# Patient Record
Sex: FEMALE | Race: BLACK OR AFRICAN AMERICAN | NOT HISPANIC OR LATINO
[De-identification: names, ages, dates, MRNs, and addresses within clinical notes are randomized per-mention and may not be internally consistent; named-entity substitution may affect disease eponyms.]

---

## 2023-03-21 ENCOUNTER — NON-APPOINTMENT (OUTPATIENT)
Age: 68
End: 2023-03-21

## 2023-03-21 ENCOUNTER — APPOINTMENT (OUTPATIENT)
Dept: INTERNAL MEDICINE | Facility: CLINIC | Age: 68
End: 2023-03-21
Payer: MEDICARE

## 2023-03-21 VITALS — SYSTOLIC BLOOD PRESSURE: 226 MMHG | DIASTOLIC BLOOD PRESSURE: 140 MMHG

## 2023-03-21 VITALS
HEART RATE: 111 BPM | WEIGHT: 183.25 LBS | HEIGHT: 65 IN | SYSTOLIC BLOOD PRESSURE: 240 MMHG | DIASTOLIC BLOOD PRESSURE: 148 MMHG | RESPIRATION RATE: 16 BRPM | BODY MASS INDEX: 30.53 KG/M2 | OXYGEN SATURATION: 95 %

## 2023-03-21 DIAGNOSIS — Z12.11 ENCOUNTER FOR SCREENING FOR MALIGNANT NEOPLASM OF COLON: ICD-10-CM

## 2023-03-21 DIAGNOSIS — Z82.49 FAMILY HISTORY OF ISCHEMIC HEART DISEASE AND OTHER DISEASES OF THE CIRCULATORY SYSTEM: ICD-10-CM

## 2023-03-21 DIAGNOSIS — Z12.31 ENCOUNTER FOR SCREENING MAMMOGRAM FOR MALIGNANT NEOPLASM OF BREAST: ICD-10-CM

## 2023-03-21 DIAGNOSIS — Z80.3 FAMILY HISTORY OF MALIGNANT NEOPLASM OF BREAST: ICD-10-CM

## 2023-03-21 DIAGNOSIS — Z23 ENCOUNTER FOR IMMUNIZATION: ICD-10-CM

## 2023-03-21 DIAGNOSIS — F12.91 CANNABIS USE, UNSPECIFIED, IN REMISSION: ICD-10-CM

## 2023-03-21 DIAGNOSIS — Z00.00 ENCOUNTER FOR GENERAL ADULT MEDICAL EXAMINATION W/OUT ABNORMAL FINDINGS: ICD-10-CM

## 2023-03-21 DIAGNOSIS — U07.1 COVID-19: ICD-10-CM

## 2023-03-21 DIAGNOSIS — Z80.0 FAMILY HISTORY OF MALIGNANT NEOPLASM OF DIGESTIVE ORGANS: ICD-10-CM

## 2023-03-21 DIAGNOSIS — Z78.9 OTHER SPECIFIED HEALTH STATUS: ICD-10-CM

## 2023-03-21 PROCEDURE — 90677 PCV20 VACCINE IM: CPT

## 2023-03-21 PROCEDURE — G0439: CPT

## 2023-03-21 PROCEDURE — 99204 OFFICE O/P NEW MOD 45 MIN: CPT | Mod: 25

## 2023-03-21 PROCEDURE — 36415 COLL VENOUS BLD VENIPUNCTURE: CPT

## 2023-03-21 PROCEDURE — G0009: CPT

## 2023-03-21 NOTE — PLAN
[FreeTextEntry1] : Routine blood work drawn in office and urine collected today. Prevnar 20 today. Start Amlodipine 10mg. Echocardiogram ordered. Refer to cardiology and GI. Mammogram ordered. Rx sent for wheezing, Levalbuterol 45 mcg/act Q4-6 hours PRN. Pt advised to sign up for [facility] portal to review labs and communicate any questions or concerns directly. Yearly physical and return as needed for illness, medication refills, and new or existing complaints. f/u on FRIDAY.

## 2023-03-21 NOTE — HEALTH RISK ASSESSMENT
[Good] : ~his/her~  mood as  good [Yes] : Yes [2 - 4 times a month (2 pts)] : 2-4 times a month (2 points) [1 or 2 (0 pts)] : 1 or 2 (0 points) [Never (0 pts)] : Never (0 points) [No] : In the past 12 months have you used drugs other than those required for medical reasons? No [No falls in past year] : Patient reported no falls in the past year [0] : 2) Feeling down, depressed, or hopeless: Not at all (0) [PHQ-2 Negative - No further assessment needed] : PHQ-2 Negative - No further assessment needed [None] : None [With Family] : lives with family [Retired] : retired [] :  [# Of Children ___] : has [unfilled] children [Fully functional (bathing, dressing, toileting, transferring, walking, feeding)] : Fully functional (bathing, dressing, toileting, transferring, walking, feeding) [Fully functional (using the telephone, shopping, preparing meals, housekeeping, doing laundry, using] : Fully functional and needs no help or supervision to perform IADLs (using the telephone, shopping, preparing meals, housekeeping, doing laundry, using transportation, managing medications and managing finances) [Reports normal functional visual acuity (ie: able to read med bottle)] : Reports normal functional visual acuity [With Patient/Caregiver] : , with patient/caregiver [Never] : Never [Audit-CScore] : 2 [RFD3Negik] : 0 [Reports changes in hearing] : Reports no changes in hearing [Reports changes in vision] : Reports no changes in vision [MammogramComments] : Due [PapSmearComments] : Due [BoneDensityComments] : Due [ColonoscopyComments] : Due [AdvancecareDate] : 03/23

## 2023-03-21 NOTE — PHYSICAL EXAM
[No Acute Distress] : no acute distress [Well Nourished] : well nourished [Well-Appearing] : well-appearing [Normal Sclera/Conjunctiva] : normal sclera/conjunctiva [PERRL] : pupils equal round and reactive to light [EOMI] : extraocular movements intact [Normal Outer Ear/Nose] : the outer ears and nose were normal in appearance [Normal Oropharynx] : the oropharynx was normal [Normal TMs] : both tympanic membranes were normal [No JVD] : no jugular venous distention [No Lymphadenopathy] : no lymphadenopathy [Supple] : supple [Thyroid Normal, No Nodules] : the thyroid was normal and there were no nodules present [No Respiratory Distress] : no respiratory distress  [No Accessory Muscle Use] : no accessory muscle use [Clear to Auscultation] : lungs were clear to auscultation bilaterally [Regular Rhythm] : with a regular rhythm [Normal S1, S2] : normal S1 and S2 [No Murmur] : no murmur heard [No Abdominal Bruit] : a ~M bruit was not heard ~T in the abdomen [No Varicosities] : no varicosities [No Palpable Aorta] : no palpable aorta [No Extremity Clubbing/Cyanosis] : no extremity clubbing/cyanosis [Soft] : abdomen soft [Non Tender] : non-tender [Non-distended] : non-distended [No Masses] : no abdominal mass palpated [No HSM] : no HSM [Normal Bowel Sounds] : normal bowel sounds [Normal Posterior Cervical Nodes] : no posterior cervical lymphadenopathy [Normal Anterior Cervical Nodes] : no anterior cervical lymphadenopathy [No CVA Tenderness] : no CVA  tenderness [No Spinal Tenderness] : no spinal tenderness [No Joint Swelling] : no joint swelling [Grossly Normal Strength/Tone] : grossly normal strength/tone [No Rash] : no rash [Coordination Grossly Intact] : coordination grossly intact [No Focal Deficits] : no focal deficits [Normal Gait] : normal gait [Normal Affect] : the affect was normal [Normal Insight/Judgement] : insight and judgment were intact [de-identified] : obesity [de-identified] : tachycardia [de-identified] : +2 pitting LE

## 2023-03-21 NOTE — HISTORY OF PRESENT ILLNESS
[de-identified] : 67 year old F with PMH unknown due to not having seen a doctor in 15+ years presents to establish care. She is noted to be in HTN urgency and out of compliance with all age/gender related recommendations. She is agreeable to all recommendations today. She complains of SOBOE. Pt denies CP, fever/chills, n/v/d/c.

## 2023-03-22 LAB
25(OH)D3 SERPL-MCNC: 10.4 NG/ML
ALBUMIN SERPL ELPH-MCNC: 4.2 G/DL
ALP BLD-CCNC: 112 U/L
ALT SERPL-CCNC: 34 U/L
ANION GAP SERPL CALC-SCNC: 13 MMOL/L
APPEARANCE: CLEAR
AST SERPL-CCNC: 27 U/L
BACTERIA: NEGATIVE
BASOPHILS # BLD AUTO: 0.04 K/UL
BASOPHILS NFR BLD AUTO: 0.6 %
BILIRUB SERPL-MCNC: 1 MG/DL
BILIRUBIN URINE: NEGATIVE
BLOOD URINE: NEGATIVE
BUN SERPL-MCNC: 17 MG/DL
CALCIUM SERPL-MCNC: 9.6 MG/DL
CHLORIDE SERPL-SCNC: 106 MMOL/L
CHOLEST SERPL-MCNC: 169 MG/DL
CO2 SERPL-SCNC: 22 MMOL/L
COLOR: COLORLESS
CREAT SERPL-MCNC: 1.23 MG/DL
EGFR: 48 ML/MIN/1.73M2
EOSINOPHIL # BLD AUTO: 0.05 K/UL
EOSINOPHIL NFR BLD AUTO: 0.7 %
ESTIMATED AVERAGE GLUCOSE: 128 MG/DL
GLUCOSE QUALITATIVE U: NEGATIVE
GLUCOSE SERPL-MCNC: 103 MG/DL
HBA1C MFR BLD HPLC: 6.1 %
HCT VFR BLD CALC: 41.7 %
HDLC SERPL-MCNC: 43 MG/DL
HGB BLD-MCNC: 13.8 G/DL
HYALINE CASTS: 1 /LPF
IMM GRANULOCYTES NFR BLD AUTO: 0.4 %
KETONES URINE: NEGATIVE
LDLC SERPL CALC-MCNC: 109 MG/DL
LEUKOCYTE ESTERASE URINE: NEGATIVE
LYMPHOCYTES # BLD AUTO: 2.41 K/UL
LYMPHOCYTES NFR BLD AUTO: 33.5 %
MAN DIFF?: NORMAL
MCHC RBC-ENTMCNC: 25.8 PG
MCHC RBC-ENTMCNC: 33.1 GM/DL
MCV RBC AUTO: 77.9 FL
MICROSCOPIC-UA: NORMAL
MONOCYTES # BLD AUTO: 0.58 K/UL
MONOCYTES NFR BLD AUTO: 8.1 %
NEUTROPHILS # BLD AUTO: 4.09 K/UL
NEUTROPHILS NFR BLD AUTO: 56.7 %
NITRITE URINE: NEGATIVE
NONHDLC SERPL-MCNC: 126 MG/DL
NT-PROBNP SERPL-MCNC: 3210 PG/ML
PH URINE: 7
PLATELET # BLD AUTO: 299 K/UL
POTASSIUM SERPL-SCNC: 3.8 MMOL/L
PROT SERPL-MCNC: 7.1 G/DL
PROTEIN URINE: ABNORMAL
RBC # BLD: 5.35 M/UL
RBC # FLD: 16.3 %
RED BLOOD CELLS URINE: 2 /HPF
SODIUM SERPL-SCNC: 142 MMOL/L
SPECIFIC GRAVITY URINE: 1.01
SQUAMOUS EPITHELIAL CELLS: 1 /HPF
TRIGL SERPL-MCNC: 85 MG/DL
TSH SERPL-ACNC: 4.13 UIU/ML
UROBILINOGEN URINE: NORMAL
WBC # FLD AUTO: 7.2 K/UL
WHITE BLOOD CELLS URINE: 1 /HPF

## 2023-03-23 ENCOUNTER — NON-APPOINTMENT (OUTPATIENT)
Age: 68
End: 2023-03-23

## 2023-03-24 ENCOUNTER — APPOINTMENT (OUTPATIENT)
Dept: INTERNAL MEDICINE | Facility: CLINIC | Age: 68
End: 2023-03-24
Payer: MEDICARE

## 2023-03-24 ENCOUNTER — APPOINTMENT (OUTPATIENT)
Dept: CARDIOLOGY | Facility: CLINIC | Age: 68
End: 2023-03-24
Payer: MEDICARE

## 2023-03-24 VITALS
DIASTOLIC BLOOD PRESSURE: 134 MMHG | OXYGEN SATURATION: 90 % | BODY MASS INDEX: 29.66 KG/M2 | HEIGHT: 65 IN | SYSTOLIC BLOOD PRESSURE: 189 MMHG | TEMPERATURE: 97.8 F | HEART RATE: 111 BPM | WEIGHT: 178 LBS | RESPIRATION RATE: 20 BRPM

## 2023-03-24 VITALS — DIASTOLIC BLOOD PRESSURE: 132 MMHG | SYSTOLIC BLOOD PRESSURE: 188 MMHG

## 2023-03-24 VITALS
DIASTOLIC BLOOD PRESSURE: 148 MMHG | WEIGHT: 183 LBS | BODY MASS INDEX: 30.49 KG/M2 | HEIGHT: 65 IN | HEART RATE: 112 BPM | SYSTOLIC BLOOD PRESSURE: 203 MMHG | TEMPERATURE: 97.9 F | OXYGEN SATURATION: 88 %

## 2023-03-24 VITALS — DIASTOLIC BLOOD PRESSURE: 120 MMHG | SYSTOLIC BLOOD PRESSURE: 170 MMHG

## 2023-03-24 DIAGNOSIS — R79.89 OTHER SPECIFIED ABNORMAL FINDINGS OF BLOOD CHEMISTRY: ICD-10-CM

## 2023-03-24 DIAGNOSIS — E55.9 VITAMIN D DEFICIENCY, UNSPECIFIED: ICD-10-CM

## 2023-03-24 DIAGNOSIS — R73.03 PREDIABETES.: ICD-10-CM

## 2023-03-24 DIAGNOSIS — R71.8 OTHER ABNORMALITY OF RED BLOOD CELLS: ICD-10-CM

## 2023-03-24 PROCEDURE — 93306 TTE W/DOPPLER COMPLETE: CPT

## 2023-03-24 PROCEDURE — 99214 OFFICE O/P EST MOD 30 MIN: CPT

## 2023-03-24 PROCEDURE — 99205 OFFICE O/P NEW HI 60 MIN: CPT | Mod: 25

## 2023-03-24 NOTE — PLAN
[FreeTextEntry1] : Plan as above. Pending cardio and echo today. Add on Carvedilol 6.25mg BID. Pt advised to sign up for [facility] portal to review labs and communicate any questions or concerns directly. Yearly physical and return as needed for illness, medication refills, and new or existing complaints. f/u 3 weeks.

## 2023-03-24 NOTE — HISTORY OF PRESENT ILLNESS
[FreeTextEntry1] : 67 HTN, preDM, elevated SCr\par \par 1 month of DELGADO\par with minimal exertion\par \par occ PND and orthopnea\par + new LE edema\par \par No recent long trips\par No weight loss of constitutional symptoms\par \par TTE today with RV enlargement/failure\par severe pulmonary hypertension with severe TR\par mildly enlarged ascending aorta\par MARLEY with IVS 1.9cm\par \par Fam hx:\par Father - CABG age 84\par No SCD\par \par Pregnancy hx:\par Once - bedrest for fibroids. PIH\par Hysterectomy ~ 36\par Night sweats age 40
no

## 2023-03-24 NOTE — DISCUSSION/SUMMARY
[FreeTextEntry1] : Acute RV failure with some degree of chronicity to her severe pHTN\par MARLEY with dilated ascending aorta\par volume overload\par severe HTN\par proteinuria\par \par Unclear if this is primary pulmonary HTN vs. WHO Group 2 as the pt does not have classic findings of hypertensive heart disease and her MARLEY may reflect an independent finding\par \par -on amlodipine 10mg daily\par -just started on carvedilol 6.25mg BID\par -will afterload reduced with hydralazine 25mg TID\par -diurese with furosemide 40mg daily, to increase if needed\par \par -we discussed reducing sodium in the diet\par \par will have pt return in 1 week for BP and BMP check\par will assess volume status and determine diuretic titration\par \par she will eventually need a RHC when euvolemic to better assess her pressures\par if they remain significantly elevated may need to consider CTA to evaluate for chronic PE as well as to evaluate her aortic size\par \par discussed plan of care with pt in detail

## 2023-03-24 NOTE — PHYSICAL EXAM
[Well Developed] : well developed [Well Nourished] : well nourished [No Acute Distress] : no acute distress [Normal Conjunctiva] : normal conjunctiva [Normal Venous Pressure] : normal venous pressure [No Carotid Bruit] : no carotid bruit [Normal S1, S2] : normal S1, S2 [No Murmur] : no murmur [No Rub] : no rub [No Gallop] : no gallop [Clear Lung Fields] : clear lung fields [Good Air Entry] : good air entry [Soft] : abdomen soft [Non Tender] : non-tender [Normal Gait] : normal gait [No Cyanosis] : no cyanosis [No Clubbing] : no clubbing [Edema ___] : edema [unfilled] [No Rash] : no rash [No Skin Lesions] : no skin lesions [Moves all extremities] : moves all extremities [No Focal Deficits] : no focal deficits [Normal Speech] : normal speech [Alert and Oriented] : alert and oriented [Normal memory] : normal memory

## 2023-03-24 NOTE — REVIEW OF SYSTEMS
[Chest Pain] : no chest pain [Palpitations] : no palpitations [Leg Claudication] : no leg claudication [Lower Ext Edema] : lower extremity edema [Orthopnea] : no orthopnea [Paroxysmal Nocturnal Dyspnea] : no paroxysmal nocturnal dyspnea [Negative] : Heme/Lymph

## 2023-03-24 NOTE — CARDIOLOGY SUMMARY
[de-identified] : 3/21/23: KE ROMERO [de-identified] : 3/24/23:\par small LV, enlarged RV with at least moderate RV dysfunction, DD 1, severe TR, PASP 88mmHg, LVOT gradient 12mmHg at rest, severe KE, MARLEY IVS 1.9cm. ASc Ao 4.0 cm (indexed 2.0cm/m2)

## 2023-03-24 NOTE — HISTORY OF PRESENT ILLNESS
[de-identified] : 67 year old F with PMH HTN, possible CHF, pre-DM, CKD IIIa, microcytosis and vitamin D deficiency presents for short-term follow up for BP and blood work results. Pt denies CP/SOB, fever/chills, n/v/d/c.

## 2023-03-24 NOTE — REVIEW OF SYSTEMS
[Negative] : Heme/Lymph [SOB] : shortness of breath [Dyspnea on exertion] : dyspnea during exertion [Chest Discomfort] : no chest discomfort [Lower Ext Edema] : lower extremity edema [Orthopnea] : orthopnea

## 2023-03-31 ENCOUNTER — APPOINTMENT (OUTPATIENT)
Dept: CARDIOLOGY | Facility: CLINIC | Age: 68
End: 2023-03-31

## 2023-03-31 VITALS
WEIGHT: 176.25 LBS | BODY MASS INDEX: 29.37 KG/M2 | HEIGHT: 65 IN | SYSTOLIC BLOOD PRESSURE: 136 MMHG | DIASTOLIC BLOOD PRESSURE: 96 MMHG

## 2023-04-03 ENCOUNTER — MESSAGE (OUTPATIENT)
Age: 68
End: 2023-04-03

## 2023-04-03 LAB
ANION GAP SERPL CALC-SCNC: 14 MMOL/L
BUN SERPL-MCNC: 27 MG/DL
CALCIUM SERPL-MCNC: 9.8 MG/DL
CHLORIDE SERPL-SCNC: 102 MMOL/L
CO2 SERPL-SCNC: 26 MMOL/L
CREAT SERPL-MCNC: 1.62 MG/DL
EGFR: 35 ML/MIN/1.73M2
GLUCOSE SERPL-MCNC: 120 MG/DL
POTASSIUM SERPL-SCNC: 3.9 MMOL/L
SODIUM SERPL-SCNC: 142 MMOL/L

## 2023-04-14 ENCOUNTER — APPOINTMENT (OUTPATIENT)
Dept: INTERNAL MEDICINE | Facility: CLINIC | Age: 68
End: 2023-04-14
Payer: MEDICARE

## 2023-04-14 VITALS
HEIGHT: 65 IN | TEMPERATURE: 98 F | WEIGHT: 172 LBS | BODY MASS INDEX: 28.66 KG/M2 | SYSTOLIC BLOOD PRESSURE: 124 MMHG | HEART RATE: 82 BPM | OXYGEN SATURATION: 9 % | DIASTOLIC BLOOD PRESSURE: 84 MMHG

## 2023-04-14 DIAGNOSIS — I16.0 HYPERTENSIVE URGENCY: ICD-10-CM

## 2023-04-14 DIAGNOSIS — Z87.898 PERSONAL HISTORY OF OTHER SPECIFIED CONDITIONS: ICD-10-CM

## 2023-04-14 DIAGNOSIS — E66.3 OVERWEIGHT: ICD-10-CM

## 2023-04-14 DIAGNOSIS — Z86.79 PERSONAL HISTORY OF OTHER DISEASES OF THE CIRCULATORY SYSTEM: ICD-10-CM

## 2023-04-14 PROCEDURE — 99214 OFFICE O/P EST MOD 30 MIN: CPT

## 2023-04-14 NOTE — PLAN
[FreeTextEntry1] : c/w plan. Stable chronic issues discussed and documented. c/w medications as prescribed. Lasix decreased to 1 tab QD since last week. BP is well controlled with Carvedilol 6.25mg BID , Amlodipine 10mg QD, and Hydralazine 25mg TID. f/u with cardiology. Advised to have mammogram. Pt advised to sign up for [facility] portal to review labs and communicate any questions or concerns directly. Yearly physical and return as needed for illness, medication refills, and new or existing complaints. f/u 1 month.

## 2023-04-14 NOTE — PHYSICAL EXAM
[No Acute Distress] : no acute distress [Well Nourished] : well nourished [Well Developed] : well developed [Well-Appearing] : well-appearing [No Respiratory Distress] : no respiratory distress  [No Accessory Muscle Use] : no accessory muscle use [Clear to Auscultation] : lungs were clear to auscultation bilaterally [Normal Rate] : normal rate  [Regular Rhythm] : with a regular rhythm [Normal S1, S2] : normal S1 and S2 [No Murmur] : no murmur heard [No Extremity Clubbing/Cyanosis] : no extremity clubbing/cyanosis [Coordination Grossly Intact] : coordination grossly intact [No Focal Deficits] : no focal deficits [Normal Gait] : normal gait [Normal Affect] : the affect was normal [Normal Insight/Judgement] : insight and judgment were intact [de-identified] : left ankle swelling

## 2023-04-14 NOTE — HISTORY OF PRESENT ILLNESS
[de-identified] : 67 year old F with PMH HTN, CKD IIIa, right heart CHF, and pHTN presents for follow up after cardiology eval, diuresis, and BP control. Pt is feeling well. She is taking her meds. Blood work showing some acute on chronic renal failure and elevated proBNP. Pt denies CP/SOB, fever/chills, n/v/d/c.

## 2023-05-12 ENCOUNTER — APPOINTMENT (OUTPATIENT)
Dept: INTERNAL MEDICINE | Facility: CLINIC | Age: 68
End: 2023-05-12
Payer: MEDICARE

## 2023-05-12 VITALS
HEART RATE: 83 BPM | TEMPERATURE: 98 F | SYSTOLIC BLOOD PRESSURE: 163 MMHG | BODY MASS INDEX: 28.32 KG/M2 | OXYGEN SATURATION: 90 % | DIASTOLIC BLOOD PRESSURE: 110 MMHG | HEIGHT: 65 IN | WEIGHT: 170 LBS

## 2023-05-12 VITALS — DIASTOLIC BLOOD PRESSURE: 88 MMHG | SYSTOLIC BLOOD PRESSURE: 122 MMHG

## 2023-05-12 PROCEDURE — 99214 OFFICE O/P EST MOD 30 MIN: CPT | Mod: 25

## 2023-05-12 PROCEDURE — 36415 COLL VENOUS BLD VENIPUNCTURE: CPT

## 2023-05-12 NOTE — PLAN
[FreeTextEntry1] : Routine blood work drawn in office and urine collected today. Medications reconciled. Things appear stable. She just needs to f/u with cardiology for any additional recommended procedures and maybe pulmonology depending on cardiology results. f/u 3 months.

## 2023-05-12 NOTE — HISTORY OF PRESENT ILLNESS
[de-identified] : 67 year old F with PMH HTN, CKD IIIa, right heart CHF, and pHTN presents for follow up. Feels well today. Pt denies CP/SOB, fever/chills, n/v/d/c.

## 2023-05-15 LAB
25(OH)D3 SERPL-MCNC: 25.7 NG/ML
ALBUMIN SERPL ELPH-MCNC: 4.3 G/DL
ALP BLD-CCNC: 97 U/L
ALT SERPL-CCNC: 19 U/L
ANION GAP SERPL CALC-SCNC: 13 MMOL/L
APPEARANCE: CLEAR
AST SERPL-CCNC: 22 U/L
BACTERIA: ABNORMAL /HPF
BASOPHILS # BLD AUTO: 0.07 K/UL
BASOPHILS NFR BLD AUTO: 1 %
BILIRUB SERPL-MCNC: 1 MG/DL
BILIRUBIN URINE: NEGATIVE
BLOOD URINE: NEGATIVE
BUN SERPL-MCNC: 24 MG/DL
CALCIUM SERPL-MCNC: 9.7 MG/DL
CAST: 2 /LPF
CHLORIDE SERPL-SCNC: 109 MMOL/L
CO2 SERPL-SCNC: 22 MMOL/L
COLOR: NORMAL
CREAT SERPL-MCNC: 1.45 MG/DL
CREAT SPEC-SCNC: 211 MG/DL
EGFR: 40 ML/MIN/1.73M2
EOSINOPHIL # BLD AUTO: 0.16 K/UL
EOSINOPHIL NFR BLD AUTO: 2.3 %
EPITHELIAL CELLS: 8 /HPF
GLUCOSE QUALITATIVE U: NEGATIVE MG/DL
GLUCOSE SERPL-MCNC: 100 MG/DL
HCT VFR BLD CALC: 40.1 %
HGB BLD-MCNC: 13.6 G/DL
IMM GRANULOCYTES NFR BLD AUTO: 0.4 %
KETONES URINE: ABNORMAL MG/DL
LEUKOCYTE ESTERASE URINE: NEGATIVE
LYMPHOCYTES # BLD AUTO: 1.97 K/UL
LYMPHOCYTES NFR BLD AUTO: 28.4 %
MAN DIFF?: NORMAL
MCHC RBC-ENTMCNC: 25.9 PG
MCHC RBC-ENTMCNC: 33.9 GM/DL
MCV RBC AUTO: 76.4 FL
MICROALBUMIN 24H UR DL<=1MG/L-MCNC: 7.7 MG/DL
MICROALBUMIN/CREAT 24H UR-RTO: 37 MG/G
MICROSCOPIC-UA: NORMAL
MONOCYTES # BLD AUTO: 0.56 K/UL
MONOCYTES NFR BLD AUTO: 8.1 %
NEUTROPHILS # BLD AUTO: 4.14 K/UL
NEUTROPHILS NFR BLD AUTO: 59.8 %
NITRITE URINE: NEGATIVE
NT-PROBNP SERPL-MCNC: 866 PG/ML
PH URINE: 5.5
PLATELET # BLD AUTO: 267 K/UL
POTASSIUM SERPL-SCNC: 4.2 MMOL/L
PROT SERPL-MCNC: 7.2 G/DL
PROTEIN URINE: 30 MG/DL
RBC # BLD: 5.25 M/UL
RBC # FLD: 16.2 %
RED BLOOD CELLS URINE: 0 /HPF
SODIUM SERPL-SCNC: 144 MMOL/L
SPECIFIC GRAVITY URINE: 1.02
UROBILINOGEN URINE: 1 MG/DL
WBC # FLD AUTO: 6.93 K/UL
WHITE BLOOD CELLS URINE: 2 /HPF

## 2023-05-16 ENCOUNTER — APPOINTMENT (OUTPATIENT)
Dept: MAMMOGRAPHY | Facility: CLINIC | Age: 68
End: 2023-05-16
Payer: MEDICARE

## 2023-05-16 ENCOUNTER — RESULT REVIEW (OUTPATIENT)
Age: 68
End: 2023-05-16

## 2023-05-16 PROCEDURE — 77067 SCR MAMMO BI INCL CAD: CPT

## 2023-05-16 PROCEDURE — 77063 BREAST TOMOSYNTHESIS BI: CPT

## 2023-06-12 ENCOUNTER — RX RENEWAL (OUTPATIENT)
Age: 68
End: 2023-06-12

## 2023-06-20 ENCOUNTER — TRANSCRIPTION ENCOUNTER (OUTPATIENT)
Age: 68
End: 2023-06-20

## 2023-06-20 ENCOUNTER — OUTPATIENT (OUTPATIENT)
Dept: OUTPATIENT SERVICES | Facility: HOSPITAL | Age: 68
LOS: 1 days | End: 2023-06-20
Payer: MEDICARE

## 2023-06-20 VITALS
OXYGEN SATURATION: 92 % | HEART RATE: 706 BPM | SYSTOLIC BLOOD PRESSURE: 135 MMHG | RESPIRATION RATE: 14 BRPM | DIASTOLIC BLOOD PRESSURE: 87 MMHG

## 2023-06-20 VITALS
HEART RATE: 75 BPM | SYSTOLIC BLOOD PRESSURE: 137 MMHG | TEMPERATURE: 99 F | HEIGHT: 64 IN | WEIGHT: 169.09 LBS | DIASTOLIC BLOOD PRESSURE: 95 MMHG | OXYGEN SATURATION: 92 % | RESPIRATION RATE: 16 BRPM

## 2023-06-20 DIAGNOSIS — R06.02 SHORTNESS OF BREATH: ICD-10-CM

## 2023-06-20 DIAGNOSIS — Z98.891 HISTORY OF UTERINE SCAR FROM PREVIOUS SURGERY: Chronic | ICD-10-CM

## 2023-06-20 DIAGNOSIS — Z90.710 ACQUIRED ABSENCE OF BOTH CERVIX AND UTERUS: Chronic | ICD-10-CM

## 2023-06-20 LAB
ALBUMIN SERPL ELPH-MCNC: 3.7 G/DL — SIGNIFICANT CHANGE UP (ref 3.3–5)
ALP SERPL-CCNC: 79 U/L — SIGNIFICANT CHANGE UP (ref 40–120)
ALT FLD-CCNC: 24 U/L — SIGNIFICANT CHANGE UP (ref 10–45)
ANION GAP SERPL CALC-SCNC: 12 MMOL/L — SIGNIFICANT CHANGE UP (ref 5–17)
AST SERPL-CCNC: 22 U/L — SIGNIFICANT CHANGE UP (ref 10–40)
BILIRUB SERPL-MCNC: 0.7 MG/DL — SIGNIFICANT CHANGE UP (ref 0.2–1.2)
BUN SERPL-MCNC: 20 MG/DL — SIGNIFICANT CHANGE UP (ref 7–23)
CALCIUM SERPL-MCNC: 9.1 MG/DL — SIGNIFICANT CHANGE UP (ref 8.4–10.5)
CHLORIDE SERPL-SCNC: 108 MMOL/L — SIGNIFICANT CHANGE UP (ref 96–108)
CO2 SERPL-SCNC: 20 MMOL/L — LOW (ref 22–31)
CREAT SERPL-MCNC: 1.32 MG/DL — HIGH (ref 0.5–1.3)
EGFR: 44 ML/MIN/1.73M2 — LOW
GLUCOSE SERPL-MCNC: 95 MG/DL — SIGNIFICANT CHANGE UP (ref 70–99)
HCT VFR BLD CALC: 36.5 % — SIGNIFICANT CHANGE UP (ref 34.5–45)
HGB BLD-MCNC: 12.4 G/DL — SIGNIFICANT CHANGE UP (ref 11.5–15.5)
MCHC RBC-ENTMCNC: 25.6 PG — LOW (ref 27–34)
MCHC RBC-ENTMCNC: 34 GM/DL — SIGNIFICANT CHANGE UP (ref 32–36)
MCV RBC AUTO: 75.4 FL — LOW (ref 80–100)
NRBC # BLD: 0 /100 WBCS — SIGNIFICANT CHANGE UP (ref 0–0)
PLATELET # BLD AUTO: 228 K/UL — SIGNIFICANT CHANGE UP (ref 150–400)
POTASSIUM SERPL-MCNC: 4.2 MMOL/L — SIGNIFICANT CHANGE UP (ref 3.5–5.3)
POTASSIUM SERPL-SCNC: 4.2 MMOL/L — SIGNIFICANT CHANGE UP (ref 3.5–5.3)
PROT SERPL-MCNC: 6.9 G/DL — SIGNIFICANT CHANGE UP (ref 6–8.3)
RBC # BLD: 4.84 M/UL — SIGNIFICANT CHANGE UP (ref 3.8–5.2)
RBC # FLD: 15 % — HIGH (ref 10.3–14.5)
SODIUM SERPL-SCNC: 140 MMOL/L — SIGNIFICANT CHANGE UP (ref 135–145)
WBC # BLD: 6.84 K/UL — SIGNIFICANT CHANGE UP (ref 3.8–10.5)
WBC # FLD AUTO: 6.84 K/UL — SIGNIFICANT CHANGE UP (ref 3.8–10.5)

## 2023-06-20 PROCEDURE — 99152 MOD SED SAME PHYS/QHP 5/>YRS: CPT

## 2023-06-20 PROCEDURE — C1889: CPT

## 2023-06-20 PROCEDURE — 93456 R HRT CORONARY ARTERY ANGIO: CPT

## 2023-06-20 PROCEDURE — 93005 ELECTROCARDIOGRAM TRACING: CPT

## 2023-06-20 PROCEDURE — C1769: CPT

## 2023-06-20 PROCEDURE — 93456 R HRT CORONARY ARTERY ANGIO: CPT | Mod: 26

## 2023-06-20 PROCEDURE — 82803 BLOOD GASES ANY COMBINATION: CPT

## 2023-06-20 PROCEDURE — 85027 COMPLETE CBC AUTOMATED: CPT

## 2023-06-20 PROCEDURE — 93010 ELECTROCARDIOGRAM REPORT: CPT

## 2023-06-20 PROCEDURE — C1894: CPT

## 2023-06-20 PROCEDURE — 80053 COMPREHEN METABOLIC PANEL: CPT

## 2023-06-20 PROCEDURE — C1887: CPT

## 2023-06-20 RX ORDER — HYDRALAZINE HCL 50 MG
25 TABLET ORAL ONCE
Refills: 0 | Status: COMPLETED | OUTPATIENT
Start: 2023-06-20 | End: 2023-06-20

## 2023-06-20 RX ADMIN — Medication 25 MILLIGRAM(S): at 15:14

## 2023-06-20 NOTE — ASU DISCHARGE PLAN (ADULT/PEDIATRIC) - NS MD DC FALL RISK RISK
For information on Fall & Injury Prevention, visit: [email or web address] OR  [email or web address] OR  https://www.cdc.gov/steadi/patient.html

## 2023-06-20 NOTE — H&P CARDIOLOGY - NSICDXPASTMEDICALHX_GEN_ALL_CORE_FT
PAST MEDICAL HISTORY:  Enlarged aorta     HTN (hypertension)     Mild tricuspid regurgitation     Prediabetes

## 2023-06-20 NOTE — ASU PATIENT PROFILE, ADULT - FALL HARM RISK - UNIVERSAL INTERVENTIONS
Bed in lowest position, wheels locked, appropriate side rails in place/Call bell, personal items and telephone in reach/Instruct patient to call for assistance before getting out of bed or chair/Non-slip footwear when patient is out of bed/[place] to call system/Physically safe environment - no spills, clutter or unnecessary equipment/Purposeful Proactive Rounding/Room/bathroom lighting operational, light cord in reach

## 2023-06-20 NOTE — ASU DISCHARGE PLAN (ADULT/PEDIATRIC) - ASU DC SPECIAL INSTRUCTIONSFT
Wound Care:   the day AFTER your procedure remove bandage GENTTLY, and clean using  mild soap and gentle warm, water stream, pat dry. leave OPEN to air. YOU MAY SHOWER   DO NOT apply lotions, creams, ointments, powder, parfumes to your incision site  DO NOT SOAK your site for 1 week ( no baths, no pools, no tubs, etc...)  Check  your groin and /or wirst daily.A small amount of bruising, and soarness are normal    ACTIVITY: for 24 hours   - DO NOT DRIVE  - DO NOT make any important decisions or sign legal documents   - DO NOT operate heavy machinaries   - you may resume sexual activity in 48 hours, unless otherwise instructed by your cardiologist     If your procedure was done through the WRIST: for the NEXT 3DAYS:  - avoid pushing, pulling, with that affected wrist   - avoid repeated movement of that hand and wrist ( eg: typing, hammering)  - DO NOT LIFT anything more than 5 lbs     If your procedure was done through the GROIN: for the NEXT 5 DAYS  - Limit climbing stairs, DO NOT soak in bathtub or pool  - no strenous activities, pushing, pulling, straining  - Do not lift anything 10lbs or heavier     MEDICATION:   take your medications as explained ( see discharge paperwork)   If you received a STENT, you will be taking antiplatelet medications to KEEP YOUR STENT OPEN ( eg: Aspirin, Plavix, Brilinta, Effient, etc).  Take as prescribed DO NOT STOP taking them without consulting with your cardiologist first.     Follow heart healthy diet reccomended by your doctor, , if you smoke STOP SMOKING ( may call [phone] for center of tobacco control if you need assistance)     CALL your doctor to make appointment in 2 WEEKS     ***CALL YOUR DOCTOR***  if you experience: fever, chills, body aches, or severe pain, swelling, redness, heat or yellow discharge at incision site  If you experience Bleeding or excruciating pain at the procedural site, sweliing ( golf ball size) at your procedural site  If you experience CHEST PAIN  If you experience extremity numbness, tingling, temperature change ( of your procedural site)   If you are unable to reach your doctor, you may contact:   -Cardiology Office at [facility] at [phone] or   - [facility] [phone] - [facility] [phone]

## 2023-06-20 NOTE — ASU DISCHARGE PLAN (ADULT/PEDIATRIC) - CARE PROVIDER_API CALL
Brennen Barnett  Cardiology  [address] [place] [address]  Phone: [phone]  Fax: [phone]  Follow Up Time: Routine

## 2023-06-20 NOTE — H&P CARDIOLOGY - HISTORY OF PRESENT ILLNESS
This is a 66 yo female with o PMH of HTN, recently dx at started on antihypertensives by her cardiologist Ericka Newton, pre DM ( a1C 6.1%).  Patient with c/c of shortness of breath on exertion x1 month and new LE edema +1.  Patients admits not to have regular check ups for "many years" and when she first was evaluated by Dr Barnett her blood pressure was "extremely high" >200s.  FH of CAD, father had CABG at age 84. ECHO 3/20/2023 revealed RV enlargement/failure, severe pulmonary htn with severe TR, mildly enlarged aorta ( MARLEY with IVS 1.9cm).  Presents here today for RHC/LHC.

## 2023-06-21 LAB
HGB BLDA-MCNC: 12.9 G/DL — SIGNIFICANT CHANGE UP (ref 11.7–16.1)
HGB FLD-MCNC: 12.5 G/DL — SIGNIFICANT CHANGE UP (ref 11.7–16.5)
OXYHGB MFR BLDA: 85.5 % — LOW (ref 90–95)
OXYHGB MFR BLDMV: 58.3 % — LOW (ref 90–95)
SAO2 % BLD: 59.4 % — LOW (ref 60–90)
SAO2 % BLDA: 87.5 % — LOW (ref 94–98)

## 2023-06-22 PROBLEM — I07.1 RHEUMATIC TRICUSPID INSUFFICIENCY: Chronic | Status: ACTIVE | Noted: 2023-06-20

## 2023-06-22 PROBLEM — I10 ESSENTIAL (PRIMARY) HYPERTENSION: Chronic | Status: ACTIVE | Noted: 2023-06-20

## 2023-06-22 PROBLEM — I77.89 OTHER SPECIFIED DISORDERS OF ARTERIES AND ARTERIOLES: Chronic | Status: ACTIVE | Noted: 2023-06-20

## 2023-06-22 PROBLEM — R73.03 PREDIABETES: Chronic | Status: ACTIVE | Noted: 2023-06-20

## 2023-06-29 ENCOUNTER — NON-APPOINTMENT (OUTPATIENT)
Age: 68
End: 2023-06-29

## 2023-07-24 ENCOUNTER — APPOINTMENT (OUTPATIENT)
Dept: CARDIOLOGY | Facility: CLINIC | Age: 68
End: 2023-07-24
Payer: MEDICARE

## 2023-07-24 VITALS — SYSTOLIC BLOOD PRESSURE: 138 MMHG | DIASTOLIC BLOOD PRESSURE: 86 MMHG

## 2023-07-24 VITALS
BODY MASS INDEX: 27.82 KG/M2 | HEART RATE: 79 BPM | SYSTOLIC BLOOD PRESSURE: 160 MMHG | OXYGEN SATURATION: 95 % | TEMPERATURE: 98.4 F | DIASTOLIC BLOOD PRESSURE: 94 MMHG | HEIGHT: 65 IN | WEIGHT: 167 LBS

## 2023-07-24 DIAGNOSIS — I50.811 ACUTE RIGHT HEART FAILURE: ICD-10-CM

## 2023-07-24 PROCEDURE — 99214 OFFICE O/P EST MOD 30 MIN: CPT

## 2023-07-24 NOTE — REVIEW OF SYSTEMS
[Negative] : Heme/Lymph [SOB] : no shortness of breath [Dyspnea on exertion] : not dyspnea during exertion [Chest Discomfort] : no chest discomfort [Lower Ext Edema] : no extremity edema [Orthopnea] : no orthopnea

## 2023-07-24 NOTE — CARDIOLOGY SUMMARY
[de-identified] : 3/21/23: KE ROMERO [de-identified] : 3/24/23:\par small LV, enlarged RV with at least moderate RV dysfunction, DD 1, severe TR, PASP 88mmHg, LVOT gradient 12mmHg at rest, severe KE, MARLEY IVS 1.9cm. ASc Ao 4.0 cm (indexed 2.0cm/m2) [de-identified] : 6/20/23:\par mild nonobstructive CAD, severe pulmonary hypertension\par pLAD 30%\par pRCA 40%\par PASP 82\par PAD 28\par PCWP 20

## 2023-07-24 NOTE — PHYSICAL EXAM
[Well Developed] : well developed [Well Nourished] : well nourished [No Acute Distress] : no acute distress [Normal Conjunctiva] : normal conjunctiva [Normal Venous Pressure] : normal venous pressure [No Carotid Bruit] : no carotid bruit [Normal S1, S2] : normal S1, S2 [No Murmur] : no murmur [No Rub] : no rub [No Gallop] : no gallop [Clear Lung Fields] : clear lung fields [Good Air Entry] : good air entry [Soft] : abdomen soft [Non Tender] : non-tender [Normal Gait] : normal gait [No Cyanosis] : no cyanosis [No Clubbing] : no clubbing [No Rash] : no rash [No Skin Lesions] : no skin lesions [Moves all extremities] : moves all extremities [No Focal Deficits] : no focal deficits [Normal Speech] : normal speech [Alert and Oriented] : alert and oriented [Normal memory] : normal memory [No Edema] : no edema

## 2023-07-24 NOTE — HISTORY OF PRESENT ILLNESS
[FreeTextEntry1] : 67 HTN, preDM, elevated SCr, RV failure with pHTN, MARLEY, ascending aortic aneurysm 4.0cm, non-obs CAD\par \par originally presented with \par 1 month of DELGADO with minimal exertion, occ PND and orthopnea, + new LE edema\par \par No recent long trips\par No weight loss of constitutional symptoms\par \par had RV enlargement/failure\par severe pulmonary hypertension with severe TR\par mildly enlarged ascending aorta\par MARLEY with IVS 1.9cm\par \par started on furosemide\par has better ET\par LE much improved - now just worse at the end of the day\par \par Fam hx:\par Father - CABG age 84\par No SCD\par \par Pregnancy hx:\par Once - bedrest for fibroids. PIH\par Hysterectomy ~ 36\par Night sweats age 40

## 2023-08-01 ENCOUNTER — MESSAGE (OUTPATIENT)
Age: 68
End: 2023-08-01

## 2023-08-01 LAB
ANION GAP SERPL CALC-SCNC: 11 MMOL/L
BUN SERPL-MCNC: 24 MG/DL
CALCIUM SERPL-MCNC: 9.3 MG/DL
CHLORIDE SERPL-SCNC: 109 MMOL/L
CO2 SERPL-SCNC: 21 MMOL/L
CREAT SERPL-MCNC: 1.31 MG/DL
EGFR: 45 ML/MIN/1.73M2
GLUCOSE SERPL-MCNC: 99 MG/DL
POTASSIUM SERPL-SCNC: 4.5 MMOL/L
SODIUM SERPL-SCNC: 142 MMOL/L

## 2023-08-02 ENCOUNTER — APPOINTMENT (OUTPATIENT)
Dept: PULMONOLOGY | Facility: CLINIC | Age: 68
End: 2023-08-02
Payer: MEDICARE

## 2023-08-02 ENCOUNTER — NON-APPOINTMENT (OUTPATIENT)
Age: 68
End: 2023-08-02

## 2023-08-02 ENCOUNTER — INPATIENT (INPATIENT)
Facility: HOSPITAL | Age: 68
LOS: 3 days | Discharge: HOME | DRG: 300 | End: 2023-08-06
Attending: STUDENT IN AN ORGANIZED HEALTH CARE EDUCATION/TRAINING PROGRAM | Admitting: THORACIC SURGERY (CARDIOTHORACIC VASCULAR SURGERY)
Payer: MEDICARE

## 2023-08-02 VITALS
HEART RATE: 96 BPM | WEIGHT: 166.89 LBS | SYSTOLIC BLOOD PRESSURE: 189 MMHG | HEIGHT: 64 IN | TEMPERATURE: 98 F | RESPIRATION RATE: 22 BRPM | DIASTOLIC BLOOD PRESSURE: 104 MMHG | OXYGEN SATURATION: 91 %

## 2023-08-02 VITALS — DIASTOLIC BLOOD PRESSURE: 76 MMHG | HEART RATE: 71 BPM | OXYGEN SATURATION: 92 % | SYSTOLIC BLOOD PRESSURE: 121 MMHG

## 2023-08-02 DIAGNOSIS — Z90.710 ACQUIRED ABSENCE OF BOTH CERVIX AND UTERUS: Chronic | ICD-10-CM

## 2023-08-02 DIAGNOSIS — J98.4 OTHER DISORDERS OF LUNG: ICD-10-CM

## 2023-08-02 DIAGNOSIS — Z98.891 HISTORY OF UTERINE SCAR FROM PREVIOUS SURGERY: Chronic | ICD-10-CM

## 2023-08-02 LAB
ALBUMIN SERPL ELPH-MCNC: 4.2 G/DL — SIGNIFICANT CHANGE UP (ref 3.3–5)
ALP SERPL-CCNC: 97 U/L — SIGNIFICANT CHANGE UP (ref 40–120)
ALT FLD-CCNC: 32 U/L — SIGNIFICANT CHANGE UP (ref 10–45)
ANION GAP SERPL CALC-SCNC: 14 MMOL/L — SIGNIFICANT CHANGE UP (ref 5–17)
AST SERPL-CCNC: 31 U/L — SIGNIFICANT CHANGE UP (ref 10–40)
BASE EXCESS BLDV CALC-SCNC: -2.3 MMOL/L — LOW (ref -2–3)
BASOPHILS # BLD AUTO: 0.04 K/UL — SIGNIFICANT CHANGE UP (ref 0–0.2)
BASOPHILS NFR BLD AUTO: 0.7 % — SIGNIFICANT CHANGE UP (ref 0–2)
BILIRUB SERPL-MCNC: 0.7 MG/DL — SIGNIFICANT CHANGE UP (ref 0.2–1.2)
BUN SERPL-MCNC: 22 MG/DL — SIGNIFICANT CHANGE UP (ref 7–23)
CA-I SERPL-SCNC: 1.14 MMOL/L — LOW (ref 1.15–1.33)
CALCIUM SERPL-MCNC: 9.4 MG/DL — SIGNIFICANT CHANGE UP (ref 8.4–10.5)
CHLORIDE BLDV-SCNC: 106 MMOL/L — SIGNIFICANT CHANGE UP (ref 96–108)
CHLORIDE SERPL-SCNC: 107 MMOL/L — SIGNIFICANT CHANGE UP (ref 96–108)
CO2 BLDV-SCNC: 24 MMOL/L — SIGNIFICANT CHANGE UP (ref 22–26)
CO2 SERPL-SCNC: 17 MMOL/L — LOW (ref 22–31)
CREAT SERPL-MCNC: 1.37 MG/DL — HIGH (ref 0.5–1.3)
EGFR: 42 ML/MIN/1.73M2 — LOW
EOSINOPHIL # BLD AUTO: 0.21 K/UL — SIGNIFICANT CHANGE UP (ref 0–0.5)
EOSINOPHIL NFR BLD AUTO: 3.4 % — SIGNIFICANT CHANGE UP (ref 0–6)
GAS PNL BLDV: 134 MMOL/L — LOW (ref 136–145)
GAS PNL BLDV: SIGNIFICANT CHANGE UP
GLUCOSE BLDV-MCNC: 88 MG/DL — SIGNIFICANT CHANGE UP (ref 70–99)
GLUCOSE SERPL-MCNC: 91 MG/DL — SIGNIFICANT CHANGE UP (ref 70–99)
HCO3 BLDV-SCNC: 22 MMOL/L — SIGNIFICANT CHANGE UP (ref 22–29)
HCT VFR BLD CALC: 37.6 % — SIGNIFICANT CHANGE UP (ref 34.5–45)
HCT VFR BLDA CALC: 42 % — SIGNIFICANT CHANGE UP (ref 34.5–46.5)
HGB BLD CALC-MCNC: 13.9 G/DL — SIGNIFICANT CHANGE UP (ref 11.7–16.1)
HGB BLD-MCNC: 12.7 G/DL — SIGNIFICANT CHANGE UP (ref 11.5–15.5)
IMM GRANULOCYTES NFR BLD AUTO: 0.3 % — SIGNIFICANT CHANGE UP (ref 0–0.9)
LACTATE BLDV-MCNC: 1 MMOL/L — SIGNIFICANT CHANGE UP (ref 0.5–2)
LYMPHOCYTES # BLD AUTO: 2.03 K/UL — SIGNIFICANT CHANGE UP (ref 1–3.3)
LYMPHOCYTES # BLD AUTO: 33.3 % — SIGNIFICANT CHANGE UP (ref 13–44)
MCHC RBC-ENTMCNC: 26.3 PG — LOW (ref 27–34)
MCHC RBC-ENTMCNC: 33.8 GM/DL — SIGNIFICANT CHANGE UP (ref 32–36)
MCV RBC AUTO: 78 FL — LOW (ref 80–100)
MONOCYTES # BLD AUTO: 0.42 K/UL — SIGNIFICANT CHANGE UP (ref 0–0.9)
MONOCYTES NFR BLD AUTO: 6.9 % — SIGNIFICANT CHANGE UP (ref 2–14)
NEUTROPHILS # BLD AUTO: 3.37 K/UL — SIGNIFICANT CHANGE UP (ref 1.8–7.4)
NEUTROPHILS NFR BLD AUTO: 55.4 % — SIGNIFICANT CHANGE UP (ref 43–77)
NRBC # BLD: 0 /100 WBCS — SIGNIFICANT CHANGE UP (ref 0–0)
NT-PROBNP SERPL-SCNC: 731 PG/ML — HIGH (ref 0–300)
PCO2 BLDV: 38 MMHG — LOW (ref 39–42)
PH BLDV: 7.38 — SIGNIFICANT CHANGE UP (ref 7.32–7.43)
PO2 BLDV: 31 MMHG — SIGNIFICANT CHANGE UP (ref 25–45)
POTASSIUM BLDV-SCNC: 4 MMOL/L — SIGNIFICANT CHANGE UP (ref 3.5–5.1)
POTASSIUM SERPL-MCNC: 4.5 MMOL/L — SIGNIFICANT CHANGE UP (ref 3.5–5.3)
POTASSIUM SERPL-SCNC: 4.5 MMOL/L — SIGNIFICANT CHANGE UP (ref 3.5–5.3)
PROT SERPL-MCNC: 7.4 G/DL — SIGNIFICANT CHANGE UP (ref 6–8.3)
RBC # BLD: 4.82 M/UL — SIGNIFICANT CHANGE UP (ref 3.8–5.2)
RBC # FLD: 14.4 % — SIGNIFICANT CHANGE UP (ref 10.3–14.5)
SAO2 % BLDV: 45.8 % — LOW (ref 67–88)
SODIUM SERPL-SCNC: 138 MMOL/L — SIGNIFICANT CHANGE UP (ref 135–145)
TROPONIN T, HIGH SENSITIVITY RESULT: 13 NG/L — SIGNIFICANT CHANGE UP (ref 0–51)
WBC # BLD: 6.09 K/UL — SIGNIFICANT CHANGE UP (ref 3.8–10.5)
WBC # FLD AUTO: 6.09 K/UL — SIGNIFICANT CHANGE UP (ref 3.8–10.5)

## 2023-08-02 PROCEDURE — 94729 DIFFUSING CAPACITY: CPT

## 2023-08-02 PROCEDURE — 94010 BREATHING CAPACITY TEST: CPT

## 2023-08-02 PROCEDURE — ZZZZZ: CPT

## 2023-08-02 PROCEDURE — 99204 OFFICE O/P NEW MOD 45 MIN: CPT | Mod: 25

## 2023-08-02 PROCEDURE — 94727 GAS DIL/WSHOT DETER LNG VOL: CPT

## 2023-08-02 PROCEDURE — 36415 COLL VENOUS BLD VENIPUNCTURE: CPT

## 2023-08-02 PROCEDURE — 71046 X-RAY EXAM CHEST 2 VIEWS: CPT

## 2023-08-02 PROCEDURE — 99291 CRITICAL CARE FIRST HOUR: CPT

## 2023-08-02 NOTE — ED PROVIDER NOTE - PROGRESS NOTE DETAILS
Shellie LI: Patient having Type B dissection, CT Surgery desiring BP control and agreed for admission.

## 2023-08-02 NOTE — ED PROVIDER NOTE - CADM POA PRESS ULCER
Discharge instructions completed - opportunity for questions - AVS med list reviewed for duplicates - states relief from meds - states headache is much better No

## 2023-08-02 NOTE — ED PROVIDER NOTE - RAPID ASSESSMENT
Shanti Fisher MD  89 yr old with HTN on amlodipine, hydralazine, cardiovedilol had angiogram in June to check due to shortness of breath found to have O2 sat in 92s, and advised to follow up with pulmonary doctor and had a d-dimer elevated to the 2000s and asked to come to the ED. On arrival 02 in the 92s and started on 2 l No chest pain, no pleuritic complaint, bilateral swelling for a couple of months, left more than the right but no calf pain, not a smoker, marijuana smoker, no recent long rides.  *** I, Shanti Fisher MD, performed an initial face to face bedside interview with this patient regarding history of present illness and determined that the patient should be evaluated in the main ED. A physical exam was not performed due to private space availability. This patient's evaluation is NOT COMPLETE and only preliminary. The full assessment, management, and reassessment of this patient, including but not limited to the follow up of ordered laboratory and radiologic testing, is deferred to the main ED provider. ***

## 2023-08-02 NOTE — ED PROVIDER NOTE - CLINICAL SUMMARY MEDICAL DECISION MAKING FREE TEXT BOX
67-year-old female has medical history of hypertension chronic kidney disease sent over by pulmonologist with concerns of elevated D-dimer.  Patient complaining of exertional shortness of breath for the past 1 year.  On Oxygen via NC for SpO2 92%. Chest CTA, BL ankle edema. Will evaluate for PE. Pending CT-PA.

## 2023-08-02 NOTE — ED PROVIDER NOTE - SHIFT CHANGE DETAILS
Attending note (Alexis): I have endorsed this patient to the incoming physician, including clinical history, physical exam, current results and assessment/plan. ct, labs

## 2023-08-02 NOTE — ED PROVIDER NOTE - PHYSICAL EXAMINATION
PHYSICAL EXAM:  GENERAL: NAD, lying in bed comfortably  HEAD:  Atraumatic, Normocephalic  EYES: EOMI, PERRLA, conjunctiva and sclera clear  ENT: No erythema/pallor/petechiae/lesions; TMs clear b/l  NECK: Supple, No JVD  LUNG: CTA b/l; no r/r/w  HEART: RRR, +S1/S2; No m/r/g  ABDOMEN: soft, NT/ND; BS audible   EXTREMITIES:  BL ankle edema  NERVOUS SYSTEM:  AAOx3, speech clear. No sensory/motor deficits   SKIN: No rashes or lesions

## 2023-08-02 NOTE — HISTORY OF PRESENT ILLNESS
[Former] : former [TextBox_4] : Meaghan Byrne is a 67 year old female with history of CAD, chronic renal insufficiency, she is here for initial pulmonary evaluation for shortness of breath   Experiencing worsening SOB on exertion x 1 year; had angiogram done in June, to which she was told it was normal, but her "oxygen levels were low"   Has used Levalbuterol (given by PCP), which has helped - does one inhalation a day in the AM   She denies of having any drug related allergies.  [TextBox_27] : 10 years

## 2023-08-02 NOTE — DISCUSSION/SUMMARY
[FreeTextEntry1] : Patient has dyspnea on exertion likely secondary to emphysema/COPD, rule out malignancy in this patient with right hilar density shown on current chest x-ray, rule out PE.  Secondly, she is a patient with history of CAD

## 2023-08-02 NOTE — ED PROVIDER NOTE - NS ED ROS FT
CONSTITUTIONAL: No weakness, fevers or chills  EYES/ENT: No visual changes;  No vertigo or throat pain   NECK: No pain or stiffness  RESPIRATORY: c/o SOB   CARDIOVASCULAR: No chest pain or palpitations  GASTROINTESTINAL: No abdominal or epigastric pain. No nausea, vomiting, or hematemesis; No diarrhea or constipation. No melena or hematochezia.  GENITOURINARY: No dysuria, frequency or hematuria  NEUROLOGICAL: No numbness or weakness  SKIN: No itching, burning, rashes, or lesions     All other review of systems is negative unless indicated above.

## 2023-08-02 NOTE — ED PROVIDER NOTE - ATTENDING CONTRIBUTION TO CARE
Afebrile. Awake and Alert. Lungs CTA. Heart RRR. Abdomen soft NTND. CN II-XII grossly intact. Moves all extremities without lateralization. No LE swelling.

## 2023-08-02 NOTE — ED PROVIDER NOTE - OBJECTIVE STATEMENT
67-year-old female has medical history of hypertension chronic kidney disease sent over by pulmonologist with concerns of elevated D-dimer.  Patient complaining of exertional shortness of breath for the past 1 year.  Denies having any chest pains, palpitations, lightheadedness, syncope.  Patient noticing dependent ankle edema for the past 1 year as well.  Has family history significant for malignancy ( father - colon ca., mother - breast ca.), no history of VTE.  Denies trauma, immobilizations. Patient found to have SpO2 of 92% in Triage.

## 2023-08-02 NOTE — PROCEDURE
[FreeTextEntry1] : Pulmonary Function Test obtained in office today which revealed: Spirometry: Within normal limits, Lung Volume: Within normal limits, Diffusion: Severe impairment. ___ Chest x-ray PA and lateral views performed in my office today showed A right hilar density, elevated right hemidiaphragm, no evidence of infiltrates or pleural effusions.

## 2023-08-03 DIAGNOSIS — I71.00 DISSECTION OF UNSPECIFIED SITE OF AORTA: ICD-10-CM

## 2023-08-03 LAB
A1C WITH ESTIMATED AVERAGE GLUCOSE RESULT: 5.6 % — SIGNIFICANT CHANGE UP (ref 4–5.6)
ALBUMIN SERPL ELPH-MCNC: 3.6 G/DL — SIGNIFICANT CHANGE UP (ref 3.3–5)
ALP SERPL-CCNC: 82 U/L — SIGNIFICANT CHANGE UP (ref 40–120)
ALT FLD-CCNC: 25 U/L — SIGNIFICANT CHANGE UP (ref 10–45)
ANION GAP SERPL CALC-SCNC: 13 MMOL/L — SIGNIFICANT CHANGE UP (ref 5–17)
APPEARANCE UR: CLEAR — SIGNIFICANT CHANGE UP
APTT BLD: 30.3 SEC — SIGNIFICANT CHANGE UP (ref 24.5–35.6)
AST SERPL-CCNC: 21 U/L — SIGNIFICANT CHANGE UP (ref 10–40)
BACTERIA # UR AUTO: NEGATIVE — SIGNIFICANT CHANGE UP
BILIRUB SERPL-MCNC: 0.7 MG/DL — SIGNIFICANT CHANGE UP (ref 0.2–1.2)
BILIRUB UR-MCNC: NEGATIVE — SIGNIFICANT CHANGE UP
BUN SERPL-MCNC: 18 MG/DL — SIGNIFICANT CHANGE UP (ref 7–23)
CALCIUM SERPL-MCNC: 8.9 MG/DL — SIGNIFICANT CHANGE UP (ref 8.4–10.5)
CHLORIDE SERPL-SCNC: 107 MMOL/L — SIGNIFICANT CHANGE UP (ref 96–108)
CO2 SERPL-SCNC: 18 MMOL/L — LOW (ref 22–31)
COLOR SPEC: SIGNIFICANT CHANGE UP
CREAT SERPL-MCNC: 1.12 MG/DL — SIGNIFICANT CHANGE UP (ref 0.5–1.3)
D DIMER BLD IA.RAPID-MCNC: 1968 NG/ML DDU — HIGH
DIFF PNL FLD: NEGATIVE — SIGNIFICANT CHANGE UP
EGFR: 54 ML/MIN/1.73M2 — LOW
EPI CELLS # UR: 0 /HPF — SIGNIFICANT CHANGE UP
ESTIMATED AVERAGE GLUCOSE: 114 MG/DL — SIGNIFICANT CHANGE UP (ref 68–114)
GAS PNL BLDA: SIGNIFICANT CHANGE UP
GLUCOSE SERPL-MCNC: 112 MG/DL — HIGH (ref 70–99)
GLUCOSE UR QL: NEGATIVE — SIGNIFICANT CHANGE UP
HYALINE CASTS # UR AUTO: 1 /LPF — SIGNIFICANT CHANGE UP (ref 0–2)
INR BLD: 1.2 RATIO — HIGH (ref 0.85–1.18)
KETONES UR-MCNC: NEGATIVE — SIGNIFICANT CHANGE UP
LEUKOCYTE ESTERASE UR-ACNC: NEGATIVE — SIGNIFICANT CHANGE UP
MRSA PCR RESULT.: SIGNIFICANT CHANGE UP
NITRITE UR-MCNC: NEGATIVE — SIGNIFICANT CHANGE UP
NT-PROBNP SERPL-SCNC: 553 PG/ML — HIGH (ref 0–300)
PH UR: 6 — SIGNIFICANT CHANGE UP (ref 5–8)
PLATELET # BLD AUTO: 111 K/UL — LOW (ref 150–400)
POTASSIUM SERPL-MCNC: 3.8 MMOL/L — SIGNIFICANT CHANGE UP (ref 3.5–5.3)
POTASSIUM SERPL-SCNC: 3.8 MMOL/L — SIGNIFICANT CHANGE UP (ref 3.5–5.3)
PROT SERPL-MCNC: 6.3 G/DL — SIGNIFICANT CHANGE UP (ref 6–8.3)
PROT UR-MCNC: ABNORMAL
PROTHROM AB SERPL-ACNC: 13.1 SEC — HIGH (ref 9.5–13)
RAPID RVP RESULT: SIGNIFICANT CHANGE UP
RBC CASTS # UR COMP ASSIST: 1 /HPF — SIGNIFICANT CHANGE UP (ref 0–4)
S AUREUS DNA NOSE QL NAA+PROBE: SIGNIFICANT CHANGE UP
SARS-COV-2 RNA SPEC QL NAA+PROBE: SIGNIFICANT CHANGE UP
SODIUM SERPL-SCNC: 138 MMOL/L — SIGNIFICANT CHANGE UP (ref 135–145)
SP GR SPEC: >1.05 (ref 1.01–1.02)
TSH SERPL-MCNC: 2.68 UIU/ML — SIGNIFICANT CHANGE UP (ref 0.27–4.2)
UROBILINOGEN FLD QL: NEGATIVE — SIGNIFICANT CHANGE UP
WBC UR QL: 0 /HPF — SIGNIFICANT CHANGE UP (ref 0–5)

## 2023-08-03 PROCEDURE — 93306 TTE W/DOPPLER COMPLETE: CPT | Mod: 26

## 2023-08-03 PROCEDURE — 71045 X-RAY EXAM CHEST 1 VIEW: CPT | Mod: 26

## 2023-08-03 PROCEDURE — 71275 CT ANGIOGRAPHY CHEST: CPT | Mod: 26

## 2023-08-03 PROCEDURE — 74174 CTA ABD&PLVS W/CONTRAST: CPT | Mod: 26

## 2023-08-03 PROCEDURE — 99291 CRITICAL CARE FIRST HOUR: CPT

## 2023-08-03 PROCEDURE — 93880 EXTRACRANIAL BILAT STUDY: CPT | Mod: 26

## 2023-08-03 RX ORDER — ESMOLOL HCL 100MG/10ML
37900 VIAL (ML) INTRAVENOUS ONCE
Refills: 0 | Status: DISCONTINUED | OUTPATIENT
Start: 2023-08-03 | End: 2023-08-03

## 2023-08-03 RX ORDER — ESMOLOL HCL 100MG/10ML
50 VIAL (ML) INTRAVENOUS
Qty: 2500 | Refills: 0 | Status: DISCONTINUED | OUTPATIENT
Start: 2023-08-03 | End: 2023-08-03

## 2023-08-03 RX ORDER — LABETALOL HCL 100 MG
100 TABLET ORAL EVERY 8 HOURS
Refills: 0 | Status: DISCONTINUED | OUTPATIENT
Start: 2023-08-03 | End: 2023-08-06

## 2023-08-03 RX ORDER — POTASSIUM CHLORIDE 20 MEQ
20 PACKET (EA) ORAL ONCE
Refills: 0 | Status: COMPLETED | OUTPATIENT
Start: 2023-08-03 | End: 2023-08-03

## 2023-08-03 RX ORDER — LABETALOL HCL 100 MG
1 TABLET ORAL
Qty: 200 | Refills: 0 | Status: DISCONTINUED | OUTPATIENT
Start: 2023-08-03 | End: 2023-08-04

## 2023-08-03 RX ORDER — PANTOPRAZOLE SODIUM 20 MG/1
40 TABLET, DELAYED RELEASE ORAL
Refills: 0 | Status: DISCONTINUED | OUTPATIENT
Start: 2023-08-03 | End: 2023-08-06

## 2023-08-03 RX ORDER — SODIUM CHLORIDE 9 MG/ML
3 INJECTION INTRAMUSCULAR; INTRAVENOUS; SUBCUTANEOUS EVERY 8 HOURS
Refills: 0 | Status: DISCONTINUED | OUTPATIENT
Start: 2023-08-03 | End: 2023-08-06

## 2023-08-03 RX ORDER — AMLODIPINE BESYLATE 2.5 MG/1
10 TABLET ORAL DAILY
Refills: 0 | Status: DISCONTINUED | OUTPATIENT
Start: 2023-08-03 | End: 2023-08-06

## 2023-08-03 RX ORDER — PANTOPRAZOLE SODIUM 20 MG/1
40 TABLET, DELAYED RELEASE ORAL DAILY
Refills: 0 | Status: DISCONTINUED | OUTPATIENT
Start: 2023-08-03 | End: 2023-08-03

## 2023-08-03 RX ORDER — HEPARIN SODIUM 5000 [USP'U]/ML
5000 INJECTION INTRAVENOUS; SUBCUTANEOUS EVERY 8 HOURS
Refills: 0 | Status: DISCONTINUED | OUTPATIENT
Start: 2023-08-03 | End: 2023-08-06

## 2023-08-03 RX ORDER — PANTOPRAZOLE SODIUM 20 MG/1
40 TABLET, DELAYED RELEASE ORAL
Refills: 0 | Status: DISCONTINUED | OUTPATIENT
Start: 2023-08-03 | End: 2023-08-03

## 2023-08-03 RX ORDER — LABETALOL HCL 100 MG
0.5 TABLET ORAL
Qty: 200 | Refills: 0 | Status: DISCONTINUED | OUTPATIENT
Start: 2023-08-03 | End: 2023-08-03

## 2023-08-03 RX ORDER — LABETALOL HCL 100 MG
20 TABLET ORAL ONCE
Refills: 0 | Status: COMPLETED | OUTPATIENT
Start: 2023-08-03 | End: 2023-08-03

## 2023-08-03 RX ADMIN — Medication 20 MILLIEQUIVALENT(S): at 11:42

## 2023-08-03 RX ADMIN — Medication 100 MILLIGRAM(S): at 22:26

## 2023-08-03 RX ADMIN — AMLODIPINE BESYLATE 10 MILLIGRAM(S): 2.5 TABLET ORAL at 10:25

## 2023-08-03 RX ADMIN — SODIUM CHLORIDE 3 MILLILITER(S): 9 INJECTION INTRAMUSCULAR; INTRAVENOUS; SUBCUTANEOUS at 05:20

## 2023-08-03 RX ADMIN — SODIUM CHLORIDE 3 MILLILITER(S): 9 INJECTION INTRAMUSCULAR; INTRAVENOUS; SUBCUTANEOUS at 13:16

## 2023-08-03 RX ADMIN — PANTOPRAZOLE SODIUM 40 MILLIGRAM(S): 20 TABLET, DELAYED RELEASE ORAL at 11:42

## 2023-08-03 RX ADMIN — HEPARIN SODIUM 5000 UNIT(S): 5000 INJECTION INTRAVENOUS; SUBCUTANEOUS at 22:25

## 2023-08-03 RX ADMIN — Medication 100 MILLIGRAM(S): at 14:19

## 2023-08-03 RX ADMIN — Medication 20 MILLIGRAM(S): at 02:07

## 2023-08-03 RX ADMIN — HEPARIN SODIUM 5000 UNIT(S): 5000 INJECTION INTRAVENOUS; SUBCUTANEOUS at 14:04

## 2023-08-03 RX ADMIN — SODIUM CHLORIDE 3 MILLILITER(S): 9 INJECTION INTRAMUSCULAR; INTRAVENOUS; SUBCUTANEOUS at 22:00

## 2023-08-03 NOTE — H&P ADULT - NSHPPHYSICALEXAM_GEN_ALL_CORE
alert and oriented x 3  - JVD , supple   reg s1s2 , no murmur   clear elroy , no wheeze   soft , + bs  , non tender  - edema   + equal pulses . warm

## 2023-08-03 NOTE — PROGRESS NOTE ADULT - SUBJECTIVE AND OBJECTIVE BOX
Patient seen and examined at the bedside.    Remained critically ill on continuous ICU monitoring.      Brief Summary:  67 y r old with HTN on amlodipine, hydralazine, cardiovedilol had angiogram in June to check due to shortness of breath found to have O2 sat in 92s, and advised to follow up with pulmonary doctor and had a d-dimer elevated to the 2000s and asked to come to the ED. On arrival 02 in the 92s and started on 2 l No chest pain, no pleuritic complaint, bilateral swelling for a couple of months, left more than the right but no calf pain, not a smoker, marijuana smoker, no recent long rides. 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 B dissection, currently scheduled for Type B Dissection on 8/3/2023    24 Hour events:  - Taken to the OR for *Surgery*  - Drips:  - Echo:   - Blood Products:       OBJECTIVE:  Vital Signs Last 24 Hrs  T(C): 35.7 (03 Aug 2023 02:30), Max: 36.5 (02 Aug 2023 19:55)  T(F): 96.3 (03 Aug 2023 02:30), Max: 97.7 (02 Aug 2023 19:55)  HR: 54 (03 Aug 2023 07:00) (53 - 96)  BP: 97/65 (03 Aug 2023 07:00) (94/58 - 197/124)  BP(mean): 77 (03 Aug 2023 07:00) (70 - 126)  RR: 19 (03 Aug 2023 07:00) (10 - 28)  SpO2: 95% (03 Aug 2023 07:00) (84% - 98%)    Parameters below as of 03 Aug 2023 04:00  Patient On (Oxygen Delivery Method): nasal cannula, high flow  O2 Flow (L/min): 50  O2 Concentration (%): 40                Physical Exam:   General: awake/sedated, OOBTC  Neurology: intact  ENT: trachea midline  Respiratory: on High Flow Nasal Cannula   CV: S1S2, no murmurs        [x] Sternal dressing        [x] Sinus rhythm  Abdominal: Soft, NT  : Fam *Remove if not present*  Extremities: warm, well perfused, moving all extremities   Skin: No Rashes, Hematoma, Ecchymosis         -------------------------------------------------------------------------------------------------------------------------------    Labs:                        12.7   6.09  )-----------( 111      ( 02 Aug 2023 23:03 )             37.6     08-02    138  |  107  |  22  ----------------------------<  91  4.5   |  17<L>  |  1.37<H>    Ca    9.4      02 Aug 2023 23:03    TPro  7.4  /  Alb  4.2  /  TBili  0.7  /  DBili  x   /  AST  31  /  ALT  32  /  AlkPhos  97  08-02    LIVER FUNCTIONS - ( 02 Aug 2023 23:03 )  Alb: 4.2 g/dL / Pro: 7.4 g/dL / ALK PHOS: 97 U/L / ALT: 32 U/L / AST: 31 U/L / GGT: x             ABG - ( 03 Aug 2023 02:40 )  pH, Arterial: 7.39  pH, Blood: x     /  pCO2: 32    /  pO2: 61    / HCO3: 19    / Base Excess: -4.6  /  SaO2: 89.9              ------------------------------------------------------------------------------------------------------------------------------  Assessment:  67 y r old with HTN on amlodipine, hydralazine, cardiovedilol had angiogram in June to check due to shortness of breath found to have O2 sat in 92s, and advised to follow up with pulmonary doctor and had a d-dimer elevated to the 2000s and asked to come to the ED. On arrival 02 in the 92s and started on 2 l No chest pain, no pleuritic complaint, bilateral swelling for a couple of months, left more than the right but no calf pain, not a smoker, marijuana smoker, no recent long rides. 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     ***Cardiovascular***  - Invasive hemodynamic monitoring, assess perfusion indices   - At risk for hemodynamic instability and cardiac arrhythmias.  - IVF for perioperative hypovolemia.  - Continue Labetalol gtt.   - Monitor chest tube output.    ***Pulmonary***  - Postoperative acute respiratory insufficiency   - Currently on High Flow Nasal Cannula 40%/50L. Wean oxygen as able.  - Deep breathing and coughing exercises.    ***GI***  - NPO for now.   - Protonix for stress ulcer prophylaxis   - Bowel regimen.    ***Renal***  - Replete electrolytes as indicated.    ***ID***      ***Endocrine***  - Prediabetes    ***Hematology***  - Acute blood loss anemia and thrombocytopenia   - No current transfusion indication  - At risk for Bleeding           Patient requires continuous monitoring with bedside rhythm monitoring, pulse oximetry monitoring, and continuous central venous and arterial pressure monitoring; and intermittent blood gas analysis. Care plan discussed with the ICU care team.   Patient remained critical, at risk for life threatening decompensation.    I have spent 30 minutes providing critical care management to this patient.    By signing my name below, I, Joycelyn Harmon, attest that this documentation has been prepared under the direction and in the presence of Kely Gerardo DO  Electronically signed: Joycelyn Harmon, 08-03-23 @ 07:30    I, Kely Gerardo DO, personally performed the services described in this documentation. all medical record entries made by the scribe were at my direction and in my presence. I have reviewed the chart and agree that the record reflects my personal performance and is accurate and complete  Electronically signed: Kely Gerardo DO, Patient seen and examined at the bedside.    Remained critically ill on continuous ICU monitoring.      Brief Summary:  67 y r old with HTN on amlodipine, hydralazine, cardiovedilol had angiogram in June to check due to shortness of breath found to have O2 sat in 92s, and advised to follow up with pulmonary doctor and had a d-dimer elevated to the 2000s and asked to come to the ED. On arrival 02 in the 92s and started on 2 l No chest pain, no pleuritic complaint, bilateral swelling for a couple of months, left more than the right but no calf pain, not a smoker, marijuana smoker, no recent long rides. 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 B dissection, currently scheduled for Type B Dissection on 8/3/2023    24 Hour events:  - Cardene weaned off  - Plan to give diet  - Plan for IS  - Possible wean to NC  - Plan to start Norvasc       OBJECTIVE:  Vital Signs Last 24 Hrs  T(C): 35.7 (03 Aug 2023 02:30), Max: 36.5 (02 Aug 2023 19:55)  T(F): 96.3 (03 Aug 2023 02:30), Max: 97.7 (02 Aug 2023 19:55)  HR: 54 (03 Aug 2023 07:00) (53 - 96)  BP: 97/65 (03 Aug 2023 07:00) (94/58 - 197/124)  BP(mean): 77 (03 Aug 2023 07:00) (70 - 126)  RR: 19 (03 Aug 2023 07:00) (10 - 28)  SpO2: 95% (03 Aug 2023 07:00) (84% - 98%)    Parameters below as of 03 Aug 2023 04:00  Patient On (Oxygen Delivery Method): nasal cannula, high flow  O2 Flow (L/min): 50  O2 Concentration (%): 40                Physical Exam:   General: awake/sedated, OOBTC  Neurology: intact  ENT: trachea midline  Respiratory: on High Flow Nasal Cannula   CV: S1S2, no murmurs        [x] Sternal dressing        [x] Sinus rhythm  Abdominal: Soft, NT  : Fam *Remove if not present*  Extremities: warm, well perfused, moving all extremities   Skin: No Rashes, Hematoma, Ecchymosis         -------------------------------------------------------------------------------------------------------------------------------    Labs:                        12.7   6.09  )-----------( 111      ( 02 Aug 2023 23:03 )             37.6     08-02    138  |  107  |  22  ----------------------------<  91  4.5   |  17<L>  |  1.37<H>    Ca    9.4      02 Aug 2023 23:03    TPro  7.4  /  Alb  4.2  /  TBili  0.7  /  DBili  x   /  AST  31  /  ALT  32  /  AlkPhos  97  08-02    LIVER FUNCTIONS - ( 02 Aug 2023 23:03 )  Alb: 4.2 g/dL / Pro: 7.4 g/dL / ALK PHOS: 97 U/L / ALT: 32 U/L / AST: 31 U/L / GGT: x             ABG - ( 03 Aug 2023 02:40 )  pH, Arterial: 7.39  pH, Blood: x     /  pCO2: 32    /  pO2: 61    / HCO3: 19    / Base Excess: -4.6  /  SaO2: 89.9              ------------------------------------------------------------------------------------------------------------------------------  Assessment:  67 y r old with HTN on amlodipine, hydralazine, cardiovedilol had angiogram in June to check due to shortness of breath found to have O2 sat in 92s, and advised to follow up with pulmonary doctor and had a d-dimer elevated to the 2000s and asked to come to the ED. On arrival 02 in the 92s and started on 2 l No chest pain, no pleuritic complaint, bilateral swelling for a couple of months, left more than the right but no calf pain, not a smoker, marijuana smoker, no recent long rides. 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     ***Cardiovascular***  - Invasive hemodynamic monitoring, assess perfusion indices   - At risk for hemodynamic instability and cardiac arrhythmias.  - IVF for perioperative hypovolemia.  - Continue Labetalol gtt.   - Monitor chest tube output.    ***Pulmonary***  - Postoperative acute respiratory insufficiency   - Currently on High Flow Nasal Cannula 40%/50L. Wean oxygen as able.  - Possible wean to Nasal Cannula.  - Deep breathing and coughing exercises.    ***GI***  - NPO for now.   - Protonix for stress ulcer prophylaxis   - Bowel regimen.    ***Renal***  - Replete electrolytes as indicated.    ***ID***      ***Endocrine***  - Prediabetes    ***Hematology***  - Acute blood loss anemia and thrombocytopenia   - No current transfusion indication  - At risk for Bleeding           Patient requires continuous monitoring with bedside rhythm monitoring, pulse oximetry monitoring, and continuous central venous and arterial pressure monitoring; and intermittent blood gas analysis. Care plan discussed with the ICU care team.   Patient remained critical, at risk for life threatening decompensation.    I have spent 30 minutes providing critical care management to this patient.    By signing my name below, I, Joycelyn Harmon, attest that this documentation has been prepared under the direction and in the presence of Kely Gerardo DO  Electronically signed: Joycelyn Harmon, 08-03-23 @ 07:30    I, Kely Gerardo DO, personally performed the services described in this documentation. all medical record entries made by the scribe were at my direction and in my presence. I have reviewed the chart and agree that the record reflects my personal performance and is accurate and complete  Electronically signed: Kely Gerardo DO, Patient seen and examined at the bedside.    Remained critically ill on continuous ICU monitoring.      Brief Summary:  66 yo F with PMHx HTN, found to have a Type B aortic dissection and transferred to CTICU for further hemodynamic monitoring.    24 Hour events:  - Placed on labetalol and cardene gtt for HTN.  - Cardene subsequently weaned off  - Placed on HFNC this AM for hypoxia.      OBJECTIVE:  Vital Signs Last 24 Hrs  T(C): 35.7 (03 Aug 2023 02:30), Max: 36.5 (02 Aug 2023 19:55)  T(F): 96.3 (03 Aug 2023 02:30), Max: 97.7 (02 Aug 2023 19:55)  HR: 54 (03 Aug 2023 07:00) (53 - 96)  BP: 97/65 (03 Aug 2023 07:00) (94/58 - 197/124)  BP(mean): 77 (03 Aug 2023 07:00) (70 - 126)  RR: 19 (03 Aug 2023 07:00) (10 - 28)  SpO2: 95% (03 Aug 2023 07:00) (84% - 98%)    Parameters below as of 03 Aug 2023 04:00  Patient On (Oxygen Delivery Method): nasal cannula, high flow  O2 Flow (L/min): 50  O2 Concentration (%): 40      Physical Exam:   General: awake, AAOx4  Neurology: intact, follows commands  ENT: trachea midline  Respiratory: on HFNC, non-labored respirations  CV: sinus rhythm, no murmurs  Abdominal: Soft, NT  Extremities: warm, well perfused, moving all extremities   Skin: No Rashes, Hematoma, Ecchymosis         -------------------------------------------------------------------------------------------------------------------------------    Labs:                        12.7   6.09  )-----------( 111      ( 02 Aug 2023 23:03 )             37.6     08-02    138  |  107  |  22  ----------------------------<  91  4.5   |  17<L>  |  1.37<H>    Ca    9.4      02 Aug 2023 23:03    TPro  7.4  /  Alb  4.2  /  TBili  0.7  /  DBili  x   /  AST  31  /  ALT  32  /  AlkPhos  97  08-02    LIVER FUNCTIONS - ( 02 Aug 2023 23:03 )  Alb: 4.2 g/dL / Pro: 7.4 g/dL / ALK PHOS: 97 U/L / ALT: 32 U/L / AST: 31 U/L / GGT: x             ABG - ( 03 Aug 2023 02:40 )  pH, Arterial: 7.39  pH, Blood: x     /  pCO2: 32    /  pO2: 61    / HCO3: 19    / Base Excess: -4.6  /  SaO2: 89.9          ------------------------------------------------------------------------------------------------------------------------------  Assessment:  66 yo F with PMHx HTN, found to have a Type B aortic dissection and transferred to CTICU for further hemodynamic monitoring.    Type B aortic dissection  At risk for hemodynamic instability  Acute respiratory insufficiency  Thrombocytopenia  Hypertension  At risk for bleeding      Plan:   ***Neuro***  - Optimize day/night cycles.  - No acute issues.    ***Cardiovascular***  - At risk for hemodynamic instability  - Continue Labetalol gtt. Transition IV to PO labetalol.  - Add norvasc for HTN.  - Keep -120, HR 60-80.  - Per vascular - no intervention for now.    ***Pulmonary***  - Acute respiratory insufficiency   - On HFNC.  - Wean oxygen as able.  - Deep breathing and coughing exercises.  - IS.    ***GI***  - Start diet.  - Protonix for stress ulcer prophylaxis   - Bowel regimen.    ***Renal***  - Replete electrolytes as indicated.  - No acute issues.    ***ID***  - No acute issues.    ***Endocrine***  - Insulin if needed to maintain euglycemia.    ***Hematology***  - Thrombocytopenia   - No current transfusion indication  - At risk for Bleeding           Patient requires continuous monitoring with bedside rhythm monitoring, pulse oximetry monitoring, and continuous central venous and arterial pressure monitoring; and intermittent blood gas analysis. Care plan discussed with the ICU care team.   Patient remained critical, at risk for life threatening decompensation.    I have spent 50 minutes providing critical care management to this patient.    By signing my name below, I, Joycelyn Harmon, attest that this documentation has been prepared under the direction and in the presence of Kely Gerardo DO  Electronically signed: Joycelyn Harmon, 08-03-23 @ 07:30    I, Kely Gerardo DO, personally performed the services described in this documentation. all medical record entries made by the scribe were at my direction and in my presence. I have reviewed the chart and agree that the record reflects my personal performance and is accurate and complete  Electronically signed: Kely Gerardo DO,

## 2023-08-03 NOTE — H&P ADULT - HISTORY OF PRESENT ILLNESS
67 y r old with HTN on amlodipine, hydralazine, cardiovedilol had angiogram in June to check due to shortness of breath found to have O2 sat in 92s, and advised to follow up with pulmonary doctor and had a d-dimer elevated to the 2000s and asked to come to the ED. On arrival 02 in the 92s and started on 2 l No chest pain, no pleuritic complaint, bilateral swelling for a couple of months, left more than the right but no calf pain, not a smoker, marijuana smoker, no recent long rides. 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 B dissection .

## 2023-08-03 NOTE — ED ADULT NURSE NOTE - OBJECTIVE STATEMENT
66 yo female PMh HTN, A&Ox3, presents to ED c/o SOB.  PT reports was told by pulmonologist to go to ED because D dimer was elevated, has been having SOB on exertion x 3 months, was recently put on coreg and hydralazine with her norvasc to control her HTN.  MIssed night med dose.  Pt is on NC at 2LPM here, room air sat 92%.  Denies CP/dizziness.  BReathing even and unlabored, abdomen soft nontender, no pedal edema. Pt denies chest pain, palpitations, headache, visual disturbances, numbness/tingling, fever, chills, diaphoresis,  nausea, vomiting, constipation, diarrhea, or urinary symptoms.

## 2023-08-03 NOTE — H&P ADULT - NSHPLABSRESULTS_GEN_ALL_CORE
labetalol Infusion 1 mG/Min IV Continuous <Continuous>  sodium chloride 0.9% lock flush 3 milliLiter(s) IV Push every 8 hours                            12.7   6.09  )-----------( 111      ( 02 Aug 2023 23:03 )             37.6       Hemoglobin: 12.7 g/dL (08-02 @ 23:03)      08-02    138  |  107  |  22  ----------------------------<  91  4.5   |  17<L>  |  1.37<H>    Ca    9.4      02 Aug 2023 23:03    TPro  7.4  /  Alb  4.2  /  TBili  0.7  /  DBili  x   /  AST  31  /  ALT  32  /  AlkPhos  97  08-02    Creatinine Trend: 1.37<--    COAGS:           T(C): 36.5 (08-02-23 @ 19:55), Max: 36.5 (08-02-23 @ 19:55)  HR: 75 (08-03-23 @ 02:30) (75 - 96)  BP: 150/84 (08-03-23 @ 02:30) (150/84 - 197/124)  RR: 20 (08-03-23 @ 02:30) (20 - 28)  SpO2: 91% (08-03-23 @ 02:30) (84% - 95%)  Wt(kg): --    I&O's Summary

## 2023-08-03 NOTE — ED ADULT NURSE NOTE - NSFALLUNIVINTERV_ED_ALL_ED
Bed/Stretcher in lowest position, wheels locked, appropriate side rails in place/Call bell, personal items and telephone in reach/Instruct patient to call for assistance before getting out of bed/chair/stretcher/Non-slip footwear applied when patient is off stretcher/[place] to call system/Physically safe environment - no spills, clutter or unnecessary equipment/Purposeful proactive rounding/Room/bathroom lighting operational, light cord in reach

## 2023-08-03 NOTE — H&P ADULT - ASSESSMENT
67-year-old female has medical history of hypertension chronic kidney disease sent over by pulmonologist with concerns of elevated D-dimer.   On CT angio found to have Type B aortic dissection.     -  Admit to ctu,   - BP control .

## 2023-08-03 NOTE — H&P ADULT - PROBLEM SELECTOR PLAN 1
Admit to  ctu   BP control - Labetalol GTT   ECHO ordered , to assess LV fx   official CT read.   hold ASA   stat labs ( BLood gas).   D/W Dr Brenda Vega at bedside

## 2023-08-03 NOTE — PROCEDURE NOTE - NSINDICATIONS_GEN_A_CORE
arterial puncture to obtain ABG's/blood sampling/critical patient/monitoring purposes Self/Parent(s)

## 2023-08-03 NOTE — ED ADULT NURSE NOTE - TEMPLATE
Cardiac Acitretin Pregnancy And Lactation Text: This medication is Pregnancy Category X and should not be given to women who are pregnant or may become pregnant in the future. This medication is excreted in breast milk.

## 2023-08-04 LAB
ALBUMIN SERPL ELPH-MCNC: 3.7 G/DL — SIGNIFICANT CHANGE UP (ref 3.3–5)
ALP SERPL-CCNC: 76 U/L — SIGNIFICANT CHANGE UP (ref 40–120)
ALT FLD-CCNC: 25 U/L — SIGNIFICANT CHANGE UP (ref 10–45)
ANION GAP SERPL CALC-SCNC: 14 MMOL/L — SIGNIFICANT CHANGE UP (ref 5–17)
AST SERPL-CCNC: 19 U/L — SIGNIFICANT CHANGE UP (ref 10–40)
BILIRUB SERPL-MCNC: 0.6 MG/DL — SIGNIFICANT CHANGE UP (ref 0.2–1.2)
BUN SERPL-MCNC: 24 MG/DL — HIGH (ref 7–23)
CALCIUM SERPL-MCNC: 8.8 MG/DL — SIGNIFICANT CHANGE UP (ref 8.4–10.5)
CHLORIDE SERPL-SCNC: 108 MMOL/L — SIGNIFICANT CHANGE UP (ref 96–108)
CO2 SERPL-SCNC: 18 MMOL/L — LOW (ref 22–31)
CREAT SERPL-MCNC: 1.53 MG/DL — HIGH (ref 0.5–1.3)
EGFR: 37 ML/MIN/1.73M2 — LOW
GLUCOSE SERPL-MCNC: 89 MG/DL — SIGNIFICANT CHANGE UP (ref 70–99)
HCT VFR BLD CALC: 33.8 % — LOW (ref 34.5–45)
HGB BLD-MCNC: 11.9 G/DL — SIGNIFICANT CHANGE UP (ref 11.5–15.5)
MAGNESIUM SERPL-MCNC: 2.1 MG/DL — SIGNIFICANT CHANGE UP (ref 1.6–2.6)
MCHC RBC-ENTMCNC: 26.3 PG — LOW (ref 27–34)
MCHC RBC-ENTMCNC: 35.2 GM/DL — SIGNIFICANT CHANGE UP (ref 32–36)
MCV RBC AUTO: 74.8 FL — LOW (ref 80–100)
NRBC # BLD: 0 /100 WBCS — SIGNIFICANT CHANGE UP (ref 0–0)
PHOSPHATE SERPL-MCNC: 3.4 MG/DL — SIGNIFICANT CHANGE UP (ref 2.5–4.5)
PLATELET # BLD AUTO: 213 K/UL — SIGNIFICANT CHANGE UP (ref 150–400)
POTASSIUM SERPL-MCNC: 4.1 MMOL/L — SIGNIFICANT CHANGE UP (ref 3.5–5.3)
POTASSIUM SERPL-SCNC: 4.1 MMOL/L — SIGNIFICANT CHANGE UP (ref 3.5–5.3)
PROT SERPL-MCNC: 6.3 G/DL — SIGNIFICANT CHANGE UP (ref 6–8.3)
RBC # BLD: 4.52 M/UL — SIGNIFICANT CHANGE UP (ref 3.8–5.2)
RBC # FLD: 14.4 % — SIGNIFICANT CHANGE UP (ref 10.3–14.5)
SODIUM SERPL-SCNC: 140 MMOL/L — SIGNIFICANT CHANGE UP (ref 135–145)
WBC # BLD: 6.91 K/UL — SIGNIFICANT CHANGE UP (ref 3.8–10.5)
WBC # FLD AUTO: 6.91 K/UL — SIGNIFICANT CHANGE UP (ref 3.8–10.5)

## 2023-08-04 PROCEDURE — 71045 X-RAY EXAM CHEST 1 VIEW: CPT | Mod: 26

## 2023-08-04 PROCEDURE — 99222 1ST HOSP IP/OBS MODERATE 55: CPT

## 2023-08-04 PROCEDURE — 99233 SBSQ HOSP IP/OBS HIGH 50: CPT

## 2023-08-04 RX ORDER — ATORVASTATIN CALCIUM 80 MG/1
20 TABLET, FILM COATED ORAL AT BEDTIME
Refills: 0 | Status: DISCONTINUED | OUTPATIENT
Start: 2023-08-04 | End: 2023-08-06

## 2023-08-04 RX ADMIN — SODIUM CHLORIDE 3 MILLILITER(S): 9 INJECTION INTRAMUSCULAR; INTRAVENOUS; SUBCUTANEOUS at 16:28

## 2023-08-04 RX ADMIN — PANTOPRAZOLE SODIUM 40 MILLIGRAM(S): 20 TABLET, DELAYED RELEASE ORAL at 05:20

## 2023-08-04 RX ADMIN — Medication 100 MILLIGRAM(S): at 05:20

## 2023-08-04 RX ADMIN — AMLODIPINE BESYLATE 10 MILLIGRAM(S): 2.5 TABLET ORAL at 05:20

## 2023-08-04 RX ADMIN — HEPARIN SODIUM 5000 UNIT(S): 5000 INJECTION INTRAVENOUS; SUBCUTANEOUS at 21:26

## 2023-08-04 RX ADMIN — Medication 100 MILLIGRAM(S): at 21:27

## 2023-08-04 RX ADMIN — SODIUM CHLORIDE 3 MILLILITER(S): 9 INJECTION INTRAMUSCULAR; INTRAVENOUS; SUBCUTANEOUS at 21:36

## 2023-08-04 RX ADMIN — HEPARIN SODIUM 5000 UNIT(S): 5000 INJECTION INTRAVENOUS; SUBCUTANEOUS at 14:15

## 2023-08-04 RX ADMIN — HEPARIN SODIUM 5000 UNIT(S): 5000 INJECTION INTRAVENOUS; SUBCUTANEOUS at 05:20

## 2023-08-04 RX ADMIN — ATORVASTATIN CALCIUM 20 MILLIGRAM(S): 80 TABLET, FILM COATED ORAL at 21:26

## 2023-08-04 RX ADMIN — SODIUM CHLORIDE 3 MILLILITER(S): 9 INJECTION INTRAMUSCULAR; INTRAVENOUS; SUBCUTANEOUS at 05:14

## 2023-08-04 RX ADMIN — Medication 100 MILLIGRAM(S): at 14:15

## 2023-08-04 NOTE — PROGRESS NOTE ADULT - SUBJECTIVE AND OBJECTIVE BOX
Patient seen and examined at the bedside.    Remained critically ill on continuous ICU monitoring.      Brief Summary:  68 yo F with PMHx HTN, found to have a Type B aortic dissection and transferred to CTICU for further hemodynamic monitoring.      24 Hour events:        OBJECTIVE:  Vital Signs Last 24 Hrs  T(C): 35.7 (04 Aug 2023 04:00), Max: 36.2 (03 Aug 2023 12:00)  T(F): 96.3 (04 Aug 2023 04:00), Max: 97.2 (03 Aug 2023 12:00)  HR: 61 (04 Aug 2023 06:00) (48 - 74)  BP: 117/67 (04 Aug 2023 06:00) (88/61 - 157/90)  BP(mean): 87 (04 Aug 2023 06:00) (69 - 117)  RR: 17 (04 Aug 2023 06:00) (13 - 32)  SpO2: 91% (04 Aug 2023 06:00) (89% - 98%)    Parameters below as of 04 Aug 2023 05:00  Patient On (Oxygen Delivery Method): nasal cannula  O2 Flow (L/min): 5                  Physical Exam:   General: awake, AAOx4  Neurology: intact, follows commands  ENT: trachea midline  Respiratory: on nasal cannula, non-labored respirations  CV: sinus rhythm, no murmurs  Abdominal: Soft, NT  Extremities: warm, well perfused, moving all extremities   Skin: No Rashes, Hematoma, Ecchymosis                 -------------------------------------------------------------------------------------------------------------------------------    Labs:                        11.9   6.91  )-----------( 213      ( 04 Aug 2023 00:33 )             33.8     08-04    140  |  108  |  24<H>  ----------------------------<  89  4.1   |  18<L>  |  1.53<H>    Ca    8.8      04 Aug 2023 00:33  Phos  3.4     08-04  Mg     2.1     08-04    TPro  6.3  /  Alb  3.7  /  TBili  0.6  /  DBili  x   /  AST  19  /  ALT  25  /  AlkPhos  76  08-04    LIVER FUNCTIONS - ( 04 Aug 2023 00:33 )  Alb: 3.7 g/dL / Pro: 6.3 g/dL / ALK PHOS: 76 U/L / ALT: 25 U/L / AST: 19 U/L / GGT: x           PT/INR - ( 03 Aug 2023 17:16 )   PT: 13.1 sec;   INR: 1.20 ratio         PTT - ( 03 Aug 2023 17:16 )  PTT:30.3 sec  ABG - ( 03 Aug 2023 17:08 )  pH, Arterial: 7.37  pH, Blood: x     /  pCO2: 34    /  pO2: 69    / HCO3: 20    / Base Excess: -4.8  /  SaO2: 94.3              ------------------------------------------------------------------------------------------------------------------------------  Assessment:  68 yo F with PMHx HTN, found to have a Type B aortic dissection and transferred to CTICU for further hemodynamic monitoring.    Type B aortic dissection  At risk for hemodynamic instability  Acute respiratory insufficiency  Hypertension  At risk for bleeding      Plan:   ***Neuro***  - Optimize day/night cycles.  - No acute issues.    ***Cardiovascular***  - At risk for hemodynamic instability  - Transitioned IV to PO labetalol.  - Added norvasc for HTN.  - Keep -120, HR 60-80.  - Per vascular - no intervention for now.    ***Pulmonary***  - Acute respiratory insufficiency   - On 5L NC.  - Wean oxygen as able.  - Deep breathing and coughing exercises.  - IS.    ***GI***  - Continue diet.  - Protonix for stress ulcer prophylaxis       ***Renal***  - Replete electrolytes as indicated.  - No acute issues.    ***ID***  - No acute issues.    ***Endocrine***  - Insulin if needed to maintain euglycemia.    ***Hematology***  - No current transfusion indication  - At risk for Bleeding   - Heparin for DVT prophylaxis       Patient requires continuous monitoring with bedside rhythm monitoring, pulse oximetry monitoring, and continuous central venous and arterial pressure monitoring; and intermittent blood gas analysis. Care plan discussed with the ICU care team.   Patient remained critical, at risk for life threatening decompensation.    I have spent 30 minutes providing critical care management to this patient.    By signing my name below, I, Willie Sabino, attest that this documentation has been prepared under the direction and in the presence of Kely Gerardo DO  Electronically signed: Willie Gallo, 08-04-23 @ 06:20    I, Kely Gerardo DO, personally performed the services described in this documentation. all medical record entries made by the scribe were at my direction and in my presence. I have reviewed the chart and agree that the record reflects my personal performance and is accurate and complete  Electronically signed: Kely Gerardo DO, Patient seen and examined at the bedside.    Remained critically ill on continuous ICU monitoring.      Brief Summary:  66 yo F with PMHx HTN, found to have a Type B aortic dissection and transferred to CTICU for further hemodynamic monitoring.      24 Hour events:  - OOBTC this AM  - [place] taken out  - CXR clear       OBJECTIVE:  Vital Signs Last 24 Hrs  T(C): 35.7 (04 Aug 2023 04:00), Max: 36.2 (03 Aug 2023 12:00)  T(F): 96.3 (04 Aug 2023 04:00), Max: 97.2 (03 Aug 2023 12:00)  HR: 61 (04 Aug 2023 06:00) (48 - 74)  BP: 117/67 (04 Aug 2023 06:00) (88/61 - 157/90)  BP(mean): 87 (04 Aug 2023 06:00) (69 - 117)  RR: 17 (04 Aug 2023 06:00) (13 - 32)  SpO2: 91% (04 Aug 2023 06:00) (89% - 98%)    Parameters below as of 04 Aug 2023 05:00  Patient On (Oxygen Delivery Method): nasal cannula  O2 Flow (L/min): 5                  Physical Exam:   General: awake, AAOx4  Neurology: intact, follows commands  ENT: trachea midline  Respiratory: on nasal cannula, non-labored respirations  CV: sinus rhythm, no murmurs  Abdominal: Soft, NT  Extremities: warm, well perfused, moving all extremities   Skin: No Rashes, Hematoma, Ecchymosis                 -------------------------------------------------------------------------------------------------------------------------------    Labs:                        11.9   6.91  )-----------( 213      ( 04 Aug 2023 00:33 )             33.8     08-04    140  |  108  |  24<H>  ----------------------------<  89  4.1   |  18<L>  |  1.53<H>    Ca    8.8      04 Aug 2023 00:33  Phos  3.4     08-04  Mg     2.1     08-04    TPro  6.3  /  Alb  3.7  /  TBili  0.6  /  DBili  x   /  AST  19  /  ALT  25  /  AlkPhos  76  08-04    LIVER FUNCTIONS - ( 04 Aug 2023 00:33 )  Alb: 3.7 g/dL / Pro: 6.3 g/dL / ALK PHOS: 76 U/L / ALT: 25 U/L / AST: 19 U/L / GGT: x           PT/INR - ( 03 Aug 2023 17:16 )   PT: 13.1 sec;   INR: 1.20 ratio         PTT - ( 03 Aug 2023 17:16 )  PTT:30.3 sec  ABG - ( 03 Aug 2023 17:08 )  pH, Arterial: 7.37  pH, Blood: x     /  pCO2: 34    /  pO2: 69    / HCO3: 20    / Base Excess: -4.8  /  SaO2: 94.3              ------------------------------------------------------------------------------------------------------------------------------  Assessment:  66 yo F with PMHx HTN, found to have a Type B aortic dissection and transferred to CTICU for further hemodynamic monitoring.    Type B aortic dissection  At risk for hemodynamic instability  Acute respiratory insufficiency  Hypertension  At risk for bleeding      Plan:   ***Neuro***  - Optimize day/night cycles.  - No acute issues.    ***Cardiovascular***  - At risk for hemodynamic instability  - Transitioned IV to PO labetalol.  - Added norvasc for HTN.  - Keep -120, HR 60-80.  - Per vascular - no intervention for now.    ***Pulmonary***  - Acute respiratory insufficiency   - On 5L NC.  - Wean oxygen as able.  - Deep breathing and coughing exercises.  - IS.    ***GI***  - Continue diet.  - Protonix for stress ulcer prophylaxis       ***Renal***  - Replete electrolytes as indicated.  - No acute issues.    ***ID***  - No acute issues.    ***Endocrine***  - Insulin if needed to maintain euglycemia.    ***Hematology***  - No current transfusion indication  - At risk for Bleeding   - Heparin for DVT prophylaxis       Patient requires continuous monitoring with bedside rhythm monitoring, pulse oximetry monitoring, and continuous central venous and arterial pressure monitoring; and intermittent blood gas analysis. Care plan discussed with the ICU care team.   Patient remained critical, at risk for life threatening decompensation.    I have spent 30 minutes providing critical care management to this patient.    By signing my name below, I, Willie Sabino, attest that this documentation has been prepared under the direction and in the presence of Kely Gerardo DO  Electronically signed: Willie Gallo, 08-04-23 @ 06:20    I, Kely Gerardo DO, personally performed the services described in this documentation. all medical record entries made by the scribe were at my direction and in my presence. I have reviewed the chart and agree that the record reflects my personal performance and is accurate and complete  Electronically signed: Kely Gerardo DO, Patient seen and examined at the bedside.    Remained critically ill on continuous ICU monitoring.      Brief Summary:  66 yo F with PMHx HTN, found to have a Type B aortic dissection and transferred to CTICU for further hemodynamic monitoring.      24 Hour events:  - No acute events overnight.      OBJECTIVE:  Vital Signs Last 24 Hrs  T(C): 35.7 (04 Aug 2023 04:00), Max: 36.2 (03 Aug 2023 12:00)  T(F): 96.3 (04 Aug 2023 04:00), Max: 97.2 (03 Aug 2023 12:00)  HR: 61 (04 Aug 2023 06:00) (48 - 74)  BP: 117/67 (04 Aug 2023 06:00) (88/61 - 157/90)  BP(mean): 87 (04 Aug 2023 06:00) (69 - 117)  RR: 17 (04 Aug 2023 06:00) (13 - 32)  SpO2: 91% (04 Aug 2023 06:00) (89% - 98%)    Parameters below as of 04 Aug 2023 05:00  Patient On (Oxygen Delivery Method): nasal cannula  O2 Flow (L/min): 5                  Physical Exam:   General: awake, AAOx4, interactive  Neurology: intact  ENT: trachea midline  Respiratory: on nasal cannula, non-labored respirations  CV: sinus rhythm, no murmurs  Abdominal: Soft, ND  Extremities: warm to palpation, HORNER  Skin: No Rashes, Hematoma, Ecchymosis                 -------------------------------------------------------------------------------------------------------------------------------    Labs:                        11.9   6.91  )-----------( 213      ( 04 Aug 2023 00:33 )             33.8     08-04    140  |  108  |  24<H>  ----------------------------<  89  4.1   |  18<L>  |  1.53<H>    Ca    8.8      04 Aug 2023 00:33  Phos  3.4     08-04  Mg     2.1     08-04    TPro  6.3  /  Alb  3.7  /  TBili  0.6  /  DBili  x   /  AST  19  /  ALT  25  /  AlkPhos  76  08-04    LIVER FUNCTIONS - ( 04 Aug 2023 00:33 )  Alb: 3.7 g/dL / Pro: 6.3 g/dL / ALK PHOS: 76 U/L / ALT: 25 U/L / AST: 19 U/L / GGT: x           PT/INR - ( 03 Aug 2023 17:16 )   PT: 13.1 sec;   INR: 1.20 ratio         PTT - ( 03 Aug 2023 17:16 )  PTT:30.3 sec  ABG - ( 03 Aug 2023 17:08 )  pH, Arterial: 7.37  pH, Blood: x     /  pCO2: 34    /  pO2: 69    / HCO3: 20    / Base Excess: -4.8  /  SaO2: 94.3              ------------------------------------------------------------------------------------------------------------------------------  Assessment:  66 yo F with PMHx HTN, found to have a Type B aortic dissection and transferred to CTICU for further hemodynamic monitoring.    Type B aortic dissection  Acute respiratory insufficiency  Hypertension      Plan:   ***Neuro***  - Optimize day/night cycles.  - No acute issues.    ***Cardiovascular***  - Continue norvasc, labetalol for HTN.  - Pulm HTN team to follow.    ***Pulmonary***  - Acute respiratory insufficiency   - Wean oxygen as able.  - Deep breathing and coughing exercises.  - IS.    ***GI***  - Continue diet.  - Protonix for stress ulcer prophylaxis       ***Renal***  - Replete electrolytes as indicated.  - No acute issues.    ***ID***  - No acute issues.    ***Endocrine***  - Insulin if needed to maintain euglycemia.    ***Hematology***  - No current transfusion indication  - Heparin SC for DVT prophylaxis       Patient requires continuous monitoring with bedside rhythm monitoring, pulse oximetry monitoring, and continuous central venous and arterial pressure monitoring; and intermittent blood gas analysis. Care plan discussed with the ICU care team.   Patient remained critical, at risk for life threatening decompensation.    I have spent 30 minutes providing level III management to this patient.    By signing my name below, I, Willie Sabino, attest that this documentation has been prepared under the direction and in the presence of Kely Gerardo DO  Electronically signed: Willie Gallo, 08-04-23 @ 06:20    I, Kely Gerardo DO, personally performed the services described in this documentation. all medical record entries made by the scribe were at my direction and in my presence. I have reviewed the chart and agree that the record reflects my personal performance and is accurate and complete  Electronically signed: Kely Gerardo DO,

## 2023-08-04 NOTE — PROGRESS NOTE ADULT - SUBJECTIVE AND OBJECTIVE BOX
PULMONARY PROGRESS NOTE    MONIKA MORA  MRN-[id number]    Patient is a 67y old  Female who presents with a chief complaint of Type B dissection (04 Aug 2023 06:20)      HPI:  -known to my partner   -aortic dissection  -no complaints    ACTIVE MEDICATIONS:  MEDICATIONS  (STANDING):  amLODIPine   Tablet 10 milliGRAM(s) Oral daily  heparin   Injectable 5000 Unit(s) SubCutaneous every 8 hours  labetalol 100 milliGRAM(s) Oral every 8 hours  pantoprazole    Tablet 40 milliGRAM(s) Oral before breakfast  sodium chloride 0.9% lock flush 3 milliLiter(s) IV Push every 8 hours    MEDICATIONS  (PRN):      EXAM:  Vital Signs Last 24 Hrs  T(C): 35.8 (04 Aug 2023 08:00), Max: 36.2 (03 Aug 2023 12:00)  T(F): 96.4 (04 Aug 2023 08:00), Max: 97.2 (03 Aug 2023 12:00)  HR: 63 (04 Aug 2023 10:00) (51 - 74)  BP: 97/60 (04 Aug 2023 10:00) (91/60 - 157/90)  BP(mean): 72 (04 Aug 2023 10:00) (69 - 117)  RR: 29 (04 Aug 2023 10:00) (13 - 32)  SpO2: 86% (04 Aug 2023 10:00) (86% - 98%)    Parameters below as of 04 Aug 2023 08:00  Patient On (Oxygen Delivery Method): nasal cannula  O2 Flow (L/min): 5      LUNGS: Clear to auscultation without wheezing, rales or rhonchi; respirations unlabored    LABS/IMAGING: reviewed      < from: CT Angio Chest Aorta w/wo IV Cont (08.03.23 @ 02:31) >    ACC: [id number] EXAM:  CT ANGIO CHEST AORTA WAWIC   ORDERED BY: CYNDI POZO     ACC: [id number] EXAM:  CT ANGIO ABD PELV (W)AW IC   ORDERED BY: CYNDI POZO     PROCEDURE DATE:  08/03/2023          INTERPRETATION:  CLINICAL INFORMATION: Aortic dissection. Shortness of   breath.    COMPARISON: CT chest from 8/3/2023.    CONTRAST/COMPLICATIONS:  IV Contrast: Omnipaque 350  95 cc   0 cc discarded.  Oral Contrast: NONE  Complications: None reported at time of study completion    PROCEDURE:  CT Angiography of the Chest, Abdomen and Pelvis.  Gated precontrast imaging was performed through the heart followed by   gated CT Angiography of the heart with subsequent non-gated arterial   phase imaging of the chest, abdomen and pelvis.  Sagittal and coronal reformats were performed as well as 3D (MIP)   reconstructions.    FINDINGS:  AORTIC ANGIOGRAPHY:  On the precontrast series, there is residual contrast from the prior CT   chest study. Following IV contrast injection, there is an aortic   dissection just distal to the left subclavian artery extending to the   superior mesenteric artery. The descending aorta measures up to 5.7 x 5   cm at the level of the pulmonary artery. The celiac axis is supplied by   the false lumen. The SMA, bilateral renal arteries and ANTHONY are patent.   The bilateral common iliac arteries and their branches are patent.   Minimal calcification within the abdominal aorta. 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    There is a focal dissection involving the external right iliac artery   extending from the bifurcation with the internal right iliac artery to   the distal right iliac artery.    CHEST:  LUNGS AND LARGE AIRWAYS: Patent central airways. Peribronchovascular   groundglass opacities.  PLEURA: No pleural effusion.  HEART: Cardiomegaly. No pericardial effusion.  MEDIASTINUM AND ROBERTO: No lymphadenopathy.  CHEST WALL AND LOWER NECK: Within normal limits.    ABDOMEN AND PELVIS:  LIVER: A few subcentimeter hypodense foci too small to characterize.  BILE DUCTS: Normal caliber.  GALLBLADDER: Within normal limits.  SPLEEN: Within normal limits.  PANCREAS: Within normal limits.  ADRENALS: Indeterminate left adrenal nodule measures 2.7 by 1.9 cm.  KIDNEYS/URETERS: No hydronephrosis. Left subcentimeter hypodense focus   too small to characterize.    BLADDER: Within normal limits.  REPRODUCTIVE ORGANS: Hysterectomy.    BOWEL: No bowel obstruction. Appendix is normal.  PERITONEUM: No ascites.  RETROPERITONEUM/LYMPH NODES: No lymphadenopathy.  ABDOMINAL WALL: Unremarkable  BONES: Degenerative changes.    IMPRESSION:  Type B aortic dissection. The celiac axis is supplied by the false lumen.   Additional focal dissection of the right external iliac artery.    Indeterminate left adrenal nodule. If there are prior, outside exams,   they should be submitted for comparison.    Discussed with ALEX Cam[place] by Dr. Blake on 8/3/2023 2:48 AM with readback   confirmation.    --- End of Report ---           BENJAMÍN BLAKE MD; Resident Radiologist  This document has been electronically signed.  JAZMINE MONAHAN MD; Attending Radiologist  This document has been electronica    < end of copied text >    PROBLEM LIST:  67y Female with HEALTH ISSUES - PROBLEM Dx:  Aortic dissection    RECS:    -appreciate CTICU care  -fu with  after dc    Samaria Carnes MD  [phone]

## 2023-08-05 DIAGNOSIS — I10 ESSENTIAL (PRIMARY) HYPERTENSION: ICD-10-CM

## 2023-08-05 LAB
ALBUMIN SERPL ELPH-MCNC: 3.5 G/DL — SIGNIFICANT CHANGE UP (ref 3.3–5)
ALP SERPL-CCNC: 76 U/L — SIGNIFICANT CHANGE UP (ref 40–120)
ALT FLD-CCNC: 23 U/L — SIGNIFICANT CHANGE UP (ref 10–45)
ANION GAP SERPL CALC-SCNC: 13 MMOL/L — SIGNIFICANT CHANGE UP (ref 5–17)
AST SERPL-CCNC: 19 U/L — SIGNIFICANT CHANGE UP (ref 10–40)
BASOPHILS # BLD AUTO: 0.06 K/UL — SIGNIFICANT CHANGE UP (ref 0–0.2)
BASOPHILS NFR BLD AUTO: 0.8 % — SIGNIFICANT CHANGE UP (ref 0–2)
BILIRUB SERPL-MCNC: 0.5 MG/DL — SIGNIFICANT CHANGE UP (ref 0.2–1.2)
BUN SERPL-MCNC: 27 MG/DL — HIGH (ref 7–23)
CALCIUM SERPL-MCNC: 8.9 MG/DL — SIGNIFICANT CHANGE UP (ref 8.4–10.5)
CHLORIDE SERPL-SCNC: 106 MMOL/L — SIGNIFICANT CHANGE UP (ref 96–108)
CO2 SERPL-SCNC: 19 MMOL/L — LOW (ref 22–31)
CREAT SERPL-MCNC: 1.5 MG/DL — HIGH (ref 0.5–1.3)
EGFR: 38 ML/MIN/1.73M2 — LOW
EOSINOPHIL # BLD AUTO: 0.29 K/UL — SIGNIFICANT CHANGE UP (ref 0–0.5)
EOSINOPHIL NFR BLD AUTO: 4 % — SIGNIFICANT CHANGE UP (ref 0–6)
GLUCOSE SERPL-MCNC: 100 MG/DL — HIGH (ref 70–99)
HCT VFR BLD CALC: 34.1 % — LOW (ref 34.5–45)
HCV AB S/CO SERPL IA: 0.13 S/CO — SIGNIFICANT CHANGE UP (ref 0–0.99)
HCV AB SERPL-IMP: SIGNIFICANT CHANGE UP
HGB BLD-MCNC: 11.6 G/DL — SIGNIFICANT CHANGE UP (ref 11.5–15.5)
IMM GRANULOCYTES NFR BLD AUTO: 0.3 % — SIGNIFICANT CHANGE UP (ref 0–0.9)
LYMPHOCYTES # BLD AUTO: 2.33 K/UL — SIGNIFICANT CHANGE UP (ref 1–3.3)
LYMPHOCYTES # BLD AUTO: 31.9 % — SIGNIFICANT CHANGE UP (ref 13–44)
MAGNESIUM SERPL-MCNC: 2.1 MG/DL — SIGNIFICANT CHANGE UP (ref 1.6–2.6)
MCHC RBC-ENTMCNC: 25.8 PG — LOW (ref 27–34)
MCHC RBC-ENTMCNC: 34 GM/DL — SIGNIFICANT CHANGE UP (ref 32–36)
MCV RBC AUTO: 75.8 FL — LOW (ref 80–100)
MONOCYTES # BLD AUTO: 0.64 K/UL — SIGNIFICANT CHANGE UP (ref 0–0.9)
MONOCYTES NFR BLD AUTO: 8.8 % — SIGNIFICANT CHANGE UP (ref 2–14)
NEUTROPHILS # BLD AUTO: 3.96 K/UL — SIGNIFICANT CHANGE UP (ref 1.8–7.4)
NEUTROPHILS NFR BLD AUTO: 54.2 % — SIGNIFICANT CHANGE UP (ref 43–77)
NRBC # BLD: 0 /100 WBCS — SIGNIFICANT CHANGE UP (ref 0–0)
PHOSPHATE SERPL-MCNC: 3.5 MG/DL — SIGNIFICANT CHANGE UP (ref 2.5–4.5)
PLATELET # BLD AUTO: 237 K/UL — SIGNIFICANT CHANGE UP (ref 150–400)
POTASSIUM SERPL-MCNC: 4.4 MMOL/L — SIGNIFICANT CHANGE UP (ref 3.5–5.3)
POTASSIUM SERPL-SCNC: 4.4 MMOL/L — SIGNIFICANT CHANGE UP (ref 3.5–5.3)
PROT SERPL-MCNC: 6.3 G/DL — SIGNIFICANT CHANGE UP (ref 6–8.3)
RBC # BLD: 4.5 M/UL — SIGNIFICANT CHANGE UP (ref 3.8–5.2)
RBC # FLD: 14.1 % — SIGNIFICANT CHANGE UP (ref 10.3–14.5)
SODIUM SERPL-SCNC: 138 MMOL/L — SIGNIFICANT CHANGE UP (ref 135–145)
WBC # BLD: 7.3 K/UL — SIGNIFICANT CHANGE UP (ref 3.8–10.5)
WBC # FLD AUTO: 7.3 K/UL — SIGNIFICANT CHANGE UP (ref 3.8–10.5)

## 2023-08-05 PROCEDURE — 99233 SBSQ HOSP IP/OBS HIGH 50: CPT

## 2023-08-05 PROCEDURE — 99231 SBSQ HOSP IP/OBS SF/LOW 25: CPT

## 2023-08-05 PROCEDURE — 71045 X-RAY EXAM CHEST 1 VIEW: CPT | Mod: 26

## 2023-08-05 PROCEDURE — 99232 SBSQ HOSP IP/OBS MODERATE 35: CPT

## 2023-08-05 RX ADMIN — SODIUM CHLORIDE 3 MILLILITER(S): 9 INJECTION INTRAMUSCULAR; INTRAVENOUS; SUBCUTANEOUS at 20:08

## 2023-08-05 RX ADMIN — SODIUM CHLORIDE 3 MILLILITER(S): 9 INJECTION INTRAMUSCULAR; INTRAVENOUS; SUBCUTANEOUS at 16:27

## 2023-08-05 RX ADMIN — Medication 100 MILLIGRAM(S): at 06:43

## 2023-08-05 RX ADMIN — HEPARIN SODIUM 5000 UNIT(S): 5000 INJECTION INTRAVENOUS; SUBCUTANEOUS at 21:28

## 2023-08-05 RX ADMIN — HEPARIN SODIUM 5000 UNIT(S): 5000 INJECTION INTRAVENOUS; SUBCUTANEOUS at 06:03

## 2023-08-05 RX ADMIN — Medication 100 MILLIGRAM(S): at 15:43

## 2023-08-05 RX ADMIN — ATORVASTATIN CALCIUM 20 MILLIGRAM(S): 80 TABLET, FILM COATED ORAL at 21:27

## 2023-08-05 RX ADMIN — SODIUM CHLORIDE 3 MILLILITER(S): 9 INJECTION INTRAMUSCULAR; INTRAVENOUS; SUBCUTANEOUS at 05:28

## 2023-08-05 RX ADMIN — PANTOPRAZOLE SODIUM 40 MILLIGRAM(S): 20 TABLET, DELAYED RELEASE ORAL at 06:03

## 2023-08-05 RX ADMIN — Medication 100 MILLIGRAM(S): at 21:27

## 2023-08-05 RX ADMIN — HEPARIN SODIUM 5000 UNIT(S): 5000 INJECTION INTRAVENOUS; SUBCUTANEOUS at 15:43

## 2023-08-05 RX ADMIN — AMLODIPINE BESYLATE 10 MILLIGRAM(S): 2.5 TABLET ORAL at 06:03

## 2023-08-05 NOTE — PROGRESS NOTE ADULT - SUBJECTIVE AND OBJECTIVE BOX
VITAL SIGNS    Telemetry:    Vital Signs Last 24 Hrs  T(C): 36.5 (23 @ 11:38), Max: 36.5 (23 @ 11:38)  T(F): 97.7 (23 @ 11:38), Max: 97.7 (23 @ 11:38)  HR: 61 (23 11:38) (58 - 74)  BP: 125/80 (23 @ 11:38) (96/58 - 131/82)  RR: 16 (23 11:38) (13 - 27)  SpO2: 96% (23 @ 11:38) (74% - 98%)             @ 07:01  -   @ 07:00  --------------------------------------------------------  IN: 700 mL / OUT: 0 mL / NET: 700 mL     @ 07:01  -   @ 14:12  --------------------------------------------------------  IN: 150 mL / OUT: 0 mL / NET: 150 mL       Daily     Daily Weight in k.6 (05 Aug 2023 00:00)  Admit Wt: Drug Dosing Weight  Height (cm): 162.6 (02 Aug 2023 19:55)  Weight (kg): 75.7 (02 Aug 2023 19:55)  BMI (kg/m2): 28.6 (02 Aug 2023 19:55)  BSA (m2): 1.81 (02 Aug 2023 19:55)    Bilirubin Total: 0.5 mg/dL ( @ 00:11)    CAPILLARY BLOOD GLUCOSE              LABS:     @ 07:01  -   @ 07:00  --------------------------------------------------------  IN: 700 mL / OUT: 0 mL / NET: 700 mL    08 @ 07:01  -   @ 14:12  --------------------------------------------------------  IN: 150 mL / OUT: 0 mL / NET: 150 mL    cret                        11.6   7.30  )-----------( 237      ( 05 Aug 2023 00:12 )             34.1     08-    138  |  106  |  27<H>  ----------------------------<  100<H>  4.4   |  19<L>  |  1.50<H>    Ca    8.9      05 Aug 2023 00:11  Phos  3.5     08-05  Mg     2.1     08-    TPro  6.3  /  Alb  3.5  /  TBili  0.5  /  DBili  x   /  AST  19  /  ALT  23  /  AlkPhos  76  08-05    PT/INR - ( 03 Aug 2023 17:16 )   PT: 13.1 sec;   INR: 1.20 ratio         PTT - ( 03 Aug 2023 17:16 )  PTT:30.3 sec    amLODIPine   Tablet 10 milliGRAM(s) Oral daily  atorvastatin 20 milliGRAM(s) Oral at bedtime  heparin   Injectable 5000 Unit(s) SubCutaneous every 8 hours  labetalol 100 milliGRAM(s) Oral every 8 hours  pantoprazole    Tablet 40 milliGRAM(s) Oral before breakfast  sodium chloride 0.9% lock flush 3 milliLiter(s) IV Push every 8 hours      PHYSICAL EXAM    Subjective: "Hi.   Neurology: alert and oriented x 3, nonfocal, no gross deficits  CV : tele:  RSR  Sternal Wound :  CDI with dressing , Stable  Lungs: clear. RR easy, unlabored   Abdomen: soft, nontender, nondistended, positive bowel sounds, bowel movement   Neg N/V/D   :  pt voiding without difficulty   Extremities:   HORNER; edema, neg calf tenderness.   PPP bilaterally      PW:  Chest tubes:                 VITAL SIGNS    Telemetry:  rsr 70's   Vital Signs Last 24 Hrs  T(C): 36.5 (23 @ 11:38), Max: 36.5 (23 @ 11:38)  T(F): 97.7 (23 @ 11:38), Max: 97.7 (23 @ 11:38)  HR: 61 (23 11:38) (58 - 74)  BP: 125/80 (23 @ 11:38) (96/58 - 131/82)  RR: 16 (23 @ 11:38) (13 - 27)  SpO2: 96% (23 @ 11:38) (74% - 98%)             @ 07:01  -   @ 07:00  --------------------------------------------------------  IN: 700 mL / OUT: 0 mL / NET: 700 mL     @ 07:01  -   @ 14:12  --------------------------------------------------------  IN: 150 mL / OUT: 0 mL / NET: 150 mL       Daily     Daily Weight in k.6 (05 Aug 2023 00:00)  Admit Wt: Drug Dosing Weight  Height (cm): 162.6 (02 Aug 2023 19:55)  Weight (kg): 75.7 (02 Aug 2023 19:55)  BMI (kg/m2): 28.6 (02 Aug 2023 19:55)  BSA (m2): 1.81 (02 Aug 2023 19:55)    Bilirubin Total: 0.5 mg/dL ( @ 00:11)    CAPILLARY BLOOD GLUCOSE              LABS:     @ 07:01  -   @ 07:00  --------------------------------------------------------  IN: 700 mL / OUT: 0 mL / NET: 700 mL    08 @ 07:01  -   @ 14:12  --------------------------------------------------------  IN: 150 mL / OUT: 0 mL / NET: 150 mL    cret                        11.6   7.30  )-----------( 237      ( 05 Aug 2023 00:12 )             34.1         138  |  106  |  27<H>  ----------------------------<  100<H>  4.4   |  19<L>  |  1.50<H>    Ca    8.9      05 Aug 2023 00:11  Phos  3.5     08-  Mg     2.1         TPro  6.3  /  Alb  3.5  /  TBili  0.5  /  DBili  x   /  AST  19  /  ALT  23  /  AlkPhos  76  08-05    PT/INR - ( 03 Aug 2023 17:16 )   PT: 13.1 sec;   INR: 1.20 ratio         PTT - ( 03 Aug 2023 17:16 )  PTT:30.3 sec    amLODIPine   Tablet 10 milliGRAM(s) Oral daily  atorvastatin 20 milliGRAM(s) Oral at bedtime  heparin   Injectable 5000 Unit(s) SubCutaneous every 8 hours  labetalol 100 milliGRAM(s) Oral every 8 hours  pantoprazole    Tablet 40 milliGRAM(s) Oral before breakfast  sodium chloride 0.9% lock flush 3 milliLiter(s) IV Push every 8 hours      PHYSICAL EXAM    Subjective: "I feel ok."   Neurology: alert and oriented x 3, nonfocal, no gross deficits  CV : tele:  RSR 70's   Lungs: clear. RR easy, unlabored   Abdomen: soft, nontender, nondistended, positive bowel sounds, + bowel movement   Neg N/V/D   :  pt voiding without difficulty   Extremities:   HORNER; neg LE edema, neg calf tenderness.   PPP bilaterally

## 2023-08-05 NOTE — PROGRESS NOTE ADULT - SUBJECTIVE AND OBJECTIVE BOX
Patient seen and examined at the bedside.    Remained critically ill on continuous ICU monitoring.    OBJECTIVE:  Vital Signs Last 24 Hrs  T(C): 36.3 (05 Aug 2023 04:00), Max: 36.4 (04 Aug 2023 20:00)  T(F): 97.3 (05 Aug 2023 04:00), Max: 97.5 (04 Aug 2023 20:00)  HR: 61 (05 Aug 2023 07:00) (53 - 74)  BP: 115/74 (05 Aug 2023 07:00) (95/55 - 131/82)  BP(mean): 90 (05 Aug 2023 07:00) (69 - 96)  RR: 20 (05 Aug 2023 07:00) (14 - 29)  SpO2: 97% (05 Aug 2023 07:00) (86% - 97%)    Parameters below as of 05 Aug 2023 04:00  Patient On (Oxygen Delivery Method): nasal cannula  O2 Flow (L/min): 3        Physical Exam:   General: awake, AAOx4, interactive  Neurology: intact  Eyes: bilateral pupils equal and reactive   ENT: trachea midline  Respiratory: on nasal cannula, non-labored respirations  CV: sinus rhythm, no murmurs        [x] Sternal dressing        [x] Sinus rhythm  Abdominal: Soft, ND  Extremities: warm to palpation, HORNER  Skin: No Rashes, Hematoma, Ecchymosis                                   ============================I/O===========================   I&O's Detail    04 Aug 2023 07:01  -  05 Aug 2023 07:00  --------------------------------------------------------  IN:    Oral Fluid: 700 mL  Total IN: 700 mL    OUT:  Total OUT: 0 mL    Total NET: 700 mL        ============================ LABS =========================                        11.6   7.30  )-----------( 237      ( 05 Aug 2023 00:12 )             34.1     08-05    138  |  106  |  27<H>  ----------------------------<  100<H>  4.4   |  19<L>  |  1.50<H>    Ca    8.9      05 Aug 2023 00:11  Phos  3.5     08-05  Mg     2.1     08-05    TPro  6.3  /  Alb  3.5  /  TBili  0.5  /  DBili  x   /  AST  19  /  ALT  23  /  AlkPhos  76  08-05    LIVER FUNCTIONS - ( 05 Aug 2023 00:11 )  Alb: 3.5 g/dL / Pro: 6.3 g/dL / ALK PHOS: 76 U/L / ALT: 23 U/L / AST: 19 U/L / GGT: x           PT/INR - ( 03 Aug 2023 17:16 )   PT: 13.1 sec;   INR: 1.20 ratio         PTT - ( 03 Aug 2023 17:16 )  PTT:30.3 sec  ABG - ( 03 Aug 2023 17:08 )  pH, Arterial: 7.37  pH, Blood: x     /  pCO2: 34    /  pO2: 69    / HCO3: 20    / Base Excess: -4.8  /  SaO2: 94.3              Urinalysis Basic - ( 05 Aug 2023 00:11 )    Color: x / Appearance: x / SG: x / pH: x  Gluc: 100 mg/dL / Ketone: x  / Bili: x / Urobili: x   Blood: x / Protein: x / Nitrite: x   Leuk Esterase: x / RBC: x / WBC x   Sq Epi: x / Non Sq Epi: x / Bacteria: x      ======================Micro/Rad/Cardio=================  Culture: Reviewed   CXR: Reviewed  Echo: Reviewed  ======================================================  PAST MEDICAL & SURGICAL HISTORY:  HTN (hypertension)      Prediabetes      Mild tricuspid regurgitation      Enlarged aorta      S/P hysterectomy      S/P           ======================================================    Assessment:  68 yo F with PMHx HTN, found to have a Type B aortic dissection and transferred to CTICU for further hemodynamic monitoring.    Type B aortic dissection  Acute respiratory insufficiency  Hypertension    ======================================================  Plan:   ***Neuro***  [x] Nonfocal    Post operative neuro assessment     ***Cardiovascular***  Invasive hemodynamic monitoring, assess perfusion indices   SR / Hct 34.1%/ Lactate 0.8   Continue Amlodipine and Labetalol  [x] VTE ppx with SQ Heparin  [x] Statin   Serial EKG and cardiac enzymes     ***Pulmonary***  [x] Nasal Cannula   Follow SpO2, CXR, blood gasses   Encourage incentive spirometry, continue pulse ox monitoring, follow ABGs               ***GI***  [x]  Diet:  Regular  [x] Protonix    ***Renal***  Continue to monitor I/Os, BUN/Creatinine.   Replete lytes PRN  Fma present    ***ID***  No active antibiotic coverage      ***Endocrine***  [x] Prediabetes : HbA1c 5.6%                - Need tight glycemic control to prevent wound infection.          Patient requires continuous monitoring with bedside rhythm monitoring, arterial line, pulse oximetry, ventilator monitoring; intermittent blood gas analysis. Care plan discussed with ICU care team. Patient remains critical; required more than usual ICU care.    patient remain critical; required more than usual post op care; I have spent 35 minutes providing non routine post op care, revaluated multiple times during the day .     Care Plan discussed with the ICU care team. Patient remained critical, at risk for life threatening decompensation.     By signing my name below, I, Joycelyn Harmon, attest that this documentation has been prepared under the direction and in the presence of Gary Salas MD.  Electronically signed: John Ramesh, 23 @ 07:23    I, Josue Vega, personally performed the services described in this documentation. all medical record entries made by the scribe were at my direction and in my presence. I have reviewed the chart and agree that the record reflects my personal performance and is accurate and complete  Electronically signed: Gary Salas MD. Patient seen and examined at the bedside.    Remained critically ill on continuous ICU monitoring.    OBJECTIVE:  Vital Signs Last 24 Hrs  T(C): 36.3 (05 Aug 2023 04:00), Max: 36.4 (04 Aug 2023 20:00)  T(F): 97.3 (05 Aug 2023 04:00), Max: 97.5 (04 Aug 2023 20:00)  HR: 61 (05 Aug 2023 07:00) (53 - 74)  BP: 115/74 (05 Aug 2023 07:00) (95/55 - 131/82)  BP(mean): 90 (05 Aug 2023 07:00) (69 - 96)  RR: 20 (05 Aug 2023 07:00) (14 - 29)  SpO2: 97% (05 Aug 2023 07:00) (86% - 97%)    Parameters below as of 05 Aug 2023 04:00  Patient On (Oxygen Delivery Method): nasal cannula  O2 Flow (L/min): 3        Physical Exam:   General: awake, AAOx4, interactive  Neurology: intact  Eyes: bilateral pupils equal and reactive   ENT: trachea midline  Respiratory: on nasal cannula, non-labored respirations  CV: sinus rhythm, no murmurs        [x] Sinus rhythm  Abdominal: Soft, ND  Extremities: warm to palpation, HORNER  Skin: No Rashes, Hematoma, Ecchymosis                                   ============================I/O===========================   I&O's Detail    04 Aug 2023 07:01  -  05 Aug 2023 07:00  --------------------------------------------------------  IN:    Oral Fluid: 700 mL  Total IN: 700 mL    OUT:  Total OUT: 0 mL    Total NET: 700 mL        ============================ LABS =========================                        11.6   7.30  )-----------( 237      ( 05 Aug 2023 00:12 )             34.1     08-05    138  |  106  |  27<H>  ----------------------------<  100<H>  4.4   |  19<L>  |  1.50<H>    Ca    8.9      05 Aug 2023 00:11  Phos  3.5     08-05  Mg     2.1     08-05    TPro  6.3  /  Alb  3.5  /  TBili  0.5  /  DBili  x   /  AST  19  /  ALT  23  /  AlkPhos  76  08-05    LIVER FUNCTIONS - ( 05 Aug 2023 00:11 )  Alb: 3.5 g/dL / Pro: 6.3 g/dL / ALK PHOS: 76 U/L / ALT: 23 U/L / AST: 19 U/L / GGT: x           PT/INR - ( 03 Aug 2023 17:16 )   PT: 13.1 sec;   INR: 1.20 ratio         PTT - ( 03 Aug 2023 17:16 )  PTT:30.3 sec  ABG - ( 03 Aug 2023 17:08 )  pH, Arterial: 7.37  pH, Blood: x     /  pCO2: 34    /  pO2: 69    / HCO3: 20    / Base Excess: -4.8  /  SaO2: 94.3              Urinalysis Basic - ( 05 Aug 2023 00:11 )    Color: x / Appearance: x / SG: x / pH: x  Gluc: 100 mg/dL / Ketone: x  / Bili: x / Urobili: x   Blood: x / Protein: x / Nitrite: x   Leuk Esterase: x / RBC: x / WBC x   Sq Epi: x / Non Sq Epi: x / Bacteria: x      ======================Micro/Rad/Cardio=================  Culture: Reviewed   CXR: Reviewed  Echo: Reviewed  ======================================================  PAST MEDICAL & SURGICAL HISTORY:  HTN (hypertension)      Prediabetes      Mild tricuspid regurgitation      Enlarged aorta      S/P hysterectomy      S/P           ======================================================    Assessment:  68 yo F with PMHx HTN, found to have a Type B aortic dissection and transferred to CTICU for further hemodynamic monitoring.    Type B aortic dissection  acute renal insufficiency  acute respiratory insufficincy  pulmonary hypertension, possible chronic thromboembolic   hypertension    ======================================================  Plan:   ***Neuro***  [x] Nonfocal    Post operative neuro assessment     ***Cardiovascular***  Invasive hemodynamic monitoring, assess perfusion indices   SR / Hct 34.1%/ Lactate 0.8   Continue Amlodipine and Labetalol  [x] VTE ppx with SQ Heparin  [x] Statin   Serial EKG and cardiac enzymes     ***Pulmonary***  [x] Nasal Cannula   Follow SpO2, CXR, blood gasses   Encourage incentive spirometry, continue pulse ox monitoring, follow ABGs               ***GI***  [x]  Diet:  Regular  [x] Protonix    ***Renal***  Continue to monitor I/Os, BUN/Creatinine.   Replete lytes PRN  Fam present    ***ID***  No active antibiotic coverage      ***Endocrine***  [x] Prediabetes : HbA1c 5.6%                - Need tight glycemic control to prevent wound infection.      hemodynamically stable ,  on nasal cannula,   monitor elevated creatinine( pt recieved contrast )  BP control   transfer to floor    Care Plan discussed with the ICU care team.   By signing my name below, I, Joycelyn Harmon, attest that this documentation has been prepared under the direction and in the presence of Gary Salas MD.  Electronically signed: John Ramesh, 23 @ 07:23    I, Josue Vega, personally performed the services described in this documentation. all medical record entries made by the scribe were at my direction and in my presence. I have reviewed the chart and agree that the record reflects my personal performance and is accurate and complete  Electronically signed: Gary Salas MD.

## 2023-08-05 NOTE — PROGRESS NOTE ADULT - ASSESSMENT
67 y r old with HTN on amlodipine, hydralazine, cardiovedilol had angiogram in June to check due to shortness of breath found to have O2 sat in 92s, and advised to follow up with pulmonary doctor and had a d-dimer elevated to the 2000s and asked to come to the ED. On arrival 02 in the 92s and started on 2 l No chest pain, no pleuritic complaint, bilateral swelling for a couple of months, left more than the right but no calf pain, not a smoker, marijuana smoker, no recent long rides. 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 B dissection .     67F h/o HTN w/recent workup for dyspnea revealing severe symptomatic pulmonary HTN [PASP ~88mmHg, 10WU], was sent to the hospital to rule out PE for progressive dyspnea and found to have incidental type B aortic dissection on CT imaging       - Incidentally found type B aortic dissection on CT imaging; appears to be chronic in nature and patient remains without any signs of malperfusion or pain. Patient's anatomy is amenable for stent-graft repair, however, given her severe symptomatic pulmonary hypertension, the most prudent thing would be to address this and we will follow up with repeat CT imaging in 6 months.     - Will continue PO control of her BP. This has likely been the reason for her SLICK, which seemingly has plateaued.     - Patient will follow up with Dr. Cathy Serra to continue her pulmonary hypertension. 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 She will likely require a hypercoagulable workup as a part of this. Additionally, she will also likely require anticoagulation, which I discussed with her. Ideally, we would not have someone with residual false-lumen flow on anticoagulation, however given the chronicity of her dissection and large fenestrations in the descending, the benefits of treating her pulmonary htn outweigh the risks.        67 F W/ HTN on amlodipine, hydralazine, cardiovedilol had angiogram in June to check due to shortness of breath found to have O2 sat in 92s, and advised to follow up with pulmonary doctor and had a d-dimer elevated to the 2000s and asked to come to the ED. On arrival 02 in the 92s and started on 2 l No chest pain, no pleuritic complaint, bilateral swelling for a couple of months, left more than the right but no calf pain, not a smoker, marijuana smoker, no recent long rides. 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 B dissection; Recent workup for dyspnea revealing severe symptomatic pulmonary HTN [PASP ~88mmHg, 10WU], was sent to the hospital to rule out PE for progressive dyspnea and found to have incidental type B aortic dissection on CT imaging Incidentally found type B aortic dissection on CT imaging; appears to be chronic in nature and patient remains without any signs of malperfusion or pain. Patient's anatomy is amenable for stent-graft repair, however, given her severe symptomatic pulmonary hypertension, the most prudent thing would be to address this and we will follow up with repeat CT imaging in 6 months. Will continue PO control of her BP. This has likely been the reason for her SLICK, which seemingly has plateaued.  Patient will follow up with Dr. Cathy Serra to continue her pulmonary hypertension. 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 She will likely require a hypercoagulable workup as a part of this. Additionally, she will also likely require anticoagulation, which I discussed with her. Ideally, we would not have someone with residual false-lumen flow on anticoagulation, however given the chronicity of her dissection and large fenestrations in the descending, the benefits of treating her pulmonary htn outweigh the risks.   8/5 PT tx 2[facility] floor; VSS; RSR 70's; continue anti-hypertensive medications; repeat ct scan in 6 months  discharge planning- home in am sun if bp stable

## 2023-08-05 NOTE — PROGRESS NOTE ADULT - REASON FOR ADMISSION
Type B dissection

## 2023-08-05 NOTE — PROGRESS NOTE ADULT - PROBLEM SELECTOR PLAN 1
AS per Dr. Lange: incidental type B aortic dissection on CT imaging Incidentally found type B aortic dissection on CT imaging; appears to be chronic in nature and patient remains without any signs of malperfusion or pain. Patient's anatomy is amenable for stent-graft repair, however, given her severe symptomatic pulmonary hypertension, the most prudent thing would be to address this and we will follow up with repeat CT imaging in 6 month  htn management  discharge planning- home sun if stable overnight

## 2023-08-05 NOTE — PROGRESS NOTE ADULT - ASSESSMENT
67F h/o HTN w/recent workup for dyspnea revealing severe symptomatic pulmonary HTN [PASP ~88mmHg, 10WU], was sent to the hospital to rule out PE for progressive dyspnea and found to have incidental type B aortic dissection on CT imaging       - Incidentally found type B aortic dissection on CT imaging; appears to be chronic in nature and patient remains without any signs of malperfusion or pain. Patient's anatomy is amenable for stent-graft repair, however, given her severe symptomatic pulmonary hypertension, the most prudent thing would be to address this and we will follow up with repeat CT imaging in 6 months.     - Will continue PO control of her BP. This has likely been the reason for her SLICK, which seemingly has plateaued.     - Patient will follow up with Dr. Cathy Serra to continue her pulmonary hypertension. 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 She will likely require a hypercoagulable workup as a part of this. Additionally, she will also likely require anticoagulation, which I discussed with her. Ideally, we would not have someone with residual false-lumen flow on anticoagulation, however given the chronicity of her dissection and large fenestrations in the descending, the benefits of treating her pulmonary htn outweigh the risks.       Franky [facility]  Cardiac Surgery

## 2023-08-05 NOTE — PROGRESS NOTE ADULT - SUBJECTIVE AND OBJECTIVE BOX
Cardiac Surgery  CC: type B dissection  HPI: 67F h/o HTN w/recent workup for dyspnea revealing severe symptomatic pulmonary HTN [PASP ~88mmHg, 10WU], was sent to the hospital to rule out PE for progressive dyspnea and found to have incidental type B aortic dissection on CT imaging; no evidence of malperfusion or pain.   24-hour / overnight events: No overnight events, patient doing well. Off HFNC, weaned to ~3L NC. Remains asymptomatic from type B dissection.           Review of Symptoms  General: NAD  Chest: improving dyspnea, no chest pain  Abdomen: denies abdominal pain    Physical Exam  General: sitting in chain comfortably  Chest: breathing well on 3L NC, bilateral basilar rhonci  LE: improving edema, no tenderness bilaterally

## 2023-08-06 ENCOUNTER — TRANSCRIPTION ENCOUNTER (OUTPATIENT)
Age: 68
End: 2023-08-06

## 2023-08-06 VITALS — WEIGHT: 170.2 LBS

## 2023-08-06 LAB
ALBUMIN SERPL ELPH-MCNC: 3.6 G/DL — SIGNIFICANT CHANGE UP (ref 3.3–5)
ALP SERPL-CCNC: 77 U/L — SIGNIFICANT CHANGE UP (ref 40–120)
ALT FLD-CCNC: 26 U/L — SIGNIFICANT CHANGE UP (ref 10–45)
ANION GAP SERPL CALC-SCNC: 11 MMOL/L — SIGNIFICANT CHANGE UP (ref 5–17)
AST SERPL-CCNC: 25 U/L — SIGNIFICANT CHANGE UP (ref 10–40)
BASOPHILS # BLD AUTO: 0.07 K/UL — SIGNIFICANT CHANGE UP (ref 0–0.2)
BASOPHILS NFR BLD AUTO: 1 % — SIGNIFICANT CHANGE UP (ref 0–2)
BILIRUB SERPL-MCNC: 0.6 MG/DL — SIGNIFICANT CHANGE UP (ref 0.2–1.2)
BUN SERPL-MCNC: 24 MG/DL — HIGH (ref 7–23)
CALCIUM SERPL-MCNC: 9.1 MG/DL — SIGNIFICANT CHANGE UP (ref 8.4–10.5)
CHLORIDE SERPL-SCNC: 109 MMOL/L — HIGH (ref 96–108)
CO2 SERPL-SCNC: 20 MMOL/L — LOW (ref 22–31)
CREAT SERPL-MCNC: 1.31 MG/DL — HIGH (ref 0.5–1.3)
EGFR: 45 ML/MIN/1.73M2 — LOW
EOSINOPHIL # BLD AUTO: 0.28 K/UL — SIGNIFICANT CHANGE UP (ref 0–0.5)
EOSINOPHIL NFR BLD AUTO: 4 % — SIGNIFICANT CHANGE UP (ref 0–6)
GLUCOSE SERPL-MCNC: 91 MG/DL — SIGNIFICANT CHANGE UP (ref 70–99)
HCT VFR BLD CALC: 35.4 % — SIGNIFICANT CHANGE UP (ref 34.5–45)
HGB BLD-MCNC: 11.9 G/DL — SIGNIFICANT CHANGE UP (ref 11.5–15.5)
IMM GRANULOCYTES NFR BLD AUTO: 0.4 % — SIGNIFICANT CHANGE UP (ref 0–0.9)
LYMPHOCYTES # BLD AUTO: 2.92 K/UL — SIGNIFICANT CHANGE UP (ref 1–3.3)
LYMPHOCYTES # BLD AUTO: 41.3 % — SIGNIFICANT CHANGE UP (ref 13–44)
MAGNESIUM SERPL-MCNC: 2.1 MG/DL — SIGNIFICANT CHANGE UP (ref 1.6–2.6)
MCHC RBC-ENTMCNC: 26 PG — LOW (ref 27–34)
MCHC RBC-ENTMCNC: 33.6 GM/DL — SIGNIFICANT CHANGE UP (ref 32–36)
MCV RBC AUTO: 77.3 FL — LOW (ref 80–100)
MONOCYTES # BLD AUTO: 0.62 K/UL — SIGNIFICANT CHANGE UP (ref 0–0.9)
MONOCYTES NFR BLD AUTO: 8.8 % — SIGNIFICANT CHANGE UP (ref 2–14)
NEUTROPHILS # BLD AUTO: 3.15 K/UL — SIGNIFICANT CHANGE UP (ref 1.8–7.4)
NEUTROPHILS NFR BLD AUTO: 44.5 % — SIGNIFICANT CHANGE UP (ref 43–77)
NRBC # BLD: 0 /100 WBCS — SIGNIFICANT CHANGE UP (ref 0–0)
PHOSPHATE SERPL-MCNC: 3.7 MG/DL — SIGNIFICANT CHANGE UP (ref 2.5–4.5)
PLATELET # BLD AUTO: 224 K/UL — SIGNIFICANT CHANGE UP (ref 150–400)
POTASSIUM SERPL-MCNC: 4.2 MMOL/L — SIGNIFICANT CHANGE UP (ref 3.5–5.3)
POTASSIUM SERPL-SCNC: 4.2 MMOL/L — SIGNIFICANT CHANGE UP (ref 3.5–5.3)
PROT SERPL-MCNC: 6.6 G/DL — SIGNIFICANT CHANGE UP (ref 6–8.3)
RBC # BLD: 4.58 M/UL — SIGNIFICANT CHANGE UP (ref 3.8–5.2)
RBC # FLD: 14.2 % — SIGNIFICANT CHANGE UP (ref 10.3–14.5)
SODIUM SERPL-SCNC: 140 MMOL/L — SIGNIFICANT CHANGE UP (ref 135–145)
WBC # BLD: 7.07 K/UL — SIGNIFICANT CHANGE UP (ref 3.8–10.5)
WBC # FLD AUTO: 7.07 K/UL — SIGNIFICANT CHANGE UP (ref 3.8–10.5)

## 2023-08-06 PROCEDURE — 84484 ASSAY OF TROPONIN QUANT: CPT

## 2023-08-06 PROCEDURE — 83735 ASSAY OF MAGNESIUM: CPT

## 2023-08-06 PROCEDURE — 87640 STAPH A DNA AMP PROBE: CPT

## 2023-08-06 PROCEDURE — 71275 CT ANGIOGRAPHY CHEST: CPT | Mod: MA

## 2023-08-06 PROCEDURE — 82947 ASSAY GLUCOSE BLOOD QUANT: CPT

## 2023-08-06 PROCEDURE — 74174 CTA ABD&PLVS W/CONTRAST: CPT | Mod: MA

## 2023-08-06 PROCEDURE — 85610 PROTHROMBIN TIME: CPT

## 2023-08-06 PROCEDURE — 86850 RBC ANTIBODY SCREEN: CPT

## 2023-08-06 PROCEDURE — 86803 HEPATITIS C AB TEST: CPT

## 2023-08-06 PROCEDURE — 85018 HEMOGLOBIN: CPT

## 2023-08-06 PROCEDURE — 80053 COMPREHEN METABOLIC PANEL: CPT

## 2023-08-06 PROCEDURE — 82330 ASSAY OF CALCIUM: CPT

## 2023-08-06 PROCEDURE — 83605 ASSAY OF LACTIC ACID: CPT

## 2023-08-06 PROCEDURE — 85520 HEPARIN ASSAY: CPT

## 2023-08-06 PROCEDURE — 82435 ASSAY OF BLOOD CHLORIDE: CPT

## 2023-08-06 PROCEDURE — 83036 HEMOGLOBIN GLYCOSYLATED A1C: CPT

## 2023-08-06 PROCEDURE — 81001 URINALYSIS AUTO W/SCOPE: CPT

## 2023-08-06 PROCEDURE — 71045 X-RAY EXAM CHEST 1 VIEW: CPT | Mod: 26

## 2023-08-06 PROCEDURE — 36415 COLL VENOUS BLD VENIPUNCTURE: CPT

## 2023-08-06 PROCEDURE — 86901 BLOOD TYPING SEROLOGIC RH(D): CPT

## 2023-08-06 PROCEDURE — 83880 ASSAY OF NATRIURETIC PEPTIDE: CPT

## 2023-08-06 PROCEDURE — 0225U NFCT DS DNA&RNA 21 SARSCOV2: CPT

## 2023-08-06 PROCEDURE — 85014 HEMATOCRIT: CPT

## 2023-08-06 PROCEDURE — 84132 ASSAY OF SERUM POTASSIUM: CPT

## 2023-08-06 PROCEDURE — 84295 ASSAY OF SERUM SODIUM: CPT

## 2023-08-06 PROCEDURE — 86900 BLOOD TYPING SEROLOGIC ABO: CPT

## 2023-08-06 PROCEDURE — 85027 COMPLETE CBC AUTOMATED: CPT

## 2023-08-06 PROCEDURE — 87641 MR-STAPH DNA AMP PROBE: CPT

## 2023-08-06 PROCEDURE — 93010 ELECTROCARDIOGRAM REPORT: CPT

## 2023-08-06 PROCEDURE — 93005 ELECTROCARDIOGRAM TRACING: CPT

## 2023-08-06 PROCEDURE — 84100 ASSAY OF PHOSPHORUS: CPT

## 2023-08-06 PROCEDURE — 85379 FIBRIN DEGRADATION QUANT: CPT

## 2023-08-06 PROCEDURE — 85730 THROMBOPLASTIN TIME PARTIAL: CPT

## 2023-08-06 PROCEDURE — 82565 ASSAY OF CREATININE: CPT

## 2023-08-06 PROCEDURE — 99285 EMERGENCY DEPT VISIT HI MDM: CPT | Mod: 25

## 2023-08-06 PROCEDURE — 96374 THER/PROPH/DIAG INJ IV PUSH: CPT

## 2023-08-06 PROCEDURE — 93306 TTE W/DOPPLER COMPLETE: CPT

## 2023-08-06 PROCEDURE — 71045 X-RAY EXAM CHEST 1 VIEW: CPT

## 2023-08-06 PROCEDURE — 84443 ASSAY THYROID STIM HORMONE: CPT

## 2023-08-06 PROCEDURE — 82803 BLOOD GASES ANY COMBINATION: CPT

## 2023-08-06 PROCEDURE — 93880 EXTRACRANIAL BILAT STUDY: CPT

## 2023-08-06 PROCEDURE — 85025 COMPLETE CBC W/AUTO DIFF WBC: CPT

## 2023-08-06 RX ORDER — HYDRALAZINE HCL 50 MG
1 TABLET ORAL
Refills: 0 | DISCHARGE

## 2023-08-06 RX ORDER — FUROSEMIDE 40 MG
1 TABLET ORAL
Refills: 0 | DISCHARGE

## 2023-08-06 RX ORDER — LABETALOL HCL 100 MG
1 TABLET ORAL
Qty: 90 | Refills: 0
Start: 2023-08-06 | End: 2023-09-04

## 2023-08-06 RX ORDER — ATORVASTATIN CALCIUM 80 MG/1
1 TABLET, FILM COATED ORAL
Qty: 30 | Refills: 0
Start: 2023-08-06 | End: 2023-09-04

## 2023-08-06 RX ORDER — CARVEDILOL PHOSPHATE 80 MG/1
1 CAPSULE, EXTENDED RELEASE ORAL
Refills: 0 | DISCHARGE

## 2023-08-06 RX ADMIN — Medication 100 MILLIGRAM(S): at 05:19

## 2023-08-06 RX ADMIN — PANTOPRAZOLE SODIUM 40 MILLIGRAM(S): 20 TABLET, DELAYED RELEASE ORAL at 05:19

## 2023-08-06 RX ADMIN — SODIUM CHLORIDE 3 MILLILITER(S): 9 INJECTION INTRAMUSCULAR; INTRAVENOUS; SUBCUTANEOUS at 12:11

## 2023-08-06 RX ADMIN — SODIUM CHLORIDE 3 MILLILITER(S): 9 INJECTION INTRAMUSCULAR; INTRAVENOUS; SUBCUTANEOUS at 06:04

## 2023-08-06 RX ADMIN — AMLODIPINE BESYLATE 10 MILLIGRAM(S): 2.5 TABLET ORAL at 05:19

## 2023-08-06 RX ADMIN — HEPARIN SODIUM 5000 UNIT(S): 5000 INJECTION INTRAVENOUS; SUBCUTANEOUS at 05:22

## 2023-08-06 NOTE — DISCHARGE NOTE PROVIDER - HOSPITAL COURSE
67 F W/ HTN on amlodipine, hydralazine, cardiovedilol had angiogram in June to check due to shortness of breath found to have O2 sat in 92s, and advised to follow up with pulmonary doctor and had a d-dimer elevated to the 2000s and asked to come to the ED. On arrival 02 in the 92s and started on 2 l No chest pain, no pleuritic complaint, bilateral swelling for a couple of months, left more than the right but no calf pain, not a smoker, marijuana smoker, no recent long rides. 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 B dissection; Recent workup for dyspnea revealing severe symptomatic pulmonary HTN [PASP ~88mmHg, 10WU], was sent to the hospital to rule out PE for progressive dyspnea and found to have incidental type B aortic dissection on CT imaging Incidentally found type B aortic dissection on CT imaging; appears to be chronic in nature and patient remains without any signs of malperfusion or pain. Patient's anatomy is amenable for stent-graft repair, however, given her severe symptomatic pulmonary hypertension, the most prudent thing would be to address this and we will follow up with repeat CT imaging in 6 months. Will continue PO control of her BP. This has likely been the reason for her SLICK, which seemingly has plateaued.  Patient will follow up with Dr. Cathy Serra to continue her pulmonary hypertension. 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 She will likely require a hypercoagulable workup as a part of this. Additionally, she will also likely require anticoagulation, which I discussed with her. Ideally, we would not have someone with residual false-lumen flow on anticoagulation, however given the chronicity of her dissection and large fenestrations in the descending, the benefits of treating her pulmonary htn outweigh the risks.   8/5 PT tx 2[facility] floor; VSS; RSR 70's; continue anti-hypertensive medications; repeat ct scan in 6 months  discharge planning- home in am sun if bp stable   8/6 BP well controlled. Discharged home on present regimen. Aortic registry for surveillance scan

## 2023-08-06 NOTE — DISCHARGE NOTE PROVIDER - NSDCFUADDINST_GEN_ALL_CORE_FT
Resume activity as tolerated  Resume low cholesterol low sodium diet  Maintain good blood pressure control and adhere to medication regimen.  Use incentive spirometer every hour during the day  Record daily weight and report changes greater than 3lbs  Please follow up with your cardiologist in 7-10 days  Please call cardiothoracic office Monday for follow up appointment with Aortic registry. Repeat imaging in 6 months  Seek emergency attention if experiencing chest pain, back pain or numbness and tingling to extremities

## 2023-08-06 NOTE — DISCHARGE NOTE NURSING/CASE MANAGEMENT/SOCIAL WORK - PATIENT PORTAL LINK FT
You can access the FollowMyHealth Patient Portal offered by [facility] by registering at the following website: [email or web address]. By joining [facility]’s FollowMyHealth portal, you will also be able to view your health information using other applications (apps) compatible with our system.
No

## 2023-08-06 NOTE — DISCHARGE NOTE PROVIDER - CARE PROVIDER_API CALL
Franky Suh  Thoracic Surgery  [address] [place] [address], [place] [address]  Phone: [phone]  Fax: [phone]  Follow Up Time: Routine

## 2023-08-06 NOTE — DISCHARGE NOTE PROVIDER - NSDCFUSCHEDAPPT_GEN_ALL_CORE_FT
Nathaniel Rankin  [facility] Physician [facility]  INTMED [id number]  Scheduled Appointment: 08/11/2023    Cathy Serra  [facility] Physician [facility]  PULMMED [address]  Scheduled Appointment: 08/17/2023

## 2023-08-06 NOTE — DISCHARGE NOTE PROVIDER - NSDCMRMEDTOKEN_GEN_ALL_CORE_FT
amLODIPine 10 mg oral tablet: 1 orally once a day  atorvastatin 20 mg oral tablet: 1 tab(s) orally once a day (at bedtime)  labetalol 100 mg oral tablet: 1 tab(s) orally every 8 hours

## 2023-08-06 NOTE — DISCHARGE NOTE PROVIDER - PROVIDER TOKENS
PROVIDER:[TOKEN:[[id number]],FOLLOWUP:[Routine]] Patient requests all Lab, Cardiology, and Radiology Results on their Discharge Instructions

## 2023-08-06 NOTE — DISCHARGE NOTE PROVIDER - NSDCPNSUBOBJ_GEN_ALL_CORE
VITAL SIGNS    Telemetry:    Vital Signs Last 24 Hrs  T(C): 36.8 (23 @ 10:31), Max: 36.9 (23 @ 04:32)  T(F): 98.2 (23 @ 10:31), Max: 98.4 (23 @ 04:32)  HR: 62 (23 @ 10:31) (62 - 80)  BP: 118/74 (23 @ 10:31) (110/71 - 119/78)  RR: 18 (23 @ 10:31) (18 - 18)  SpO2: 95% (23 @ 10:31) (95% - 98%)             @ 07:01  -   @ 07:00  --------------------------------------------------------  IN: 447 mL / OUT: 3 mL / NET: 444 mL       Daily     Daily   Admit Wt: Drug Dosing Weight  Height (cm): 162.6 (02 Aug 2023 19:55)  Weight (kg): 75.7 (02 Aug 2023 19:55)  BMI (kg/m2): 28.6 (02 Aug 2023 19:55)  BSA (m2): 1.81 (02 Aug 2023 19:55)    Bilirubin Total: 0.6 mg/dL ( @ 06:33)    CAPILLARY BLOOD GLUCOSE              MEDICATIONS  amLODIPine   Tablet 10 milliGRAM(s) Oral daily  atorvastatin 20 milliGRAM(s) Oral at bedtime  heparin   Injectable 5000 Unit(s) SubCutaneous every 8 hours  labetalol 100 milliGRAM(s) Oral every 8 hours  pantoprazole    Tablet 40 milliGRAM(s) Oral before breakfast  sodium chloride 0.9% lock flush 3 milliLiter(s) IV Push every 8 hours      >>> <<<  PHYSICAL EXAM  Subjective: NAD  Neurology: alert and oriented x 3, nonfocal, no gross deficits  CV : s1s2  Lungs: CTA   Abdomen: soft, NT,ND, ( +)BM  :  voiding  Extremities: -c/c/e      LABS      140  |  109<H>  |  24<H>  ----------------------------<  91  4.2   |  20<L>  |  1.31<H>    Ca    9.1      06 Aug 2023 06:33  Phos  3.7     08-  Mg     2.1         TPro  6.6  /  Alb  3.6  /  TBili  0.6  /  DBili  x   /  AST  25  /  ALT  26  /  AlkPhos  77                                   11.9   7.07  )-----------( 224      ( 06 Aug 2023 06:33 )             35.4                 PAST MEDICAL & SURGICAL HISTORY:  HTN (hypertension)      Prediabetes      Mild tricuspid regurgitation      Enlarged aorta      S/P hysterectomy      S/P             VITAL SIGNS    Telemetry:    Vital Signs Last 24 Hrs  T(C): 36.8 (23 @ 10:31), Max: 36.9 (23 @ 04:32)  T(F): 98.2 (23 @ 10:31), Max: 98.4 (23 @ 04:32)  HR: 62 (23 @ 10:31) (62 - 80)  BP: 118/74 (23 @ 10:31) (110/71 - 119/78)  RR: 18 (23 @ 10:31) (18 - 18)  SpO2: 95% (23 @ 10:31) (95% - 98%)             @ 07:01  -   @ 07:00  --------------------------------------------------------  IN: 447 mL / OUT: 3 mL / NET: 444 mL       Daily     Daily   Admit Wt: Drug Dosing Weight  Height (cm): 162.6 (02 Aug 2023 19:55)  Weight (kg): 75.7 (02 Aug 2023 19:55)  BMI (kg/m2): 28.6 (02 Aug 2023 19:55)  BSA (m2): 1.81 (02 Aug 2023 19:55)    Bilirubin Total: 0.6 mg/dL ( @ 06:33)    CAPILLARY BLOOD GLUCOSE              MEDICATIONS  amLODIPine   Tablet 10 milliGRAM(s) Oral daily  atorvastatin 20 milliGRAM(s) Oral at bedtime  heparin   Injectable 5000 Unit(s) SubCutaneous every 8 hours  labetalol 100 milliGRAM(s) Oral every 8 hours  pantoprazole    Tablet 40 milliGRAM(s) Oral before breakfast  sodium chloride 0.9% lock flush 3 milliLiter(s) IV Push every 8 hours      >>> <<<  PHYSICAL EXAM  Subjective: NAD  Neurology: alert and oriented x 3, nonfocal, no gross deficits  CV : s1s2  Lungs: CTA   Abdomen: soft, NT,ND, ( +)BM  :  voiding  Extremities: -c/c/e      LABS      140  |  109<H>  |  24<H>  ----------------------------<  91  4.2   |  20<L>  |  1.31<H>    Ca    9.1      06 Aug 2023 06:33  Phos  3.7     08-  Mg     2.1     -    TPro  6.6  /  Alb  3.6  /  TBili  0.6  /  DBili  x   /  AST  25  /  ALT  26  /  AlkPhos  77  08-                                 11.9   7.07  )-----------( 224      ( 06 Aug 2023 06:33 )             35.4                 PAST MEDICAL & SURGICAL HISTORY:  HTN (hypertension)      Prediabetes      Mild tricuspid regurgitation      Enlarged aorta      S/P hysterectomy      S/P            Attending attestation:   67F w/severe symptomatic pulmonary HTN [PASP ~88mmHg, 10WU],and incidentally identified type B aortic dissection on CT imaging.    - Incidentally found type B aortic dissection on CT imaging; appears to be chronic in nature and patient remains without any signs of malperfusion or pain. Discharged home w/blood pressure controlled and plateaued renal function. Patient's anatomy is amenable for stent-graft repair, however, given her severe symptomatic pulmonary hypertension, the most prudent thing would be to address this and she will follow up in my office with repeat CT imaging in 6 months [repeat CTA chest].     - Regarding her pulmonary hypertension; patient will follow up with Dr. Cathy Serra to continue her pulmonary hypertension treatment and workup. 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 She will likely require a hypercoagulable workup as a part of this. Additionally, she will also likely require anticoagulation, which I discussed with her. Ideally, we would not have someone with residual false-lumen flow on anticoagulation, however given the chronicity of her dissection and large fenestrations in the descending, I feel that the benefits outweigh the risks and she is okay from an aortic standpoint to start anticoagulation if her pulmonary team decides on that course.     [facility]   Cardiac surgery

## 2023-08-09 LAB
ANION GAP SERPL CALC-SCNC: 13 MMOL/L
BUN SERPL-MCNC: 22 MG/DL
CALCIUM SERPL-MCNC: 9.8 MG/DL
CHLORIDE SERPL-SCNC: 108 MMOL/L
CO2 SERPL-SCNC: 22 MMOL/L
CREAT SERPL-MCNC: 1.26 MG/DL
DEPRECATED D DIMER PPP IA-ACNC: 2310 NG/ML DDU
EGFR: 47 ML/MIN/1.73M2
GLUCOSE SERPL-MCNC: 97 MG/DL
POTASSIUM SERPL-SCNC: 4.3 MMOL/L
SODIUM SERPL-SCNC: 143 MMOL/L
TOTAL IGE SMQN RAST: 105 KU/L

## 2023-08-11 ENCOUNTER — APPOINTMENT (OUTPATIENT)
Dept: INTERNAL MEDICINE | Facility: CLINIC | Age: 68
End: 2023-08-11
Payer: MEDICARE

## 2023-08-11 VITALS
TEMPERATURE: 97.9 F | HEIGHT: 65 IN | OXYGEN SATURATION: 97 % | SYSTOLIC BLOOD PRESSURE: 142 MMHG | WEIGHT: 166 LBS | HEART RATE: 75 BPM | BODY MASS INDEX: 27.66 KG/M2 | DIASTOLIC BLOOD PRESSURE: 90 MMHG

## 2023-08-11 VITALS — DIASTOLIC BLOOD PRESSURE: 82 MMHG | SYSTOLIC BLOOD PRESSURE: 128 MMHG

## 2023-08-11 PROCEDURE — 99496 TRANSJ CARE MGMT HIGH F2F 7D: CPT

## 2023-08-11 RX ORDER — CARVEDILOL 6.25 MG/1
6.25 TABLET, FILM COATED ORAL TWICE DAILY
Qty: 180 | Refills: 0 | Status: DISCONTINUED | COMMUNITY
Start: 2023-03-24 | End: 2023-08-11

## 2023-08-11 RX ORDER — HYDRALAZINE HYDROCHLORIDE 25 MG/1
25 TABLET ORAL
Qty: 270 | Refills: 0 | Status: DISCONTINUED | COMMUNITY
Start: 2023-03-24 | End: 2023-08-11

## 2023-08-11 NOTE — PHYSICAL EXAM
[No Acute Distress] : no acute distress [Well-Appearing] : well-appearing [No Respiratory Distress] : no respiratory distress  [No Accessory Muscle Use] : no accessory muscle use [Clear to Auscultation] : lungs were clear to auscultation bilaterally [Normal Rate] : normal rate  [Regular Rhythm] : with a regular rhythm [Normal S1, S2] : normal S1 and S2 [No Murmur] : no murmur heard [No Edema] : there was no peripheral edema [No Extremity Clubbing/Cyanosis] : no extremity clubbing/cyanosis [Coordination Grossly Intact] : coordination grossly intact [No Focal Deficits] : no focal deficits [Normal Gait] : normal gait [Normal Affect] : the affect was normal [Normal Insight/Judgement] : insight and judgment were intact [95914 - High Complexity requires an extensive number of possible diagnoses and/or the management options, extensive complexity of the medical data (tests, etc.) to be reviewed, and a high risk of significant complications, morbidity, and/or mortality as w] : High Complexity

## 2023-08-11 NOTE — HISTORY OF PRESENT ILLNESS
[Post-hospitalization from ___ Hospital] : Post-hospitalization from [unfilled] Hospital [Admitted on: ___] : The patient was admitted on [unfilled] [Discharged on ___] : discharged on [unfilled] [Discharge Summary] : discharge summary [Pertinent Labs] : pertinent labs [Radiology Findings] : radiology findings [Discharge Med List] : discharge medication list [Med Reconciliation] : medication reconciliation has been completed [Patient Contacted By: ____] : and contacted by [unfilled] [FreeTextEntry2] : 67 year old F with PMH HTN, CKD IIIa, right heart CHF, and pHTN presents for follow up after hospitalization for aortic dissection. Pt denies CP/SOB, fever/chills, n/v/d/c.

## 2023-08-11 NOTE — PLAN
[FreeTextEntry1] : Plan as above. c/w specialist appointments and recs. Refer to hematology. Medications reconciled. Problem list updated. Pt advised to sign up for [facility] portal to review labs and communicate any questions or concerns directly. Yearly physical and return as needed for illness, medication refills, and new or existing complaints.

## 2023-08-13 ENCOUNTER — TRANSCRIPTION ENCOUNTER (OUTPATIENT)
Age: 68
End: 2023-08-13

## 2023-08-17 ENCOUNTER — APPOINTMENT (OUTPATIENT)
Dept: PULMONOLOGY | Facility: CLINIC | Age: 68
End: 2023-08-17
Payer: MEDICARE

## 2023-08-17 VITALS — OXYGEN SATURATION: 94 % | SYSTOLIC BLOOD PRESSURE: 146 MMHG | DIASTOLIC BLOOD PRESSURE: 87 MMHG | HEART RATE: 80 BPM

## 2023-08-17 PROCEDURE — 99214 OFFICE O/P EST MOD 30 MIN: CPT | Mod: 25

## 2023-08-17 PROCEDURE — 95012 NITRIC OXIDE EXP GAS DETER: CPT

## 2023-08-17 PROCEDURE — 94010 BREATHING CAPACITY TEST: CPT

## 2023-08-17 NOTE — HISTORY OF PRESENT ILLNESS
[TextBox_4] : Syndrome was recently admitted to [facility] for type B descending thoracic aortic dissection, he was medically treated.  Was also found to have CTEPH, I spoke with thoracic surgeon Dr. Jeremy Hanna regarding possible pulmonary thromboendarterectomy for CTEPH, Dr. Hanna feels that she is not a candidate for it.  She came in for hospital follow-up today, she states that her shortness of breath is significantly better after Trelegy was started.

## 2023-08-17 NOTE — ASSESSMENT
[FreeTextEntry1] : Continue Trelegy 200/ 62.5/25 mcg Ellipta, 1 puff daily for asthma. Will start her on Riociguat for CTEPH. Recommend starting DOAC for CTEPH. Cardiology f/u for optimal hypertension control in light of Type B aortic dissection. Hematology/oncology evaluation. Return for Pulmonary f/u in 3 weeks.

## 2023-08-17 NOTE — DISCUSSION/SUMMARY
[FreeTextEntry1] : Meaghan is a patient with recently diagnosed type B aortic dissection.  Secondly, she is a patient with CTEPH.  Thirdly, she is a patient with improved shortness of breath likely secondary to mild asthma.  Fourthly, she is a patient with history of hypertension.

## 2023-08-17 NOTE — REASON FOR VISIT
[Follow-Up] : a follow-up visit [Abnormal CXR/ Chest CT] : an abnormal CXR/ chest CT [Pulmonary Hypertension] : pulmonary hypertension [Shortness of Breath] : shortness of breath

## 2023-08-17 NOTE — PROCEDURE
[FreeTextEntry1] : Spirometry is within normal limits. _______  Exhaled Nitric Oxide             Final  No Documents Attached    	Test  	Result  	Flag	Reference	Goal	Last Verified  	Exhaled Nitric Oxide	19	 	 		REQUIRED  Ordered by: WILLAM LOO       Collected/Examined: 17Aug2023 01:50PM       Verification Required       Stage: Final       Performed at: In Office       Performed by: WILLAM LOO       Resulted: 17Aug2023 01:50PM       Last Updated: 17Aug2023 01:51PM

## 2023-08-18 ENCOUNTER — NON-APPOINTMENT (OUTPATIENT)
Age: 68
End: 2023-08-18

## 2023-08-28 ENCOUNTER — RX RENEWAL (OUTPATIENT)
Age: 68
End: 2023-08-28

## 2023-08-28 ENCOUNTER — OUTPATIENT (OUTPATIENT)
Dept: OUTPATIENT SERVICES | Facility: HOSPITAL | Age: 68
LOS: 1 days | Discharge: HOME | End: 2023-08-28

## 2023-08-28 DIAGNOSIS — Z98.891 HISTORY OF UTERINE SCAR FROM PREVIOUS SURGERY: Chronic | ICD-10-CM

## 2023-08-28 DIAGNOSIS — Z90.710 ACQUIRED ABSENCE OF BOTH CERVIX AND UTERUS: Chronic | ICD-10-CM

## 2023-08-28 DIAGNOSIS — D64.9 ANEMIA, UNSPECIFIED: ICD-10-CM

## 2023-08-29 ENCOUNTER — APPOINTMENT (OUTPATIENT)
Dept: HEMATOLOGY ONCOLOGY | Facility: CLINIC | Age: 68
End: 2023-08-29
Payer: MEDICARE

## 2023-08-29 ENCOUNTER — NON-APPOINTMENT (OUTPATIENT)
Age: 68
End: 2023-08-29

## 2023-08-29 VITALS
SYSTOLIC BLOOD PRESSURE: 147 MMHG | DIASTOLIC BLOOD PRESSURE: 95 MMHG | WEIGHT: 164.44 LBS | OXYGEN SATURATION: 97 % | HEART RATE: 85 BPM | BODY MASS INDEX: 28.07 KG/M2 | HEIGHT: 64.29 IN | TEMPERATURE: 97.3 F

## 2023-08-29 PROCEDURE — 99205 OFFICE O/P NEW HI 60 MIN: CPT

## 2023-09-01 NOTE — HISTORY OF PRESENT ILLNESS
[de-identified] : 68 y/o F with hx of HTN and otherwise unremarkable medical history here for possibly hypercoagulable evaluation. Recently (beginning of August) found with a very high d-dimer and ultimately sent to the hospital and found with an aortic dissection. The breathing has been an issue for around 8 months, and it actually was getting better with time with treatment from pulmonologist. She has been since diagnosed with pulmonary hypertension, which according to pulmonology is consistent with chronic thromboembolic pulmonary hypertension. Patient reporting that she feels anxious today, but otherwise feels OK. Her breathing is not completely back to normal but nothing close to the severity in April this year. No fevers or chills, night sweats. Appetite is normal, but she did intentionally lose 20 pounds due to the breathing issue in an effort to improve. No history of thrombosis in her life previous to this. Nobody in her family has ever had a blood clot before to her knowledge.

## 2023-09-01 NOTE — ASSESSMENT
[FreeTextEntry1] : 66 y/o F with HTN and recent aortic dissection, also with pulmonary HTN suspected due to chronic thromboembolic phenomena given elevated d-dimer here for hypercoagulable workup.   Patient is without known personal or family history of thrombosis. She is currently not on full anticoagulation.   Plan: -Will reach out to patient's pulmonologist to discuss the case. Unclear if indicated for full anticoagulation at this point to me.  -Will check full hypercoagulable workup today - this is including APLS profile, Factor V Leiden, Prothrombin gene mutation, Protein C and S levels, and antithrombin III level, and JAK2 testing.  -Patient understands and agrees with plan. All information explained to the best of my ability.  -RTC in 6 weeks  Name band;

## 2023-09-01 NOTE — REASON FOR VISIT
[Initial Consultation] : an initial consultation for [Family Member] : family member [FreeTextEntry2] : CTEPH - hypercoagulable workup

## 2023-09-07 ENCOUNTER — APPOINTMENT (OUTPATIENT)
Dept: CARDIOLOGY | Facility: CLINIC | Age: 68
End: 2023-09-07
Payer: MEDICARE

## 2023-09-07 VITALS
TEMPERATURE: 98.6 F | OXYGEN SATURATION: 92 % | SYSTOLIC BLOOD PRESSURE: 147 MMHG | WEIGHT: 167.1 LBS | DIASTOLIC BLOOD PRESSURE: 95 MMHG | HEIGHT: 64 IN | HEART RATE: 86 BPM | BODY MASS INDEX: 28.53 KG/M2

## 2023-09-07 PROCEDURE — 99214 OFFICE O/P EST MOD 30 MIN: CPT

## 2023-09-07 RX ORDER — LABETALOL HYDROCHLORIDE 100 MG/1
100 TABLET, FILM COATED ORAL EVERY 8 HOURS
Qty: 270 | Refills: 0 | Status: DISCONTINUED | COMMUNITY
Start: 1900-01-01 | End: 2023-09-07

## 2023-09-07 NOTE — PHYSICAL EXAM
[Well Developed] : well developed [Well Nourished] : well nourished [No Acute Distress] : no acute distress [Normal Conjunctiva] : normal conjunctiva [Normal Venous Pressure] : normal venous pressure [No Carotid Bruit] : no carotid bruit [Normal S1, S2] : normal S1, S2 [No Murmur] : no murmur [No Rub] : no rub [No Gallop] : no gallop [Clear Lung Fields] : clear lung fields [Good Air Entry] : good air entry [Soft] : abdomen soft [Non Tender] : non-tender [Normal Gait] : normal gait [No Edema] : no edema [No Cyanosis] : no cyanosis [No Clubbing] : no clubbing [No Rash] : no rash [No Skin Lesions] : no skin lesions [Moves all extremities] : moves all extremities [No Focal Deficits] : no focal deficits [Normal Speech] : normal speech [Alert and Oriented] : alert and oriented [Normal memory] : normal memory

## 2023-09-08 ENCOUNTER — OUTPATIENT (OUTPATIENT)
Dept: OUTPATIENT SERVICES | Facility: HOSPITAL | Age: 68
LOS: 1 days | End: 2023-09-08

## 2023-09-08 ENCOUNTER — APPOINTMENT (OUTPATIENT)
Dept: NUCLEAR MEDICINE | Facility: HOSPITAL | Age: 68
End: 2023-09-08
Payer: MEDICARE

## 2023-09-08 ENCOUNTER — APPOINTMENT (OUTPATIENT)
Dept: PULMONOLOGY | Facility: CLINIC | Age: 68
End: 2023-09-08
Payer: MEDICARE

## 2023-09-08 VITALS — SYSTOLIC BLOOD PRESSURE: 155 MMHG | DIASTOLIC BLOOD PRESSURE: 95 MMHG | HEART RATE: 78 BPM | OXYGEN SATURATION: 91 %

## 2023-09-08 DIAGNOSIS — I27.24 CHRONIC THROMBOEMBOLIC PULMONARY HYPERTENSION: ICD-10-CM

## 2023-09-08 DIAGNOSIS — Z90.710 ACQUIRED ABSENCE OF BOTH CERVIX AND UTERUS: Chronic | ICD-10-CM

## 2023-09-08 DIAGNOSIS — Z98.891 HISTORY OF UTERINE SCAR FROM PREVIOUS SURGERY: Chronic | ICD-10-CM

## 2023-09-08 PROCEDURE — 78582 LUNG VENTILAT&PERFUS IMAGING: CPT | Mod: 26,GC

## 2023-09-08 PROCEDURE — 99214 OFFICE O/P EST MOD 30 MIN: CPT | Mod: 25

## 2023-09-08 PROCEDURE — 71046 X-RAY EXAM CHEST 2 VIEWS: CPT

## 2023-09-08 PROCEDURE — 95012 NITRIC OXIDE EXP GAS DETER: CPT

## 2023-09-08 PROCEDURE — 94010 BREATHING CAPACITY TEST: CPT

## 2023-09-08 NOTE — DISCUSSION/SUMMARY
[FreeTextEntry1] : BP elevated. as part of GDMT for RV failure will switch labetalol to carvedilol 6.25mg BID. If BP remains elevated will increase to 12.5mgBID if renal function remains stable re-add losartan continue amlodipine 10mg daily  given mild CAD and increased risk of bleeding with pHTN, would consider stopping asa 81mg daily from a cardiac standpoint when systemic anticoagulation is initiated  will need annual surveillance of her dilated ascending aorta which may be done at the same time as her thoracic/abdominal aorta

## 2023-09-08 NOTE — HISTORY OF PRESENT ILLNESS
[FreeTextEntry1] : 67 HTN, preDM, elevated SCr, RV failure with pHTN, MARLEY, ascending aortic aneurysm 4.0cm, Type B dissection, non-obs CAD who presents for follow-up  originally presented with  1 month of DELGADO with minimal exertion, occ PND and orthopnea, + new LE edema, found to have RV systolic dysfunction and pHTN diuresed, felt better noted to have high d-dimer and subsequently went to [facility] where a CTA chest demonstrated a type B dissection. admitted 8/3/23-8/6/23  dx with CTEPH for pulm htn. not yet started on AC but is recommended for CTEPH was determined not a candidate for aortic intervention given comorbidities  currently feels well awaiting riociguat  Fam hx: Father - CABG age 84 No SCD  Pregnancy hx: Once - bedrest for fibroids. PIH Hysterectomy ~ 36 Night sweats age 40

## 2023-09-08 NOTE — CARDIOLOGY SUMMARY
[de-identified] : 3/21/23: KE ROMERO [de-identified] : 3/24/23: small LV, enlarged RV with at least moderate RV dysfunction, DD 1, severe TR, PASP 88mmHg, LVOT gradient 12mmHg at rest, severe KE, MARLEY IVS 1.9cm. ASc Ao 4.0 cm (indexed 2.0cm/m2) [de-identified] : 6/20/23: mild nonobstructive CAD, severe pulmonary hypertension pLAD 30% pRCA 40% PASP 82 PAD 28 PCWP 20

## 2023-09-09 NOTE — ASSESSMENT
[FreeTextEntry1] : Obtained and reviewed spirometry, NIOX, chest x-ray results with patient today.  Advised patient to follow up with cardiologist if her blood pressure continues to be elevated 3 days from now. Pause Trelegy at this time as it may cause patient to have elevated blood pressure. Prescribed Anoro Ellipta 62.5/25 mcg 1 puff once a day. Ordered Ventilation/Perfusion Scan to confirm CTEPH. Will start her on Riociguat for CTEPH. Will start DOAC for CTEPH. Return for pulmonary follow up in 1 month. Hematology follow-up with Dr. Bradley Goldberg.

## 2023-09-09 NOTE — PROCEDURE
[FreeTextEntry1] : Spirometry is within normal limits.    ___ NIOX: 19 ___  Xray Chest 2 Views PA/Lat             Final  No Documents Attached    	 Chest x-ray PA and lateral views performed in my office today showed widened mediastinum secondary to known type II thoracic aortic dissection, clear lungs, no evidence of infiltrates or pleural effusions.  Ordered by: WILLAM LOO       Collected/Examined: [id number] 10:00AM       Verification Required       Stage: Final       Performed at: In Office       Performed by: WILLAM LOO       Resulted: [id number] 10:00AM       Last Updated: [id number] 10:01AM

## 2023-09-09 NOTE — ADDENDUM
[FreeTextEntry1] : I, Jhoan Manzo, acted solely as a scribe for Dr. Cathy Serra D.O., on this date 09/08/2023.   All medical record entries made by the Scribe were at my, Dr. Cathy Serra D.O., direction and personally dictated by me on 09/08/2023. I have reviewed the chart and agree that the record accurately reflects my personal performance of the history, physical exam, assessment and plan. I have also personally directed, reviewed, and agreed with the chart.

## 2023-09-09 NOTE — DISCUSSION/SUMMARY
[FreeTextEntry1] : Ms. MONIKA MORA is an 67 year old female, history of asthma, CTEPH, CAD. Type 2 thoracic aortic dissection.

## 2023-09-09 NOTE — REASON FOR VISIT
[Follow-Up] : a follow-up visit [Pulmonary Hypertension] : pulmonary hypertension [Asthma] : asthma [Shortness of Breath] : shortness of breath

## 2023-09-09 NOTE — HISTORY OF PRESENT ILLNESS
[TextBox_4] : MONIKA MORA is a 67 year old female, with history of asthma, CTEPH, CAD, Hypertension, Type II thoracic aortic dissection, who presents to the office for follow up evaluation. Patient reports that she is feeling well overall with no respiratory complaints. She states that she continues to take Trelegy for asthma treatment. Patient reports that she currently not taking Eliquis. Patient reports that she was recently started on Carvedilol for Hypertension treatment after receiving a cardiac follow up yesterday.

## 2023-09-15 ENCOUNTER — APPOINTMENT (OUTPATIENT)
Dept: PULMONOLOGY | Facility: CLINIC | Age: 68
End: 2023-09-15
Payer: MEDICARE

## 2023-09-15 VITALS — HEART RATE: 74 BPM | SYSTOLIC BLOOD PRESSURE: 136 MMHG | OXYGEN SATURATION: 94 % | DIASTOLIC BLOOD PRESSURE: 85 MMHG

## 2023-09-15 PROCEDURE — 99214 OFFICE O/P EST MOD 30 MIN: CPT | Mod: 25

## 2023-09-15 PROCEDURE — 36415 COLL VENOUS BLD VENIPUNCTURE: CPT

## 2023-09-15 RX ORDER — APIXABAN 5 MG/1
5 TABLET, FILM COATED ORAL
Qty: 180 | Refills: 3 | Status: ACTIVE | COMMUNITY
Start: 2023-09-15 | End: 1900-01-01

## 2023-09-18 LAB
ALBUMIN SERPL ELPH-MCNC: 4.2 G/DL
ALP BLD-CCNC: 107 U/L
ALT SERPL-CCNC: 31 U/L
ANION GAP SERPL CALC-SCNC: 13 MMOL/L
APCR PPP: 2.28 RATIO
APTT BLD: 32.3 SEC
AST SERPL-CCNC: 23 U/L
AT III PPP CHRO-ACNC: 125 %
B2 GLYCOPROT1 IGG SER-ACNC: 7 SGU
B2 GLYCOPROT1 IGM SER-ACNC: 6.9 SMU
BILIRUB SERPL-MCNC: 0.6 MG/DL
BUN SERPL-MCNC: 16 MG/DL
CALCIUM SERPL-MCNC: 9.6 MG/DL
CARDIOLIPIN IGM SER-MCNC: 5 GPL
CARDIOLIPIN IGM SER-MCNC: 5.6 MPL
CHLORIDE SERPL-SCNC: 106 MMOL/L
CO2 SERPL-SCNC: 23 MMOL/L
CONFIRM: 32.2 SEC
CREAT SERPL-MCNC: 1.29 MG/DL
DEPRECATED D DIMER PPP IA-ACNC: 2485 NG/ML DDU
DNA PLOIDY SPEC FC-IMP: NORMAL
DRVVT IMM 1:2 NP PPP: NORMAL
DRVVT SCREEN TO CONFIRM RATIO: 1.12 RATIO
EGFR: 45 ML/MIN/1.73M2
FVL BLANK: 42.9
FVL TEST: 97.8
GLUCOSE SERPL-MCNC: 85 MG/DL
INR PPP: 1.1 RATIO
POTASSIUM SERPL-SCNC: 4.4 MMOL/L
PROT C PPP CHRO-ACNC: 80 %
PROT S AG ACT/NOR PPP IA: 46 %
PROT SERPL-MCNC: 7.1 G/DL
PT BLD: 12.4 SEC
PTR INTERP: NORMAL
SCREEN DRVVT: 42.8 SEC
SILICA CLOTTING TIME INTERPRETATION: NORMAL
SILICA CLOTTING TIME S/C: 1.12 RATIO
SODIUM SERPL-SCNC: 143 MMOL/L

## 2023-09-29 ENCOUNTER — RX RENEWAL (OUTPATIENT)
Age: 68
End: 2023-09-29

## 2023-10-12 ENCOUNTER — RESULT CHARGE (OUTPATIENT)
Age: 68
End: 2023-10-12

## 2023-10-13 ENCOUNTER — APPOINTMENT (OUTPATIENT)
Dept: PULMONOLOGY | Facility: CLINIC | Age: 68
End: 2023-10-13
Payer: MEDICARE

## 2023-10-13 VITALS — SYSTOLIC BLOOD PRESSURE: 132 MMHG | OXYGEN SATURATION: 96 % | HEART RATE: 78 BPM | DIASTOLIC BLOOD PRESSURE: 83 MMHG

## 2023-10-13 PROCEDURE — 94060 EVALUATION OF WHEEZING: CPT

## 2023-10-13 PROCEDURE — 36415 COLL VENOUS BLD VENIPUNCTURE: CPT

## 2023-10-13 PROCEDURE — 95012 NITRIC OXIDE EXP GAS DETER: CPT

## 2023-10-13 PROCEDURE — 99214 OFFICE O/P EST MOD 30 MIN: CPT | Mod: 25

## 2023-10-15 LAB
ALBUMIN SERPL ELPH-MCNC: 4.3 G/DL
ALP BLD-CCNC: 100 U/L
ALT SERPL-CCNC: 44 U/L
ANION GAP SERPL CALC-SCNC: 11 MMOL/L
AST SERPL-CCNC: 30 U/L
BILIRUB SERPL-MCNC: 0.8 MG/DL
BUN SERPL-MCNC: 19 MG/DL
CALCIUM SERPL-MCNC: 9.9 MG/DL
CHLORIDE SERPL-SCNC: 106 MMOL/L
CO2 SERPL-SCNC: 24 MMOL/L
CREAT SERPL-MCNC: 1.28 MG/DL
EGFR: 46 ML/MIN/1.73M2
GLUCOSE SERPL-MCNC: 98 MG/DL
POTASSIUM SERPL-SCNC: 4.7 MMOL/L
PROT SERPL-MCNC: 7.3 G/DL
SODIUM SERPL-SCNC: 142 MMOL/L

## 2023-10-16 ENCOUNTER — RESULT REVIEW (OUTPATIENT)
Age: 68
End: 2023-10-16

## 2023-10-16 ENCOUNTER — APPOINTMENT (OUTPATIENT)
Dept: HEMATOLOGY ONCOLOGY | Facility: CLINIC | Age: 68
End: 2023-10-16
Payer: MEDICARE

## 2023-10-16 VITALS
WEIGHT: 172.4 LBS | DIASTOLIC BLOOD PRESSURE: 84 MMHG | OXYGEN SATURATION: 96 % | RESPIRATION RATE: 17 BRPM | BODY MASS INDEX: 29.59 KG/M2 | TEMPERATURE: 97.2 F | HEART RATE: 78 BPM | SYSTOLIC BLOOD PRESSURE: 136 MMHG

## 2023-10-16 LAB
BASOPHILS # BLD AUTO: 0.04 K/UL — SIGNIFICANT CHANGE UP (ref 0–0.2)
BASOPHILS NFR BLD AUTO: 0.6 % — SIGNIFICANT CHANGE UP (ref 0–2)
EOSINOPHIL # BLD AUTO: 0.18 K/UL — SIGNIFICANT CHANGE UP (ref 0–0.5)
EOSINOPHIL NFR BLD AUTO: 2.7 % — SIGNIFICANT CHANGE UP (ref 0–6)
HCT VFR BLD CALC: 39 % — SIGNIFICANT CHANGE UP (ref 34.5–45)
HGB BLD-MCNC: 13.1 G/DL — SIGNIFICANT CHANGE UP (ref 11.5–15.5)
IMM GRANULOCYTES NFR BLD AUTO: 0.3 % — SIGNIFICANT CHANGE UP (ref 0–0.9)
LYMPHOCYTES # BLD AUTO: 2.2 K/UL — SIGNIFICANT CHANGE UP (ref 1–3.3)
LYMPHOCYTES # BLD AUTO: 33.3 % — SIGNIFICANT CHANGE UP (ref 13–44)
MCHC RBC-ENTMCNC: 25.5 PG — LOW (ref 27–34)
MCHC RBC-ENTMCNC: 33.6 G/DL — SIGNIFICANT CHANGE UP (ref 32–36)
MCV RBC AUTO: 75.9 FL — LOW (ref 80–100)
MONOCYTES # BLD AUTO: 0.57 K/UL — SIGNIFICANT CHANGE UP (ref 0–0.9)
MONOCYTES NFR BLD AUTO: 8.6 % — SIGNIFICANT CHANGE UP (ref 2–14)
NEUTROPHILS # BLD AUTO: 3.59 K/UL — SIGNIFICANT CHANGE UP (ref 1.8–7.4)
NEUTROPHILS NFR BLD AUTO: 54.5 % — SIGNIFICANT CHANGE UP (ref 43–77)
NRBC # BLD: 0 /100 WBCS — SIGNIFICANT CHANGE UP (ref 0–0)
PLATELET # BLD AUTO: 251 K/UL — SIGNIFICANT CHANGE UP (ref 150–400)
RBC # BLD: 5.14 M/UL — SIGNIFICANT CHANGE UP (ref 3.8–5.2)
RBC # FLD: 14.8 % — HIGH (ref 10.3–14.5)
WBC # BLD: 6.6 K/UL — SIGNIFICANT CHANGE UP (ref 3.8–10.5)
WBC # FLD AUTO: 6.6 K/UL — SIGNIFICANT CHANGE UP (ref 3.8–10.5)

## 2023-10-16 PROCEDURE — 99213 OFFICE O/P EST LOW 20 MIN: CPT

## 2023-10-16 RX ORDER — APIXABAN 5 MG/1
5 TABLET, FILM COATED ORAL TWICE DAILY
Qty: 28 | Refills: 0 | Status: DISCONTINUED | COMMUNITY
Start: 2023-09-08 | End: 2023-10-16

## 2023-10-18 LAB
ALBUMIN SERPL ELPH-MCNC: 4.1 G/DL
ALP BLD-CCNC: 97 U/L
ALT SERPL-CCNC: 39 U/L
ANION GAP SERPL CALC-SCNC: 11 MMOL/L
AST SERPL-CCNC: 29 U/L
BILIRUB SERPL-MCNC: 0.8 MG/DL
BUN SERPL-MCNC: 18 MG/DL
CALCIUM SERPL-MCNC: 9.4 MG/DL
CHLORIDE SERPL-SCNC: 108 MMOL/L
CO2 SERPL-SCNC: 23 MMOL/L
CREAT SERPL-MCNC: 1.16 MG/DL
DEPRECATED D DIMER PPP IA-ACNC: 454 NG/ML DDU
EGFR: 52 ML/MIN/1.73M2
GLUCOSE SERPL-MCNC: 87 MG/DL
POTASSIUM SERPL-SCNC: 4.1 MMOL/L
PROT SERPL-MCNC: 7 G/DL
SODIUM SERPL-SCNC: 142 MMOL/L

## 2023-10-20 RX ORDER — ATORVASTATIN CALCIUM 20 MG/1
20 TABLET, FILM COATED ORAL DAILY
Qty: 90 | Refills: 2 | Status: ACTIVE | COMMUNITY
Start: 2023-07-24 | End: 1900-01-01

## 2023-10-20 RX ORDER — FUROSEMIDE 20 MG/1
20 TABLET ORAL
Qty: 90 | Refills: 2 | Status: ACTIVE | COMMUNITY
Start: 2023-03-24 | End: 1900-01-01

## 2023-10-23 ENCOUNTER — RX RENEWAL (OUTPATIENT)
Age: 68
End: 2023-10-23

## 2023-11-10 LAB
MISCELLANEOUS TEST: NORMAL
PROC NAME: NORMAL

## 2023-11-15 ENCOUNTER — APPOINTMENT (OUTPATIENT)
Dept: PULMONOLOGY | Facility: CLINIC | Age: 68
End: 2023-11-15
Payer: MEDICARE

## 2023-11-15 VITALS — HEART RATE: 75 BPM | SYSTOLIC BLOOD PRESSURE: 120 MMHG | OXYGEN SATURATION: 94 % | DIASTOLIC BLOOD PRESSURE: 75 MMHG

## 2023-11-15 PROCEDURE — 99214 OFFICE O/P EST MOD 30 MIN: CPT | Mod: 25

## 2023-11-15 PROCEDURE — 95012 NITRIC OXIDE EXP GAS DETER: CPT

## 2023-11-15 PROCEDURE — 94010 BREATHING CAPACITY TEST: CPT

## 2023-11-15 RX ORDER — UMECLIDINIUM BROMIDE AND VILANTEROL TRIFENATATE 62.5; 25 UG/1; UG/1
62.5-25 POWDER RESPIRATORY (INHALATION)
Qty: 3 | Refills: 3 | Status: ACTIVE | COMMUNITY
Start: 2023-09-08 | End: 1900-01-01

## 2023-11-27 ENCOUNTER — RX RENEWAL (OUTPATIENT)
Age: 68
End: 2023-11-27

## 2023-12-06 ENCOUNTER — TRANSCRIPTION ENCOUNTER (OUTPATIENT)
Age: 68
End: 2023-12-06

## 2023-12-29 ENCOUNTER — TRANSCRIPTION ENCOUNTER (OUTPATIENT)
Age: 68
End: 2023-12-29

## 2023-12-29 RX ORDER — RIOCIGUAT 2.5 MG/1
2.5 TABLET, FILM COATED ORAL 3 TIMES DAILY
Qty: 90 | Refills: 5 | Status: ACTIVE | COMMUNITY
Start: 2023-10-10 | End: 1900-01-01

## 2024-01-08 ENCOUNTER — RX RENEWAL (OUTPATIENT)
Age: 69
End: 2024-01-08

## 2024-01-16 ENCOUNTER — APPOINTMENT (OUTPATIENT)
Dept: CARDIOLOGY | Facility: CLINIC | Age: 69
End: 2024-01-16
Payer: MEDICARE

## 2024-01-16 VITALS
BODY MASS INDEX: 29.19 KG/M2 | TEMPERATURE: 98.3 F | OXYGEN SATURATION: 95 % | HEART RATE: 83 BPM | HEIGHT: 64 IN | WEIGHT: 171 LBS | SYSTOLIC BLOOD PRESSURE: 122 MMHG | RESPIRATION RATE: 17 BRPM | DIASTOLIC BLOOD PRESSURE: 74 MMHG

## 2024-01-16 PROCEDURE — 99214 OFFICE O/P EST MOD 30 MIN: CPT | Mod: 25

## 2024-01-16 RX ORDER — ASPIRIN 81 MG/1
81 TABLET, COATED ORAL
Refills: 0 | Status: DISCONTINUED | COMMUNITY
End: 2024-01-16

## 2024-01-16 NOTE — HISTORY OF PRESENT ILLNESS
[FreeTextEntry1] : 68F HTN, preDM, elevated SCr, RV failure with pHTN/CTEPH, MARLEY, ascending aortic aneurysm 4.0cm, Type B dissection, non-obs CAD who presents for follow-up  originally presented with  1 month of DELGADO with minimal exertion, occ PND and orthopnea, + new LE edema, found to have RV systolic dysfunction and pHTN diuresed, felt better noted to have high d-dimer and subsequently went to [facility] where a CTA chest demonstrated a type B dissection. admitted 8/3/23-8/6/23  dx with CTEPH for pulm htn. tolerating Eliquis. off aspirin was determined not a candidate for aortic intervention given comorbidities  currently feels well with fair ET on riociguat   Fam hx: Father - CABG age 84 No SCD  Pregnancy hx: Once - bedrest for fibroids. PIH Hysterectomy ~ 36 Night sweats age 40

## 2024-01-16 NOTE — DISCUSSION/SUMMARY
[FreeTextEntry1] : as part of GDMT for RV failure cont carvedilol 6.25mg BID. trial of resuming losartan 25mg daily for CKD and for systolic dysfunction continue amlodipine 10mg daily maintenance furosemide daily to aid with LE edema  will need annual surveillance of her dilated ascending aorta which may be done at the same time as her thoracic/abdominal aorta  f/u in six months

## 2024-01-16 NOTE — CARDIOLOGY SUMMARY
[de-identified] : 1/16/24: SR, LAE, nonspecific t wave changes 3/21/23: ST, KE [de-identified] : 3/24/23: small LV, enlarged RV with at least moderate RV dysfunction, DD 1, severe TR, PASP 88mmHg, LVOT gradient 12mmHg at rest, severe KE, MARLEY IVS 1.9cm. ASc Ao 4.0 cm (indexed 2.0cm/m2) [de-identified] : 6/20/23: mild nonobstructive CAD, severe pulmonary hypertension pLAD 30% pRCA 40% PASP 82 PAD 28 PCWP 20

## 2024-01-17 ENCOUNTER — APPOINTMENT (OUTPATIENT)
Dept: PULMONOLOGY | Facility: CLINIC | Age: 69
End: 2024-01-17
Payer: MEDICARE

## 2024-01-17 VITALS
DIASTOLIC BLOOD PRESSURE: 78 MMHG | SYSTOLIC BLOOD PRESSURE: 124 MMHG | OXYGEN SATURATION: 93 % | RESPIRATION RATE: 16 BRPM | HEART RATE: 85 BPM

## 2024-01-17 PROCEDURE — 95012 NITRIC OXIDE EXP GAS DETER: CPT

## 2024-01-17 PROCEDURE — 94060 EVALUATION OF WHEEZING: CPT

## 2024-01-17 PROCEDURE — 94727 GAS DIL/WSHOT DETER LNG VOL: CPT

## 2024-01-17 PROCEDURE — 99214 OFFICE O/P EST MOD 30 MIN: CPT | Mod: 25

## 2024-01-17 PROCEDURE — 94729 DIFFUSING CAPACITY: CPT

## 2024-01-17 PROCEDURE — ZZZZZ: CPT

## 2024-01-19 NOTE — ASSESSMENT
[FreeTextEntry1] : Obtained and reviewed pulmonary function test, NIOX results with patient today.  Continue Eliquis 5 mg 1 tablet twice a day for CTEPH/pulmonary embolism. Continue Anoro Ellipta 62.5/25 mcg 1 puff once a day. Continue on Riociguat 2.5 mg for CTEPH. Continue carvedilol and amlodipine for type II thoracic aortic dissection.   Medications reviewed. Continue present medications. Return for pulmonary follow up in 1 month.

## 2024-01-19 NOTE — PROCEDURE
[FreeTextEntry1] : Pulmonary Function Test obtained in office today which revealed: Spirometry: Within normal limits with some improvement post bronchodilator, Lung Volume: Within normal limits, Diffusion: Moderate impairment ___ NIOX: 21

## 2024-01-19 NOTE — HISTORY OF PRESENT ILLNESS
[TextBox_4] : MONIKA MORA is a 67 year old female, with history of asthma, CTEPH, CAD, Hypertension, Type II thoracic aortic dissection, who presents to the office for follow up evaluation. Patient reports that she is feeling well overall and her symptoms of dyspnea on exertion have significantly removed. She denies of having any symptoms of chest pain. Patient reports that has started Adempas 2.5 mg for CTEPH. She states that she continues to take Anoro on a daily basis for asthma treatment.

## 2024-01-19 NOTE — DISCUSSION/SUMMARY
[FreeTextEntry1] : Ms. MONIKA MORA is an 67 year old female, history of asthma, CTEPH, CAD. Type 2 thoracic aortic dissection. Patient appears stable from a pulmonary perspective.

## 2024-02-14 ENCOUNTER — APPOINTMENT (OUTPATIENT)
Dept: PULMONOLOGY | Facility: CLINIC | Age: 69
End: 2024-02-14
Payer: MEDICARE

## 2024-02-14 VITALS — OXYGEN SATURATION: 90 % | SYSTOLIC BLOOD PRESSURE: 121 MMHG | DIASTOLIC BLOOD PRESSURE: 80 MMHG | HEART RATE: 85 BPM

## 2024-02-14 PROCEDURE — 94060 EVALUATION OF WHEEZING: CPT

## 2024-02-14 PROCEDURE — 94727 GAS DIL/WSHOT DETER LNG VOL: CPT

## 2024-02-14 PROCEDURE — 94729 DIFFUSING CAPACITY: CPT

## 2024-02-14 PROCEDURE — 36415 COLL VENOUS BLD VENIPUNCTURE: CPT

## 2024-02-14 PROCEDURE — 99214 OFFICE O/P EST MOD 30 MIN: CPT | Mod: 25

## 2024-02-14 PROCEDURE — 88738 HGB QUANT TRANSCUTANEOUS: CPT

## 2024-02-14 PROCEDURE — ZZZZZ: CPT

## 2024-02-14 PROCEDURE — 95012 NITRIC OXIDE EXP GAS DETER: CPT

## 2024-02-14 NOTE — ADDENDUM
[FreeTextEntry1] : I, Jhoan Manzo, acted solely as a scribe for Dr. Cathy Serra D.O., on this date 01/17/2024.   All medical record entries made by the Scribe were at my, Dr. Cathy Serra D.O., direction and personally dictated by me on 01/17/2024. I have reviewed the chart and agree that the record accurately reflects my personal performance of the history, physical exam, assessment and plan. I have also personally directed, reviewed, and agreed with the chart.

## 2024-02-14 NOTE — PROCEDURE
[FreeTextEntry1] : Pulmonary Function Test obtained in office today which revealed: Spirometry: Within normal limits with no post bronchodilator, Lung Volume: Within normal limits, Diffusion: Severe impairment. ___  Exhaled Nitric Oxide             Final  No Documents Attached    	Test  	Result  	Flag	Reference	Goal	Last Verified  	Exhaled Nitric Oxide	20	 	 		REQUIRED  Ordered by: WILLAM LOO       Collected/Examined: [id number] 10:00AM       Verification Required       Stage: Final       Performed at: In Office       Performed by: WILLAM LOO       Resulted: [id number] 10:00AM       Last Updated: [id number] 10:02AM

## 2024-02-19 LAB
ALBUMIN SERPL ELPH-MCNC: 4.1 G/DL
ALP BLD-CCNC: 100 U/L
ALT SERPL-CCNC: 42 U/L
ANION GAP SERPL CALC-SCNC: 12 MMOL/L
AST SERPL-CCNC: 29 U/L
BILIRUB SERPL-MCNC: 0.8 MG/DL
BUN SERPL-MCNC: 14 MG/DL
CALCIUM SERPL-MCNC: 9.3 MG/DL
CHLORIDE SERPL-SCNC: 107 MMOL/L
CO2 SERPL-SCNC: 24 MMOL/L
CREAT SERPL-MCNC: 1.2 MG/DL
EGFR: 49 ML/MIN/1.73M2
GLUCOSE SERPL-MCNC: 87 MG/DL
POCT - HEMOGLOBIN (HGB), QUANTITATIVE, TRANSCUTANEOUS: 15
POTASSIUM SERPL-SCNC: 4.4 MMOL/L
PROT SERPL-MCNC: 6.8 G/DL
SODIUM SERPL-SCNC: 142 MMOL/L

## 2024-02-25 ENCOUNTER — RX RENEWAL (OUTPATIENT)
Age: 69
End: 2024-02-25

## 2024-02-26 ENCOUNTER — OUTPATIENT (OUTPATIENT)
Dept: OUTPATIENT SERVICES | Facility: HOSPITAL | Age: 69
LOS: 1 days | End: 2024-02-26
Payer: MEDICARE

## 2024-02-26 ENCOUNTER — APPOINTMENT (OUTPATIENT)
Dept: CT IMAGING | Facility: IMAGING CENTER | Age: 69
End: 2024-02-26
Payer: MEDICARE

## 2024-02-26 DIAGNOSIS — Z90.710 ACQUIRED ABSENCE OF BOTH CERVIX AND UTERUS: Chronic | ICD-10-CM

## 2024-02-26 DIAGNOSIS — I77.810 THORACIC AORTIC ECTASIA: ICD-10-CM

## 2024-02-26 DIAGNOSIS — Z98.891 HISTORY OF UTERINE SCAR FROM PREVIOUS SURGERY: Chronic | ICD-10-CM

## 2024-02-26 PROCEDURE — 71275 CT ANGIOGRAPHY CHEST: CPT | Mod: 26

## 2024-02-26 PROCEDURE — 71275 CT ANGIOGRAPHY CHEST: CPT

## 2024-03-21 ENCOUNTER — APPOINTMENT (OUTPATIENT)
Dept: CARDIOTHORACIC SURGERY | Facility: CLINIC | Age: 69
End: 2024-03-21
Payer: MEDICARE

## 2024-03-21 VITALS
HEART RATE: 74 BPM | TEMPERATURE: 97.8 F | WEIGHT: 177 LBS | BODY MASS INDEX: 30.22 KG/M2 | OXYGEN SATURATION: 95 % | DIASTOLIC BLOOD PRESSURE: 78 MMHG | RESPIRATION RATE: 14 BRPM | SYSTOLIC BLOOD PRESSURE: 119 MMHG | HEIGHT: 64 IN

## 2024-03-21 DIAGNOSIS — N18.31 CHRONIC KIDNEY DISEASE, STAGE 3A: ICD-10-CM

## 2024-03-21 PROCEDURE — 99215 OFFICE O/P EST HI 40 MIN: CPT

## 2024-03-21 RX ORDER — FLUTICASONE FUROATE, UMECLIDINIUM BROMIDE AND VILANTEROL TRIFENATATE 200; 62.5; 25 UG/1; UG/1; UG/1
200-62.5-25 POWDER RESPIRATORY (INHALATION)
Qty: 3 | Refills: 3 | Status: COMPLETED | COMMUNITY
Start: 2023-08-17 | End: 2024-03-21

## 2024-03-21 RX ORDER — LOSARTAN POTASSIUM 25 MG/1
25 TABLET, FILM COATED ORAL
Qty: 90 | Refills: 1 | Status: COMPLETED | COMMUNITY
Start: 2023-07-24 | End: 2024-03-21

## 2024-03-26 NOTE — ASSESSMENT
[FreeTextEntry1] : Ms. MORA is a 68 year old female with  past medical history of Hypertension who presented to [facility] ED for elevated D-Dimer in pulmonologist office and got admitted (8/3 to 8/6/23) for Type B dissection. During the work up CTA CAP revealed type B dissection. She was advised to follow up with Dr. Cathy Serra for pulmonary hypertension. She is here for 6 months follow up visit.   Today she presents and reports that she is doing well. Denies any chest pain, shortness of breath, palpitations, dizziness or pedal edema.   2/26/24 CTA Chest revealed Type B dissection with proximal descending thoracic aortic aneurysm stable since 08/03/2023. 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     Dr. FRANCES VIVAR,  reviewed the cardiac imaging, medical records and reports with patient and discussed the case.   Dr. FRANCES VIVAR discussed the risks , benefits and alternatives to surgery. Risks included but not limited to  bleeding , stroke, Myocardial Infarction, kidney problems, Blood transfusion ,permanent  pacemaker implantation,  infections and death.  Dr. FRANCES VIVAR  quoted a (low) operative mortality and complication risks.  Dr. FRANCES VIVAR also discussed the various approaches in detail. Dr. FRANCES VIVAR  feel that the patient will benefit and is a candidate for a TEVAR . All questions and concerns were addressed and patient agrees to proceed with the plan.   _____________________________________________________________________________________  68F h/o acute type B aortic dissection w/aneurysmal degeneration of her proximal descending aorta to ~58mm. Recently started on Adempas 2.5 mg for CTEPH with fairly remarkable improvement in breathing as well as functional status. Patient states she can do stairs now with ease, which she could not do when I originally saw her six months prior. Currently denies chest / back pain, shortness of breath at rest or hoarseness.   - Patient has a large fenestration in the proximal descending aorta w/resultant aneurysmal degeneration. When I originally saw her six months prior, she was unable to ambulate half a flight of stairs or FCI down an aisle of a market without severe dyspnea. She now has had significant improvement with her respiratory status following the initiation of Adempas. Given the large fenestration, her false lumen will be extremely unlikely to thrombose. She would benefit from a TEVAR, which would be relatively straight forward w/adequate proximal landing zone. However, her Celiac does come off the false lumen, which dose increase the risk. We will plan for TEVAR w/STABILISE technique.  - We have tentatively scheduled for TEVAR on 4/25/24; we will do this at [facility]. Will have to call patient and confirm if that date is amenable at a different location.   - Will touch base with Dr. Cathy Serra to see if any contraindications from his standpoint. As well as Dr. Brennen Barnett.   - Will require PST

## 2024-03-26 NOTE — DATA REVIEWED
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

## 2024-03-26 NOTE — PHYSICAL EXAM
[Sclera] : the sclera and conjunctiva were normal [PERRL With Normal Accommodation] : pupils were equal in size, round, and reactive to light [Neck Appearance] : the appearance of the neck was normal [] : no respiratory distress [Respiration, Rhythm And Depth] : normal respiratory rhythm and effort [Apical Impulse] : the apical impulse was normal [Heart Rate And Rhythm] : heart rate was normal and rhythm regular [Heart Sounds] : normal S1 and S2 [Murmurs] : no murmurs [Examination Of The Chest] : the chest was normal in appearance [2+] : left 2+ [Breast Appearance] : normal in appearance [Bowel Sounds] : normal bowel sounds [Abdomen Soft] : soft [No CVA Tenderness] : no ~M costovertebral angle tenderness [Abnormal Walk] : normal gait [Involuntary Movements] : no involuntary movements were seen [Skin Color & Pigmentation] : normal skin color and pigmentation [Skin Turgor] : normal skin turgor [No Focal Deficits] : no focal deficits [Oriented To Time, Place, And Person] : oriented to person, place, and time [Impaired Insight] : insight and judgment were intact [Affect] : the affect was normal [Mood] : the mood was normal [Memory Recent] : recent memory was not impaired [Memory Remote] : remote memory was not impaired [FreeTextEntry1] : Deferred

## 2024-03-26 NOTE — CONSULT LETTER
[FreeTextEntry2] : Dr. Brennen Barnett  [FreeTextEntry3] :  Dr. Franky Suh Attending Surgeon Department of Cardiovascular and Thoracic surgery [address] [place] [address] Tel: [phone]

## 2024-03-26 NOTE — END OF VISIT
[FreeTextEntry3] :  I, FRANCES Zavaleta , personally performed the evaluation and management (E/M) services for this established  patient. That E/M includes conducting the initial examination, assessing all conditions, and establishing the plan of care. Today, FE LAN  was here to observe my evaluation and management services for this patient.

## 2024-03-26 NOTE — HISTORY OF PRESENT ILLNESS
[FreeTextEntry1] :  Ms. MORA is a 68 year old female with  past medical history of Hypertension who presented to [facility] ED for elevated D-Dimer in pulmonologist office and got admitted (8/3 to 8/6/23) for Type B dissection. During the work up CTA CAP revealed type B dissection. She was advised to follow up with Dr. Cathy Serra for pulmonary hypertension. She is here for 6 months follow up visit.   Today she presents and reports that she is doing well. Denies any chest pain, shortness of breath, palpitations, dizziness or pedal edema.    NYHA class I

## 2024-04-08 ENCOUNTER — OUTPATIENT (OUTPATIENT)
Dept: OUTPATIENT SERVICES | Facility: HOSPITAL | Age: 69
LOS: 1 days | Discharge: HOME | End: 2024-04-08

## 2024-04-08 DIAGNOSIS — Z90.710 ACQUIRED ABSENCE OF BOTH CERVIX AND UTERUS: Chronic | ICD-10-CM

## 2024-04-08 DIAGNOSIS — Z98.891 HISTORY OF UTERINE SCAR FROM PREVIOUS SURGERY: Chronic | ICD-10-CM

## 2024-04-08 DIAGNOSIS — D64.9 ANEMIA, UNSPECIFIED: ICD-10-CM

## 2024-04-10 ENCOUNTER — OUTPATIENT (OUTPATIENT)
Dept: OUTPATIENT SERVICES | Facility: HOSPITAL | Age: 69
LOS: 1 days | End: 2024-04-10
Payer: MEDICARE

## 2024-04-10 VITALS
SYSTOLIC BLOOD PRESSURE: 128 MMHG | DIASTOLIC BLOOD PRESSURE: 80 MMHG | RESPIRATION RATE: 16 BRPM | TEMPERATURE: 98 F | HEART RATE: 75 BPM | WEIGHT: 177.91 LBS | HEIGHT: 64 IN | OXYGEN SATURATION: 98 %

## 2024-04-10 DIAGNOSIS — Z90.710 ACQUIRED ABSENCE OF BOTH CERVIX AND UTERUS: Chronic | ICD-10-CM

## 2024-04-10 DIAGNOSIS — J45.909 UNSPECIFIED ASTHMA, UNCOMPLICATED: ICD-10-CM

## 2024-04-10 DIAGNOSIS — I77.810 THORACIC AORTIC ECTASIA: ICD-10-CM

## 2024-04-10 DIAGNOSIS — Z01.818 ENCOUNTER FOR OTHER PREPROCEDURAL EXAMINATION: ICD-10-CM

## 2024-04-10 DIAGNOSIS — Z29.9 ENCOUNTER FOR PROPHYLACTIC MEASURES, UNSPECIFIED: ICD-10-CM

## 2024-04-10 DIAGNOSIS — I71.50: ICD-10-CM

## 2024-04-10 DIAGNOSIS — Z98.891 HISTORY OF UTERINE SCAR FROM PREVIOUS SURGERY: Chronic | ICD-10-CM

## 2024-04-10 LAB
A1C WITH ESTIMATED AVERAGE GLUCOSE RESULT: 6 % — HIGH (ref 4–5.6)
ALBUMIN SERPL ELPH-MCNC: 4 G/DL — SIGNIFICANT CHANGE UP (ref 3.3–5)
ALP SERPL-CCNC: 97 U/L — SIGNIFICANT CHANGE UP (ref 40–120)
ALT FLD-CCNC: 44 U/L — SIGNIFICANT CHANGE UP (ref 10–45)
ANION GAP SERPL CALC-SCNC: 11 MMOL/L — SIGNIFICANT CHANGE UP (ref 5–17)
AST SERPL-CCNC: 25 U/L — SIGNIFICANT CHANGE UP (ref 10–40)
BILIRUB SERPL-MCNC: 0.9 MG/DL — SIGNIFICANT CHANGE UP (ref 0.2–1.2)
BLD GP AB SCN SERPL QL: NEGATIVE — SIGNIFICANT CHANGE UP
BUN SERPL-MCNC: 19 MG/DL — SIGNIFICANT CHANGE UP (ref 7–23)
CALCIUM SERPL-MCNC: 9.4 MG/DL — SIGNIFICANT CHANGE UP (ref 8.4–10.5)
CHLORIDE SERPL-SCNC: 110 MMOL/L — HIGH (ref 96–108)
CO2 SERPL-SCNC: 24 MMOL/L — SIGNIFICANT CHANGE UP (ref 22–31)
CREAT SERPL-MCNC: 1.17 MG/DL — SIGNIFICANT CHANGE UP (ref 0.5–1.3)
EGFR: 51 ML/MIN/1.73M2 — LOW
ESTIMATED AVERAGE GLUCOSE: 126 MG/DL — HIGH (ref 68–114)
GLUCOSE SERPL-MCNC: 96 MG/DL — SIGNIFICANT CHANGE UP (ref 70–99)
HCT VFR BLD CALC: 42 % — SIGNIFICANT CHANGE UP (ref 34.5–45)
HGB BLD-MCNC: 14.1 G/DL — SIGNIFICANT CHANGE UP (ref 11.5–15.5)
MCHC RBC-ENTMCNC: 25.2 PG — LOW (ref 27–34)
MCHC RBC-ENTMCNC: 33.6 GM/DL — SIGNIFICANT CHANGE UP (ref 32–36)
MCV RBC AUTO: 75.1 FL — LOW (ref 80–100)
MRSA PCR RESULT.: SIGNIFICANT CHANGE UP
NRBC # BLD: 0 /100 WBCS — SIGNIFICANT CHANGE UP (ref 0–0)
PLATELET # BLD AUTO: 225 K/UL — SIGNIFICANT CHANGE UP (ref 150–400)
POTASSIUM SERPL-MCNC: 4.3 MMOL/L — SIGNIFICANT CHANGE UP (ref 3.5–5.3)
POTASSIUM SERPL-SCNC: 4.3 MMOL/L — SIGNIFICANT CHANGE UP (ref 3.5–5.3)
PROT SERPL-MCNC: 7.2 G/DL — SIGNIFICANT CHANGE UP (ref 6–8.3)
RBC # BLD: 5.59 M/UL — HIGH (ref 3.8–5.2)
RBC # FLD: 15.7 % — HIGH (ref 10.3–14.5)
RH IG SCN BLD-IMP: POSITIVE — SIGNIFICANT CHANGE UP
S AUREUS DNA NOSE QL NAA+PROBE: DETECTED
SODIUM SERPL-SCNC: 145 MMOL/L — SIGNIFICANT CHANGE UP (ref 135–145)
WBC # BLD: 5.7 K/UL — SIGNIFICANT CHANGE UP (ref 3.8–10.5)
WBC # FLD AUTO: 5.7 K/UL — SIGNIFICANT CHANGE UP (ref 3.8–10.5)

## 2024-04-10 PROCEDURE — 80053 COMPREHEN METABOLIC PANEL: CPT

## 2024-04-10 PROCEDURE — 86850 RBC ANTIBODY SCREEN: CPT

## 2024-04-10 PROCEDURE — 87640 STAPH A DNA AMP PROBE: CPT

## 2024-04-10 PROCEDURE — 36415 COLL VENOUS BLD VENIPUNCTURE: CPT

## 2024-04-10 PROCEDURE — 83036 HEMOGLOBIN GLYCOSYLATED A1C: CPT

## 2024-04-10 PROCEDURE — 87641 MR-STAPH DNA AMP PROBE: CPT

## 2024-04-10 PROCEDURE — 93880 EXTRACRANIAL BILAT STUDY: CPT | Mod: 26

## 2024-04-10 PROCEDURE — 86900 BLOOD TYPING SEROLOGIC ABO: CPT

## 2024-04-10 PROCEDURE — 93880 EXTRACRANIAL BILAT STUDY: CPT

## 2024-04-10 PROCEDURE — 85027 COMPLETE CBC AUTOMATED: CPT

## 2024-04-10 PROCEDURE — 71046 X-RAY EXAM CHEST 2 VIEWS: CPT

## 2024-04-10 PROCEDURE — G0463: CPT

## 2024-04-10 PROCEDURE — 71046 X-RAY EXAM CHEST 2 VIEWS: CPT | Mod: 26

## 2024-04-10 PROCEDURE — 86901 BLOOD TYPING SEROLOGIC RH(D): CPT

## 2024-04-10 NOTE — H&P PST ADULT - ASSESSMENT
DASI: able to go up one flight of stairs or walk 1-2 blocks with out difficulty  Loose teeth: doron     CAPRINI VTE 2.0 SCORE [CLOT updated 2019]    AGE RELATED RISK FACTORS                                                       MOBILITY RELATED FACTORS  [ ] Age 41-60 years                                            (1 Point)                    [ ] Bed rest                                                        (1 Point)  [x] Age: 61-74 years                                           (2 Points)                  [ ] Plaster cast                                                   (2 Points)  [ ] Age= 75 years                                              (3 Points)                    [ ] Bed bound for more than 72 hours                 (2 Points)    DISEASE RELATED RISK FACTORS                                               GENDER SPECIFIC FACTORS  [ ] Edema in the lower extremities                       (1 Point)              [ ] Pregnancy                                                     (1 Point)  [ ] Varicose veins                                               (1 Point)                     [ ] Post-partum < 6 weeks                                   (1 Point)             [x ] BMI > 25 Kg/m2                                            (1 Point)                     [ ] Hormonal therapy  or oral contraception          (1 Point)                 [ ] Sepsis (in the previous month)                        (1 Point)               [ ] History of pregnancy complications                 (1 point)  [ ] Pneumonia or serious lung disease                                               [ ] Unexplained or recurrent                     (1 Point)           (in the previous month)                               (1 Point)  [ ] Abnormal pulmonary function test                     (1 Point)                 SURGERY RELATED RISK FACTORS  [ ] Acute myocardial infarction                              (1 Point)               [ ]  Section                                             (1 Point)  [ ] Congestive heart failure (in the previous month)  (1 Point)      [ ] Minor surgery                                                  (1 Point)   [ ] Inflammatory bowel disease                             (1 Point)               [ ] Arthroscopic surgery                                        (2 Points)  [ ] Central venous access                                      (2 Points)                [ x] General surgery lasting more than 45 minutes (2 points)  [ ] Malignancy- Present or previous                   (2 Points)                [ ] Elective arthroplasty                                         (5 points)    [ ] Stroke (in the previous month)                          (5 Points)                                                                                                                                                           HEMATOLOGY RELATED FACTORS                                                 TRAUMA RELATED RISK FACTORS  [ ] Prior episodes of VTE                                     (3 Points)                [ ] Fracture of the hip, pelvis, or leg                       (5 Points)  [ ] Positive family history for VTE                         (3 Points)             [ ] Acute spinal cord injury (in the previous month)  (5 Points)  [ ] Prothrombin 44312 A                                     (3 Points)               [ ] Paralysis  (less than 1 month)                             (5 Points)  [ ] Factor V Leiden                                             (3 Points)                  [ ] Multiple Trauma within 1 month                        (5 Points)  [ ] Lupus anticoagulants                                     (3 Points)                                                           [ ] Anticardiolipin antibodies                               (3 Points)                                                       [ ] High homocysteine in the blood                      (3 Points)                                             [ ] Other congenital or acquired thrombophilia      (3 Points)                                                [ ] Heparin induced thrombocytopenia                  (3 Points)                                     Total Score [   6       ]

## 2024-04-10 NOTE — H&P PST ADULT - NSICDXPASTMEDICALHX_GEN_ALL_CORE_FT
PAST MEDICAL HISTORY:  2019 novel coronavirus disease (COVID-19)     Ascending aorta dilation     CAD (coronary artery disease)     Chronic systolic right heart failure     Enlarged aorta     HTN (hypertension)     Mild tricuspid regurgitation     Prediabetes     Pulmonary hypertension

## 2024-04-10 NOTE — H&P PST ADULT - NSANTHOSAYNRD_GEN_A_CORE
No. CARLEE screening performed.  STOP BANG Legend: 0-2 = LOW Risk; 3-4 = INTERMEDIATE Risk; 5-8 = HIGH Risk

## 2024-04-10 NOTE — H&P PST ADULT - PROBLEM SELECTOR PLAN 1
TEVAR on 4/25/24  Hold Eliquis 3 days prior to surgery   continue on Adempas until day of surgery   continue on antihypertensive medications   Pre-op education provided - all questions answered. Pt verbalized understanding

## 2024-04-10 NOTE — H&P PST ADULT - HISTORY OF PRESENT ILLNESS
67 y/o F with h/o HTN, CTEPH, Asthma, CAD who presented to [facility] ED for SOB and elevated D-Dimer in pulmonologist office and got admitted (8/3 to 8/6/23) for Type B dissection. During the work up CTA CAP revealed a large fenestration in the proximal descending aorta w/resultant aneurysmal degeneration of her proximal descending aorta to ~58mm. She was started on Adempas 2.5 mg for CTEPH with fairly remarkable improvement in breathing as well as functional status. Currently denies chest / back pain, shortness of breath at rest or hoarseness. Today she present to PST for scheduled TEVAR on 4/25/24.

## 2024-04-11 DIAGNOSIS — Z22.321 CARRIER OR SUSPECTED CARRIER OF METHICILLIN SUSCEPTIBLE STAPHYLOCOCCUS AUREUS: ICD-10-CM

## 2024-04-11 PROBLEM — I77.810 THORACIC AORTIC ECTASIA: Chronic | Status: ACTIVE | Noted: 2024-04-10

## 2024-04-11 PROBLEM — U07.1 COVID-19: Chronic | Status: ACTIVE | Noted: 2024-04-10

## 2024-04-11 PROBLEM — I25.10 ATHEROSCLEROTIC HEART DISEASE OF NATIVE CORONARY ARTERY WITHOUT ANGINA PECTORIS: Chronic | Status: ACTIVE | Noted: 2024-04-10

## 2024-04-11 PROBLEM — I50.812 CHRONIC RIGHT HEART FAILURE: Chronic | Status: ACTIVE | Noted: 2024-04-10

## 2024-04-11 PROBLEM — I27.20 PULMONARY HYPERTENSION, UNSPECIFIED: Chronic | Status: ACTIVE | Noted: 2024-04-10

## 2024-04-11 PROBLEM — J45.909 UNSPECIFIED ASTHMA, UNCOMPLICATED: Chronic | Status: ACTIVE | Noted: 2024-04-10

## 2024-04-15 ENCOUNTER — NON-APPOINTMENT (OUTPATIENT)
Age: 69
End: 2024-04-15

## 2024-04-16 ENCOUNTER — APPOINTMENT (OUTPATIENT)
Dept: HEMATOLOGY ONCOLOGY | Facility: CLINIC | Age: 69
End: 2024-04-16
Payer: MEDICARE

## 2024-04-16 ENCOUNTER — RESULT REVIEW (OUTPATIENT)
Age: 69
End: 2024-04-16

## 2024-04-16 VITALS
OXYGEN SATURATION: 95 % | TEMPERATURE: 98.7 F | BODY MASS INDEX: 30.96 KG/M2 | RESPIRATION RATE: 16 BRPM | WEIGHT: 180.34 LBS | SYSTOLIC BLOOD PRESSURE: 115 MMHG | DIASTOLIC BLOOD PRESSURE: 76 MMHG | HEART RATE: 78 BPM

## 2024-04-16 LAB
BASOPHILS # BLD AUTO: 0.05 K/UL — SIGNIFICANT CHANGE UP (ref 0–0.2)
BASOPHILS NFR BLD AUTO: 0.9 % — SIGNIFICANT CHANGE UP (ref 0–2)
EOSINOPHIL # BLD AUTO: 0.19 K/UL — SIGNIFICANT CHANGE UP (ref 0–0.5)
EOSINOPHIL NFR BLD AUTO: 3.3 % — SIGNIFICANT CHANGE UP (ref 0–6)
HCT VFR BLD CALC: 39.8 % — SIGNIFICANT CHANGE UP (ref 34.5–45)
HGB BLD-MCNC: 13.6 G/DL — SIGNIFICANT CHANGE UP (ref 11.5–15.5)
IMM GRANULOCYTES NFR BLD AUTO: 0.3 % — SIGNIFICANT CHANGE UP (ref 0–0.9)
LYMPHOCYTES # BLD AUTO: 1.87 K/UL — SIGNIFICANT CHANGE UP (ref 1–3.3)
LYMPHOCYTES # BLD AUTO: 32.7 % — SIGNIFICANT CHANGE UP (ref 13–44)
MCHC RBC-ENTMCNC: 25.9 PG — LOW (ref 27–34)
MCHC RBC-ENTMCNC: 34.2 G/DL — SIGNIFICANT CHANGE UP (ref 32–36)
MCV RBC AUTO: 75.7 FL — LOW (ref 80–100)
MONOCYTES # BLD AUTO: 0.46 K/UL — SIGNIFICANT CHANGE UP (ref 0–0.9)
MONOCYTES NFR BLD AUTO: 8 % — SIGNIFICANT CHANGE UP (ref 2–14)
NEUTROPHILS # BLD AUTO: 3.13 K/UL — SIGNIFICANT CHANGE UP (ref 1.8–7.4)
NEUTROPHILS NFR BLD AUTO: 54.8 % — SIGNIFICANT CHANGE UP (ref 43–77)
NRBC # BLD: 0 /100 WBCS — SIGNIFICANT CHANGE UP (ref 0–0)
PLATELET # BLD AUTO: 197 K/UL — SIGNIFICANT CHANGE UP (ref 150–400)
RBC # BLD: 5.26 M/UL — HIGH (ref 3.8–5.2)
RBC # FLD: 15.8 % — HIGH (ref 10.3–14.5)
WBC # BLD: 5.72 K/UL — SIGNIFICANT CHANGE UP (ref 3.8–10.5)
WBC # FLD AUTO: 5.72 K/UL — SIGNIFICANT CHANGE UP (ref 3.8–10.5)

## 2024-04-16 PROCEDURE — 99214 OFFICE O/P EST MOD 30 MIN: CPT

## 2024-04-16 NOTE — HISTORY OF PRESENT ILLNESS
[de-identified] : 66 y/o F with hx of HTN and otherwise unremarkable medical history here for possibly hypercoagulable evaluation. Recently (beginning of August) found with a very high d-dimer and ultimately sent to the hospital and found with an aortic dissection. The breathing has been an issue for around 8 months, and it actually was getting better with time with treatment from pulmonologist. She has been since diagnosed with pulmonary hypertension, which according to pulmonology is consistent with chronic thromboembolic pulmonary hypertension. Patient reporting that she feels anxious today, but otherwise feels OK. Her breathing is not completely back to normal but nothing close to the severity in April this year. No fevers or chills, night sweats. Appetite is normal, but she did intentionally lose 20 pounds due to the breathing issue in an effort to improve. No history of thrombosis in her life previous to this. Nobody in her family has ever had a blood clot before to her knowledge.   On initial workup found with borderline low protein S level (46%). She was started on Eliquis 5 mg BID for likely PEs. [de-identified] : Patient seen for follow up on 04/16/2024. Patient with no complaints today. Reports no febrile illnesses. No weight loss or night sweats. No chest pain, palpitations or shortness of breath. Denies any N/V/D. No pain symptoms at the present time. Normal bowel movements and normal urinary habits. Patient reports a good appetite at this time. No bleeding complications at this point. No bruising. Pending CT surgery for correction of dissection later this month.

## 2024-04-16 NOTE — ASSESSMENT
[FreeTextEntry1] : 67 y/o F w/ HTN and recent aortic dissection, also with pulmonary HTN suspected due to chronic thromboembolic phenomena given elevated d-dimer here for hypercoagulable workup. Patient is without known personal or family history of thrombosis. She is currently not on full anticoagulation.  Plan: -Protein S level persistently low, now on Eliquis 5 mg BID and aspirin.  -Checked for PROS1 gene mutation, a common cause of protein S deficiency, and this was negative. Her borderline low levels more likely due to consumption due to CTEPH.  -No contraindication from hematologic perspective for surgery. Would hold eliquis for 3 days prior to intervention and then reinitiate after when hemostasis achieved.  -Patient understands and agrees with plan. All information explained to the best of my ability. -RTC in 3 months. Will determine for a/c plan forward at that point but should continue until then after surgery.  -Patient understands and agrees with plan. All information explained to the best of my ability.

## 2024-04-17 LAB
ALBUMIN SERPL ELPH-MCNC: 4 G/DL
ALP BLD-CCNC: 96 U/L
ALT SERPL-CCNC: 33 U/L
ANION GAP SERPL CALC-SCNC: 12 MMOL/L
AST SERPL-CCNC: 21 U/L
BILIRUB SERPL-MCNC: 1.1 MG/DL
BUN SERPL-MCNC: 20 MG/DL
CALCIUM SERPL-MCNC: 9.3 MG/DL
CHLORIDE SERPL-SCNC: 106 MMOL/L
CO2 SERPL-SCNC: 23 MMOL/L
CREAT SERPL-MCNC: 1.24 MG/DL
DEPRECATED D DIMER PPP IA-ACNC: 278 NG/ML DDU
EGFR: 47 ML/MIN/1.73M2
GLUCOSE SERPL-MCNC: 99 MG/DL
POTASSIUM SERPL-SCNC: 4.2 MMOL/L
PROT SERPL-MCNC: 6.9 G/DL
SODIUM SERPL-SCNC: 141 MMOL/L

## 2024-04-18 ENCOUNTER — APPOINTMENT (OUTPATIENT)
Dept: PULMONOLOGY | Facility: CLINIC | Age: 69
End: 2024-04-18
Payer: MEDICARE

## 2024-04-18 VITALS — SYSTOLIC BLOOD PRESSURE: 94 MMHG | DIASTOLIC BLOOD PRESSURE: 61 MMHG | OXYGEN SATURATION: 90 % | HEART RATE: 71 BPM

## 2024-04-18 DIAGNOSIS — Z01.811 ENCOUNTER FOR PREPROCEDURAL RESPIRATORY EXAMINATION: ICD-10-CM

## 2024-04-18 PROCEDURE — 99214 OFFICE O/P EST MOD 30 MIN: CPT | Mod: 25

## 2024-04-18 PROCEDURE — 94727 GAS DIL/WSHOT DETER LNG VOL: CPT

## 2024-04-18 PROCEDURE — 94729 DIFFUSING CAPACITY: CPT

## 2024-04-18 PROCEDURE — 94010 BREATHING CAPACITY TEST: CPT

## 2024-04-18 PROCEDURE — ZZZZZ: CPT

## 2024-04-18 PROCEDURE — 95012 NITRIC OXIDE EXP GAS DETER: CPT

## 2024-04-18 RX ORDER — MUPIROCIN 20 MG/G
2 OINTMENT TOPICAL
Qty: 1 | Refills: 0 | Status: COMPLETED | COMMUNITY
Start: 2024-04-11 | End: 2024-04-16

## 2024-04-18 RX ORDER — LEVALBUTEROL TARTRATE 45 UG/1
45 AEROSOL, METERED ORAL
Qty: 1 | Refills: 0 | Status: COMPLETED | COMMUNITY
Start: 2023-03-21 | End: 2024-04-18

## 2024-04-19 PROBLEM — Z01.811 ENCOUNTER FOR PREOPERATIVE PULMONARY EXAMINATION: Status: ACTIVE | Noted: 2024-04-19

## 2024-04-19 NOTE — CONSULT LETTER
[Dear  ___] : Dear  [unfilled], [Courtesy Letter:] : I had the pleasure of seeing your patient, [unfilled], in my office today. [Please see my note below.] : Please see my note below. [Consult Closing:] : Thank you very much for allowing me to participate in the care of this patient.  If you have any questions, please do not hesitate to contact me. [Sincerely,] : Sincerely, [FreeTextEntry3] : Dr. Cathy Serra

## 2024-04-19 NOTE — HISTORY OF PRESENT ILLNESS
[TextBox_4] : Meaghan is scheduled to undergo TEVAR by Dr. Franky Suh on April 25, 2024  Overall feeling well; no respiratory complaints reported at this time - breathing has improved   Having surgery at the end of next week to repair aortic dissection

## 2024-04-19 NOTE — PROCEDURE
[FreeTextEntry1] : Pulmonary Function Test performed in my office today: Spirometry: Within normal limits; Lung Volume: Within normal limits; Diffusion: Severe impairment. ____________  Exhaled Nitric Oxide             Final  No Documents Attached    	Test  	Result  	Flag	Reference	Goal	Last Verified  	Exhaled Nitric Oxide	17	 	 		REQUIRED  Ordered by: WILLAM LOO       Collected/Examined: [id number] 09:20AM       Verification Required       Stage: Final       Performed at: In Office       Performed by: SOFY BAUMANN       Resulted: [id number] 09:20AM       Last Updated: [id number] 09:21AM

## 2024-04-19 NOTE — ASSESSMENT
[FreeTextEntry1] : Obtained and reviewed pulmonary function test, NIOX results with patient today.  Continue Eliquis 5 mg 1 tablet twice a day for CTEPH/pulmonary embolism. Continue Anoro Ellipta 62.5/25 mcg 1 puff once a day. Continue on Riociguat 2.5 mg p.o. 3 times daily for CTEPH. Continue carvedilol and amlodipine for type II thoracic aortic dissection.   Medications reviewed. Continue present medications. No acute or active pulmonary contraindications to the proposed TEVAR.   Return for pulmonary follow up after TEVAR.

## 2024-04-23 ENCOUNTER — APPOINTMENT (OUTPATIENT)
Dept: CT IMAGING | Facility: IMAGING CENTER | Age: 69
End: 2024-04-23
Payer: MEDICARE

## 2024-04-23 ENCOUNTER — APPOINTMENT (OUTPATIENT)
Dept: RADIOLOGY | Facility: IMAGING CENTER | Age: 69
End: 2024-04-23
Payer: MEDICARE

## 2024-04-23 ENCOUNTER — OUTPATIENT (OUTPATIENT)
Dept: OUTPATIENT SERVICES | Facility: HOSPITAL | Age: 69
LOS: 1 days | End: 2024-04-23
Payer: MEDICARE

## 2024-04-23 DIAGNOSIS — Z90.710 ACQUIRED ABSENCE OF BOTH CERVIX AND UTERUS: Chronic | ICD-10-CM

## 2024-04-23 DIAGNOSIS — Z98.891 HISTORY OF UTERINE SCAR FROM PREVIOUS SURGERY: Chronic | ICD-10-CM

## 2024-04-23 DIAGNOSIS — I71.03 DISSECTION OF THORACOABDOMINAL AORTA: ICD-10-CM

## 2024-04-23 PROCEDURE — 70450 CT HEAD/BRAIN W/O DYE: CPT | Mod: 26

## 2024-04-23 PROCEDURE — 72100 X-RAY EXAM L-S SPINE 2/3 VWS: CPT

## 2024-04-23 PROCEDURE — 72100 X-RAY EXAM L-S SPINE 2/3 VWS: CPT | Mod: 26

## 2024-04-23 PROCEDURE — 70450 CT HEAD/BRAIN W/O DYE: CPT

## 2024-04-25 ENCOUNTER — TRANSCRIPTION ENCOUNTER (OUTPATIENT)
Age: 69
End: 2024-04-25

## 2024-04-25 ENCOUNTER — INPATIENT (INPATIENT)
Facility: HOSPITAL | Age: 69
LOS: 28 days | Discharge: HOME HEALTH | DRG: 219 | End: 2024-05-24
Attending: THORACIC SURGERY (CARDIOTHORACIC VASCULAR SURGERY) | Admitting: THORACIC SURGERY (CARDIOTHORACIC VASCULAR SURGERY)
Payer: MEDICARE

## 2024-04-25 VITALS
DIASTOLIC BLOOD PRESSURE: 78 MMHG | HEIGHT: 64 IN | OXYGEN SATURATION: 99 % | HEART RATE: 77 BPM | RESPIRATION RATE: 18 BRPM | TEMPERATURE: 98 F | SYSTOLIC BLOOD PRESSURE: 129 MMHG | WEIGHT: 179.9 LBS

## 2024-04-25 DIAGNOSIS — Z90.710 ACQUIRED ABSENCE OF BOTH CERVIX AND UTERUS: Chronic | ICD-10-CM
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
## 2024-04-25 DIAGNOSIS — I27.24 CHRONIC THROMBOEMBOLIC PULMONARY HYPERTENSION: ICD-10-CM

## 2024-04-25 DIAGNOSIS — E66.9 OBESITY, UNSPECIFIED: ICD-10-CM

## 2024-04-25 DIAGNOSIS — I82.512 CHRONIC EMBOLISM AND THROMBOSIS OF LEFT FEMORAL VEIN: ICD-10-CM

## 2024-04-25 DIAGNOSIS — R57.8 OTHER SHOCK: ICD-10-CM

## 2024-04-25 DIAGNOSIS — N28.0 ISCHEMIA AND INFARCTION OF KIDNEY: ICD-10-CM

## 2024-04-25 DIAGNOSIS — Z98.891 HISTORY OF UTERINE SCAR FROM PREVIOUS SURGERY: Chronic | ICD-10-CM

## 2024-04-25 DIAGNOSIS — D64.9 ANEMIA, UNSPECIFIED: ICD-10-CM

## 2024-04-25 DIAGNOSIS — I25.10 ATHEROSCLEROTIC HEART DISEASE OF NATIVE CORONARY ARTERY WITHOUT ANGINA PECTORIS: ICD-10-CM

## 2024-04-25 DIAGNOSIS — T50.995A ADVERSE EFFECT OF OTHER DRUGS, MEDICAMENTS AND BIOLOGICAL SUBSTANCES, INITIAL ENCOUNTER: ICD-10-CM

## 2024-04-25 DIAGNOSIS — J95.2 ACUTE PULMONARY INSUFFICIENCY FOLLOWING NONTHORACIC SURGERY: ICD-10-CM
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
## 2024-04-25 DIAGNOSIS — G95.11 ACUTE INFARCTION OF SPINAL CORD (EMBOLIC) (NONEMBOLIC): ICD-10-CM

## 2024-04-25 DIAGNOSIS — D75.828: ICD-10-CM

## 2024-04-25 DIAGNOSIS — Z79.01 LONG TERM (CURRENT) USE OF ANTICOAGULANTS: ICD-10-CM

## 2024-04-25 DIAGNOSIS — G82.20 PARAPLEGIA, UNSPECIFIED: ICD-10-CM

## 2024-04-25 DIAGNOSIS — I71.03 DISSECTION OF THORACOABDOMINAL AORTA: ICD-10-CM

## 2024-04-25 DIAGNOSIS — I26.99 OTHER PULMONARY EMBOLISM WITHOUT ACUTE COR PULMONALE: ICD-10-CM

## 2024-04-25 DIAGNOSIS — Z82.49 FAMILY HISTORY OF ISCHEMIC HEART DISEASE AND OTHER DISEASES OF THE CIRCULATORY SYSTEM: ICD-10-CM

## 2024-04-25 DIAGNOSIS — G95.19 OTHER VASCULAR MYELOPATHIES: ICD-10-CM

## 2024-04-25 DIAGNOSIS — I50.41 ACUTE COMBINED SYSTOLIC (CONGESTIVE) AND DIASTOLIC (CONGESTIVE) HEART FAILURE: ICD-10-CM

## 2024-04-25 DIAGNOSIS — Z86.16 PERSONAL HISTORY OF COVID-19: ICD-10-CM

## 2024-04-25 DIAGNOSIS — I82.532 CHRONIC EMBOLISM AND THROMBOSIS OF LEFT POPLITEAL VEIN: ICD-10-CM

## 2024-04-25 DIAGNOSIS — R33.9 RETENTION OF URINE, UNSPECIFIED: ICD-10-CM

## 2024-04-25 DIAGNOSIS — J45.909 UNSPECIFIED ASTHMA, UNCOMPLICATED: ICD-10-CM

## 2024-04-25 DIAGNOSIS — Z90.710 ACQUIRED ABSENCE OF BOTH CERVIX AND UTERUS: ICD-10-CM

## 2024-04-25 DIAGNOSIS — I74.8 EMBOLISM AND THROMBOSIS OF OTHER ARTERIES: ICD-10-CM
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
## 2024-04-25 DIAGNOSIS — R73.03 PREDIABETES: ICD-10-CM

## 2024-04-25 DIAGNOSIS — N39.0 URINARY TRACT INFECTION, SITE NOT SPECIFIED: ICD-10-CM

## 2024-04-25 DIAGNOSIS — I11.0 HYPERTENSIVE HEART DISEASE WITH HEART FAILURE: ICD-10-CM

## 2024-04-25 LAB
A1C WITH ESTIMATED AVERAGE GLUCOSE RESULT: 5.9 % — HIGH (ref 4–5.6)
ADD ON TEST-SPECIMEN IN LAB: SIGNIFICANT CHANGE UP
ALBUMIN SERPL ELPH-MCNC: 3.8 G/DL — SIGNIFICANT CHANGE UP (ref 3.3–5)
ALP SERPL-CCNC: 90 U/L — SIGNIFICANT CHANGE UP (ref 40–120)
ALT FLD-CCNC: 22 U/L — SIGNIFICANT CHANGE UP (ref 10–45)
ANION GAP SERPL CALC-SCNC: 11 MMOL/L — SIGNIFICANT CHANGE UP (ref 5–17)
APPEARANCE UR: CLEAR — SIGNIFICANT CHANGE UP
APTT BLD: 37.8 SEC — HIGH (ref 24.5–35.6)
AST SERPL-CCNC: 20 U/L — SIGNIFICANT CHANGE UP (ref 10–40)
BASE EXCESS BLDA CALC-SCNC: -3.9 MMOL/L — LOW (ref -2–3)
BASOPHILS # BLD AUTO: 0.04 K/UL — SIGNIFICANT CHANGE UP (ref 0–0.2)
BASOPHILS NFR BLD AUTO: 0.7 % — SIGNIFICANT CHANGE UP (ref 0–2)
BILIRUB SERPL-MCNC: 0.9 MG/DL — SIGNIFICANT CHANGE UP (ref 0.2–1.2)
BILIRUB UR-MCNC: NEGATIVE — SIGNIFICANT CHANGE UP
BLD GP AB SCN SERPL QL: NEGATIVE — SIGNIFICANT CHANGE UP
BUN SERPL-MCNC: 15 MG/DL — SIGNIFICANT CHANGE UP (ref 7–23)
CALCIUM SERPL-MCNC: 9 MG/DL — SIGNIFICANT CHANGE UP (ref 8.4–10.5)
CHLORIDE SERPL-SCNC: 111 MMOL/L — HIGH (ref 96–108)
CHOLEST SERPL-MCNC: 114 MG/DL — SIGNIFICANT CHANGE UP
CK MB CFR SERPL CALC: 2.1 NG/ML — SIGNIFICANT CHANGE UP (ref 0–6.7)
CK SERPL-CCNC: 134 U/L — SIGNIFICANT CHANGE UP (ref 25–170)
CO2 BLDA-SCNC: 20 MMOL/L — SIGNIFICANT CHANGE UP (ref 19–24)
CO2 SERPL-SCNC: 20 MMOL/L — LOW (ref 22–31)
COLOR SPEC: YELLOW — SIGNIFICANT CHANGE UP
CREAT SERPL-MCNC: 0.97 MG/DL — SIGNIFICANT CHANGE UP (ref 0.5–1.3)
DIFF PNL FLD: NEGATIVE — SIGNIFICANT CHANGE UP
EGFR: 64 ML/MIN/1.73M2 — SIGNIFICANT CHANGE UP
EOSINOPHIL # BLD AUTO: 0.15 K/UL — SIGNIFICANT CHANGE UP (ref 0–0.5)
EOSINOPHIL NFR BLD AUTO: 2.6 % — SIGNIFICANT CHANGE UP (ref 0–6)
ESTIMATED AVERAGE GLUCOSE: 123 MG/DL — HIGH (ref 68–114)
GLUCOSE SERPL-MCNC: 97 MG/DL — SIGNIFICANT CHANGE UP (ref 70–99)
GLUCOSE UR QL: NEGATIVE MG/DL — SIGNIFICANT CHANGE UP
HCO3 BLDA-SCNC: 20 MMOL/L — LOW (ref 21–28)
HCT VFR BLD CALC: 41.4 % — SIGNIFICANT CHANGE UP (ref 34.5–45)
HDLC SERPL-MCNC: 46 MG/DL — LOW
HGB BLD-MCNC: 13.8 G/DL — SIGNIFICANT CHANGE UP (ref 11.5–15.5)
IMM GRANULOCYTES NFR BLD AUTO: 0.3 % — SIGNIFICANT CHANGE UP (ref 0–0.9)
INR BLD: 1.13 — SIGNIFICANT CHANGE UP (ref 0.85–1.18)
KETONES UR-MCNC: NEGATIVE MG/DL — SIGNIFICANT CHANGE UP
LEUKOCYTE ESTERASE UR-ACNC: NEGATIVE — SIGNIFICANT CHANGE UP
LIPID PNL WITH DIRECT LDL SERPL: 57 MG/DL — SIGNIFICANT CHANGE UP
LYMPHOCYTES # BLD AUTO: 1.86 K/UL — SIGNIFICANT CHANGE UP (ref 1–3.3)
LYMPHOCYTES # BLD AUTO: 32.1 % — SIGNIFICANT CHANGE UP (ref 13–44)
MAGNESIUM SERPL-MCNC: 2.2 MG/DL — SIGNIFICANT CHANGE UP (ref 1.6–2.6)
MCHC RBC-ENTMCNC: 25.8 PG — LOW (ref 27–34)
MCHC RBC-ENTMCNC: 33.3 GM/DL — SIGNIFICANT CHANGE UP (ref 32–36)
MCV RBC AUTO: 77.5 FL — LOW (ref 80–100)
MONOCYTES # BLD AUTO: 0.39 K/UL — SIGNIFICANT CHANGE UP (ref 0–0.9)
MONOCYTES NFR BLD AUTO: 6.7 % — SIGNIFICANT CHANGE UP (ref 2–14)
NEUTROPHILS # BLD AUTO: 3.33 K/UL — SIGNIFICANT CHANGE UP (ref 1.8–7.4)
NEUTROPHILS NFR BLD AUTO: 57.6 % — SIGNIFICANT CHANGE UP (ref 43–77)
NITRITE UR-MCNC: NEGATIVE — SIGNIFICANT CHANGE UP
NON HDL CHOLESTEROL: 68 MG/DL — SIGNIFICANT CHANGE UP
NRBC # BLD: 0 /100 WBCS — SIGNIFICANT CHANGE UP (ref 0–0)
NT-PROBNP SERPL-SCNC: 99 PG/ML — SIGNIFICANT CHANGE UP (ref 0–300)
PCO2 BLDA: 30 MMHG — LOW (ref 32–45)
PH BLDA: 7.42 — SIGNIFICANT CHANGE UP (ref 7.35–7.45)
PH UR: 7 — SIGNIFICANT CHANGE UP (ref 5–8)
PLATELET # BLD AUTO: 194 K/UL — SIGNIFICANT CHANGE UP (ref 150–400)
PO2 BLDA: 58 MMHG — LOW (ref 83–108)
POTASSIUM SERPL-MCNC: 4.1 MMOL/L — SIGNIFICANT CHANGE UP (ref 3.5–5.3)
POTASSIUM SERPL-SCNC: 4.1 MMOL/L — SIGNIFICANT CHANGE UP (ref 3.5–5.3)
PROT SERPL-MCNC: 6.7 G/DL — SIGNIFICANT CHANGE UP (ref 6–8.3)
PROT UR-MCNC: NEGATIVE MG/DL — SIGNIFICANT CHANGE UP
PROTHROM AB SERPL-ACNC: 12.8 SEC — SIGNIFICANT CHANGE UP (ref 9.5–13)
RBC # BLD: 5.34 M/UL — HIGH (ref 3.8–5.2)
RBC # FLD: 15.9 % — HIGH (ref 10.3–14.5)
RH IG SCN BLD-IMP: POSITIVE — SIGNIFICANT CHANGE UP
SAO2 % BLDA: 91.2 % — LOW (ref 94–98)
SODIUM SERPL-SCNC: 142 MMOL/L — SIGNIFICANT CHANGE UP (ref 135–145)
SP GR SPEC: 1.02 — SIGNIFICANT CHANGE UP (ref 1–1.03)
TRIGL SERPL-MCNC: 41 MG/DL — SIGNIFICANT CHANGE UP
TROPONIN T, HIGH SENSITIVITY RESULT: 12 NG/L — SIGNIFICANT CHANGE UP (ref 0–51)
TSH SERPL-MCNC: 2.09 UIU/ML — SIGNIFICANT CHANGE UP (ref 0.27–4.2)
UROBILINOGEN FLD QL: 1 MG/DL — SIGNIFICANT CHANGE UP (ref 0.2–1)
WBC # BLD: 5.79 K/UL — SIGNIFICANT CHANGE UP (ref 3.8–10.5)
WBC # FLD AUTO: 5.79 K/UL — SIGNIFICANT CHANGE UP (ref 3.8–10.5)

## 2024-04-25 PROCEDURE — 36620 INSERTION CATHETER ARTERY: CPT

## 2024-04-25 PROCEDURE — 62272 THER SPI PNXR DRG CSF: CPT

## 2024-04-25 PROCEDURE — 99291 CRITICAL CARE FIRST HOUR: CPT

## 2024-04-25 PROCEDURE — 71045 X-RAY EXAM CHEST 1 VIEW: CPT | Mod: 26

## 2024-04-25 RX ORDER — SODIUM CHLORIDE 9 MG/ML
3 INJECTION INTRAMUSCULAR; INTRAVENOUS; SUBCUTANEOUS EVERY 8 HOURS
Refills: 0 | Status: DISCONTINUED | OUTPATIENT
Start: 2024-04-25 | End: 2024-04-26

## 2024-04-25 RX ORDER — CHLORHEXIDINE GLUCONATE 213 G/1000ML
15 SOLUTION TOPICAL ONCE
Refills: 0 | Status: COMPLETED | OUTPATIENT
Start: 2024-04-25 | End: 2024-04-26

## 2024-04-25 RX ORDER — RIOCIGUAT 1.5 MG/1
2.5 TABLET, FILM COATED ORAL EVERY 8 HOURS
Refills: 0 | Status: DISCONTINUED | OUTPATIENT
Start: 2024-04-25 | End: 2024-04-26

## 2024-04-25 RX ORDER — MIDAZOLAM HYDROCHLORIDE 1 MG/ML
2 INJECTION, SOLUTION INTRAMUSCULAR; INTRAVENOUS ONCE
Refills: 0 | Status: DISCONTINUED | OUTPATIENT
Start: 2024-04-25 | End: 2024-04-25

## 2024-04-25 RX ORDER — ATORVASTATIN CALCIUM 80 MG/1
20 TABLET, FILM COATED ORAL AT BEDTIME
Refills: 0 | Status: DISCONTINUED | OUTPATIENT
Start: 2024-04-25 | End: 2024-04-26

## 2024-04-25 RX ORDER — FENTANYL CITRATE 50 UG/ML
25 INJECTION INTRAVENOUS ONCE
Refills: 0 | Status: DISCONTINUED | OUTPATIENT
Start: 2024-04-25 | End: 2024-04-25

## 2024-04-25 RX ORDER — FENTANYL CITRATE 50 UG/ML
50 INJECTION INTRAVENOUS ONCE
Refills: 0 | Status: DISCONTINUED | OUTPATIENT
Start: 2024-04-25 | End: 2024-04-25

## 2024-04-25 RX ORDER — RIOCIGUAT 1.5 MG/1
1 TABLET, FILM COATED ORAL
Refills: 0 | DISCHARGE

## 2024-04-25 RX ORDER — CHLORHEXIDINE GLUCONATE 213 G/1000ML
1 SOLUTION TOPICAL ONCE
Refills: 0 | Status: COMPLETED | OUTPATIENT
Start: 2024-04-25 | End: 2024-04-25

## 2024-04-25 RX ORDER — INFLUENZA VIRUS VACCINE 15; 15; 15; 15 UG/.5ML; UG/.5ML; UG/.5ML; UG/.5ML
0.7 SUSPENSION INTRAMUSCULAR ONCE
Refills: 0 | Status: COMPLETED | OUTPATIENT
Start: 2024-04-25 | End: 2024-04-25

## 2024-04-25 RX ORDER — LEVALBUTEROL 1.25 MG/.5ML
2 SOLUTION, CONCENTRATE RESPIRATORY (INHALATION)
Refills: 0 | DISCHARGE

## 2024-04-25 RX ORDER — CHLORHEXIDINE GLUCONATE 213 G/1000ML
1 SOLUTION TOPICAL ONCE
Refills: 0 | Status: COMPLETED | OUTPATIENT
Start: 2024-04-26 | End: 2024-04-26

## 2024-04-25 RX ORDER — MIDAZOLAM HYDROCHLORIDE 1 MG/ML
1 INJECTION, SOLUTION INTRAMUSCULAR; INTRAVENOUS ONCE
Refills: 0 | Status: DISCONTINUED | OUTPATIENT
Start: 2024-04-25 | End: 2024-04-25

## 2024-04-25 RX ORDER — UMECLIDINIUM BROMIDE AND VILANTEROL TRIFENATATE 62.5; 25 UG/1; UG/1
1 POWDER RESPIRATORY (INHALATION)
Refills: 0 | DISCHARGE

## 2024-04-25 RX ORDER — PANTOPRAZOLE SODIUM 20 MG/1
40 TABLET, DELAYED RELEASE ORAL
Refills: 0 | Status: DISCONTINUED | OUTPATIENT
Start: 2024-04-25 | End: 2024-04-26

## 2024-04-25 RX ORDER — SENNA PLUS 8.6 MG/1
2 TABLET ORAL AT BEDTIME
Refills: 0 | Status: DISCONTINUED | OUTPATIENT
Start: 2024-04-25 | End: 2024-04-26

## 2024-04-25 RX ORDER — LIDOCAINE HYDROCHLORIDE AND EPINEPHRINE 10; 10 MG/ML; UG/ML
10 INJECTION, SOLUTION INFILTRATION; PERINEURAL ONCE
Refills: 0 | Status: DISCONTINUED | OUTPATIENT
Start: 2024-04-25 | End: 2024-04-26

## 2024-04-25 RX ORDER — CARVEDILOL PHOSPHATE 80 MG/1
6.25 CAPSULE, EXTENDED RELEASE ORAL EVERY 12 HOURS
Refills: 0 | Status: DISCONTINUED | OUTPATIENT
Start: 2024-04-25 | End: 2024-04-26

## 2024-04-25 RX ADMIN — SODIUM CHLORIDE 3 MILLILITER(S): 9 INJECTION INTRAMUSCULAR; INTRAVENOUS; SUBCUTANEOUS at 13:46

## 2024-04-25 RX ADMIN — MIDAZOLAM HYDROCHLORIDE 1 MILLIGRAM(S): 1 INJECTION, SOLUTION INTRAMUSCULAR; INTRAVENOUS at 17:18

## 2024-04-25 RX ADMIN — FENTANYL CITRATE 50 MICROGRAM(S): 50 INJECTION INTRAVENOUS at 17:28

## 2024-04-25 RX ADMIN — ATORVASTATIN CALCIUM 20 MILLIGRAM(S): 80 TABLET, FILM COATED ORAL at 22:15

## 2024-04-25 RX ADMIN — CHLORHEXIDINE GLUCONATE 1 APPLICATION(S): 213 SOLUTION TOPICAL at 22:14

## 2024-04-25 RX ADMIN — CARVEDILOL PHOSPHATE 6.25 MILLIGRAM(S): 80 CAPSULE, EXTENDED RELEASE ORAL at 19:03

## 2024-04-25 RX ADMIN — PANTOPRAZOLE SODIUM 40 MILLIGRAM(S): 20 TABLET, DELAYED RELEASE ORAL at 22:15

## 2024-04-25 RX ADMIN — RIOCIGUAT 2.5 MILLIGRAM(S): 1.5 TABLET, FILM COATED ORAL at 15:25

## 2024-04-25 RX ADMIN — SODIUM CHLORIDE 3 MILLILITER(S): 9 INJECTION INTRAMUSCULAR; INTRAVENOUS; SUBCUTANEOUS at 22:34

## 2024-04-25 RX ADMIN — RIOCIGUAT 2.5 MILLIGRAM(S): 1.5 TABLET, FILM COATED ORAL at 22:48

## 2024-04-25 RX ADMIN — SENNA PLUS 2 TABLET(S): 8.6 TABLET ORAL at 22:15

## 2024-04-25 RX ADMIN — FENTANYL CITRATE 25 MICROGRAM(S): 50 INJECTION INTRAVENOUS at 17:19

## 2024-04-25 NOTE — PATIENT PROFILE ADULT - OVER THE PAST TWO WEEKS, HAVE YOU FELT LITTLE INTEREST OR PLEASURE IN DOING THINGS?
[FreeTextEntry1] : This is an 81 year old female here for a follow up of anemia. She had an EGD on 10/24/22 showed a small hiatal hernia. Colonoscopy from 1/24/22 diverticulosis. Capsule study by Dr. Brunner revealed no AVMs or small bowel lesions. Questionable delayed gastric emptying. As per capsule report it took capsule two hours to leave the stomach. Currently patient feels fine. No difficulty swallowing or reflux. No N&V or abdominal pain. No early satiety or fullness. Weight fluctuating. No other issues. Daughter in the room used for interpretation 
no

## 2024-04-25 NOTE — PATIENT PROFILE ADULT - IS THERE A SUSPICION OF ABUSE/NEGLIGENCE?
Handoff report received from night shift nurse, and pt care assumed. Pt is currently resting in bed, POC discussed, and pt verbalizes no questions at this time. Pt is currently on 2L, AAOx4, on tele monitoring, and VSS. Pt will be going today for MRI. Call light and belongings within reach, bed in lowest and locked position, and pt educated on use of call light. Will continue to monitor.    no

## 2024-04-25 NOTE — H&P ADULT - NSHPPHYSICALEXAM_GEN_ALL_CORE
General: NAD, sitting comfortably in chair, conversing appropriately  Head: NC/AT  Neurological: alert and oriented, UE and LE strength equal b/l, facial symmetry present  Cardiovascular: RRR, Clear S1 and S2, no murmurs appreciated  Respiratory: chest expansion symmetrical, CTA b/l, no wheezing noted  Gastrointestinal: +BS, soft, NT, ND  Extremities: moving spontaneously, no calf tenderness or edema.  Vascular: warm, well perfused. DP/PT pulses palpable b/l.  Skin: no obvious rashes noted  Incisions: none

## 2024-04-25 NOTE — PATIENT PROFILE ADULT - FALL HARM RISK - HARM RISK INTERVENTIONS

## 2024-04-25 NOTE — PRE-ANESTHESIA EVALUATION ADULT - NSANTHADDINFOFT_GEN_ALL_CORE
H&P Adult [Charted Location: [facility] [id number]] [Authored: 2024 11:00]- for Visit: [id number], In Progress, Revised, Signed w/additional Signatures Pending, Available to Patient    History and Physical:   Source of Information	Chart(s), Patient     Language:  · Patient/Family of Limited English Proficiency	No       Patient Identity:  · Birth Sex	Female  · Patient's Preferred Pronoun	Her/She     History of Present Illness:  Reason for Admission: type B dissection  History of Present Illness:   68 year old female with past medical history of asthma, CTEPH, CAD, HTN, Type B dissection who was seen by the outpatient office and was deemed a good candidate for TEVAR. Back in 2023 she was seen by her outpatient pulmonologist, Dr. Cathy Serra, was noted to have an elevated D-Dimer and was advised to present to ED for workup. In ED a CTA C/A/P was performed which revealed a Type B dissection and she was admitted and discharged with close follow up. 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 She was deemed a candidate for TEVAR with Dr. Kwon and Dr. Suh and presented to [facility] for preop lumbar drain prior to OR tomorrow. Patient currently denies lightheadedness, headache, CP, palpitations, SOB, abdominal pain, nausea, vomiting, fever, chills.      ICU Vital Signs Last 24 Hrs  T(C): --  T(F): --  HR: --  BP: --  BP(mean): --  ABP: --  ABP(mean): --  RR: --  SpO2: --           Review of Systems:  Review of Systems: Review of Systems  CONSTITUTIONAL:  Denies Fevers / chills, sweats, fatigue, weight loss, weight gain                                      NEURO:  Denies changes in sensation, seizures, syncope, confusion                                                                            EYES:  Denies Blurry vision, discharge, pain, loss of vision                                                                                    ENMT:  Denies Difficulty hearing, vertigo, dysphagia, epistaxis, recent dental work                                       CV:  Denies Chest pain, palpitations, DELGADO, orthopnea                                                                                          RESPIRATORY:  Denies Wheezing, SOB, cough / sputum, hemoptysis                                                                GI:  Denies Nausea, vomiting, diarrhea, constipation, melena, difficulty swallowing                                               : Denies Hematuria, dysuria, urgency, incontinence                                                                                         MUSKULOSKELETAL:  Denies arthritis, joint swelling, muscle weakness                                                             SKIN/BREAST:  Denies rash, itching, hair loss, masses                                                                                            PSYCH:  Denies depression, anxiety, suicidal ideation                                                                                               HEME/LYMPH:  Denies bruises easily, enlarged lymph nodes, tender lymph nodes                                        ENDOCRINE:  Denies cold intolerance, heat intolerance, polydipsia      Allergies and Intolerances:        Allergies:  	No Known Allergies:     Home Medications:   * Patient Currently Takes Medications as of 2024 16:00 documented in Structured Notes  · 	atorvastatin 20 mg oral tablet: Last Dose Taken:  , 1 tab(s) orally once a day (at bedtime)  · 	furosemide 20 mg oral tablet: Last Dose Taken:  , 1 tab(s) orally once a day (in the afternoon)  · 	amLODIPine 10 mg oral tablet: Last Dose Taken:  , 1 orally once a day  · 	carvedilol 6.25 mg oral tablet: Last Dose Taken:  , 1 tab(s) orally 2 times a day  · 	Eliquis 5 mg oral tablet: Last Dose Taken: 2024 , 1 tab(s) orally 2 times a day  · 	Adempas 2.5 mg oral tablet: Last Dose Taken:  , 1 tab(s) orally 3 times a day  · 	Anoro Ellipta 62.5 mcg-25 mcg/inh inhalation powder: Last Dose Taken:  , 1 puff(s) inhaled once a day    .    Patient History:    Past Medical, Past Surgical, and Family History:  PAST MEDICAL HISTORY:  2019 novel coronavirus disease (COVID-19)     Ascending aorta dilation     Asthma     CAD (coronary artery disease)     Chronic systolic right heart failure     Enlarged aorta     HTN (hypertension)     Mild tricuspid regurgitation     Prediabetes     Pulmonary hypertension.     PAST SURGICAL HISTORY:  S/P      S/P hysterectomy.     FAMILY HISTORY:  Father  Still living? Unknown  Family history of early CAD, Age at diagnosis: Age Unknown.     Social History:  · Substance use	No   · Social History (marital status, living situation, occupation, and sexual history)	denies alcohol, ever smoking, other illicit drugs.     Tobacco Screening:  · Core Measure Site	No  · Has the patient used tobacco in the past 30 days?	No     Risk Assessment:    Present on Admission:  Deep Venous Thrombosis	no  Pulmonary Embolus	no     HIV Screening:  · In accordance with [place] State law, we offer every patient who comes to our ED an HIV test. Would you like to be tested today?	Opt out      Hepatitis C Test Questions:  · In accordance with [place] State Law, we offer every patient a Hepatitis C test. Would you like to be tested today?	Previously negative    Physical Exam:   Physical Exam: General: NAD, sitting comfortably in chair, conversing appropriately  Head: NC/AT  Neurological: alert and oriented, UE and LE strength equal b/l, facial symmetry present  Cardiovascular: RRR, Clear S1 and S2, no murmurs appreciated  Respiratory: chest expansion symmetrical, CTA b/l, no wheezing noted  Gastrointestinal: +BS, soft, NT, ND  Extremities: moving spontaneously, no calf tenderness or edema.  Vascular: warm, well perfused. DP/PT pulses palpable b/l.  Skin: no obvious rashes noted  Incisions: none       Labs and Results:  Labs, Radiology, Cardiology, and Other Results: TTE 8/3/23  EF 75%, LV hyperdynamic  pa systolic pressure 48 with mild to mod pulmonary HTN    Cardiac cath 2023  -mild nonobstructive CAD. Severe pulm HTN    Assessment and Plan:   · VTE Risk Assessment	VTE Assessment already completed for this visit  · Completed VTE Risk Assessment(s)	Refer to the Assessment tab to view/cancel completed assessment.     Assessment:  · Assessment	  68 year old female with past medical history of asthma, CTEPH, CAD, HTN, Type B dissection who was seen by the outpatient office and was deemed a good candidate for TEVAR. Back in 2023 she was seen by her outpatient pulmonologist, Dr. Cathy Serra, was noted to have an elevated D-Dimer and was advised to present to ED for workup. In ED a CTA C/A/P was performed which revealed a Type B dissection and she was admitted and discharged with close follow up. 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 She was deemed a candidate for TEVAR with Dr. Kwon and Dr. Suh and presented to [facility] for preop lumbar drain prior to OR tomorrow.    Plan:  -preop LD placement with NSGY today  -preop labs  -carotid US  -fernando placement  -OR tomorrow for TEVAR        Electronic Signatures:  Nabil Kwon)  (Signature Pending)  	Co-Signer: History and Physical, History of Present Illness, Allergies/Medications, Patient History, Risk Assessment, Labs and Results, Assessment and Plan  Minerva Ashley (PA)  (Signed 2024 16:06)  	Authored: History and Physical, History of Present Illness, Allergies/Medications, Patient History, Risk Assessment, Physical Exam, Labs and Results, Assessment and Plan      Last Updated: 2024 16:06 by Minerva Ashley (PA)

## 2024-04-25 NOTE — H&P ADULT - NSICDXPASTMEDICALHX_GEN_ALL_CORE_FT
PAST MEDICAL HISTORY:  2019 novel coronavirus disease (COVID-19)     Ascending aorta dilation     Asthma     CAD (coronary artery disease)     Chronic systolic right heart failure     Enlarged aorta     HTN (hypertension)     Mild tricuspid regurgitation     Prediabetes     Pulmonary hypertension

## 2024-04-25 NOTE — PROGRESS NOTE ADULT - SUBJECTIVE AND OBJECTIVE BOX
CTICU  CRITICAL  CARE  attending     Hand off received 					   Pertinent clinical, laboratory, radiographic, hemodynamic, echocardiographic, respiratory data, microbiologic data and chart were reviewed and analyzed frequently throughout the course of the day and night  Patient seen and examined with CTS/ SH attending at bedside  Pt is a 68y , Female, HEALTH ISSUES - PROBLEM Dx:      , FAMILY HISTORY:  Family history of early CAD (Father)    PAST MEDICAL & SURGICAL HISTORY:  HTN (hypertension)      Prediabetes      Mild tricuspid regurgitation      Enlarged aorta      CAD (coronary artery disease)      Ascending aorta dilation      Pulmonary hypertension      Chronic systolic right heart failure      2019 novel coronavirus disease (COVID-19)      Asthma      S/P hysterectomy      S/P         Patient is a 68y old  Female who presents with a chief complaint of type B dissection (2024 11:00)      14 system review was unremarkable    Vital signs, hemodynamic and respiratory parameters were reviewed from the bedside nursing flowsheet.  ICU Vital Signs Last 24 Hrs  T(C): 36.7 (2024 21:59), Max: 36.7 (2024 16:59)  T(F): 98 (2024 21:59), Max: 98.1 (2024 16:59)  HR: 73 (2024 20:00) (66 - 83)  BP: 117/69 (2024 20:00) (115/67 - 141/80)  BP(mean): 85 (2024 20:00) (85 - 108)  ABP: 124/70 (2024 20:00) (117/58 - 150/79)  ABP(mean): 90 (2024 20:00) (80 - 105)  RR: 18 (2024 20:00) (18 - 18)  SpO2: 97% (2024 20:00) (94% - 100%)    O2 Parameters below as of 2024 20:00  Patient On (Oxygen Delivery Method): room air          Adult Advanced Hemodynamics Last 24 Hrs  CVP(mm Hg): --  CVP(cm H2O): --  CO: --  CI: --  PA: --  PA(mean): --  PCWP: --  SVR: --  SVRI: --  PVR: --  PVRI: --, ABG - ( 2024 17:39 )  pH, Arterial: 7.42  pH, Blood: x     /  pCO2: 30    /  pO2: 58    / HCO3: 20    / Base Excess: -3.9  /  SaO2: 91.2                Intake and output was reviewed and the fluid balance was calculated  Daily Height in cm: 162.56 (2024 11:15)    Daily   I&O's Summary    2024 07:01  -  2024 22:30  --------------------------------------------------------  IN: 480 mL / OUT: 890 mL / NET: -410 mL        All lines and drain sites were assessed  Glycemic trend was reviewedCAPILLARY BLOOD GLUCOSE        No acute change in mental status  Auscultation of the chest reveals equal bs  Abdomen is soft  Extremities are warm and well perfused  Wounds appear clean and unremarkable  Antibiotics are periop    labs  CBC Full  -  ( 2024 10:50 )  WBC Count : 5.79 K/uL  RBC Count : 5.34 M/uL  Hemoglobin : 13.8 g/dL  Hematocrit : 41.4 %  Platelet Count - Automated : 194 K/uL  Mean Cell Volume : 77.5 fl  Mean Cell Hemoglobin : 25.8 pg  Mean Cell Hemoglobin Concentration : 33.3 gm/dL  Auto Neutrophil # : 3.33 K/uL  Auto Lymphocyte # : 1.86 K/uL  Auto Monocyte # : 0.39 K/uL  Auto Eosinophil # : 0.15 K/uL  Auto Basophil # : 0.04 K/uL  Auto Neutrophil % : 57.6 %  Auto Lymphocyte % : 32.1 %  Auto Monocyte % : 6.7 %  Auto Eosinophil % : 2.6 %  Auto Basophil % : 0.7 %        142  |  111<H>  |  15  ----------------------------<  97  4.1   |  20<L>  |  0.97    Ca    9.0      2024 17:43  Mg     2.2         TPro  6.7  /  Alb  3.8  /  TBili  0.9  /  DBili  x   /  AST  20  /  ALT  22  /  AlkPhos  90      PT/INR - ( 2024 10:26 )   PT: 12.8 sec;   INR: 1.13          PTT - ( 2024 10:26 )  PTT:37.8 sec  The current medications were reviewed   MEDICATIONS  (STANDING):  atorvastatin 20 milliGRAM(s) Oral at bedtime  carvedilol 6.25 milliGRAM(s) Oral every 12 hours  chlorhexidine 0.12% Liquid 15 milliLiter(s) Swish and Spit once  influenza  Vaccine (HIGH DOSE) 0.7 milliLiter(s) IntraMuscular once  lidocaine 2%/epinephrine 1:100,000 Inj 10 milliLiter(s) Local Injection once  pantoprazole    Tablet 40 milliGRAM(s) Oral before breakfast  riociguat 2.5 milliGRAM(s) Oral every 8 hours  senna 2 Tablet(s) Oral at bedtime  sodium chloride 0.9% lock flush 3 milliLiter(s) IV Push every 8 hours    MEDICATIONS  (PRN):       PROBLEM LIST/ ASSESSMENT:  HEALTH ISSUES - PROBLEM Dx:      ,   Patient is a 68y old  Female who presents with a chief complaint of type B dissection (2024 11:00)    preop for tevar             My plan includes :  close hemodynamic, ventilatory and drain monitoring and management per post op routine    Monitor for arrhythmias and monitor parameters for organ perfusion  beta blockade not administered due to hemodynamic instability and bradycardia  monitor neurologic status  Head of the bed should remain elevated to 45 deg .   chest PT and IS will be encouraged  monitor adequacy of oxygenation and ventilation and attempt to wean oxygen  antibiotic regimen will be tailored to the clinical, laboratory and microbiologic data  Nutritional goals will be met using po eventually , ensure adequate caloric intake and montior the same  Stress ulcer and VTE prophylaxis will be achieved    Glycemic control is satisfactory  Electrolytes have been repleted as necessary and wound care has been carried out. Pain control has been achieved.   agressive physical therapy and early mobility and ambulation goals will be met   The family was updated about the course and plan  CRITICAL CARE TIME Upon my evaluation, this patient had a high probability of imminent or life-threatening deterioration due to the above problems which required my direct attention, intervention, and personal management.  I have personally provided 150 minutes of critical care time exclusive of time spent on separately billable procedures. Time included review of laboratory data, radiology results, discussion with consultants, and monitoring for potential decompensation. Interventions were performed as documented abovepersonally provided by me  in evaluation and management, reassessments, review and interpretation of labs and x-rays, ventilator and hemodynamic management, formulating a plan and coordinating care: ___150___ MIN.  Time does not include procedural time.    CTICU ATTENDING     					    Eder Conway MD

## 2024-04-25 NOTE — H&P ADULT - NSHPLABSRESULTS_GEN_ALL_CORE
TTE 8/3/23  EF 75%, LV hyperdynamic  pa systolic pressure 48 with mild to mod pulmonary HTN    Cardiac cath 6/2023  -mild nonobstructive CAD. Severe pulm HTN

## 2024-04-25 NOTE — PRE-ANESTHESIA EVALUATION ADULT - LAST ECHOCARDIOGRAM
8/13/23.  report reviewed.  hyperdynamic LV, EF 75%.  enlarged RV, NL RV function.  mildly dilated RA.  mild TR.  PAS 48mmHg

## 2024-04-25 NOTE — H&P ADULT - ASSESSMENT
68 year old female with past medical history of asthma, CTEPH, CAD, HTN, Type B dissection who was seen by the outpatient office and was deemed a good candidate for TEVAR. Back in August 2023 she was seen by her outpatient pulmonologist, Dr. Cathy Serra, was noted to have an elevated D-Dimer and was advised to present to ED for workup. In ED a CTA C/A/P was performed which revealed a Type B dissection and she was admitted and discharged with close follow up. 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 She was deemed a candidate for TEVAR with Dr. Kwon and Dr. Suh and presented to [facility] for preop lumbar drain prior to OR tomorrow.    Plan:  -preop LD placement with NSGY today  -preop labs  -carotid US  -fernando placement  -OR tomorrow for TEVAR

## 2024-04-25 NOTE — H&P ADULT - NSHPREVIEWOFSYSTEMS_GEN_ALL_CORE
Review of Systems  CONSTITUTIONAL:  Denies Fevers / chills, sweats, fatigue, weight loss, weight gain                                      NEURO:  Denies changes in sensation, seizures, syncope, confusion                                                                            EYES:  Denies Blurry vision, discharge, pain, loss of vision                                                                                    ENMT:  Denies Difficulty hearing, vertigo, dysphagia, epistaxis, recent dental work                                       CV:  Denies Chest pain, palpitations, DELGADO, orthopnea                                                                                          RESPIRATORY:  Denies Wheezing, SOB, cough / sputum, hemoptysis                                                                GI:  Denies Nausea, vomiting, diarrhea, constipation, melena, difficulty swallowing                                               : Denies Hematuria, dysuria, urgency, incontinence                                                                                         MUSKULOSKELETAL:  Denies arthritis, joint swelling, muscle weakness                                                             SKIN/BREAST:  Denies rash, itching, hair loss, masses                                                                                            PSYCH:  Denies depression, anxiety, suicidal ideation                                                                                               HEME/LYMPH:  Denies bruises easily, enlarged lymph nodes, tender lymph nodes                                        ENDOCRINE:  Denies cold intolerance, heat intolerance, polydipsia

## 2024-04-25 NOTE — H&P ADULT - HISTORY OF PRESENT ILLNESS
68 year old female with past medical history of asthma, CTEPH, CAD, HTN, Type B dissection who was seen by the outpatient office and was deemed a good candidate for TEVAR. Back in August 2023 she was seen by her outpatient pulmonologist, Dr. Cathy Serra, was noted to have an elevated D-Dimer and was advised to present to ED for workup. In ED a CTA C/A/P was performed which revealed a Type B dissection and she was admitted and discharged with close follow up. 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 She was deemed a candidate for TEVAR with Dr. Kwon and Dr. Suh and presented to [facility] for preop lumbar drain prior to OR tomorrow. Patient currently denies lightheadedness, headache, CP, palpitations, SOB, abdominal pain, nausea, vomiting, fever, chills.      ICU Vital Signs Last 24 Hrs  T(C): --  T(F): --  HR: --  BP: --  BP(mean): --  ABP: --  ABP(mean): --  RR: --  SpO2: --

## 2024-04-25 NOTE — PRE-ANESTHESIA EVALUATION ADULT - LAST CARDIAC ANGIOGRAM/IMAGING
Kidney Stone (with Pain)    The sharp cramping pain on either side of your lower back and nausea/vomiting that you have are because of a small stone that has formed in the kidney. It is now passing down a narrow tube (ureter) on its way to your bladder. Once the stone reaches your bladder, the pain will often stop. But it may come back as the stone continues to pass out of the bladder and through the urethra. The stone may pass in your urine stream in one piece. The size may be 1/16 inch to 1/4 inch (1 to 6 mm). Or, the stone may break up into brian fragments that you may not even notice.  Once you have had a kidney stone, you are at risk of getting another one in the future. There are 4 types of kidney stones. Eighty percent are calcium stones--mostly calcium oxalate but also some with calcium phosphate. The other 3 types include uric acid stones, struvite stones (from a preceding infection), and rarely, cystine stones.  Most stones will pass on their own, but may take from a few hours to a few days. Sometimes the stone is too large to pass by itself. In that case, the health care provider will need to use other ways to remove the stone. These techniques include:  · Lithotripsy. This uses ultrasound waves to break up the stone.  · Ureteroscopy. This pushes a basket-like instrument through the urethra and bladder and into the ureter to pull out the stone.  · Various types of direct surgery through the skin  Home care  The following are general care guidelines:  · Drink plenty of fluids. This means at least 12, 8-ounce glasses of fluid--mostly water--a day.  · Each time you urinate, do so in a jar. Pour the urine from the jar through the strainer and into the toilet. Continue doing this until 24 hours after your pain stops. By then, if there was a kidney stone, it should pass from your bladder. Some stones dissolve into sand-like particles and pass right through the strainer. In that case, you won’t ever see a  stone.  · Save any stone that you find in the strainer and bring it to your doctor to look at. It may be possible to stop certain types of stones from forming. For this reason, it is important to know what kind of stone you have.  · Try to stay as active as possible. This will help the stone pass. Don't stay in bed unless your pain keeps you from getting up. You may notice a red, pink, or brown color to your urine. This is normal while passing a kidney stone.  · If you develop pain, you may take ibuprofen or naproxen for pain, unless another medicine was prescribed. If you have chronic liver or kidney disease, talk with your health care provider before taking these medicines. Also talk with your provider if you've had a stomach ulcer or GI bleeding.  Preventing stones  Each year for the next 5 to 7 years, you are at risk that a new stone will form. Your risk is a 50% chance over this time period. The risk is higher if you have a family history of kidney stones or have certain chronic illnesses like hypertension, obesity, or diabetes. But you can make changes to your lifestyle and diet that can lower your risk for another stone.  Most kidney stones are made of calcium. The following is advice for preventing another calcium stone. If you don’t know the type of stone you have, follow this advice until the cause of your stone is found.  Things that help:  · The most important thing you can do is to drink plenty of fluids each day. See home care above.   · Eat foods that contain phytates. These include wheat, rice, rye, barley, and beans. Phytates are substances that may lower your risk for any type of stone for form.  · Eat more fruits and vegetables. Choose those that are high in potassium.  · Eat foods high in natural citrate like fruit and low-sugar fruit juices.  · Having too little calcium in your diet can put you at risk for calcium kidney stones. Eat a normal amount of calcium in your diet and talk with your  health care provider if you are taking calcium supplements. Cutting back on your calcium intake may raise your risk. New research shows that eating calcium-rich and oxalate-rich foods together lowers your risk for stones by binding the minerals in the stomach and intestines before they can reach the kidneys.    · Limit salt intake to 2 grams (1 teaspoon) per day. Use limited amounts when cooking, and don’t add salt at the table. Processed and canned foods are usually high in salt.   · Spinach, rhubarb, peanuts, cashews, almonds, grapefruit, and grapefruit juice are all high oxalate foods. You should limit how much of these you eat. Or eat them with calcium-rich foods. These include dairy products, dark leafy greens, soy products, and calcium-enriched foods.  · Reducing the amount of animal meat and high protein foods in your diet may lower your risk of uric acid stones.  · Avoid excess sugar (sucrose) and fructose (sweetener in many soft drinks) in your diet.   · If you take vitamin C as a supplement, don't take more than 1,000 mg a day.  · A dietitian or your health provider can give you information about changes in your diet that will help stop more kidney stone from forming.  Follow-up care  Follow up with your health care provider, or as advised, if the pain lasts more than 48 hours. Talk with your provider about urine and blood tests to find out the cause of your stone. If you had an X-ray, CT scan, or other diagnostic test, it will be looked at by a specialist. You will be told of any new findings that may affect your care.  When to seek medical advice  Call your health care provider right away if any of these occur:  · Pain that is not controlled by the medicine given  · Repeated vomiting or unable to keep down fluids  · Weakness, dizziness, or fainting  · Fever of 100.4ºF (38ºC) or higher, or as directed by your health care provider  · Passage of solid red or brown urine (can't see through it) or urine with  lots of blood clots  · Foul-smelling or cloudy urine  · Unable to pass urine for 8 hours and increasing bladder pressure  © 3135-4346 [employer], [employer]. [address] [place] [address], [place] [address]. All rights reserved. This information is not intended as a substitute for professional medical care. Always follow your healthcare professional's instructions.         6/2023 s/p cath.  nonobstructive CAD

## 2024-04-26 ENCOUNTER — APPOINTMENT (OUTPATIENT)
Dept: CARDIOTHORACIC SURGERY | Facility: HOSPITAL | Age: 69
End: 2024-04-26

## 2024-04-26 LAB
ALBUMIN SERPL ELPH-MCNC: 3.4 G/DL — SIGNIFICANT CHANGE UP (ref 3.3–5)
ALBUMIN SERPL ELPH-MCNC: 3.6 G/DL — SIGNIFICANT CHANGE UP (ref 3.3–5)
ALBUMIN SERPL ELPH-MCNC: 3.8 G/DL — SIGNIFICANT CHANGE UP (ref 3.3–5)
ALP SERPL-CCNC: 80 U/L — SIGNIFICANT CHANGE UP (ref 40–120)
ALP SERPL-CCNC: 89 U/L — SIGNIFICANT CHANGE UP (ref 40–120)
ALP SERPL-CCNC: 90 U/L — SIGNIFICANT CHANGE UP (ref 40–120)
ALT FLD-CCNC: 17 U/L — SIGNIFICANT CHANGE UP (ref 10–45)
ALT FLD-CCNC: 18 U/L — SIGNIFICANT CHANGE UP (ref 10–45)
ALT FLD-CCNC: 21 U/L — SIGNIFICANT CHANGE UP (ref 10–45)
ANION GAP SERPL CALC-SCNC: 10 MMOL/L — SIGNIFICANT CHANGE UP (ref 5–17)
ANION GAP SERPL CALC-SCNC: 12 MMOL/L — SIGNIFICANT CHANGE UP (ref 5–17)
ANION GAP SERPL CALC-SCNC: 6 MMOL/L — SIGNIFICANT CHANGE UP (ref 5–17)
ANISOCYTOSIS BLD QL: SLIGHT — SIGNIFICANT CHANGE UP
APTT BLD: 26.4 SEC — SIGNIFICANT CHANGE UP (ref 24.5–35.6)
APTT BLD: 28.8 SEC — SIGNIFICANT CHANGE UP (ref 24.5–35.6)
APTT BLD: 35.7 SEC — HIGH (ref 24.5–35.6)
AST SERPL-CCNC: 15 U/L — SIGNIFICANT CHANGE UP (ref 10–40)
AST SERPL-CCNC: 17 U/L — SIGNIFICANT CHANGE UP (ref 10–40)
AST SERPL-CCNC: 19 U/L — SIGNIFICANT CHANGE UP (ref 10–40)
BASOPHILS # BLD AUTO: 0 K/UL — SIGNIFICANT CHANGE UP (ref 0–0.2)
BASOPHILS # BLD AUTO: 0.01 K/UL — SIGNIFICANT CHANGE UP (ref 0–0.2)
BASOPHILS # BLD AUTO: 0.03 K/UL — SIGNIFICANT CHANGE UP (ref 0–0.2)
BASOPHILS NFR BLD AUTO: 0 % — SIGNIFICANT CHANGE UP (ref 0–2)
BASOPHILS NFR BLD AUTO: 0.1 % — SIGNIFICANT CHANGE UP (ref 0–2)
BASOPHILS NFR BLD AUTO: 0.4 % — SIGNIFICANT CHANGE UP (ref 0–2)
BILIRUB SERPL-MCNC: 1 MG/DL — SIGNIFICANT CHANGE UP (ref 0.2–1.2)
BILIRUB SERPL-MCNC: 1.1 MG/DL — SIGNIFICANT CHANGE UP (ref 0.2–1.2)
BILIRUB SERPL-MCNC: 1.5 MG/DL — HIGH (ref 0.2–1.2)
BLD GP AB SCN SERPL QL: NEGATIVE — SIGNIFICANT CHANGE UP
BUN SERPL-MCNC: 11 MG/DL — SIGNIFICANT CHANGE UP (ref 7–23)
BUN SERPL-MCNC: 11 MG/DL — SIGNIFICANT CHANGE UP (ref 7–23)
BUN SERPL-MCNC: 12 MG/DL — SIGNIFICANT CHANGE UP (ref 7–23)
CALCIUM SERPL-MCNC: 8.6 MG/DL — SIGNIFICANT CHANGE UP (ref 8.4–10.5)
CALCIUM SERPL-MCNC: 9 MG/DL — SIGNIFICANT CHANGE UP (ref 8.4–10.5)
CALCIUM SERPL-MCNC: 9.1 MG/DL — SIGNIFICANT CHANGE UP (ref 8.4–10.5)
CHLORIDE SERPL-SCNC: 104 MMOL/L — SIGNIFICANT CHANGE UP (ref 96–108)
CHLORIDE SERPL-SCNC: 107 MMOL/L — SIGNIFICANT CHANGE UP (ref 96–108)
CHLORIDE SERPL-SCNC: 108 MMOL/L — SIGNIFICANT CHANGE UP (ref 96–108)
CO2 SERPL-SCNC: 21 MMOL/L — LOW (ref 22–31)
CO2 SERPL-SCNC: 21 MMOL/L — LOW (ref 22–31)
CO2 SERPL-SCNC: 23 MMOL/L — SIGNIFICANT CHANGE UP (ref 22–31)
CREAT SERPL-MCNC: 0.85 MG/DL — SIGNIFICANT CHANGE UP (ref 0.5–1.3)
CREAT SERPL-MCNC: 0.9 MG/DL — SIGNIFICANT CHANGE UP (ref 0.5–1.3)
CREAT SERPL-MCNC: 0.96 MG/DL — SIGNIFICANT CHANGE UP (ref 0.5–1.3)
EGFR: 64 ML/MIN/1.73M2 — SIGNIFICANT CHANGE UP
EGFR: 70 ML/MIN/1.73M2 — SIGNIFICANT CHANGE UP
EGFR: 75 ML/MIN/1.73M2 — SIGNIFICANT CHANGE UP
EOSINOPHIL # BLD AUTO: 0 K/UL — SIGNIFICANT CHANGE UP (ref 0–0.5)
EOSINOPHIL # BLD AUTO: 0.1 K/UL — SIGNIFICANT CHANGE UP (ref 0–0.5)
EOSINOPHIL # BLD AUTO: 0.1 K/UL — SIGNIFICANT CHANGE UP (ref 0–0.5)
EOSINOPHIL NFR BLD AUTO: 0 % — SIGNIFICANT CHANGE UP (ref 0–6)
EOSINOPHIL NFR BLD AUTO: 0.8 % — SIGNIFICANT CHANGE UP (ref 0–6)
EOSINOPHIL NFR BLD AUTO: 1.5 % — SIGNIFICANT CHANGE UP (ref 0–6)
GAS PNL BLDA: SIGNIFICANT CHANGE UP
GAS PNL BLDA: SIGNIFICANT CHANGE UP
GLUCOSE BLDC GLUCOMTR-MCNC: 100 MG/DL — HIGH (ref 70–99)
GLUCOSE BLDC GLUCOMTR-MCNC: 115 MG/DL — HIGH (ref 70–99)
GLUCOSE BLDC GLUCOMTR-MCNC: 115 MG/DL — HIGH (ref 70–99)
GLUCOSE SERPL-MCNC: 132 MG/DL — HIGH (ref 70–99)
GLUCOSE SERPL-MCNC: 132 MG/DL — HIGH (ref 70–99)
GLUCOSE SERPL-MCNC: 98 MG/DL — SIGNIFICANT CHANGE UP (ref 70–99)
HCT VFR BLD CALC: 33.2 % — LOW (ref 34.5–45)
HCT VFR BLD CALC: 36.5 % — SIGNIFICANT CHANGE UP (ref 34.5–45)
HCT VFR BLD CALC: 40.7 % — SIGNIFICANT CHANGE UP (ref 34.5–45)
HGB BLD-MCNC: 11.7 G/DL — SIGNIFICANT CHANGE UP (ref 11.5–15.5)
HGB BLD-MCNC: 12.5 G/DL — SIGNIFICANT CHANGE UP (ref 11.5–15.5)
HGB BLD-MCNC: 13.7 G/DL — SIGNIFICANT CHANGE UP (ref 11.5–15.5)
HYPOCHROMIA BLD QL: SLIGHT — SIGNIFICANT CHANGE UP
IMM GRANULOCYTES NFR BLD AUTO: 0.4 % — SIGNIFICANT CHANGE UP (ref 0–0.9)
IMM GRANULOCYTES NFR BLD AUTO: 0.4 % — SIGNIFICANT CHANGE UP (ref 0–0.9)
INR BLD: 1.16 — SIGNIFICANT CHANGE UP (ref 0.85–1.18)
INR BLD: 1.18 — SIGNIFICANT CHANGE UP (ref 0.85–1.18)
INR BLD: 1.28 — HIGH (ref 0.85–1.18)
ISTAT ARTERIAL BE: -4 MMOL/L — LOW (ref -2–3)
ISTAT ARTERIAL GLUCOSE: 123 MG/DL — HIGH (ref 70–99)
ISTAT ARTERIAL HCO3: 20 MMOL/L — LOW (ref 22–26)
ISTAT ARTERIAL HEMATOCRIT: 34 % — LOW (ref 34.5–45)
ISTAT ARTERIAL HEMOGLOBIN: 11.6 G/DL — SIGNIFICANT CHANGE UP (ref 11.5–15.5)
ISTAT ARTERIAL IONIZED CALCIUM: 1.25 MMOL/L — SIGNIFICANT CHANGE UP (ref 1.12–1.3)
ISTAT ARTERIAL PCO2: 36 MMHG — SIGNIFICANT CHANGE UP (ref 35–45)
ISTAT ARTERIAL PH: 7.36 — SIGNIFICANT CHANGE UP (ref 7.35–7.45)
ISTAT ARTERIAL PO2: <66 MMHG — LOW (ref 80–105)
ISTAT ARTERIAL POTASSIUM: 4.1 MMOL/L — SIGNIFICANT CHANGE UP (ref 3.5–5.3)
ISTAT ARTERIAL SO2: 86 % — LOW (ref 95–98)
ISTAT ARTERIAL SODIUM: 139 MMOL/L — SIGNIFICANT CHANGE UP (ref 135–145)
ISTAT ARTERIAL TCO2: 22 MMOL/L — SIGNIFICANT CHANGE UP (ref 22–31)
LYMPHOCYTES # BLD AUTO: 1.05 K/UL — SIGNIFICANT CHANGE UP (ref 1–3.3)
LYMPHOCYTES # BLD AUTO: 1.18 K/UL — SIGNIFICANT CHANGE UP (ref 1–3.3)
LYMPHOCYTES # BLD AUTO: 1.58 K/UL — SIGNIFICANT CHANGE UP (ref 1–3.3)
LYMPHOCYTES # BLD AUTO: 10.9 % — LOW (ref 13–44)
LYMPHOCYTES # BLD AUTO: 23.5 % — SIGNIFICANT CHANGE UP (ref 13–44)
LYMPHOCYTES # BLD AUTO: 8.7 % — LOW (ref 13–44)
MAGNESIUM SERPL-MCNC: 1.9 MG/DL — SIGNIFICANT CHANGE UP (ref 1.6–2.6)
MAGNESIUM SERPL-MCNC: 2 MG/DL — SIGNIFICANT CHANGE UP (ref 1.6–2.6)
MAGNESIUM SERPL-MCNC: 2.6 MG/DL — SIGNIFICANT CHANGE UP (ref 1.6–2.6)
MANUAL SMEAR VERIFICATION: SIGNIFICANT CHANGE UP
MCHC RBC-ENTMCNC: 25.9 PG — LOW (ref 27–34)
MCHC RBC-ENTMCNC: 26.1 PG — LOW (ref 27–34)
MCHC RBC-ENTMCNC: 26.2 PG — LOW (ref 27–34)
MCHC RBC-ENTMCNC: 33.7 GM/DL — SIGNIFICANT CHANGE UP (ref 32–36)
MCHC RBC-ENTMCNC: 34.2 GM/DL — SIGNIFICANT CHANGE UP (ref 32–36)
MCHC RBC-ENTMCNC: 35.2 GM/DL — SIGNIFICANT CHANGE UP (ref 32–36)
MCV RBC AUTO: 74.1 FL — LOW (ref 80–100)
MCV RBC AUTO: 76.4 FL — LOW (ref 80–100)
MCV RBC AUTO: 76.9 FL — LOW (ref 80–100)
METAMYELOCYTES # FLD: 1.7 % — HIGH (ref 0–0)
MICROCYTES BLD QL: SLIGHT — SIGNIFICANT CHANGE UP
MONOCYTES # BLD AUTO: 0.11 K/UL — SIGNIFICANT CHANGE UP (ref 0–0.9)
MONOCYTES # BLD AUTO: 0.24 K/UL — SIGNIFICANT CHANGE UP (ref 0–0.9)
MONOCYTES # BLD AUTO: 0.47 K/UL — SIGNIFICANT CHANGE UP (ref 0–0.9)
MONOCYTES NFR BLD AUTO: 0.9 % — LOW (ref 2–14)
MONOCYTES NFR BLD AUTO: 2.2 % — SIGNIFICANT CHANGE UP (ref 2–14)
MONOCYTES NFR BLD AUTO: 7 % — SIGNIFICANT CHANGE UP (ref 2–14)
NEUTROPHILS # BLD AUTO: 10.15 K/UL — HIGH (ref 1.8–7.4)
NEUTROPHILS # BLD AUTO: 4.5 K/UL — SIGNIFICANT CHANGE UP (ref 1.8–7.4)
NEUTROPHILS # BLD AUTO: 9.33 K/UL — HIGH (ref 1.8–7.4)
NEUTROPHILS NFR BLD AUTO: 67.2 % — SIGNIFICANT CHANGE UP (ref 43–77)
NEUTROPHILS NFR BLD AUTO: 83.5 % — HIGH (ref 43–77)
NEUTROPHILS NFR BLD AUTO: 86.4 % — HIGH (ref 43–77)
NEUTS BAND # BLD: 0.9 % — SIGNIFICANT CHANGE UP (ref 0–8)
NRBC # BLD: 0 /100 WBCS — SIGNIFICANT CHANGE UP (ref 0–0)
NRBC # BLD: 0 /100 WBCS — SIGNIFICANT CHANGE UP (ref 0–0)
OVALOCYTES BLD QL SMEAR: SLIGHT — SIGNIFICANT CHANGE UP
PHOSPHATE SERPL-MCNC: 2.9 MG/DL — SIGNIFICANT CHANGE UP (ref 2.5–4.5)
PHOSPHATE SERPL-MCNC: 3.8 MG/DL — SIGNIFICANT CHANGE UP (ref 2.5–4.5)
PHOSPHATE SERPL-MCNC: 3.9 MG/DL — SIGNIFICANT CHANGE UP (ref 2.5–4.5)
PLAT MORPH BLD: NORMAL — SIGNIFICANT CHANGE UP
PLATELET # BLD AUTO: 143 K/UL — LOW (ref 150–400)
PLATELET # BLD AUTO: 147 K/UL — LOW (ref 150–400)
PLATELET # BLD AUTO: 176 K/UL — SIGNIFICANT CHANGE UP (ref 150–400)
POLYCHROMASIA BLD QL SMEAR: SLIGHT — SIGNIFICANT CHANGE UP
POTASSIUM SERPL-MCNC: 3.8 MMOL/L — SIGNIFICANT CHANGE UP (ref 3.5–5.3)
POTASSIUM SERPL-MCNC: 4.1 MMOL/L — SIGNIFICANT CHANGE UP (ref 3.5–5.3)
POTASSIUM SERPL-MCNC: 4.3 MMOL/L — SIGNIFICANT CHANGE UP (ref 3.5–5.3)
POTASSIUM SERPL-SCNC: 3.8 MMOL/L — SIGNIFICANT CHANGE UP (ref 3.5–5.3)
POTASSIUM SERPL-SCNC: 4.1 MMOL/L — SIGNIFICANT CHANGE UP (ref 3.5–5.3)
POTASSIUM SERPL-SCNC: 4.3 MMOL/L — SIGNIFICANT CHANGE UP (ref 3.5–5.3)
PROT SERPL-MCNC: 6 G/DL — SIGNIFICANT CHANGE UP (ref 6–8.3)
PROT SERPL-MCNC: 6.3 G/DL — SIGNIFICANT CHANGE UP (ref 6–8.3)
PROT SERPL-MCNC: 7 G/DL — SIGNIFICANT CHANGE UP (ref 6–8.3)
PROTHROM AB SERPL-ACNC: 13.2 SEC — HIGH (ref 9.5–13)
PROTHROM AB SERPL-ACNC: 13.4 SEC — HIGH (ref 9.5–13)
PROTHROM AB SERPL-ACNC: 14.5 SEC — HIGH (ref 9.5–13)
RBC # BLD: 4.48 M/UL — SIGNIFICANT CHANGE UP (ref 3.8–5.2)
RBC # BLD: 4.78 M/UL — SIGNIFICANT CHANGE UP (ref 3.8–5.2)
RBC # BLD: 5.29 M/UL — HIGH (ref 3.8–5.2)
RBC # FLD: 15.5 % — HIGH (ref 10.3–14.5)
RBC # FLD: 15.6 % — HIGH (ref 10.3–14.5)
RBC # FLD: 15.8 % — HIGH (ref 10.3–14.5)
RBC BLD AUTO: ABNORMAL
RH IG SCN BLD-IMP: POSITIVE — SIGNIFICANT CHANGE UP
SODIUM SERPL-SCNC: 136 MMOL/L — SIGNIFICANT CHANGE UP (ref 135–145)
SODIUM SERPL-SCNC: 137 MMOL/L — SIGNIFICANT CHANGE UP (ref 135–145)
SODIUM SERPL-SCNC: 139 MMOL/L — SIGNIFICANT CHANGE UP (ref 135–145)
TARGETS BLD QL SMEAR: SLIGHT — SIGNIFICANT CHANGE UP
VARIANT LYMPHS # BLD: 3.5 % — SIGNIFICANT CHANGE UP (ref 0–6)
WBC # BLD: 10.8 K/UL — HIGH (ref 3.8–10.5)
WBC # BLD: 12.03 K/UL — HIGH (ref 3.8–10.5)
WBC # BLD: 6.71 K/UL — SIGNIFICANT CHANGE UP (ref 3.8–10.5)
WBC # FLD AUTO: 10.8 K/UL — HIGH (ref 3.8–10.5)
WBC # FLD AUTO: 12.03 K/UL — HIGH (ref 3.8–10.5)
WBC # FLD AUTO: 6.71 K/UL — SIGNIFICANT CHANGE UP (ref 3.8–10.5)

## 2024-04-26 PROCEDURE — 37253 INTRVASC US NONCORONARY ADDL: CPT | Mod: GC

## 2024-04-26 PROCEDURE — 37252 INTRVASC US NONCORONARY 1ST: CPT | Mod: 62

## 2024-04-26 PROCEDURE — 37252 INTRVASC US NONCORONARY 1ST: CPT | Mod: GC

## 2024-04-26 PROCEDURE — 33880 EVASC RPR TA NDGFT COV LSA: CPT | Mod: GC,22

## 2024-04-26 PROCEDURE — 75956: CPT | Mod: 26,80

## 2024-04-26 PROCEDURE — 99291 CRITICAL CARE FIRST HOUR: CPT

## 2024-04-26 PROCEDURE — 34713 PERQ ACCESS & CLSR FEM ART: CPT | Mod: 50,GC

## 2024-04-26 PROCEDURE — 36200 PLACE CATHETER IN AORTA: CPT | Mod: LT

## 2024-04-26 PROCEDURE — 33880 EVASC RPR TA NDGFT COV LSA: CPT | Mod: 62

## 2024-04-26 PROCEDURE — 71045 X-RAY EXAM CHEST 1 VIEW: CPT | Mod: 26

## 2024-04-26 PROCEDURE — 75956: CPT | Mod: 26,GC

## 2024-04-26 PROCEDURE — 36200 PLACE CATHETER IN AORTA: CPT | Mod: RT

## 2024-04-26 DEVICE — IMPLANTABLE DEVICE: Type: IMPLANTABLE DEVICE | Status: FUNCTIONAL

## 2024-04-26 DEVICE — SHEATH INTRODUCER TERUMO PINNACLE CORONARY 8FR X 10CM X 0.038" MINI WIRE: Type: IMPLANTABLE DEVICE | Status: FUNCTIONAL

## 2024-04-26 DEVICE — SHEATH INTRO DRYSEAL FLEX 22FR 33CM: Type: IMPLANTABLE DEVICE | Status: FUNCTIONAL

## 2024-04-26 DEVICE — SUT PERCLOSE PROGLIDE 6FR: Type: IMPLANTABLE DEVICE | Status: FUNCTIONAL

## 2024-04-26 DEVICE — SHEATH INTRODUCER TERUMO PINNACLE CORONARY 6FR X 10CM X 0.038" MINI WIRE: Type: IMPLANTABLE DEVICE | Status: FUNCTIONAL

## 2024-04-26 DEVICE — GUIDEWIRE LUNDERQUIST EXTRA-STIFF DOUBLE CURVED 0.035" X 300CM: Type: IMPLANTABLE DEVICE | Status: FUNCTIONAL

## 2024-04-26 DEVICE — SHEATH INTRODUCER TERUMO PINNACLE CORONARY 5FR X 10CM X 0.038" MINI WIRE: Type: IMPLANTABLE DEVICE | Status: FUNCTIONAL

## 2024-04-26 DEVICE — CATH ANGIO GLIDECATH ANGLE 5FR X 100CM: Type: IMPLANTABLE DEVICE | Status: FUNCTIONAL

## 2024-04-26 DEVICE — ANGIOSEAL VASC CLOS VIP 6FR: Type: IMPLANTABLE DEVICE | Status: FUNCTIONAL

## 2024-04-26 DEVICE — GUIDEWIRE RADIFOCUS GLIDEWIRE STANDARD ANGLED TIP 0.035" X 260CM: Type: IMPLANTABLE DEVICE | Status: FUNCTIONAL

## 2024-04-26 DEVICE — INTRO MICROPUNC 4FRX10CM SS: Type: IMPLANTABLE DEVICE | Status: FUNCTIONAL

## 2024-04-26 DEVICE — CATH SIZING AUROUS PIGTAIL 5FR X 0.035": Type: IMPLANTABLE DEVICE | Status: FUNCTIONAL

## 2024-04-26 DEVICE — CATH SOFTVU BERENSTN 5FRX100: Type: IMPLANTABLE DEVICE | Status: FUNCTIONAL

## 2024-04-26 DEVICE — CATH IVUS T/A PU .035CM: Type: IMPLANTABLE DEVICE | Status: FUNCTIONAL

## 2024-04-26 DEVICE — CATH BALLOON CODA 0.035" 10FR X 140CM: Type: IMPLANTABLE DEVICE | Status: FUNCTIONAL

## 2024-04-26 RX ORDER — SODIUM CHLORIDE 9 MG/ML
1000 INJECTION, SOLUTION INTRAVENOUS
Refills: 0 | Status: DISCONTINUED | OUTPATIENT
Start: 2024-04-26 | End: 2024-04-29

## 2024-04-26 RX ORDER — DEXTROSE 50 % IN WATER 50 %
12.5 SYRINGE (ML) INTRAVENOUS ONCE
Refills: 0 | Status: DISCONTINUED | OUTPATIENT
Start: 2024-04-26 | End: 2024-04-29

## 2024-04-26 RX ORDER — DEXTROSE 10 % IN WATER 10 %
125 INTRAVENOUS SOLUTION INTRAVENOUS ONCE
Refills: 0 | Status: DISCONTINUED | OUTPATIENT
Start: 2024-04-26 | End: 2024-04-29

## 2024-04-26 RX ORDER — INSULIN HUMAN 100 [IU]/ML
1 INJECTION, SOLUTION SUBCUTANEOUS
Qty: 50 | Refills: 0 | Status: DISCONTINUED | OUTPATIENT
Start: 2024-04-26 | End: 2024-04-26

## 2024-04-26 RX ORDER — CHLORHEXIDINE GLUCONATE 213 G/1000ML
15 SOLUTION TOPICAL EVERY 12 HOURS
Refills: 0 | Status: DISCONTINUED | OUTPATIENT
Start: 2024-04-26 | End: 2024-04-26

## 2024-04-26 RX ORDER — PANTOPRAZOLE SODIUM 20 MG/1
40 TABLET, DELAYED RELEASE ORAL DAILY
Refills: 0 | Status: DISCONTINUED | OUTPATIENT
Start: 2024-04-26 | End: 2024-05-24

## 2024-04-26 RX ORDER — ALBUTEROL 90 UG/1
2 AEROSOL, METERED ORAL EVERY 6 HOURS
Refills: 0 | Status: DISCONTINUED | OUTPATIENT
Start: 2024-04-26 | End: 2024-05-24

## 2024-04-26 RX ORDER — ATORVASTATIN CALCIUM 80 MG/1
20 TABLET, FILM COATED ORAL AT BEDTIME
Refills: 0 | Status: DISCONTINUED | OUTPATIENT
Start: 2024-04-26 | End: 2024-05-02

## 2024-04-26 RX ORDER — LABETALOL HCL 100 MG
10 TABLET ORAL ONCE
Refills: 0 | Status: COMPLETED | OUTPATIENT
Start: 2024-04-26 | End: 2024-04-26

## 2024-04-26 RX ORDER — DEXTROSE 50 % IN WATER 50 %
25 SYRINGE (ML) INTRAVENOUS
Refills: 0 | Status: DISCONTINUED | OUTPATIENT
Start: 2024-04-26 | End: 2024-04-26

## 2024-04-26 RX ORDER — DEXTROSE 50 % IN WATER 50 %
25 SYRINGE (ML) INTRAVENOUS ONCE
Refills: 0 | Status: DISCONTINUED | OUTPATIENT
Start: 2024-04-26 | End: 2024-04-29

## 2024-04-26 RX ORDER — IPRATROPIUM/ALBUTEROL SULFATE 18-103MCG
3 AEROSOL WITH ADAPTER (GRAM) INHALATION EVERY 6 HOURS
Refills: 0 | Status: DISCONTINUED | OUTPATIENT
Start: 2024-04-26 | End: 2024-05-20

## 2024-04-26 RX ORDER — SODIUM CHLORIDE 9 MG/ML
500 INJECTION, SOLUTION INTRAVENOUS ONCE
Refills: 0 | Status: COMPLETED | OUTPATIENT
Start: 2024-04-26 | End: 2024-04-26

## 2024-04-26 RX ORDER — ESMOLOL HCL 100MG/10ML
50 VIAL (ML) INTRAVENOUS
Qty: 2500 | Refills: 0 | Status: DISCONTINUED | OUTPATIENT
Start: 2024-04-26 | End: 2024-04-26

## 2024-04-26 RX ORDER — CEFAZOLIN SODIUM 1 G
2000 VIAL (EA) INJECTION EVERY 8 HOURS
Refills: 0 | Status: COMPLETED | OUTPATIENT
Start: 2024-04-26 | End: 2024-04-27

## 2024-04-26 RX ORDER — INSULIN LISPRO 100/ML
VIAL (ML) SUBCUTANEOUS
Refills: 0 | Status: DISCONTINUED | OUTPATIENT
Start: 2024-04-26 | End: 2024-04-29

## 2024-04-26 RX ORDER — GLUCAGON INJECTION, SOLUTION 0.5 MG/.1ML
1 INJECTION, SOLUTION SUBCUTANEOUS ONCE
Refills: 0 | Status: DISCONTINUED | OUTPATIENT
Start: 2024-04-26 | End: 2024-04-29

## 2024-04-26 RX ORDER — CHLORHEXIDINE GLUCONATE 213 G/1000ML
1 SOLUTION TOPICAL DAILY
Refills: 0 | Status: DISCONTINUED | OUTPATIENT
Start: 2024-04-26 | End: 2024-05-24

## 2024-04-26 RX ORDER — MAGNESIUM SULFATE 500 MG/ML
2 VIAL (ML) INJECTION ONCE
Refills: 0 | Status: COMPLETED | OUTPATIENT
Start: 2024-04-26 | End: 2024-04-26

## 2024-04-26 RX ORDER — ACETAMINOPHEN 500 MG
1000 TABLET ORAL EVERY 6 HOURS
Refills: 0 | Status: COMPLETED | OUTPATIENT
Start: 2024-04-26 | End: 2024-04-27

## 2024-04-26 RX ORDER — SENNA PLUS 8.6 MG/1
2 TABLET ORAL AT BEDTIME
Refills: 0 | Status: DISCONTINUED | OUTPATIENT
Start: 2024-04-27 | End: 2024-05-24

## 2024-04-26 RX ORDER — DEXTROSE 50 % IN WATER 50 %
15 SYRINGE (ML) INTRAVENOUS ONCE
Refills: 0 | Status: DISCONTINUED | OUTPATIENT
Start: 2024-04-26 | End: 2024-04-29

## 2024-04-26 RX ORDER — GABAPENTIN 400 MG/1
100 CAPSULE ORAL EVERY 8 HOURS
Refills: 0 | Status: COMPLETED | OUTPATIENT
Start: 2024-04-26 | End: 2024-05-01

## 2024-04-26 RX ORDER — PHENYLEPHRINE HYDROCHLORIDE 10 MG/ML
0.1 INJECTION INTRAVENOUS
Qty: 40 | Refills: 0 | Status: DISCONTINUED | OUTPATIENT
Start: 2024-04-26 | End: 2024-04-26

## 2024-04-26 RX ORDER — POLYETHYLENE GLYCOL 3350 17 G/17G
17 POWDER, FOR SOLUTION ORAL DAILY
Refills: 0 | Status: DISCONTINUED | OUTPATIENT
Start: 2024-04-27 | End: 2024-05-24

## 2024-04-26 RX ORDER — RIOCIGUAT 1.5 MG/1
2.5 TABLET, FILM COATED ORAL EVERY 8 HOURS
Refills: 0 | Status: DISCONTINUED | OUTPATIENT
Start: 2024-04-26 | End: 2024-05-24

## 2024-04-26 RX ORDER — MUPIROCIN 20 MG/G
1 OINTMENT TOPICAL
Refills: 0 | Status: COMPLETED | OUTPATIENT
Start: 2024-04-26 | End: 2024-05-01

## 2024-04-26 RX ORDER — DEXTROSE 50 % IN WATER 50 %
50 SYRINGE (ML) INTRAVENOUS
Refills: 0 | Status: DISCONTINUED | OUTPATIENT
Start: 2024-04-26 | End: 2024-04-26

## 2024-04-26 RX ORDER — ASCORBIC ACID 60 MG
500 TABLET,CHEWABLE ORAL
Refills: 0 | Status: COMPLETED | OUTPATIENT
Start: 2024-04-26 | End: 2024-05-01

## 2024-04-26 RX ORDER — SODIUM BICARBONATE 1 MEQ/ML
50 SYRINGE (ML) INTRAVENOUS ONCE
Refills: 0 | Status: COMPLETED | OUTPATIENT
Start: 2024-04-26 | End: 2024-04-26

## 2024-04-26 RX ORDER — SODIUM CHLORIDE 9 MG/ML
1000 INJECTION INTRAMUSCULAR; INTRAVENOUS; SUBCUTANEOUS
Refills: 0 | Status: DISCONTINUED | OUTPATIENT
Start: 2024-04-26 | End: 2024-05-24

## 2024-04-26 RX ADMIN — ATORVASTATIN CALCIUM 20 MILLIGRAM(S): 80 TABLET, FILM COATED ORAL at 22:11

## 2024-04-26 RX ADMIN — CHLORHEXIDINE GLUCONATE 15 MILLILITER(S): 213 SOLUTION TOPICAL at 06:19

## 2024-04-26 RX ADMIN — Medication 25 GRAM(S): at 13:36

## 2024-04-26 RX ADMIN — GABAPENTIN 100 MILLIGRAM(S): 400 CAPSULE ORAL at 14:36

## 2024-04-26 RX ADMIN — MUPIROCIN 1 APPLICATION(S): 20 OINTMENT TOPICAL at 17:54

## 2024-04-26 RX ADMIN — SODIUM CHLORIDE 10 MILLILITER(S): 9 INJECTION INTRAMUSCULAR; INTRAVENOUS; SUBCUTANEOUS at 11:38

## 2024-04-26 RX ADMIN — Medication 500 MILLIGRAM(S): at 17:53

## 2024-04-26 RX ADMIN — SODIUM CHLORIDE 666.67 MILLILITER(S): 9 INJECTION, SOLUTION INTRAVENOUS at 17:13

## 2024-04-26 RX ADMIN — PANTOPRAZOLE SODIUM 40 MILLIGRAM(S): 20 TABLET, DELAYED RELEASE ORAL at 14:36

## 2024-04-26 RX ADMIN — GABAPENTIN 100 MILLIGRAM(S): 400 CAPSULE ORAL at 22:11

## 2024-04-26 RX ADMIN — Medication 1000 MILLIGRAM(S): at 18:24

## 2024-04-26 RX ADMIN — Medication 50 MILLIEQUIVALENT(S): at 17:35

## 2024-04-26 RX ADMIN — Medication 3 MILLILITER(S): at 13:35

## 2024-04-26 RX ADMIN — Medication 400 MILLIGRAM(S): at 13:35

## 2024-04-26 RX ADMIN — SODIUM CHLORIDE 666.67 MILLILITER(S): 9 INJECTION, SOLUTION INTRAVENOUS at 11:38

## 2024-04-26 RX ADMIN — Medication 400 MILLIGRAM(S): at 17:54

## 2024-04-26 RX ADMIN — CHLORHEXIDINE GLUCONATE 1 APPLICATION(S): 213 SOLUTION TOPICAL at 06:21

## 2024-04-26 RX ADMIN — Medication 1000 MILLIGRAM(S): at 14:00

## 2024-04-26 RX ADMIN — RIOCIGUAT 2.5 MILLIGRAM(S): 1.5 TABLET, FILM COATED ORAL at 14:53

## 2024-04-26 RX ADMIN — RIOCIGUAT 2.5 MILLIGRAM(S): 1.5 TABLET, FILM COATED ORAL at 22:39

## 2024-04-26 RX ADMIN — Medication 100 MILLIGRAM(S): at 16:04

## 2024-04-26 RX ADMIN — Medication 3 MILLILITER(S): at 17:53

## 2024-04-26 RX ADMIN — SODIUM CHLORIDE 3 MILLILITER(S): 9 INJECTION INTRAMUSCULAR; INTRAVENOUS; SUBCUTANEOUS at 05:10

## 2024-04-26 NOTE — BRIEF OPERATIVE NOTE - NSICDXBRIEFPROCEDURE_GEN_ALL_CORE_FT
PROCEDURES:  Second stage thoracic endovascular aortic repair (TEVAR) 26-Apr-2024 10:34:11  Linn Lindo  
PROCEDURES:  Endovascular repair of thoracic aorta 26-Apr-2024 10:57:58 TEVAR (prox portion covering L subclavian, distal portion ~5cm above celiac trunk) Apple Beckman

## 2024-04-26 NOTE — BRIEF OPERATIVE NOTE - OPERATION/FINDINGS
R CFA: 6F, angioseal  L CFA: 22F, perclosed x2 R CFA: 6F, angioseal  L CFA: 22F, perclosed x2  2 grafts deployed: most proximal covering left subclavian, most distal ~5cm superior to celiac trunk : Zenith TX2 [id number] & Zenith TX2 [id number] deployed

## 2024-04-26 NOTE — BRIEF OPERATIVE NOTE - OPERATION/FINDINGS
Bilateral groin access. Main body left side. 22F max sheath size L side. Cook Zenith Dissection 38mm body graft deployed from distal to the left subclavian artery to just above the celiac artery in two pieces. No complications, leaks, or residual dissection noted. Heparin reversed. Proglides x2 deployed in L groin. Heparin reversed with protamine. Angioseal device deployed in R groin.

## 2024-04-26 NOTE — PROGRESS NOTE ADULT - SUBJECTIVE AND OBJECTIVE BOX
CTICU  CRITICAL  CARE  attending     Hand off received 					   Pertinent clinical, laboratory, radiographic, hemodynamic, echocardiographic, respiratory data, microbiologic data and chart were reviewed and analyzed frequently throughout the course of the day and night  Patient seen and examined with CTS/ SH attending at bedside  Pt is a 68y , Female, HEALTH ISSUES - PROBLEM Dx:      , FAMILY HISTORY:  Family history of early CAD (Father)    PAST MEDICAL & SURGICAL HISTORY:  HTN (hypertension)      Prediabetes      Mild tricuspid regurgitation      Enlarged aorta      CAD (coronary artery disease)      Ascending aorta dilation      Pulmonary hypertension      Chronic systolic right heart failure      2019 novel coronavirus disease (COVID-19)      Asthma      S/P hysterectomy      S/P         Patient is a 68y old  Female who presents with a chief complaint of type B dissection (2024 22:25)      14 system review was unremarkable    Vital signs, hemodynamic and respiratory parameters were reviewed from the bedside nursing flowsheet.  ICU Vital Signs Last 24 Hrs  T(C): 36.5 (2024 05:01), Max: 36.7 (2024 16:59)  T(F): 97.7 (2024 05:01), Max: 98.1 (2024 16:59)  HR: 68 (2024 13:30) (59 - 94)  BP: 134/63 (2024 05:00) (117/69 - 141/80)  BP(mean): 91 (2024 05:00) (85 - 108)  ABP: 140/77 (2024 13:30) (58/53 - 152/78)  ABP(mean): 103 (2024 13:30) (55 - 106)  RR: 14 (2024 13:30) (14 - 19)  SpO2: 100% (2024 13:30) (93% - 100%)    O2 Parameters below as of 2024 13:30  Patient On (Oxygen Delivery Method): nasal cannula w/ humidification  O2 Flow (L/min): 4        Adult Advanced Hemodynamics Last 24 Hrs  CVP(mm Hg): --  CVP(cm H2O): --  CO: --  CI: --  PA: --  PA(mean): --  PCWP: --  SVR: --  SVRI: --  PVR: --  PVRI: --, ABG - ( 2024 11:09 )  pH, Arterial: 7.37  pH, Blood: x     /  pCO2: 37    /  pO2: 56    / HCO3: 21    / Base Excess: -3.4  /  SaO2: 87.4                Intake and output was reviewed and the fluid balance was calculated  Daily Height in cm: 162.6 (2024 00:44)    Daily   I&O's Summary    2024 07:  -  2024 07:00  --------------------------------------------------------  IN: 600 mL / OUT: 1280 mL / NET: -680 mL    2024 07:01  -  2024 13:54  --------------------------------------------------------  IN: 570 mL / OUT: 400 mL / NET: 170 mL        All lines and drain sites were assessed  Glycemic trend was reviewedCAPILLARY BLOOD GLUCOSE      POCT Blood Glucose.: 100 mg/dL (2024 13:43)    No acute change in mental status  Auscultation of the chest reveals equal bs  Abdomen is soft  Extremities are warm and well perfused  Wounds appear clean and unremarkable  Antibiotics are periop    labs  CBC Full  -  ( 2024 11:17 )  WBC Count : 12.03 K/uL  RBC Count : 4.48 M/uL  Hemoglobin : 11.7 g/dL  Hematocrit : 33.2 %  Platelet Count - Automated : 143 K/uL  Mean Cell Volume : 74.1 fl  Mean Cell Hemoglobin : 26.1 pg  Mean Cell Hemoglobin Concentration : 35.2 gm/dL  Auto Neutrophil # : 10.15 K/uL  Auto Lymphocyte # : 1.05 K/uL  Auto Monocyte # : 0.11 K/uL  Auto Eosinophil # : 0.10 K/uL  Auto Basophil # : 0.00 K/uL  Auto Neutrophil % : 83.5 %  Auto Lymphocyte % : 8.7 %  Auto Monocyte % : 0.9 %  Auto Eosinophil % : 0.8 %  Auto Basophil % : 0.0 %        136  |  107  |  11  ----------------------------<  132<H>  4.3   |  23  |  0.96    Ca    8.6      2024 11:18  Phos  3.8       Mg     1.9         TPro  6.0  /  Alb  3.4  /  TBili  1.0  /  DBili  x   /  AST  15  /  ALT  17  /  AlkPhos  80      PT/INR - ( 2024 11:18 )   PT: 14.5 sec;   INR: 1.28          PTT - ( 2024 11:18 )  PTT:28.8 sec  The current medications were reviewed   MEDICATIONS  (STANDING):  acetaminophen   IVPB .. 1000 milliGRAM(s) IV Intermittent every 6 hours  albuterol    90 MICROgram(s) HFA Inhaler 2 Puff(s) Inhalation every 6 hours  albuterol/ipratropium for Nebulization 3 milliLiter(s) Nebulizer every 6 hours  ascorbic acid 500 milliGRAM(s) Oral two times a day  atorvastatin 20 milliGRAM(s) Oral at bedtime  ceFAZolin   IVPB 2000 milliGRAM(s) IV Intermittent every 8 hours  chlorhexidine 2% Cloths 1 Application(s) Topical daily  dextrose 50% Injectable 50 milliLiter(s) IV Push every 15 minutes  dextrose 50% Injectable 25 milliLiter(s) IV Push every 15 minutes  gabapentin 100 milliGRAM(s) Oral every 8 hours  influenza  Vaccine (HIGH DOSE) 0.7 milliLiter(s) IntraMuscular once  insulin regular Infusion 1 Unit(s)/Hr (1 mL/Hr) IV Continuous <Continuous>  mupirocin 2% Ointment 1 Application(s) Both Nostrils two times a day  pantoprazole    Tablet 40 milliGRAM(s) Oral daily  phenylephrine    Infusion 0.1 MICROgram(s)/kG/Min (3.06 mL/Hr) IV Continuous <Continuous>  riociguat 2.5 milliGRAM(s) Oral every 8 hours  sodium chloride 0.9%. 1000 milliLiter(s) (10 mL/Hr) IV Continuous <Continuous>    MEDICATIONS  (PRN):       PROBLEM LIST/ ASSESSMENT:  HEALTH ISSUES - PROBLEM Dx:      ,   Patient is a 68y old  Female who presents with a chief complaint of type B dissection (2024 22:25)     s/p cardiac surgery      Acute post operative pulmonary insufficiency ruled in due to hypoxemia, O2 sats < 91% on RA treated with HFNC          My plan includes :  close hemodynamic, ventilatory and drain monitoring and management per post op routine    Monitor for arrhythmias and monitor parameters for organ perfusion  beta blockade not administered due to hemodynamic instability and bradycardia  monitor neurologic status  Head of the bed should remain elevated to 45 deg .   chest PT and IS will be encouraged  monitor adequacy of oxygenation and ventilation and attempt to wean oxygen  antibiotic regimen will be tailored to the clinical, laboratory and microbiologic data  Nutritional goals will be met using po eventually , ensure adequate caloric intake and montior the same  Stress ulcer and VTE prophylaxis will be achieved    Glycemic control is satisfactory  Electrolytes have been repleted as necessary and wound care has been carried out. Pain control has been achieved.   agressive physical therapy and early mobility and ambulation goals will be met   The family was updated about the course and plan  CRITICAL CARE TIME Upon my evaluation, this patient had a high probability of imminent or life-threatening deterioration due to the above problems which required my direct attention, intervention, and personal management.  I have personally provided 150 minutes of critical care time exclusive of time spent on separately billable procedures. Time included review of laboratory data, radiology results, discussion with consultants, and monitoring for potential decompensation. Interventions were performed as documented abovepersonally provided by me  in evaluation and management, reassessments, review and interpretation of labs and x-rays, ventilator and hemodynamic management, formulating a plan and coordinating care: ___150___ MIN.  Time does not include procedural time.    CTICU ATTENDING     					    Eder Conway MD

## 2024-04-26 NOTE — PROGRESS NOTE ADULT - ASSESSMENT
67 y/o F PMH asthma, CTEPH, CAD, HTN, Type B dissection who was seen by the outpatient office and was deemed a good candidate for TEVAR. Back in August 2023 she was seen by her outpatient pulmonologist, Dr. Cathy Serra, was noted to have an elevated D-Dimer and was advised to present to ED for workup. In ED a CTA C/A/P was performed which revealed a Type B dissection and she was admitted and discharged with close follow up. 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 She was deemed a candidate for TEVAR with Dr. Kwon and Dr. Suh and presented to [facility] for preop lumbar drain prior to OR tomorrow. Patient currently denies lightheadedness, headache, CP, palpitations, SOB, abdominal pain, nausea, vomiting, fever, chills. is now s/p TEVAR. Groin sites soft, pulses palpable and normal sensorimotor exam globally.     - continue neurovasacular checks per TEVAR ERAS protocol  - b/l groin checks per TEVAR ERAS protocol  - rest of care per CTICU  - vascular surgery will continue to follow patient

## 2024-04-26 NOTE — PROGRESS NOTE ADULT - SUBJECTIVE AND OBJECTIVE BOX
CTICU  CRITICAL  CARE  attending     Hand off received 					   Pertinent clinical, laboratory, radiographic, hemodynamic, echocardiographic, respiratory data, microbiologic data and chart were reviewed and analyzed frequently throughout the course of the day and night        68 year old female with ASTHMA, Chronic Pulmonary HTN,, CAD, HTN.  She was evaluated by her pulmonologist.  Dr. Cathy Serra, was noted to have an elevated D-Dimer and was advised to present to ED for workup. In ED a CTA C/A/P was performed which revealed a Type B dissection and she was admitted and discharged with close follow up.   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   She was deemed a candidate for TEVAR with Dr. Kwon and Dr. Suh and presented to [facility] for preop lumbar drain prior to OR     S/P TEVAR.            FAMILY HISTORY:  Family history of early CAD (Father)    PAST MEDICAL & SURGICAL HISTORY:  HTN (hypertension)  Prediabetes Mellitis.  Mild tricuspid regurgitation  Enlarged aorta  CAD (coronary artery disease)  Ascending aorta dilation  Pulmonary hypertension  Chronic systolic right heart failure  2019 novel coronavirus disease (COVID-19)  Asthma  S/P hysterectomy  S/P         14 system review was unremarkable    Vital signs, hemodynamic and respiratory parameters were reviewed from the bedside nursing flow sheet.  ICU Vital Signs Last 24 Hrs  T(C): 36 (2024 20:58), Max: 36.5 (2024 00:44)  T(F): 96.8 (2024 20:58), Max: 97.7 (2024 00:44)  HR: 73 (2024 22:00) (59 - 94)  BP: 134/63 (2024 05:00) (120/63 - 134/63)  BP(mean): 91 (2024 05:00) (85 - 91)  ABP: 144/69 (2024 22:00) (58/53 - 152/78)  ABP(mean): 97 (2024 22:00) (55 - 107)  RR: 16 (2024 22:00) (14 - 19)  SpO2: 100% (2024 22:00) (93% - 100%)    O2 Parameters below as of 2024 22:00  Patient On (Oxygen Delivery Method): nasal cannula, high flow  O2 Flow (L/min): 50  O2 Concentration (%): 40      Adult Advanced Hemodynamics Last 24 Hrs  CVP(mm Hg): --  CVP(cm H2O): --  CO: --  CI: --  PA: --  PA(mean): --  PCWP: --  SVR: --  SVRI: --  PVR: --  PVRI: --, ABG - ( 2024 16:09 )  pH, Arterial: 7.37  pH, Blood: x     /  pCO2: 35    /  pO2: 76    / HCO3: 20    / Base Excess: -4.4  /  SaO2: 96.6                Intake and output was reviewed and the fluid balance was calculated  Daily Height in cm: 162.6 (2024 00:44)    Daily   I&O's Summary    2024 07:01  -  2024 07:00  --------------------------------------------------------  IN: 600 mL / OUT: 1280 mL / NET: -680 mL    2024 07:01  -  2024 22:29  --------------------------------------------------------  IN: 1840 mL / OUT: 1715 mL / NET: 125 mL        All lines and drain sites were assessed    Neuro: No change in the Neuro status from the baseline. Able to lift both lower extremities off gravity.   Neck: No JVD.  CVS: S1, S1, No S3.  Lungs: Good air entry bilaterally.  Abd: Soft. No tenderness. + Bowel sounds.  Vascular: + DP/PT.  Extremities: No edema.  Lymphatic: Normal.  Skin: No abnormalities.      labs  CBC Full  -  ( 2024 16:09 )  WBC Count : 10.80 K/uL  RBC Count : 4.78 M/uL  Hemoglobin : 12.5 g/dL  Hematocrit : 36.5 %  Platelet Count - Automated : 147 K/uL  Mean Cell Volume : 76.4 fl  Mean Cell Hemoglobin : 26.2 pg  Mean Cell Hemoglobin Concentration : 34.2 gm/dL  Auto Neutrophil # : 9.33 K/uL  Auto Lymphocyte # : 1.18 K/uL  Auto Monocyte # : 0.24 K/uL  Auto Eosinophil # : 0.00 K/uL  Auto Basophil # : 0.01 K/uL  Auto Neutrophil % : 86.4 %  Auto Lymphocyte % : 10.9 %  Auto Monocyte % : 2.2 %  Auto Eosinophil % : 0.0 %  Auto Basophil % : 0.1 %        137  |  104  |  11  ----------------------------<  132<H>  4.1   |  21<L>  |  0.90    Ca    9.0      2024 16:09  Phos  3.9       Mg     2.6         TPro  6.3  /  Alb  3.6  /  TBili  1.5<H>  /  DBili  x   /  AST  17  /  ALT  18  /  AlkPhos  90      PT/INR - ( 2024 16:09 )   PT: 13.4 sec;   INR: 1.18          PTT - ( 2024 16:09 )  PTT:26.4 sec  The current medications were reviewed   MEDICATIONS  (STANDING):  acetaminophen   IVPB .. 1000 milliGRAM(s) IV Intermittent every 6 hours  albuterol    90 MICROgram(s) HFA Inhaler 2 Puff(s) Inhalation every 6 hours  albuterol/ipratropium for Nebulization 3 milliLiter(s) Nebulizer every 6 hours  ascorbic acid 500 milliGRAM(s) Oral two times a day  atorvastatin 20 milliGRAM(s) Oral at bedtime  ceFAZolin   IVPB 2000 milliGRAM(s) IV Intermittent every 8 hours  chlorhexidine 2% Cloths 1 Application(s) Topical daily  dextrose 10% Bolus 125 milliLiter(s) IV Bolus once  dextrose 5%. 1000 milliLiter(s) (50 mL/Hr) IV Continuous <Continuous>  dextrose 5%. 1000 milliLiter(s) (100 mL/Hr) IV Continuous <Continuous>  dextrose 50% Injectable 25 Gram(s) IV Push once  dextrose 50% Injectable 12.5 Gram(s) IV Push once  gabapentin 100 milliGRAM(s) Oral every 8 hours  glucagon  Injectable 1 milliGRAM(s) IntraMuscular once  influenza  Vaccine (HIGH DOSE) 0.7 milliLiter(s) IntraMuscular once  insulin lispro (ADMELOG) corrective regimen sliding scale   SubCutaneous Before meals and at bedtime  mupirocin 2% Ointment 1 Application(s) Both Nostrils two times a day  pantoprazole    Tablet 40 milliGRAM(s) Oral daily  riociguat 2.5 milliGRAM(s) Oral every 8 hours  sodium chloride 0.9%. 1000 milliLiter(s) (10 mL/Hr) IV Continuous <Continuous>    MEDICATIONS  (PRN):  dextrose Oral Gel 15 Gram(s) Oral once PRN Blood Glucose LESS THAN 70 milliGRAM(s)/deciliter        68 year old  Female admitted with type B dissection.  S/P TEVAR (Dual Grafts).  Hemodynamically stable.  Good oxygenation.  Fair urine out put.        My plan includes :  CSF diversion.  Keep MAP > 90.   Statin Rx.  IV esmolol.  Bronchodilator Rx.  Riociguat for pulmonary HTN   Close hemodynamic monitoring.  Monitor for arrhythmias and monitor parameters for organ perfusion  Monitor neurologic status  Monitor renal function.  Head of the bed should remain elevated to 45 deg .   Chest PT and IS will be encouraged  Monitor adequacy of oxygenation   Nutritional goals will be met using po eventually , ensure adequate caloric intake and monitor the same  Stress ulcer and VTE prophylaxis will be achieved    Glycemic control is satisfactory  Electrolytes have been repleted as necessary and wound care has been carried out. Pain control has been achieved.   Aggressive physical therapy and early mobility and ambulation goals will be met   The family was updated about the course and plan  CRITICAL CARE TIME SPENT in evaluation and management, reassessments, review and interpretation of labs and x-rays, hemodynamic management, formulating a plan and coordinating care: ___30____ MIN.  Time does not include procedural time.  CTICU ATTENDING     					    Yanick Degroot MD

## 2024-04-26 NOTE — BRIEF OPERATIVE NOTE - NSICDXBRIEFPREOP_GEN_ALL_CORE_FT
PRE-OP DIAGNOSIS:  Aortic dissection 26-Apr-2024 10:31:34  Apple Beckman  
PRE-OP DIAGNOSIS:  Aortic dissection 26-Apr-2024 10:31:34  Apple Beckman

## 2024-04-26 NOTE — PROGRESS NOTE ADULT - SUBJECTIVE AND OBJECTIVE BOX
Subjective: Patient doing well, examined at bedside. Lying flat still and feeling better post anesthesia and post procedurally. She has not had a diet yet but is in good spirits has, a normal sensorimotor exam bilateral and good proximal muscle strength in all extremities denies fevers, chills, abd pain, flank pain, leg pain or paraesthesias and weakness.     ROS:   Denies Headache, blurred vision, Chest Pain, SOB, Abdominal pain, nausea or vomiting     Social   ceFAZolin   IVPB 2000  ceFAZolin   IVPB 2000  esmolol  Infusion 50  labetalol Injectable 10  phenylephrine    Infusion 0.1  riociguat 2.5      Allergies    No Known Allergies    Intolerances        Vital Signs Last 24 Hrs  T(C): 36.5 (26 Apr 2024 05:01), Max: 36.7 (25 Apr 2024 16:59)  T(F): 97.7 (26 Apr 2024 05:01), Max: 98.1 (25 Apr 2024 16:59)  HR: 70 (26 Apr 2024 14:00) (59 - 94)  BP: 134/63 (26 Apr 2024 05:00) (117/69 - 139/87)  BP(mean): 91 (26 Apr 2024 05:00) (85 - 108)  RR: 14 (26 Apr 2024 14:00) (14 - 19)  SpO2: 100% (26 Apr 2024 14:00) (93% - 100%)    Parameters below as of 26 Apr 2024 14:00  Patient On (Oxygen Delivery Method): nasal cannula w/ humidification  O2 Flow (L/min): 4    I&O's Summary    25 Apr 2024 07:01  -  26 Apr 2024 07:00  --------------------------------------------------------  IN: 600 mL / OUT: 1280 mL / NET: -680 mL    26 Apr 2024 07:01  -  26 Apr 2024 14:41  --------------------------------------------------------  IN: 850 mL / OUT: 610 mL / NET: 240 mL        Physical Exam:  General: NAD, well appearing male  Pulmonary: NLB  Cardiovascular: RRR, no MGR  Abdominal: Soft nt nd, L groin soft, dressed with Dermabond R groin soft, incision c/d/i   Extremities: WWP  Pulses: palpable pulses 2+ b/l groin and in BUE (radial ulnar) and BLE (DP,PT)     LABS:                        11.7   12.03 )-----------( 143      ( 26 Apr 2024 11:17 )             33.2     04-26    136  |  107  |  11  ----------------------------<  132<H>  4.3   |  23  |  0.96    Ca    8.6      26 Apr 2024 11:18  Phos  3.8     04-26  Mg     1.9     04-26    TPro  6.0  /  Alb  3.4  /  TBili  1.0  /  DBili  x   /  AST  15  /  ALT  17  /  AlkPhos  80  04-26    PT/INR - ( 26 Apr 2024 11:18 )   PT: 14.5 sec;   INR: 1.28          PTT - ( 26 Apr 2024 11:18 )  PTT:28.8 sec

## 2024-04-27 LAB
ALBUMIN SERPL ELPH-MCNC: 3.6 G/DL — SIGNIFICANT CHANGE UP (ref 3.3–5)
ALBUMIN SERPL ELPH-MCNC: 3.8 G/DL — SIGNIFICANT CHANGE UP (ref 3.3–5)
ALBUMIN SERPL ELPH-MCNC: 3.8 G/DL — SIGNIFICANT CHANGE UP (ref 3.3–5)
ALP SERPL-CCNC: 74 U/L — SIGNIFICANT CHANGE UP (ref 40–120)
ALP SERPL-CCNC: 83 U/L — SIGNIFICANT CHANGE UP (ref 40–120)
ALP SERPL-CCNC: SIGNIFICANT CHANGE UP (ref 40–120)
ALT FLD-CCNC: 13 U/L — SIGNIFICANT CHANGE UP (ref 10–45)
ALT FLD-CCNC: 15 U/L — SIGNIFICANT CHANGE UP (ref 10–45)
ALT FLD-CCNC: SIGNIFICANT CHANGE UP (ref 10–45)
ANION GAP SERPL CALC-SCNC: 10 MMOL/L — SIGNIFICANT CHANGE UP (ref 5–17)
ANION GAP SERPL CALC-SCNC: 9 MMOL/L — SIGNIFICANT CHANGE UP (ref 5–17)
ANION GAP SERPL CALC-SCNC: 9 MMOL/L — SIGNIFICANT CHANGE UP (ref 5–17)
ANISOCYTOSIS BLD QL: SLIGHT — SIGNIFICANT CHANGE UP
APTT BLD: 28.3 SEC — SIGNIFICANT CHANGE UP (ref 24.5–35.6)
APTT BLD: 63.2 SEC — HIGH (ref 24.5–35.6)
AST SERPL-CCNC: 18 U/L — SIGNIFICANT CHANGE UP (ref 10–40)
AST SERPL-CCNC: 21 U/L — SIGNIFICANT CHANGE UP (ref 10–40)
AST SERPL-CCNC: SIGNIFICANT CHANGE UP (ref 10–40)
BASOPHILS # BLD AUTO: 0 K/UL — SIGNIFICANT CHANGE UP (ref 0–0.2)
BASOPHILS # BLD AUTO: 0.01 K/UL — SIGNIFICANT CHANGE UP (ref 0–0.2)
BASOPHILS NFR BLD AUTO: 0 % — SIGNIFICANT CHANGE UP (ref 0–2)
BASOPHILS NFR BLD AUTO: 0.1 % — SIGNIFICANT CHANGE UP (ref 0–2)
BILIRUB SERPL-MCNC: 1 MG/DL — SIGNIFICANT CHANGE UP (ref 0.2–1.2)
BILIRUB SERPL-MCNC: 1.1 MG/DL — SIGNIFICANT CHANGE UP (ref 0.2–1.2)
BILIRUB SERPL-MCNC: 1.3 MG/DL — HIGH (ref 0.2–1.2)
BUN SERPL-MCNC: 12 MG/DL — SIGNIFICANT CHANGE UP (ref 7–23)
BUN SERPL-MCNC: 14 MG/DL — SIGNIFICANT CHANGE UP (ref 7–23)
BUN SERPL-MCNC: 14 MG/DL — SIGNIFICANT CHANGE UP (ref 7–23)
CALCIUM SERPL-MCNC: 8.8 MG/DL — SIGNIFICANT CHANGE UP (ref 8.4–10.5)
CALCIUM SERPL-MCNC: 8.8 MG/DL — SIGNIFICANT CHANGE UP (ref 8.4–10.5)
CALCIUM SERPL-MCNC: 8.9 MG/DL — SIGNIFICANT CHANGE UP (ref 8.4–10.5)
CHLORIDE SERPL-SCNC: 101 MMOL/L — SIGNIFICANT CHANGE UP (ref 96–108)
CHLORIDE SERPL-SCNC: 103 MMOL/L — SIGNIFICANT CHANGE UP (ref 96–108)
CHLORIDE SERPL-SCNC: 103 MMOL/L — SIGNIFICANT CHANGE UP (ref 96–108)
CO2 SERPL-SCNC: 22 MMOL/L — SIGNIFICANT CHANGE UP (ref 22–31)
CO2 SERPL-SCNC: 23 MMOL/L — SIGNIFICANT CHANGE UP (ref 22–31)
CO2 SERPL-SCNC: 24 MMOL/L — SIGNIFICANT CHANGE UP (ref 22–31)
CREAT SERPL-MCNC: 0.8 MG/DL — SIGNIFICANT CHANGE UP (ref 0.5–1.3)
CREAT SERPL-MCNC: 0.82 MG/DL — SIGNIFICANT CHANGE UP (ref 0.5–1.3)
CREAT SERPL-MCNC: 0.95 MG/DL — SIGNIFICANT CHANGE UP (ref 0.5–1.3)
EGFR: 65 ML/MIN/1.73M2 — SIGNIFICANT CHANGE UP
EGFR: 78 ML/MIN/1.73M2 — SIGNIFICANT CHANGE UP
EGFR: 80 ML/MIN/1.73M2 — SIGNIFICANT CHANGE UP
EOSINOPHIL # BLD AUTO: 0 K/UL — SIGNIFICANT CHANGE UP (ref 0–0.5)
EOSINOPHIL # BLD AUTO: 0 K/UL — SIGNIFICANT CHANGE UP (ref 0–0.5)
EOSINOPHIL NFR BLD AUTO: 0 % — SIGNIFICANT CHANGE UP (ref 0–6)
EOSINOPHIL NFR BLD AUTO: 0 % — SIGNIFICANT CHANGE UP (ref 0–6)
GAS PNL BLDA: SIGNIFICANT CHANGE UP
GAS PNL BLDA: SIGNIFICANT CHANGE UP
GLUCOSE BLDC GLUCOMTR-MCNC: 123 MG/DL — HIGH (ref 70–99)
GLUCOSE BLDC GLUCOMTR-MCNC: 134 MG/DL — HIGH (ref 70–99)
GLUCOSE SERPL-MCNC: 135 MG/DL — HIGH (ref 70–99)
GLUCOSE SERPL-MCNC: 150 MG/DL — HIGH (ref 70–99)
GLUCOSE SERPL-MCNC: 150 MG/DL — HIGH (ref 70–99)
HCT VFR BLD CALC: 32.8 % — LOW (ref 34.5–45)
HCT VFR BLD CALC: 34.2 % — LOW (ref 34.5–45)
HGB BLD-MCNC: 11.4 G/DL — LOW (ref 11.5–15.5)
HGB BLD-MCNC: 11.9 G/DL — SIGNIFICANT CHANGE UP (ref 11.5–15.5)
IMM GRANULOCYTES NFR BLD AUTO: 0.3 % — SIGNIFICANT CHANGE UP (ref 0–0.9)
INR BLD: 1.25 — HIGH (ref 0.85–1.18)
INR BLD: 1.28 — HIGH (ref 0.85–1.18)
LYMPHOCYTES # BLD AUTO: 1.29 K/UL — SIGNIFICANT CHANGE UP (ref 1–3.3)
LYMPHOCYTES # BLD AUTO: 1.72 K/UL — SIGNIFICANT CHANGE UP (ref 1–3.3)
LYMPHOCYTES # BLD AUTO: 12.5 % — LOW (ref 13–44)
LYMPHOCYTES # BLD AUTO: 8.6 % — LOW (ref 13–44)
MAGNESIUM SERPL-MCNC: 2.2 MG/DL — SIGNIFICANT CHANGE UP (ref 1.6–2.6)
MAGNESIUM SERPL-MCNC: 2.2 MG/DL — SIGNIFICANT CHANGE UP (ref 1.6–2.6)
MAGNESIUM SERPL-MCNC: 2.3 MG/DL — SIGNIFICANT CHANGE UP (ref 1.6–2.6)
MANUAL SMEAR VERIFICATION: SIGNIFICANT CHANGE UP
MCHC RBC-ENTMCNC: 26.1 PG — LOW (ref 27–34)
MCHC RBC-ENTMCNC: 26.2 PG — LOW (ref 27–34)
MCHC RBC-ENTMCNC: 34.8 GM/DL — SIGNIFICANT CHANGE UP (ref 32–36)
MCHC RBC-ENTMCNC: 34.8 GM/DL — SIGNIFICANT CHANGE UP (ref 32–36)
MCV RBC AUTO: 75.1 FL — LOW (ref 80–100)
MCV RBC AUTO: 75.3 FL — LOW (ref 80–100)
MICROCYTES BLD QL: SLIGHT — SIGNIFICANT CHANGE UP
MONOCYTES # BLD AUTO: 1.03 K/UL — HIGH (ref 0–0.9)
MONOCYTES # BLD AUTO: 1.48 K/UL — HIGH (ref 0–0.9)
MONOCYTES NFR BLD AUTO: 10.7 % — SIGNIFICANT CHANGE UP (ref 2–14)
MONOCYTES NFR BLD AUTO: 6.9 % — SIGNIFICANT CHANGE UP (ref 2–14)
NEUTROPHILS # BLD AUTO: 10.55 K/UL — HIGH (ref 1.8–7.4)
NEUTROPHILS # BLD AUTO: 12.66 K/UL — HIGH (ref 1.8–7.4)
NEUTROPHILS NFR BLD AUTO: 76.4 % — SIGNIFICANT CHANGE UP (ref 43–77)
NEUTROPHILS NFR BLD AUTO: 84.5 % — HIGH (ref 43–77)
NRBC # BLD: 0 /100 WBCS — SIGNIFICANT CHANGE UP (ref 0–0)
OVALOCYTES BLD QL SMEAR: SLIGHT — SIGNIFICANT CHANGE UP
PHOSPHATE SERPL-MCNC: 2.6 MG/DL — SIGNIFICANT CHANGE UP (ref 2.5–4.5)
PHOSPHATE SERPL-MCNC: 2.9 MG/DL — SIGNIFICANT CHANGE UP (ref 2.5–4.5)
PHOSPHATE SERPL-MCNC: 3.6 MG/DL — SIGNIFICANT CHANGE UP (ref 2.5–4.5)
PLAT MORPH BLD: NORMAL — SIGNIFICANT CHANGE UP
PLATELET # BLD AUTO: 141 K/UL — LOW (ref 150–400)
PLATELET # BLD AUTO: 147 K/UL — LOW (ref 150–400)
POIKILOCYTOSIS BLD QL AUTO: SLIGHT — SIGNIFICANT CHANGE UP
POLYCHROMASIA BLD QL SMEAR: SLIGHT — SIGNIFICANT CHANGE UP
POTASSIUM SERPL-MCNC: 4 MMOL/L — SIGNIFICANT CHANGE UP (ref 3.5–5.3)
POTASSIUM SERPL-MCNC: 4.3 MMOL/L — SIGNIFICANT CHANGE UP (ref 3.5–5.3)
POTASSIUM SERPL-MCNC: SIGNIFICANT CHANGE UP (ref 3.5–5.3)
POTASSIUM SERPL-SCNC: 4 MMOL/L — SIGNIFICANT CHANGE UP (ref 3.5–5.3)
POTASSIUM SERPL-SCNC: 4.3 MMOL/L — SIGNIFICANT CHANGE UP (ref 3.5–5.3)
POTASSIUM SERPL-SCNC: SIGNIFICANT CHANGE UP (ref 3.5–5.3)
PROT SERPL-MCNC: 5.9 G/DL — LOW (ref 6–8.3)
PROT SERPL-MCNC: 6.4 G/DL — SIGNIFICANT CHANGE UP (ref 6–8.3)
PROT SERPL-MCNC: 6.8 G/DL — SIGNIFICANT CHANGE UP (ref 6–8.3)
PROTHROM AB SERPL-ACNC: 14.2 SEC — HIGH (ref 9.5–13)
PROTHROM AB SERPL-ACNC: 14.5 SEC — HIGH (ref 9.5–13)
RBC # BLD: 4.37 M/UL — SIGNIFICANT CHANGE UP (ref 3.8–5.2)
RBC # BLD: 4.54 M/UL — SIGNIFICANT CHANGE UP (ref 3.8–5.2)
RBC # FLD: 15.3 % — HIGH (ref 10.3–14.5)
RBC # FLD: 15.4 % — HIGH (ref 10.3–14.5)
RBC BLD AUTO: ABNORMAL
SCHISTOCYTES BLD QL AUTO: SLIGHT — SIGNIFICANT CHANGE UP
SODIUM SERPL-SCNC: 133 MMOL/L — LOW (ref 135–145)
SODIUM SERPL-SCNC: 134 MMOL/L — LOW (ref 135–145)
SODIUM SERPL-SCNC: 137 MMOL/L — SIGNIFICANT CHANGE UP (ref 135–145)
WBC # BLD: 13.8 K/UL — HIGH (ref 3.8–10.5)
WBC # BLD: 14.98 K/UL — HIGH (ref 3.8–10.5)
WBC # FLD AUTO: 13.8 K/UL — HIGH (ref 3.8–10.5)
WBC # FLD AUTO: 14.98 K/UL — HIGH (ref 3.8–10.5)

## 2024-04-27 PROCEDURE — 71045 X-RAY EXAM CHEST 1 VIEW: CPT | Mod: 26

## 2024-04-27 PROCEDURE — 99232 SBSQ HOSP IP/OBS MODERATE 35: CPT

## 2024-04-27 RX ORDER — ACETAMINOPHEN 500 MG
650 TABLET ORAL EVERY 6 HOURS
Refills: 0 | Status: ACTIVE | OUTPATIENT
Start: 2024-04-27 | End: 2025-03-26

## 2024-04-27 RX ORDER — ALBUMIN HUMAN 25 %
250 VIAL (ML) INTRAVENOUS
Refills: 0 | Status: COMPLETED | OUTPATIENT
Start: 2024-04-27 | End: 2024-04-27

## 2024-04-27 RX ADMIN — SODIUM CHLORIDE 10 MILLILITER(S): 9 INJECTION INTRAMUSCULAR; INTRAVENOUS; SUBCUTANEOUS at 05:15

## 2024-04-27 RX ADMIN — Medication 125 MILLILITER(S): at 15:07

## 2024-04-27 RX ADMIN — MUPIROCIN 1 APPLICATION(S): 20 OINTMENT TOPICAL at 18:23

## 2024-04-27 RX ADMIN — ATORVASTATIN CALCIUM 20 MILLIGRAM(S): 80 TABLET, FILM COATED ORAL at 21:04

## 2024-04-27 RX ADMIN — Medication 125 MILLILITER(S): at 10:07

## 2024-04-27 RX ADMIN — RIOCIGUAT 2.5 MILLIGRAM(S): 1.5 TABLET, FILM COATED ORAL at 05:15

## 2024-04-27 RX ADMIN — Medication 3 MILLILITER(S): at 01:12

## 2024-04-27 RX ADMIN — Medication 3 MILLILITER(S): at 18:17

## 2024-04-27 RX ADMIN — RIOCIGUAT 2.5 MILLIGRAM(S): 1.5 TABLET, FILM COATED ORAL at 21:04

## 2024-04-27 RX ADMIN — Medication 1000 MILLIGRAM(S): at 05:45

## 2024-04-27 RX ADMIN — CHLORHEXIDINE GLUCONATE 1 APPLICATION(S): 213 SOLUTION TOPICAL at 05:17

## 2024-04-27 RX ADMIN — SENNA PLUS 2 TABLET(S): 8.6 TABLET ORAL at 21:04

## 2024-04-27 RX ADMIN — Medication 3 MILLILITER(S): at 05:15

## 2024-04-27 RX ADMIN — Medication 500 MILLIGRAM(S): at 05:15

## 2024-04-27 RX ADMIN — POLYETHYLENE GLYCOL 3350 17 GRAM(S): 17 POWDER, FOR SOLUTION ORAL at 11:47

## 2024-04-27 RX ADMIN — Medication 100 MILLIGRAM(S): at 02:15

## 2024-04-27 RX ADMIN — GABAPENTIN 100 MILLIGRAM(S): 400 CAPSULE ORAL at 21:04

## 2024-04-27 RX ADMIN — MUPIROCIN 1 APPLICATION(S): 20 OINTMENT TOPICAL at 05:15

## 2024-04-27 RX ADMIN — Medication 500 MILLIGRAM(S): at 18:16

## 2024-04-27 RX ADMIN — RIOCIGUAT 2.5 MILLIGRAM(S): 1.5 TABLET, FILM COATED ORAL at 14:58

## 2024-04-27 RX ADMIN — Medication 400 MILLIGRAM(S): at 05:15

## 2024-04-27 RX ADMIN — GABAPENTIN 100 MILLIGRAM(S): 400 CAPSULE ORAL at 15:11

## 2024-04-27 RX ADMIN — PANTOPRAZOLE SODIUM 40 MILLIGRAM(S): 20 TABLET, DELAYED RELEASE ORAL at 11:47

## 2024-04-27 RX ADMIN — Medication 3 MILLILITER(S): at 11:49

## 2024-04-27 RX ADMIN — GABAPENTIN 100 MILLIGRAM(S): 400 CAPSULE ORAL at 05:15

## 2024-04-27 RX ADMIN — SODIUM CHLORIDE 10 MILLILITER(S): 9 INJECTION INTRAMUSCULAR; INTRAVENOUS; SUBCUTANEOUS at 21:04

## 2024-04-27 NOTE — PHYSICAL THERAPY INITIAL EVALUATION ADULT - DID THE PATIENT HAVE SURGERY?
Metformin refilled per protocol.Patient has appointment scheduled 05/14/2018.ac  
TEVAR, second stage TEVAR/yes

## 2024-04-27 NOTE — PHYSICAL THERAPY INITIAL EVALUATION ADULT - PERTINENT HX OF CURRENT PROBLEM, REHAB EVAL
Patient is 68 year old female with past medical history of asthma, CTEPH, CAD, HTN, Type B dissection who was seen by the outpatient office and was deemed a good candidate for TEVAR. Back in August 2023 she was seen by her outpatient pulmonologist, Dr. Cathy Serra, was noted to have an elevated D-Dimer and was advised to present to ED for workup. In ED a CTA C/A/P was performed which revealed a Type B dissection and she was admitted and discharged with close follow up. 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 Patient is s/p TEVAR, second stage TEVAR on 4/26/2024.

## 2024-04-27 NOTE — PHYSICAL THERAPY INITIAL EVALUATION ADULT - ADDITIONAL COMMENTS
Patient lives with family in private 1-story home, +5 LAYA. PTA, patient was independent with all ADLs and functional mobility, without the use of any AD.

## 2024-04-27 NOTE — PROGRESS NOTE ADULT - SUBJECTIVE AND OBJECTIVE BOX
CTICU  CRITICAL  CARE  attending     Hand off received 					   Pertinent clinical, laboratory, radiographic, hemodynamic, echocardiographic, respiratory data, microbiologic data and chart were reviewed and analyzed frequently throughout the course of the day and night      68 year old female with ASTHMA, Chronic Pulmonary HTN,, CAD, HTN.  She was evaluated by her pulmonologist.  Dr. Cathy Serra, was noted to have an elevated D-Dimer and was advised to present to ED for workup. In ED a CTA C/A/P was performed which revealed a Type B dissection and she was admitted and discharged with close follow up.   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   She was deemed a candidate for TEVAR with Dr. Kwon and Dr. Suh and presented to [facility] for preop lumbar drain prior to OR     S/P TEVAR.      FAMILY HISTORY:  Family history of early CAD (Father)    PAST MEDICAL & SURGICAL HISTORY:  HTN (hypertension)  Prediabetes  Mild tricuspid regurgitation  Enlarged aorta  CAD (coronary artery disease)  Ascending aorta dilation  Pulmonary hypertension  Chronic systolic right heart failure  2019 novel coronavirus disease (COVID-19)  Asthma  S/P hysterectomy  S/P         14 system review was unremarkable    Vital signs, hemodynamic and respiratory parameters were reviewed from the bedside nursing flow sheet.  ICU Vital Signs Last 24 Hrs  T(C): 36.7 (2024 17:03), Max: 36.7 (2024 10:00)  T(F): 98.1 (2024 17:03), Max: 98.1 (2024 10:00)  HR: 86 (2024 21:00) (65 - 86)  BP: 123/76 (2024 05:30) (123/76 - 123/76)  BP(mean): 93 (2024 05:30) (93 - 93)  ABP: 141/63 (2024 21:00) (113/80 - 241/186)  ABP(mean): 90 (2024 21:00) (79 - 111)  RR: 16 (2024 21:00) (14 - 19)  SpO2: 100% (2024 21:00) (94% - 100%)    O2 Parameters below as of 2024 21:00  Patient On (Oxygen Delivery Method): nasal cannula w/ humidification  O2 Flow (L/min): 6        Adult Advanced Hemodynamics Last 24 Hrs  CVP(mm Hg): --  CVP(cm H2O): --  CO: --  CI: --  PA: --  PA(mean): --  PCWP: --  SVR: --  SVRI: --  PVR: --  PVRI: --, ABG - ( 2024 13:29 )  pH, Arterial: 7.45  pH, Blood: x     /  pCO2: 29    /  pO2: 88    / HCO3: 20    / Base Excess: -2.6  /  SaO2: 98.4                Intake and output was reviewed and the fluid balance was calculated  Daily     Daily   I&O's Summary    2024 07:  -  2024 07:00  --------------------------------------------------------  IN: 2990 mL / OUT: 2420 mL / NET: 570 mL    2024 07:01  -  2024 21:08  --------------------------------------------------------  IN: 640 mL / OUT: 635 mL / NET: 5 mL            Neuro: No change in the Neuro status from the baseline.   Neck: No JVD.  CVS: S1, S2, No S3.  Lungs: Good air entry bilaterally.  Abd: Soft. No tenderness. + Bowel sounds.  Vascular: + DP/PT.  Extremities: No edema.  Lymphatic: Normal.  Skin: No abnormalities.      labs  CBC Full  -  ( 2024 13:29 )  WBC Count : 14.98 K/uL  RBC Count : 4.37 M/uL  Hemoglobin : 11.4 g/dL  Hematocrit : 32.8 %  Platelet Count - Automated : 147 K/uL  Mean Cell Volume : 75.1 fl  Mean Cell Hemoglobin : 26.1 pg  Mean Cell Hemoglobin Concentration : 34.8 gm/dL  Auto Neutrophil # : 12.66 K/uL  Auto Lymphocyte # : 1.29 K/uL  Auto Monocyte # : 1.03 K/uL  Auto Eosinophil # : 0.00 K/uL  Auto Basophil # : 0.00 K/uL  Auto Neutrophil % : 84.5 %  Auto Lymphocyte % : 8.6 %  Auto Monocyte % : 6.9 %  Auto Eosinophil % : 0.0 %  Auto Basophil % : 0.0 %        134<L>  |  103  |  14  ----------------------------<  150<H>  4.3   |  22  |  0.82    Ca    8.8      2024 14:17  Phos  2.6       Mg     2.2         TPro  6.4  /  Alb  3.8  /  TBili  1.1  /  DBili  x   /  AST  21  /  ALT  13  /  AlkPhos  74      PT/INR - ( 2024 13:29 )   PT: 14.5 sec;   INR: 1.28          PTT - ( 2024 13:29 )  PTT:63.2 sec  The current medications were reviewed   MEDICATIONS  (STANDING):  albuterol    90 MICROgram(s) HFA Inhaler 2 Puff(s) Inhalation every 6 hours  albuterol/ipratropium for Nebulization 3 milliLiter(s) Nebulizer every 6 hours  ascorbic acid 500 milliGRAM(s) Oral two times a day  atorvastatin 20 milliGRAM(s) Oral at bedtime  chlorhexidine 2% Cloths 1 Application(s) Topical daily  dextrose 10% Bolus 125 milliLiter(s) IV Bolus once  dextrose 5%. 1000 milliLiter(s) (100 mL/Hr) IV Continuous <Continuous>  dextrose 5%. 1000 milliLiter(s) (50 mL/Hr) IV Continuous <Continuous>  dextrose 50% Injectable 25 Gram(s) IV Push once  dextrose 50% Injectable 12.5 Gram(s) IV Push once  gabapentin 100 milliGRAM(s) Oral every 8 hours  glucagon  Injectable 1 milliGRAM(s) IntraMuscular once  influenza  Vaccine (HIGH DOSE) 0.7 milliLiter(s) IntraMuscular once  insulin lispro (ADMELOG) corrective regimen sliding scale   SubCutaneous Before meals and at bedtime  mupirocin 2% Ointment 1 Application(s) Both Nostrils two times a day  pantoprazole    Tablet 40 milliGRAM(s) Oral daily  polyethylene glycol 3350 17 Gram(s) Oral daily  riociguat 2.5 milliGRAM(s) Oral every 8 hours  senna 2 Tablet(s) Oral at bedtime  sodium chloride 0.9%. 1000 milliLiter(s) (10 mL/Hr) IV Continuous <Continuous>    MEDICATIONS  (PRN):  acetaminophen     Tablet .. 650 milliGRAM(s) Oral every 6 hours PRN Mild Pain (1 - 3)  dextrose Oral Gel 15 Gram(s) Oral once PRN Blood Glucose LESS THAN 70 milliGRAM(s)/deciliter                68 year old  Female admitted with type B dissection.  S/P TEVAR (Dual Grafts).  Hemodynamically stable.  Good oxygenation.  Fair urine out put.            My plan includes :  CSF diversion overnight  D/C lumbar drain in AM.   Statin Rx.  Bronchodilator Rx.  Riociguat for pulmonary HTN.  Close hemodynamic monitoring.  Monitor for arrhythmias and monitor parameters for organ perfusion  Monitor neurologic status  Monitor renal function.  Head of the bed should remain elevated to 45 deg .   Chest PT and IS will be encouraged  Monitor adequacy of oxygenation   Nutritional goals will be met using po eventually , ensure adequate caloric intake and monitor the same  Stress ulcer and VTE prophylaxis will be achieved    Glycemic control is satisfactory  Electrolytes have been repleted as necessary and wound care has been carried out. Pain control has been achieved.   Aggressive physical therapy and early mobility and ambulation goals will be met   The family was updated about the course and plan  CRITICAL CARE TIME SPENT in evaluation and management, reassessments, review and interpretation of labs and x-rays, hemodynamic management, formulating a plan and coordinating care: ___30____ MIN.  Time does not include procedural time.  CTICU ATTENDING     					    Yanick Degroot MD

## 2024-04-27 NOTE — PROGRESS NOTE ADULT - SUBJECTIVE AND OBJECTIVE BOX
S: Patient seen in CT ICU. No complaints of weakness / abdominal pain / back pain / groin pain. Lumbar drain in place.     O: Vitals reviewed. AFVSS. MAPs w/in goal.     Physical Exam:  General: NAD, well appearing male  Pulmonary: NLB on HFNC  Cardiovascular: RRR, no MGR  Abdominal: Soft nt nd, L groin soft, dressed with Dermabond R groin soft, incision c/d/i   Extremities: WWP  Pulses: palpable pulses 2+ b/l groin and in BUE (radial ulnar) and BLE (DP,PT)   Neuro: Normal lower extremity strength proximally and distally    04-27    x   |  x   |  14  ----------------------------<  150<H>  x    |  23  |  0.80    Ca    8.9      27 Apr 2024 13:29  Phos  2.9     04-27  Mg     2.2     04-27    TPro  5.9<L>  /  Alb  3.6  /  TBili  1.0  /  DBili  x   /  AST  18  /  ALT  15  /  AlkPhos  83  04-27                        11.4   14.98 )-----------( 147      ( 27 Apr 2024 13:29 )             32.8     a/p: 67 y/o F PMH asthma, CTEPH, CAD, HTN, Type B dissection who was seen by the outpatient office and was deemed a good candidate for TEVAR. Back in August 2023 she was seen by her outpatient pulmonologist, Dr. Cathy Serra, was noted to have an elevated D-Dimer and was advised to present to ED for workup. In ED a CTA C/A/P was performed which revealed a Type B dissection and she was admitted and discharged with close follow up. 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 She was deemed a candidate for TEVAR with Dr. Kwon and Dr. Suh and presented to [facility] for preop lumbar drain prior to OR tomorrow. Patient currently denies lightheadedness, headache, CP, palpitations, SOB, abdominal pain, nausea, vomiting, fever, chills. is now s/p TEVAR. Groin sites soft, pulses palpable and normal sensorimotor exam globally.     - continue neurovasacular checks per TEVAR ERAS protocol  - b/l groin checks per TEVAR ERAS protocol  - rest of care per CTICU  - vascular surgery will continue to follow patient

## 2024-04-27 NOTE — PHYSICAL THERAPY INITIAL EVALUATION ADULT - GENERAL OBSERVATIONS, REHAB EVAL
Patient encountered out of bed sitting in bedside chair in no apparent distress, +heplock, +tele, +pulseOx, +a-line, +lumbar drain (clamped) +central line +stevens +NC @6L/min, +b/l SCDs.

## 2024-04-27 NOTE — PROGRESS NOTE ADULT - SUBJECTIVE AND OBJECTIVE BOX
CTICU  CRITICAL  CARE  attending     Hand off received 					   Pertinent clinical, laboratory, radiographic, hemodynamic, echocardiographic, respiratory data, microbiologic data and chart were reviewed and analyzed frequently throughout the course of the day and night  Patient seen and examined with CTS/ SH attending at bedside    Pt is a 68y , Female, post procedure day # 1 s/p TEVAR;     currently:    LD capped  ambulated  re-drain CSF @ 10 cc/hr overnight      68 year old female with past medical history of asthma, CTEPH, CAD, HTN, Type B dissection who was seen by the outpatient office and was deemed a good candidate for TEVAR. Back in 2023 she was seen by her outpatient pulmonologist, Dr. Cathy Serra, was noted to have an elevated D-Dimer and was advised to present to ED for workup. In ED a CTA C/A/P was performed which revealed a Type B dissection and she was admitted and discharged with close follow up. 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 She was deemed a candidate for TEVAR with Dr. Kwon and Dr. Suh and presented to [facility] for preop lumbar drain prior to OR tomorrow      , FAMILY HISTORY:  Family history of early CAD (Father)    PAST MEDICAL & SURGICAL HISTORY:  HTN (hypertension)      Prediabetes      Mild tricuspid regurgitation      Enlarged aorta      CAD (coronary artery disease)      Ascending aorta dilation      Pulmonary hypertension      Chronic systolic right heart failure      2019 novel coronavirus disease (COVID-19)      Asthma      S/P hysterectomy      S/P         Patient is a 68y old  Female who presents with a chief complaint of type B dissection (2024 14:07)    14 system review limited 2/2 post procedure discomfort    Vital signs, hemodynamic and respiratory parameters were reviewed from the bedside nursing flowsheet.  ICU Vital Signs Last 24 Hrs  T(C): 36.5 (2024 12:47), Max: 36.7 (2024 10:00)  T(F): 97.7 (2024 12:47), Max: 98.1 (2024 10:00)  HR: 81 (2024 15:00) (65 - 81)  BP: 123/76 (2024 05:30) (123/76 - 123/76)  BP(mean): 93 (2024 05:30) (93 - 93)  ABP: 125/60 (2024 15:00) (113/80 - 150/74)  ABP(mean): 83 (2024 15:00) (79 - 111)  RR: 17 (2024 15:00) (14 - 19)  SpO2: 95% (2024 15:00) (95% - 100%)    O2 Parameters below as of 2024 15:00    O2 Flow (L/min): 6        Adult Advanced Hemodynamics Last 24 Hrs  CVP(mm Hg): --  CVP(cm H2O): --  CO: --  CI: --  PA: --  PA(mean): --  PCWP: --  SVR: --  SVRI: --  PVR: --  PVRI: --, ABG - ( 2024 13:29 )  pH, Arterial: 7.45  pH, Blood: x     /  pCO2: 29    /  pO2: 88    / HCO3: 20    / Base Excess: -2.6  /  SaO2: 98.4                Intake and output was reviewed and the fluid balance was calculated  Daily     Daily   I&O's Summary    2024 07:  -  2024 07:00  --------------------------------------------------------  IN: 2490 mL / OUT: 2420 mL / NET: 70 mL    2024 07:01  -  2024 16:02  --------------------------------------------------------  IN: 90 mL / OUT: 335 mL / NET: -245 mL        All lines and drain sites were assessed  Glycemic trend was reviewedCAPILLARY BLOOD GLUCOSE      POCT Blood Glucose.: 134 mg/dL (2024 07:00)    No acute change in mental status  Auscultation of the chest reveals equal bs  Abdomen is soft  Extremities are warm and well perfused  Wounds appear clean and unremarkable  Antibiotics are periop    labs  CBC Full  -  ( 2024 13:29 )  WBC Count : 14.98 K/uL  RBC Count : 4.37 M/uL  Hemoglobin : 11.4 g/dL  Hematocrit : 32.8 %  Platelet Count - Automated : 147 K/uL  Mean Cell Volume : 75.1 fl  Mean Cell Hemoglobin : 26.1 pg  Mean Cell Hemoglobin Concentration : 34.8 gm/dL  Auto Neutrophil # : 12.66 K/uL  Auto Lymphocyte # : 1.29 K/uL  Auto Monocyte # : 1.03 K/uL  Auto Eosinophil # : 0.00 K/uL  Auto Basophil # : 0.00 K/uL  Auto Neutrophil % : 84.5 %  Auto Lymphocyte % : 8.6 %  Auto Monocyte % : 6.9 %  Auto Eosinophil % : 0.0 %  Auto Basophil % : 0.0 %        134<L>  |  103  |  14  ----------------------------<  150<H>  4.3   |  22  |  0.82    Ca    8.8      2024 14:17  Phos  2.6       Mg     2.2         TPro  6.4  /  Alb  3.8  /  TBili  1.1  /  DBili  x   /  AST  21  /  ALT  13  /  AlkPhos  74  27    PT/INR - ( 2024 13:29 )   PT: 14.5 sec;   INR: 1.28          PTT - ( 2024 13:29 )  PTT:63.2 sec  The current medications were reviewed   MEDICATIONS  (STANDING):  albuterol    90 MICROgram(s) HFA Inhaler 2 Puff(s) Inhalation every 6 hours  albuterol/ipratropium for Nebulization 3 milliLiter(s) Nebulizer every 6 hours  ascorbic acid 500 milliGRAM(s) Oral two times a day  atorvastatin 20 milliGRAM(s) Oral at bedtime  chlorhexidine 2% Cloths 1 Application(s) Topical daily  dextrose 10% Bolus 125 milliLiter(s) IV Bolus once  dextrose 5%. 1000 milliLiter(s) (50 mL/Hr) IV Continuous <Continuous>  dextrose 5%. 1000 milliLiter(s) (100 mL/Hr) IV Continuous <Continuous>  dextrose 50% Injectable 25 Gram(s) IV Push once  dextrose 50% Injectable 12.5 Gram(s) IV Push once  gabapentin 100 milliGRAM(s) Oral every 8 hours  glucagon  Injectable 1 milliGRAM(s) IntraMuscular once  influenza  Vaccine (HIGH DOSE) 0.7 milliLiter(s) IntraMuscular once  insulin lispro (ADMELOG) corrective regimen sliding scale   SubCutaneous Before meals and at bedtime  mupirocin 2% Ointment 1 Application(s) Both Nostrils two times a day  pantoprazole    Tablet 40 milliGRAM(s) Oral daily  polyethylene glycol 3350 17 Gram(s) Oral daily  riociguat 2.5 milliGRAM(s) Oral every 8 hours  senna 2 Tablet(s) Oral at bedtime  sodium chloride 0.9%. 1000 milliLiter(s) (10 mL/Hr) IV Continuous <Continuous>    MEDICATIONS  (PRN):  acetaminophen     Tablet .. 650 milliGRAM(s) Oral every 6 hours PRN Mild Pain (1 - 3)  dextrose Oral Gel 15 Gram(s) Oral once PRN Blood Glucose LESS THAN 70 milliGRAM(s)/deciliter       PROBLEM LIST/ ASSESSMENT:  HEALTH ISSUES - PROBLEM Dx:      ,   Patient is a 68y old  Female who presents with a chief complaint of type B dissection (2024 14:07)     s/p TEVAR      My plan includes :    Maintain MAP >80-85  Monitor for LE motor deficit  continue CSF diversion @ 100 cc/hr overnight  possible LD d/c tomorrow    close hemodynamic, ventilatory and drain monitoring and management per post op routine    Monitor for arrhythmias and monitor parameters for organ perfusion  monitor neurologic status  Head of the bed should remain elevated to 45 deg .   chest PT and IS will be encouraged  monitor adequacy of oxygenation and ventilation and attempt to wean oxygen  Nutritional goals will be met using po eventually , ensure adequate caloric intake and montior the same  Stress ulcer and VTE prophylaxis will be achieved    Glycemic control is satisfactory  Electrolytes have been repleted as necessary and wound care has been carried out. Pain control has been achieved.   agressive physical therapy and early mobility and ambulation goals will be met   The family was updated about the course and plan  CRITICAL CARE TIME SPENT in evaluation and management, reassessments, review and interpretation of labs and x-rays, ventilator and hemodynamic management, formulating a plan and coordinating care: __30___ MIN.  Time does not include procedural time.  CTICU ATTENDING     					    Duong Reynolds MD

## 2024-04-28 LAB
ALBUMIN SERPL ELPH-MCNC: 3.4 G/DL — SIGNIFICANT CHANGE UP (ref 3.3–5)
ALBUMIN SERPL ELPH-MCNC: 3.4 G/DL — SIGNIFICANT CHANGE UP (ref 3.3–5)
ALBUMIN SERPL ELPH-MCNC: 3.7 G/DL — SIGNIFICANT CHANGE UP (ref 3.3–5)
ALP SERPL-CCNC: 64 U/L — SIGNIFICANT CHANGE UP (ref 40–120)
ALP SERPL-CCNC: 78 U/L — SIGNIFICANT CHANGE UP (ref 40–120)
ALP SERPL-CCNC: SIGNIFICANT CHANGE UP U/L (ref 40–120)
ALT FLD-CCNC: 12 U/L — SIGNIFICANT CHANGE UP (ref 10–45)
ALT FLD-CCNC: 22 U/L — SIGNIFICANT CHANGE UP (ref 10–45)
ALT FLD-CCNC: SIGNIFICANT CHANGE UP U/L (ref 10–45)
AMYLASE P1 CFR SERPL: 48 U/L — SIGNIFICANT CHANGE UP (ref 25–125)
ANION GAP SERPL CALC-SCNC: 12 MMOL/L — SIGNIFICANT CHANGE UP (ref 5–17)
ANION GAP SERPL CALC-SCNC: 7 MMOL/L — SIGNIFICANT CHANGE UP (ref 5–17)
ANION GAP SERPL CALC-SCNC: 8 MMOL/L — SIGNIFICANT CHANGE UP (ref 5–17)
APTT BLD: 27.5 SEC — SIGNIFICANT CHANGE UP (ref 24.5–35.6)
APTT BLD: 31.8 SEC — SIGNIFICANT CHANGE UP (ref 24.5–35.6)
AST SERPL-CCNC: 26 U/L — SIGNIFICANT CHANGE UP (ref 10–40)
AST SERPL-CCNC: SIGNIFICANT CHANGE UP (ref 10–40)
AST SERPL-CCNC: SIGNIFICANT CHANGE UP U/L (ref 10–40)
BASOPHILS # BLD AUTO: 0.02 K/UL — SIGNIFICANT CHANGE UP (ref 0–0.2)
BASOPHILS # BLD AUTO: 0.02 K/UL — SIGNIFICANT CHANGE UP (ref 0–0.2)
BASOPHILS NFR BLD AUTO: 0.1 % — SIGNIFICANT CHANGE UP (ref 0–2)
BASOPHILS NFR BLD AUTO: 0.1 % — SIGNIFICANT CHANGE UP (ref 0–2)
BILIRUB SERPL-MCNC: 1.3 MG/DL — HIGH (ref 0.2–1.2)
BILIRUB SERPL-MCNC: 1.3 MG/DL — HIGH (ref 0.2–1.2)
BILIRUB SERPL-MCNC: 1.5 MG/DL — HIGH (ref 0.2–1.2)
BUN SERPL-MCNC: 12 MG/DL — SIGNIFICANT CHANGE UP (ref 7–23)
BUN SERPL-MCNC: 12 MG/DL — SIGNIFICANT CHANGE UP (ref 7–23)
BUN SERPL-MCNC: 13 MG/DL — SIGNIFICANT CHANGE UP (ref 7–23)
CALCIUM SERPL-MCNC: 8.6 MG/DL — SIGNIFICANT CHANGE UP (ref 8.4–10.5)
CALCIUM SERPL-MCNC: 8.7 MG/DL — SIGNIFICANT CHANGE UP (ref 8.4–10.5)
CALCIUM SERPL-MCNC: 9.2 MG/DL — SIGNIFICANT CHANGE UP (ref 8.4–10.5)
CHLORIDE SERPL-SCNC: 102 MMOL/L — SIGNIFICANT CHANGE UP (ref 96–108)
CHLORIDE SERPL-SCNC: 104 MMOL/L — SIGNIFICANT CHANGE UP (ref 96–108)
CHLORIDE SERPL-SCNC: 99 MMOL/L — SIGNIFICANT CHANGE UP (ref 96–108)
CO2 SERPL-SCNC: 23 MMOL/L — SIGNIFICANT CHANGE UP (ref 22–31)
CO2 SERPL-SCNC: 24 MMOL/L — SIGNIFICANT CHANGE UP (ref 22–31)
CO2 SERPL-SCNC: 24 MMOL/L — SIGNIFICANT CHANGE UP (ref 22–31)
CREAT SERPL-MCNC: 0.79 MG/DL — SIGNIFICANT CHANGE UP (ref 0.5–1.3)
CREAT SERPL-MCNC: 0.8 MG/DL — SIGNIFICANT CHANGE UP (ref 0.5–1.3)
CREAT SERPL-MCNC: 0.99 MG/DL — SIGNIFICANT CHANGE UP (ref 0.5–1.3)
EGFR: 62 ML/MIN/1.73M2 — SIGNIFICANT CHANGE UP
EGFR: 80 ML/MIN/1.73M2 — SIGNIFICANT CHANGE UP
EGFR: 81 ML/MIN/1.73M2 — SIGNIFICANT CHANGE UP
EOSINOPHIL # BLD AUTO: 0.01 K/UL — SIGNIFICANT CHANGE UP (ref 0–0.5)
EOSINOPHIL # BLD AUTO: 0.02 K/UL — SIGNIFICANT CHANGE UP (ref 0–0.5)
EOSINOPHIL NFR BLD AUTO: 0.1 % — SIGNIFICANT CHANGE UP (ref 0–6)
EOSINOPHIL NFR BLD AUTO: 0.1 % — SIGNIFICANT CHANGE UP (ref 0–6)
GAS PNL BLDA: SIGNIFICANT CHANGE UP
GLUCOSE BLDC GLUCOMTR-MCNC: 110 MG/DL — HIGH (ref 70–99)
GLUCOSE BLDC GLUCOMTR-MCNC: 117 MG/DL — HIGH (ref 70–99)
GLUCOSE BLDC GLUCOMTR-MCNC: 137 MG/DL — HIGH (ref 70–99)
GLUCOSE BLDC GLUCOMTR-MCNC: 137 MG/DL — HIGH (ref 70–99)
GLUCOSE SERPL-MCNC: 116 MG/DL — HIGH (ref 70–99)
GLUCOSE SERPL-MCNC: 119 MG/DL — HIGH (ref 70–99)
GLUCOSE SERPL-MCNC: 121 MG/DL — HIGH (ref 70–99)
HCT VFR BLD CALC: 30.6 % — LOW (ref 34.5–45)
HCT VFR BLD CALC: 32.9 % — LOW (ref 34.5–45)
HGB BLD-MCNC: 10.6 G/DL — LOW (ref 11.5–15.5)
HGB BLD-MCNC: 11.1 G/DL — LOW (ref 11.5–15.5)
IMM GRANULOCYTES NFR BLD AUTO: 0.5 % — SIGNIFICANT CHANGE UP (ref 0–0.9)
IMM GRANULOCYTES NFR BLD AUTO: 0.5 % — SIGNIFICANT CHANGE UP (ref 0–0.9)
INR BLD: 1.13 — SIGNIFICANT CHANGE UP (ref 0.85–1.18)
INR BLD: 1.28 — HIGH (ref 0.85–1.18)
LACTATE SERPL-SCNC: 1.9 MMOL/L — SIGNIFICANT CHANGE UP (ref 0.5–2)
LIDOCAIN IGE QN: 20 U/L — SIGNIFICANT CHANGE UP (ref 7–60)
LYMPHOCYTES # BLD AUTO: 1.65 K/UL — SIGNIFICANT CHANGE UP (ref 1–3.3)
LYMPHOCYTES # BLD AUTO: 10.8 % — LOW (ref 13–44)
LYMPHOCYTES # BLD AUTO: 14.5 % — SIGNIFICANT CHANGE UP (ref 13–44)
LYMPHOCYTES # BLD AUTO: 2.39 K/UL — SIGNIFICANT CHANGE UP (ref 1–3.3)
MAGNESIUM SERPL-MCNC: 2.2 MG/DL — SIGNIFICANT CHANGE UP (ref 1.6–2.6)
MAGNESIUM SERPL-MCNC: 2.2 MG/DL — SIGNIFICANT CHANGE UP (ref 1.6–2.6)
MCHC RBC-ENTMCNC: 26.1 PG — LOW (ref 27–34)
MCHC RBC-ENTMCNC: 26.1 PG — LOW (ref 27–34)
MCHC RBC-ENTMCNC: 33.7 GM/DL — SIGNIFICANT CHANGE UP (ref 32–36)
MCHC RBC-ENTMCNC: 34.6 GM/DL — SIGNIFICANT CHANGE UP (ref 32–36)
MCV RBC AUTO: 75.4 FL — LOW (ref 80–100)
MCV RBC AUTO: 77.2 FL — LOW (ref 80–100)
MONOCYTES # BLD AUTO: 2.05 K/UL — HIGH (ref 0–0.9)
MONOCYTES # BLD AUTO: 2.22 K/UL — HIGH (ref 0–0.9)
MONOCYTES NFR BLD AUTO: 13.5 % — SIGNIFICANT CHANGE UP (ref 2–14)
MONOCYTES NFR BLD AUTO: 13.5 % — SIGNIFICANT CHANGE UP (ref 2–14)
NEUTROPHILS # BLD AUTO: 11.43 K/UL — HIGH (ref 1.8–7.4)
NEUTROPHILS # BLD AUTO: 11.77 K/UL — HIGH (ref 1.8–7.4)
NEUTROPHILS NFR BLD AUTO: 71.3 % — SIGNIFICANT CHANGE UP (ref 43–77)
NEUTROPHILS NFR BLD AUTO: 75 % — SIGNIFICANT CHANGE UP (ref 43–77)
NRBC # BLD: 0 /100 WBCS — SIGNIFICANT CHANGE UP (ref 0–0)
NRBC # BLD: 0 /100 WBCS — SIGNIFICANT CHANGE UP (ref 0–0)
PHOSPHATE SERPL-MCNC: 1.8 MG/DL — LOW (ref 2.5–4.5)
PHOSPHATE SERPL-MCNC: 2.7 MG/DL — SIGNIFICANT CHANGE UP (ref 2.5–4.5)
PLATELET # BLD AUTO: 127 K/UL — LOW (ref 150–400)
PLATELET # BLD AUTO: 137 K/UL — LOW (ref 150–400)
POTASSIUM SERPL-MCNC: 3.8 MMOL/L — SIGNIFICANT CHANGE UP (ref 3.5–5.3)
POTASSIUM SERPL-MCNC: 4.1 MMOL/L — SIGNIFICANT CHANGE UP (ref 3.5–5.3)
POTASSIUM SERPL-MCNC: SIGNIFICANT CHANGE UP MMOL/L (ref 3.5–5.3)
POTASSIUM SERPL-SCNC: 3.8 MMOL/L — SIGNIFICANT CHANGE UP (ref 3.5–5.3)
POTASSIUM SERPL-SCNC: 4.1 MMOL/L — SIGNIFICANT CHANGE UP (ref 3.5–5.3)
POTASSIUM SERPL-SCNC: SIGNIFICANT CHANGE UP MMOL/L (ref 3.5–5.3)
PROT SERPL-MCNC: 5.9 G/DL — LOW (ref 6–8.3)
PROT SERPL-MCNC: 6.2 G/DL — SIGNIFICANT CHANGE UP (ref 6–8.3)
PROT SERPL-MCNC: 6.5 G/DL — SIGNIFICANT CHANGE UP (ref 6–8.3)
PROTHROM AB SERPL-ACNC: 12.8 SEC — SIGNIFICANT CHANGE UP (ref 9.5–13)
PROTHROM AB SERPL-ACNC: 14.5 SEC — HIGH (ref 9.5–13)
RBC # BLD: 4.06 M/UL — SIGNIFICANT CHANGE UP (ref 3.8–5.2)
RBC # BLD: 4.26 M/UL — SIGNIFICANT CHANGE UP (ref 3.8–5.2)
RBC # FLD: 15.3 % — HIGH (ref 10.3–14.5)
RBC # FLD: 15.6 % — HIGH (ref 10.3–14.5)
SODIUM SERPL-SCNC: 133 MMOL/L — LOW (ref 135–145)
SODIUM SERPL-SCNC: 135 MMOL/L — SIGNIFICANT CHANGE UP (ref 135–145)
SODIUM SERPL-SCNC: 135 MMOL/L — SIGNIFICANT CHANGE UP (ref 135–145)
WBC # BLD: 15.24 K/UL — HIGH (ref 3.8–10.5)
WBC # BLD: 16.5 K/UL — HIGH (ref 3.8–10.5)
WBC # FLD AUTO: 15.24 K/UL — HIGH (ref 3.8–10.5)
WBC # FLD AUTO: 16.5 K/UL — HIGH (ref 3.8–10.5)

## 2024-04-28 PROCEDURE — 71045 X-RAY EXAM CHEST 1 VIEW: CPT | Mod: 26

## 2024-04-28 PROCEDURE — 71275 CT ANGIOGRAPHY CHEST: CPT | Mod: 26

## 2024-04-28 PROCEDURE — 74174 CTA ABD&PLVS W/CONTRAST: CPT | Mod: 26

## 2024-04-28 PROCEDURE — 99232 SBSQ HOSP IP/OBS MODERATE 35: CPT

## 2024-04-28 RX ORDER — HEPARIN SODIUM 5000 [USP'U]/ML
500 INJECTION INTRAVENOUS; SUBCUTANEOUS
Qty: 25000 | Refills: 0 | Status: DISCONTINUED | OUTPATIENT
Start: 2024-04-28 | End: 2024-04-29

## 2024-04-28 RX ORDER — SODIUM,POTASSIUM PHOSPHATES 278-250MG
1 POWDER IN PACKET (EA) ORAL ONCE
Refills: 0 | Status: COMPLETED | OUTPATIENT
Start: 2024-04-28 | End: 2024-04-28

## 2024-04-28 RX ORDER — POTASSIUM PHOSPHATE, MONOBASIC POTASSIUM PHOSPHATE, DIBASIC 236; 224 MG/ML; MG/ML
15 INJECTION, SOLUTION INTRAVENOUS ONCE
Refills: 0 | Status: DISCONTINUED | OUTPATIENT
Start: 2024-04-28 | End: 2024-04-30

## 2024-04-28 RX ADMIN — RIOCIGUAT 2.5 MILLIGRAM(S): 1.5 TABLET, FILM COATED ORAL at 05:08

## 2024-04-28 RX ADMIN — Medication 500 MILLIGRAM(S): at 18:10

## 2024-04-28 RX ADMIN — Medication 3 MILLILITER(S): at 18:10

## 2024-04-28 RX ADMIN — MUPIROCIN 1 APPLICATION(S): 20 OINTMENT TOPICAL at 05:07

## 2024-04-28 RX ADMIN — ATORVASTATIN CALCIUM 20 MILLIGRAM(S): 80 TABLET, FILM COATED ORAL at 21:33

## 2024-04-28 RX ADMIN — GABAPENTIN 100 MILLIGRAM(S): 400 CAPSULE ORAL at 05:07

## 2024-04-28 RX ADMIN — Medication 3 MILLILITER(S): at 01:23

## 2024-04-28 RX ADMIN — Medication 3 MILLILITER(S): at 11:27

## 2024-04-28 RX ADMIN — Medication 3 MILLILITER(S): at 22:44

## 2024-04-28 RX ADMIN — MUPIROCIN 1 APPLICATION(S): 20 OINTMENT TOPICAL at 18:10

## 2024-04-28 RX ADMIN — Medication 1 TABLET(S): at 21:32

## 2024-04-28 RX ADMIN — PANTOPRAZOLE SODIUM 40 MILLIGRAM(S): 20 TABLET, DELAYED RELEASE ORAL at 11:27

## 2024-04-28 RX ADMIN — Medication 500 MILLIGRAM(S): at 05:07

## 2024-04-28 RX ADMIN — Medication 3 MILLILITER(S): at 06:10

## 2024-04-28 RX ADMIN — GABAPENTIN 100 MILLIGRAM(S): 400 CAPSULE ORAL at 15:05

## 2024-04-28 RX ADMIN — CHLORHEXIDINE GLUCONATE 1 APPLICATION(S): 213 SOLUTION TOPICAL at 05:06

## 2024-04-28 RX ADMIN — POLYETHYLENE GLYCOL 3350 17 GRAM(S): 17 POWDER, FOR SOLUTION ORAL at 11:27

## 2024-04-28 RX ADMIN — RIOCIGUAT 2.5 MILLIGRAM(S): 1.5 TABLET, FILM COATED ORAL at 15:07

## 2024-04-28 RX ADMIN — GABAPENTIN 100 MILLIGRAM(S): 400 CAPSULE ORAL at 21:33

## 2024-04-28 RX ADMIN — SENNA PLUS 2 TABLET(S): 8.6 TABLET ORAL at 21:33

## 2024-04-28 RX ADMIN — RIOCIGUAT 2.5 MILLIGRAM(S): 1.5 TABLET, FILM COATED ORAL at 21:32

## 2024-04-28 RX ADMIN — HEPARIN SODIUM 5 UNIT(S)/HR: 5000 INJECTION INTRAVENOUS; SUBCUTANEOUS at 19:49

## 2024-04-28 NOTE — PROGRESS NOTE ADULT - ASSESSMENT
a/p: 69 y/o F PMH asthma, CTEPH, CAD, HTN, Type B dissection who was seen by the outpatient office and was deemed a good candidate for TEVAR. Back in August 2023 she was seen by her outpatient pulmonologist, Dr. Cathy Serra, was noted to have an elevated D-Dimer and was advised to present to ED for workup. In ED a CTA C/A/P was performed which revealed a Type B dissection and she was admitted and discharged with close follow up. 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 She was deemed a candidate for TEVAR with Dr. Kwon and Dr. Suh and presented to [facility] for preop lumbar drain prior to OR tomorrow. Patient currently denies lightheadedness, headache, CP, palpitations, SOB, abdominal pain, nausea, vomiting, fever, chills. is now s/p TEVAR. Groin sites soft, pulses palpable and normal sensorimotor exam globally. Luumbar drain capped today, patient exam unchanged. Continue to monitor through removal of lumbar drain and then to discharge.     - continue neurovasacular checks per TEVAR ERAS protocol  - b/l groin checks per TEVAR ERAS protocol  - rest of care per CTICU  - vascular surgery will continue to follow patient

## 2024-04-28 NOTE — PROGRESS NOTE ADULT - SUBJECTIVE AND OBJECTIVE BOX
Subjective:     ROS:   Denies Headache, blurred vision, Chest Pain, SOB, Abdominal pain, nausea or vomiting     Social   riociguat 2.5      Allergies    No Known Allergies    Intolerances        Vital Signs Last 24 Hrs  T(C): 36.6 (28 Apr 2024 09:37), Max: 37 (27 Apr 2024 21:19)  T(F): 97.9 (28 Apr 2024 09:37), Max: 98.6 (27 Apr 2024 21:19)  HR: 87 (28 Apr 2024 10:00) (66 - 97)  BP: 123/63 (28 Apr 2024 10:00) (123/63 - 123/63)  BP(mean): 87 (28 Apr 2024 10:00) (87 - 87)  RR: 16 (28 Apr 2024 10:00) (16 - 20)  SpO2: 92% (28 Apr 2024 10:00) (92% - 100%)    Parameters below as of 28 Apr 2024 10:00  Patient On (Oxygen Delivery Method): nasal cannula w/ humidification  O2 Flow (L/min): 4    I&O's Summary    27 Apr 2024 07:01  -  28 Apr 2024 07:00  --------------------------------------------------------  IN: 740 mL / OUT: 1350 mL / NET: -610 mL    28 Apr 2024 07:01  -  28 Apr 2024 11:10  --------------------------------------------------------  IN: 30 mL / OUT: 135 mL / NET: -105 mL      Physical Exam:  General: NAD, well appearing male  Pulmonary: NLB on HFNC  Cardiovascular: RRR, no MGR  Abdominal: Soft nt nd, L groin soft, dressed with Dermabond R groin soft, incision c/d/i   Extremities: WWP  Pulses: palpable pulses 2+ b/l groin and in BUE (radial ulnar) and BLE (DP,PT)   Neuro: Normal lower extremity strength proximally and distally      LABS:                        10.6   15.24 )-----------( 127      ( 28 Apr 2024 02:09 )             30.6     04-28    135  |  104  |  12  ----------------------------<  119<H>  4.1   |  23  |  0.80    Ca    8.7      28 Apr 2024 03:05  Phos  2.7     04-28  Mg     2.2     04-28    TPro  5.9<L>  /  Alb  3.4  /  TBili  1.3<H>  /  DBili  x   /  AST  See Note  /  ALT  12  /  AlkPhos  64  04-28    PT/INR - ( 28 Apr 2024 02:09 )   PT: 14.5 sec;   INR: 1.28          PTT - ( 28 Apr 2024 02:09 )  PTT:27.5 sec      A/P: 68y YO Female      Vascular/PAD:      HTN/HLD:    CAD/CHF:     DM:    CKD:     Anemia:     ID:    Diet:     Activity:     DVTPPx:     Dispo:      Subjective: Patient doing well. Tolerated diet, passing flatus, ambulated this morning. Feeling optimistic about recovery so far. Denies all constitutional sx including fevers, chills, malaise, or abd pain. Denies any focal neurological symptoms such as paraesthesia numbness, tingling, difficulty moving extremities or weakness. No chest pain, SOB, difficulty breathing or n/v.     ROS:   Denies Headache, blurred vision, Chest Pain, SOB, Abdominal pain, nausea or vomiting     Social   riociguat 2.5      Allergies    No Known Allergies    Intolerances        Vital Signs Last 24 Hrs  T(C): 36.6 (28 Apr 2024 09:37), Max: 37 (27 Apr 2024 21:19)  T(F): 97.9 (28 Apr 2024 09:37), Max: 98.6 (27 Apr 2024 21:19)  HR: 87 (28 Apr 2024 10:00) (66 - 97)  BP: 123/63 (28 Apr 2024 10:00) (123/63 - 123/63)  BP(mean): 87 (28 Apr 2024 10:00) (87 - 87)  RR: 16 (28 Apr 2024 10:00) (16 - 20)  SpO2: 92% (28 Apr 2024 10:00) (92% - 100%)    Parameters below as of 28 Apr 2024 10:00  Patient On (Oxygen Delivery Method): nasal cannula w/ humidification  O2 Flow (L/min): 4    I&O's Summary    27 Apr 2024 07:01  -  28 Apr 2024 07:00  --------------------------------------------------------  IN: 740 mL / OUT: 1350 mL / NET: -610 mL    28 Apr 2024 07:01  -  28 Apr 2024 11:10  --------------------------------------------------------  IN: 30 mL / OUT: 135 mL / NET: -105 mL      Physical Exam:  General: NAD, well appearing male  Pulmonary: NLB on HFNC  Cardiovascular: RRR, no MGR  Abdominal: Soft nt nd, L groin soft, dressed with Dermabond R groin soft, incision c/d/i   Extremities: WWP  Pulses: palpable pulses 2+ b/l groin and in BUE (radial ulnar) and BLE (DP,PT)   Neuro: Normal lower extremity strength proximally and distally      LABS:                        10.6   15.24 )-----------( 127      ( 28 Apr 2024 02:09 )             30.6     04-28    135  |  104  |  12  ----------------------------<  119<H>  4.1   |  23  |  0.80    Ca    8.7      28 Apr 2024 03:05  Phos  2.7     04-28  Mg     2.2     04-28    TPro  5.9<L>  /  Alb  3.4  /  TBili  1.3<H>  /  DBili  x   /  AST  See Note  /  ALT  12  /  AlkPhos  64  04-28    PT/INR - ( 28 Apr 2024 02:09 )   PT: 14.5 sec;   INR: 1.28          PTT - ( 28 Apr 2024 02:09 )  PTT:27.5 sec      A/P: 68y YO Female      Vascular/PAD:      HTN/HLD:    CAD/CHF:     DM:    CKD:     Anemia:     ID:    Diet:     Activity:     DVTPPx:     Dispo:

## 2024-04-28 NOTE — PROGRESS NOTE ADULT - SUBJECTIVE AND OBJECTIVE BOX
CTICU  CRITICAL  CARE  attending     Hand off received 					   Pertinent clinical, laboratory, radiographic, hemodynamic, echocardiographic, respiratory data, microbiologic data and chart were reviewed and analyzed frequently throughout the course of the day and night        68 year old female with ASTHMA, Chronic Pulmonary HTN,, CAD, HTN.  She was evaluated by her pulmonologist.  Dr. Cathy Serra, was noted to have an elevated D-Dimer and was advised to present to ED for workup. In ED a CTA C/A/P was performed which revealed a Type B dissection and she was admitted and discharged with close follow up.   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   She was deemed a candidate for TEVAR with Dr. Kwon and Dr. Suh and presented to [facility] for preop lumbar drain prior to OR     S/P TEVAR.      FAMILY HISTORY:  Family history of early CAD (Father)    PAST MEDICAL & SURGICAL HISTORY:  HTN (hypertension)  Pre Diabetes Mellitis  Mild tricuspid regurgitation  Enlarged aorta  CAD (coronary artery disease)  Ascending aorta dilation  Pulmonary hypertension  Chronic systolic right heart failure  2019 novel coronavirus disease (COVID-19)  Asthma  S/P hysterectomy  S/P         14 system review was unremarkable    Vital signs, hemodynamic and respiratory parameters were reviewed from the bedside nursing flow sheet.  ICU Vital Signs Last 24 Hrs  T(C): 36.9 (2024 17:05), Max: 37 (2024 21:19)  T(F): 98.4 (2024 17:05), Max: 98.6 (2024 21:19)  HR: 93 (2024 17:00) (78 - 97)  BP: 129/64 (2024 17:00) (123/63 - 135/63)  BP(mean): 88 (2024 17:00) (84 - 90)  ABP: 139/64 (2024 09:00) (132/64 - 150/72)  ABP(mean): 93 (2024 09:00) (88 - 101)  RR: 16 (2024 17:00) (15 - 20)  SpO2: 98% (2024 17:00) (90% - 100%)    O2 Parameters below as of 2024 18:00  Patient On (Oxygen Delivery Method): nasal cannula w/ humidification  O2 Flow (L/min): 2        Adult Advanced Hemodynamics Last 24 Hrs  CVP(mm Hg): --  CVP(cm H2O): --  CO: --  CI: --  PA: --  PA(mean): --  PCWP: --  SVR: --  SVRI: --  PVR: --  PVRI: --, ABG - ( 2024 02:09 )  pH, Arterial: 7.47  pH, Blood: x     /  pCO2: 33    /  pO2: 69    / HCO3: 24    / Base Excess: 0.9   /  SaO2: 95.9                Intake and output was reviewed and the fluid balance was calculated  Daily     Daily   I&O's Summary    2024 07:  -  2024 07:00  --------------------------------------------------------  IN: 740 mL / OUT: 1350 mL / NET: -610 mL    2024 07:01  -  2024 20:42  --------------------------------------------------------  IN: 90 mL / OUT: 185 mL / NET: -95 mL            Neuro: No change in the Neuro status from the baseline.   Neck: No JVD.  CVS: S1, S2, No S3.  Lungs: Good air entry bilaterally.  Abd: Soft. No tenderness. + Bowel sounds.  Vascular: + DP/PT.  Extremities: No edema.  Lymphatic: Normal.  Skin: No abnormalities.      labs  CBC Full  -  ( 2024 18:55 )  WBC Count : 16.50 K/uL  RBC Count : 4.26 M/uL  Hemoglobin : 11.1 g/dL  Hematocrit : 32.9 %  Platelet Count - Automated : 137 K/uL  Mean Cell Volume : 77.2 fl  Mean Cell Hemoglobin : 26.1 pg  Mean Cell Hemoglobin Concentration : 33.7 gm/dL  Auto Neutrophil # : 11.77 K/uL  Auto Lymphocyte # : 2.39 K/uL  Auto Monocyte # : 2.22 K/uL  Auto Eosinophil # : 0.02 K/uL  Auto Basophil # : 0.02 K/uL  Auto Neutrophil % : 71.3 %  Auto Lymphocyte % : 14.5 %  Auto Monocyte % : 13.5 %  Auto Eosinophil % : 0.1 %  Auto Basophil % : 0.1 %        135  |  99  |  13  ----------------------------<  116<H>  3.8   |  24  |  0.99    Ca    9.2      2024 18:55  Phos  1.8       Mg     2.2         TPro  6.5  /  Alb  3.7  /  TBili  1.5<H>  /  DBili  x   /  AST  26  /  ALT  22  /  AlkPhos  78      PT/INR - ( 2024 18:55 )   PT: 12.8 sec;   INR: 1.13          PTT - ( 2024 18:55 )  PTT:31.8 sec  The current medications were reviewed   MEDICATIONS  (STANDING):  albuterol    90 MICROgram(s) HFA Inhaler 2 Puff(s) Inhalation every 6 hours  albuterol/ipratropium for Nebulization 3 milliLiter(s) Nebulizer every 6 hours  ascorbic acid 500 milliGRAM(s) Oral two times a day  atorvastatin 20 milliGRAM(s) Oral at bedtime  chlorhexidine 2% Cloths 1 Application(s) Topical daily  dextrose 10% Bolus 125 milliLiter(s) IV Bolus once  dextrose 5%. 1000 milliLiter(s) (50 mL/Hr) IV Continuous <Continuous>  dextrose 5%. 1000 milliLiter(s) (100 mL/Hr) IV Continuous <Continuous>  dextrose 50% Injectable 25 Gram(s) IV Push once  dextrose 50% Injectable 12.5 Gram(s) IV Push once  gabapentin 100 milliGRAM(s) Oral every 8 hours  glucagon  Injectable 1 milliGRAM(s) IntraMuscular once  heparin  Infusion 500 Unit(s)/Hr (5 mL/Hr) IV Continuous <Continuous>  influenza  Vaccine (HIGH DOSE) 0.7 milliLiter(s) IntraMuscular once  insulin lispro (ADMELOG) corrective regimen sliding scale   SubCutaneous Before meals and at bedtime  mupirocin 2% Ointment 1 Application(s) Both Nostrils two times a day  pantoprazole    Tablet 40 milliGRAM(s) Oral daily  polyethylene glycol 3350 17 Gram(s) Oral daily  potassium phosphate / sodium phosphate Tablet (K-PHOS No. 2) 1 Tablet(s) Oral once  potassium phosphate IVPB 15 milliMole(s) IV Intermittent once  riociguat 2.5 milliGRAM(s) Oral every 8 hours  senna 2 Tablet(s) Oral at bedtime  sodium chloride 0.9%. 1000 milliLiter(s) (10 mL/Hr) IV Continuous <Continuous>    MEDICATIONS  (PRN):  acetaminophen     Tablet .. 650 milliGRAM(s) Oral every 6 hours PRN Mild Pain (1 - 3)  dextrose Oral Gel 15 Gram(s) Oral once PRN Blood Glucose LESS THAN 70 milliGRAM(s)/deciliter          68 year old  Female admitted with type B dissection.  S/P TEVAR (Dual Grafts).  CT ABDOMEN: Interval TEVAR of Type B aortic dissection. Stable aneurysmal dilatation of false lumen below distal graft attachment site which is perfused.  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 Distal common hepatic artery is perfused.  Multifocal splenic infarcts and multiple small renal infarcts bilaterally, likely embolic etiology.  Hemodynamically stable.  Good oxygenation.  Fair urine out put.          My plan includes :  IV heparin for celiac thrombosis.  Statin Rx.  Bronchodilator Rx.             Monitor renal function.  Close hemodynamic monitoring   Monitor for arrhythmias and monitor parameters for organ perfusion  Monitor neurologic status  Riociguat for chronic pulmonary hypertension.  Head of the bed should remain elevated to 45 deg .   Chest PT and IS will be encouraged  Monitor adequacy of oxygenation   Nutritional goals will be met using po eventually , ensure adequate caloric intake and monitor the same  Stress ulcer and VTE prophylaxis will be achieved    Glycemic control is satisfactory  Electrolytes have been repleted as necessary and wound care has been carried out. Pain control has been achieved.   Aggressive physical therapy and early mobility and ambulation goals will be met   The family was updated about the course and plan  CRITICAL CARE TIME SPENT in evaluation and management, reassessments, review and interpretation of labs and x-rays, hemodynamic management, formulating a plan and coordinating care: ___30____ MIN.  Time does not include procedural time.  CTICU ATTENDING     					      Yanick Degroot MD

## 2024-04-28 NOTE — PROGRESS NOTE ADULT - SUBJECTIVE AND OBJECTIVE BOX
INTERVAL HPI/OVERNIGHT EVENTS:    4/26: TEVAR - 2 grafts deployed: most proximal covering left subclavian, most distal ~5cm superior to celiac trunk     67yo Female Hx asthma, CTEPH, CAD, HTN, known Type B dissection found on assessment for elevated D-dimer     CTa C/A/P: Type B dissection - plan BP control and monitoring  Repeat CTa 2/2024: stable type B dissection with proximal descending thoracic aortic aneurysm stable since prior exam 8/2023 and patent celiac axis originating from false lumen.     Deemed a candidate for TEVAR  Admitted for PreOp lumbar drain placement 4/25    taken to OR 4/26  no intraop blood; 1.6L Crystalloid given   arrived to ICU post-op Extubated - moving all extremities well     4/27 - LD clamped and patient up and ambulating  drained overnight and again clamped this am     ICU Vital Signs Last 24 Hrs  T(C): 36.6 (28 Apr 2024 09:37), Max: 37 (27 Apr 2024 21:19)  T(F): 97.9 (28 Apr 2024 09:37), Max: 98.6 (27 Apr 2024 21:19)  HR: 89 (28 Apr 2024 09:00) (66 - 97) sinus   ABP: 139/64 (28 Apr 2024 09:00) (115/57 - 241/186)  ABP(mean): 93 (28 Apr 2024 09:00) (79 - 101)  RR: 16 (28 Apr 2024 08:24) (16 - 20)  SpO2: 100% (28 Apr 2024 09:00) (94% - 100%) 4L NC     Qtts: None     I&O's Summary    27 Apr 2024 07:01  -  28 Apr 2024 07:00  --------------------------------------------------------  IN: 740 mL / OUT: 1350 mL / NET: -610 mL    28 Apr 2024 07:01  -  28 Apr 2024 10:08  --------------------------------------------------------  IN: 10 mL / OUT: 85 mL / NET: -75 mL    Physical Exam    Heart - regular - no rub/gallop  Lungs - CTA - no rhonchi/wheeze  Abd - (+)BS soft NTND (-)r/r/g  Ext - warm to touch; no cyanosis/clubbing  Neuro - alert/oriented and interactive - nonfocal ; moving against gravity without problem  Skin - no rash     LABS:                        10.6   15.24 )-----------( 127      ( 28 Apr 2024 02:09 )             30.6     04-28    135  |  104  |  12  ----------------------------<  119<H>  4.1   |  23  |  0.80    Ca    8.7      28 Apr 2024 03:05  Phos  2.7     04-28  Mg     2.2     04-28    TPro  5.9<L>  /  Alb  3.4  /  TBili  1.3<H>  /  DBili  x   /  AST  See Note  /  ALT  12  /  AlkPhos  64  04-28    PT/INR - ( 28 Apr 2024 02:09 )   PT: 14.5 sec;   INR: 1.28     PTT - ( 28 Apr 2024 02:09 )  PTT:27.5 sec    ABG - ( 28 Apr 2024 02:09 )  pH, Arterial: 7.47  pH, Blood: x     /  pCO2: 33    /  pO2: 69    / HCO3: 24    / Base Excess: 0.9   /  SaO2: 95.9      RADIOLOGY & ADDITIONAL STUDIES: reviewed    Patient with Type B aneurysm s/p TEVAR - doing well    Cap lumbar drain again and anticipate removal later today - d/w neurosurgery  BP control   monitor neuro and vascular checks ongoing   plan 3 phase scan today per CTS     cont nebs; Sa02 good on RA - no evidence of asthma exacerbation     DVT and GI prophylaxis  d/c planning  - anticiapte transfer to floor later today when bed available     d/w patient/staff and CTS

## 2024-04-29 ENCOUNTER — RESULT REVIEW (OUTPATIENT)
Age: 69
End: 2024-04-29

## 2024-04-29 DIAGNOSIS — I77.810 THORACIC AORTIC ECTASIA: ICD-10-CM

## 2024-04-29 LAB
ADD ON TEST-SPECIMEN IN LAB: SIGNIFICANT CHANGE UP
ALBUMIN SERPL ELPH-MCNC: 2.9 G/DL — LOW (ref 3.3–5)
ALBUMIN SERPL ELPH-MCNC: 3.3 G/DL — SIGNIFICANT CHANGE UP (ref 3.3–5)
ALBUMIN SERPL ELPH-MCNC: 3.5 G/DL — SIGNIFICANT CHANGE UP (ref 3.3–5)
ALBUMIN SERPL ELPH-MCNC: 3.6 G/DL — SIGNIFICANT CHANGE UP (ref 3.3–5)
ALBUMIN SERPL ELPH-MCNC: 3.8 G/DL — SIGNIFICANT CHANGE UP (ref 3.3–5)
ALBUMIN SERPL ELPH-MCNC: 4 G/DL — SIGNIFICANT CHANGE UP (ref 3.3–5)
ALP SERPL-CCNC: 65 U/L — SIGNIFICANT CHANGE UP (ref 40–120)
ALP SERPL-CCNC: 68 U/L — SIGNIFICANT CHANGE UP (ref 40–120)
ALP SERPL-CCNC: 78 U/L — SIGNIFICANT CHANGE UP (ref 40–120)
ALP SERPL-CCNC: 82 U/L — SIGNIFICANT CHANGE UP (ref 40–120)
ALP SERPL-CCNC: 87 U/L — SIGNIFICANT CHANGE UP (ref 40–120)
ALP SERPL-CCNC: 88 U/L — SIGNIFICANT CHANGE UP (ref 40–120)
ALT FLD-CCNC: 19 U/L — SIGNIFICANT CHANGE UP (ref 10–45)
ALT FLD-CCNC: 49 U/L — HIGH (ref 10–45)
ALT FLD-CCNC: 55 U/L — HIGH (ref 10–45)
ALT FLD-CCNC: 55 U/L — HIGH (ref 10–45)
ALT FLD-CCNC: 58 U/L — HIGH (ref 10–45)
ALT FLD-CCNC: 60 U/L — HIGH (ref 10–45)
AMYLASE P1 CFR SERPL: 43 U/L — SIGNIFICANT CHANGE UP (ref 25–125)
ANION GAP SERPL CALC-SCNC: 11 MMOL/L — SIGNIFICANT CHANGE UP (ref 5–17)
ANION GAP SERPL CALC-SCNC: 11 MMOL/L — SIGNIFICANT CHANGE UP (ref 5–17)
ANION GAP SERPL CALC-SCNC: 12 MMOL/L — SIGNIFICANT CHANGE UP (ref 5–17)
ANION GAP SERPL CALC-SCNC: 12 MMOL/L — SIGNIFICANT CHANGE UP (ref 5–17)
ANION GAP SERPL CALC-SCNC: 13 MMOL/L — SIGNIFICANT CHANGE UP (ref 5–17)
ANION GAP SERPL CALC-SCNC: 9 MMOL/L — SIGNIFICANT CHANGE UP (ref 5–17)
ANISOCYTOSIS BLD QL: SIGNIFICANT CHANGE UP
APTT BLD: 28.2 SEC — SIGNIFICANT CHANGE UP (ref 24.5–35.6)
APTT BLD: 28.3 SEC — SIGNIFICANT CHANGE UP (ref 24.5–35.6)
APTT BLD: 28.8 SEC — SIGNIFICANT CHANGE UP (ref 24.5–35.6)
APTT BLD: 29.6 SEC — SIGNIFICANT CHANGE UP (ref 24.5–35.6)
APTT BLD: 29.6 SEC — SIGNIFICANT CHANGE UP (ref 24.5–35.6)
APTT BLD: 31.6 SEC — SIGNIFICANT CHANGE UP (ref 24.5–35.6)
AST SERPL-CCNC: 24 U/L — SIGNIFICANT CHANGE UP (ref 10–40)
AST SERPL-CCNC: 45 U/L — HIGH (ref 10–40)
AST SERPL-CCNC: 47 U/L — HIGH (ref 10–40)
AST SERPL-CCNC: 55 U/L — HIGH (ref 10–40)
AST SERPL-CCNC: 63 U/L — HIGH (ref 10–40)
AST SERPL-CCNC: 67 U/L — HIGH (ref 10–40)
BASE EXCESS BLDA CALC-SCNC: 0.1 MMOL/L — SIGNIFICANT CHANGE UP (ref -2–3)
BASE EXCESS BLDV CALC-SCNC: -1.5 MMOL/L — SIGNIFICANT CHANGE UP (ref -2–3)
BASE EXCESS BLDV CALC-SCNC: -1.5 MMOL/L — SIGNIFICANT CHANGE UP (ref -2–3)
BASE EXCESS BLDV CALC-SCNC: 1.2 MMOL/L — SIGNIFICANT CHANGE UP (ref -2–3)
BASOPHILS # BLD AUTO: 0 K/UL — SIGNIFICANT CHANGE UP (ref 0–0.2)
BASOPHILS # BLD AUTO: 0.01 K/UL — SIGNIFICANT CHANGE UP (ref 0–0.2)
BASOPHILS # BLD AUTO: 0.02 K/UL — SIGNIFICANT CHANGE UP (ref 0–0.2)
BASOPHILS # BLD AUTO: 0.03 K/UL — SIGNIFICANT CHANGE UP (ref 0–0.2)
BASOPHILS # BLD AUTO: 0.04 K/UL — SIGNIFICANT CHANGE UP (ref 0–0.2)
BASOPHILS NFR BLD AUTO: 0 % — SIGNIFICANT CHANGE UP (ref 0–2)
BASOPHILS NFR BLD AUTO: 0.1 % — SIGNIFICANT CHANGE UP (ref 0–2)
BASOPHILS NFR BLD AUTO: 0.2 % — SIGNIFICANT CHANGE UP (ref 0–2)
BILIRUB SERPL-MCNC: 1.3 MG/DL — HIGH (ref 0.2–1.2)
BILIRUB SERPL-MCNC: 1.3 MG/DL — HIGH (ref 0.2–1.2)
BILIRUB SERPL-MCNC: 1.6 MG/DL — HIGH (ref 0.2–1.2)
BILIRUB SERPL-MCNC: 2 MG/DL — HIGH (ref 0.2–1.2)
BILIRUB SERPL-MCNC: 2.1 MG/DL — HIGH (ref 0.2–1.2)
BILIRUB SERPL-MCNC: 2.5 MG/DL — HIGH (ref 0.2–1.2)
BLD GP AB SCN SERPL QL: NEGATIVE — SIGNIFICANT CHANGE UP
BUN SERPL-MCNC: 10 MG/DL — SIGNIFICANT CHANGE UP (ref 7–23)
BUN SERPL-MCNC: 12 MG/DL — SIGNIFICANT CHANGE UP (ref 7–23)
BUN SERPL-MCNC: 13 MG/DL — SIGNIFICANT CHANGE UP (ref 7–23)
BUN SERPL-MCNC: 13 MG/DL — SIGNIFICANT CHANGE UP (ref 7–23)
BUN SERPL-MCNC: 14 MG/DL — SIGNIFICANT CHANGE UP (ref 7–23)
BUN SERPL-MCNC: 14 MG/DL — SIGNIFICANT CHANGE UP (ref 7–23)
CA-I SERPL-SCNC: 1.21 MMOL/L — SIGNIFICANT CHANGE UP (ref 1.15–1.33)
CA-I SERPL-SCNC: 1.23 MMOL/L — SIGNIFICANT CHANGE UP (ref 1.15–1.33)
CA-I SERPL-SCNC: SIGNIFICANT CHANGE UP MMOL/L (ref 1.15–1.33)
CALCIUM SERPL-MCNC: 8.5 MG/DL — SIGNIFICANT CHANGE UP (ref 8.4–10.5)
CALCIUM SERPL-MCNC: 9 MG/DL — SIGNIFICANT CHANGE UP (ref 8.4–10.5)
CALCIUM SERPL-MCNC: 9 MG/DL — SIGNIFICANT CHANGE UP (ref 8.4–10.5)
CALCIUM SERPL-MCNC: 9.4 MG/DL — SIGNIFICANT CHANGE UP (ref 8.4–10.5)
CALCIUM SERPL-MCNC: 9.4 MG/DL — SIGNIFICANT CHANGE UP (ref 8.4–10.5)
CALCIUM SERPL-MCNC: 9.5 MG/DL — SIGNIFICANT CHANGE UP (ref 8.4–10.5)
CHLORIDE SERPL-SCNC: 103 MMOL/L — SIGNIFICANT CHANGE UP (ref 96–108)
CHLORIDE SERPL-SCNC: 104 MMOL/L — SIGNIFICANT CHANGE UP (ref 96–108)
CHLORIDE SERPL-SCNC: 105 MMOL/L — SIGNIFICANT CHANGE UP (ref 96–108)
CHLORIDE SERPL-SCNC: 106 MMOL/L — SIGNIFICANT CHANGE UP (ref 96–108)
CHLORIDE SERPL-SCNC: 107 MMOL/L — SIGNIFICANT CHANGE UP (ref 96–108)
CHLORIDE SERPL-SCNC: 107 MMOL/L — SIGNIFICANT CHANGE UP (ref 96–108)
CK SERPL-CCNC: 67 U/L — SIGNIFICANT CHANGE UP (ref 25–170)
CO2 BLDA-SCNC: 25 MMOL/L — HIGH (ref 19–24)
CO2 BLDV-SCNC: 24 MMOL/L — SIGNIFICANT CHANGE UP (ref 22–26)
CO2 BLDV-SCNC: 24 MMOL/L — SIGNIFICANT CHANGE UP (ref 22–26)
CO2 BLDV-SCNC: 27.3 MMOL/L — HIGH (ref 22–26)
CO2 SERPL-SCNC: 21 MMOL/L — LOW (ref 22–31)
CO2 SERPL-SCNC: 21 MMOL/L — LOW (ref 22–31)
CO2 SERPL-SCNC: 22 MMOL/L — SIGNIFICANT CHANGE UP (ref 22–31)
CO2 SERPL-SCNC: 22 MMOL/L — SIGNIFICANT CHANGE UP (ref 22–31)
CO2 SERPL-SCNC: 23 MMOL/L — SIGNIFICANT CHANGE UP (ref 22–31)
CO2 SERPL-SCNC: 23 MMOL/L — SIGNIFICANT CHANGE UP (ref 22–31)
CREAT SERPL-MCNC: 0.71 MG/DL — SIGNIFICANT CHANGE UP (ref 0.5–1.3)
CREAT SERPL-MCNC: 0.71 MG/DL — SIGNIFICANT CHANGE UP (ref 0.5–1.3)
CREAT SERPL-MCNC: 0.82 MG/DL — SIGNIFICANT CHANGE UP (ref 0.5–1.3)
CREAT SERPL-MCNC: 0.87 MG/DL — SIGNIFICANT CHANGE UP (ref 0.5–1.3)
CREAT SERPL-MCNC: 0.97 MG/DL — SIGNIFICANT CHANGE UP (ref 0.5–1.3)
CREAT SERPL-MCNC: 0.98 MG/DL — SIGNIFICANT CHANGE UP (ref 0.5–1.3)
DACRYOCYTES BLD QL SMEAR: SLIGHT — SIGNIFICANT CHANGE UP
EGFR: 63 ML/MIN/1.73M2 — SIGNIFICANT CHANGE UP
EGFR: 64 ML/MIN/1.73M2 — SIGNIFICANT CHANGE UP
EGFR: 73 ML/MIN/1.73M2 — SIGNIFICANT CHANGE UP
EGFR: 78 ML/MIN/1.73M2 — SIGNIFICANT CHANGE UP
EGFR: 93 ML/MIN/1.73M2 — SIGNIFICANT CHANGE UP
EGFR: 93 ML/MIN/1.73M2 — SIGNIFICANT CHANGE UP
EOSINOPHIL # BLD AUTO: 0 K/UL — SIGNIFICANT CHANGE UP (ref 0–0.5)
EOSINOPHIL # BLD AUTO: 0 K/UL — SIGNIFICANT CHANGE UP (ref 0–0.5)
EOSINOPHIL # BLD AUTO: 0.02 K/UL — SIGNIFICANT CHANGE UP (ref 0–0.5)
EOSINOPHIL # BLD AUTO: 0.05 K/UL — SIGNIFICANT CHANGE UP (ref 0–0.5)
EOSINOPHIL # BLD AUTO: 0.24 K/UL — SIGNIFICANT CHANGE UP (ref 0–0.5)
EOSINOPHIL NFR BLD AUTO: 0 % — SIGNIFICANT CHANGE UP (ref 0–6)
EOSINOPHIL NFR BLD AUTO: 0 % — SIGNIFICANT CHANGE UP (ref 0–6)
EOSINOPHIL NFR BLD AUTO: 0.1 % — SIGNIFICANT CHANGE UP (ref 0–6)
EOSINOPHIL NFR BLD AUTO: 0.3 % — SIGNIFICANT CHANGE UP (ref 0–6)
EOSINOPHIL NFR BLD AUTO: 1.7 % — SIGNIFICANT CHANGE UP (ref 0–6)
GAS PNL BLDA: SIGNIFICANT CHANGE UP
GAS PNL BLDV: 136 MMOL/L — SIGNIFICANT CHANGE UP (ref 136–145)
GAS PNL BLDV: 140 MMOL/L — SIGNIFICANT CHANGE UP (ref 136–145)
GAS PNL BLDV: SIGNIFICANT CHANGE UP
GAS PNL BLDV: SIGNIFICANT CHANGE UP MMOL/L (ref 136–145)
GLUCOSE BLDC GLUCOMTR-MCNC: 127 MG/DL — HIGH (ref 70–99)
GLUCOSE SERPL-MCNC: 118 MG/DL — HIGH (ref 70–99)
GLUCOSE SERPL-MCNC: 123 MG/DL — HIGH (ref 70–99)
GLUCOSE SERPL-MCNC: 125 MG/DL — HIGH (ref 70–99)
GLUCOSE SERPL-MCNC: 145 MG/DL — HIGH (ref 70–99)
GLUCOSE SERPL-MCNC: 184 MG/DL — HIGH (ref 70–99)
GLUCOSE SERPL-MCNC: 223 MG/DL — HIGH (ref 70–99)
HCO3 BLDA-SCNC: 24 MMOL/L — SIGNIFICANT CHANGE UP (ref 21–28)
HCO3 BLDV-SCNC: 23 MMOL/L — SIGNIFICANT CHANGE UP (ref 22–29)
HCO3 BLDV-SCNC: 23 MMOL/L — SIGNIFICANT CHANGE UP (ref 22–29)
HCO3 BLDV-SCNC: 26 MMOL/L — SIGNIFICANT CHANGE UP (ref 22–29)
HCT VFR BLD CALC: 28 % — LOW (ref 34.5–45)
HCT VFR BLD CALC: 29.3 % — LOW (ref 34.5–45)
HCT VFR BLD CALC: 31.5 % — LOW (ref 34.5–45)
HCT VFR BLD CALC: 32 % — LOW (ref 34.5–45)
HCT VFR BLD CALC: 32.7 % — LOW (ref 34.5–45)
HCT VFR BLD CALC: 33.5 % — LOW (ref 34.5–45)
HGB BLD-MCNC: 10.3 G/DL — LOW (ref 11.5–15.5)
HGB BLD-MCNC: 11.1 G/DL — LOW (ref 11.5–15.5)
HGB BLD-MCNC: 11.2 G/DL — LOW (ref 11.5–15.5)
HGB BLD-MCNC: 11.2 G/DL — LOW (ref 11.5–15.5)
HGB BLD-MCNC: 11.7 G/DL — SIGNIFICANT CHANGE UP (ref 11.5–15.5)
HGB BLD-MCNC: 9.5 G/DL — LOW (ref 11.5–15.5)
HYPOCHROMIA BLD QL: SLIGHT — SIGNIFICANT CHANGE UP
IMM GRANULOCYTES NFR BLD AUTO: 0.4 % — SIGNIFICANT CHANGE UP (ref 0–0.9)
IMM GRANULOCYTES NFR BLD AUTO: 0.6 % — SIGNIFICANT CHANGE UP (ref 0–0.9)
IMM GRANULOCYTES NFR BLD AUTO: 0.6 % — SIGNIFICANT CHANGE UP (ref 0–0.9)
IMM GRANULOCYTES NFR BLD AUTO: 0.7 % — SIGNIFICANT CHANGE UP (ref 0–0.9)
INR BLD: 1.14 — SIGNIFICANT CHANGE UP (ref 0.85–1.18)
INR BLD: 1.16 — SIGNIFICANT CHANGE UP (ref 0.85–1.18)
INR BLD: 1.19 — HIGH (ref 0.85–1.18)
INR BLD: 1.2 — HIGH (ref 0.85–1.18)
INR BLD: 1.21 — HIGH (ref 0.85–1.18)
INR BLD: 1.23 — HIGH (ref 0.85–1.18)
ISTAT ARTERIAL BE: -1 MMOL/L — SIGNIFICANT CHANGE UP (ref -2–3)
ISTAT ARTERIAL GLUCOSE: 119 MG/DL — HIGH (ref 70–99)
ISTAT ARTERIAL HCO3: 23 MMOL/L — SIGNIFICANT CHANGE UP (ref 22–26)
ISTAT ARTERIAL HEMATOCRIT: 33 % — LOW (ref 34.5–45)
ISTAT ARTERIAL HEMOGLOBIN: 11.2 G/DL — LOW (ref 11.5–15.5)
ISTAT ARTERIAL IONIZED CALCIUM: 1.25 MMOL/L — SIGNIFICANT CHANGE UP (ref 1.12–1.3)
ISTAT ARTERIAL PCO2: 36 MMHG — SIGNIFICANT CHANGE UP (ref 35–45)
ISTAT ARTERIAL PH: 7.42 — SIGNIFICANT CHANGE UP (ref 7.35–7.45)
ISTAT ARTERIAL PO2: <66 MMHG — LOW (ref 80–105)
ISTAT ARTERIAL POTASSIUM: 3.5 MMOL/L — SIGNIFICANT CHANGE UP (ref 3.5–5.3)
ISTAT ARTERIAL SO2: 91 % — LOW (ref 95–98)
ISTAT ARTERIAL SODIUM: 140 MMOL/L — SIGNIFICANT CHANGE UP (ref 135–145)
ISTAT ARTERIAL TCO2: 24 MMOL/L — SIGNIFICANT CHANGE UP (ref 22–31)
LACTATE SERPL-SCNC: 0.7 MMOL/L — SIGNIFICANT CHANGE UP (ref 0.5–2)
LACTATE SERPL-SCNC: 0.7 MMOL/L — SIGNIFICANT CHANGE UP (ref 0.5–2)
LACTATE SERPL-SCNC: 0.8 MMOL/L — SIGNIFICANT CHANGE UP (ref 0.5–2)
LACTATE SERPL-SCNC: 1 MMOL/L — SIGNIFICANT CHANGE UP (ref 0.5–2)
LACTATE SERPL-SCNC: 1 MMOL/L — SIGNIFICANT CHANGE UP (ref 0.5–2)
LIDOCAIN IGE QN: 15 U/L — SIGNIFICANT CHANGE UP (ref 7–60)
LYMPHOCYTES # BLD AUTO: 0.36 K/UL — LOW (ref 1–3.3)
LYMPHOCYTES # BLD AUTO: 0.66 K/UL — LOW (ref 1–3.3)
LYMPHOCYTES # BLD AUTO: 0.88 K/UL — LOW (ref 1–3.3)
LYMPHOCYTES # BLD AUTO: 1.33 K/UL — SIGNIFICANT CHANGE UP (ref 1–3.3)
LYMPHOCYTES # BLD AUTO: 1.82 K/UL — SIGNIFICANT CHANGE UP (ref 1–3.3)
LYMPHOCYTES # BLD AUTO: 12.3 % — LOW (ref 13–44)
LYMPHOCYTES # BLD AUTO: 2.6 % — LOW (ref 13–44)
LYMPHOCYTES # BLD AUTO: 5 % — LOW (ref 13–44)
LYMPHOCYTES # BLD AUTO: 6.1 % — LOW (ref 13–44)
LYMPHOCYTES # BLD AUTO: 8.2 % — LOW (ref 13–44)
MACROCYTES BLD QL: SLIGHT — SIGNIFICANT CHANGE UP
MAGNESIUM SERPL-MCNC: 2.1 MG/DL — SIGNIFICANT CHANGE UP (ref 1.6–2.6)
MAGNESIUM SERPL-MCNC: 2.2 MG/DL — SIGNIFICANT CHANGE UP (ref 1.6–2.6)
MAGNESIUM SERPL-MCNC: 2.2 MG/DL — SIGNIFICANT CHANGE UP (ref 1.6–2.6)
MAGNESIUM SERPL-MCNC: 2.3 MG/DL — SIGNIFICANT CHANGE UP (ref 1.6–2.6)
MANUAL SMEAR VERIFICATION: SIGNIFICANT CHANGE UP
MCHC RBC-ENTMCNC: 25.9 PG — LOW (ref 27–34)
MCHC RBC-ENTMCNC: 26.1 PG — LOW (ref 27–34)
MCHC RBC-ENTMCNC: 26.1 PG — LOW (ref 27–34)
MCHC RBC-ENTMCNC: 26.2 PG — LOW (ref 27–34)
MCHC RBC-ENTMCNC: 26.2 PG — LOW (ref 27–34)
MCHC RBC-ENTMCNC: 26.9 PG — LOW (ref 27–34)
MCHC RBC-ENTMCNC: 33.9 GM/DL — SIGNIFICANT CHANGE UP (ref 32–36)
MCHC RBC-ENTMCNC: 34.3 GM/DL — SIGNIFICANT CHANGE UP (ref 32–36)
MCHC RBC-ENTMCNC: 34.9 GM/DL — SIGNIFICANT CHANGE UP (ref 32–36)
MCHC RBC-ENTMCNC: 35 GM/DL — SIGNIFICANT CHANGE UP (ref 32–36)
MCHC RBC-ENTMCNC: 35.2 GM/DL — SIGNIFICANT CHANGE UP (ref 32–36)
MCHC RBC-ENTMCNC: 35.2 GM/DL — SIGNIFICANT CHANGE UP (ref 32–36)
MCV RBC AUTO: 73.9 FL — LOW (ref 80–100)
MCV RBC AUTO: 74.6 FL — LOW (ref 80–100)
MCV RBC AUTO: 74.6 FL — LOW (ref 80–100)
MCV RBC AUTO: 76.3 FL — LOW (ref 80–100)
MCV RBC AUTO: 76.4 FL — LOW (ref 80–100)
MCV RBC AUTO: 77 FL — LOW (ref 80–100)
MICROCYTES BLD QL: SLIGHT — SIGNIFICANT CHANGE UP
MONOCYTES # BLD AUTO: 0.13 K/UL — SIGNIFICANT CHANGE UP (ref 0–0.9)
MONOCYTES # BLD AUTO: 1.06 K/UL — HIGH (ref 0–0.9)
MONOCYTES # BLD AUTO: 1.59 K/UL — HIGH (ref 0–0.9)
MONOCYTES # BLD AUTO: 2.12 K/UL — HIGH (ref 0–0.9)
MONOCYTES # BLD AUTO: 2.71 K/UL — HIGH (ref 0–0.9)
MONOCYTES NFR BLD AUTO: 0.9 % — LOW (ref 2–14)
MONOCYTES NFR BLD AUTO: 11.1 % — SIGNIFICANT CHANGE UP (ref 2–14)
MONOCYTES NFR BLD AUTO: 14.3 % — HIGH (ref 2–14)
MONOCYTES NFR BLD AUTO: 16.7 % — HIGH (ref 2–14)
MONOCYTES NFR BLD AUTO: 8.1 % — SIGNIFICANT CHANGE UP (ref 2–14)
NEUTROPHILS # BLD AUTO: 10.7 K/UL — HIGH (ref 1.8–7.4)
NEUTROPHILS # BLD AUTO: 11.28 K/UL — HIGH (ref 1.8–7.4)
NEUTROPHILS # BLD AUTO: 11.81 K/UL — HIGH (ref 1.8–7.4)
NEUTROPHILS # BLD AUTO: 11.98 K/UL — HIGH (ref 1.8–7.4)
NEUTROPHILS # BLD AUTO: 13.2 K/UL — HIGH (ref 1.8–7.4)
NEUTROPHILS NFR BLD AUTO: 72.4 % — SIGNIFICANT CHANGE UP (ref 43–77)
NEUTROPHILS NFR BLD AUTO: 74 % — SIGNIFICANT CHANGE UP (ref 43–77)
NEUTROPHILS NFR BLD AUTO: 82.3 % — HIGH (ref 43–77)
NEUTROPHILS NFR BLD AUTO: 86.1 % — HIGH (ref 43–77)
NEUTROPHILS NFR BLD AUTO: 94.8 % — HIGH (ref 43–77)
NRBC # BLD: 0 /100 WBCS — SIGNIFICANT CHANGE UP (ref 0–0)
OSMOLALITY SERPL: 296 MOSM/KG — SIGNIFICANT CHANGE UP (ref 280–301)
OVALOCYTES BLD QL SMEAR: SLIGHT — SIGNIFICANT CHANGE UP
PCO2 BLDA: 35 MMHG — SIGNIFICANT CHANGE UP (ref 32–45)
PCO2 BLDV: 37 MMHG — LOW (ref 39–42)
PCO2 BLDV: 37 MMHG — LOW (ref 39–42)
PCO2 BLDV: 41 MMHG — SIGNIFICANT CHANGE UP (ref 39–42)
PH BLDA: 7.44 — SIGNIFICANT CHANGE UP (ref 7.35–7.45)
PH BLDV: 7.4 — SIGNIFICANT CHANGE UP (ref 7.32–7.43)
PH BLDV: 7.4 — SIGNIFICANT CHANGE UP (ref 7.32–7.43)
PH BLDV: 7.41 — SIGNIFICANT CHANGE UP (ref 7.32–7.43)
PHOSPHATE SERPL-MCNC: 2.2 MG/DL — LOW (ref 2.5–4.5)
PHOSPHATE SERPL-MCNC: 2.3 MG/DL — LOW (ref 2.5–4.5)
PHOSPHATE SERPL-MCNC: 2.6 MG/DL — SIGNIFICANT CHANGE UP (ref 2.5–4.5)
PHOSPHATE SERPL-MCNC: 2.6 MG/DL — SIGNIFICANT CHANGE UP (ref 2.5–4.5)
PHOSPHATE SERPL-MCNC: 2.7 MG/DL — SIGNIFICANT CHANGE UP (ref 2.5–4.5)
PHOSPHATE SERPL-MCNC: 3.1 MG/DL — SIGNIFICANT CHANGE UP (ref 2.5–4.5)
PLAT MORPH BLD: NORMAL — SIGNIFICANT CHANGE UP
PLATELET # BLD AUTO: 128 K/UL — LOW (ref 150–400)
PLATELET # BLD AUTO: 130 K/UL — LOW (ref 150–400)
PLATELET # BLD AUTO: 133 K/UL — LOW (ref 150–400)
PLATELET # BLD AUTO: 142 K/UL — LOW (ref 150–400)
PLATELET # BLD AUTO: 148 K/UL — LOW (ref 150–400)
PLATELET # BLD AUTO: 148 K/UL — LOW (ref 150–400)
PO2 BLDA: 70 MMHG — LOW (ref 83–108)
PO2 BLDV: 40 MMHG — SIGNIFICANT CHANGE UP (ref 25–45)
PO2 BLDV: 46 MMHG — HIGH (ref 25–45)
PO2 BLDV: 46 MMHG — HIGH (ref 25–45)
POIKILOCYTOSIS BLD QL AUTO: SIGNIFICANT CHANGE UP
POLYCHROMASIA BLD QL SMEAR: SLIGHT — SIGNIFICANT CHANGE UP
POTASSIUM BLDV-SCNC: 3.4 MMOL/L — LOW (ref 3.5–5.1)
POTASSIUM BLDV-SCNC: 3.6 MMOL/L — SIGNIFICANT CHANGE UP (ref 3.5–5.1)
POTASSIUM BLDV-SCNC: 3.7 MMOL/L — SIGNIFICANT CHANGE UP (ref 3.5–5.1)
POTASSIUM SERPL-MCNC: 3.6 MMOL/L — SIGNIFICANT CHANGE UP (ref 3.5–5.3)
POTASSIUM SERPL-MCNC: 3.7 MMOL/L — SIGNIFICANT CHANGE UP (ref 3.5–5.3)
POTASSIUM SERPL-MCNC: 3.7 MMOL/L — SIGNIFICANT CHANGE UP (ref 3.5–5.3)
POTASSIUM SERPL-MCNC: 3.8 MMOL/L — SIGNIFICANT CHANGE UP (ref 3.5–5.3)
POTASSIUM SERPL-MCNC: 3.9 MMOL/L — SIGNIFICANT CHANGE UP (ref 3.5–5.3)
POTASSIUM SERPL-MCNC: 4 MMOL/L — SIGNIFICANT CHANGE UP (ref 3.5–5.3)
POTASSIUM SERPL-SCNC: 3.6 MMOL/L — SIGNIFICANT CHANGE UP (ref 3.5–5.3)
POTASSIUM SERPL-SCNC: 3.7 MMOL/L — SIGNIFICANT CHANGE UP (ref 3.5–5.3)
POTASSIUM SERPL-SCNC: 3.7 MMOL/L — SIGNIFICANT CHANGE UP (ref 3.5–5.3)
POTASSIUM SERPL-SCNC: 3.8 MMOL/L — SIGNIFICANT CHANGE UP (ref 3.5–5.3)
POTASSIUM SERPL-SCNC: 3.9 MMOL/L — SIGNIFICANT CHANGE UP (ref 3.5–5.3)
POTASSIUM SERPL-SCNC: 4 MMOL/L — SIGNIFICANT CHANGE UP (ref 3.5–5.3)
PROT SERPL-MCNC: 5.6 G/DL — LOW (ref 6–8.3)
PROT SERPL-MCNC: 6.1 G/DL — SIGNIFICANT CHANGE UP (ref 6–8.3)
PROT SERPL-MCNC: 6.4 G/DL — SIGNIFICANT CHANGE UP (ref 6–8.3)
PROT SERPL-MCNC: 6.7 G/DL — SIGNIFICANT CHANGE UP (ref 6–8.3)
PROT SERPL-MCNC: 6.8 G/DL — SIGNIFICANT CHANGE UP (ref 6–8.3)
PROT SERPL-MCNC: 6.8 G/DL — SIGNIFICANT CHANGE UP (ref 6–8.3)
PROTHROM AB SERPL-ACNC: 12.9 SEC — SIGNIFICANT CHANGE UP (ref 9.5–13)
PROTHROM AB SERPL-ACNC: 13.2 SEC — HIGH (ref 9.5–13)
PROTHROM AB SERPL-ACNC: 13.5 SEC — HIGH (ref 9.5–13)
PROTHROM AB SERPL-ACNC: 13.6 SEC — HIGH (ref 9.5–13)
PROTHROM AB SERPL-ACNC: 13.7 SEC — HIGH (ref 9.5–13)
PROTHROM AB SERPL-ACNC: 13.9 SEC — HIGH (ref 9.5–13)
RBC # BLD: 3.67 M/UL — LOW (ref 3.8–5.2)
RBC # BLD: 3.93 M/UL — SIGNIFICANT CHANGE UP (ref 3.8–5.2)
RBC # BLD: 4.26 M/UL — SIGNIFICANT CHANGE UP (ref 3.8–5.2)
RBC # BLD: 4.28 M/UL — SIGNIFICANT CHANGE UP (ref 3.8–5.2)
RBC # BLD: 4.29 M/UL — SIGNIFICANT CHANGE UP (ref 3.8–5.2)
RBC # BLD: 4.35 M/UL — SIGNIFICANT CHANGE UP (ref 3.8–5.2)
RBC # FLD: 15.2 % — HIGH (ref 10.3–14.5)
RBC # FLD: 15.2 % — HIGH (ref 10.3–14.5)
RBC # FLD: 15.3 % — HIGH (ref 10.3–14.5)
RBC # FLD: 15.4 % — HIGH (ref 10.3–14.5)
RBC # FLD: 15.6 % — HIGH (ref 10.3–14.5)
RBC # FLD: 15.6 % — HIGH (ref 10.3–14.5)
RBC BLD AUTO: ABNORMAL
RH IG SCN BLD-IMP: POSITIVE — SIGNIFICANT CHANGE UP
SAO2 % BLDA: 95.9 % — SIGNIFICANT CHANGE UP (ref 94–98)
SAO2 % BLDV: 71.3 % — SIGNIFICANT CHANGE UP (ref 67–88)
SAO2 % BLDV: 74.5 % — SIGNIFICANT CHANGE UP (ref 67–88)
SAO2 % BLDV: 78.3 % — SIGNIFICANT CHANGE UP (ref 67–88)
SCHISTOCYTES BLD QL AUTO: SLIGHT — SIGNIFICANT CHANGE UP
SODIUM SERPL-SCNC: 135 MMOL/L — SIGNIFICANT CHANGE UP (ref 135–145)
SODIUM SERPL-SCNC: 137 MMOL/L — SIGNIFICANT CHANGE UP (ref 135–145)
SODIUM SERPL-SCNC: 137 MMOL/L — SIGNIFICANT CHANGE UP (ref 135–145)
SODIUM SERPL-SCNC: 139 MMOL/L — SIGNIFICANT CHANGE UP (ref 135–145)
SODIUM SERPL-SCNC: 142 MMOL/L — SIGNIFICANT CHANGE UP (ref 135–145)
SODIUM SERPL-SCNC: 142 MMOL/L — SIGNIFICANT CHANGE UP (ref 135–145)
SPHEROCYTES BLD QL SMEAR: SIGNIFICANT CHANGE UP
TARGETS BLD QL SMEAR: SLIGHT — SIGNIFICANT CHANGE UP
WBC # BLD: 13.1 K/UL — HIGH (ref 3.8–10.5)
WBC # BLD: 13.92 K/UL — HIGH (ref 3.8–10.5)
WBC # BLD: 14.35 K/UL — HIGH (ref 3.8–10.5)
WBC # BLD: 14.8 K/UL — HIGH (ref 3.8–10.5)
WBC # BLD: 15.32 K/UL — HIGH (ref 3.8–10.5)
WBC # BLD: 16.2 K/UL — HIGH (ref 3.8–10.5)
WBC # FLD AUTO: 13.1 K/UL — HIGH (ref 3.8–10.5)
WBC # FLD AUTO: 13.92 K/UL — HIGH (ref 3.8–10.5)
WBC # FLD AUTO: 14.35 K/UL — HIGH (ref 3.8–10.5)
WBC # FLD AUTO: 14.8 K/UL — HIGH (ref 3.8–10.5)
WBC # FLD AUTO: 15.32 K/UL — HIGH (ref 3.8–10.5)
WBC # FLD AUTO: 16.2 K/UL — HIGH (ref 3.8–10.5)

## 2024-04-29 PROCEDURE — 62272 THER SPI PNXR DRG CSF: CPT

## 2024-04-29 PROCEDURE — 99291 CRITICAL CARE FIRST HOUR: CPT | Mod: 24

## 2024-04-29 PROCEDURE — 36556 INSERT NON-TUNNEL CV CATH: CPT | Mod: RT

## 2024-04-29 PROCEDURE — 93503 INSERT/PLACE HEART CATHETER: CPT

## 2024-04-29 PROCEDURE — 99292 CRITICAL CARE ADDL 30 MIN: CPT

## 2024-04-29 PROCEDURE — 70450 CT HEAD/BRAIN W/O DYE: CPT | Mod: 26

## 2024-04-29 PROCEDURE — 93306 TTE W/DOPPLER COMPLETE: CPT | Mod: 26

## 2024-04-29 PROCEDURE — 71045 X-RAY EXAM CHEST 1 VIEW: CPT | Mod: 26

## 2024-04-29 PROCEDURE — 99292 CRITICAL CARE ADDL 30 MIN: CPT | Mod: 24

## 2024-04-29 PROCEDURE — 72148 MRI LUMBAR SPINE W/O DYE: CPT | Mod: 26

## 2024-04-29 PROCEDURE — 76937 US GUIDE VASCULAR ACCESS: CPT | Mod: 26,RT

## 2024-04-29 PROCEDURE — 99221 1ST HOSP IP/OBS SF/LOW 40: CPT

## 2024-04-29 PROCEDURE — 72128 CT CHEST SPINE W/O DYE: CPT | Mod: 26

## 2024-04-29 PROCEDURE — 72131 CT LUMBAR SPINE W/O DYE: CPT | Mod: 26

## 2024-04-29 RX ORDER — SODIUM CHLORIDE 5 G/100ML
500 INJECTION, SOLUTION INTRAVENOUS
Refills: 0 | Status: DISCONTINUED | OUTPATIENT
Start: 2024-04-29 | End: 2024-04-29

## 2024-04-29 RX ORDER — DEXAMETHASONE 0.5 MG/5ML
10 ELIXIR ORAL ONCE
Refills: 0 | Status: COMPLETED | OUTPATIENT
Start: 2024-04-29 | End: 2024-04-29

## 2024-04-29 RX ORDER — RIOCIGUAT 1.5 MG/1
2.5 TABLET, FILM COATED ORAL ONCE
Refills: 0 | Status: COMPLETED | OUTPATIENT
Start: 2024-04-29 | End: 2024-04-29

## 2024-04-29 RX ORDER — PHENYLEPHRINE HYDROCHLORIDE 10 MG/ML
0.4 INJECTION INTRAVENOUS
Qty: 40 | Refills: 0 | Status: DISCONTINUED | OUTPATIENT
Start: 2024-04-29 | End: 2024-05-03

## 2024-04-29 RX ORDER — SODIUM CHLORIDE 5 G/100ML
500 INJECTION, SOLUTION INTRAVENOUS
Refills: 0 | Status: DISCONTINUED | OUTPATIENT
Start: 2024-04-29 | End: 2024-04-30

## 2024-04-29 RX ORDER — VASOPRESSIN 20 [USP'U]/ML
0.02 INJECTION INTRAVENOUS
Qty: 40 | Refills: 0 | Status: DISCONTINUED | OUTPATIENT
Start: 2024-04-29 | End: 2024-04-29

## 2024-04-29 RX ORDER — POTASSIUM PHOSPHATE, MONOBASIC POTASSIUM PHOSPHATE, DIBASIC 236; 224 MG/ML; MG/ML
30 INJECTION, SOLUTION INTRAVENOUS ONCE
Refills: 0 | Status: COMPLETED | OUTPATIENT
Start: 2024-04-29 | End: 2024-04-30

## 2024-04-29 RX ORDER — ALBUMIN HUMAN 25 %
250 VIAL (ML) INTRAVENOUS ONCE
Refills: 0 | Status: COMPLETED | OUTPATIENT
Start: 2024-04-29 | End: 2024-04-29

## 2024-04-29 RX ORDER — FENTANYL CITRATE 50 UG/ML
25 INJECTION INTRAVENOUS ONCE
Refills: 0 | Status: DISCONTINUED | OUTPATIENT
Start: 2024-04-29 | End: 2024-04-29

## 2024-04-29 RX ORDER — ALBUMIN HUMAN 25 %
250 VIAL (ML) INTRAVENOUS
Refills: 0 | Status: COMPLETED | OUTPATIENT
Start: 2024-04-29 | End: 2024-04-29

## 2024-04-29 RX ORDER — POTASSIUM CHLORIDE 20 MEQ
20 PACKET (EA) ORAL ONCE
Refills: 0 | Status: COMPLETED | OUTPATIENT
Start: 2024-04-29 | End: 2024-04-29

## 2024-04-29 RX ORDER — MIDAZOLAM HYDROCHLORIDE 1 MG/ML
1 INJECTION, SOLUTION INTRAMUSCULAR; INTRAVENOUS ONCE
Refills: 0 | Status: DISCONTINUED | OUTPATIENT
Start: 2024-04-29 | End: 2024-04-29

## 2024-04-29 RX ORDER — MODAFINIL 200 MG/1
100 TABLET ORAL DAILY
Refills: 0 | Status: DISCONTINUED | OUTPATIENT
Start: 2024-04-29 | End: 2024-05-02

## 2024-04-29 RX ORDER — SODIUM,POTASSIUM PHOSPHATES 278-250MG
1 POWDER IN PACKET (EA) ORAL ONCE
Refills: 0 | Status: COMPLETED | OUTPATIENT
Start: 2024-04-29 | End: 2024-04-29

## 2024-04-29 RX ORDER — MANNITOL
20 POWDER (GRAM) MISCELLANEOUS ONCE
Refills: 0 | Status: COMPLETED | OUTPATIENT
Start: 2024-04-29 | End: 2024-04-29

## 2024-04-29 RX ORDER — POTASSIUM CHLORIDE 20 MEQ
20 PACKET (EA) ORAL
Refills: 0 | Status: COMPLETED | OUTPATIENT
Start: 2024-04-29 | End: 2024-04-30

## 2024-04-29 RX ORDER — DEXAMETHASONE 0.5 MG/5ML
10 ELIXIR ORAL ONCE
Refills: 0 | Status: DISCONTINUED | OUTPATIENT
Start: 2024-04-29 | End: 2024-04-29

## 2024-04-29 RX ORDER — PHENYLEPHRINE HYDROCHLORIDE 10 MG/ML
0.4 INJECTION INTRAVENOUS
Qty: 40 | Refills: 0 | Status: DISCONTINUED | OUTPATIENT
Start: 2024-04-29 | End: 2024-04-29

## 2024-04-29 RX ORDER — MILRINONE LACTATE 1 MG/ML
0.25 INJECTION, SOLUTION INTRAVENOUS
Qty: 20 | Refills: 0 | Status: DISCONTINUED | OUTPATIENT
Start: 2024-04-29 | End: 2024-04-30

## 2024-04-29 RX ORDER — VASOPRESSIN 20 [USP'U]/ML
0.02 INJECTION INTRAVENOUS
Qty: 40 | Refills: 0 | Status: DISCONTINUED | OUTPATIENT
Start: 2024-04-29 | End: 2024-05-03

## 2024-04-29 RX ORDER — POTASSIUM CHLORIDE 20 MEQ
40 PACKET (EA) ORAL ONCE
Refills: 0 | Status: COMPLETED | OUTPATIENT
Start: 2024-04-29 | End: 2024-04-29

## 2024-04-29 RX ORDER — LIDOCAINE HYDROCHLORIDE AND EPINEPHRINE 10; 10 MG/ML; UG/ML
10 INJECTION, SOLUTION INFILTRATION; PERINEURAL ONCE
Refills: 0 | Status: COMPLETED | OUTPATIENT
Start: 2024-04-29 | End: 2024-04-29

## 2024-04-29 RX ADMIN — Medication 200 GRAM(S): at 09:12

## 2024-04-29 RX ADMIN — Medication 1 TABLET(S): at 04:12

## 2024-04-29 RX ADMIN — MIDAZOLAM HYDROCHLORIDE 1 MILLIGRAM(S): 1 INJECTION, SOLUTION INTRAMUSCULAR; INTRAVENOUS at 07:19

## 2024-04-29 RX ADMIN — FENTANYL CITRATE 25 MICROGRAM(S): 50 INJECTION INTRAVENOUS at 07:19

## 2024-04-29 RX ADMIN — Medication 100 MILLIEQUIVALENT(S): at 11:01

## 2024-04-29 RX ADMIN — GABAPENTIN 100 MILLIGRAM(S): 400 CAPSULE ORAL at 22:08

## 2024-04-29 RX ADMIN — Medication 100 MILLIEQUIVALENT(S): at 23:49

## 2024-04-29 RX ADMIN — Medication 100 MILLIEQUIVALENT(S): at 13:11

## 2024-04-29 RX ADMIN — MODAFINIL 100 MILLIGRAM(S): 200 TABLET ORAL at 19:27

## 2024-04-29 RX ADMIN — MUPIROCIN 1 APPLICATION(S): 20 OINTMENT TOPICAL at 05:30

## 2024-04-29 RX ADMIN — ATORVASTATIN CALCIUM 20 MILLIGRAM(S): 80 TABLET, FILM COATED ORAL at 22:09

## 2024-04-29 RX ADMIN — RIOCIGUAT 2.5 MILLIGRAM(S): 1.5 TABLET, FILM COATED ORAL at 15:37

## 2024-04-29 RX ADMIN — PHENYLEPHRINE HYDROCHLORIDE 12.2 MICROGRAM(S)/KG/MIN: 10 INJECTION INTRAVENOUS at 15:24

## 2024-04-29 RX ADMIN — Medication 3 MILLILITER(S): at 05:07

## 2024-04-29 RX ADMIN — SODIUM CHLORIDE 50 MILLILITER(S): 5 INJECTION, SOLUTION INTRAVENOUS at 10:50

## 2024-04-29 RX ADMIN — Medication 102 MILLIGRAM(S): at 10:26

## 2024-04-29 RX ADMIN — Medication 125 MILLILITER(S): at 05:30

## 2024-04-29 RX ADMIN — MILRINONE LACTATE 6.12 MICROGRAM(S)/KG/MIN: 1 INJECTION, SOLUTION INTRAVENOUS at 22:26

## 2024-04-29 RX ADMIN — Medication 500 MILLIGRAM(S): at 05:29

## 2024-04-29 RX ADMIN — MILRINONE LACTATE 6.12 MICROGRAM(S)/KG/MIN: 1 INJECTION, SOLUTION INTRAVENOUS at 15:24

## 2024-04-29 RX ADMIN — Medication 125 MILLILITER(S): at 08:20

## 2024-04-29 RX ADMIN — Medication 125 MILLILITER(S): at 15:33

## 2024-04-29 RX ADMIN — PHENYLEPHRINE HYDROCHLORIDE 12.2 MICROGRAM(S)/KG/MIN: 10 INJECTION INTRAVENOUS at 05:31

## 2024-04-29 RX ADMIN — Medication 200 GRAM(S): at 08:20

## 2024-04-29 RX ADMIN — RIOCIGUAT 2.5 MILLIGRAM(S): 1.5 TABLET, FILM COATED ORAL at 22:56

## 2024-04-29 RX ADMIN — FENTANYL CITRATE 25 MICROGRAM(S): 50 INJECTION INTRAVENOUS at 07:49

## 2024-04-29 RX ADMIN — LIDOCAINE HYDROCHLORIDE AND EPINEPHRINE 10 MILLILITER(S): 10; 10 INJECTION, SOLUTION INFILTRATION; PERINEURAL at 06:21

## 2024-04-29 RX ADMIN — SENNA PLUS 2 TABLET(S): 8.6 TABLET ORAL at 22:09

## 2024-04-29 RX ADMIN — CHLORHEXIDINE GLUCONATE 1 APPLICATION(S): 213 SOLUTION TOPICAL at 05:31

## 2024-04-29 RX ADMIN — Medication 125 MILLILITER(S): at 15:51

## 2024-04-29 RX ADMIN — GABAPENTIN 100 MILLIGRAM(S): 400 CAPSULE ORAL at 05:30

## 2024-04-29 NOTE — PROGRESS NOTE ADULT - SUBJECTIVE AND OBJECTIVE BOX
STATUS POST:  s/p TEVAR.    POST OPERATIVE DAY #: 3    SUBJECTIVE: Pt seen and examined at bedside this am by surgery team. Overnight reported sudden decreased of proximal lower extremity strength unable to lift thigh or legs against gravity.     MEDICATIONS  (STANDING):  albuterol    90 MICROgram(s) HFA Inhaler 2 Puff(s) Inhalation every 6 hours  albuterol/ipratropium for Nebulization 3 milliLiter(s) Nebulizer every 6 hours  ascorbic acid 500 milliGRAM(s) Oral two times a day  atorvastatin 20 milliGRAM(s) Oral at bedtime  chlorhexidine 2% Cloths 1 Application(s) Topical daily  gabapentin 100 milliGRAM(s) Oral every 8 hours  influenza  Vaccine (HIGH DOSE) 0.7 milliLiter(s) IntraMuscular once  milrinone Infusion 0.25 MICROgram(s)/kG/Min (6.12 mL/Hr) IV Continuous <Continuous>  modafinil 100 milliGRAM(s) Oral daily  mupirocin 2% Ointment 1 Application(s) Both Nostrils two times a day  pantoprazole    Tablet 40 milliGRAM(s) Oral daily  phenylephrine    Infusion 0.399 MICROgram(s)/kG/Min (12.2 mL/Hr) IV Continuous <Continuous>  polyethylene glycol 3350 17 Gram(s) Oral daily  potassium phosphate IVPB 15 milliMole(s) IV Intermittent once  riociguat 2.5 milliGRAM(s) Oral every 8 hours  senna 2 Tablet(s) Oral at bedtime  sodium chloride 0.9%. 1000 milliLiter(s) (10 mL/Hr) IV Continuous <Continuous>  sodium chloride 3%. 500 milliLiter(s) (30 mL/Hr) IV Continuous <Continuous>  vasopressin Infusion 0.02 Unit(s)/Min (3 mL/Hr) IV Continuous <Continuous>    MEDICATIONS  (PRN):  acetaminophen     Tablet .. 650 milliGRAM(s) Oral every 6 hours PRN Mild Pain (1 - 3)      Vital Signs Last 24 Hrs  T(C): 37 (29 Apr 2024 16:58), Max: 37.4 (29 Apr 2024 08:23)  T(F): 98.6 (29 Apr 2024 16:58), Max: 99.3 (29 Apr 2024 08:23)  HR: 82 (29 Apr 2024 16:00) (71 - 102)  BP: 110/55 (29 Apr 2024 05:00) (107/60 - 129/64)  BP(mean): 77 (29 Apr 2024 05:00) (75 - 88)  RR: 18 (29 Apr 2024 16:42) (16 - 18)  SpO2: 100% (29 Apr 2024 16:42) (93% - 100%)    Parameters below as of 29 Apr 2024 16:42  Patient On (Oxygen Delivery Method): nasal cannula, high flow  O2 Flow (L/min): 40  O2 Concentration (%): 100    Physical Exam:  General: NAD, well appearing male  Pulmonary: NLB on HFNC  Cardiovascular: RRR, no MGR  Abdominal: Soft nt nd, L groin soft, dressed with Dermabond R groin soft, incision c/d/i   Extremities: WWP  Pulses: palpable pulses 2+ b/l groin and in BUE (radial ulnar) and BLE (DP,PT)   Neuro: Decreased lower extremity strength proximally         I&O's Detail    28 Apr 2024 07:01  -  29 Apr 2024 07:00  --------------------------------------------------------  IN:    Albumin 5%  - 250 mL: 500 mL    Heparin: 50 mL    Phenylephrine: 33.6 mL    sodium chloride 0.9%: 90 mL  Total IN: 673.6 mL    OUT:    Drain (mL): 10 mL    Indwelling Catheter - Urethral (mL): 125 mL    Voided (mL): 425 mL  Total OUT: 560 mL    Total NET: 113.6 mL      29 Apr 2024 07:01  -  29 Apr 2024 16:59  --------------------------------------------------------  IN:    Albumin 5%  - 250 mL: 500 mL    IV PiggyBack: 450 mL    Milrinone: 28.2 mL    Phenylephrine: 30.6 mL    Phenylephrine: 159.2 mL    PRBCs (Packed Red Blood Cells): 300 mL    sodium chloride 0.9%: 90 mL    sodium chloride 3%: 200 mL    sodium chloride 3%: 90 mL    Vasopressin: 33 mL  Total IN: 1881 mL    OUT:    Drain (mL): 170 mL    Indwelling Catheter - Urethral (mL): 2875 mL    Voided (mL): 800 mL  Total OUT: 3845 mL    Total NET: -1964 mL          LABS:                        11.1   13.92 )-----------( 142      ( 29 Apr 2024 12:53 )             31.5     04-29    142  |  107  |  12  ----------------------------<  145<H>  3.8   |  23  |  0.82    Ca    9.4      29 Apr 2024 12:53  Phos  2.6     04-29  Mg     2.3     04-29    TPro  6.8  /  Alb  3.6  /  TBili  2.0<H>  /  DBili  x   /  AST  67<H>  /  ALT  60<H>  /  AlkPhos  88  04-29    PT/INR - ( 29 Apr 2024 12:53 )   PT: 12.9 sec;   INR: 1.14          PTT - ( 29 Apr 2024 12:53 )  PTT:28.3 sec  Urinalysis Basic - ( 29 Apr 2024 12:53 )    Color: x / Appearance: x / SG: x / pH: x  Gluc: 145 mg/dL / Ketone: x  / Bili: x / Urobili: x   Blood: x / Protein: x / Nitrite: x   Leuk Esterase: x / RBC: x / WBC x   Sq Epi: x / Non Sq Epi: x / Bacteria: x        RADIOLOGY & ADDITIONAL STUDIES:  CT LUMBAR SPINE ORDERED BY: VYON SO    PROCEDURE DATE: 04/29/2024        INTERPRETATION: PROCEDURE: CT thoracic and lumbar spine without contrast    INDICATION: Weakness post TEVAR. Rule out extradural hematoma.    TECHNIQUE: Multiple axial sections were obtained through the thoracic and lumbar spine. Sagittal and coronal reformats were obtained from the axial data set. The images were reviewed in soft tissue and bone windows.    COMPARISON: CTA chest abdomen and pelvis 4/20/2024.    FINDINGS:    CT thoracic spine:    Lumbar drain enters the spinal canal below the inferior extent of the examination and courses superiorly to the T8-T9 level where it loops and then extends inferiorly to the T9-T10 level.    No acute fracture traumatic subluxation.    Vertebral body height and facet alignment maintained. Exaggeration of the thoracic kyphosis.    No significant spinal canal stenosis or neural foraminal narrowing.    Status post TEVAR. Small left sided pleural effusion.    CT lumbar spine:    Lumbar drain is seen to enter the spinal canal at the L1-L2 level and course superiorly beyond the extent of the examination.    No acute fracture traumatic subluxation.    Vertebral body height and alignment are maintained. Maintenance of the lumbar lordosis.    Schmorl's node involves the inferior endplate of L1.    Intervertebral disc spaces are relatively preserved.    Multilevel degenerative changes. Disc bulges from L2-L3 through L5-S1. Multilevel facet/ligamentous hypertrophy.    L4-L5 mild to moderate spinal canal stenosis with possible bilateral lateral recess stenosis. Milder spinal canal stenosis at other levels.    L2-L3 mild to moderate left neural foraminal narrowing, L3-L4 mild to moderate bilateral neural foraminal narrowing, L4-L5 mild to moderate bilateral neural foraminal narrowing, L5-S1 mild to moderate bilateral neural foraminal narrowing.    IMPRESSION:    CT thoracic spine:  No acute fracture or traumatic subluxation.  Vertebral body height and facet alignment maintained.  No significant spinal canal stenosis or neural foraminal narrowing.    CT lumbar spine:  No acute fracture traumatic subluxation.  Vertebral body height and facet alignment maintained.  Degenerative changes detailed in the body the report.    Please note that CT modality is a relatively insensitive examination for the detection of epidural hematoma.    --- End of Report ---

## 2024-04-29 NOTE — CONSULT NOTE ADULT - SUBJECTIVE AND OBJECTIVE BOX
**STROKE CODE CONSULT NOTE**    Last known well time/Time of onset of symptoms: 2024 at 00:01    HPI: 68 year old female with past medical history of asthma, CTEPH, CAD, HTN, Type B dissection who was seen by the outpatient office and was deemed a good candidate for TEVAR. Back in 2023 she was seen by her outpatient pulmonologist, Dr. Cathy Serra, was noted to have an elevated D-Dimer and was advised to present to ED for workup. In ED a CTA C/A/P was performed which revealed a Type B dissection and she was admitted and discharged with close follow up. 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 She was deemed a candidate for TEVAR with Dr. Kwon and Dr. Suh and presented to [facility] for preop lumbar drain prior to OR. Second stage thoracic endovascular aortic repair (TEVAR) 2024. Cook Zenith Dissection 38mm body graft deployed from distal to the left subclavian artery to just above the celiac artery in two pieces, with no complications. Neurology consulted for evaluation of bilateral lower extremity weakness around 5 am, improving after initial evaluation progressively. Patient went to sleep at midnight with no weakness. CT angio chest, abdomen and pelvis on 24 showed interval TEVAR of Type B aortic dissection with stable aneurysmal dilatation of false lumen below distal graft attachment site which is perfused. 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 Distal common hepatic artery is perfused. Multifocal splenic infarcts and multiple small renal infarcts bilaterally, likely embolic etiology.    T(C): 37.2 (24 @ 01:04), Max: 37.3 (24 @ 22:00)  HR: 102 (24 @ 06:00) (78 - 102)  BP: 110/55 (24 @ 05:00) (107/60 - 135/63)  RR: 18 (24 @ 06:00) (15 - 18)  SpO2: 93% (24 @ 06:00) (90% - 100%)    PAST MEDICAL & SURGICAL HISTORY:  HTN (hypertension)  Prediabetes  Mild tricuspid regurgitation  Enlarged aorta  CAD (coronary artery disease)  Ascending aorta dilation  Pulmonary hypertension  Chronic systolic right heart failure  2019 novel coronavirus disease (COVID-19)  Asthma  S/P hysterectomy  S/P     FAMILY HISTORY:  Family history of early CAD (Father)    SOCIAL HISTORY:   Smoking status: No  Drinking: No  Drug Use: No    ROS: ***  Constitutional: No fever, weight loss or fatigue  Eyes: No eye pain, visual disturbances, or discharge  ENMT:  No difficulty hearing, tinnitus; No sinus or throat pain  Neck: No pain or stiffness  Respiratory: No cough, wheezing, chills or hemoptysis  Cardiovascular: No chest pain, palpitations, shortness of breath, or leg swelling  Gastrointestinal: No abdominal pain. No nausea, vomiting or hematemesis; No diarrhea or constipation. Nohematochezia.  Genitourinary: No dysuria, frequency, hematuria or incontinence  Neurological: As per HPI  Skin: No itching, burning, rashes or lesions   Endocrine: No heat or cold intolerance; No hair loss  Musculoskeletal: No joint pain or swelling; No muscle, back or extremity pain  Heme/Lymph: No easy bruising or bleeding gums    MEDICATIONS  (STANDING):  albumin human  5% IVPB 250 milliLiter(s) IV Intermittent every 1 hour  albumin human  5% IVPB 250 milliLiter(s) IV Intermittent once  albuterol    90 MICROgram(s) HFA Inhaler 2 Puff(s) Inhalation every 6 hours  albuterol/ipratropium for Nebulization 3 milliLiter(s) Nebulizer every 6 hours  ascorbic acid 500 milliGRAM(s) Oral two times a day  atorvastatin 20 milliGRAM(s) Oral at bedtime  chlorhexidine 2% Cloths 1 Application(s) Topical daily  gabapentin 100 milliGRAM(s) Oral every 8 hours  heparin  Infusion 500 Unit(s)/Hr (5 mL/Hr) IV Continuous <Continuous>  influenza  Vaccine (HIGH DOSE) 0.7 milliLiter(s) IntraMuscular once  mupirocin 2% Ointment 1 Application(s) Both Nostrils two times a day  pantoprazole    Tablet 40 milliGRAM(s) Oral daily  phenylephrine    Infusion 0.4 MICROgram(s)/kG/Min (12.2 mL/Hr) IV Continuous <Continuous>  polyethylene glycol 3350 17 Gram(s) Oral daily  potassium phosphate IVPB 15 milliMole(s) IV Intermittent once  riociguat 2.5 milliGRAM(s) Oral every 8 hours  senna 2 Tablet(s) Oral at bedtime  sodium chloride 0.9%. 1000 milliLiter(s) (10 mL/Hr) IV Continuous <Continuous>    MEDICATIONS  (PRN):  acetaminophen     Tablet .. 650 milliGRAM(s) Oral every 6 hours PRN Mild Pain (1 - 3)    Allergies  No Known Allergies    Vital Signs Last 24 Hrs  T(C): 37.2 (2024 01:04), Max: 37.3 (2024 22:00)  T(F): 98.9 (2024 01:04), Max: 99.2 (2024 22:00)  HR: 102 (2024 06:00) (78 - 102)  BP: 110/55 (2024 05:00) (107/60 - 135/63)  BP(mean): 77 (2024 05:00) (75 - 90)  RR: 18 (2024 06:00) (15 - 18)  SpO2: 93% (2024 06:00) (90% - 100%)    Parameters below as of 2024 06:00  Patient On (Oxygen Delivery Method): room air    Physical exam:  Constitutional: No acute distress, conversant  Eyes: Anicteric sclerae, moist conjunctivae, see below for CNs  Neck: trachea midline, FROM, supple, no thyromegaly or lymphadenopathy  Cardiovascular: Regular rate and rhythm, no murmurs, rubs, or gallops. No carotid bruits.   Pulmonary: Anterior breath sounds clear bilaterally, no crackles or wheezing. No use of accessory muscles  GI: Abdomen soft, non-distended, non-tender  Extremities: Radial and DP pulses +2, no edema  Neurologic:  -Mental status: Awake, alert, oriented to person, place, and time. Speech is fluent with intact naming, repetition, and comprehension, no dysarthria. Recent and remote memory intact. Follows commands. Attention/concentration intact. Fund of knowledge appropriate.  -Cranial nerves:   II: Visual fields are full to confrontation.  III, IV, VI: Extraocular movements are intact without nystagmus. Pupils equally round and reactive to light  V:  Facial sensation V1-V3 equal and intact   VII: Face is symmetric with normal eye closure and smile  VIII: Hearing is bilaterally intact to finger rub  IX, X: Uvula is midline and soft palate rises symmetrically  XI: Head turning and shoulder shrug are intact.  XII: Tongue protrudes midline  Motor: Normal bulk and tone. No pronator drift. Strength bilateral upper extremity 5/5. Drift in both legs. Right lower extremity 5-/5 hip flexion, 4/5 knee flexion, rest unremarkable. Left lower extremity 3/5 hip flexion, 4/5 knee flexion, 4/5 distal, rest unremarkable.   Rapid alternating movements intact and symmetric  Sensation: Intact to light touch bilaterally. No neglect or extinction on double simultaneous testing.  Coordination: No dysmetria on finger-to-nose. Heel-to-shin unable  Reflexes: 3/4 patelar bilateral with spread to the other leg. 1/4 achilles bilateral. UE 2/4. Downgoing toes bilaterally   Gait: Narrow gait and steady    NIHSS: 3    Fingerstick Blood Glucose: CAPILLARY BLOOD GLUCOSE      POCT Blood Glucose.: 137 mg/dL (2024 21:37)    LABS:                        9.5    15.32 )-----------( 148      ( 2024 04:43 )             28.0     04-29    137  |  105  |  13  ----------------------------<  118<H>  3.7   |  21<L>  |  0.97    Ca    8.5      2024 04:43  Phos  2.7     -  Mg     2.1         TPro  5.6<L>  /  Alb  2.9<L>  /  TBili  1.3<H>  /  DBili  x   /  AST  45<H>  /  ALT  49<H>  /  AlkPhos  65  04-29    PT/INR - ( 2024 00:03 )   PT: 13.9 sec;   INR: 1.23          PTT - ( 2024 00:03 )  PTT:31.6 sec      Urinalysis Basic - ( 2024 04:43 )    Color: x / Appearance: x / SG: x / pH: x  Gluc: 118 mg/dL / Ketone: x  / Bili: x / Urobili: x   Blood: x / Protein: x / Nitrite: x   Leuk Esterase: x / RBC: x / WBC x   Sq Epi: x / Non Sq Epi: x / Bacteria: x        RADIOLOGY & ADDITIONAL STUDIES:      -----------------------------------------------------------------------------------------------------------------  IV-tPA (Y/N):    ***                              Bolus time:    Alteplase Dose Verification w/ RN:  Reason IV-tPA not given: ***    **STROKE CODE CONSULT NOTE**    Last known well time/Time of onset of symptoms: 2024 at 00:01    HPI: 68 year old female with past medical history of asthma, CTEPH, CAD, HTN, Type B dissection who was seen by the outpatient office and was deemed a good candidate for TEVAR. Back in 2023 she was seen by her outpatient pulmonologist, Dr. Cathy Serra, was noted to have an elevated D-Dimer and was advised to present to ED for workup. In ED a CTA C/A/P was performed which revealed a Type B dissection and she was admitted and discharged with close follow up. 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 She was deemed a candidate for TEVAR with Dr. Kwon and Dr. Suh and presented to [facility] for preop lumbar drain prior to OR. Second stage thoracic endovascular aortic repair (TEVAR) 2024. Cook Zenith Dissection 38mm body graft deployed from distal to the left subclavian artery to just above the celiac artery in two pieces, with no complications. Neurology consulted for evaluation of bilateral lower extremity weakness around 5 am, improving after initial evaluation progressively. Patient went to sleep at midnight with no weakness. CT angio chest, abdomen and pelvis on 24 showed interval TEVAR of Type B aortic dissection with stable aneurysmal dilatation of false lumen below distal graft attachment site which is perfused. 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 Distal common hepatic artery is perfused. Multifocal splenic infarcts and multiple small renal infarcts bilaterally, likely embolic etiology.    T(C): 37.2 (24 @ 01:04), Max: 37.3 (24 @ 22:00)  HR: 102 (24 @ 06:00) (78 - 102)  BP: 110/55 (24 @ 05:00) (107/60 - 135/63)  RR: 18 (24 @ 06:00) (15 - 18)  SpO2: 93% (24 @ 06:00) (90% - 100%)    PAST MEDICAL & SURGICAL HISTORY:  HTN (hypertension)  Prediabetes  Mild tricuspid regurgitation  Enlarged aorta  CAD (coronary artery disease)  Ascending aorta dilation  Pulmonary hypertension  Chronic systolic right heart failure  2019 novel coronavirus disease (COVID-19)  Asthma  S/P hysterectomy  S/P     FAMILY HISTORY:  Family history of early CAD (Father)    SOCIAL HISTORY:   Smoking status: No  Drinking: No  Drug Use: No    ROS: ***  Constitutional: No fever, weight loss or fatigue  Eyes: No eye pain, visual disturbances, or discharge  ENMT:  No difficulty hearing, tinnitus; No sinus or throat pain  Neck: No pain or stiffness  Respiratory: No cough, wheezing, chills or hemoptysis  Cardiovascular: No chest pain, palpitations, shortness of breath, or leg swelling  Gastrointestinal: No abdominal pain. No nausea, vomiting or hematemesis; No diarrhea or constipation. Nohematochezia.  Genitourinary: No dysuria, frequency, hematuria or incontinence  Neurological: As per HPI  Skin: No itching, burning, rashes or lesions   Endocrine: No heat or cold intolerance; No hair loss  Musculoskeletal: No joint pain or swelling; No muscle, back or extremity pain  Heme/Lymph: No easy bruising or bleeding gums    MEDICATIONS  (STANDING):  albumin human  5% IVPB 250 milliLiter(s) IV Intermittent every 1 hour  albumin human  5% IVPB 250 milliLiter(s) IV Intermittent once  albuterol    90 MICROgram(s) HFA Inhaler 2 Puff(s) Inhalation every 6 hours  albuterol/ipratropium for Nebulization 3 milliLiter(s) Nebulizer every 6 hours  ascorbic acid 500 milliGRAM(s) Oral two times a day  atorvastatin 20 milliGRAM(s) Oral at bedtime  chlorhexidine 2% Cloths 1 Application(s) Topical daily  gabapentin 100 milliGRAM(s) Oral every 8 hours  heparin  Infusion 500 Unit(s)/Hr (5 mL/Hr) IV Continuous <Continuous>  influenza  Vaccine (HIGH DOSE) 0.7 milliLiter(s) IntraMuscular once  mupirocin 2% Ointment 1 Application(s) Both Nostrils two times a day  pantoprazole    Tablet 40 milliGRAM(s) Oral daily  phenylephrine    Infusion 0.4 MICROgram(s)/kG/Min (12.2 mL/Hr) IV Continuous <Continuous>  polyethylene glycol 3350 17 Gram(s) Oral daily  potassium phosphate IVPB 15 milliMole(s) IV Intermittent once  riociguat 2.5 milliGRAM(s) Oral every 8 hours  senna 2 Tablet(s) Oral at bedtime  sodium chloride 0.9%. 1000 milliLiter(s) (10 mL/Hr) IV Continuous <Continuous>    MEDICATIONS  (PRN):  acetaminophen     Tablet .. 650 milliGRAM(s) Oral every 6 hours PRN Mild Pain (1 - 3)    Allergies  No Known Allergies    Vital Signs Last 24 Hrs  T(C): 37.2 (2024 01:04), Max: 37.3 (2024 22:00)  T(F): 98.9 (2024 01:04), Max: 99.2 (2024 22:00)  HR: 102 (2024 06:00) (78 - 102)  BP: 110/55 (2024 05:00) (107/60 - 135/63)  BP(mean): 77 (2024 05:00) (75 - 90)  RR: 18 (2024 06:00) (15 - 18)  SpO2: 93% (2024 06:00) (90% - 100%)    Parameters below as of 2024 06:00  Patient On (Oxygen Delivery Method): room air    Physical exam:  Constitutional: No acute distress, conversant  Eyes: Anicteric sclerae, moist conjunctivae, see below for CNs  Neck: trachea midline, FROM, supple, no thyromegaly or lymphadenopathy  Cardiovascular: Regular rate and rhythm, no murmurs, rubs, or gallops. No carotid bruits.   Pulmonary: Anterior breath sounds clear bilaterally, no crackles or wheezing. No use of accessory muscles  GI: Abdomen soft, non-distended, non-tender  Extremities: Radial and DP pulses +2, no edema  Neurologic:  -Mental status: Awake, alert, oriented to person, place, and time. Speech is fluent with intact naming, repetition, and comprehension, no dysarthria. Recent and remote memory intact. Follows commands. Attention/concentration intact. Fund of knowledge appropriate.  -Cranial nerves:   II: Visual fields are full to confrontation.  III, IV, VI: Extraocular movements are intact without nystagmus. Pupils equally round and reactive to light  V:  Facial sensation V1-V3 equal and intact   VII: Face is symmetric with normal eye closure and smile  VIII: Hearing is bilaterally intact to finger rub  IX, X: Uvula is midline and soft palate rises symmetrically  XI: Head turning and shoulder shrug are intact.  XII: Tongue protrudes midline  Motor: Normal bulk and tone. No pronator drift. Strength bilateral upper extremity 5/5. Drift in both legs. Right lower extremity 5-/5 hip flexion, 4/5 knee flexion, rest unremarkable. Left lower extremity 3/5 hip flexion, 4/5 knee flexion, 4/5 distal, rest unremarkable.   Rapid alternating movements intact and symmetric  Sensation: Intact to light touch bilaterally. No neglect or extinction on double simultaneous testing.  Coordination: No dysmetria on finger-to-nose. Heel-to-shin unable  Reflexes: 3/4 patelar bilateral with spread to the other leg. 1/4 achilles bilateral. UE 2/4. Downgoing toes bilaterally   Gait: Narrow gait and steady    NIHSS: 3    Fingerstick Blood Glucose: CAPILLARY BLOOD GLUCOSE      POCT Blood Glucose.: 137 mg/dL (2024 21:37)    LABS:                        9.5    15.32 )-----------( 148      ( 2024 04:43 )             28.0     04-29    137  |  105  |  13  ----------------------------<  118<H>  3.7   |  21<L>  |  0.97    Ca    8.5      2024 04:43  Phos  2.7     -  Mg     2.1         TPro  5.6<L>  /  Alb  2.9<L>  /  TBili  1.3<H>  /  DBili  x   /  AST  45<H>  /  ALT  49<H>  /  AlkPhos  65  04-29    PT/INR - ( 2024 00:03 )   PT: 13.9 sec;   INR: 1.23          PTT - ( 2024 00:03 )  PTT:31.6 sec      Urinalysis Basic - ( 2024 04:43 )    Color: x / Appearance: x / SG: x / pH: x  Gluc: 118 mg/dL / Ketone: x  / Bili: x / Urobili: x   Blood: x / Protein: x / Nitrite: x   Leuk Esterase: x / RBC: x / WBC x   Sq Epi: x / Non Sq Epi: x / Bacteria: x        RADIOLOGY & ADDITIONAL STUDIES:      -----------------------------------------------------------------------------------------------------------------  No IV-TNK  Reason IV-TNK not given: Out of TNK window plus recent surgery    **STROKE CODE CONSULT NOTE**    Last known well time/Time of onset of symptoms: 2024 at 00:01    HPI: 68 year old female with past medical history of asthma, CTEPH, CAD, HTN, Type B dissection who was seen by the outpatient office and was deemed a good candidate for TEVAR. Back in 2023 she was seen by her outpatient pulmonologist, Dr. Cathy Serra, was noted to have an elevated D-Dimer and was advised to present to ED for workup. In ED a CTA C/A/P was performed which revealed a Type B dissection and she was admitted and discharged with close follow up. 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 She was deemed a candidate for TEVAR with Dr. Kwon and Dr. Suh and presented to [facility] for preop lumbar drain prior to OR. Second stage thoracic endovascular aortic repair (TEVAR) 2024. Cook Zenith Dissection 38mm body graft deployed from distal to the left subclavian artery to just above the celiac artery in two pieces, with no complications. Neurology consulted for evaluation of bilateral lower extremity weakness around 5 am, improving after initial evaluation progressively. Patient went to sleep at midnight with no weakness. CT angio chest, abdomen and pelvis on 24 showed interval TEVAR of Type B aortic dissection with stable aneurysmal dilatation of false lumen below distal graft attachment site which is perfused. 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 Distal common hepatic artery is perfused. Multifocal splenic infarcts and multiple small renal infarcts bilaterally, likely embolic etiology.    T(C): 37.2 (24 @ 01:04), Max: 37.3 (24 @ 22:00)  HR: 102 (24 @ 06:00) (78 - 102)  BP: 110/55 (24 @ 05:00) (107/60 - 135/63)  RR: 18 (24 @ 06:00) (15 - 18)  SpO2: 93% (24 @ 06:00) (90% - 100%)    PAST MEDICAL & SURGICAL HISTORY:  HTN (hypertension)  Prediabetes  Mild tricuspid regurgitation  Enlarged aorta  CAD (coronary artery disease)  Ascending aorta dilation  Pulmonary hypertension  Chronic systolic right heart failure  2019 novel coronavirus disease (COVID-19)  Asthma  S/P hysterectomy  S/P     FAMILY HISTORY:  Family history of early CAD (Father)    SOCIAL HISTORY:   Smoking status: No  Drinking: No  Drug Use: No    ROS: ***  Constitutional: No fever, weight loss or fatigue  Eyes: No eye pain, visual disturbances, or discharge  ENMT:  No difficulty hearing, tinnitus; No sinus or throat pain  Neck: No pain or stiffness  Respiratory: No cough, wheezing, chills or hemoptysis  Cardiovascular: No chest pain, palpitations, shortness of breath, or leg swelling  Gastrointestinal: No abdominal pain. No nausea, vomiting or hematemesis; No diarrhea or constipation. Nohematochezia.  Genitourinary: No dysuria, frequency, hematuria or incontinence  Neurological: As per HPI  Skin: No itching, burning, rashes or lesions   Endocrine: No heat or cold intolerance; No hair loss  Musculoskeletal: No joint pain or swelling; No muscle, back or extremity pain  Heme/Lymph: No easy bruising or bleeding gums    MEDICATIONS  (STANDING):  albumin human  5% IVPB 250 milliLiter(s) IV Intermittent every 1 hour  albumin human  5% IVPB 250 milliLiter(s) IV Intermittent once  albuterol    90 MICROgram(s) HFA Inhaler 2 Puff(s) Inhalation every 6 hours  albuterol/ipratropium for Nebulization 3 milliLiter(s) Nebulizer every 6 hours  ascorbic acid 500 milliGRAM(s) Oral two times a day  atorvastatin 20 milliGRAM(s) Oral at bedtime  chlorhexidine 2% Cloths 1 Application(s) Topical daily  gabapentin 100 milliGRAM(s) Oral every 8 hours  heparin  Infusion 500 Unit(s)/Hr (5 mL/Hr) IV Continuous <Continuous>  influenza  Vaccine (HIGH DOSE) 0.7 milliLiter(s) IntraMuscular once  mupirocin 2% Ointment 1 Application(s) Both Nostrils two times a day  pantoprazole    Tablet 40 milliGRAM(s) Oral daily  phenylephrine    Infusion 0.4 MICROgram(s)/kG/Min (12.2 mL/Hr) IV Continuous <Continuous>  polyethylene glycol 3350 17 Gram(s) Oral daily  potassium phosphate IVPB 15 milliMole(s) IV Intermittent once  riociguat 2.5 milliGRAM(s) Oral every 8 hours  senna 2 Tablet(s) Oral at bedtime  sodium chloride 0.9%. 1000 milliLiter(s) (10 mL/Hr) IV Continuous <Continuous>    MEDICATIONS  (PRN):  acetaminophen     Tablet .. 650 milliGRAM(s) Oral every 6 hours PRN Mild Pain (1 - 3)    Allergies  No Known Allergies    Vital Signs Last 24 Hrs  T(C): 37.2 (2024 01:04), Max: 37.3 (2024 22:00)  T(F): 98.9 (2024 01:04), Max: 99.2 (2024 22:00)  HR: 102 (2024 06:00) (78 - 102)  BP: 110/55 (2024 05:00) (107/60 - 135/63)  BP(mean): 77 (2024 05:00) (75 - 90)  RR: 18 (2024 06:00) (15 - 18)  SpO2: 93% (2024 06:00) (90% - 100%)    Parameters below as of 2024 06:00  Patient On (Oxygen Delivery Method): room air    Physical exam:  Constitutional: No acute distress, conversant  Eyes: Anicteric sclerae, moist conjunctivae, see below for CNs  Neck: trachea midline, FROM, supple, no thyromegaly or lymphadenopathy  Cardiovascular: Regular rate and rhythm, no murmurs, rubs, or gallops. No carotid bruits.   Pulmonary: Anterior breath sounds clear bilaterally, no crackles or wheezing. No use of accessory muscles  GI: Abdomen soft, non-distended, non-tender  Extremities: Radial and DP pulses +2, no edema  Neurologic:  -Mental status: Awake, alert, oriented to person, place, and time. Speech is fluent with intact naming, repetition, and comprehension, no dysarthria. Recent and remote memory intact. Follows commands. Attention/concentration intact. Fund of knowledge appropriate.  -Cranial nerves:   II: Visual fields are full to confrontation.  III, IV, VI: Extraocular movements are intact without nystagmus. Pupils equally round and reactive to light  V:  Facial sensation V1-V3 equal and intact   VII: Face is symmetric with normal eye closure and smile  VIII: Hearing is bilaterally intact to finger rub  IX, X: Uvula is midline and soft palate rises symmetrically  XI: Head turning and shoulder shrug are intact.  XII: Tongue protrudes midline  Motor: Normal bulk and tone. No pronator drift. Strength bilateral upper extremity 5/5. Drift in both legs. Right lower extremity 5-/5 hip flexion, 4/5 knee flexion, rest unremarkable. Left lower extremity 3/5 hip flexion, 4/5 knee flexion, 4/5 distal, rest unremarkable.   Rapid alternating movements intact and symmetric  Sensation: Intact to light touch bilaterally. No neglect or extinction on double simultaneous testing.  Coordination: No dysmetria on finger-to-nose. Heel-to-shin unable  Reflexes: 3/4 patelar bilateral with spread to the other leg. 1/4 achilles bilateral. UE 2/4. Downgoing toes bilaterally   Gait: Narrow gait and steady    NIHSS: 3    Fingerstick Blood Glucose: CAPILLARY BLOOD GLUCOSE      POCT Blood Glucose.: 137 mg/dL (2024 21:37)    LABS:                        9.5    15.32 )-----------( 148      ( 2024 04:43 )             28.0     04-29    137  |  105  |  13  ----------------------------<  118<H>  3.7   |  21<L>  |  0.97    Ca    8.5      2024 04:43  Phos  2.7     -  Mg     2.1         TPro  5.6<L>  /  Alb  2.9<L>  /  TBili  1.3<H>  /  DBili  x   /  AST  45<H>  /  ALT  49<H>  /  AlkPhos  65  04-29    PT/INR - ( 2024 00:03 )   PT: 13.9 sec;   INR: 1.23          PTT - ( 2024 00:03 )  PTT:31.6 sec      Urinalysis Basic - ( 2024 04:43 )    Color: x / Appearance: x / SG: x / pH: x  Gluc: 118 mg/dL / Ketone: x  / Bili: x / Urobili: x   Blood: x / Protein: x / Nitrite: x   Leuk Esterase: x / RBC: x / WBC x   Sq Epi: x / Non Sq Epi: x / Bacteria: x        RADIOLOGY & ADDITIONAL STUDIES:      -----------------------------------------------------------------------------------------------------------------  No IV-TNK  Reason IV-TNK not given: Out of TNK window plus recent surgery

## 2024-04-29 NOTE — CONSULT NOTE ADULT - SUBJECTIVE AND OBJECTIVE BOX
HISTORY OF PRESENT ILLNESS:   68 year old female with ASTHMA, Chronic Pulmonary HTN,, CAD, HTN.  She was evaluated by her pulmonologist.  Dr. Cathy Serra, was noted to have an elevated D-Dimer and was advised to present to ED for workup. In ED a CTA C/A/P was performed which revealed a Type B dissection and she was admitted and discharged with close follow up.   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   She was deemed a candidate for TEVAR with Dr. Kwon and Dr. Suh and presented to [facility] for preop lumbar drain placement on  removed on . S/p TEVAR .     PAST MEDICAL & SURGICAL HISTORY:  HTN (hypertension)      Prediabetes      Mild tricuspid regurgitation      Enlarged aorta      CAD (coronary artery disease)      Ascending aorta dilation      Pulmonary hypertension      Chronic systolic right heart failure      2019 novel coronavirus disease (COVID-19)      Asthma      S/P hysterectomy      S/P         FAMILY HISTORY:  Family history of early CAD (Father)        SOCIAL HISTORY:  Tobacco Use:  EtOH use:   Substance:    Allergies    No Known Allergies    Intolerances        REVIEW OF SYSTEMS:  General:	no recent illnesses, no recent wt gain/loss, no chills  Skin/Breast:  no rash, lumps, new moles, erythema, tenderness  Ophthalmologic:  no change in vision, diplopia, pain, redness, tearing, dry eyes	  ENMT:	no hearing loss, tinnitus, ear pain, vertigo, nasal congestion, epistaxis, sore throat  Respiratory and Thorax: no coughing, wheezing, recent URI, shortness of breath	  Cardiovascular: no chest pain, DELGADO, leg swelling, irregular rhythm   Gastrointestinal:	no abd pain, nausea, vomiting, diarrhea, constipation, bloody stool, heartburn  Genitourinary: no frequency, dysuria, hematuria  Musculoskeletal:	no joint pain, no joint swelling, no tenderness  Neurological:	 see HPI  Psychiatric:	no confusion, no anxiousness, no depression   Hematology/Lymphatics:	no brusing, easy bleeding, LAD  Endocrine:  	no excess urination/thirst, heat/cold intolerance  Allergic/Immunologic:  no urticaria, sneezing, recurrent infections      MEDICATIONS:  Antibiotics:    Neuro:  acetaminophen     Tablet .. 650 milliGRAM(s) Oral every 6 hours PRN  gabapentin 100 milliGRAM(s) Oral every 8 hours    Anticoagulation:  heparin  Infusion 500 Unit(s)/Hr IV Continuous <Continuous>    OTHER:  albuterol    90 MICROgram(s) HFA Inhaler 2 Puff(s) Inhalation every 6 hours  albuterol/ipratropium for Nebulization 3 milliLiter(s) Nebulizer every 6 hours  atorvastatin 20 milliGRAM(s) Oral at bedtime  chlorhexidine 2% Cloths 1 Application(s) Topical daily  dextrose 50% Injectable 25 Gram(s) IV Push once  dextrose 50% Injectable 12.5 Gram(s) IV Push once  dextrose Oral Gel 15 Gram(s) Oral once PRN  glucagon  Injectable 1 milliGRAM(s) IntraMuscular once  influenza  Vaccine (HIGH DOSE) 0.7 milliLiter(s) IntraMuscular once  insulin lispro (ADMELOG) corrective regimen sliding scale   SubCutaneous Before meals and at bedtime  mupirocin 2% Ointment 1 Application(s) Both Nostrils two times a day  pantoprazole    Tablet 40 milliGRAM(s) Oral daily  phenylephrine    Infusion 0.4 MICROgram(s)/kG/Min IV Continuous <Continuous>  polyethylene glycol 3350 17 Gram(s) Oral daily  riociguat 2.5 milliGRAM(s) Oral every 8 hours  senna 2 Tablet(s) Oral at bedtime    IVF:  albumin human  5% IVPB 250 milliLiter(s) IV Intermittent every 1 hour  ascorbic acid 500 milliGRAM(s) Oral two times a day  dextrose 10% Bolus 125 milliLiter(s) IV Bolus once  dextrose 5%. 1000 milliLiter(s) IV Continuous <Continuous>  dextrose 5%. 1000 milliLiter(s) IV Continuous <Continuous>  potassium phosphate IVPB 15 milliMole(s) IV Intermittent once  sodium chloride 0.9%. 1000 milliLiter(s) IV Continuous <Continuous>      Vital Signs Last 24 Hrs  T(C): 37.2 (2024 01:04), Max: 37.3 (2024 22:00)  T(F): 98.9 (2024 01:04), Max: 99.2 (2024 22:00)  HR: 95 (2024 04:00) (78 - 101)  BP: 111/59 (2024 04:00) (107/60 - 135/63)  BP(mean): 78 (2024 04:00) (75 - 90)  RR: 16 (2024 04:00) (15 - 18)  SpO2: 100% (2024 04:00) (90% - 100%)    Parameters below as of 2024 05:00  Patient On (Oxygen Delivery Method): nasal cannula w/ humidification  O2 Flow (L/min): 2      PHYSICAL EXAM:  GEN: laying in bed, appears well, NAD  NEURO: AOx3. FC, OE spont, speech intact, face symmetric. CNII-XII intact. PERRL, EOMI. No pronator drift.   5/5 strength b/l UE. RLE HF 3/5, KF/KE 4/5, PF/DF 5/5. LLE HF 2/5, KE/KF 3/5, PF DF 5/5. Sensation grossly intact to light touch throughout.   CV: RRR +S1/S2  PULM: Chest rise symmetric  GI: NT/ND  EXT: ext warm, dry, nontender    LABS:                        10.3   14.80 )-----------( 128      ( 2024 00:03 )             29.3     04-29    135  |  103  |  14  ----------------------------<  125<H>  3.9   |  23  |  0.98    Ca    9.0      2024 00:03  Phos  2.2     -  Mg     2.2         TPro  6.1  /  Alb  3.3  /  TBili  1.3<H>  /  DBili  x   /  AST  24  /  ALT  19  /  AlkPhos  68  04-29    PT/INR - ( 2024 00:03 )   PT: 13.9 sec;   INR: 1.23          PTT - ( 2024 00:03 )  PTT:31.6 sec  Urinalysis Basic - ( 2024 00:03 )    Color: x / Appearance: x / SG: x / pH: x  Gluc: 125 mg/dL / Ketone: x  / Bili: x / Urobili: x   Blood: x / Protein: x / Nitrite: x   Leuk Esterase: x / RBC: x / WBC x   Sq Epi: x / Non Sq Epi: x / Bacteria: x      CULTURES:    RADIOLOGY & ADDITIONAL STUDIES:      Assessment:  68 year old female with ASTHMA, Chronic Pulmonary HTN, CAD, HTN, now s/p preop lumbar drain placement on  removed s/p TEVAR on . NSGY consulted again for concern for cord ischemia and replacement of lumbar drain.     Plan:  - Recommend Neurology consult   - Recommend MRI T/L spine w/o contrast     D/w Dr. Randolph

## 2024-04-29 NOTE — CONSULT NOTE ADULT - ASSESSMENT
68 year old female with past medical history of asthma, CTEPH, CAD, HTN, Type B dissection who was seen by the outpatient office and was deemed a good candidate for TEVAR. Back in August 2023 she was seen by her outpatient pulmonologist, Dr. Cathy Serra, was noted to have an elevated D-Dimer and was advised to present to ED for workup. In ED a CTA C/A/P was performed which revealed a Type B dissection and she was admitted and discharged with close follow up. 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 She was deemed a candidate for TEVAR with Dr. Kwon and Dr. Suh and presented to [facility] for preop lumbar drain prior to OR. Second stage thoracic endovascular aortic repair (TEVAR) 26-Apr-2024. Cook Zenith Dissection 38mm body graft deployed from distal to the left subclavian artery to just above the celiac artery in two pieces, with no complications. Neurology consulted for evaluation of bilateral lower extremity weakness around 5 am, improving after initial evaluation progressively. Patient went to sleep at midnight with no weakness. CT angio chest, abdomen and pelvis on 4/28/24 showed interval TEVAR of Type B aortic dissection with stable aneurysmal dilatation of false lumen below distal graft attachment site which is perfused. 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 Distal common hepatic artery is perfused. Multifocal splenic infarcts and multiple small renal infarcts bilaterally, likely embolic etiology.    Impression: Rule out AIS in anterior spinal artery territory (aortic dissection)    PLAN:  - Management by primary team  - Titrate unfractionated heparin to therapeutic range  - Permissive hypertension giving keeping perfusion with fluids if possible (MAP above 85)  - Order MRI T/L spine w/o contrast   - Order CTA neck and head   - Consult PT/OT for evaluation    Case discussed with Neurology attending Dr. Norris 68 year old female with past medical history of asthma, CTEPH, CAD, HTN, Type B dissection who was seen by the outpatient office and was deemed a good candidate for TEVAR. CTA 2/2024 revealed stable type B dissection with proximal descending thoracic aortic aneurysm stable since prior exam 8/2023 and patent celiac axis originating from false lumen. Second stage thoracic endovascular aortic repair (TEVAR) 26-Apr-2024 with no complications. Neurology consulted for evaluation of bilateral lower extremity weakness around 5 am, improving after initial evaluation progressively. Patient went to sleep at midnight with no weakness. CT angio chest, abdomen and pelvis on 4/28/24 showed interval TEVAR of Type B aortic dissection with stable aneurysmal dilatation of false lumen below distal graft attachment site which is perfused. 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 Distal common hepatic artery is perfused. Multifocal splenic infarcts and multiple small renal infarcts bilaterally, likely embolic etiology.    Impression: Rule out AIS in anterior spinal artery territory (aortic dissection)    PLAN:  - Management by primary team  - Titrate unfractionated heparin to therapeutic range for stroke  - Permissive hypertension giving keeping perfusion with fluids if possible (MAP above 85)  - Order MRI T/L spine w/o contrast   - Order CTA neck and head   - Consult PT/OT for evaluation    Case discussed with Neurology attending Dr. Norris 68 year old female with past medical history of asthma, CTEPH, CAD, HTN, Type B dissection who was seen by the outpatient office and was deemed a good candidate for TEVAR. CTA 2/2024 revealed stable type B dissection with proximal descending thoracic aortic aneurysm stable since prior exam 8/2023 and patent celiac axis originating from false lumen. Second stage thoracic endovascular aortic repair (TEVAR) 26-Apr-2024 with no complications. Neurology consulted for evaluation of bilateral lower extremity weakness around 5 am, improving after initial evaluation progressively. Patient went to sleep at midnight with no weakness. CT angio chest, abdomen and pelvis on 4/28/24 showed interval TEVAR of Type B aortic dissection with stable aneurysmal dilatation of false lumen below distal graft attachment site which is perfused. 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 Multifocal splenic infarcts and multiple small renal infarcts bilaterally, likely embolic etiology.    Impression: Rule out AIS in anterior spinal artery vs b/l ENRICO territory (aortic dissection). Type B aortic dissection with stable aneurysmal dilatation of false lumen. Thrombus within the false lumen at this level and there is embolic occlusion of the celiac trunk extending into common hepatic artery, left gastric and splenic arteries. Multifocal splenic infarcts and multiple small renal infarcts bilaterally, likely embolic etiology.    PLAN:  - Management by primary team  - Titrate unfractionated heparin to therapeutic range for stroke  - Permissive hypertension giving keeping perfusion with fluids if possible (MAP above 85)  - Order MRI T/L spine w/o contrast   - Order CTA neck and head   - Consult PT/OT for evaluation    Case discussed with Neurology attending Dr. Norris

## 2024-04-29 NOTE — CONSULT NOTE ADULT - ASSESSMENT
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, severe combined pre/post capillary PH, Type B aortic dissection, obesity, systemic hypertension who presented for elective TEVAR now with concern for spinal ischemia and PH consulted in setting of need to use aggressive vasopressors to increase spinal perfusion/MAP    #CTEPH  #Combined pre/post capillary PH    - Per review of out patient medical records, she had severe PH noted on TTE 3/2023 and DELGADO, underwent CTA PE protocol after d dimer noted to be elevated, found to have webs and linear defects of R main PA, RUL, RLL with associated mosaicism, V/Q also significantly positive for CTEPH  - At this time her aortic dissection was noted, but felt to delay surgical intervention until PH improved  - RHC with:  RA 13  PA 82/28/48  PAWP 20  CO/CI (F) 4.64/2.54  PVR 6WU  PVR/SVR 0.3    NO vasoreactivity testing done, no TD CO/CI    - She was subsequently started on riociguat and follow up TTE showed significant improvement in PH as well as subjectively she noted improvement  - Now on vaso/phenylephrine to drive SVR    Recommend PA-C insertion to better assess PVR with aggressive vasopressor medications - specifically like to avoid phenylephrine if possible due to significant increase in PVR noted in literature. Vasopressin is excellent choice, would opt for epinephrine or levophed if PA-C suggest severe PVR elevation (baseline prior to treatment was 6WU). Would be helpful to trend PA pressure, obtain PAWP and CO/CI  Would not hold riociguat as she is at risk for rebound PH, although it may contribute to lower BP. Augment with pressor choices as outlined above  Can trial inhaled nitric oxide, 20PPM, and reassess PA pressure. She did not have vasoreactivity testing so it is unclear if this will be beneficial. If no change in PA pressures with Boo can discontinue as she has no difficulty with oxygenation  Maintain relative diuresis, avoid fluid boluses and would keep pt relatively net negative if RAP > 10    Would benefit from evaluation in formal CTEPH clinic for consideration of PTE (may be difficult in setting of Type B aortic dissection) vs balloon angioplasty and possible escalation of medical therapy, this can be done as an out patient. Patient can be seen in CTEPH clinic with myself and Dr. 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    - Per review of out patient medical records, she had severe PH noted on TTE 3/2023 and DELGADO, underwent CTA PE protocol after d dimer noted to be elevated, found to have webs and linear defects of R main PA, RUL, RLL with associated mosaicism, V/Q also significantly positive for CTEPH  - At this time her aortic dissection was noted, but felt to delay surgical intervention until PH improved  - RHC reviewed (8/2024) - Of note there is NO PAWP WAVEFORM, unable to wedge, calculations made on estimated PAWP of 15  RA 13  PA 82/28/48  PAWP 15  CO/CI (F) 4.64/2.54  PVR 7.1WU  PVR/SVR 0.4    NO vasoreactivity testing done, no TD CO/CI    - She was subsequently started on riociguat and follow up TTE showed significant improvement in PH as well as subjectively she noted improvement  - Now on vaso/phenylephrine to drive SVR    Recommend PA-C insertion to better assess PVR with aggressive vasopressor medications - specifically like to avoid phenylephrine if possible due to significant increase in PVR noted in literature. Vasopressin is excellent choice, would opt for epinephrine or levophed if PA-C suggest severe PVR elevation (baseline prior to treatment was 6WU). Would be helpful to trend PA pressure, obtain PAWP and CO/CI  Would not hold riociguat as she is at risk for rebound PH, although it may contribute to lower BP. Augment with pressor choices as outlined above  Can trial inhaled nitric oxide, 20PPM, and reassess PA pressure. She did not have vasoreactivity testing so it is unclear if this will be beneficial. If no change in PA pressures with Boo can discontinue as she has no difficulty with oxygenation  Maintain relative diuresis, avoid fluid boluses and would keep pt relatively net negative if RAP > 10  Recommend doppler of LE - if she has evidence of DVT recommend IVC filter while she is off a/c; otherwise serial doppler exams until lumbar drain removed and A/C can be restarted  Will send in referral for opsumit given severity of PH likely contributing to her spinal cord ischemia and paraplegia, concern prostacyclin may cause some hypotension so will hold off for now    Would benefit from evaluation in formal CTEPH clinic for consideration of PTE (may be difficult in setting of Type B aortic dissection) vs balloon angioplasty and possible escalation of medical therapy, this can be done as an out patient. Patient can be seen in CTEPH clinic with myself and Dr. Benites Agree with medical therapy for now    PH will continue to follow

## 2024-04-29 NOTE — PROGRESS NOTE ADULT - SUBJECTIVE AND OBJECTIVE BOX
CTICU  CRITICAL  CARE  attending     Hand off received 					   Pertinent clinical, laboratory, radiographic, hemodynamic, echocardiographic, respiratory data, microbiologic data and chart were reviewed and analyzed frequently throughout the course of the day  Patient seen and examined with CTS/ SH attending at bedside  Pt is a 68yr old female with PMH asthma, CTEPH, CAD, HTN, Type B Dissection admitted for TEVAR. Pt underwent LD placement . : Pt underwent endovascular repair of thoracic aorta covering L subclavian to ~5cm above celiac trunk ([place]). Arrived Extubated. Clamped  and  and drained overnight. Ld removed in afternoon. : early AM pt unable to move LE, stroke code called. NSGY replaced LD. MAPs driven up. CT imaging and MRI imaging performed. Pulmonary consulted for CTEPH mgmt. Boo and primacor restarted with new swan placement. 1 pRBC, mannitol, and decadron also given.     FAMILY HISTORY:  Family history of early CAD (Father)  PAST MEDICAL & SURGICAL HISTORY:  HTN (hypertension)  Prediabetes  Mild tricuspid regurgitation  Enlarged aorta  CAD (coronary artery disease)  Ascending aorta dilation  Pulmonary hypertension  Chronic systolic right heart failure  2019 novel coronavirus disease (COVID-19)  Asthma  S/P hysteectomy  S/P         Patient is a 68y old  Female who presents with a chief complaint of type B dissection.      14 system review was unremarkable    Vital signs, hemodynamic and respiratory parameters were reviewed from the bedside nursing flowsheet.  ICU Vital Signs Last 24 Hrs  T(C): 37 (2024 16:58), Max: 37.4 (2024 08:23)  T(F): 98.6 (2024 16:58), Max: 99.3 (2024 08:23)  HR: 82 (2024 19:00) (71 - 102)  BP: 110/55 (2024 05:00) (107/60 - 119/58)  BP(mean): 77 (2024 05:00) (75 - 84)  ABP: 166/84 (2024 19:00) (147/71 - 174/88)  ABP(mean): 120 (2024 19:00) (102 - 125)  RR: 18 (2024 19:00) (16 - 18)  SpO2: 100% (2024 19:00) (93% - 100%)    O2 Parameters below as of 2024 19:00  Patient On (Oxygen Delivery Method): nasal cannula, high flow  O2 Flow (L/min): 40  O2 Concentration (%): 60      Adult Advanced Hemodynamics Last 24 Hrs  CVP(mm Hg): --  CVP(cm H2O): --  CO: --  CI: --  PA: --  PA(mean): --  PCWP: --  SVR: --  SVRI: --  PVR: --  PVRI: --, ABG - ( 2024 13:05 )  pH, Arterial: 7.44  pH, Blood: x     /  pCO2: 36    /  pO2: 169   / HCO3: 24    / Base Excess: 0.6   /  SaO2: 99.9                Intake and output was reviewed and the fluid balance was calculated  Daily     Daily   I&O's Summary    2024 07:01  -  2024 07:00  --------------------------------------------------------  IN: 673.6 mL / OUT: 560 mL / NET: 113.6 mL    2024 07:01  -  2024 19:43  --------------------------------------------------------  IN: 2306.8 mL / OUT: 4285 mL / NET: -1978.2 mL        All lines and drain sites were assessed  Glycemic trend was reviewedCAPILLARY BLOOD GLUCOSE      POCT Blood Glucose.: 127 mg/dL (2024 08:24)      Neuro: well appearing female in nad, 5/5 strength throughout bl upper and lower extremities  HEENT: mmm  Heart: s1 s2  Lungs: clear bl  Abdomen: soft nt nd  Extremities: wwp    Lines:  RIJ Cordis with [place]   RIJ TLC   R axillary arterial line     Tubes:  LD       labs  CBC Full  -  ( 2024 12:53 )  WBC Count : 13.92 K/uL  RBC Count : 4.26 M/uL  Hemoglobin : 11.1 g/dL  Hematocrit : 31.5 %  Platelet Count - Automated : 142 K/uL  Mean Cell Volume : 73.9 fl  Mean Cell Hemoglobin : 26.1 pg  Mean Cell Hemoglobin Concentration : 35.2 gm/dL  Auto Neutrophil # : 13.20 K/uL  Auto Lymphocyte # : 0.36 K/uL  Auto Monocyte # : 0.13 K/uL  Auto Eosinophil # : 0.24 K/uL  Auto Basophil # : 0.00 K/uL  Auto Neutrophil % : 94.8 %  Auto Lymphocyte % : 2.6 %  Auto Monocyte % : 0.9 %  Auto Eosinophil % : 1.7 %  Auto Basophil % : 0.0 %        142  |  107  |  12  ----------------------------<  145<H>  3.8   |  23  |  0.82    Ca    9.4      2024 12:53  Phos  2.6       Mg     2.3         TPro  6.8  /  Alb  3.6  /  TBili  2.0<H>  /  DBili  x   /  AST  67<H>  /  ALT  60<H>  /  AlkPhos  88      PT/INR - ( 2024 12:53 )   PT: 12.9 sec;   INR: 1.14          PTT - ( 2024 12:53 )  PTT:28.3 sec  The current medications were reviewed   MEDICATIONS  (STANDING):  albuterol    90 MICROgram(s) HFA Inhaler 2 Puff(s) Inhalation every 6 hours  albuterol/ipratropium for Nebulization 3 milliLiter(s) Nebulizer every 6 hours  ascorbic acid 500 milliGRAM(s) Oral two times a day  atorvastatin 20 milliGRAM(s) Oral at bedtime  chlorhexidine 2% Cloths 1 Application(s) Topical daily  gabapentin 100 milliGRAM(s) Oral every 8 hours  influenza  Vaccine (HIGH DOSE) 0.7 milliLiter(s) IntraMuscular once  milrinone Infusion 0.25 MICROgram(s)/kG/Min (6.12 mL/Hr) IV Continuous <Continuous>  modafinil 100 milliGRAM(s) Oral daily  mupirocin 2% Ointment 1 Application(s) Both Nostrils two times a day  pantoprazole    Tablet 40 milliGRAM(s) Oral daily  phenylephrine    Infusion 0.399 MICROgram(s)/kG/Min (12.2 mL/Hr) IV Continuous <Continuous>  polyethylene glycol 3350 17 Gram(s) Oral daily  potassium phosphate IVPB 15 milliMole(s) IV Intermittent once  riociguat 2.5 milliGRAM(s) Oral every 8 hours  senna 2 Tablet(s) Oral at bedtime  sodium chloride 0.9%. 1000 milliLiter(s) (10 mL/Hr) IV Continuous <Continuous>  sodium chloride 3%. 500 milliLiter(s) (30 mL/Hr) IV Continuous <Continuous>  vasopressin Infusion 0.02 Unit(s)/Min (3 mL/Hr) IV Continuous <Continuous>    MEDICATIONS  (PRN):  acetaminophen     Tablet .. 650 milliGRAM(s) Oral every 6 hours PRN Mild Pain (1 - 3)      Assessment/Plan:  68yr old female with PMH asthma, CTEPH, CAD, HTN, Type B Dissection admitted for TEVAR.    POD3 TEVAR ([place])  Continue LD per protocol  Fu MRI imaging results  Continue with MAP goals >120, with vasopressin and phenylephrine prn  Serial neuro checks, q1h  CTEPH mgmt  Appreciate pulmonary consult  Continue Boo  Continue Riociguat  Continue primacor  Serial CI/CO  Trend end organ perfusion markers  Diet as tolerated  Replete lytes prn  Monitor CT output  GI/DVT PPX  Bowel Regimen  Pain control  OOB with PT  Close hemodynamic, ventilatory and drain monitoring and management per post op routine  Monitor for arrhythmias and monitor parameters for organ perfusion  Beta blockade not administered due to hemodynamic instability and bradycardia  Monitor neurologic status  Head of the bed should remain elevated to 45 deg   Chest PT and IS will be encouraged  Monitor adequacy of oxygenation and ventilation and attempt to wean oxygen  Antibiotic regimen will be tailored to the clinical, laboratory and microbiologic data  Nutritional goals will be met using po eventually, ensure adequate caloric intake and monitor the same  Stress ulcer and VTE prophylaxis will be achieved    Glycemic control is satisfactory  Electrolytes have been repleted as necessary and wound care has been carried out   Pain control has been achieved.   Aggressive physical therapy and early mobility and ambulation goals will be met   The family was updated about the course and plan.    CRITICAL CARE TIME personally provided by me  in evaluation and management, reassessments, review and interpretation of labs and x-rays, ventilator and hemodynamic management, formulating a plan and coordinating care: ___60__ MIN.  Time does not include procedural time.           CTICU ATTENDING     					  Cherelle Redmond MD

## 2024-04-29 NOTE — PROGRESS NOTE ADULT - SUBJECTIVE AND OBJECTIVE BOX
Neurology Stroke Progress Note    INTERVAL HPI/OVERNIGHT EVENTS:  Patient seen and examined. s/p lumbar drain.    MEDICATIONS  (STANDING):  albuterol    90 MICROgram(s) HFA Inhaler 2 Puff(s) Inhalation every 6 hours  albuterol/ipratropium for Nebulization 3 milliLiter(s) Nebulizer every 6 hours  ascorbic acid 500 milliGRAM(s) Oral two times a day  atorvastatin 20 milliGRAM(s) Oral at bedtime  chlorhexidine 2% Cloths 1 Application(s) Topical daily  gabapentin 100 milliGRAM(s) Oral every 8 hours  influenza  Vaccine (HIGH DOSE) 0.7 milliLiter(s) IntraMuscular once  modafinil 100 milliGRAM(s) Oral daily  mupirocin 2% Ointment 1 Application(s) Both Nostrils two times a day  pantoprazole    Tablet 40 milliGRAM(s) Oral daily  phenylephrine    Infusion 0.399 MICROgram(s)/kG/Min (12.2 mL/Hr) IV Continuous <Continuous>  polyethylene glycol 3350 17 Gram(s) Oral daily  potassium phosphate IVPB 15 milliMole(s) IV Intermittent once  riociguat 2.5 milliGRAM(s) Oral every 8 hours  senna 2 Tablet(s) Oral at bedtime  sodium chloride 0.9%. 1000 milliLiter(s) (10 mL/Hr) IV Continuous <Continuous>  sodium chloride 3%. 500 milliLiter(s) (50 mL/Hr) IV Continuous <Continuous>  vasopressin Infusion 0.02 Unit(s)/Min (3 mL/Hr) IV Continuous <Continuous>    MEDICATIONS  (PRN):  acetaminophen     Tablet .. 650 milliGRAM(s) Oral every 6 hours PRN Mild Pain (1 - 3)      Allergies    No Known Allergies    Intolerances        Vital Signs Last 24 Hrs  T(C): 36.9 (29 Apr 2024 14:27), Max: 37.4 (29 Apr 2024 08:23)  T(F): 98.4 (29 Apr 2024 14:27), Max: 99.3 (29 Apr 2024 08:23)  HR: 74 (29 Apr 2024 14:00) (73 - 102)  BP: 110/55 (29 Apr 2024 05:00) (107/60 - 134/60)  BP(mean): 77 (29 Apr 2024 05:00) (75 - 88)  RR: 18 (29 Apr 2024 14:00) (16 - 18)  SpO2: 99% (29 Apr 2024 14:00) (93% - 100%)    Parameters below as of 29 Apr 2024 14:00  Patient On (Oxygen Delivery Method): BiPAP/CPAP    O2 Concentration (%): 60    Neurologic:  -Mental status: Awake, alert, oriented to person, place, and time. Speech is fluent with intact naming, repetition, and comprehension, no dysarthria. Recent and remote memory intact. Follows commands. Attention/concentration intact.   -Cranial nerves:   II: Visual fields are full to confrontation.  III, IV, VI: Extraocular movements are intact without nystagmus. Pupils equally round and reactive to light  V:  Facial sensation V1-V3 equal and intact   VII: Face is symmetric with normal eye closure and smile  Motor: Normal bulk and tone. No pronator drift. Strength bilateral upper extremity 5/5. RLE 4+/5. LLE 3+/5.    Sensation: Intact to light touch bilaterally. No neglect or extinction on double simultaneous testing.  Coordination: No dysmetria on finger-to-nose bilaterally.    Reflexes: upgoing toes L foot  Gait: deferred      LABS:                        11.1   13.92 )-----------( 142      ( 29 Apr 2024 12:53 )             31.5     04-29    142  |  107  |  12  ----------------------------<  145<H>  3.8   |  23  |  0.82    Ca    9.4      29 Apr 2024 12:53  Phos  2.6     04-29  Mg     2.3     04-29    TPro  6.8  /  Alb  3.6  /  TBili  2.0<H>  /  DBili  x   /  AST  67<H>  /  ALT  60<H>  /  AlkPhos  88  04-29    PT/INR - ( 29 Apr 2024 12:53 )   PT: 12.9 sec;   INR: 1.14          PTT - ( 29 Apr 2024 12:53 )  PTT:28.3 sec  Urinalysis Basic - ( 29 Apr 2024 12:53 )    Color: x / Appearance: x / SG: x / pH: x  Gluc: 145 mg/dL / Ketone: x  / Bili: x / Urobili: x   Blood: x / Protein: x / Nitrite: x   Leuk Esterase: x / RBC: x / WBC x   Sq Epi: x / Non Sq Epi: x / Bacteria: x        RADIOLOGY & ADDITIONAL TESTS:     Neurology Stroke Progress Note    INTERVAL HPI/OVERNIGHT EVENTS:  Patient seen and examined. s/p lumbar drain.    MEDICATIONS  (STANDING):  albuterol    90 MICROgram(s) HFA Inhaler 2 Puff(s) Inhalation every 6 hours  albuterol/ipratropium for Nebulization 3 milliLiter(s) Nebulizer every 6 hours  ascorbic acid 500 milliGRAM(s) Oral two times a day  atorvastatin 20 milliGRAM(s) Oral at bedtime  chlorhexidine 2% Cloths 1 Application(s) Topical daily  gabapentin 100 milliGRAM(s) Oral every 8 hours  influenza  Vaccine (HIGH DOSE) 0.7 milliLiter(s) IntraMuscular once  modafinil 100 milliGRAM(s) Oral daily  mupirocin 2% Ointment 1 Application(s) Both Nostrils two times a day  pantoprazole    Tablet 40 milliGRAM(s) Oral daily  phenylephrine    Infusion 0.399 MICROgram(s)/kG/Min (12.2 mL/Hr) IV Continuous <Continuous>  polyethylene glycol 3350 17 Gram(s) Oral daily  potassium phosphate IVPB 15 milliMole(s) IV Intermittent once  riociguat 2.5 milliGRAM(s) Oral every 8 hours  senna 2 Tablet(s) Oral at bedtime  sodium chloride 0.9%. 1000 milliLiter(s) (10 mL/Hr) IV Continuous <Continuous>  sodium chloride 3%. 500 milliLiter(s) (50 mL/Hr) IV Continuous <Continuous>  vasopressin Infusion 0.02 Unit(s)/Min (3 mL/Hr) IV Continuous <Continuous>    MEDICATIONS  (PRN):  acetaminophen     Tablet .. 650 milliGRAM(s) Oral every 6 hours PRN Mild Pain (1 - 3)      Allergies    No Known Allergies    Intolerances        Vital Signs Last 24 Hrs  T(C): 36.9 (29 Apr 2024 14:27), Max: 37.4 (29 Apr 2024 08:23)  T(F): 98.4 (29 Apr 2024 14:27), Max: 99.3 (29 Apr 2024 08:23)  HR: 74 (29 Apr 2024 14:00) (73 - 102)  BP: 110/55 (29 Apr 2024 05:00) (107/60 - 134/60)  BP(mean): 77 (29 Apr 2024 05:00) (75 - 88)  RR: 18 (29 Apr 2024 14:00) (16 - 18)  SpO2: 99% (29 Apr 2024 14:00) (93% - 100%)    Parameters below as of 29 Apr 2024 14:00  Patient On (Oxygen Delivery Method): BiPAP/CPAP    O2 Concentration (%): 60    Neurologic:  -Mental status: Awake, alert, oriented to person, place, and time. Speech is fluent with intact naming, repetition, and comprehension, no dysarthria. Recent and remote memory intact. Follows commands. Attention/concentration intact.   -Cranial nerves:   II: Visual fields are full to confrontation.  III, IV, VI: Extraocular movements are intact without nystagmus. Pupils equally round and reactive to light  V:  Facial sensation V1-V3 equal and intact   VII: Face is symmetric with normal eye closure and smile  Motor: Normal bulk and tone. No pronator drift. Strength bilateral upper extremity 5/5. RLE 4+/5. LLE 3+/5.    Sensation: Intact to light touch bilaterally. No neglect or extinction on double simultaneous testing.  Coordination: No dysmetria on finger-to-nose bilaterally.    Reflexes: upgoing toes L foot  Gait: deferred      LABS:                        11.1   13.92 )-----------( 142      ( 29 Apr 2024 12:53 )             31.5     04-29    142  |  107  |  12  ----------------------------<  145<H>  3.8   |  23  |  0.82    Ca    9.4      29 Apr 2024 12:53  Phos  2.6     04-29  Mg     2.3     04-29    TPro  6.8  /  Alb  3.6  /  TBili  2.0<H>  /  DBili  x   /  AST  67<H>  /  ALT  60<H>  /  AlkPhos  88  04-29    PT/INR - ( 29 Apr 2024 12:53 )   PT: 12.9 sec;   INR: 1.14          PTT - ( 29 Apr 2024 12:53 )  PTT:28.3 sec  Urinalysis Basic - ( 29 Apr 2024 12:53 )    Color: x / Appearance: x / SG: x / pH: x  Gluc: 145 mg/dL / Ketone: x  / Bili: x / Urobili: x   Blood: x / Protein: x / Nitrite: x   Leuk Esterase: x / RBC: x / WBC x   Sq Epi: x / Non Sq Epi: x / Bacteria: x        RADIOLOGY & ADDITIONAL TESTS:    CT thoracic spine:  No acute fracture or traumatic subluxation.  Vertebral body height and facet alignment maintained.  No significant spinal canal stenosis or neural foraminal narrowing.    CT lumbar spine:  No acute fracture traumaticsubluxation.  Vertebral body height and facet alignment maintained.  Degenerative changes detailed in the body the report.    < from: CT Head No Cont (04.29.24 @ 12:28) >    No hydrocephalus, acute intracranial hemorrhage, mass effect, or brain   edema.    Chronic appearing left posterior corona radiata and right thalamic   infarcts.

## 2024-04-29 NOTE — PROGRESS NOTE ADULT - ASSESSMENT
68 year old female PMHx asthma, CTEPH, CAD, HTN, Type B dissection s/p TEVAR 4/26 w/o complications. Stroke consulted for new bilateral LE plegia 4/29 AM c/f spinal cord ischemia. CT angio CAP 4/28 showed interval TEVAR of Type B aortic dissection with stable aneurysmal dilatation of false lumen below distal graft attachment site which is perfused. 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 Multifocal splenic infarcts and multiple small renal infarcts bilaterally, likely embolic etiology. S/p lumbar drain w/ NSGY 4/29. Patient with improvement in her LE weakness with sensation remaining intact. CTH/CT T and L spine negative.    Plan:  - please obtain MRI T and L spine to rule out anterior spinal cord infarct  - keep map > 110  - a1c: 5.9; LDL: 57; TSH: 2.090  - stroke neuro checks q1h  - stroke education  - stroke to follow    Discussed with Dr. Troncoso   68 year old female PMHx asthma, CTEPH, CAD, HTN, Type B dissection s/p TEVAR 4/26 w/o complications. Stroke consulted for new bilateral LE plegia 4/29 AM c/f spinal cord ischemia. CT angio CAP 4/28 showed interval TEVAR of Type B aortic dissection with stable aneurysmal dilatation of false lumen below distal graft attachment site which is perfused. 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 Multifocal splenic infarcts and multiple small renal infarcts bilaterally, likely embolic etiology. S/p lumbar drain w/ NSGY 4/29. Patient with improvement in her LE weakness with sensation remaining intact. CTH/CT T and L spine negative.    Plan:  - please obtain MRI T and L spine with diffusion to rule out anterior spinal cord infarct  - keep map > 110  - a1c: 5.9; LDL: 57; TSH: 2.090  - stroke neuro checks q1h  - stroke education  - stroke to follow    Discussed with Dr. Troncoso

## 2024-04-29 NOTE — PROGRESS NOTE ADULT - ASSESSMENT
69 y/o F PMH asthma, CTEPH, CAD, HTN, Type B dissection now s/p TEVAR, Groin sites soft, pulses palpable, sudden decreased motor strength overnight, normal sensory to lower extremities. Lumbar drain removed 4/28. Pulmonary on board for pulmonary hypertension.     - Will follow MRI spine  - Continue neurovasacular checks per TEVAR ERAS protocol  - B/l groin checks per TEVAR ERAS protocol  - Rest of care per CTICU  - vascular surgery will continue to follow patient

## 2024-04-29 NOTE — CONSULT NOTE ADULT - SUBJECTIVE AND OBJECTIVE BOX
Patient is a 68y old  Female who presents with a chief complaint of type B dissection (2024 14:28)      HPI:  68 year old female with past medical history of asthma, CTEPH, CAD, HTN, Type B dissection who was seen by the outpatient office and was deemed a good candidate for TEVAR. Back in 2023 she developed DELGADO, PCP thought it was related to systemic HTN but did not improve with anti hypertensives and was seen by her outpatient pulmonologist, Dr. Cathy Serra, was noted to have SpO2 92% and an elevated D-Dimer and was advised to present to ED for workup. In ED a CTA C/A/P was performed which revealed findings concerning for CTEPH and a Type B dissection and she was admitted and discharged with close follow up, surgery postponed given concern for severe PH on TTE.  She underwent RHC which demontrated severe combined pre/post capillary PH and she was started on riociguat, required lasix after initiating due to LE edema. Had significant improvement in DELGADO Repeat CTA 2024 revealed stable type B dissection with proximal descending thoracic aortic aneurysm stable since prior exam 2023 and patent celiac axis originating from false lumen. She was deemed a candidate for TEVAR with Dr. Kwon and Dr. Suh and presented to [facility] for preop lumbar drain prior to TEVAR (24), uncomplicated post op course until today when she developed acute paresthesias and weakness of LE now undergoing lumbar protocol to drive MAP > 110, PH consulted given known CTEPH.      Risk factors for PH:  Hx of Fen-phen use, 6 months, quit decades ago  Known CTEPH  Systemic HTN, elevated PAWP  Obesity    NO family hx of PH or sudden cardiac death, no hx of connective tissue disease, no hx of lung disease, no known sleep apnea, no hematologic disorders, no personal hx of ca, no hx of sarcoid, no hx of anemia    ICU Vital Signs Last 24 Hrs  T(C): 36.9 (2024 14:27), Max: 37.4 (2024 08:23)  T(F): 98.4 (2024 14:27), Max: 99.3 (2024 08:23)  HR: 82 (2024 16:00) (71 - 102)  BP: 110/55 (2024 05:00) (107/60 - 129/64)  BP(mean): 77 (2024 05:00) (75 - 88)  ABP: 174/88 (2024 16:00) (147/71 - 174/88)  ABP(mean): 122 (2024 16:00) (102 - 125)  RR: 18 (2024 16:00) (16 - 18)  SpO2: 100% (2024 16:00) (93% - 100%)    O2 Parameters below as of 2024 16:00  Patient On (Oxygen Delivery Method): nasal cannula, high flow  O2 Flow (L/min): 40  O2 Concentration (%): 100         (2024 11:00)      PAST MEDICAL & SURGICAL HISTORY:  HTN (hypertension)      Prediabetes      Mild tricuspid regurgitation      Enlarged aorta      CAD (coronary artery disease)      Ascending aorta dilation      Pulmonary hypertension      Chronic systolic right heart failure      2019 novel coronavirus disease (COVID-19)      Asthma      S/P hysterectomy      S/P           FAMILY HISTORY:  Family history of early CAD (Father)        SOCIAL HISTORY:  Smoking Status: [ ] Current, [ ] Former, [ ] Never  Pack Years:    MEDICATIONS:  Pulmonary:  albuterol    90 MICROgram(s) HFA Inhaler 2 Puff(s) Inhalation every 6 hours  albuterol/ipratropium for Nebulization 3 milliLiter(s) Nebulizer every 6 hours    Antimicrobials:    Anticoagulants:    Onc:    GI/:  pantoprazole    Tablet 40 milliGRAM(s) Oral daily  polyethylene glycol 3350 17 Gram(s) Oral daily  senna 2 Tablet(s) Oral at bedtime    Endocrine:  atorvastatin 20 milliGRAM(s) Oral at bedtime  vasopressin Infusion 0.02 Unit(s)/Min IV Continuous <Continuous>    Cardiac:  milrinone Infusion 0.25 MICROgram(s)/kG/Min IV Continuous <Continuous>  phenylephrine    Infusion 0.399 MICROgram(s)/kG/Min IV Continuous <Continuous>  riociguat 2.5 milliGRAM(s) Oral every 8 hours    Other Medications:  acetaminophen     Tablet .. 650 milliGRAM(s) Oral every 6 hours PRN  ascorbic acid 500 milliGRAM(s) Oral two times a day  atorvastatin 20 milliGRAM(s) Oral at bedtime  chlorhexidine 2% Cloths 1 Application(s) Topical daily  gabapentin 100 milliGRAM(s) Oral every 8 hours  influenza  Vaccine (HIGH DOSE) 0.7 milliLiter(s) IntraMuscular once  modafinil 100 milliGRAM(s) Oral daily  mupirocin 2% Ointment 1 Application(s) Both Nostrils two times a day  potassium phosphate IVPB 15 milliMole(s) IV Intermittent once  sodium chloride 0.9%. 1000 milliLiter(s) IV Continuous <Continuous>  sodium chloride 3%. 500 milliLiter(s) IV Continuous <Continuous>  vasopressin Infusion 0.02 Unit(s)/Min IV Continuous <Continuous>      Allergies    No Known Allergies    Intolerances        Vital Signs Last 24 Hrs  T(C): 36.9 (2024 14:27), Max: 37.4 (2024 08:23)  T(F): 98.4 (2024 14:27), Max: 99.3 (2024 08:23)  HR: 82 (2024 16:00) (71 - 102)  BP: 110/55 (2024 05:00) (107/60 - 129/64)  BP(mean): 77 (2024 05:00) (75 - 88)  RR: 18 (2024 16:00) (16 - 18)  SpO2: 100% (2024 16:00) (93% - 100%)    Parameters below as of 2024 16:00  Patient On (Oxygen Delivery Method): nasal cannula, high flow  O2 Flow (L/min): 40  O2 Concentration (%): 100     @ : @ 07:00  --------------------------------------------------------  IN: 673.6 mL / OUT: 560 mL / NET: 113.6 mL     @ : @ 16:23  --------------------------------------------------------  IN: 1881 mL / OUT: 3845 mL / NET: -1964 mL          LABS:  ABG - ( 2024 13:05 )  pH, Arterial: 7.44  pH, Blood: x     /  pCO2: 36    /  pO2: 169   / HCO3: 24    / Base Excess: 0.6   /  SaO2: 99.9                CBC Full  -  ( 2024 12:53 )  WBC Count : 13.92 K/uL  RBC Count : 4.26 M/uL  Hemoglobin : 11.1 g/dL  Hematocrit : 31.5 %  Platelet Count - Automated : 142 K/uL  Mean Cell Volume : 73.9 fl  Mean Cell Hemoglobin : 26.1 pg  Mean Cell Hemoglobin Concentration : 35.2 gm/dL  Auto Neutrophil # : 13.20 K/uL  Auto Lymphocyte # : 0.36 K/uL  Auto Monocyte # : 0.13 K/uL  Auto Eosinophil # : 0.24 K/uL  Auto Basophil # : 0.00 K/uL  Auto Neutrophil % : 94.8 %  Auto Lymphocyte % : 2.6 %  Auto Monocyte % : 0.9 %  Auto Eosinophil % : 1.7 %  Auto Basophil % : 0.0 %        142  |  107  |  12  ----------------------------<  145<H>  3.8   |  23  |  0.82    Ca    9.4      2024 12:53  Phos  2.6       Mg     2.3         TPro  6.8  /  Alb  3.6  /  TBili  2.0<H>  /  DBili  x   /  AST  67<H>  /  ALT  60<H>  /  AlkPhos  88      PT/INR - ( 2024 12:53 )   PT: 12.9 sec;   INR: 1.14          PTT - ( 2024 12:53 )  PTT:28.3 sec      Urinalysis Basic - ( 2024 12:53 )    Color: x / Appearance: x / SG: x / pH: x  Gluc: 145 mg/dL / Ketone: x  / Bili: x / Urobili: x   Blood: x / Protein: x / Nitrite: x   Leuk Esterase: x / RBC: x / WBC x   Sq Epi: x / Non Sq Epi: x / Bacteria: x                RADIOLOGY & ADDITIONAL STUDIES (The following images were personally reviewed):  TTE 24  1. Technically difficult study.  2. Normal left ventricular size and systolic function.  3. The right ventricle is not well visualized, appears mildly dilated and hypokinetic in limited  views.  4. Normal atria.  5. Pulmonary hypertension present, pulmonary artery systolic pressure is 73 mmHg.  6. No significant valvular disease.  7. No pericardial effusion.  8. No prior echo is available for comparison.      V/Q 2023 - FINDINGS: Greater than or equal to 2 large mismatched ventilation-perfusion defects without associated chest x-ray abnormality.    IMPRESSION:    High probability of pulmonary embolus.    TTE 2023  1. The left ventricular systolic function is hyperdynamic with an ejection fraction of 75 % by Ureña's  method of disks.  2. Estimated pulmonary artery systolic pressure is 48 mmHg, consistent with mild to moderate  pulmonary hypertension.  ________________________________________________________________________________________    CTA 2023  IMPRESSION:  No acute pulmonary embolism. Linear opacities/webs in the right upper and lower segmental pulmonary arteries, may be seen with resolved thrombus from chronic pulmonary embolism.    Type B aortic dissection extending to the SMA. The celiac trunk is supplied by the false lumen.    Aneurysmal dilation of the right lower subsegmental pulmonary artery.    Mosaicism suggestive of pulmonary vascular disease/CTEPH      TTE 3/2023  1.Small left ventricular cavity size. Right ventricular volume and pressure overload. The left ventricular systolic function is hyperdynamic with an ejection fraction visually estimated at >75 %.  2.Moderate asymmetric septal hypertrophy measuring up to 1.9 cm. Turbulent flow seen in the left ventricular outflow tract with a gradient of 12 mmHg.  3.There is mild (grade 1) left ventricular diastolic dysfunction.  4.Moderately enlarged right ventricular cavity size, normal wall thickness and moderately reduced systolic function.  5.The left atrium is normal.  6.The right atrium is severely dilated.  7.Normal aortic valve, without any evidence of aortic stenosis or regurgitation.  8.Structurally normal mitral valve with normal leaflet excursion.  9.Severe tricuspid regurgitation.  10.Estimated pulmonary artery systolic pressure is 88 mmHg, consistent with severe pulmonary   hypertension.  11.The proximal ascending aorta is mildly dilated

## 2024-04-29 NOTE — PROGRESS NOTE ADULT - SUBJECTIVE AND OBJECTIVE BOX
CTICU  CRITICAL  CARE  attending     Hand off received 					   Pertinent clinical, laboratory, radiographic, hemodynamic, echocardiographic, respiratory data, microbiologic data and chart were reviewed and analyzed frequently throughout the course of the day and night  Patient seen and examined with CTS/ SH attending at bedside  Pt is a 68y , Female, HEALTH ISSUES - PROBLEM Dx:  Ascending aorta dilation        , FAMILY HISTORY:  Family history of early CAD (Father)    PAST MEDICAL & SURGICAL HISTORY:  HTN (hypertension)      Prediabetes      Mild tricuspid regurgitation      Enlarged aorta      CAD (coronary artery disease)      Ascending aorta dilation      Pulmonary hypertension      Chronic systolic right heart failure      2019 novel coronavirus disease (COVID-19)      Asthma      S/P hysterectomy      S/P         Patient is a 68y old  Female who presents with a chief complaint of type B dissection (2024 06:05)      14 system review was unremarkable    Vital signs, hemodynamic and respiratory parameters were reviewed from the bedside nursing flowsheet.  ICU Vital Signs Last 24 Hrs  T(C): 37.4 (2024 08:23), Max: 37.4 (2024 08:23)  T(F): 99.3 (2024 08:23), Max: 99.3 (2024 08:23)  HR: 88 (2024 12:00) (84 - 102)  BP: 110/55 (2024 05:00) (107/60 - 135/63)  BP(mean): 77 (2024 05:00) (75 - 90)  ABP: 169/83 (2024 12:00) (147/71 - 173/82)  ABP(mean): 123 (2024 12:00) (102 - 125)  RR: 18 (2024 12:00) (15 - 18)  SpO2: 97% (2024 12:00) (93% - 100%)    O2 Parameters below as of 2024 12:00  Patient On (Oxygen Delivery Method): nasal cannula w/ humidification  O2 Flow (L/min): 30  O2 Concentration (%): 100      Adult Advanced Hemodynamics Last 24 Hrs  CVP(mm Hg): --  CVP(cm H2O): --  CO: --  CI: --  PA: --  PA(mean): --  PCWP: --  SVR: --  SVRI: --  PVR: --  PVRI: --, ABG - ( 2024 10:21 )  pH, Arterial: 7.44  pH, Blood: x     /  pCO2: 37    /  pO2: 146   / HCO3: 25    / Base Excess: 1.1   /  SaO2: 100.0               Intake and output was reviewed and the fluid balance was calculated  Daily     Daily   I&O's Summary    2024 07:  -  2024 07:00  --------------------------------------------------------  IN: 673.6 mL / OUT: 560 mL / NET: 113.6 mL    2024 07:01  -  2024 12:53  --------------------------------------------------------  IN: 809.1 mL / OUT: 3090 mL / NET: -2280.9 mL        All lines and drain sites were assessed  [facility]      POCT Blood Glucose.: 127 mg/dL (2024 08:24)    No acute change in mental status  Auscultation of the chest reveals equal bs  Abdomen is soft  Extremities are warm and well perfused  Wounds appear clean and unremarkable  Antibiotics are periop    labs  CBC Full  -  ( 2024 09:48 )  WBC Count : 16.20 K/uL  RBC Count : 4.28 M/uL  Hemoglobin : 11.2 g/dL  Hematocrit : 32.7 %  Platelet Count - Automated : 148 K/uL  Mean Cell Volume : 76.4 fl  Mean Cell Hemoglobin : 26.2 pg  Mean Cell Hemoglobin Concentration : 34.3 gm/dL  Auto Neutrophil # : 11.98 K/uL  Auto Lymphocyte # : 1.33 K/uL  Auto Monocyte # : 2.71 K/uL  Auto Eosinophil # : 0.05 K/uL  Auto Basophil # : 0.04 K/uL  Auto Neutrophil % : 74.0 %  Auto Lymphocyte % : 8.2 %  Auto Monocyte % : 16.7 %  Auto Eosinophil % : 0.3 %  Auto Basophil % : 0.2 %        137  |  104  |  13  ----------------------------<  123<H>  3.6   |  22  |  0.87    Ca    9.0      2024 09:48  Phos  3.1       Mg     2.2         TPro  6.4  /  Alb  3.5  /  TBili  1.6<H>  /  DBili  x   /  AST  63<H>  /  ALT  55<H>  /  AlkPhos  78      PT/INR - ( 2024 09:48 )   PT: 13.7 sec;   INR: 1.21          PTT - ( 2024 09:48 )  PTT:29.6 sec  The current medications were reviewed   MEDICATIONS  (STANDING):  albuterol    90 MICROgram(s) HFA Inhaler 2 Puff(s) Inhalation every 6 hours  albuterol/ipratropium for Nebulization 3 milliLiter(s) Nebulizer every 6 hours  ascorbic acid 500 milliGRAM(s) Oral two times a day  atorvastatin 20 milliGRAM(s) Oral at bedtime  chlorhexidine 2% Cloths 1 Application(s) Topical daily  gabapentin 100 milliGRAM(s) Oral every 8 hours  influenza  Vaccine (HIGH DOSE) 0.7 milliLiter(s) IntraMuscular once  mupirocin 2% Ointment 1 Application(s) Both Nostrils two times a day  pantoprazole    Tablet 40 milliGRAM(s) Oral daily  phenylephrine    Infusion 0.399 MICROgram(s)/kG/Min (12.2 mL/Hr) IV Continuous <Continuous>  polyethylene glycol 3350 17 Gram(s) Oral daily  potassium chloride  20 mEq/100 mL IVPB 20 milliEquivalent(s) IV Intermittent once  potassium phosphate IVPB 15 milliMole(s) IV Intermittent once  riociguat 2.5 milliGRAM(s) Oral every 8 hours  senna 2 Tablet(s) Oral at bedtime  sodium chloride 0.9%. 1000 milliLiter(s) (10 mL/Hr) IV Continuous <Continuous>  sodium chloride 3%. 500 milliLiter(s) (50 mL/Hr) IV Continuous <Continuous>  vasopressin Infusion 0.02 Unit(s)/Min (3 mL/Hr) IV Continuous <Continuous>    MEDICATIONS  (PRN):  acetaminophen     Tablet .. 650 milliGRAM(s) Oral every 6 hours PRN Mild Pain (1 - 3)       PROBLEM LIST/ ASSESSMENT:  HEALTH ISSUES - PROBLEM Dx:  Ascending aorta dilation        ,   Patient is a 68y old  Female who presents with a chief complaint of type B dissection (2024 06:05)     s/p cardiac surgery    acute paraparesis- spinal cord ischemia , r/o epidural hematoma    Vasogenic shock due to hypotension in the cticu , will keep on pressors      Acute post operative pulmonary insufficiency ruled in due to hypoxemia, O2 sats < 91% on RA treated with HFNC    Acute anastasia-operative ischemic stroke          My plan includes :    appreciatye lumbar drain by neurosurgery     keep map > 110  mannitol to reduce cord eedmea   keep intravasc space full    place cvl   close hemodynamic, ventilatory and drain monitoring and management per post op routine    Monitor for arrhythmias and monitor parameters for organ perfusion  beta blockade not administered due to hemodynamic instability and bradycardia  monitor neurologic status  Head of the bed should remain elevated to 45 deg .   chest PT and IS will be encouraged  monitor adequacy of oxygenation and ventilation and attempt to wean oxygen  antibiotic regimen will be tailored to the clinical, laboratory and microbiologic data  Nutritional goals will be met using po eventually , ensure adequate caloric intake and montior the same  Stress ulcer and VTE prophylaxis will be achieved    Glycemic control is satisfactory  Electrolytes have been repleted as necessary and wound care has been carried out. Pain control has been achieved.   agressive physical therapy and early mobility and ambulation goals will be met   The family was updated about the course and plan  CRITICAL CARE TIME Upon my evaluation, this patient had a high probability of imminent or life-threatening deterioration due to the above problems which required my direct attention, intervention, and personal management.  I have personally provided 150 minutes of critical care time exclusive of time spent on separately billable procedures. Time included review of laboratory data, radiology results, discussion with consultants, and monitoring for potential decompensation. Interventions were performed as documented abovepersonally provided by me  in evaluation and management, reassessments, review and interpretation of labs and x-rays, ventilator and hemodynamic management, formulating a plan and coordinating care: ___150___ MIN.  Time does not include procedural time.    CTICU ATTENDING     					    Eder Conway MD

## 2024-04-30 LAB
ALBUMIN SERPL ELPH-MCNC: 3.8 G/DL — SIGNIFICANT CHANGE UP (ref 3.3–5)
ALBUMIN SERPL ELPH-MCNC: 4 G/DL — SIGNIFICANT CHANGE UP (ref 3.3–5)
ALBUMIN SERPL ELPH-MCNC: 4.5 G/DL — SIGNIFICANT CHANGE UP (ref 3.3–5)
ALBUMIN SERPL ELPH-MCNC: 4.5 G/DL — SIGNIFICANT CHANGE UP (ref 3.3–5)
ALP SERPL-CCNC: 81 U/L — SIGNIFICANT CHANGE UP (ref 40–120)
ALP SERPL-CCNC: 82 U/L — SIGNIFICANT CHANGE UP (ref 40–120)
ALP SERPL-CCNC: 84 U/L — SIGNIFICANT CHANGE UP (ref 40–120)
ALP SERPL-CCNC: 84 U/L — SIGNIFICANT CHANGE UP (ref 40–120)
ALT FLD-CCNC: 47 U/L — HIGH (ref 10–45)
ALT FLD-CCNC: 49 U/L — HIGH (ref 10–45)
ALT FLD-CCNC: 49 U/L — HIGH (ref 10–45)
ALT FLD-CCNC: 50 U/L — HIGH (ref 10–45)
ANION GAP SERPL CALC-SCNC: 10 MMOL/L — SIGNIFICANT CHANGE UP (ref 5–17)
ANION GAP SERPL CALC-SCNC: 14 MMOL/L — SIGNIFICANT CHANGE UP (ref 5–17)
ANION GAP SERPL CALC-SCNC: 15 MMOL/L — SIGNIFICANT CHANGE UP (ref 5–17)
ANION GAP SERPL CALC-SCNC: 9 MMOL/L — SIGNIFICANT CHANGE UP (ref 5–17)
ANISOCYTOSIS BLD QL: SLIGHT — SIGNIFICANT CHANGE UP
APTT BLD: 26.6 SEC — SIGNIFICANT CHANGE UP (ref 24.5–35.6)
APTT BLD: 26.8 SEC — SIGNIFICANT CHANGE UP (ref 24.5–35.6)
APTT BLD: 27.2 SEC — SIGNIFICANT CHANGE UP (ref 24.5–35.6)
APTT BLD: 30.4 SEC — SIGNIFICANT CHANGE UP (ref 24.5–35.6)
AST SERPL-CCNC: 36 U/L — SIGNIFICANT CHANGE UP (ref 10–40)
AST SERPL-CCNC: 37 U/L — SIGNIFICANT CHANGE UP (ref 10–40)
AST SERPL-CCNC: 37 U/L — SIGNIFICANT CHANGE UP (ref 10–40)
AST SERPL-CCNC: 40 U/L — SIGNIFICANT CHANGE UP (ref 10–40)
BASE EXCESS BLDV CALC-SCNC: -0.8 MMOL/L — SIGNIFICANT CHANGE UP (ref -2–3)
BASE EXCESS BLDV CALC-SCNC: 1.3 MMOL/L — SIGNIFICANT CHANGE UP (ref -2–3)
BASE EXCESS BLDV CALC-SCNC: 2.8 MMOL/L — SIGNIFICANT CHANGE UP (ref -2–3)
BASE EXCESS BLDV CALC-SCNC: 3.7 MMOL/L — HIGH (ref -2–3)
BASOPHILS # BLD AUTO: 0 K/UL — SIGNIFICANT CHANGE UP (ref 0–0.2)
BASOPHILS # BLD AUTO: 0.02 K/UL — SIGNIFICANT CHANGE UP (ref 0–0.2)
BASOPHILS NFR BLD AUTO: 0 % — SIGNIFICANT CHANGE UP (ref 0–2)
BASOPHILS NFR BLD AUTO: 0.1 % — SIGNIFICANT CHANGE UP (ref 0–2)
BILIRUB SERPL-MCNC: 1.6 MG/DL — HIGH (ref 0.2–1.2)
BILIRUB SERPL-MCNC: 1.6 MG/DL — HIGH (ref 0.2–1.2)
BILIRUB SERPL-MCNC: 1.7 MG/DL — HIGH (ref 0.2–1.2)
BILIRUB SERPL-MCNC: 1.7 MG/DL — HIGH (ref 0.2–1.2)
BUN SERPL-MCNC: 13 MG/DL — SIGNIFICANT CHANGE UP (ref 7–23)
BUN SERPL-MCNC: 13 MG/DL — SIGNIFICANT CHANGE UP (ref 7–23)
BUN SERPL-MCNC: 14 MG/DL — SIGNIFICANT CHANGE UP (ref 7–23)
BUN SERPL-MCNC: 14 MG/DL — SIGNIFICANT CHANGE UP (ref 7–23)
CA-I SERPL-SCNC: 1.28 MMOL/L — SIGNIFICANT CHANGE UP (ref 1.15–1.33)
CALCIUM SERPL-MCNC: 9.5 MG/DL — SIGNIFICANT CHANGE UP (ref 8.4–10.5)
CALCIUM SERPL-MCNC: 9.7 MG/DL — SIGNIFICANT CHANGE UP (ref 8.4–10.5)
CALCIUM SERPL-MCNC: 9.9 MG/DL — SIGNIFICANT CHANGE UP (ref 8.4–10.5)
CALCIUM SERPL-MCNC: 9.9 MG/DL — SIGNIFICANT CHANGE UP (ref 8.4–10.5)
CHLORIDE SERPL-SCNC: 103 MMOL/L — SIGNIFICANT CHANGE UP (ref 96–108)
CHLORIDE SERPL-SCNC: 106 MMOL/L — SIGNIFICANT CHANGE UP (ref 96–108)
CHLORIDE SERPL-SCNC: 108 MMOL/L — SIGNIFICANT CHANGE UP (ref 96–108)
CHLORIDE SERPL-SCNC: 109 MMOL/L — HIGH (ref 96–108)
CK SERPL-CCNC: 58 U/L — SIGNIFICANT CHANGE UP (ref 25–170)
CK SERPL-CCNC: 61 U/L — SIGNIFICANT CHANGE UP (ref 25–170)
CO2 BLDV-SCNC: 24.6 MMOL/L — SIGNIFICANT CHANGE UP (ref 22–26)
CO2 BLDV-SCNC: 26.1 MMOL/L — HIGH (ref 22–26)
CO2 BLDV-SCNC: 28.4 MMOL/L — HIGH (ref 22–26)
CO2 BLDV-SCNC: 28.9 MMOL/L — HIGH (ref 22–26)
CO2 SERPL-SCNC: 21 MMOL/L — LOW (ref 22–31)
CO2 SERPL-SCNC: 24 MMOL/L — SIGNIFICANT CHANGE UP (ref 22–31)
CO2 SERPL-SCNC: 24 MMOL/L — SIGNIFICANT CHANGE UP (ref 22–31)
CO2 SERPL-SCNC: 25 MMOL/L — SIGNIFICANT CHANGE UP (ref 22–31)
CREAT SERPL-MCNC: 0.64 MG/DL — SIGNIFICANT CHANGE UP (ref 0.5–1.3)
CREAT SERPL-MCNC: 0.65 MG/DL — SIGNIFICANT CHANGE UP (ref 0.5–1.3)
CREAT SERPL-MCNC: 0.71 MG/DL — SIGNIFICANT CHANGE UP (ref 0.5–1.3)
CREAT SERPL-MCNC: 0.72 MG/DL — SIGNIFICANT CHANGE UP (ref 0.5–1.3)
EGFR: 91 ML/MIN/1.73M2 — SIGNIFICANT CHANGE UP
EGFR: 93 ML/MIN/1.73M2 — SIGNIFICANT CHANGE UP
EGFR: 96 ML/MIN/1.73M2 — SIGNIFICANT CHANGE UP
EGFR: 96 ML/MIN/1.73M2 — SIGNIFICANT CHANGE UP
EOSINOPHIL # BLD AUTO: 0 K/UL — SIGNIFICANT CHANGE UP (ref 0–0.5)
EOSINOPHIL NFR BLD AUTO: 0 % — SIGNIFICANT CHANGE UP (ref 0–6)
GAS PNL BLDA: SIGNIFICANT CHANGE UP
GAS PNL BLDV: 139 MMOL/L — SIGNIFICANT CHANGE UP (ref 136–145)
GAS PNL BLDV: SIGNIFICANT CHANGE UP
GLUCOSE BLDC GLUCOMTR-MCNC: 138 MG/DL — HIGH (ref 70–99)
GLUCOSE BLDC GLUCOMTR-MCNC: 155 MG/DL — HIGH (ref 70–99)
GLUCOSE BLDC GLUCOMTR-MCNC: 172 MG/DL — HIGH (ref 70–99)
GLUCOSE BLDC GLUCOMTR-MCNC: 177 MG/DL — HIGH (ref 70–99)
GLUCOSE BLDC GLUCOMTR-MCNC: 178 MG/DL — HIGH (ref 70–99)
GLUCOSE BLDC GLUCOMTR-MCNC: 188 MG/DL — HIGH (ref 70–99)
GLUCOSE SERPL-MCNC: 174 MG/DL — HIGH (ref 70–99)
GLUCOSE SERPL-MCNC: 188 MG/DL — HIGH (ref 70–99)
GLUCOSE SERPL-MCNC: 199 MG/DL — HIGH (ref 70–99)
GLUCOSE SERPL-MCNC: 207 MG/DL — HIGH (ref 70–99)
HCO3 BLDV-SCNC: 24 MMOL/L — SIGNIFICANT CHANGE UP (ref 22–29)
HCO3 BLDV-SCNC: 25 MMOL/L — SIGNIFICANT CHANGE UP (ref 22–29)
HCO3 BLDV-SCNC: 27 MMOL/L — SIGNIFICANT CHANGE UP (ref 22–29)
HCO3 BLDV-SCNC: 28 MMOL/L — SIGNIFICANT CHANGE UP (ref 22–29)
HCT VFR BLD CALC: 28.4 % — LOW (ref 34.5–45)
HCT VFR BLD CALC: 29.2 % — LOW (ref 34.5–45)
HCT VFR BLD CALC: 30.1 % — LOW (ref 34.5–45)
HCT VFR BLD CALC: 32.6 % — LOW (ref 34.5–45)
HGB BLD-MCNC: 10.2 G/DL — LOW (ref 11.5–15.5)
HGB BLD-MCNC: 10.3 G/DL — LOW (ref 11.5–15.5)
HGB BLD-MCNC: 10.8 G/DL — LOW (ref 11.5–15.5)
HGB BLD-MCNC: 11.1 G/DL — LOW (ref 11.5–15.5)
HYPOCHROMIA BLD QL: SLIGHT — SIGNIFICANT CHANGE UP
IMM GRANULOCYTES NFR BLD AUTO: 0.6 % — SIGNIFICANT CHANGE UP (ref 0–0.9)
IMM GRANULOCYTES NFR BLD AUTO: 0.7 % — SIGNIFICANT CHANGE UP (ref 0–0.9)
IMM GRANULOCYTES NFR BLD AUTO: 0.9 % — SIGNIFICANT CHANGE UP (ref 0–0.9)
INR BLD: 1.13 — SIGNIFICANT CHANGE UP (ref 0.85–1.18)
INR BLD: 1.13 — SIGNIFICANT CHANGE UP (ref 0.85–1.18)
INR BLD: 1.14 — SIGNIFICANT CHANGE UP (ref 0.85–1.18)
INR BLD: 1.18 — SIGNIFICANT CHANGE UP (ref 0.85–1.18)
LACTATE SERPL-SCNC: 0.6 MMOL/L — SIGNIFICANT CHANGE UP (ref 0.5–2)
LACTATE SERPL-SCNC: 0.8 MMOL/L — SIGNIFICANT CHANGE UP (ref 0.5–2)
LACTATE SERPL-SCNC: 0.8 MMOL/L — SIGNIFICANT CHANGE UP (ref 0.5–2)
LACTATE SERPL-SCNC: 1 MMOL/L — SIGNIFICANT CHANGE UP (ref 0.5–2)
LYMPHOCYTES # BLD AUTO: 0.61 K/UL — LOW (ref 1–3.3)
LYMPHOCYTES # BLD AUTO: 0.82 K/UL — LOW (ref 1–3.3)
LYMPHOCYTES # BLD AUTO: 1.06 K/UL — SIGNIFICANT CHANGE UP (ref 1–3.3)
LYMPHOCYTES # BLD AUTO: 1.06 K/UL — SIGNIFICANT CHANGE UP (ref 1–3.3)
LYMPHOCYTES # BLD AUTO: 4.4 % — LOW (ref 13–44)
LYMPHOCYTES # BLD AUTO: 6.1 % — LOW (ref 13–44)
LYMPHOCYTES # BLD AUTO: 6.8 % — LOW (ref 13–44)
LYMPHOCYTES # BLD AUTO: 7 % — LOW (ref 13–44)
MAGNESIUM SERPL-MCNC: 2 MG/DL — SIGNIFICANT CHANGE UP (ref 1.6–2.6)
MAGNESIUM SERPL-MCNC: 2 MG/DL — SIGNIFICANT CHANGE UP (ref 1.6–2.6)
MAGNESIUM SERPL-MCNC: 2.1 MG/DL — SIGNIFICANT CHANGE UP (ref 1.6–2.6)
MAGNESIUM SERPL-MCNC: 2.2 MG/DL — SIGNIFICANT CHANGE UP (ref 1.6–2.6)
MANUAL SMEAR VERIFICATION: SIGNIFICANT CHANGE UP
MCHC RBC-ENTMCNC: 26 PG — LOW (ref 27–34)
MCHC RBC-ENTMCNC: 26.2 PG — LOW (ref 27–34)
MCHC RBC-ENTMCNC: 26.5 PG — LOW (ref 27–34)
MCHC RBC-ENTMCNC: 26.8 PG — LOW (ref 27–34)
MCHC RBC-ENTMCNC: 34 GM/DL — SIGNIFICANT CHANGE UP (ref 32–36)
MCHC RBC-ENTMCNC: 35.3 GM/DL — SIGNIFICANT CHANGE UP (ref 32–36)
MCHC RBC-ENTMCNC: 35.9 GM/DL — SIGNIFICANT CHANGE UP (ref 32–36)
MCHC RBC-ENTMCNC: 35.9 GM/DL — SIGNIFICANT CHANGE UP (ref 32–36)
MCV RBC AUTO: 73.7 FL — LOW (ref 80–100)
MCV RBC AUTO: 73.8 FL — LOW (ref 80–100)
MCV RBC AUTO: 74.5 FL — LOW (ref 80–100)
MCV RBC AUTO: 77.1 FL — LOW (ref 80–100)
MONOCYTES # BLD AUTO: 0.73 K/UL — SIGNIFICANT CHANGE UP (ref 0–0.9)
MONOCYTES # BLD AUTO: 1.36 K/UL — HIGH (ref 0–0.9)
MONOCYTES # BLD AUTO: 2.08 K/UL — HIGH (ref 0–0.9)
MONOCYTES # BLD AUTO: 2.37 K/UL — HIGH (ref 0–0.9)
MONOCYTES NFR BLD AUTO: 10.1 % — SIGNIFICANT CHANGE UP (ref 2–14)
MONOCYTES NFR BLD AUTO: 13.8 % — SIGNIFICANT CHANGE UP (ref 2–14)
MONOCYTES NFR BLD AUTO: 15.2 % — HIGH (ref 2–14)
MONOCYTES NFR BLD AUTO: 5.3 % — SIGNIFICANT CHANGE UP (ref 2–14)
NEUTROPHILS # BLD AUTO: 11.15 K/UL — HIGH (ref 1.8–7.4)
NEUTROPHILS # BLD AUTO: 11.86 K/UL — HIGH (ref 1.8–7.4)
NEUTROPHILS # BLD AUTO: 12.06 K/UL — HIGH (ref 1.8–7.4)
NEUTROPHILS # BLD AUTO: 12.52 K/UL — HIGH (ref 1.8–7.4)
NEUTROPHILS NFR BLD AUTO: 77.3 % — HIGH (ref 43–77)
NEUTROPHILS NFR BLD AUTO: 78.4 % — HIGH (ref 43–77)
NEUTROPHILS NFR BLD AUTO: 82.8 % — HIGH (ref 43–77)
NEUTROPHILS NFR BLD AUTO: 90.3 % — HIGH (ref 43–77)
NRBC # BLD: 0 /100 WBCS — SIGNIFICANT CHANGE UP (ref 0–0)
NRBC # BLD: 1 /100 WBCS — HIGH (ref 0–0)
NRBC # BLD: SIGNIFICANT CHANGE UP /100 WBCS (ref 0–0)
OSMOLALITY SERPL: 297 MOSM/KG — SIGNIFICANT CHANGE UP (ref 280–301)
OVALOCYTES BLD QL SMEAR: SLIGHT — SIGNIFICANT CHANGE UP
PCO2 BLDV: 36 MMHG — LOW (ref 39–42)
PCO2 BLDV: 37 MMHG — LOW (ref 39–42)
PCO2 BLDV: 39 MMHG — SIGNIFICANT CHANGE UP (ref 39–42)
PCO2 BLDV: 40 MMHG — SIGNIFICANT CHANGE UP (ref 39–42)
PH BLDV: 7.41 — SIGNIFICANT CHANGE UP (ref 7.32–7.43)
PH BLDV: 7.44 — HIGH (ref 7.32–7.43)
PH BLDV: 7.45 — HIGH (ref 7.32–7.43)
PH BLDV: 7.46 — HIGH (ref 7.32–7.43)
PHOSPHATE SERPL-MCNC: 1.8 MG/DL — LOW (ref 2.5–4.5)
PHOSPHATE SERPL-MCNC: 2.1 MG/DL — LOW (ref 2.5–4.5)
PHOSPHATE SERPL-MCNC: 2.4 MG/DL — LOW (ref 2.5–4.5)
PHOSPHATE SERPL-MCNC: 2.5 MG/DL — SIGNIFICANT CHANGE UP (ref 2.5–4.5)
PLAT MORPH BLD: NORMAL — SIGNIFICANT CHANGE UP
PLATELET # BLD AUTO: 121 K/UL — LOW (ref 150–400)
PLATELET # BLD AUTO: 134 K/UL — LOW (ref 150–400)
PLATELET # BLD AUTO: 135 K/UL — LOW (ref 150–400)
PLATELET # BLD AUTO: 135 K/UL — LOW (ref 150–400)
PO2 BLDV: 41 MMHG — SIGNIFICANT CHANGE UP (ref 25–45)
PO2 BLDV: 41 MMHG — SIGNIFICANT CHANGE UP (ref 25–45)
PO2 BLDV: 43 MMHG — SIGNIFICANT CHANGE UP (ref 25–45)
PO2 BLDV: 44 MMHG — SIGNIFICANT CHANGE UP (ref 25–45)
POLYCHROMASIA BLD QL SMEAR: SLIGHT — SIGNIFICANT CHANGE UP
POTASSIUM BLDV-SCNC: 5 MMOL/L — SIGNIFICANT CHANGE UP (ref 3.5–5.1)
POTASSIUM SERPL-MCNC: 3.6 MMOL/L — SIGNIFICANT CHANGE UP (ref 3.5–5.3)
POTASSIUM SERPL-MCNC: 3.6 MMOL/L — SIGNIFICANT CHANGE UP (ref 3.5–5.3)
POTASSIUM SERPL-MCNC: 4.1 MMOL/L — SIGNIFICANT CHANGE UP (ref 3.5–5.3)
POTASSIUM SERPL-MCNC: 5.1 MMOL/L — SIGNIFICANT CHANGE UP (ref 3.5–5.3)
POTASSIUM SERPL-SCNC: 3.6 MMOL/L — SIGNIFICANT CHANGE UP (ref 3.5–5.3)
POTASSIUM SERPL-SCNC: 3.6 MMOL/L — SIGNIFICANT CHANGE UP (ref 3.5–5.3)
POTASSIUM SERPL-SCNC: 4.1 MMOL/L — SIGNIFICANT CHANGE UP (ref 3.5–5.3)
POTASSIUM SERPL-SCNC: 5.1 MMOL/L — SIGNIFICANT CHANGE UP (ref 3.5–5.3)
PROT SERPL-MCNC: 7 G/DL — SIGNIFICANT CHANGE UP (ref 6–8.3)
PROT SERPL-MCNC: 7 G/DL — SIGNIFICANT CHANGE UP (ref 6–8.3)
PROT SERPL-MCNC: 7.3 G/DL — SIGNIFICANT CHANGE UP (ref 6–8.3)
PROT SERPL-MCNC: 7.3 G/DL — SIGNIFICANT CHANGE UP (ref 6–8.3)
PROTHROM AB SERPL-ACNC: 12.8 SEC — SIGNIFICANT CHANGE UP (ref 9.5–13)
PROTHROM AB SERPL-ACNC: 12.8 SEC — SIGNIFICANT CHANGE UP (ref 9.5–13)
PROTHROM AB SERPL-ACNC: 13 SEC — SIGNIFICANT CHANGE UP (ref 9.5–13)
PROTHROM AB SERPL-ACNC: 13.4 SEC — HIGH (ref 9.5–13)
RBC # BLD: 3.81 M/UL — SIGNIFICANT CHANGE UP (ref 3.8–5.2)
RBC # BLD: 3.96 M/UL — SIGNIFICANT CHANGE UP (ref 3.8–5.2)
RBC # BLD: 4.08 M/UL — SIGNIFICANT CHANGE UP (ref 3.8–5.2)
RBC # BLD: 4.23 M/UL — SIGNIFICANT CHANGE UP (ref 3.8–5.2)
RBC # FLD: 15.4 % — HIGH (ref 10.3–14.5)
RBC # FLD: 15.4 % — HIGH (ref 10.3–14.5)
RBC # FLD: 15.5 % — HIGH (ref 10.3–14.5)
RBC # FLD: 15.5 % — HIGH (ref 10.3–14.5)
RBC BLD AUTO: ABNORMAL
SAO2 % BLDV: 70.9 % — SIGNIFICANT CHANGE UP (ref 67–88)
SAO2 % BLDV: 71.1 % — SIGNIFICANT CHANGE UP (ref 67–88)
SAO2 % BLDV: 71.9 % — SIGNIFICANT CHANGE UP (ref 67–88)
SAO2 % BLDV: 72.1 % — SIGNIFICANT CHANGE UP (ref 67–88)
SODIUM SERPL-SCNC: 139 MMOL/L — SIGNIFICANT CHANGE UP (ref 135–145)
SODIUM SERPL-SCNC: 142 MMOL/L — SIGNIFICANT CHANGE UP (ref 135–145)
SODIUM SERPL-SCNC: 143 MMOL/L — SIGNIFICANT CHANGE UP (ref 135–145)
SODIUM SERPL-SCNC: 144 MMOL/L — SIGNIFICANT CHANGE UP (ref 135–145)
TARGETS BLD QL SMEAR: SLIGHT — SIGNIFICANT CHANGE UP
WBC # BLD: 13.47 K/UL — HIGH (ref 3.8–10.5)
WBC # BLD: 13.86 K/UL — HIGH (ref 3.8–10.5)
WBC # BLD: 15.12 K/UL — HIGH (ref 3.8–10.5)
WBC # BLD: 15.61 K/UL — HIGH (ref 3.8–10.5)
WBC # FLD AUTO: 13.47 K/UL — HIGH (ref 3.8–10.5)
WBC # FLD AUTO: 13.86 K/UL — HIGH (ref 3.8–10.5)
WBC # FLD AUTO: 15.12 K/UL — HIGH (ref 3.8–10.5)
WBC # FLD AUTO: 15.61 K/UL — HIGH (ref 3.8–10.5)

## 2024-04-30 PROCEDURE — 99292 CRITICAL CARE ADDL 30 MIN: CPT

## 2024-04-30 PROCEDURE — 99233 SBSQ HOSP IP/OBS HIGH 50: CPT | Mod: GC

## 2024-04-30 PROCEDURE — 99291 CRITICAL CARE FIRST HOUR: CPT

## 2024-04-30 PROCEDURE — 71045 X-RAY EXAM CHEST 1 VIEW: CPT | Mod: 26

## 2024-04-30 PROCEDURE — 93970 EXTREMITY STUDY: CPT | Mod: 26

## 2024-04-30 RX ORDER — FUROSEMIDE 40 MG
20 TABLET ORAL EVERY 8 HOURS
Refills: 0 | Status: DISCONTINUED | OUTPATIENT
Start: 2024-04-30 | End: 2024-04-30

## 2024-04-30 RX ORDER — ONDANSETRON 8 MG/1
4 TABLET, FILM COATED ORAL ONCE
Refills: 0 | Status: COMPLETED | OUTPATIENT
Start: 2024-04-30 | End: 2024-04-30

## 2024-04-30 RX ORDER — MIDODRINE HYDROCHLORIDE 2.5 MG/1
10 TABLET ORAL EVERY 8 HOURS
Refills: 0 | Status: DISCONTINUED | OUTPATIENT
Start: 2024-04-30 | End: 2024-05-07

## 2024-04-30 RX ORDER — ALBUMIN HUMAN 25 %
50 VIAL (ML) INTRAVENOUS
Refills: 0 | Status: COMPLETED | OUTPATIENT
Start: 2024-04-30 | End: 2024-04-30

## 2024-04-30 RX ORDER — DEXTROSE 50 % IN WATER 50 %
12.5 SYRINGE (ML) INTRAVENOUS ONCE
Refills: 0 | Status: DISCONTINUED | OUTPATIENT
Start: 2024-04-30 | End: 2024-05-06

## 2024-04-30 RX ORDER — DEXTROSE 10 % IN WATER 10 %
125 INTRAVENOUS SOLUTION INTRAVENOUS ONCE
Refills: 0 | Status: DISCONTINUED | OUTPATIENT
Start: 2024-04-30 | End: 2024-05-06

## 2024-04-30 RX ORDER — MILRINONE LACTATE 1 MG/ML
0.25 INJECTION, SOLUTION INTRAVENOUS
Qty: 20 | Refills: 0 | Status: DISCONTINUED | OUTPATIENT
Start: 2024-04-30 | End: 2024-05-05

## 2024-04-30 RX ORDER — POTASSIUM CHLORIDE 20 MEQ
20 PACKET (EA) ORAL ONCE
Refills: 0 | Status: COMPLETED | OUTPATIENT
Start: 2024-04-30 | End: 2024-04-30

## 2024-04-30 RX ORDER — CEFAZOLIN SODIUM 1 G
VIAL (EA) INJECTION
Refills: 0 | Status: DISCONTINUED | OUTPATIENT
Start: 2024-04-30 | End: 2024-05-03

## 2024-04-30 RX ORDER — CEFAZOLIN SODIUM 1 G
2000 VIAL (EA) INJECTION EVERY 8 HOURS
Refills: 0 | Status: DISCONTINUED | OUTPATIENT
Start: 2024-04-30 | End: 2024-05-03

## 2024-04-30 RX ORDER — INSULIN LISPRO 100/ML
VIAL (ML) SUBCUTANEOUS
Refills: 0 | Status: DISCONTINUED | OUTPATIENT
Start: 2024-04-30 | End: 2024-05-06

## 2024-04-30 RX ORDER — DEXTROSE 50 % IN WATER 50 %
15 SYRINGE (ML) INTRAVENOUS ONCE
Refills: 0 | Status: DISCONTINUED | OUTPATIENT
Start: 2024-04-30 | End: 2024-05-06

## 2024-04-30 RX ORDER — SODIUM BICARBONATE 1 MEQ/ML
100 SYRINGE (ML) INTRAVENOUS ONCE
Refills: 0 | Status: COMPLETED | OUTPATIENT
Start: 2024-04-30 | End: 2024-04-30

## 2024-04-30 RX ORDER — DEXTROSE 50 % IN WATER 50 %
25 SYRINGE (ML) INTRAVENOUS ONCE
Refills: 0 | Status: DISCONTINUED | OUTPATIENT
Start: 2024-04-30 | End: 2024-05-06

## 2024-04-30 RX ORDER — SODIUM CHLORIDE 9 MG/ML
1000 INJECTION, SOLUTION INTRAVENOUS
Refills: 0 | Status: DISCONTINUED | OUTPATIENT
Start: 2024-04-30 | End: 2024-05-06

## 2024-04-30 RX ORDER — FUROSEMIDE 40 MG
20 TABLET ORAL ONCE
Refills: 0 | Status: COMPLETED | OUTPATIENT
Start: 2024-04-30 | End: 2024-04-30

## 2024-04-30 RX ORDER — ALBUMIN HUMAN 25 %
250 VIAL (ML) INTRAVENOUS ONCE
Refills: 0 | Status: COMPLETED | OUTPATIENT
Start: 2024-04-30 | End: 2024-04-30

## 2024-04-30 RX ORDER — SODIUM CHLORIDE 5 G/100ML
500 INJECTION, SOLUTION INTRAVENOUS
Refills: 0 | Status: DISCONTINUED | OUTPATIENT
Start: 2024-04-30 | End: 2024-04-30

## 2024-04-30 RX ORDER — POTASSIUM CHLORIDE 20 MEQ
40 PACKET (EA) ORAL ONCE
Refills: 0 | Status: COMPLETED | OUTPATIENT
Start: 2024-04-30 | End: 2024-04-30

## 2024-04-30 RX ORDER — POTASSIUM PHOSPHATE, MONOBASIC POTASSIUM PHOSPHATE, DIBASIC 236; 224 MG/ML; MG/ML
30 INJECTION, SOLUTION INTRAVENOUS ONCE
Refills: 0 | Status: COMPLETED | OUTPATIENT
Start: 2024-04-30 | End: 2024-04-30

## 2024-04-30 RX ORDER — DEXAMETHASONE 0.5 MG/5ML
4 ELIXIR ORAL EVERY 6 HOURS
Refills: 0 | Status: DISCONTINUED | OUTPATIENT
Start: 2024-04-30 | End: 2024-05-01

## 2024-04-30 RX ORDER — CEFAZOLIN SODIUM 1 G
2000 VIAL (EA) INJECTION ONCE
Refills: 0 | Status: COMPLETED | OUTPATIENT
Start: 2024-04-30 | End: 2024-04-30

## 2024-04-30 RX ORDER — FUROSEMIDE 40 MG
20 TABLET ORAL EVERY 12 HOURS
Refills: 0 | Status: DISCONTINUED | OUTPATIENT
Start: 2024-04-30 | End: 2024-05-07

## 2024-04-30 RX ORDER — GLUCAGON INJECTION, SOLUTION 0.5 MG/.1ML
1 INJECTION, SOLUTION SUBCUTANEOUS ONCE
Refills: 0 | Status: DISCONTINUED | OUTPATIENT
Start: 2024-04-30 | End: 2024-05-06

## 2024-04-30 RX ADMIN — Medication 4 MILLIGRAM(S): at 23:58

## 2024-04-30 RX ADMIN — RIOCIGUAT 2.5 MILLIGRAM(S): 1.5 TABLET, FILM COATED ORAL at 16:10

## 2024-04-30 RX ADMIN — Medication 2: at 11:47

## 2024-04-30 RX ADMIN — Medication 3 MILLILITER(S): at 17:44

## 2024-04-30 RX ADMIN — RIOCIGUAT 2.5 MILLIGRAM(S): 1.5 TABLET, FILM COATED ORAL at 22:14

## 2024-04-30 RX ADMIN — Medication 100 MILLIEQUIVALENT(S): at 01:55

## 2024-04-30 RX ADMIN — Medication 40 MILLIEQUIVALENT(S): at 16:10

## 2024-04-30 RX ADMIN — Medication 3 MILLILITER(S): at 12:56

## 2024-04-30 RX ADMIN — Medication 100 MILLIGRAM(S): at 11:17

## 2024-04-30 RX ADMIN — Medication 500 MILLIGRAM(S): at 06:19

## 2024-04-30 RX ADMIN — Medication 3 MILLILITER(S): at 00:49

## 2024-04-30 RX ADMIN — CHLORHEXIDINE GLUCONATE 1 APPLICATION(S): 213 SOLUTION TOPICAL at 06:20

## 2024-04-30 RX ADMIN — Medication 20 MILLIGRAM(S): at 17:33

## 2024-04-30 RX ADMIN — Medication 4 MILLIGRAM(S): at 18:37

## 2024-04-30 RX ADMIN — ONDANSETRON 4 MILLIGRAM(S): 8 TABLET, FILM COATED ORAL at 23:10

## 2024-04-30 RX ADMIN — Medication 50 MILLILITER(S): at 19:12

## 2024-04-30 RX ADMIN — POLYETHYLENE GLYCOL 3350 17 GRAM(S): 17 POWDER, FOR SOLUTION ORAL at 11:18

## 2024-04-30 RX ADMIN — GABAPENTIN 100 MILLIGRAM(S): 400 CAPSULE ORAL at 22:07

## 2024-04-30 RX ADMIN — MUPIROCIN 1 APPLICATION(S): 20 OINTMENT TOPICAL at 18:47

## 2024-04-30 RX ADMIN — Medication 50 MILLILITER(S): at 12:41

## 2024-04-30 RX ADMIN — ATORVASTATIN CALCIUM 20 MILLIGRAM(S): 80 TABLET, FILM COATED ORAL at 22:07

## 2024-04-30 RX ADMIN — Medication 125 MILLILITER(S): at 11:43

## 2024-04-30 RX ADMIN — Medication 2: at 22:31

## 2024-04-30 RX ADMIN — SENNA PLUS 2 TABLET(S): 8.6 TABLET ORAL at 22:08

## 2024-04-30 RX ADMIN — PANTOPRAZOLE SODIUM 40 MILLIGRAM(S): 20 TABLET, DELAYED RELEASE ORAL at 11:18

## 2024-04-30 RX ADMIN — Medication 50 MILLILITER(S): at 18:36

## 2024-04-30 RX ADMIN — Medication 100 MILLIEQUIVALENT(S): at 00:43

## 2024-04-30 RX ADMIN — Medication 3 MILLILITER(S): at 23:47

## 2024-04-30 RX ADMIN — SODIUM CHLORIDE 30 MILLILITER(S): 5 INJECTION, SOLUTION INTRAVENOUS at 05:11

## 2024-04-30 RX ADMIN — GABAPENTIN 100 MILLIGRAM(S): 400 CAPSULE ORAL at 14:35

## 2024-04-30 RX ADMIN — MIDODRINE HYDROCHLORIDE 10 MILLIGRAM(S): 2.5 TABLET ORAL at 14:35

## 2024-04-30 RX ADMIN — PHENYLEPHRINE HYDROCHLORIDE 12.2 MICROGRAM(S)/KG/MIN: 10 INJECTION INTRAVENOUS at 06:18

## 2024-04-30 RX ADMIN — POTASSIUM PHOSPHATE, MONOBASIC POTASSIUM PHOSPHATE, DIBASIC 83.33 MILLIMOLE(S): 236; 224 INJECTION, SOLUTION INTRAVENOUS at 20:53

## 2024-04-30 RX ADMIN — Medication 3 MILLILITER(S): at 06:20

## 2024-04-30 RX ADMIN — Medication 50 MILLILITER(S): at 13:09

## 2024-04-30 RX ADMIN — Medication 4 MILLIGRAM(S): at 11:18

## 2024-04-30 RX ADMIN — MODAFINIL 100 MILLIGRAM(S): 200 TABLET ORAL at 11:18

## 2024-04-30 RX ADMIN — GABAPENTIN 100 MILLIGRAM(S): 400 CAPSULE ORAL at 06:19

## 2024-04-30 RX ADMIN — Medication 2: at 17:41

## 2024-04-30 RX ADMIN — VASOPRESSIN 3 UNIT(S)/MIN: 20 INJECTION INTRAVENOUS at 06:19

## 2024-04-30 RX ADMIN — RIOCIGUAT 2.5 MILLIGRAM(S): 1.5 TABLET, FILM COATED ORAL at 06:40

## 2024-04-30 RX ADMIN — Medication 125 MILLILITER(S): at 17:54

## 2024-04-30 RX ADMIN — Medication 20 MILLIGRAM(S): at 10:04

## 2024-04-30 RX ADMIN — Medication 20 MILLIEQUIVALENT(S): at 17:33

## 2024-04-30 RX ADMIN — POTASSIUM PHOSPHATE, MONOBASIC POTASSIUM PHOSPHATE, DIBASIC 83.33 MILLIMOLE(S): 236; 224 INJECTION, SOLUTION INTRAVENOUS at 03:02

## 2024-04-30 RX ADMIN — MIDODRINE HYDROCHLORIDE 10 MILLIGRAM(S): 2.5 TABLET ORAL at 22:07

## 2024-04-30 RX ADMIN — Medication 500 MILLIGRAM(S): at 18:37

## 2024-04-30 RX ADMIN — Medication 100 MILLIEQUIVALENT(S): at 08:05

## 2024-04-30 RX ADMIN — Medication 100 MILLIGRAM(S): at 22:07

## 2024-04-30 RX ADMIN — MUPIROCIN 1 APPLICATION(S): 20 OINTMENT TOPICAL at 06:19

## 2024-04-30 NOTE — PROGRESS NOTE ADULT - SUBJECTIVE AND OBJECTIVE BOX
Neurology Stroke Progress Note    INTERVAL HPI/OVERNIGHT EVENTS:  Patient seen and examined. She states to be feeling much better with being able to feel and move her lower extremities. She was very conversant and cooperative throughout the examination.     MEDICATIONS  (STANDING):  albuterol    90 MICROgram(s) HFA Inhaler 2 Puff(s) Inhalation every 6 hours  albuterol/ipratropium for Nebulization 3 milliLiter(s) Nebulizer every 6 hours  ascorbic acid 500 milliGRAM(s) Oral two times a day  atorvastatin 20 milliGRAM(s) Oral at bedtime  chlorhexidine 2% Cloths 1 Application(s) Topical daily  dexAMETHasone  Injectable 4 milliGRAM(s) IV Push every 6 hours  dextrose 10% Bolus 125 milliLiter(s) IV Bolus once  dextrose 5%. 1000 milliLiter(s) (50 mL/Hr) IV Continuous <Continuous>  dextrose 5%. 1000 milliLiter(s) (100 mL/Hr) IV Continuous <Continuous>  dextrose 50% Injectable 12.5 Gram(s) IV Push once  dextrose 50% Injectable 25 Gram(s) IV Push once  furosemide   Injectable 20 milliGRAM(s) IV Push every 8 hours  gabapentin 100 milliGRAM(s) Oral every 8 hours  glucagon  Injectable 1 milliGRAM(s) IntraMuscular once  influenza  Vaccine (HIGH DOSE) 0.7 milliLiter(s) IntraMuscular once  insulin lispro (ADMELOG) corrective regimen sliding scale   SubCutaneous Before meals and at bedtime  midodrine 10 milliGRAM(s) Oral every 8 hours  milrinone Infusion 0.375 MICROgram(s)/kG/Min (9.18 mL/Hr) IV Continuous <Continuous>  modafinil 100 milliGRAM(s) Oral daily  mupirocin 2% Ointment 1 Application(s) Both Nostrils two times a day  pantoprazole    Tablet 40 milliGRAM(s) Oral daily  phenylephrine    Infusion 0.399 MICROgram(s)/kG/Min (12.2 mL/Hr) IV Continuous <Continuous>  polyethylene glycol 3350 17 Gram(s) Oral daily  riociguat 2.5 milliGRAM(s) Oral every 8 hours  senna 2 Tablet(s) Oral at bedtime  sodium chloride 0.9%. 1000 milliLiter(s) (10 mL/Hr) IV Continuous <Continuous>  sodium chloride 3%. 500 milliLiter(s) (30 mL/Hr) IV Continuous <Continuous>  vasopressin Infusion 0.02 Unit(s)/Min (3 mL/Hr) IV Continuous <Continuous>    MEDICATIONS  (PRN):  acetaminophen     Tablet .. 650 milliGRAM(s) Oral every 6 hours PRN Mild Pain (1 - 3)  dextrose Oral Gel 15 Gram(s) Oral once PRN Blood Glucose LESS THAN 70 milliGRAM(s)/deciliter      Allergies    No Known Allergies    Intolerances        Vital Signs Last 24 Hrs  T(C): 36.5 (30 Apr 2024 08:30), Max: 37.6 (29 Apr 2024 21:00)  T(F): 97.7 (30 Apr 2024 08:30), Max: 99.7 (29 Apr 2024 21:00)  HR: 82 (30 Apr 2024 09:00) (71 - 90)  BP: --  BP(mean): --  RR: 16 (30 Apr 2024 09:00) (14 - 18)  SpO2: 100% (30 Apr 2024 09:00) (97% - 100%)    Parameters below as of 30 Apr 2024 10:00  Patient On (Oxygen Delivery Method): nasal cannula, high flow  O2 Flow (L/min): 50  O2 Concentration (%): 50    Physical exam:  General: No acute distress, awake and alert  Eyes: Anicteric sclerae, moist conjunctivae, see below for CNs  Neck: FROM  Cardiovascular: Regular rate and rhythm  Pulmonary: No use of accessory muscles  Extremities: no edema    Neurologic:  -Mental status: Awake, alert, oriented to person, place, and time. Speech is fluent with intact naming, repetition, and comprehension, no dysarthria. Recent and remote memory intact. Follows commands. Attention/concentration intact. Fund of knowledge appropriate.  -Cranial nerves:   II: Visual fields are full to confrontation.  III, IV, VI: Extraocular movements are intact without nystagmus. Pupils equally round and reactive to light  V:  Facial sensation V1-V3 equal and intact   VII: Face is symmetric with normal eye closure and smile  Motor: Normal bulk and tone. No pronator drift. Strength bilateral upper extremity 5/5. Right lower extremity 5/5 and Left lower extremity 4+/5.   Sensation: Intact to light touch bilaterally. No neglect or extinction on double simultaneous testing.  Coordination: No dysmetria on finger-to-nose bilaterally.     LABS:                        11.1   15.61 )-----------( 134      ( 30 Apr 2024 03:11 )             32.6     04-30    139  |  109<H>  |  14  ----------------------------<  174<H>  5.1   |  21<L>  |  0.64    Ca    9.5      30 Apr 2024 03:11  Phos  1.8     04-30  Mg     2.2     04-30    TPro  7.0  /  Alb  3.8  /  TBili  1.7<H>  /  DBili  x   /  AST  40  /  ALT  50<H>  /  AlkPhos  81  04-30    PT/INR - ( 30 Apr 2024 03:11 )   PT: 13.4 sec;   INR: 1.18          PTT - ( 30 Apr 2024 03:11 )  PTT:30.4 sec  Urinalysis Basic - ( 30 Apr 2024 03:11 )    Color: x / Appearance: x / SG: x / pH: x  Gluc: 174 mg/dL / Ketone: x  / Bili: x / Urobili: x   Blood: x / Protein: x / Nitrite: x   Leuk Esterase: x / RBC: x / WBC x   Sq Epi: x / Non Sq Epi: x / Bacteria: x        RADIOLOGY & ADDITIONAL TESTS:  Reviewed.   MRI L-Spine (4/29/24):   1.  The lower thoracic and upper lumbar spine is partially obscured by   magnetic susceptibility artifact caused by the endovascular stent graft.    2.  No MRI evidence of lumbar spine fracture, malalignment or suspicious   osseous lesion.    3.  Multilevel lumbar spondylosis and degenerative disc disease as   discussed above.  No evidence for cauda equina compression or nerve root   impingement.

## 2024-04-30 NOTE — DIETITIAN INITIAL EVALUATION ADULT - PERTINENT LABORATORY DATA
04-30    143  |  108  |  14  ----------------------------<  207<H>  4.1   |  25  |  0.65    Ca    9.7      30 Apr 2024 10:19  Phos  2.5     04-30  Mg     2.0     04-30    TPro  7.0  /  Alb  4.0  /  TBili  1.7<H>  /  DBili  x   /  AST  36  /  ALT  47<H>  /  AlkPhos  84  04-30  POCT Blood Glucose.: 178 mg/dL (04-30-24 @ 11:28)  A1C with Estimated Average Glucose Result: 5.9 % (04-25-24 @ 10:26)  A1C with Estimated Average Glucose Result: 6.0 % (04-10-24 @ 13:07)  A1C with Estimated Average Glucose Result: 5.6 % (08-03-23 @ 08:43)

## 2024-04-30 NOTE — PROGRESS NOTE ADULT - ATTENDING COMMENTS
Severe PH - CTEPH (Group IV) and Drug/Toxins (Group I, hx of diet pill usage), now s/p TEVAR complicated by spinal cord ischemia now significantly improved with improved MAPs, although suspect improved flow had largest impact with milrinone addition. PA pressures remain severely elevated but PVR unchanged and PVR/SVR ratio improved. She continues to have no respiratory symptoms, but given severity of PH and likely contribution to paraplegia post op, will plan to start opsumit. Pt will need to be enrolled into [facility] program, forms to be completed and faxed in.     - Continue Boo for now, when weaning pulmonary vasodilatory agents would opt for Boo to be weaned first  - Continue milrinone, adempas, vasopressin, attempt to wean phenylephrine. If neuro exam and MAP stable off phenylephrine would then wean vaso, lastly followed by milrinone  - CVP remains < 10, appropriate  - On HFNC, respiratory status stable  - US doppler to r/o acute DVT as pt may require IVC placement, otherwise, will resume A/C once lumbar drain is removed    Will continue to follow with you closely    Reviewed medications, laboratory results, imaging. Discussed risks/benefits of macitentan with patient in detail and she is agreeable. Decision making of high complexity.

## 2024-04-30 NOTE — DIETITIAN INITIAL EVALUATION ADULT - OTHER CALCULATIONS
Pt above % IBW (120#, 150%) thus ideal body weight used for all calculations (120#). Needs adjusted for age, post-op healing.

## 2024-04-30 NOTE — PROGRESS NOTE ADULT - SUBJECTIVE AND OBJECTIVE BOX
CTICU  CRITICAL  CARE  attending     Hand off received 					   Pertinent clinical, laboratory, radiographic, hemodynamic, echocardiographic, respiratory data, microbiologic data and chart were reviewed and analyzed frequently throughout the course of the day and night  Patient seen and examined with CTS/ SH attending at bedside  Pt is a 68y , Female, HEALTH ISSUES - PROBLEM Dx:  Ascending aorta dilation        , FAMILY HISTORY:  Family history of early CAD (Father)    PAST MEDICAL & SURGICAL HISTORY:  HTN (hypertension)      Prediabetes      Mild tricuspid regurgitation      Enlarged aorta      CAD (coronary artery disease)      Ascending aorta dilation      Pulmonary hypertension      Chronic systolic right heart failure      2019 novel coronavirus disease (COVID-19)      Asthma      S/P hysterectomy      S/P         Patient is a 68y old  Female who presents with a chief complaint of type B dissection (2024 19:38)      14 system review was unremarkable    Vital signs, hemodynamic and respiratory parameters were reviewed from the bedside nursing flowsheet.  ICU Vital Signs Last 24 Hrs  T(C): 36.9 (2024 12:22), Max: 37.6 (2024 01:00)  T(F): 98.5 (2024 12:22), Max: 99.7 (2024 01:00)  HR: 81 (2024 23:00) (72 - 89)  BP: --  BP(mean): --  ABP: 173/90 (2024 23:00) (154/82 - 173/90)  ABP(mean): 128 (2024 23:00) (112 - 128)  RR: 18 (2024 23:00) (14 - 24)  SpO2: 100% (2024 23:00) (97% - 100%)    O2 Parameters below as of 2024 23:00  Patient On (Oxygen Delivery Method): nasal cannula, high flow  O2 Flow (L/min): 50  O2 Concentration (%): 60      Adult Advanced Hemodynamics Last 24 Hrs  CVP(mm Hg): --  CVP(cm H2O): --  CO: 6 (2024 22:00) (5.8 - 7.9)  CI: 3.2 (2024 22:00) (3.1 - 4.2)  PA: --  PA(mean): --  PCWP: --  SVR: 1492 (2024 22:00) (1124 - 1492)  SVRI: 2790 (2024 22:00) (2101 - 2790)  PVR: --  PVRI: --, ABG - ( 2024 18:25 )  pH, Arterial: 7.46  pH, Blood: x     /  pCO2: 36    /  pO2: 69    / HCO3: 26    / Base Excess: 2.0   /  SaO2: 96.7                Intake and output was reviewed and the fluid balance was calculated  Daily     Daily   I&O's Summary    2024 07:01  -  2024 07:00  --------------------------------------------------------  IN: 3886.6 mL / OUT: 6170 mL / NET: -2283.4 mL    2024 07:01  -  2024 23:37  --------------------------------------------------------  IN: 2188 mL / OUT: 4035 mL / NET: -1847 mL        All lines and drain sites were assessed  [facility]      POCT Blood Glucose.: 188 mg/dL (2024 22:28)    No acute change in mental status  Auscultation of the chest reveals equal bs  Abdomen is soft  Extremities are warm and well perfused  Wounds appear clean and unremarkable  Antibiotics are periop    labs  CBC Full  -  ( 2024 18:24 )  WBC Count : 13.47 K/uL  RBC Count : 3.81 M/uL  Hemoglobin : 10.2 g/dL  Hematocrit : 28.4 %  Platelet Count - Automated : 121 K/uL  Mean Cell Volume : 74.5 fl  Mean Cell Hemoglobin : 26.8 pg  Mean Cell Hemoglobin Concentration : 35.9 gm/dL  Auto Neutrophil # : 11.15 K/uL  Auto Lymphocyte # : 0.82 K/uL  Auto Monocyte # : 1.36 K/uL  Auto Eosinophil # : 0.00 K/uL  Auto Basophil # : 0.02 K/uL  Auto Neutrophil % : 82.8 %  Auto Lymphocyte % : 6.1 %  Auto Monocyte % : 10.1 %  Auto Eosinophil % : 0.0 %  Auto Basophil % : 0.1 %        142  |  103  |  13  ----------------------------<  199<H>  3.6   |  24  |  0.72    Ca    9.9      2024 18:24  Phos  2.4     04-30  Mg     2.0     -30    TPro  7.3  /  Alb  4.5  /  TBili  1.6<H>  /  DBili  x   /  AST  37  /  ALT  49<H>  /  AlkPhos  82  04-30    PT/INR - ( 2024 18:24 )   PT: 13.0 sec;   INR: 1.14          PTT - ( 2024 18:24 )  PTT:27.2 sec  The current medications were reviewed   MEDICATIONS  (STANDING):  albuterol    90 MICROgram(s) HFA Inhaler 2 Puff(s) Inhalation every 6 hours  albuterol/ipratropium for Nebulization 3 milliLiter(s) Nebulizer every 6 hours  ascorbic acid 500 milliGRAM(s) Oral two times a day  atorvastatin 20 milliGRAM(s) Oral at bedtime  ceFAZolin   IVPB 2000 milliGRAM(s) IV Intermittent every 8 hours  ceFAZolin   IVPB      chlorhexidine 2% Cloths 1 Application(s) Topical daily  dexAMETHasone  Injectable 4 milliGRAM(s) IV Push every 6 hours  dextrose 10% Bolus 125 milliLiter(s) IV Bolus once  dextrose 5%. 1000 milliLiter(s) (50 mL/Hr) IV Continuous <Continuous>  dextrose 5%. 1000 milliLiter(s) (100 mL/Hr) IV Continuous <Continuous>  dextrose 50% Injectable 12.5 Gram(s) IV Push once  dextrose 50% Injectable 25 Gram(s) IV Push once  furosemide   Injectable 20 milliGRAM(s) IV Push every 12 hours  gabapentin 100 milliGRAM(s) Oral every 8 hours  glucagon  Injectable 1 milliGRAM(s) IntraMuscular once  influenza  Vaccine (HIGH DOSE) 0.7 milliLiter(s) IntraMuscular once  insulin lispro (ADMELOG) corrective regimen sliding scale   SubCutaneous Before meals and at bedtime  midodrine 10 milliGRAM(s) Oral every 8 hours  milrinone Infusion 0.375 MICROgram(s)/kG/Min (9.18 mL/Hr) IV Continuous <Continuous>  modafinil 100 milliGRAM(s) Oral daily  mupirocin 2% Ointment 1 Application(s) Both Nostrils two times a day  ondansetron Injectable 4 milliGRAM(s) IV Push once  pantoprazole    Tablet 40 milliGRAM(s) Oral daily  phenylephrine    Infusion 0.399 MICROgram(s)/kG/Min (12.2 mL/Hr) IV Continuous <Continuous>  polyethylene glycol 3350 17 Gram(s) Oral daily  riociguat 2.5 milliGRAM(s) Oral every 8 hours  senna 2 Tablet(s) Oral at bedtime  sodium chloride 0.9%. 1000 milliLiter(s) (10 mL/Hr) IV Continuous <Continuous>  vasopressin Infusion 0.02 Unit(s)/Min (3 mL/Hr) IV Continuous <Continuous>    MEDICATIONS  (PRN):  acetaminophen     Tablet .. 650 milliGRAM(s) Oral every 6 hours PRN Mild Pain (1 - 3)  dextrose Oral Gel 15 Gram(s) Oral once PRN Blood Glucose LESS THAN 70 milliGRAM(s)/deciliter       PROBLEM LIST/ ASSESSMENT:  HEALTH ISSUES - PROBLEM Dx:  Ascending aorta dilation        ,   Patient is a 68y old  Female who presents with a chief complaint of type B dissection (2024 19:38)     s/p cardiac surgery    Acute systolic and diastolic heart failure evidenced by SOB and parenchymal infiltrates; will treat with diuresis    Cardiogenic shock on ionotropy    Vasogenic shock due to hypotension in the cticu , will keep on pressors      Acute post operative pulmonary insufficiency ruled in due to hypoxemia, O2 sats < 91% on RA treated with HFNC          My plan includes :  close hemodynamic, ventilatory and drain monitoring and management per post op routine    Monitor for arrhythmias and monitor parameters for organ perfusion  beta blockade not administered due to hemodynamic instability and bradycardia  monitor neurologic status  Head of the bed should remain elevated to 45 deg .   chest PT and IS will be encouraged  monitor adequacy of oxygenation and ventilation and attempt to wean oxygen  antibiotic regimen will be tailored to the clinical, laboratory and microbiologic data  Nutritional goals will be met using po eventually , ensure adequate caloric intake and montior the same  Stress ulcer and VTE prophylaxis will be achieved    Glycemic control is satisfactory  Electrolytes have been repleted as necessary and wound care has been carried out. Pain control has been achieved.   agressive physical therapy and early mobility and ambulation goals will be met   The family was updated about the course and plan  CRITICAL CARE TIME Upon my evaluation, this patient had a high probability of imminent or life-threatening deterioration due to the above problems which required my direct attention, intervention, and personal management.  I have personally provided 150 minutes of critical care time exclusive of time spent on separately billable procedures. Time included review of laboratory data, radiology results, discussion with consultants, and monitoring for potential decompensation. Interventions were performed as documented abovepersonally provided by me  in evaluation and management, reassessments, review and interpretation of labs and x-rays, ventilator and hemodynamic management, formulating a plan and coordinating care: ___150___ MIN.  Time does not include procedural time.    CTICU ATTENDING     					    Eder Conway MD

## 2024-04-30 NOTE — PROGRESS NOTE ADULT - ASSESSMENT
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    - Per review of out patient medical records, she had severe PH noted on TTE 3/2023 and [facility], underwent CTA PE protocol after d dimer noted to be elevated, found to have webs and linear defects of R main PA, RUL, RLL with associated mosaicism, V/Q also significantly positive for CTEPH  - At this time her aortic dissection was noted, but felt to delay surgical intervention until PH improved  - RHC reviewed (8/2024) - Of note there is NO PAWP WAVEFORM, unable to wedge, calculations made on estimated PAWP of 15  RA 13  PA 82/28/48  PAWP 15  CO/CI (F) 4.64/2.54  PVR 7.1WU  PVR/SVR 0.4    NO vasoreactivity testing done, no TD CO/CI    - She was subsequently started on riociguat and follow up TTE showed significant improvement in PH as well as subjectively she noted improvement  - Now on vaso/phenylephrine to drive SVR  - PA-C inserted yesterday with no significant changes from her baseline other than increase in cardiac output    Recommend:   - would continue with PA-C and invasive monitoring of hemodynamics while titrating vasoactive medications  - Would not hold riociguat as she is at risk for rebound PH, although it may contribute to lower BP. Augment with pressor choices as outlined above  - can continue with Boo at 20 PPM for the moment; wean when able   - Maintain relative diuresis, avoid fluid boluses and would keep pt relatively net negative if RAP > 10  - Recommend doppler of LE - if she has evidence of DVT recommend IVC filter while she is off a/c; otherwise serial doppler exams until lumbar drain removed and A/C can be restarted  - Will send in referral for opsumit given severity of PH likely contributing to her spinal cord ischemia and paraplegia. Can start at 10 mg and will start process for ensuring she has this on discharge.     Would benefit from evaluation in formal CTEPH clinic for consideration of PTE (may be difficult in setting of Type B aortic dissection) vs balloon angioplasty and possible escalation of medical therapy, this can be done as an out patient. Patient can be seen in CTEPH clinic with myself and Dr. Benites Agree with medical therapy for now    PH will continue to follow

## 2024-04-30 NOTE — PROGRESS NOTE ADULT - SUBJECTIVE AND OBJECTIVE BOX
PULMONARY HYPERTENSION SERVICE FOLLOW-UP NOTE    INTERVAL HPI:  Reviewed chart and overnight events; patient seen and examined at bedside. Had MRI yesterday, post MRI was able to move her legs again. GERALDINE in, but unable to wedge. Feels great this morning. Reviewed her case with Dr. Conway and reviewed her chart and workup over the last year. No fevers or chills. Remains in net negative fluid balance. ROS otherwise negative.     MEDICATIONS:  Pulmonary:  albuterol    90 MICROgram(s) HFA Inhaler 2 Puff(s) Inhalation every 6 hours  albuterol/ipratropium for Nebulization 3 milliLiter(s) Nebulizer every 6 hours    Antimicrobials:    Anticoagulants:    Cardiac:  furosemide   Injectable 20 milliGRAM(s) IV Push every 8 hours  midodrine 10 milliGRAM(s) Oral every 8 hours  milrinone Infusion 0.375 MICROgram(s)/kG/Min IV Continuous <Continuous>  phenylephrine    Infusion 0.399 MICROgram(s)/kG/Min IV Continuous <Continuous>  riociguat 2.5 milliGRAM(s) Oral every 8 hours      Allergies    No Known Allergies    Intolerances        Vital Signs Last 24 Hrs  T(C): 36.5 (30 Apr 2024 08:30), Max: 37.6 (29 Apr 2024 21:00)  T(F): 97.7 (30 Apr 2024 08:30), Max: 99.7 (29 Apr 2024 21:00)  HR: 82 (30 Apr 2024 09:00) (71 - 90)  BP: --  BP(mean): --  RR: 16 (30 Apr 2024 09:00) (14 - 18)  SpO2: 100% (30 Apr 2024 09:00) (97% - 100%)    Parameters below as of 30 Apr 2024 10:00  Patient On (Oxygen Delivery Method): nasal cannula, high flow  O2 Flow (L/min): 50  O2 Concentration (%): 50    04-29 @ 07:01  -  04-30 @ 07:00  --------------------------------------------------------  IN: 3886.6 mL / OUT: 6170 mL / NET: -2283.4 mL    04-30 @ 07:01  -  04-30 @ 09:28  --------------------------------------------------------  IN: 320.2 mL / OUT: 420 mL / NET: -99.8 mL          PHYSICAL EXAM:  Constitutional: well-appearing  HEENT: NC/AT; PERRL, anicteric sclera; MMM  Neck: supple  Cardiovascular: +S1/S2, RRR  Respiratory: CTA B/L; no W/R/R; comfortable on HFNC   Gastrointestinal: soft, NT/ND  Extremities: WWP; no edema, clubbing or cyanosis  Vascular: 2+ radial pulses B/L  Neurological: awake and alert; HORNER    LABS:  ABG - ( 30 Apr 2024 03:11 )  pH, Arterial: 7.43  pH, Blood: x     /  pCO2: 32    /  pO2: 77    / HCO3: 21    / Base Excess: -2.3  /  SaO2: 97.0                CBC Full  -  ( 30 Apr 2024 03:11 )  WBC Count : 15.61 K/uL  RBC Count : 4.23 M/uL  Hemoglobin : 11.1 g/dL  Hematocrit : 32.6 %  Platelet Count - Automated : 134 K/uL  Mean Cell Volume : 77.1 fl  Mean Cell Hemoglobin : 26.2 pg  Mean Cell Hemoglobin Concentration : 34.0 gm/dL  Auto Neutrophil # : 12.06 K/uL  Auto Lymphocyte # : 1.06 K/uL  Auto Monocyte # : 2.37 K/uL  Auto Eosinophil # : 0.00 K/uL  Auto Basophil # : 0.02 K/uL  Auto Neutrophil % : 77.3 %  Auto Lymphocyte % : 6.8 %  Auto Monocyte % : 15.2 %  Auto Eosinophil % : 0.0 %  Auto Basophil % : 0.1 %    04-30    139  |  109<H>  |  14  ----------------------------<  174<H>  5.1   |  21<L>  |  0.64    Ca    9.5      30 Apr 2024 03:11  Phos  1.8     04-30  Mg     2.2     04-30    TPro  7.0  /  Alb  3.8  /  TBili  1.7<H>  /  DBili  x   /  AST  40  /  ALT  50<H>  /  AlkPhos  81  04-30    PT/INR - ( 30 Apr 2024 03:11 )   PT: 13.4 sec;   INR: 1.18          PTT - ( 30 Apr 2024 03:11 )  PTT:30.4 sec      Urinalysis Basic - ( 30 Apr 2024 03:11 )    Color: x / Appearance: x / SG: x / pH: x  Gluc: 174 mg/dL / Ketone: x  / Bili: x / Urobili: x   Blood: x / Protein: x / Nitrite: x   Leuk Esterase: x / RBC: x / WBC x   Sq Epi: x / Non Sq Epi: x / Bacteria: x                RADIOLOGY & ADDITIONAL STUDIES:

## 2024-04-30 NOTE — PROGRESS NOTE ADULT - SUBJECTIVE AND OBJECTIVE BOX
CTICU  CRITICAL  CARE  attending     Hand off received 					   Pertinent clinical, laboratory, radiographic, hemodynamic, echocardiographic, respiratory data, microbiologic data and chart were reviewed and analyzed frequently throughout the course of the day  Patient seen and examined with CTS/ SH attending at bedside  Pt is a 68yr old female with PMH asthma, CTEPH, CAD, HTN, Type B Dissection admitted for TEVAR. Pt underwent LD placement . : Pt underwent endovascular repair of thoracic aorta covering L subclavian to ~5cm above celiac trunk ([place]). Arrived Extubated. Clamped  and  and drained overnight. Ld removed in afternoon. : early AM pt unable to move LE, stroke code called. NSGY replaced LD. MAPs driven up. CT imaging and MRI imaging performed. Pulmonary consulted for CTEPH mgmt. Boo and primacor restarted with new swan placement. 1 pRBC, mannitol, and decadron also given. : Midodrine started.     FAMILY HISTORY:  Family history of early CAD (Father)  PAST MEDICAL & SURGICAL HISTORY:  HTN (hypertension)  Prediabetes  Mild tricuspid regurgitation  Enlarged aorta  CAD (coronary artery disease)  Ascending aorta dilation  Pulmonary hypertension  Chronic systolic right heart failure  2019 novel coronavirus disease (COVID-19)  Asthma  S/P hysterectomy  S/P         Patient is a 68y old  Female who presents with a chief complaint of LUMBAR DRAIN. TYPE B AORTIC DISSECTION        14 system review was unremarkable    Vital signs, hemodynamic and respiratory parameters were reviewed from the bedside nursing flowsheet.  ICU Vital Signs Last 24 Hrs  T(C): 36.9 (2024 12:22), Max: 37.6 (2024 21:00)  T(F): 98.5 (2024 12:22), Max: 99.7 (2024 21:00)  HR: 88 (2024 19:00) (76 - 89)  BP: --  BP(mean): --  ABP: 163/81 (2024 19:00) (154/82 - 171/86)  ABP(mean): 119 (2024 19:00) (112 - 125)  RR: 16 (2024 19:00) (14 - 18)  SpO2: 100% (2024 19:00) (97% - 100%)    O2 Parameters below as of 2024 20:00  Patient On (Oxygen Delivery Method): nasal cannula, high flow  O2 Flow (L/min): 50  O2 Concentration (%): 60      Adult Advanced Hemodynamics Last 24 Hrs  CVP(mm Hg): --  CVP(cm H2O): --  CO: 7.1 (2024 18:00) (5.7 - 7.9)  CI: 3.8 (2024 18:00) (3 - 4.2)  PA: --  PA(mean): --  PCWP: --  SVR: 1159 (2024 18:00) (1124 - 1444)  SVRI: 2168 (2024 18:00) (2101 - 2606)  PVR: --  PVRI: --, ABG - ( 2024 18:25 )  pH, Arterial: 7.46  pH, Blood: x     /  pCO2: 36    /  pO2: 69    / HCO3: 26    / Base Excess: 2.0   /  SaO2: 96.7                Intake and output was reviewed and the fluid balance was calculated  Daily     Daily   I&O's Summary    2024 07:01  -  2024 07:00  --------------------------------------------------------  IN: 3886.6 mL / OUT: 6170 mL / NET: -2283.4 mL    2024 07:01  -  2024 19:39  --------------------------------------------------------  IN: 1852.6 mL / OUT: 3550 mL / NET: -1697.4 mL        All lines and drain sites were assessed  Glycemic trend was reviewedCAPILLARY BLOOD GLUCOSE      POCT Blood Glucose.: 172 mg/dL (2024 17:40)    Neuro: well appearing female in nad, 5/5 strength throughout bl upper and lower extremities  HEENT: mmm  Heart: s1 s2  Lungs: clear bl  Abdomen: soft nt nd  Extremities: wwp    Lines:  RIJ Cordis with [place]   RIJ TLC   R axillary arterial line     Tubes:  LD       labs  CBC Full  -  ( 2024 18:24 )  WBC Count : 13.47 K/uL  RBC Count : 3.81 M/uL  Hemoglobin : 10.2 g/dL  Hematocrit : 28.4 %  Platelet Count - Automated : 121 K/uL  Mean Cell Volume : 74.5 fl  Mean Cell Hemoglobin : 26.8 pg  Mean Cell Hemoglobin Concentration : 35.9 gm/dL  Auto Neutrophil # : 11.15 K/uL  Auto Lymphocyte # : 0.82 K/uL  Auto Monocyte # : 1.36 K/uL  Auto Eosinophil # : 0.00 K/uL  Auto Basophil # : 0.02 K/uL  Auto Neutrophil % : 82.8 %  Auto Lymphocyte % : 6.1 %  Auto Monocyte % : 10.1 %  Auto Eosinophil % : 0.0 %  Auto Basophil % : 0.1 %    30    142  |  103  |  13  ----------------------------<  199<H>  3.6   |  24  |  0.72    Ca    9.9      2024 18:24  Phos  2.4     04-30  Mg     2.0     04-30    TPro  7.3  /  Alb  4.5  /  TBili  1.6<H>  /  DBili  x   /  AST  37  /  ALT  49<H>  /  AlkPhos  82  04-30    PT/INR - ( 2024 18:24 )   PT: 13.0 sec;   INR: 1.14          PTT - ( 2024 18:24 )  PTT:27.2 sec  The current medications were reviewed   MEDICATIONS  (STANDING):  albuterol    90 MICROgram(s) HFA Inhaler 2 Puff(s) Inhalation every 6 hours  albuterol/ipratropium for Nebulization 3 milliLiter(s) Nebulizer every 6 hours  ascorbic acid 500 milliGRAM(s) Oral two times a day  atorvastatin 20 milliGRAM(s) Oral at bedtime  ceFAZolin   IVPB      ceFAZolin   IVPB 2000 milliGRAM(s) IV Intermittent every 8 hours  chlorhexidine 2% Cloths 1 Application(s) Topical daily  dexAMETHasone  Injectable 4 milliGRAM(s) IV Push every 6 hours  dextrose 10% Bolus 125 milliLiter(s) IV Bolus once  dextrose 5%. 1000 milliLiter(s) (100 mL/Hr) IV Continuous <Continuous>  dextrose 5%. 1000 milliLiter(s) (50 mL/Hr) IV Continuous <Continuous>  dextrose 50% Injectable 12.5 Gram(s) IV Push once  dextrose 50% Injectable 25 Gram(s) IV Push once  furosemide   Injectable 20 milliGRAM(s) IV Push every 12 hours  gabapentin 100 milliGRAM(s) Oral every 8 hours  glucagon  Injectable 1 milliGRAM(s) IntraMuscular once  influenza  Vaccine (HIGH DOSE) 0.7 milliLiter(s) IntraMuscular once  insulin lispro (ADMELOG) corrective regimen sliding scale   SubCutaneous Before meals and at bedtime  midodrine 10 milliGRAM(s) Oral every 8 hours  milrinone Infusion 0.375 MICROgram(s)/kG/Min (9.18 mL/Hr) IV Continuous <Continuous>  modafinil 100 milliGRAM(s) Oral daily  mupirocin 2% Ointment 1 Application(s) Both Nostrils two times a day  pantoprazole    Tablet 40 milliGRAM(s) Oral daily  phenylephrine    Infusion 0.399 MICROgram(s)/kG/Min (12.2 mL/Hr) IV Continuous <Continuous>  polyethylene glycol 3350 17 Gram(s) Oral daily  riociguat 2.5 milliGRAM(s) Oral every 8 hours  senna 2 Tablet(s) Oral at bedtime  sodium chloride 0.9%. 1000 milliLiter(s) (10 mL/Hr) IV Continuous <Continuous>  vasopressin Infusion 0.02 Unit(s)/Min (3 mL/Hr) IV Continuous <Continuous>    MEDICATIONS  (PRN):  acetaminophen     Tablet .. 650 milliGRAM(s) Oral every 6 hours PRN Mild Pain (1 - 3)  dextrose Oral Gel 15 Gram(s) Oral once PRN Blood Glucose LESS THAN 70 milliGRAM(s)/deciliter      Assessment/Plan:  68yr old female with PMH asthma, CTEPH, CAD, HTN, Type B Dissection admitted for TEVAR.    POD4 TEVAR ([facility], )  Continue LD per protocol, clamp trial and dc tmrw  Continue with MAP goals >120, with vasopressin and phenylephrine prn  Midodrine started  Serial neuro checks, q1h  CTEPH mgmt  Appreciate pulmonary consult  Continue Boo  Continue Riociguat  Continue primacor  Serial CI/CO  Trend end organ perfusion markers  Diet as tolerated  Replete lytes prn  Monitor CT output  GI/DVT PPX  Bowel Regimen  Pain control  OOB with PT  Close hemodynamic, ventilatory and drain monitoring and management per post op routine  Monitor for arrhythmias and monitor parameters for organ perfusion  Beta blockade not administered due to hemodynamic instability and bradycardia  Monitor neurologic status  Head of the bed should remain elevated to 45 deg   Chest PT and IS will be encouraged  Monitor adequacy of oxygenation and ventilation and attempt to wean oxygen  Antibiotic regimen will be tailored to the clinical, laboratory and microbiologic data  Nutritional goals will be met using po eventually, ensure adequate caloric intake and monitor the same  Stress ulcer and VTE prophylaxis will be achieved    Glycemic control is satisfactory  Electrolytes have been repleted as necessary and wound care has been carried out   Pain control has been achieved.   Aggressive physical therapy and early mobility and ambulation goals will be met   The family was updated about the course and plan.    CRITICAL CARE TIME personally provided by me  in evaluation and management, reassessments, review and interpretation of labs and x-rays, ventilator and hemodynamic management, formulating a plan and coordinating care: ___60____ MIN.  Time does not include procedural time.        CTICU ATTENDING     					  Cherelle Redmond MD

## 2024-04-30 NOTE — DIETITIAN INITIAL EVALUATION ADULT - PERTINENT MEDS FT
MEDICATIONS  (STANDING):  albuterol    90 MICROgram(s) HFA Inhaler 2 Puff(s) Inhalation every 6 hours  albuterol/ipratropium for Nebulization 3 milliLiter(s) Nebulizer every 6 hours  ascorbic acid 500 milliGRAM(s) Oral two times a day  atorvastatin 20 milliGRAM(s) Oral at bedtime  ceFAZolin   IVPB      ceFAZolin   IVPB 2000 milliGRAM(s) IV Intermittent every 8 hours  chlorhexidine 2% Cloths 1 Application(s) Topical daily  dexAMETHasone  Injectable 4 milliGRAM(s) IV Push every 6 hours  dextrose 10% Bolus 125 milliLiter(s) IV Bolus once  dextrose 5%. 1000 milliLiter(s) (50 mL/Hr) IV Continuous <Continuous>  dextrose 5%. 1000 milliLiter(s) (100 mL/Hr) IV Continuous <Continuous>  dextrose 50% Injectable 12.5 Gram(s) IV Push once  dextrose 50% Injectable 25 Gram(s) IV Push once  furosemide   Injectable 20 milliGRAM(s) IV Push every 12 hours  gabapentin 100 milliGRAM(s) Oral every 8 hours  glucagon  Injectable 1 milliGRAM(s) IntraMuscular once  influenza  Vaccine (HIGH DOSE) 0.7 milliLiter(s) IntraMuscular once  insulin lispro (ADMELOG) corrective regimen sliding scale   SubCutaneous Before meals and at bedtime  midodrine 10 milliGRAM(s) Oral every 8 hours  milrinone Infusion 0.375 MICROgram(s)/kG/Min (9.18 mL/Hr) IV Continuous <Continuous>  modafinil 100 milliGRAM(s) Oral daily  mupirocin 2% Ointment 1 Application(s) Both Nostrils two times a day  pantoprazole    Tablet 40 milliGRAM(s) Oral daily  phenylephrine    Infusion 0.399 MICROgram(s)/kG/Min (12.2 mL/Hr) IV Continuous <Continuous>  polyethylene glycol 3350 17 Gram(s) Oral daily  riociguat 2.5 milliGRAM(s) Oral every 8 hours  senna 2 Tablet(s) Oral at bedtime  sodium chloride 0.9%. 1000 milliLiter(s) (10 mL/Hr) IV Continuous <Continuous>  sodium chloride 3%. 500 milliLiter(s) (30 mL/Hr) IV Continuous <Continuous>  vasopressin Infusion 0.02 Unit(s)/Min (3 mL/Hr) IV Continuous <Continuous>    MEDICATIONS  (PRN):  acetaminophen     Tablet .. 650 milliGRAM(s) Oral every 6 hours PRN Mild Pain (1 - 3)  dextrose Oral Gel 15 Gram(s) Oral once PRN Blood Glucose LESS THAN 70 milliGRAM(s)/deciliter

## 2024-04-30 NOTE — DIETITIAN INITIAL EVALUATION ADULT - ADD RECOMMEND
1. Consider liberalizing diet to Low Sodium, Consistent Carbohydrate to promote PO intake (Low Na > DASH/TLC). Add Ensure Max Protein 2x/day (150 kcal, 30g protein per serving)   2. Encourage and monitor PO intake, honor preferences as able   3. Monitor labs, wt trends, GI function, and skin integrity   4. Pain and bowel regimen per team   5. RD to remain available for additional nutrition interventions and diet edu as needed

## 2024-04-30 NOTE — DIETITIAN INITIAL EVALUATION ADULT - NS FNS DIET ORDER
Diet, DASH/TLC:   Sodium & Cholesterol Restricted  Consistent Carbohydrate {No Snacks} (CSTCHO)  Supplement Feeding Modality:  Oral  Nepro Cans or Servings Per Day:  1       Frequency:  Three Times a day (04-30-24 @ 13:58)

## 2024-04-30 NOTE — DIETITIAN INITIAL EVALUATION ADULT - OTHER INFO
Progress Note - Behavioral Health   Darlin Phillips 55 y o  male MRN: [id number]  Unit/Bed#: U 224-02 Encounter: [id number]    Assessment/Plan   Principal Problem:    Schizoaffective disorder ([address] )  Active Problems:    Hypothyroidism    HTN (hypertension)    Hypertriglyceridemia    Gastroesophageal reflux disease    Type 2 diabetes mellitus without complication, without long-term current use of insulin (HCC)    Anemia    Thrombocytopenia (HCC)    Medical clearance for psychiatric admission      Behavior over the last 24 hours:  unchanged  Sleep: normal  Appetite: normal  Medication side effects: No  ROS: no complaints and all other systems are negative for acute change    Terere was seen for psychiatric follow-up  Patient was dismissive and unwilling to participate in interview  He laid in bed with sheet covering entire body  Responses were minimal   He denied any psychiatric complaints including anxiety/depression/AVH/SI/HI  He verbalized no delusional content, nor did he appear internally preoccupied  Reports he is sleeping well and remains compliant with medications and meals  According to nursing staff, Ga Paz has been isolative and withdrawn to room  Hygiene has been poor; patient encouraged to shower and complete ADLs to which he replied I showered yesterday      Mental Status Evaluation:  Appearance:  Laying under sheet in bed   Behavior:  uncooperative and Dismissive, uninterested   Speech:  soft and Scant   Mood:  decreased range   Affect:  Unable to assess, patient laying under sheet   Thought Process:  Poverty of thought   Thought Content:  No overt delusions or paranoia   Perceptual Disturbances: Denies AVH, does not appear internally preoccupied   Risk Potential: Suicidal Ideations none  Homicidal Ideations none  Potential for Aggression No   Sensorium:  person and place   Memory:  patient does not answer   Consciousness:  alert and awake    Attention: attention span appeared shorter than expected for age   Insight:  limited   Judgment: limited   Gait/Station: Laying in bed   Motor Activity: no abnormal movements     Progress Toward Goals:  No change  Mood remains controlled with no signs or symptoms of acute psychosis  Withdrawn to room  Has had no aggression/agitation/behaviors on unit  Will continue with current psychotropic regimen; patient tolerating well  Discharge disposition and planning remain ongoing  Recommended Treatment: Continue with group therapy, milieu therapy and occupational therapy  Risks, benefits and possible side effects of Medications:   Risks, benefits, and possible side effects of medications explained to patient and patient verbalizes understanding        Medications:   all current active meds have been reviewed, continue current psychiatric medications and current meds:   Current Facility-Administered Medications   Medication Dose Route Frequency    acetaminophen (TYLENOL) tablet 650 mg  650 mg Oral Q6H PRN    acetaminophen (TYLENOL) tablet 650 mg  650 mg Oral Q4H PRN    acetaminophen (TYLENOL) tablet 975 mg  975 mg Oral Q6H PRN    aluminum-magnesium hydroxide-simethicone (MYLANTA) oral suspension 30 mL  30 mL Oral Q4H PRN    atorvastatin (LIPITOR) tablet 80 mg  80 mg Oral QPM    haloperidol lactate (HALDOL) injection 2 5 mg  2 5 mg Intramuscular Q6H PRN Max 4/day    And    LORazepam (ATIVAN) injection 1 mg  1 mg Intramuscular Q6H PRN Max 4/day    And    benztropine (COGENTIN) injection 0 5 mg  0 5 mg Intramuscular Q6H PRN Max 4/day    haloperidol lactate (HALDOL) injection 5 mg  5 mg Intramuscular Q4H PRN Max 4/day    And    LORazepam (ATIVAN) injection 2 mg  2 mg Intramuscular Q4H PRN Max 4/day    And    benztropine (COGENTIN) injection 1 mg  1 mg Intramuscular Q4H PRN Max 4/day    glimepiride (AMARYL) tablet 1 mg  1 mg Oral Daily With Breakfast    haloperidol (HALDOL) tablet 2 mg  2 mg Oral Q4H PRN Max 6/day    haloperidol (HALDOL) tablet 5 mg 5 mg Oral Q6H PRN Max 4/day    haloperidol (HALDOL) tablet 5 mg  5 mg Oral Q4H PRN Max 4/day    hydrOXYzine HCL (ATARAX) tablet 25 mg  25 mg Oral Q6H PRN Max 4/day    hydrOXYzine HCL (ATARAX) tablet 50 mg  50 mg Oral Q4H PRN Max 4/day    Or    LORazepam (ATIVAN) injection 1 mg  1 mg Intramuscular Q4H PRN    levothyroxine tablet 75 mcg  75 mcg Oral Daily    lidocaine (LIDODERM) 5 % patch 1 patch  1 patch Topical Daily PRN    lithium carbonate (LITHOBID) CR tablet 300 mg  300 mg Oral HS    loratadine (CLARITIN) tablet 10 mg  10 mg Oral Daily    LORazepam (ATIVAN) tablet 1 mg  1 mg Oral Q4H PRN Max 6/day    Or    LORazepam (ATIVAN) injection 2 mg  2 mg Intramuscular Q6H PRN Max 3/day    nicotine (NICODERM CQ) 14 mg/24hr TD 24 hr patch 1 patch  1 patch Transdermal Daily    nicotine polacrilex (NICORETTE) gum 2 mg  2 mg Oral Q2H PRN    pantoprazole (PROTONIX) EC tablet 40 mg  40 mg Oral BID    phenol (CHLORASEPTIC) 1 4 % mucosal liquid 2 spray  2 spray Mouth/Throat Q2H PRN    polyethylene glycol (MIRALAX) packet 17 g  17 g Oral Daily PRN    risperiDONE (RisperDAL M-TAB) disintegrating tablet 1 mg  1 mg Oral BID    traZODone (DESYREL) tablet 50 mg  50 mg Oral HS PRN     Labs: I have personally reviewed all pertinent laboratory/tests results  CMP:   Lab Results   Component Value Date    SODIUM 139 12/28/2021    K 4 0 12/28/2021     12/28/2021    CO2 25 12/28/2021    AGAP 7 12/28/2021    BUN 15 12/28/2021    CREATININE 0 76 12/28/2021    GLUC 106 12/28/2021    GLUF 148 (H) 12/06/2018    CALCIUM 8 8 12/28/2021    AST 18 12/28/2021    ALT 22 12/28/2021    ALKPHOS 78 4 12/28/2021    TP 6 7 12/28/2021    ALB 3 8 12/28/2021    TBILI 0 17 (L) 12/28/2021    EGFR 110 12/28/2021     Lithium:   Lab Results   Component Value Date    LITHIUM 0 4 (L) 01/10/2022     Counseling / Coordination of Care  Total floor / unit time spent today 25 minutes   Greater than 50% of total time was spent with the patient and / or family counseling and / or coordination of care  68 year old female with past medical history of asthma, CTEPH, CAD, HTN, Type B dissection who was seen by the outpatient office and was deemed a good candidate for TEVAR. Back in August 2023 she was seen by her outpatient pulmonologist, Dr. Cathy Serra, was noted to have an elevated D-Dimer and was advised to present to ED for workup. In ED a CTA C/A/P was performed which revealed a Type B dissection and she was admitted and discharged with close follow up. 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 She was deemed a candidate for TEVAR with Dr. Kwon and Dr. Suh and presented to [facility] for preop lumbar drain prior to OR.    Patient seen for nutrition assessment. Received lying in bed on HFNC. On milrinone, phenylephrine, and vasopressin gtt. Labs and medications reviewed. Electrolytes WDL, bilirubin 1.7<H>, ALT 47<H>, glucose 118-223 x 24 hours, HgbA1c 5.9%. Ordered for abx, NS, insulin sliding scale, furosemide, vitamin C, protonix. No pressure injuries or edema documented, surgical incision to L groin and R wrist. Noted with constipation, last BM PTA- on bowel regimen. Current diet order: DASH/TLC, CSTCHO. Confirms NKFA. No difficulty chewing/swallowing reported. Reports poor appetite, observed untouched lunch tray at bedside. Pt states she only had applesauce. Endorses normal appetite and intake at baseline. Dosing weight: 180 pounds, BMI 30.9, reports UBW of 175-178 pounds and denies recent weight loss. Observed with no overt signs and symptoms of muscle or fat wasting. Based on ASPEN guidelines, pt does not meet criteria for malnutrition. Nutrition education provided. Encouraged continued PO intake as tolerated and discussed importance of adequate intake with emphasis on protein for post-op healing. Pt amenable to oral nutrition supplement, educated pt on drinking supplement between meals to optimize PO intake of food at meal times. Pt expressed appreciation and understanding. See nutrition recommendations below.

## 2024-04-30 NOTE — PROGRESS NOTE ADULT - ASSESSMENT
********************PENDING UPDATED PLAN****************************        67 y/o female PMHx asthma, CTEPH, CAD, HTN, Type B dissection s/p TEVAR 4/26 w/o complications. Stroke consulted for new bilateral LE plegia 4/29 AM c/f spinal cord ischemia. CT angio CAP 4/28 showed interval TEVAR of Type B aortic dissection with stable aneurysmal dilatation of false lumen below distal graft attachment site which is perfused. 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 Multifocal splenic infarcts and multiple small renal infarcts bilaterally, likely embolic etiology. S/p lumbar drain w/ NSGY 4/29. Patient with improvement in her LE weakness with sensation remaining intact. CTH/CT T and L spine negative. MRI L-spine neg for cauda equina compression or nerve root impingement. Pending MRI T-Spine?     Plan:  - MRI L-spine: No fractures. Multi-level lumbar spondylosis and degenerative disc dz. No evidence for Cauda Equina compression or nerve root impingement.   - Pending MRI T-spine?  - keep map > 110  - a1c: 5.9; LDL: 57; TSH: 2.090  - stroke neuro checks q1h  - stroke education  - stroke to follow    Discussed with Dr. 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 Stroke consulted for new bilateral LE plegia 4/29 AM c/f spinal cord ischemia. CT angio CAP 4/28 showed interval TEVAR of Type B aortic dissection with stable aneurysmal dilatation of false lumen below distal graft attachment site which is perfused. 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 Multifocal splenic infarcts and multiple small renal infarcts bilaterally, likely embolic etiology. S/p lumbar drain w/ NSGY 4/29. Patient with improvement in her LE weakness with sensation remaining intact. CTH/CT T and L spine negative. MRI L-spine neg for cauda equina compression or nerve root impingement. MRI T-Spine not performed due to titanium hardware and risk of streak artifact that will be seen on imaging. Goal is to lower lumbar drainage today to 5ccs/hour to then remove/clamp tomorrow, 5/1.    Plan:  - Please lower lumbar drainage to 5ccs per hour today with goal to clamp/remove tomorrow, 5/1.  - MRI L-spine: No fractures. Multi-level lumbar spondylosis and degenerative disc dz. No evidence for Cauda Equina compression or nerve root impingement.   - MRI T-Spine not performed due to titanium hardware and may cause for there to be streak artifact on imaging.   - keep map > 110  - a1c: 5.9; LDL: 57; TSH: 2.090  - stroke neuro checks q1h  - stroke education  - stroke to follow    Discussed with Dr. Troncoso

## 2024-05-01 LAB
ALBUMIN SERPL ELPH-MCNC: 4.4 G/DL — SIGNIFICANT CHANGE UP (ref 3.3–5)
ALBUMIN SERPL ELPH-MCNC: 4.6 G/DL — SIGNIFICANT CHANGE UP (ref 3.3–5)
ALBUMIN SERPL ELPH-MCNC: 4.7 G/DL — SIGNIFICANT CHANGE UP (ref 3.3–5)
ALP SERPL-CCNC: 78 U/L — SIGNIFICANT CHANGE UP (ref 40–120)
ALP SERPL-CCNC: 91 U/L — SIGNIFICANT CHANGE UP (ref 40–120)
ALP SERPL-CCNC: 93 U/L — SIGNIFICANT CHANGE UP (ref 40–120)
ALT FLD-CCNC: 102 U/L — HIGH (ref 10–45)
ALT FLD-CCNC: 41 U/L — SIGNIFICANT CHANGE UP (ref 10–45)
ALT FLD-CCNC: 83 U/L — HIGH (ref 10–45)
ANION GAP SERPL CALC-SCNC: 13 MMOL/L — SIGNIFICANT CHANGE UP (ref 5–17)
ANION GAP SERPL CALC-SCNC: 14 MMOL/L — SIGNIFICANT CHANGE UP (ref 5–17)
ANION GAP SERPL CALC-SCNC: 15 MMOL/L — SIGNIFICANT CHANGE UP (ref 5–17)
APTT BLD: 25.1 SEC — SIGNIFICANT CHANGE UP (ref 24.5–35.6)
APTT BLD: 27.1 SEC — SIGNIFICANT CHANGE UP (ref 24.5–35.6)
APTT BLD: 27.7 SEC — SIGNIFICANT CHANGE UP (ref 24.5–35.6)
AST SERPL-CCNC: 33 U/L — SIGNIFICANT CHANGE UP (ref 10–40)
AST SERPL-CCNC: 85 U/L — HIGH (ref 10–40)
AST SERPL-CCNC: 97 U/L — HIGH (ref 10–40)
BASE EXCESS BLDA CALC-SCNC: 0.5 MMOL/L — SIGNIFICANT CHANGE UP (ref -2–3)
BASE EXCESS BLDV CALC-SCNC: 0.1 MMOL/L — SIGNIFICANT CHANGE UP (ref -2–3)
BASE EXCESS BLDV CALC-SCNC: 0.5 MMOL/L — SIGNIFICANT CHANGE UP (ref -2–3)
BASE EXCESS BLDV CALC-SCNC: 1 MMOL/L — SIGNIFICANT CHANGE UP (ref -2–3)
BASOPHILS # BLD AUTO: 0.02 K/UL — SIGNIFICANT CHANGE UP (ref 0–0.2)
BASOPHILS # BLD AUTO: 0.02 K/UL — SIGNIFICANT CHANGE UP (ref 0–0.2)
BASOPHILS # BLD AUTO: 0.03 K/UL — SIGNIFICANT CHANGE UP (ref 0–0.2)
BASOPHILS NFR BLD AUTO: 0.2 % — SIGNIFICANT CHANGE UP (ref 0–2)
BILIRUB SERPL-MCNC: 1.4 MG/DL — HIGH (ref 0.2–1.2)
BILIRUB SERPL-MCNC: 1.5 MG/DL — HIGH (ref 0.2–1.2)
BILIRUB SERPL-MCNC: 1.7 MG/DL — HIGH (ref 0.2–1.2)
BUN SERPL-MCNC: 15 MG/DL — SIGNIFICANT CHANGE UP (ref 7–23)
BUN SERPL-MCNC: 19 MG/DL — SIGNIFICANT CHANGE UP (ref 7–23)
BUN SERPL-MCNC: 26 MG/DL — HIGH (ref 7–23)
CALCIUM SERPL-MCNC: 10.1 MG/DL — SIGNIFICANT CHANGE UP (ref 8.4–10.5)
CALCIUM SERPL-MCNC: 10.2 MG/DL — SIGNIFICANT CHANGE UP (ref 8.4–10.5)
CALCIUM SERPL-MCNC: 10.3 MG/DL — SIGNIFICANT CHANGE UP (ref 8.4–10.5)
CHLORIDE SERPL-SCNC: 102 MMOL/L — SIGNIFICANT CHANGE UP (ref 96–108)
CHLORIDE SERPL-SCNC: 103 MMOL/L — SIGNIFICANT CHANGE UP (ref 96–108)
CHLORIDE SERPL-SCNC: 104 MMOL/L — SIGNIFICANT CHANGE UP (ref 96–108)
CO2 BLDA-SCNC: 25 MMOL/L — HIGH (ref 19–24)
CO2 BLDV-SCNC: 25.7 MMOL/L — SIGNIFICANT CHANGE UP (ref 22–26)
CO2 BLDV-SCNC: 25.9 MMOL/L — SIGNIFICANT CHANGE UP (ref 22–26)
CO2 BLDV-SCNC: 26.4 MMOL/L — HIGH (ref 22–26)
CO2 SERPL-SCNC: 22 MMOL/L — SIGNIFICANT CHANGE UP (ref 22–31)
CO2 SERPL-SCNC: 23 MMOL/L — SIGNIFICANT CHANGE UP (ref 22–31)
CO2 SERPL-SCNC: 25 MMOL/L — SIGNIFICANT CHANGE UP (ref 22–31)
COHGB MFR BLDA: 1.5 % — SIGNIFICANT CHANGE UP
CREAT SERPL-MCNC: 0.7 MG/DL — SIGNIFICANT CHANGE UP (ref 0.5–1.3)
CREAT SERPL-MCNC: 0.72 MG/DL — SIGNIFICANT CHANGE UP (ref 0.5–1.3)
CREAT SERPL-MCNC: 0.73 MG/DL — SIGNIFICANT CHANGE UP (ref 0.5–1.3)
EGFR: 90 ML/MIN/1.73M2 — SIGNIFICANT CHANGE UP
EGFR: 91 ML/MIN/1.73M2 — SIGNIFICANT CHANGE UP
EGFR: 94 ML/MIN/1.73M2 — SIGNIFICANT CHANGE UP
EOSINOPHIL # BLD AUTO: 0 K/UL — SIGNIFICANT CHANGE UP (ref 0–0.5)
EOSINOPHIL NFR BLD AUTO: 0 % — SIGNIFICANT CHANGE UP (ref 0–6)
GAS PNL BLDA: SIGNIFICANT CHANGE UP
GAS PNL BLDV: SIGNIFICANT CHANGE UP
GLUCOSE BLDC GLUCOMTR-MCNC: 174 MG/DL — HIGH (ref 70–99)
GLUCOSE BLDC GLUCOMTR-MCNC: 175 MG/DL — HIGH (ref 70–99)
GLUCOSE BLDC GLUCOMTR-MCNC: 184 MG/DL — HIGH (ref 70–99)
GLUCOSE BLDC GLUCOMTR-MCNC: 194 MG/DL — HIGH (ref 70–99)
GLUCOSE SERPL-MCNC: 168 MG/DL — HIGH (ref 70–99)
GLUCOSE SERPL-MCNC: 170 MG/DL — HIGH (ref 70–99)
GLUCOSE SERPL-MCNC: 186 MG/DL — HIGH (ref 70–99)
HCO3 BLDA-SCNC: 24 MMOL/L — SIGNIFICANT CHANGE UP (ref 21–28)
HCO3 BLDV-SCNC: 25 MMOL/L — SIGNIFICANT CHANGE UP (ref 22–29)
HCT VFR BLD CALC: 28.5 % — LOW (ref 34.5–45)
HCT VFR BLD CALC: 29.1 % — LOW (ref 34.5–45)
HCT VFR BLD CALC: 29.2 % — LOW (ref 34.5–45)
HGB BLD-MCNC: 10 G/DL — LOW (ref 11.5–15.5)
HGB BLD-MCNC: 10 G/DL — LOW (ref 11.5–15.5)
HGB BLD-MCNC: 9.8 G/DL — LOW (ref 11.5–15.5)
HGB BLDA-MCNC: 10.2 G/DL — LOW (ref 11.7–16.1)
IMM GRANULOCYTES NFR BLD AUTO: 1 % — HIGH (ref 0–0.9)
IMM GRANULOCYTES NFR BLD AUTO: 1.1 % — HIGH (ref 0–0.9)
IMM GRANULOCYTES NFR BLD AUTO: 1.4 % — HIGH (ref 0–0.9)
INR BLD: 1.08 — SIGNIFICANT CHANGE UP (ref 0.85–1.18)
INR BLD: 1.09 — SIGNIFICANT CHANGE UP (ref 0.85–1.18)
INR BLD: 1.13 — SIGNIFICANT CHANGE UP (ref 0.85–1.18)
LACTATE SERPL-SCNC: 0.8 MMOL/L — SIGNIFICANT CHANGE UP (ref 0.5–2)
LACTATE SERPL-SCNC: 0.9 MMOL/L — SIGNIFICANT CHANGE UP (ref 0.5–2)
LACTATE SERPL-SCNC: 1.3 MMOL/L — SIGNIFICANT CHANGE UP (ref 0.5–2)
LYMPHOCYTES # BLD AUTO: 0.67 K/UL — LOW (ref 1–3.3)
LYMPHOCYTES # BLD AUTO: 0.78 K/UL — LOW (ref 1–3.3)
LYMPHOCYTES # BLD AUTO: 0.83 K/UL — LOW (ref 1–3.3)
LYMPHOCYTES # BLD AUTO: 5.5 % — LOW (ref 13–44)
LYMPHOCYTES # BLD AUTO: 5.8 % — LOW (ref 13–44)
LYMPHOCYTES # BLD AUTO: 5.9 % — LOW (ref 13–44)
MAGNESIUM SERPL-MCNC: 2.1 MG/DL — SIGNIFICANT CHANGE UP (ref 1.6–2.6)
MCHC RBC-ENTMCNC: 26.2 PG — LOW (ref 27–34)
MCHC RBC-ENTMCNC: 26.4 PG — LOW (ref 27–34)
MCHC RBC-ENTMCNC: 26.4 PG — LOW (ref 27–34)
MCHC RBC-ENTMCNC: 34.2 GM/DL — SIGNIFICANT CHANGE UP (ref 32–36)
MCHC RBC-ENTMCNC: 34.4 GM/DL — SIGNIFICANT CHANGE UP (ref 32–36)
MCHC RBC-ENTMCNC: 34.4 GM/DL — SIGNIFICANT CHANGE UP (ref 32–36)
MCV RBC AUTO: 76.6 FL — LOW (ref 80–100)
MCV RBC AUTO: 76.8 FL — LOW (ref 80–100)
MCV RBC AUTO: 76.8 FL — LOW (ref 80–100)
METHGB MFR BLDA: 1.6 % — HIGH
MONOCYTES # BLD AUTO: 1.07 K/UL — HIGH (ref 0–0.9)
MONOCYTES # BLD AUTO: 1.34 K/UL — HIGH (ref 0–0.9)
MONOCYTES # BLD AUTO: 1.5 K/UL — HIGH (ref 0–0.9)
MONOCYTES NFR BLD AUTO: 11.3 % — SIGNIFICANT CHANGE UP (ref 2–14)
MONOCYTES NFR BLD AUTO: 8.8 % — SIGNIFICANT CHANGE UP (ref 2–14)
MONOCYTES NFR BLD AUTO: 9.4 % — SIGNIFICANT CHANGE UP (ref 2–14)
NEUTROPHILS # BLD AUTO: 10.27 K/UL — HIGH (ref 1.8–7.4)
NEUTROPHILS # BLD AUTO: 10.89 K/UL — HIGH (ref 1.8–7.4)
NEUTROPHILS # BLD AUTO: 11.86 K/UL — HIGH (ref 1.8–7.4)
NEUTROPHILS NFR BLD AUTO: 81.5 % — HIGH (ref 43–77)
NEUTROPHILS NFR BLD AUTO: 83.2 % — HIGH (ref 43–77)
NEUTROPHILS NFR BLD AUTO: 84.5 % — HIGH (ref 43–77)
NRBC # BLD: 0 /100 WBCS — SIGNIFICANT CHANGE UP (ref 0–0)
OXYHGB MFR BLDA: 90.6 % — SIGNIFICANT CHANGE UP (ref 90–95)
PCO2 BLDA: 35 MMHG — SIGNIFICANT CHANGE UP (ref 32–45)
PCO2 BLDV: 37 MMHG — LOW (ref 39–42)
PCO2 BLDV: 38 MMHG — LOW (ref 39–42)
PCO2 BLDV: 39 MMHG — SIGNIFICANT CHANGE UP (ref 39–42)
PH BLDA: 7.45 — SIGNIFICANT CHANGE UP (ref 7.35–7.45)
PH BLDV: 7.41 — SIGNIFICANT CHANGE UP (ref 7.32–7.43)
PH BLDV: 7.43 — SIGNIFICANT CHANGE UP (ref 7.32–7.43)
PH BLDV: 7.43 — SIGNIFICANT CHANGE UP (ref 7.32–7.43)
PHOSPHATE SERPL-MCNC: 2.4 MG/DL — LOW (ref 2.5–4.5)
PHOSPHATE SERPL-MCNC: 2.5 MG/DL — SIGNIFICANT CHANGE UP (ref 2.5–4.5)
PHOSPHATE SERPL-MCNC: 3.9 MG/DL — SIGNIFICANT CHANGE UP (ref 2.5–4.5)
PLATELET # BLD AUTO: 116 K/UL — LOW (ref 150–400)
PLATELET # BLD AUTO: 120 K/UL — LOW (ref 150–400)
PLATELET # BLD AUTO: 129 K/UL — LOW (ref 150–400)
PO2 BLDA: 60 MMHG — LOW (ref 83–108)
PO2 BLDV: 39 MMHG — SIGNIFICANT CHANGE UP (ref 25–45)
PO2 BLDV: 39 MMHG — SIGNIFICANT CHANGE UP (ref 25–45)
PO2 BLDV: 40 MMHG — SIGNIFICANT CHANGE UP (ref 25–45)
POTASSIUM SERPL-MCNC: 3.7 MMOL/L — SIGNIFICANT CHANGE UP (ref 3.5–5.3)
POTASSIUM SERPL-MCNC: 4 MMOL/L — SIGNIFICANT CHANGE UP (ref 3.5–5.3)
POTASSIUM SERPL-MCNC: 4.3 MMOL/L — SIGNIFICANT CHANGE UP (ref 3.5–5.3)
POTASSIUM SERPL-SCNC: 3.7 MMOL/L — SIGNIFICANT CHANGE UP (ref 3.5–5.3)
POTASSIUM SERPL-SCNC: 4 MMOL/L — SIGNIFICANT CHANGE UP (ref 3.5–5.3)
POTASSIUM SERPL-SCNC: 4.3 MMOL/L — SIGNIFICANT CHANGE UP (ref 3.5–5.3)
PROT SERPL-MCNC: 7.2 G/DL — SIGNIFICANT CHANGE UP (ref 6–8.3)
PROT SERPL-MCNC: 7.5 G/DL — SIGNIFICANT CHANGE UP (ref 6–8.3)
PROT SERPL-MCNC: 7.7 G/DL — SIGNIFICANT CHANGE UP (ref 6–8.3)
PROTHROM AB SERPL-ACNC: 12.3 SEC — SIGNIFICANT CHANGE UP (ref 9.5–13)
PROTHROM AB SERPL-ACNC: 12.4 SEC — SIGNIFICANT CHANGE UP (ref 9.5–13)
PROTHROM AB SERPL-ACNC: 12.8 SEC — SIGNIFICANT CHANGE UP (ref 9.5–13)
RBC # BLD: 3.71 M/UL — LOW (ref 3.8–5.2)
RBC # BLD: 3.79 M/UL — LOW (ref 3.8–5.2)
RBC # BLD: 3.81 M/UL — SIGNIFICANT CHANGE UP (ref 3.8–5.2)
RBC # FLD: 15.5 % — HIGH (ref 10.3–14.5)
RBC # FLD: 15.5 % — HIGH (ref 10.3–14.5)
RBC # FLD: 15.7 % — HIGH (ref 10.3–14.5)
SAO2 % BLDA: 93.5 % — LOW (ref 94–98)
SAO2 % BLDV: 64.5 % — LOW (ref 67–88)
SAO2 % BLDV: 67.4 % — SIGNIFICANT CHANGE UP (ref 67–88)
SAO2 % BLDV: 72.4 % — SIGNIFICANT CHANGE UP (ref 67–88)
SODIUM SERPL-SCNC: 139 MMOL/L — SIGNIFICANT CHANGE UP (ref 135–145)
SODIUM SERPL-SCNC: 141 MMOL/L — SIGNIFICANT CHANGE UP (ref 135–145)
SODIUM SERPL-SCNC: 141 MMOL/L — SIGNIFICANT CHANGE UP (ref 135–145)
WBC # BLD: 12.15 K/UL — HIGH (ref 3.8–10.5)
WBC # BLD: 13.33 K/UL — HIGH (ref 3.8–10.5)
WBC # BLD: 14.26 K/UL — HIGH (ref 3.8–10.5)
WBC # FLD AUTO: 12.15 K/UL — HIGH (ref 3.8–10.5)
WBC # FLD AUTO: 13.33 K/UL — HIGH (ref 3.8–10.5)
WBC # FLD AUTO: 14.26 K/UL — HIGH (ref 3.8–10.5)

## 2024-05-01 PROCEDURE — 99292 CRITICAL CARE ADDL 30 MIN: CPT

## 2024-05-01 PROCEDURE — 99291 CRITICAL CARE FIRST HOUR: CPT

## 2024-05-01 PROCEDURE — 71045 X-RAY EXAM CHEST 1 VIEW: CPT | Mod: 26

## 2024-05-01 RX ORDER — METOCLOPRAMIDE HCL 10 MG
10 TABLET ORAL ONCE
Refills: 0 | Status: COMPLETED | OUTPATIENT
Start: 2024-05-01 | End: 2024-05-01

## 2024-05-01 RX ORDER — MACITENTAN 10 MG/1
10 TABLET, FILM COATED ORAL
Refills: 0 | Status: DISCONTINUED | OUTPATIENT
Start: 2024-05-01 | End: 2024-05-24

## 2024-05-01 RX ORDER — POTASSIUM CHLORIDE 20 MEQ
40 PACKET (EA) ORAL ONCE
Refills: 0 | Status: COMPLETED | OUTPATIENT
Start: 2024-05-01 | End: 2024-05-01

## 2024-05-01 RX ADMIN — Medication 10 MILLIGRAM(S): at 06:10

## 2024-05-01 RX ADMIN — Medication 100 MILLIGRAM(S): at 22:13

## 2024-05-01 RX ADMIN — PHENYLEPHRINE HYDROCHLORIDE 12.2 MICROGRAM(S)/KG/MIN: 10 INJECTION INTRAVENOUS at 04:19

## 2024-05-01 RX ADMIN — RIOCIGUAT 2.5 MILLIGRAM(S): 1.5 TABLET, FILM COATED ORAL at 14:37

## 2024-05-01 RX ADMIN — MIDODRINE HYDROCHLORIDE 10 MILLIGRAM(S): 2.5 TABLET ORAL at 06:13

## 2024-05-01 RX ADMIN — MUPIROCIN 1 APPLICATION(S): 20 OINTMENT TOPICAL at 06:11

## 2024-05-01 RX ADMIN — MILRINONE LACTATE 9.18 MICROGRAM(S)/KG/MIN: 1 INJECTION, SOLUTION INTRAVENOUS at 05:03

## 2024-05-01 RX ADMIN — Medication 20 MILLIGRAM(S): at 17:47

## 2024-05-01 RX ADMIN — MACITENTAN 10 MILLIGRAM(S): 10 TABLET, FILM COATED ORAL at 12:18

## 2024-05-01 RX ADMIN — Medication 40 MILLIEQUIVALENT(S): at 17:49

## 2024-05-01 RX ADMIN — Medication 2: at 22:24

## 2024-05-01 RX ADMIN — ATORVASTATIN CALCIUM 20 MILLIGRAM(S): 80 TABLET, FILM COATED ORAL at 22:13

## 2024-05-01 RX ADMIN — Medication 20 MILLIGRAM(S): at 06:10

## 2024-05-01 RX ADMIN — CHLORHEXIDINE GLUCONATE 1 APPLICATION(S): 213 SOLUTION TOPICAL at 06:11

## 2024-05-01 RX ADMIN — Medication 4 MILLIGRAM(S): at 06:10

## 2024-05-01 RX ADMIN — RIOCIGUAT 2.5 MILLIGRAM(S): 1.5 TABLET, FILM COATED ORAL at 22:14

## 2024-05-01 RX ADMIN — VASOPRESSIN 3 UNIT(S)/MIN: 20 INJECTION INTRAVENOUS at 17:48

## 2024-05-01 RX ADMIN — RIOCIGUAT 2.5 MILLIGRAM(S): 1.5 TABLET, FILM COATED ORAL at 06:10

## 2024-05-01 RX ADMIN — PANTOPRAZOLE SODIUM 40 MILLIGRAM(S): 20 TABLET, DELAYED RELEASE ORAL at 12:18

## 2024-05-01 RX ADMIN — Medication 100 MILLIGRAM(S): at 14:37

## 2024-05-01 RX ADMIN — Medication 3 MILLILITER(S): at 12:18

## 2024-05-01 RX ADMIN — MODAFINIL 100 MILLIGRAM(S): 200 TABLET ORAL at 12:18

## 2024-05-01 RX ADMIN — MIDODRINE HYDROCHLORIDE 10 MILLIGRAM(S): 2.5 TABLET ORAL at 14:36

## 2024-05-01 RX ADMIN — Medication 2: at 16:43

## 2024-05-01 RX ADMIN — Medication 500 MILLIGRAM(S): at 06:10

## 2024-05-01 RX ADMIN — MILRINONE LACTATE 9.18 MICROGRAM(S)/KG/MIN: 1 INJECTION, SOLUTION INTRAVENOUS at 17:48

## 2024-05-01 RX ADMIN — Medication 3 MILLILITER(S): at 17:47

## 2024-05-01 RX ADMIN — VASOPRESSIN 3 UNIT(S)/MIN: 20 INJECTION INTRAVENOUS at 04:20

## 2024-05-01 RX ADMIN — Medication 4 MILLIGRAM(S): at 12:19

## 2024-05-01 RX ADMIN — Medication 2: at 12:25

## 2024-05-01 RX ADMIN — Medication 2: at 07:56

## 2024-05-01 RX ADMIN — MIDODRINE HYDROCHLORIDE 10 MILLIGRAM(S): 2.5 TABLET ORAL at 22:13

## 2024-05-01 RX ADMIN — Medication 3 MILLILITER(S): at 06:10

## 2024-05-01 RX ADMIN — Medication 100 MILLIGRAM(S): at 06:09

## 2024-05-01 RX ADMIN — POLYETHYLENE GLYCOL 3350 17 GRAM(S): 17 POWDER, FOR SOLUTION ORAL at 12:18

## 2024-05-01 RX ADMIN — Medication 4 MILLIGRAM(S): at 17:47

## 2024-05-01 RX ADMIN — GABAPENTIN 100 MILLIGRAM(S): 400 CAPSULE ORAL at 06:10

## 2024-05-01 NOTE — PROGRESS NOTE ADULT - ASSESSMENT
*****************PENDING UPDATED PLAN************************    69 y/o female PMHx asthma, CTEPH, CAD, HTN, Type B dissection s/p TEVAR 4/26 w/o complications. Stroke consulted for new bilateral LE plegia 4/29 AM c/f spinal cord ischemia. CT angio CAP 4/28 showed interval TEVAR of Type B aortic dissection with stable aneurysmal dilatation of false lumen below distal graft attachment site which is perfused. 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 Multifocal splenic infarcts and multiple small renal infarcts bilaterally, likely embolic etiology. S/p lumbar drain w/ NSGY 4/29. Patient with improvement in her LE weakness with sensation remaining intact. CTH/CT T and L spine negative. MRI L-spine neg for cauda equina compression or nerve root impingement. MRI T-Spine not performed due to titanium hardware and risk of streak artifact that will be seen on imaging. Goal was to lower lumbar drainage today to 5ccs/hour to then remove/clamp tomorrow, 5/1. Of note, overnight lumbar drainage was 10ccs/hour.     Plan:  - Please lower lumbar drainage to 5ccs per hour today with goal to clamp/remove.   - MRI L-spine: No fractures. Multi-level lumbar spondylosis and degenerative disc dz. No evidence for Cauda Equina compression or nerve root impingement.   - MRI T-Spine not performed due to titanium hardware and may cause for there to be streak artifact on imaging.   - keep map > 110  - a1c: 5.9; LDL: 57; TSH: 2.090  - stroke neuro checks q1h  - stroke education  - stroke to follow    Discussed with Neurology Attending, Dr. 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 Stroke consulted for new bilateral LE plegia 4/29 AM c/f spinal cord ischemia. CT angio CAP 4/28 showed interval TEVAR of Type B aortic dissection with stable aneurysmal dilatation of false lumen below distal graft attachment site which is perfused. 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 Multifocal splenic infarcts and multiple small renal infarcts bilaterally, likely embolic etiology. S/p lumbar drain w/ NSGY 4/29. Patient with improvement in her LE weakness with sensation remaining intact. CTH/CT T and L spine negative. MRI L-spine neg for cauda equina compression or nerve root impingement. MRI T-Spine not performed due to titanium hardware and risk of streak artifact that will be seen on imaging. Goal was to lower lumbar drainage today to 5ccs/hour to then remove/clamp tomorrow, 5/1. Of note, overnight lumbar drainage was 10ccs/hour.     Plan:  - Please lower lumbar drainage to 5ccs per hour today with goal to clamp/remove by end of day.   - MRI L-spine: No fractures. Multi-level lumbar spondylosis and degenerative disc dz. No evidence for Cauda Equina compression or nerve root impingement.   - MRI T-Spine not performed due to titanium hardware and may cause for there to be streak artifact on imaging.   - keep map > 110  - a1c: 5.9; LDL: 57; TSH: 2.090  - stroke neuro checks q1h  - stroke education  - stroke to follow    Discussed with Neurology Fellow, Dr. 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 Stroke consulted for new bilateral LE plegia 4/29 AM c/f spinal cord ischemia. CT angio CAP 4/28 showed interval TEVAR of Type B aortic dissection with stable aneurysmal dilatation of false lumen below distal graft attachment site which is perfused. 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 Multifocal splenic infarcts and multiple small renal infarcts bilaterally, likely embolic etiology. S/p lumbar drain w/ NSGY 4/29. Patient with improvement in her LE weakness with sensation remaining intact. CTH/CT T and L spine negative. MRI L-spine neg for cauda equina compression or nerve root impingement. MRI T-Spine not performed due to titanium hardware and risk of streak artifact that will be seen on imaging. Goal was to lower lumbar drainage today to 5ccs/hour to then remove/clamp tomorrow, 5/1. Of note, overnight lumbar drainage was 10ccs/hour. NIHSS on 5/1 is 0.    Plan:  - Please lower lumbar drainage to 5ccs per hour today with goal to clamp/remove by end of day.   - OK to start Anti-coagulation for treatment of DVT   - Neuro event is likely due to spinal cord infarct   - Please call Stroke Team for NIHSS at day of discharge.   - MRI L-spine: No fractures. Multi-level lumbar spondylosis and degenerative disc dz. No evidence for Cauda Equina compression or nerve root impingement.   - MRI T-Spine not performed due to titanium hardware and may cause for there to be streak artifact on imaging.   - keep map > 110  - a1c: 5.9; LDL: 57; TSH: 2.090  - stroke neuro checks q1h  - stroke education  - stroke to follow    Discussed with Neurology Fellow, Dr. James and Neurology Attending, Dr. Troncoso.    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 Stroke consulted for new bilateral LE plegia 4/29 AM c/f spinal cord ischemia. CT angio CAP 4/28 showed interval TEVAR of Type B aortic dissection with stable aneurysmal dilatation of false lumen below distal graft attachment site which is perfused. 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 Multifocal splenic infarcts and multiple small renal infarcts bilaterally, likely embolic etiology. S/p lumbar drain w/ NSGY 4/29. Patient with improvement in her LE weakness with sensation remaining intact. CTH/CT T and L spine negative. MRI L-spine neg for cauda equina compression or nerve root impingement. MRI T-Spine not performed due to titanium hardware and risk of streak artifact that will be seen on imaging. Goal to remove lumbar drain as cleared by neurosurgery.  NIHSS on 5/1 is 0.    Plan:  - Please lower lumbar drainage with goal to clamp/remove as soon as possible.   - OK to start Anti-coagulation for treatment of DVT from neurology perspective - would clear with neurosurgery on timing once drain is removed  - Neuro event is likely due to spinal cord infarct   - Please call Stroke Team at [phone] for NIHSS at day of discharge.   - MRI L-spine: No fractures. Multi-level lumbar spondylosis and degenerative disc dz. No evidence for Cauda Equina compression or nerve root impingement.   - MRI T-Spine not performed due to titanium hardware and may cause for there to be streak artifact on imaging.   - keep map > 110  - a1c: 5.9; LDL: 57; TSH: 2.090  - stroke neuro checks q4h  - stroke education  - stroke to follow    Discussed with Neurology Fellow, Dr. James and Neurology Attending, Dr. Troncoso.

## 2024-05-01 NOTE — PROGRESS NOTE ADULT - SUBJECTIVE AND OBJECTIVE BOX
CTICU  CRITICAL  CARE  attending     Hand off received 					   Pertinent clinical, laboratory, radiographic, hemodynamic, echocardiographic, respiratory data, microbiologic data and chart were reviewed and analyzed frequently throughout the course of the day  Patient seen and examined with CTS/ SH attending at bedside  Pt is a 68yr old female with PMH asthma, CTEPH, CAD, HTN, Type B Dissection admitted for TEVAR. Pt underwent LD placement . : Pt underwent endovascular repair of thoracic aorta covering L subclavian to ~5cm above celiac trunk ([place]). Arrived Extubated. Clamped  and  and drained overnight. Ld removed in afternoon. : early AM pt unable to move LE, stroke code called. NSGY replaced LD. MAPs driven up. CT imaging and MRI imaging performed. Pulmonary consulted for CTEPH mgmt. Boo and primacor restarted with new swan placement. 1 pRBC, mannitol, and decadron also given. : Midodrine started. : Macitetan started. Boo weaned to 5. LD capped during day.       FAMILY HISTORY:  Family history of early CAD (Father)  PAST MEDICAL & SURGICAL HISTORY:  HTN (hypertension)  Prediabetes  Mild tricuspid regurgitation  Enlarged aorta  CAD (coronary artery disease)  Ascending aorta dilation  Pulmonary hypertension  Chronic systolic right heart failure  2019 novel coronavirus disease (COVID-19)  Asthma  S/P hysterectomy  S/P         Patient is a 68y old  Female who presents with a chief complaint of type B dissection.      14 system review was unremarkable    Vital signs, hemodynamic and respiratory parameters were reviewed from the bedside nursing flowsheet.  ICU Vital Signs Last 24 Hrs  T(C): 36 (01 May 2024 13:52), Max: 37.4 (01 May 2024 05:01)  T(F): 96.8 (01 May 2024 13:52), Max: 99.3 (01 May 2024 05:01)  HR: 79 (01 May 2024 18:05) (70 - 87)  BP: --  BP(mean): --  ABP: 158/80 (01 May 2024 17:00) (158/80 - 265/265)  ABP(mean): 115 (01 May 2024 17:00) (115 - 265)  RR: 20 (01 May 2024 18:05) (16 - 24)  SpO2: 94% (01 May 2024 18:05) (92% - 100%)    O2 Parameters below as of 01 May 2024 18:05  Patient On (Oxygen Delivery Method): nasal cannula, high flow  O2 Flow (L/min): 50  O2 Concentration (%): 50      Adult Advanced Hemodynamics Last 24 Hrs  CVP(mm Hg): 10 (01 May 2024 17:00) (6 - 13)  CVP(cm H2O): --  CO: 5.6 (01 May 2024 15:00) (5.3 - 6.7)  CI: 3 (01 May 2024 15:) (2.8 - 3.6)  PA: 86/26 (01 May 2024 17:00) (75/22 - 91/28)  PA(mean): 50 (01 May 2024 17:00) (42 - 54)  PCWP: --  SVR: 1540 (01 May 2024 15:00) (1336 - 1643)  SVRI: 2876 (01 May 2024 15:00) (8819 - 3117)  PVR: --  PVRI: --, ABG - ( 01 May 2024 17:06 )  pH, Arterial: 7.45  pH, Blood: x     /  pCO2: 36    /  pO2: 72    / HCO3: 25    / Base Excess: 1.3   /  SaO2: 95.9                Intake and output was reviewed and the fluid balance was calculated  Daily     Daily   I&O's Summary    2024 07:  -  01 May 2024 07:00  --------------------------------------------------------  IN: 2886 mL / OUT: 4800 mL / NET: -1914 mL    01 May 2024 07:01  -  01 May 2024 19:16  --------------------------------------------------------  IN: 1120.4 mL / OUT: 770 mL / NET: 350.4 mL        All lines and drain sites were assessed  Glycemic trend was reviewedCAPILLARY BLOOD GLUCOSE      POCT Blood Glucose.: 174 mg/dL (01 May 2024 16:41)    Neuro: well appearing female in nad, 5/5 strength throughout bl upper and lower extremities  HEENT: mmm  Heart: s1 s2  Lungs: clear bl  Abdomen: soft nt nd  Extremities: wwp    Lines:  RIJ Cordis with [place]   RIJ TLC   R axillary arterial line     Tubes:  LD       labs  CBC Full  -  ( 01 May 2024 17:08 )  WBC Count : 14.26 K/uL  RBC Count : 3.81 M/uL  Hemoglobin : 10.0 g/dL  Hematocrit : 29.2 %  Platelet Count - Automated : 129 K/uL  Mean Cell Volume : 76.6 fl  Mean Cell Hemoglobin : 26.2 pg  Mean Cell Hemoglobin Concentration : 34.2 gm/dL  Auto Neutrophil # : 11.86 K/uL  Auto Lymphocyte # : 0.83 K/uL  Auto Monocyte # : 1.34 K/uL  Auto Eosinophil # : 0.00 K/uL  Auto Basophil # : 0.03 K/uL  Auto Neutrophil % : 83.2 %  Auto Lymphocyte % : 5.8 %  Auto Monocyte % : 9.4 %  Auto Eosinophil % : 0.0 %  Auto Basophil % : 0.2 %    05-    139  |  102  |  26<H>  ----------------------------<  170<H>  3.7   |  22  |  0.72    Ca    10.1      01 May 2024 17:08  Phos  2.4     05-  Mg     2.1     05-    TPro  7.2  /  Alb  4.4  /  TBili  1.4<H>  /  DBili  x   /  AST  97<H>  /  ALT  102<H>  /  AlkPhos  93  05-01    PT/INR - ( 01 May 2024 17:08 )   PT: 12.3 sec;   INR: 1.08          PTT - ( 01 May 2024 17:08 )  PTT:27.1 sec  The current medications were reviewed   MEDICATIONS  (STANDING):  albuterol    90 MICROgram(s) HFA Inhaler 2 Puff(s) Inhalation every 6 hours  albuterol/ipratropium for Nebulization 3 milliLiter(s) Nebulizer every 6 hours  atorvastatin 20 milliGRAM(s) Oral at bedtime  ceFAZolin   IVPB      ceFAZolin   IVPB 2000 milliGRAM(s) IV Intermittent every 8 hours  chlorhexidine 2% Cloths 1 Application(s) Topical daily  dexAMETHasone  Injectable 4 milliGRAM(s) IV Push every 6 hours  dextrose 10% Bolus 125 milliLiter(s) IV Bolus once  dextrose 5%. 1000 milliLiter(s) (50 mL/Hr) IV Continuous <Continuous>  dextrose 5%. 1000 milliLiter(s) (100 mL/Hr) IV Continuous <Continuous>  dextrose 50% Injectable 12.5 Gram(s) IV Push once  dextrose 50% Injectable 25 Gram(s) IV Push once  furosemide   Injectable 20 milliGRAM(s) IV Push every 12 hours  glucagon  Injectable 1 milliGRAM(s) IntraMuscular once  influenza  Vaccine (HIGH DOSE) 0.7 milliLiter(s) IntraMuscular once  insulin lispro (ADMELOG) corrective regimen sliding scale   SubCutaneous Before meals and at bedtime  macitentan 10 milliGRAM(s) Oral <User Schedule>  midodrine 10 milliGRAM(s) Oral every 8 hours  milrinone Infusion 0.375 MICROgram(s)/kG/Min (9.18 mL/Hr) IV Continuous <Continuous>  modafinil 100 milliGRAM(s) Oral daily  pantoprazole    Tablet 40 milliGRAM(s) Oral daily  phenylephrine    Infusion 0.399 MICROgram(s)/kG/Min (12.2 mL/Hr) IV Continuous <Continuous>  polyethylene glycol 3350 17 Gram(s) Oral daily  riociguat 2.5 milliGRAM(s) Oral every 8 hours  senna 2 Tablet(s) Oral at bedtime  sodium chloride 0.9%. 1000 milliLiter(s) (10 mL/Hr) IV Continuous <Continuous>  vasopressin Infusion 0.02 Unit(s)/Min (3 mL/Hr) IV Continuous <Continuous>    MEDICATIONS  (PRN):  acetaminophen     Tablet .. 650 milliGRAM(s) Oral every 6 hours PRN Mild Pain (1 - 3)  dextrose Oral Gel 15 Gram(s) Oral once PRN Blood Glucose LESS THAN 70 milliGRAM(s)/deciliter      Assessment/Plan:  68yr old female with PMH asthma, CTEPH, CAD, HTN, Type B Dissection admitted for TEVAR.    POD5 TEVAR ([facility], )  Continue LD per protocol, clamp trial and dc tmrw  Continue with MAP goals >120, with vasopressin and phenylephrine prn  On midodrine  Serial neuro checks, q1h  CTEPH mgmt  Appreciate pulmonary consult  Wean Boo  Continue Riociguat and macitetan  Continue primacor  Dc decadron  Serial CI/CO  Trend end organ perfusion markers  Diet as tolerated  Replete lytes prn  Monitor CT output  GI/DVT PPX  Bowel Regimen  Pain control  OOB with PT  Close hemodynamic, ventilatory and drain monitoring and management per post op routine  Monitor for arrhythmias and monitor parameters for organ perfusion  Beta blockade not administered due to hemodynamic instability and bradycardia  Monitor neurologic status  Head of the bed should remain elevated to 45 deg   Chest PT and IS will be encouraged  Monitor adequacy of oxygenation and ventilation and attempt to wean oxygen  Antibiotic regimen will be tailored to the clinical, laboratory and microbiologic data  Nutritional goals will be met using po eventually, ensure adequate caloric intake and monitor the same  Stress ulcer and VTE prophylaxis will be achieved    Glycemic control is satisfactory  Electrolytes have been repleted as necessary and wound care has been carried out   Pain control has been achieved.   Aggressive physical therapy and early mobility and ambulation goals will be met   The family was updated about the course and plan.    CRITICAL CARE TIME personally provided by me  in evaluation and management, reassessments, review and interpretation of labs and x-rays, ventilator and hemodynamic management, formulating a plan and coordinating care: ___60____ MIN.  Time does not include procedural time.         CTICU ATTENDING     					  Cherelle Redmond MD

## 2024-05-01 NOTE — PROGRESS NOTE ADULT - SUBJECTIVE AND OBJECTIVE BOX
INTERVAL COURSE  POD# 6  TEVAR  Neuro exam intact/ on baltazar vaso for MAP goal ~ 110   Ongoing LD drainage   Primacor/Daniel unchanged - PASP ~ 80-90 unchanged   Warm an well perfused on exam     ICU Vital Signs Last 24 Hrs  T(C): 37.4 (01 May 2024 05:01), Max: 37.4 (01 May 2024 05:01)  T(F): 99.3 (01 May 2024 05:01), Max: 99.3 (01 May 2024 05:01)  HR: 78 (01 May 2024 09:00) (70 - 88)  ABP: 162/82 (01 May 2024 09:00) (157/77 - 173/90)  ABP(mean): 118 (01 May 2024 09:00) (112 - 128)  RR: 18 (01 May 2024 09:00) (15 - 24)  SpO2: 99% (01 May 2024 09:00) (96% - 100%)  O2 Parameters below as of 01 May 2024 09:00  Patient On (Oxygen Delivery Method): nasal cannula, high flow  O2 Flow (L/min): 50  O2 Concentration (%): 60    Adult Advanced Hemodynamics Last 24 Hrs  CVP(mm Hg): 10 (01 May 2024 09:00) (6 - 12)  CO: 6.7 (01 May 2024 07:00) (5.3 - 7.1)  CI: 3.6 (01 May 2024 07:00) (2.8 - 3.8)  PA: 84/25 (01 May 2024 09:00) (83/22 - 91/28)  PA(mean): 49 (01 May 2024 09:00) (46 - 54)  SVR: 1336 (01 May 2024 07:00) (1124 - 1643)  SVRI: 2499 (01 May 2024 07:00) (2101 - 3110)  ABG - ( 01 May 2024 08:02 )  pH, Arterial: 7.45  pH, Blood: x     /  pCO2: 35    /  pO2: 60    / HCO3: 24    / Base Excess: 0.5   /  SaO2: x         Daily   I&O's Summary    30 Apr 2024 07:01  -  01 May 2024 07:00  --------------------------------------------------------  IN: 2886 mL / OUT: 4800 mL / NET: -1914 mL    All lines and drain sites were assessed  Glycemic trend was reviewedCAPILLARY BLOOD GLUCOSE    PHYSICAL EXAM  General: A&Ox 3; NAD  Head: NC/AT;   Eyes: PERRL; EOMI; anicteric sclera  Neck: Supple; no JVD  Respiratory: CTA B/L; no wheezes/crackles/rales auscultated w/ good air movement  Cardiovascular: Regular rhythm/rate; S1/S2; no gallops or murmurs auscultated  Gastrointestinal: Soft; NTND w/out rebound tenderness or guarding; bowel sounds normal  Extremities: WWP; no edema or cyanosis; radial/pedal pulses palpable  Neurological:  CNII-XII grossly intact; no obvious focal deficits    LABS/IMAGING/EKG/ETC  Reviewed.

## 2024-05-01 NOTE — PROGRESS NOTE ADULT - SUBJECTIVE AND OBJECTIVE BOX
Neurology Stroke Progress Note    INTERVAL HPI/OVERNIGHT EVENTS:  Patient seen and examined. States that she slept well and doesn't have any acute complaints. She was cooperative throughout the examination.     MEDICATIONS  (STANDING):  albuterol    90 MICROgram(s) HFA Inhaler 2 Puff(s) Inhalation every 6 hours  albuterol/ipratropium for Nebulization 3 milliLiter(s) Nebulizer every 6 hours  atorvastatin 20 milliGRAM(s) Oral at bedtime  ceFAZolin   IVPB      ceFAZolin   IVPB 2000 milliGRAM(s) IV Intermittent every 8 hours  chlorhexidine 2% Cloths 1 Application(s) Topical daily  dexAMETHasone  Injectable 4 milliGRAM(s) IV Push every 6 hours  dextrose 10% Bolus 125 milliLiter(s) IV Bolus once  dextrose 5%. 1000 milliLiter(s) (50 mL/Hr) IV Continuous <Continuous>  dextrose 5%. 1000 milliLiter(s) (100 mL/Hr) IV Continuous <Continuous>  dextrose 50% Injectable 12.5 Gram(s) IV Push once  dextrose 50% Injectable 25 Gram(s) IV Push once  furosemide   Injectable 20 milliGRAM(s) IV Push every 12 hours  glucagon  Injectable 1 milliGRAM(s) IntraMuscular once  influenza  Vaccine (HIGH DOSE) 0.7 milliLiter(s) IntraMuscular once  insulin lispro (ADMELOG) corrective regimen sliding scale   SubCutaneous Before meals and at bedtime  midodrine 10 milliGRAM(s) Oral every 8 hours  milrinone Infusion 0.375 MICROgram(s)/kG/Min (9.18 mL/Hr) IV Continuous <Continuous>  modafinil 100 milliGRAM(s) Oral daily  pantoprazole    Tablet 40 milliGRAM(s) Oral daily  phenylephrine    Infusion 0.399 MICROgram(s)/kG/Min (12.2 mL/Hr) IV Continuous <Continuous>  polyethylene glycol 3350 17 Gram(s) Oral daily  riociguat 2.5 milliGRAM(s) Oral every 8 hours  senna 2 Tablet(s) Oral at bedtime  sodium chloride 0.9%. 1000 milliLiter(s) (10 mL/Hr) IV Continuous <Continuous>  vasopressin Infusion 0.02 Unit(s)/Min (3 mL/Hr) IV Continuous <Continuous>    MEDICATIONS  (PRN):  acetaminophen     Tablet .. 650 milliGRAM(s) Oral every 6 hours PRN Mild Pain (1 - 3)  dextrose Oral Gel 15 Gram(s) Oral once PRN Blood Glucose LESS THAN 70 milliGRAM(s)/deciliter      Allergies    No Known Allergies    Intolerances        Vital Signs Last 24 Hrs  T(C): 37.4 (01 May 2024 05:01), Max: 37.4 (01 May 2024 05:01)  T(F): 99.3 (01 May 2024 05:01), Max: 99.3 (01 May 2024 05:01)  HR: 79 (01 May 2024 08:00) (70 - 88)  BP: --  BP(mean): --  RR: 16 (01 May 2024 06:00) (15 - 24)  SpO2: 100% (01 May 2024 08:00) (96% - 100%)    Parameters below as of 01 May 2024 08:00  Patient On (Oxygen Delivery Method): nasal cannula, high flow  O2 Flow (L/min): 50  O2 Concentration (%): 60    Physical exam:  General: No acute distress, awake and alert  Eyes: Anicteric sclerae, moist conjunctivae, see below for CNs  Neck: FROM  Cardiovascular: Regular rate and rhythm  Pulmonary: No use of accessory muscles  Extremities: no edema    Neurologic:  -Mental status: Awake, alert, oriented to person, place, and time. Speech is fluent with intact naming, repetition, and comprehension, no dysarthria. Recent and remote memory intact. Follows commands. Attention/concentration intact. Fund of knowledge appropriate.  -Cranial nerves:   III, IV, VI: Extraocular movements are intact without nystagmus. Pupils equally round and reactive to light  V:  Facial sensation V1-V3 equal and intact   VII: Face is symmetric with normal eye closure and smile  Motor: Normal bulk and tone. No pronator drift. Strength bilateral upper extremity 5/5, bilateral lower extremities 5/5.  Sensation: Intact to light touch bilaterally.  Coordination: No dysmetria on finger-to-nose bilaterally.       LABS:                        9.8    12.15 )-----------( 116      ( 01 May 2024 02:41 )             28.5     05-01    141  |  104  |  15  ----------------------------<  186<H>  4.3   |  23  |  0.70    Ca    10.2      01 May 2024 02:41  Phos  3.9     05-01  Mg     2.1     05-01    TPro  7.5  /  Alb  4.6  /  TBili  1.7<H>  /  DBili  x   /  AST  33  /  ALT  41  /  AlkPhos  78  05-01    PT/INR - ( 01 May 2024 02:41 )   PT: 12.4 sec;   INR: 1.09          PTT - ( 01 May 2024 02:41 )  PTT:25.1 sec  Urinalysis Basic - ( 01 May 2024 02:41 )    Color: x / Appearance: x / SG: x / pH: x  Gluc: 186 mg/dL / Ketone: x  / Bili: x / Urobili: x   Blood: x / Protein: x / Nitrite: x   Leuk Esterase: x / RBC: x / WBC x   Sq Epi: x / Non Sq Epi: x / Bacteria: x        RADIOLOGY & ADDITIONAL TESTS:  Reviewed.

## 2024-05-02 LAB
ALBUMIN SERPL ELPH-MCNC: 4.4 G/DL — SIGNIFICANT CHANGE UP (ref 3.3–5)
ALBUMIN SERPL ELPH-MCNC: 4.5 G/DL — SIGNIFICANT CHANGE UP (ref 3.3–5)
ALBUMIN SERPL ELPH-MCNC: 5 G/DL — SIGNIFICANT CHANGE UP (ref 3.3–5)
ALP SERPL-CCNC: 108 U/L — SIGNIFICANT CHANGE UP (ref 40–120)
ALP SERPL-CCNC: 98 U/L — SIGNIFICANT CHANGE UP (ref 40–120)
ALP SERPL-CCNC: 99 U/L — SIGNIFICANT CHANGE UP (ref 40–120)
ALT FLD-CCNC: 135 U/L — HIGH (ref 10–45)
ALT FLD-CCNC: 138 U/L — HIGH (ref 10–45)
ALT FLD-CCNC: 167 U/L — HIGH (ref 10–45)
AMYLASE P1 CFR SERPL: 71 U/L — SIGNIFICANT CHANGE UP (ref 25–125)
ANION GAP SERPL CALC-SCNC: 13 MMOL/L — SIGNIFICANT CHANGE UP (ref 5–17)
ANION GAP SERPL CALC-SCNC: 14 MMOL/L — SIGNIFICANT CHANGE UP (ref 5–17)
ANION GAP SERPL CALC-SCNC: 15 MMOL/L — SIGNIFICANT CHANGE UP (ref 5–17)
APTT BLD: 23.1 SEC — LOW (ref 24.5–35.6)
APTT BLD: 24.9 SEC — SIGNIFICANT CHANGE UP (ref 24.5–35.6)
APTT BLD: 25 SEC — SIGNIFICANT CHANGE UP (ref 24.5–35.6)
AST SERPL-CCNC: 136 U/L — HIGH (ref 10–40)
AST SERPL-CCNC: 143 U/L — HIGH (ref 10–40)
AST SERPL-CCNC: 165 U/L — HIGH (ref 10–40)
BASE EXCESS BLDV CALC-SCNC: 0.5 MMOL/L — SIGNIFICANT CHANGE UP (ref -2–3)
BASE EXCESS BLDV CALC-SCNC: 2.3 MMOL/L — SIGNIFICANT CHANGE UP (ref -2–3)
BASE EXCESS BLDV CALC-SCNC: 3.2 MMOL/L — HIGH (ref -2–3)
BASOPHILS # BLD AUTO: 0.02 K/UL — SIGNIFICANT CHANGE UP (ref 0–0.2)
BASOPHILS # BLD AUTO: 0.02 K/UL — SIGNIFICANT CHANGE UP (ref 0–0.2)
BASOPHILS # BLD AUTO: 0.03 K/UL — SIGNIFICANT CHANGE UP (ref 0–0.2)
BASOPHILS NFR BLD AUTO: 0.1 % — SIGNIFICANT CHANGE UP (ref 0–2)
BASOPHILS NFR BLD AUTO: 0.2 % — SIGNIFICANT CHANGE UP (ref 0–2)
BASOPHILS NFR BLD AUTO: 0.2 % — SIGNIFICANT CHANGE UP (ref 0–2)
BILIRUB SERPL-MCNC: 1.2 MG/DL — SIGNIFICANT CHANGE UP (ref 0.2–1.2)
BILIRUB SERPL-MCNC: 1.6 MG/DL — HIGH (ref 0.2–1.2)
BILIRUB SERPL-MCNC: 2.1 MG/DL — HIGH (ref 0.2–1.2)
BUN SERPL-MCNC: 29 MG/DL — HIGH (ref 7–23)
BUN SERPL-MCNC: 29 MG/DL — HIGH (ref 7–23)
BUN SERPL-MCNC: 31 MG/DL — HIGH (ref 7–23)
CALCIUM SERPL-MCNC: 10.1 MG/DL — SIGNIFICANT CHANGE UP (ref 8.4–10.5)
CALCIUM SERPL-MCNC: 10.2 MG/DL — SIGNIFICANT CHANGE UP (ref 8.4–10.5)
CALCIUM SERPL-MCNC: 10.2 MG/DL — SIGNIFICANT CHANGE UP (ref 8.4–10.5)
CHLORIDE SERPL-SCNC: 101 MMOL/L — SIGNIFICANT CHANGE UP (ref 96–108)
CO2 BLDV-SCNC: 25.7 MMOL/L — SIGNIFICANT CHANGE UP (ref 22–26)
CO2 BLDV-SCNC: 27.7 MMOL/L — HIGH (ref 22–26)
CO2 BLDV-SCNC: 28 MMOL/L — HIGH (ref 22–26)
CO2 SERPL-SCNC: 24 MMOL/L — SIGNIFICANT CHANGE UP (ref 22–31)
CO2 SERPL-SCNC: 25 MMOL/L — SIGNIFICANT CHANGE UP (ref 22–31)
CO2 SERPL-SCNC: 25 MMOL/L — SIGNIFICANT CHANGE UP (ref 22–31)
CREAT SERPL-MCNC: 0.74 MG/DL — SIGNIFICANT CHANGE UP (ref 0.5–1.3)
CREAT SERPL-MCNC: 0.81 MG/DL — SIGNIFICANT CHANGE UP (ref 0.5–1.3)
CREAT SERPL-MCNC: 0.82 MG/DL — SIGNIFICANT CHANGE UP (ref 0.5–1.3)
EGFR: 78 ML/MIN/1.73M2 — SIGNIFICANT CHANGE UP
EGFR: 79 ML/MIN/1.73M2 — SIGNIFICANT CHANGE UP
EGFR: 88 ML/MIN/1.73M2 — SIGNIFICANT CHANGE UP
EOSINOPHIL # BLD AUTO: 0 K/UL — SIGNIFICANT CHANGE UP (ref 0–0.5)
EOSINOPHIL # BLD AUTO: 0.01 K/UL — SIGNIFICANT CHANGE UP (ref 0–0.5)
EOSINOPHIL # BLD AUTO: 0.05 K/UL — SIGNIFICANT CHANGE UP (ref 0–0.5)
EOSINOPHIL NFR BLD AUTO: 0 % — SIGNIFICANT CHANGE UP (ref 0–6)
EOSINOPHIL NFR BLD AUTO: 0.1 % — SIGNIFICANT CHANGE UP (ref 0–6)
EOSINOPHIL NFR BLD AUTO: 0.4 % — SIGNIFICANT CHANGE UP (ref 0–6)
GAS PNL BLDA: SIGNIFICANT CHANGE UP
GAS PNL BLDV: SIGNIFICANT CHANGE UP
GLUCOSE BLDC GLUCOMTR-MCNC: 127 MG/DL — HIGH (ref 70–99)
GLUCOSE BLDC GLUCOMTR-MCNC: 142 MG/DL — HIGH (ref 70–99)
GLUCOSE BLDC GLUCOMTR-MCNC: 161 MG/DL — HIGH (ref 70–99)
GLUCOSE SERPL-MCNC: 136 MG/DL — HIGH (ref 70–99)
GLUCOSE SERPL-MCNC: 152 MG/DL — HIGH (ref 70–99)
GLUCOSE SERPL-MCNC: 170 MG/DL — HIGH (ref 70–99)
HCO3 BLDV-SCNC: 25 MMOL/L — SIGNIFICANT CHANGE UP (ref 22–29)
HCO3 BLDV-SCNC: 26 MMOL/L — SIGNIFICANT CHANGE UP (ref 22–29)
HCO3 BLDV-SCNC: 27 MMOL/L — SIGNIFICANT CHANGE UP (ref 22–29)
HCT VFR BLD CALC: 26.7 % — LOW (ref 34.5–45)
HCT VFR BLD CALC: 30.7 % — LOW (ref 34.5–45)
HCT VFR BLD CALC: 31.7 % — LOW (ref 34.5–45)
HGB BLD-MCNC: 10.7 G/DL — LOW (ref 11.5–15.5)
HGB BLD-MCNC: 11.2 G/DL — LOW (ref 11.5–15.5)
HGB BLD-MCNC: 9.5 G/DL — LOW (ref 11.5–15.5)
IMM GRANULOCYTES NFR BLD AUTO: 1.3 % — HIGH (ref 0–0.9)
IMM GRANULOCYTES NFR BLD AUTO: 1.4 % — HIGH (ref 0–0.9)
IMM GRANULOCYTES NFR BLD AUTO: 1.7 % — HIGH (ref 0–0.9)
INR BLD: 1.05 — SIGNIFICANT CHANGE UP (ref 0.85–1.18)
INR BLD: 1.06 — SIGNIFICANT CHANGE UP (ref 0.85–1.18)
INR BLD: 1.08 — SIGNIFICANT CHANGE UP (ref 0.85–1.18)
LACTATE SERPL-SCNC: 1 MMOL/L — SIGNIFICANT CHANGE UP (ref 0.5–2)
LACTATE SERPL-SCNC: 1.3 MMOL/L — SIGNIFICANT CHANGE UP (ref 0.5–2)
LACTATE SERPL-SCNC: 1.3 MMOL/L — SIGNIFICANT CHANGE UP (ref 0.5–2)
LIDOCAIN IGE QN: 61 U/L — HIGH (ref 7–60)
LYMPHOCYTES # BLD AUTO: 0.82 K/UL — LOW (ref 1–3.3)
LYMPHOCYTES # BLD AUTO: 1.06 K/UL — SIGNIFICANT CHANGE UP (ref 1–3.3)
LYMPHOCYTES # BLD AUTO: 1.19 K/UL — SIGNIFICANT CHANGE UP (ref 1–3.3)
LYMPHOCYTES # BLD AUTO: 6.1 % — LOW (ref 13–44)
LYMPHOCYTES # BLD AUTO: 8.4 % — LOW (ref 13–44)
LYMPHOCYTES # BLD AUTO: 9 % — LOW (ref 13–44)
MAGNESIUM SERPL-MCNC: 2.1 MG/DL — SIGNIFICANT CHANGE UP (ref 1.6–2.6)
MAGNESIUM SERPL-MCNC: 2.1 MG/DL — SIGNIFICANT CHANGE UP (ref 1.6–2.6)
MAGNESIUM SERPL-MCNC: 2.2 MG/DL — SIGNIFICANT CHANGE UP (ref 1.6–2.6)
MCHC RBC-ENTMCNC: 26.4 PG — LOW (ref 27–34)
MCHC RBC-ENTMCNC: 26.6 PG — LOW (ref 27–34)
MCHC RBC-ENTMCNC: 26.8 PG — LOW (ref 27–34)
MCHC RBC-ENTMCNC: 34.9 GM/DL — SIGNIFICANT CHANGE UP (ref 32–36)
MCHC RBC-ENTMCNC: 35.3 GM/DL — SIGNIFICANT CHANGE UP (ref 32–36)
MCHC RBC-ENTMCNC: 35.6 GM/DL — SIGNIFICANT CHANGE UP (ref 32–36)
MCV RBC AUTO: 74.2 FL — LOW (ref 80–100)
MCV RBC AUTO: 75.3 FL — LOW (ref 80–100)
MCV RBC AUTO: 76.8 FL — LOW (ref 80–100)
MONOCYTES # BLD AUTO: 1.43 K/UL — HIGH (ref 0–0.9)
MONOCYTES # BLD AUTO: 1.48 K/UL — HIGH (ref 0–0.9)
MONOCYTES # BLD AUTO: 1.5 K/UL — HIGH (ref 0–0.9)
MONOCYTES NFR BLD AUTO: 10.6 % — SIGNIFICANT CHANGE UP (ref 2–14)
MONOCYTES NFR BLD AUTO: 11.2 % — SIGNIFICANT CHANGE UP (ref 2–14)
MONOCYTES NFR BLD AUTO: 11.8 % — SIGNIFICANT CHANGE UP (ref 2–14)
NEUTROPHILS # BLD AUTO: 10.39 K/UL — HIGH (ref 1.8–7.4)
NEUTROPHILS # BLD AUTO: 11 K/UL — HIGH (ref 1.8–7.4)
NEUTROPHILS # BLD AUTO: 9.83 K/UL — HIGH (ref 1.8–7.4)
NEUTROPHILS NFR BLD AUTO: 77.5 % — HIGH (ref 43–77)
NEUTROPHILS NFR BLD AUTO: 78.2 % — HIGH (ref 43–77)
NEUTROPHILS NFR BLD AUTO: 81.8 % — HIGH (ref 43–77)
NRBC # BLD: 0 /100 WBCS — SIGNIFICANT CHANGE UP (ref 0–0)
PCO2 BLDV: 37 MMHG — LOW (ref 39–42)
PCO2 BLDV: 37 MMHG — LOW (ref 39–42)
PCO2 BLDV: 39 MMHG — SIGNIFICANT CHANGE UP (ref 39–42)
PH BLDV: 7.43 — SIGNIFICANT CHANGE UP (ref 7.32–7.43)
PH BLDV: 7.44 — HIGH (ref 7.32–7.43)
PH BLDV: 7.47 — HIGH (ref 7.32–7.43)
PHOSPHATE SERPL-MCNC: 2.4 MG/DL — LOW (ref 2.5–4.5)
PHOSPHATE SERPL-MCNC: 2.6 MG/DL — SIGNIFICANT CHANGE UP (ref 2.5–4.5)
PHOSPHATE SERPL-MCNC: 2.9 MG/DL — SIGNIFICANT CHANGE UP (ref 2.5–4.5)
PLATELET # BLD AUTO: 116 K/UL — LOW (ref 150–400)
PLATELET # BLD AUTO: 125 K/UL — LOW (ref 150–400)
PLATELET # BLD AUTO: 129 K/UL — LOW (ref 150–400)
PO2 BLDV: 37 MMHG — SIGNIFICANT CHANGE UP (ref 25–45)
PO2 BLDV: 41 MMHG — SIGNIFICANT CHANGE UP (ref 25–45)
PO2 BLDV: 44 MMHG — SIGNIFICANT CHANGE UP (ref 25–45)
POTASSIUM SERPL-MCNC: 3.6 MMOL/L — SIGNIFICANT CHANGE UP (ref 3.5–5.3)
POTASSIUM SERPL-MCNC: 3.6 MMOL/L — SIGNIFICANT CHANGE UP (ref 3.5–5.3)
POTASSIUM SERPL-MCNC: 4 MMOL/L — SIGNIFICANT CHANGE UP (ref 3.5–5.3)
POTASSIUM SERPL-SCNC: 3.6 MMOL/L — SIGNIFICANT CHANGE UP (ref 3.5–5.3)
POTASSIUM SERPL-SCNC: 3.6 MMOL/L — SIGNIFICANT CHANGE UP (ref 3.5–5.3)
POTASSIUM SERPL-SCNC: 4 MMOL/L — SIGNIFICANT CHANGE UP (ref 3.5–5.3)
PROT SERPL-MCNC: 7.1 G/DL — SIGNIFICANT CHANGE UP (ref 6–8.3)
PROT SERPL-MCNC: 7.1 G/DL — SIGNIFICANT CHANGE UP (ref 6–8.3)
PROT SERPL-MCNC: 7.6 G/DL — SIGNIFICANT CHANGE UP (ref 6–8.3)
PROTHROM AB SERPL-ACNC: 11.9 SEC — SIGNIFICANT CHANGE UP (ref 9.5–13)
PROTHROM AB SERPL-ACNC: 12.1 SEC — SIGNIFICANT CHANGE UP (ref 9.5–13)
PROTHROM AB SERPL-ACNC: 12.3 SEC — SIGNIFICANT CHANGE UP (ref 9.5–13)
RBC # BLD: 3.6 M/UL — LOW (ref 3.8–5.2)
RBC # BLD: 4 M/UL — SIGNIFICANT CHANGE UP (ref 3.8–5.2)
RBC # BLD: 4.21 M/UL — SIGNIFICANT CHANGE UP (ref 3.8–5.2)
RBC # FLD: 15.3 % — HIGH (ref 10.3–14.5)
RBC # FLD: 15.5 % — HIGH (ref 10.3–14.5)
RBC # FLD: 15.9 % — HIGH (ref 10.3–14.5)
SAO2 % BLDV: 65.4 % — LOW (ref 67–88)
SAO2 % BLDV: 68.2 % — SIGNIFICANT CHANGE UP (ref 67–88)
SAO2 % BLDV: 74.7 % — SIGNIFICANT CHANGE UP (ref 67–88)
SODIUM SERPL-SCNC: 139 MMOL/L — SIGNIFICANT CHANGE UP (ref 135–145)
SODIUM SERPL-SCNC: 139 MMOL/L — SIGNIFICANT CHANGE UP (ref 135–145)
SODIUM SERPL-SCNC: 141 MMOL/L — SIGNIFICANT CHANGE UP (ref 135–145)
WBC # BLD: 12.69 K/UL — HIGH (ref 3.8–10.5)
WBC # BLD: 13.26 K/UL — HIGH (ref 3.8–10.5)
WBC # BLD: 13.46 K/UL — HIGH (ref 3.8–10.5)
WBC # FLD AUTO: 12.69 K/UL — HIGH (ref 3.8–10.5)
WBC # FLD AUTO: 13.26 K/UL — HIGH (ref 3.8–10.5)
WBC # FLD AUTO: 13.46 K/UL — HIGH (ref 3.8–10.5)

## 2024-05-02 PROCEDURE — 99233 SBSQ HOSP IP/OBS HIGH 50: CPT | Mod: GC

## 2024-05-02 PROCEDURE — 99292 CRITICAL CARE ADDL 30 MIN: CPT

## 2024-05-02 PROCEDURE — 99291 CRITICAL CARE FIRST HOUR: CPT

## 2024-05-02 PROCEDURE — 71045 X-RAY EXAM CHEST 1 VIEW: CPT | Mod: 26

## 2024-05-02 RX ORDER — SODIUM CHLORIDE 0.65 %
1 AEROSOL, SPRAY (ML) NASAL THREE TIMES A DAY
Refills: 0 | Status: DISCONTINUED | OUTPATIENT
Start: 2024-05-02 | End: 2024-05-24

## 2024-05-02 RX ORDER — POTASSIUM PHOSPHATE, MONOBASIC POTASSIUM PHOSPHATE, DIBASIC 236; 224 MG/ML; MG/ML
15 INJECTION, SOLUTION INTRAVENOUS ONCE
Refills: 0 | Status: COMPLETED | OUTPATIENT
Start: 2024-05-02 | End: 2024-05-02

## 2024-05-02 RX ORDER — POTASSIUM CHLORIDE 20 MEQ
20 PACKET (EA) ORAL
Refills: 0 | Status: DISCONTINUED | OUTPATIENT
Start: 2024-05-02 | End: 2024-05-02

## 2024-05-02 RX ORDER — ALBUMIN HUMAN 25 %
50 VIAL (ML) INTRAVENOUS ONCE
Refills: 0 | Status: COMPLETED | OUTPATIENT
Start: 2024-05-02 | End: 2024-05-02

## 2024-05-02 RX ORDER — POTASSIUM CHLORIDE 20 MEQ
20 PACKET (EA) ORAL
Refills: 0 | Status: COMPLETED | OUTPATIENT
Start: 2024-05-02 | End: 2024-05-02

## 2024-05-02 RX ORDER — ALBUMIN HUMAN 25 %
250 VIAL (ML) INTRAVENOUS ONCE
Refills: 0 | Status: COMPLETED | OUTPATIENT
Start: 2024-05-02 | End: 2024-05-02

## 2024-05-02 RX ADMIN — RIOCIGUAT 2.5 MILLIGRAM(S): 1.5 TABLET, FILM COATED ORAL at 21:31

## 2024-05-02 RX ADMIN — Medication 100 MILLIGRAM(S): at 06:45

## 2024-05-02 RX ADMIN — MIDODRINE HYDROCHLORIDE 10 MILLIGRAM(S): 2.5 TABLET ORAL at 21:31

## 2024-05-02 RX ADMIN — MILRINONE LACTATE 9.18 MICROGRAM(S)/KG/MIN: 1 INJECTION, SOLUTION INTRAVENOUS at 06:44

## 2024-05-02 RX ADMIN — Medication 3 MILLILITER(S): at 05:54

## 2024-05-02 RX ADMIN — RIOCIGUAT 2.5 MILLIGRAM(S): 1.5 TABLET, FILM COATED ORAL at 06:44

## 2024-05-02 RX ADMIN — Medication 50 MILLIEQUIVALENT(S): at 17:00

## 2024-05-02 RX ADMIN — MIDODRINE HYDROCHLORIDE 10 MILLIGRAM(S): 2.5 TABLET ORAL at 06:45

## 2024-05-02 RX ADMIN — Medication 100 MILLIEQUIVALENT(S): at 19:41

## 2024-05-02 RX ADMIN — Medication 50 MILLILITER(S): at 16:15

## 2024-05-02 RX ADMIN — Medication 20 MILLIGRAM(S): at 19:42

## 2024-05-02 RX ADMIN — PHENYLEPHRINE HYDROCHLORIDE 12.2 MICROGRAM(S)/KG/MIN: 10 INJECTION INTRAVENOUS at 06:44

## 2024-05-02 RX ADMIN — Medication 50 MILLIEQUIVALENT(S): at 16:15

## 2024-05-02 RX ADMIN — Medication 50 MILLILITER(S): at 16:16

## 2024-05-02 RX ADMIN — MACITENTAN 10 MILLIGRAM(S): 10 TABLET, FILM COATED ORAL at 13:03

## 2024-05-02 RX ADMIN — SODIUM CHLORIDE 10 MILLILITER(S): 9 INJECTION INTRAMUSCULAR; INTRAVENOUS; SUBCUTANEOUS at 21:31

## 2024-05-02 RX ADMIN — Medication 100 MILLIGRAM(S): at 14:21

## 2024-05-02 RX ADMIN — Medication 100 MILLIGRAM(S): at 21:31

## 2024-05-02 RX ADMIN — MIDODRINE HYDROCHLORIDE 10 MILLIGRAM(S): 2.5 TABLET ORAL at 14:21

## 2024-05-02 RX ADMIN — POTASSIUM PHOSPHATE, MONOBASIC POTASSIUM PHOSPHATE, DIBASIC 62.5 MILLIMOLE(S): 236; 224 INJECTION, SOLUTION INTRAVENOUS at 21:31

## 2024-05-02 RX ADMIN — Medication 125 MILLILITER(S): at 16:14

## 2024-05-02 RX ADMIN — Medication 3 MILLILITER(S): at 00:31

## 2024-05-02 RX ADMIN — Medication 20 MILLIGRAM(S): at 06:45

## 2024-05-02 RX ADMIN — Medication 3 MILLILITER(S): at 12:40

## 2024-05-02 RX ADMIN — RIOCIGUAT 2.5 MILLIGRAM(S): 1.5 TABLET, FILM COATED ORAL at 14:21

## 2024-05-02 RX ADMIN — SODIUM CHLORIDE 10 MILLILITER(S): 9 INJECTION INTRAMUSCULAR; INTRAVENOUS; SUBCUTANEOUS at 06:44

## 2024-05-02 RX ADMIN — CHLORHEXIDINE GLUCONATE 1 APPLICATION(S): 213 SOLUTION TOPICAL at 06:45

## 2024-05-02 RX ADMIN — Medication 2: at 07:50

## 2024-05-02 RX ADMIN — VASOPRESSIN 3 UNIT(S)/MIN: 20 INJECTION INTRAVENOUS at 06:44

## 2024-05-02 RX ADMIN — Medication 3 MILLILITER(S): at 19:37

## 2024-05-02 NOTE — PROGRESS NOTE ADULT - SUBJECTIVE AND OBJECTIVE BOX
STATUS POST:  s/p TEVAR on 4/26    POST OPERATIVE DAY #: 8    ON: ON Phenylephrine, Milrinone and Vasopressin    SUBJECTIVE: Pt seen and examined at bedside this am by surgery team. Reports improving lower extremity strength able to sit on bed.     MEDICATIONS  (STANDING):  albuterol    90 MICROgram(s) HFA Inhaler 2 Puff(s) Inhalation every 6 hours  albuterol/ipratropium for Nebulization 3 milliLiter(s) Nebulizer every 6 hours  ceFAZolin   IVPB 2000 milliGRAM(s) IV Intermittent every 8 hours  ceFAZolin   IVPB      chlorhexidine 2% Cloths 1 Application(s) Topical daily  dextrose 10% Bolus 125 milliLiter(s) IV Bolus once  dextrose 5%. 1000 milliLiter(s) (100 mL/Hr) IV Continuous <Continuous>  dextrose 5%. 1000 milliLiter(s) (50 mL/Hr) IV Continuous <Continuous>  dextrose 50% Injectable 12.5 Gram(s) IV Push once  dextrose 50% Injectable 25 Gram(s) IV Push once  furosemide   Injectable 20 milliGRAM(s) IV Push every 12 hours  glucagon  Injectable 1 milliGRAM(s) IntraMuscular once  influenza  Vaccine (HIGH DOSE) 0.7 milliLiter(s) IntraMuscular once  insulin lispro (ADMELOG) corrective regimen sliding scale   SubCutaneous Before meals and at bedtime  macitentan 10 milliGRAM(s) Oral <User Schedule>  midodrine 10 milliGRAM(s) Oral every 8 hours  milrinone Infusion 0.375 MICROgram(s)/kG/Min (9.18 mL/Hr) IV Continuous <Continuous>  modafinil 100 milliGRAM(s) Oral daily  pantoprazole    Tablet 40 milliGRAM(s) Oral daily  phenylephrine    Infusion 0.399 MICROgram(s)/kG/Min (12.2 mL/Hr) IV Continuous <Continuous>  polyethylene glycol 3350 17 Gram(s) Oral daily  riociguat 2.5 milliGRAM(s) Oral every 8 hours  senna 2 Tablet(s) Oral at bedtime  sodium chloride 0.9%. 1000 milliLiter(s) (10 mL/Hr) IV Continuous <Continuous>  vasopressin Infusion 0.02 Unit(s)/Min (3 mL/Hr) IV Continuous <Continuous>    MEDICATIONS  (PRN):  dextrose Oral Gel 15 Gram(s) Oral once PRN Blood Glucose LESS THAN 70 milliGRAM(s)/deciliter  sodium chloride 0.65% Nasal 1 Spray(s) Both Nostrils three times a day PRN dry nose      Vital Signs Last 24 Hrs  T(C): 36.5 (02 May 2024 08:32), Max: 37.2 (02 May 2024 01:01)  T(F): 97.7 (02 May 2024 08:32), Max: 99 (02 May 2024 01:01)  HR: 77 (02 May 2024 13:00) (69 - 87)  BP: 112/77 (02 May 2024 07:10) (112/77 - 112/77)  BP(mean): 91 (02 May 2024 07:10) (91 - 91)  RR: 18 (02 May 2024 13:00) (16 - 20)  SpO2: 95% (02 May 2024 13:00) (91% - 100%)    Parameters below as of 02 May 2024 13:00  Patient On (Oxygen Delivery Method): nasal cannula w/ humidification  O2 Flow (L/min): 6      Physical Exam:  General: NAD, well appearing  Pulmonary: NLB on HFNC  Cardiovascular: RRR, no MGR  Abdominal: Soft nt nd, L groin soft, incision c/d/i   Extremities: WWP  Pulses: palpable pulses 2+ b/l groin and in BUE (radial ulnar) and BLE (DP,PT)   Neuro: AOx3, preserved sensory and ROM in lower extremities.          I&O's Detail    01 May 2024 07:01  -  02 May 2024 07:00  --------------------------------------------------------  IN:    IV PiggyBack: 100 mL    Lactated Ringers Bolus: 60 mL    Milrinone: 221 mL    Oral Fluid: 720 mL    Phenylephrine: 143.6 mL    sodium chloride 0.9%: 240 mL    Vasopressin: 211.5 mL  Total IN: 1696.1 mL    OUT:    Drain (mL): 75 mL    Indwelling Catheter - Urethral (mL): 750 mL    Intermittent Catheterization - Urethral (mL): 400 mL    Voided (mL): 550 mL  Total OUT: 1775 mL    Total NET: -78.9 mL      02 May 2024 07:01  -  02 May 2024 13:18  --------------------------------------------------------  IN:    Milrinone: 64.4 mL    Phenylephrine: 42.7 mL    sodium chloride 0.9%: 70 mL    Vasopressin: 63 mL  Total IN: 240.1 mL    OUT:    Voided (mL): 450 mL  Total OUT: 450 mL    Total NET: -209.9 mL          LABS:                        9.5    13.46 )-----------( 125      ( 02 May 2024 02:13 )             26.7     05-02    139  |  101  |  29<H>  ----------------------------<  170<H>  4.0   |  25  |  0.82    Ca    10.2      02 May 2024 02:13  Phos  2.9     05-02  Mg     2.2     05-02    TPro  7.1  /  Alb  4.4  /  TBili  1.2  /  DBili  x   /  AST  143<H>  /  ALT  138<H>  /  AlkPhos  99  05-02    PT/INR - ( 02 May 2024 02:13 )   PT: 11.9 sec;   INR: 1.05          PTT - ( 02 May 2024 02:13 )  PTT:25.0 sec  Urinalysis Basic - ( 02 May 2024 02:13 )    Color: x / Appearance: x / SG: x / pH: x  Gluc: 170 mg/dL / Ketone: x  / Bili: x / Urobili: x   Blood: x / Protein: x / Nitrite: x   Leuk Esterase: x / RBC: x / WBC x   Sq Epi: x / Non Sq Epi: x / Bacteria: x

## 2024-05-02 NOTE — PROGRESS NOTE ADULT - ASSESSMENT
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 Improving, now more mobile, walked today and pressors are being weaned as tolerated to keep  for adequate spinal perfusion.     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 At that time her aortic dissection was noted, but felt to delay surgical intervention until PH improved  - RHC reviewed (8/2024) - Of note there is NO PAWP WAVEFORM, unable to wedge, calculations made on estimated PAWP of 15  RA 13  PA 82/28/48  PAWP 15  CO/CI (F) 4.64/2.54  PVR 7.1WU  PVR/SVR 0.4    - She was subsequently started on riociguat and follow up TTE showed significant improvement in PH as well as subjectively she noted improvement  - Now on vaso/phenylephrine to drive SVR,however weaning off phenylephrine as MAP goal allows  - PA-C in place with no significant changes from her baseline other than increase in cardiac output  - Reassessed pulmonary pressures today, PVR is elevated but PVR/SVR ratio is 0.2, which signifies that elevated pressures are being driven by the high MAP goal currently (which she needs) rather than uncontrolled PH     Recommend:   - would continue with PA-C and invasive monitoring of hemodynamics while titrating vasoactive medications  - Continue with riociguat as she is at risk for rebound PH, although it may contribute to lower BP. Augment with pressor choices as outlined above  - Continue with Boo at 20 PPM for the moment; wean when able   - Maintain relative diuresis, avoid fluid boluses and would keep pt relatively net negative if RAP > 10  - Recommend doppler of LE - if she has evidence of DVT recommend IVC filter while she is off a/c; otherwise serial doppler exams until lumbar drain removed and A/C can be restarted  - Continue with opsumit given severity of PH at 10 mg, and will start process for ensuring she has this on discharge.     Discharge plan:  Would benefit from evaluation in formal CTEPH clinic for consideration of PTE (may be difficult in setting of Type B aortic dissection) vs balloon angioplasty and possible escalation of medical therapy, this can be done as an out patient. Patient can be seen in CTEPH clinic with Dr. Cabrera and Dr. Benites. 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 Improving, now more mobile, walked today and pressors are being weaned as tolerated to keep  for adequate spinal perfusion.     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 At that time her aortic dissection was noted, but felt to delay surgical intervention until PH improved  - RHC reviewed (8/2024) - Of note there is NO PAWP WAVEFORM, unable to wedge, calculations made on estimated PAWP of 15  RA 13  PA 82/28/48  PAWP 15  CO/CI (F) 4.64/2.54  PVR 7.1WU  PVR/SVR 0.4    - She was subsequently started on riociguat and follow up TTE showed significant improvement in PH as well as subjectively she noted improvement  - Now on vaso/phenylephrine to drive SVR,however weaning off phenylephrine as MAP goal allows  - PA-C in place with no significant changes from her baseline other than increase in cardiac output  - Reassessed pulmonary pressures today, PVR is elevated but PVR/SVR ratio is 0.2, which signifies that elevated pressures are being driven by the high MAP goal currently (which she needs) rather than uncontrolled PH     Recommend:   - would continue with PA-C and invasive monitoring of hemodynamics while titrating vasoactive medications  - Continue with riociguat as she is at risk for rebound PH, although it may contribute to lower BP. Augment with pressor choices as outlined above  - Maintain relative diuresis, avoid fluid boluses and would keep pt relatively net negative if RAP > 10  - Recommend doppler of LE - if she has evidence of DVT recommend IVC filter while she is off a/c; otherwise serial doppler exams until lumbar drain removed and A/C can be restarted  - Continue with opsumit given severity of PH at 10 mg, and will start process for ensuring she has this on discharge.     Discharge plan:  Would benefit from evaluation in formal CTEPH clinic for consideration of PTE (may be difficult in setting of Type B aortic dissection) vs balloon angioplasty and possible escalation of medical therapy, this can be done as an out patient. Patient can be seen in CTEPH clinic with Dr. Cabrera and Dr. Benites. Continue with medical therapy for now    PH will continue to follow

## 2024-05-02 NOTE — PROGRESS NOTE ADULT - ASSESSMENT
69 y/o F PMH asthma, CTEPH, CAD, HTN, Type B dissection now s/p TEVAR, Groin sites soft, pulses palpable, episode of decreased motor strength seems to have been improved following increased in MAP, patient currently on Milrinone, phenylephrine and vasopressin. Normal sensory to lower extremities. Lumbar drain removed on 5/1. Pulmonary on board for pulmonary hypertension. CT imaging and MRI with no findings of nerve compression or root compression.     - Continue neurovasacular checks per TEVAR ERAS protocol  - B/l groin checks per TEVAR ERAS protocol  - Rest of care per CTICU  - vascular surgery will continue to follow patient

## 2024-05-02 NOTE — OCCUPATIONAL THERAPY INITIAL EVALUATION ADULT - LIVES WITH, PROFILE
Bladder Infection, Female (Adult)    Urine is normally doesn't have any bacteria in it. But bacteria can get into the urinary tract from the skin around the rectum. Or they can travel in the blood from elsewhere in the body. Once they are in your urinary tract, they can cause infection in the urethra (urethritis), the bladder (cystitis), or the kidneys (pyelonephritis).  The most common place for an infection is in the bladder. This is called a bladder infection. This is one of the most common infections in women. Most bladder infections are easily treated. They are not serious unless the infection spreads to the kidney.  The phrases \"bladder infection,\" \"UTI,\" and \"cystitis\" are often used to describe the same thing. But they are not always the same. Cystitis is an inflammation of the bladder. The most common cause of cystitis is an infection.  Symptoms  The infection causes inflammation in the urethra and bladder. This causes many of the symptoms. The most common symptoms of a bladder infection are:  · Pain or burning when urinating  · Having to urinate more often than usual  · Urgent need to urinate  · Only a small amount of urine comes out  · Blood in urine  · Abdominal discomfort. This is usually in the lower abdomen above the pubic bone.  · Cloudy urine  · Strong- or bad-smelling urine  · Unable to urinate (urinary retention)  · Unable to hold urine in (urinary incontinence)  · Fever  · Loss of appetite  · Confusion (in older adults)  Causes  Bladder infections are not contagious. You can't get one from someone else, from a toilet seat, or from sharing a bath.  The most common cause of bladder infections is bacteria from the bowels. The bacteria get onto the skin around the opening of the urethra. From there, they can get into the urine and travel up to the bladder, causing inflammation and infection. This usually happens because of:  · Wiping improperly after urinating. Always wipe from front to  back.  · Bowel incontinence  · Pregnancy  · Procedures such as having a catheter inserted  · Older age  · Not emptying your bladder. This can allow bacteria a chance to grow in your urine.  · Dehydration  · Constipation  · Sex  · Use of a diaphragm for birth control   Treatment  Bladder infections are diagnosed by a urine test. They are treated with antibiotics and usually clear up quickly without complications. Treatment helps prevent a more serious kidney infection.  Medicines  Medicines can help in the treatment of a bladder infection:  · Take antibiotics until they are used up, even if you feel better. It is important to finish them to make sure the infection has cleared.  · You can use acetaminophen or ibuprofen for pain, fever, or discomfort, unless another medicine was prescribed. If you have chronic liver or kidney disease, talk with your healthcare provider before using these medicines. Also talk with your provider if you've ever had a stomach ulcer or gastrointestinal bleeding, or are taking blood-thinner medicines.  · If you are given phenazopydridine to reduce burning with urination, it will cause your urine to become a bright orange color. This can stain clothing.  Care and prevention  These self-care steps can help prevent future infections:  · Drink plenty of fluids to prevent dehydration and flush out your bladder. Do this unless you must restrict fluids for other health reasons, or your doctor told you not to.  · Proper cleaning after going to the bathroom is important. Wipe from front to back after using the toilet to prevent the spread of bacteria.  · Urinate more often. Don't try to hold urine in for a long time.  · Wear loose-fitting clothes and cotton underwear. Avoid tight-fitting pants.  · Improve your diet and prevent constipation. Eat more fresh fruit and vegetables, and fiber, and less junk and fatty foods.  · Avoid sex until your symptoms are gone.  · Avoid caffeine, alcohol, and spicy  foods. These can irritate your bladder.  · Urinate right after intercourse to flush out your bladder.  · If you use birth control pills and have frequent bladder infections, discuss it with your doctor.  Follow-up care  Call your healthcare provider if all symptoms are not gone after 3 days of treatment. This is especially important if you have repeat infections.  If a culture was done, you will be told if your treatment needs to be changed. If directed, you can call to find out the results.  If X-rays were done, you will be told if the results will affect your treatment.  Call 911  Call 911 if any of the following occur:  · Trouble breathing  · Hard to wake up or confusion  · Fainting or loss of consciousness  · Rapid heart rate  When to seek medical advice  Call your healthcare provider right away if any of these occur:  · Fever of 100.4ºF (38.0ºC) or higher, or as directed by your healthcare provider  · Symptoms are not better by the third day of treatment  · Back or belly (abdominal) pain that gets worse  · Repeated vomiting, or unable to keep medicine down  · Weakness or dizziness  · Vaginal discharge  · Pain, redness, or swelling in the outer vaginal area (labia)  Date Last Reviewed: 10/1/2016  © 5793-4842 [employer] [address] [place] [address], [place] [address]. All rights reserved. This information is not intended as a substitute for professional medical care. Always follow your healthcare professional's instructions.         Pt lives with her . Pt at baseline is ind for ADLs and functional mobility. No DME needed. + shower/tub./spouse

## 2024-05-02 NOTE — PROGRESS NOTE ADULT - SUBJECTIVE AND OBJECTIVE BOX
CTICU  CRITICAL  CARE  attending     Hand off received 					   Pertinent clinical, laboratory, radiographic, hemodynamic, echocardiographic, respiratory data, microbiologic data and chart were reviewed and analyzed frequently throughout the course of the day and night  Patient seen and examined with CTS/ SH attending at bedside  Pt is a 68y , Female, HEALTH ISSUES - PROBLEM Dx:  Ascending aorta dilation        , FAMILY HISTORY:  Family history of early CAD (Father)    PAST MEDICAL & SURGICAL HISTORY:  HTN (hypertension)      Prediabetes      Mild tricuspid regurgitation      Enlarged aorta      CAD (coronary artery disease)      Ascending aorta dilation      Pulmonary hypertension      Chronic systolic right heart failure      2019 novel coronavirus disease (COVID-19)      Asthma      S/P hysterectomy      S/P         Patient is a 68y old  Female who presents with a chief complaint of type B dissection (01 May 2024 19:16)      14 system review was unremarkable    Vital signs, hemodynamic and respiratory parameters were reviewed from the bedside nursing flowsheet.  ICU Vital Signs Last 24 Hrs  T(C): 36.5 (02 May 2024 08:32), Max: 37.2 (02 May 2024 01:01)  T(F): 97.7 (02 May 2024 08:32), Max: 99 (02 May 2024 01:01)  HR: 75 (02 May 2024 11:00) (69 - 87)  BP: 112/77 (02 May 2024 07:10) (112/77 - 112/77)  BP(mean): 91 (02 May 2024 07:10) (91 - 91)  ABP: 163/80 (02 May 2024 11:00) (153/76 - 265/265)  ABP(mean): 116 (02 May 2024 11:00) (108 - 265)  RR: 17 (02 May 2024 11:00) (16 - 20)  SpO2: 93% (02 May 2024 11:00) (91% - 100%)    O2 Parameters below as of 02 May 2024 11:00  Patient On (Oxygen Delivery Method): nasal cannula w/ humidification  O2 Flow (L/min): 6        Adult Advanced Hemodynamics Last 24 Hrs  CVP(mm Hg): 6 (02 May 2024 11:00) (6 - 23)  CVP(cm H2O): --  CO: 6.5 (01 May 2024 21:00) (5.6 - 6.5)  CI: 3.4 (01 May 2024 21:00) (3 - 3.4)  PA: 82/21 (02 May 2024 11:00) (75/22 - 100/38)  PA(mean): 45 (02 May 2024 11:00) (42 - 61)  PCWP: --  SVR: 1302 (01 May 2024 21:00) (1302 - 1540)  SVRI: 2491 (01 May 2024 21:00) (9081 - 0486)  PVR: --  PVRI: --, ABG - ( 02 May 2024 02:13 )  pH, Arterial: 7.47  pH, Blood: x     /  pCO2: 36    /  pO2: 87    / HCO3: 26    / Base Excess: 2.7   /  SaO2: 98.9                Intake and output was reviewed and the fluid balance was calculated  Daily     Daily   I&O's Summary    01 May 2024 07:01  -  02 May 2024 07:00  --------------------------------------------------------  IN: 1696.1 mL / OUT: 1775 mL / NET: -78.9 mL    02 May 2024 07:01  -  02 May 2024 11:24  --------------------------------------------------------  IN: 137.2 mL / OUT: 100 mL / NET: 37.2 mL        All lines and drain sites were assessed  [facility]      POCT Blood Glucose.: 161 mg/dL (02 May 2024 07:47)    No acute change in mental status  Auscultation of the chest reveals equal bs  Abdomen is soft  Extremities are warm and well perfused  Wounds appear clean and unremarkable  Antibiotics are periop    labs  CBC Full  -  ( 02 May 2024 02:13 )  WBC Count : 13.46 K/uL  RBC Count : 3.60 M/uL  Hemoglobin : 9.5 g/dL  Hematocrit : 26.7 %  Platelet Count - Automated : 125 K/uL  Mean Cell Volume : 74.2 fl  Mean Cell Hemoglobin : 26.4 pg  Mean Cell Hemoglobin Concentration : 35.6 gm/dL  Auto Neutrophil # : 11.00 K/uL  Auto Lymphocyte # : 0.82 K/uL  Auto Monocyte # : 1.43 K/uL  Auto Eosinophil # : 0.00 K/uL  Auto Basophil # : 0.02 K/uL  Auto Neutrophil % : 81.8 %  Auto Lymphocyte % : 6.1 %  Auto Monocyte % : 10.6 %  Auto Eosinophil % : 0.0 %  Auto Basophil % : 0.1 %    05    139  |  101  |  29<H>  ----------------------------<  170<H>  4.0   |  25  |  0.82    Ca    10.2      02 May 2024 02:13  Phos  2.9     05-02  Mg     2.2     -    TPro  7.1  /  Alb  4.4  /  TBili  1.2  /  DBili  x   /  AST  143<H>  /  ALT  138<H>  /  AlkPhos  99  05-02    PT/INR - ( 02 May 2024 02:13 )   PT: 11.9 sec;   INR: 1.05          PTT - ( 02 May 2024 02:13 )  PTT:25.0 sec  The current medications were reviewed   MEDICATIONS  (STANDING):  albuterol    90 MICROgram(s) HFA Inhaler 2 Puff(s) Inhalation every 6 hours  albuterol/ipratropium for Nebulization 3 milliLiter(s) Nebulizer every 6 hours  ceFAZolin   IVPB      ceFAZolin   IVPB 2000 milliGRAM(s) IV Intermittent every 8 hours  chlorhexidine 2% Cloths 1 Application(s) Topical daily  dextrose 10% Bolus 125 milliLiter(s) IV Bolus once  dextrose 5%. 1000 milliLiter(s) (50 mL/Hr) IV Continuous <Continuous>  dextrose 5%. 1000 milliLiter(s) (100 mL/Hr) IV Continuous <Continuous>  dextrose 50% Injectable 12.5 Gram(s) IV Push once  dextrose 50% Injectable 25 Gram(s) IV Push once  furosemide   Injectable 20 milliGRAM(s) IV Push every 12 hours  glucagon  Injectable 1 milliGRAM(s) IntraMuscular once  influenza  Vaccine (HIGH DOSE) 0.7 milliLiter(s) IntraMuscular once  insulin lispro (ADMELOG) corrective regimen sliding scale   SubCutaneous Before meals and at bedtime  macitentan 10 milliGRAM(s) Oral <User Schedule>  midodrine 10 milliGRAM(s) Oral every 8 hours  milrinone Infusion 0.375 MICROgram(s)/kG/Min (9.18 mL/Hr) IV Continuous <Continuous>  modafinil 100 milliGRAM(s) Oral daily  pantoprazole    Tablet 40 milliGRAM(s) Oral daily  phenylephrine    Infusion 0.399 MICROgram(s)/kG/Min (12.2 mL/Hr) IV Continuous <Continuous>  polyethylene glycol 3350 17 Gram(s) Oral daily  riociguat 2.5 milliGRAM(s) Oral every 8 hours  senna 2 Tablet(s) Oral at bedtime  sodium chloride 0.9%. 1000 milliLiter(s) (10 mL/Hr) IV Continuous <Continuous>  vasopressin Infusion 0.02 Unit(s)/Min (3 mL/Hr) IV Continuous <Continuous>    MEDICATIONS  (PRN):  acetaminophen     Tablet .. 650 milliGRAM(s) Oral every 6 hours PRN Mild Pain (1 - 3)  dextrose Oral Gel 15 Gram(s) Oral once PRN Blood Glucose LESS THAN 70 milliGRAM(s)/deciliter  sodium chloride 0.65% Nasal 1 Spray(s) Both Nostrils three times a day PRN dry nose       PROBLEM LIST/ ASSESSMENT:  HEALTH ISSUES - PROBLEM Dx:  Ascending aorta dilation        ,   Patient is a 68y old  Female who presents with a chief complaint of type B dissection (01 May 2024 19:16)     s/p cardiac surgery    Acute systolic and diastolic heart failure evidenced by SOB and parenchymal infiltrates; will treat with diuresis    severe pulm htn    Cardiogenic shock on ionotropy    Vasogenic shock due to hypotension in the cticu , will keep on pressors    Acute blood loss anemia with relative hypotension treated with > 1 unit PC    Acute post operative pulmonary insufficiency ruled in due to hypoxemia, O2 sats < 91% on RA treated with HFNC    transaminitis      My plan includes :    will cap ld    if strength on ambuln good then will dc it  close hemodynamic, ventilatory and drain monitoring and management per post op routine    Monitor for arrhythmias and monitor parameters for organ perfusion  beta blockade not administered due to hemodynamic instability and bradycardia  monitor neurologic status  Head of the bed should remain elevated to 45 deg .   chest PT and IS will be encouraged  monitor adequacy of oxygenation and ventilation and attempt to wean oxygen  antibiotic regimen will be tailored to the clinical, laboratory and microbiologic data  Nutritional goals will be met using po eventually , ensure adequate caloric intake and montior the same  Stress ulcer and VTE prophylaxis will be achieved    Glycemic control is satisfactory  Electrolytes have been repleted as necessary and wound care has been carried out. Pain control has been achieved.   agressive physical therapy and early mobility and ambulation goals will be met   The family was updated about the course and plan  CRITICAL CARE TIME Upon my evaluation, this patient had a high probability of imminent or life-threatening deterioration due to the above problems which required my direct attention, intervention, and personal management.  I have personally provided 150 minutes of critical care time exclusive of time spent on separately billable procedures. Time included review of laboratory data, radiology results, discussion with consultants, and monitoring for potential decompensation. Interventions were performed as documented abovepersonally provided by me  in evaluation and management, reassessments, review and interpretation of labs and x-rays, ventilator and hemodynamic management, formulating a plan and coordinating care: ___150___ MIN.  Time does not include procedural time.    CTICU ATTENDING     					    Eder Conway MD

## 2024-05-02 NOTE — PROCEDURE NOTE - ADDITIONAL PROCEDURE DETAILS
rhc inserted via cordis balloon floatation technique
Lumbar drain clamped. Pt positioned in lateral recumbent. Dressings removed from around drain, area cleansed well with chlorhexidine. Procedure done in sterile fashion. Sutures removed. Area cleansed again with chlorhexidine. Drain removed, tip visualized. Singular 3.0 silk suture placed at drain site. No bleeding or leakage of CSF. Drain site covered with gauze and tegaderm.
Lumbar drain insertion without evidence of bloody CSF to denote a hematoma in the spinal canal. Mildly elevated pressure on insertion however not checked with manometry. Drain inserted without resistance and draining well at the completion of the procedure.
Lumbar drain removed in a sterile fashion. 1 monocryl suture was placed. Patient tolerated procedure well.
DESCRIPTION OF PROCEDURE:  The patient was placed in the left lateral position and the lumbar region was prepped and draped in the routine manner. The region was thoroughly infiltrated with lidocaine. A Tuohy needle was inserted into the L3-4 interspace. The needle was advanced until CSF egress was noted. The catheter was inserted through the Tuohy needle without difficulty. Excellent outflow of CSF was obtained. The catheter was secured to the skin with sutures and was then taped to the back and flank. The lumbar drain was then connected to the external ventricular drainage chamber in a sterile fashion. The patient tolerated the procedure well and no complications were noted.

## 2024-05-02 NOTE — OCCUPATIONAL THERAPY INITIAL EVALUATION ADULT - RANGE OF MOTION EXAMINATION, UPPER EXTREMITY
Rica Older  : 1992  Payor: Margarito Grider / Plan: [facility] / Product Type: PPO /  Therapy Center at On license of [facility]IM BOTELLO  [address] [place] [address], [address], [place] [address].  [phone]   [phone]       510 Post Session:  5/10   Medications Last Reviewed:  10-26-Gia Lovett PT [facility]   11/2/2021 12:15 PM ELIANA Thomson [facility]   11/5/2021 12:00 PM Gia Lovett PT [facility]   11/23/2021 10:45 AM Vasyl Duke MD [facility] DOREEN bilateral UE Active ROM was WFL  (within functional limits)

## 2024-05-02 NOTE — PROGRESS NOTE ADULT - ATTENDING COMMENTS
CTEPH and PAH 2/2 drug/toxins, continues to be on pressors to augment MAP and improve spinal perfusion, phenylephrine briefly weaned off although still intermittently requiring. Repeated PA-C hemodynamics, reassuringly PVR/SVR is 0.2, high normal CO/CI. Tolerating macitentan without issue. LFTs mildly elevated prior to starting, no contraindication for use and will continue to monitor closely. Recommend continuing PA-C until phenylephrine definitively weaned off. Lumbar drain to be removed today and plan to start A/C 24 hrs after removal. Continue with milrinone, vasopressin, riociguat, and macitentan. Maintain CVP < 10. Rest as above.

## 2024-05-02 NOTE — OCCUPATIONAL THERAPY INITIAL EVALUATION ADULT - PERTINENT HX OF CURRENT PROBLEM, REHAB EVAL
68yr old female with PMH asthma, CTEPH, CAD, HTN, Type B Dissection admitted for TEVAR. Pt underwent LD placement 4/25. 4/26: Pt underwent endovascular repair of thoracic aorta covering L subclavian to ~5cm above celiac trunk (Doyle). Arrived Extubated. Clamped 4/27 and 4/28 and drained overnight. Ld removed in afternoon. 4/29: early AM pt unable to move LE, stroke code called. NSGY replaced LD. MAPs driven up. CT imaging and MRI imaging performed. Pulmonary consulted for CTEPH mgmt. Boo and primacor restarted with new swan placement. 1 pRBC, mannitol, and decadron also given. 4/30: Midodrine started. 5/1: Macitetan started. Boo weaned to 5. LD capped during day.   Pt s/p lumbar drain removal 5/2/24.

## 2024-05-02 NOTE — OCCUPATIONAL THERAPY INITIAL EVALUATION ADULT - LIGHT TOUCH SENSATION, LUE, REHAB EVAL
"PSYCHIATRY DISCHARGE SUMMARY    Patient: Arelis Schmid Date: 12/3/2018   : 1999 Attending: Bindu Saul MD   23year old female      Time spent on discharge:Over thirty minutes spent in final exam and discharge day activities. Reason for Hospitalization: Per Dr Cathie Hernandez: Arelis Schmid is a 23year old domiciled, unemployed  and single White  female with pphx of borderline personality disorder, bipolar disorder, psychosis, bulimia nervosa, polysubstance dependence and pmhx of asthma voluntarily admitted for SI and alcohol, sedative, and opiate detoxes. Pt endorses symptoms of Depression:  depressed mood, poor concentration, lack of energy, decreased sleep, difficulty falling asleep, difficulty maintaining sleep, early morning awakening, decreased appetite, weight loss, excessive guilt, hopelessness, helplessness, worthlessness, psychomotor retardation, decreased interest, impairments in functioning, urges to self harm and SI (with plan to overdose on heroin). Associated symptoms include Anxiety:  feeling nervous, anxious or on edge, not able to control racing thoughts, restlessness, excessive worrying, ruminating, being easily irritated or agitated , not able to relax and muscle tension and Psychosis:  visual hallucinations. Pt endorses  Visual hallucinations in the forms of seeing people, seeing blood, and  seeing cuts on her arms. Pt reports history of eating disorder and she is currently restricting and purging. Pt said she eats one meal per day, purging every time she eats. Current symptoms have been present for past 2 months since her boyfriend  with acute exacerbation , progression and acute SI that pose a threat to life. Â   Patient reports using heroin for past year, previously was doing crack, meth and coke. Pt reports that the amount used is about ""all day, I shoot up all day\"", about $200 a day, last use was on Friday.  She unintentionally overdose last Friday, received Narcan at the hospital. " "She was previously using xanax daily, stopped a couple of months ago. Pt endorses Opiate withdrawal symptoms: sweating, restlessness, anxiety, nausea , vomiting, diarrhea, generalized aches and pain. Pt endorses history of seizures, last seizure was 2 weeks ago, cause unknown. Pt endorses continued use of heroin despite impairments in functioning , damages to relationships, endangerment of self or others or loss of time in order to obtain substances and distress related to maladaptive patterns of using with cravings and urges to use , tolerance, withdrawal symptoms and a desire to cutdown without success. Pt reports drinking ""one liter or more\"" of hard liquor daily over the past two weeks, last use was 2018 and reports drinking 10 beers. She was using xanax bars in the past but hasn't done this for past month. Pt reports history of seizures, stating her last seizure was two weeks ago, on carbamazepine. . Pt was unsure of the cause of her seizures. Pt said she used methamphetamines once in the past month and crack cocaine \""occasionally\"" over the past month. She is also smoking MJ daily. 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 History of treatment includes [facility] PHP, [facility] IOP, and NA. Admitting Diagnosis:   Mood disorder with psychosis, history of bipolar disorder (primary diagnosis)  History of borderline personality disorder  Bulimia nervosa  Polysubstance use disorders  Alcohol withdrawal syndrome  Opiate withdrawal syndrome  Seizure disorder    Medical History:   Active Hospital Problems    Diagnosis Date Noted   â¢ Seizure disorder (CMS/[facility]) 10/29/2018     Priority: Low   â¢ Acute UTI 10/29/2018     Priority: Low   â¢ Tobacco abuse 2018     Priority: Low   â¢ Chronic migraine 2018     Priority: Low   â¢ Bulimia nervosa 2018     Priority: Low   â¢ Borderline personality disorder (CMS/[facility]) 2018 " Priority: Low   â¢ Mood disorder with psychosis (CMS/[facility]) 11/26/2018       Consultations: History and Physical Examination refer to consultation. Laboratory Tests:  Lab Results   Component Value Date    WBC 7.1 11/30/2018    HGB 11.0 (L) 11/30/2018    HCT 34.1 (L) 11/30/2018     11/30/2018    SODIUM 137 11/30/2018    POTASSIUM 4.0 11/30/2018    CHLORIDE 101 11/30/2018    CO2 31 11/30/2018    GLUCOSE 115 (H) 11/30/2018    BUN 10 11/30/2018    CREATININE 0.73 11/30/2018    CALCIUM 8.4 11/30/2018    ALBUMIN 3.0 (L) 11/30/2018    MG 2.1 11/30/2018    BILIRUBIN 0.1 (L) 11/30/2018    ALKPT 94 11/30/2018    AST 15 11/30/2018    GPT 21 11/30/2018    PHOS 2.9 11/30/2018       Pending Labs: none     NATALIE - Cocaine: Negative  MOP - Morphine, Opiates: Negative  BUPG - Buprenorphine: Negative  AMP - Amphetamines: Negative     OXY - Oxycodone : Negative  MET - Methamphetamine: Negative  BAR - Barbituates: Negative  THC - Marijuana: Positive  BZO - Benzodiazepine: Positive  MTD - Methadone: Negative  MDMA - Methylenedioxymethamphetamine: Negative    Vitals:    12/03/18 0757   BP: 116/73   Pulse: 103   Resp:    Temp:        Hospital Course: Ms Ammon Aguilera was admitted Voluntarily and seen by medical doctor for history and physical exam. Lab work was drawn with results shown above. The patient was encouraged to attend group therapy sessions to gain psychoeducational benefit. Necessary PRN's for agitation, insomnia, pain, nausea/vomiting, bowel prep were ordered. Patient completed alcohol, sedative and opiate detoxes successfully. She was put on Suboxone taper and may consider naltrexone in the future. Much of her PTA medications were resumed. Gabapentin was increased to help with anxiety and Seroquel was also increased to help with mood lability and psychosis. Abilify was stopped because it was not helpful. Her UTI was also treated with Macrobid. Eating disorder precautions were also in place.  She did not engage in any purging behaviors although she would restrict at times. Occasionally her pulse would be high however EKG was within normal limits with QTC of 382-390 ms. Lithium level on 11/27 was 0.8, dose has not been changed for a long time. She is also on carbamazepine for seizures, level on 11/29 was 9.1. Dose was increased to 300mg bid for mood lability and twiching concerning for seizure on 11/29. She was [facility] to [facility].    At the time of discharge, patient denied AH/VH. Mood was improved. Was tolerating medications well. Denied any thoughts of harming self or others and promised to seek help immediately if they start to have such thoughts. Mental Status Exam:  General appearance: in no acute distress  Speech   Rate: normal  Volume: normal  Latency: normal  Mood/affect: euthymic  Psychomotor: normal  Associations: intact  Thought process: intact  Thought content: unremarkable. Denies SI/HI. Perceptual disorders/hallucinations: none  Orientation: oriented to person, place, time, and general circumstances  Attention: ability to maintain attention   Concentration: Normal  Language: Normal  Fund of knowledge: average  Memory: no apparent impairments in short term memory and no apparent impairments in long term memory   Insight: fair  Judgement: fair    Post Hospitalization Plan:   AFTERCARE PLAN  Â   [address] [place] [address] beginning, Monday December 3rd You will be walked directly to the [facility] upon discharge from inpatient. [facility]  [address]  [phone]  Â   Group Therapy:   Appointment: Wednesday, December 5th at 5:30pm  [facility]  [address] [facility] [place]  [phone]  Â   Medication Management: Jeff Stuart  Appointment: Wednesday, December 12th at 11:30am  [facility]  [address] [facility] [place]  [phone]  Â   Individual Therapy:   Appointment:  Wednesday, December 12th at 2:00pm  [facility]  [address] [place] [address]  [phone]    Discharge on: 12/3/2018    Discharge To: [facility]    Next Level of Care Recommended:  [facility] Residential Rehabilitation Program: Accepts. AODA Placement Criteria    1. Withdrawal Potential:Level III.5: High Intensive Residential - Not at risk of withdrawal, or is experiencing minimal or stable withdrawal.    2. Biomedical Conditions & Complications: Level III.5: High Intensive Residential - None or stable, or the patient is receiving concurrent medical monitoring. 3. Emotional, Behavioral or Cognitive Conditions & Complications: Level III.5: High Intensive Residential - None/minimal/not distracting to recovery,    4. Readiness to Change: Level III.5: High Intensive Residential - Open to recovery but needs structured environment to maintain therapeutic gains. 5. Relapse, Continued Use, or Continued Problem Potential Recovery Environment: Level III.5: High Intensive Residential - Understands relapse, but needs structure to maintain therapeutic gains. 6. Recovery Environment:Level III.5: High Intensive Residential - Patient's environment is dangerous, but recovery is achievable if residential/24 hr structure is available. Refer to: [facility] [place].    This information has been disclosed to you from records protected by [facility] confidentiality rules (42 CFR part 2). The Federal rules prohibit you from making any further disclosure of this information unless further disclosure is expressly permitted by the written consent of the person to whom it pertains or as otherwise permitted by 42 CFT part2. A general authorization for the release of medical or other information is NOT sufficient for this purpose. The Federal rules restrict any use of the information to criminally investigate or prosecute any alcohol or drug abuse patient.       PDMP reviewed and no concerns at this time    Discharge Medications:      Summary of your Discharge Medications      Take these Medications      Details   albuterol 108 (90 Base) MCG/ACT inhaler   Inhale 2 puffs into the lungs Every 4 hours as needed for Shortness of Breath or Wheezing. carBAMazepine 200 MG tablet  Commonly known as:  TEGretol   Take 1 tablet by mouth every 12 hours. cholecalciferol 1000 UNITS tablet  Commonly known as:  VITAMIN D3  Start taking on:  12/4/2018   Take 2 tablets by mouth daily. ferrous sulfate 325 (65 FE) MG tablet  Start taking on:  12/4/2018   Take 1 tablet by mouth daily (with breakfast). gabapentin 300 MG capsule  Commonly known as:  NEURONTIN   Take 2 capsules by mouth 3 times daily. hydrOXYzine 25 MG tablet  Commonly known as:  ATARAX   Take 1 tablet by mouth every 4 hours as needed for Anxiety. ibuprofen 200 MG tablet  Commonly known as:  MOTRIN   Take 800 mg by mouth every 6 hours as needed for Pain.     lithium 450 MG CR tablet  Commonly known as:  ESKALITH   Take 1 tablet by mouth 2 times daily. norethindrone-ethinyl estradiol-FE 1-20 MG-MCG per tablet  Commonly known as:  JUNEL FE 1/20  Start taking on:  12/4/2018   Take 1 tablet by mouth daily. prazosin 2 MG capsule  Commonly known as:  MINIPRESS   Take 1 capsule by mouth nightly. QUEtiapine 200 MG tablet  Commonly known as:  SEROquel   Take 1 tablet by mouth nightly. sertraline 100 MG tablet  Commonly known as:  ZOLOFT  Start taking on:  12/4/2018   Take 1 tablet by mouth daily. sumatriptan 50 MG tablet  Commonly known as:  IMITREX   Take 1 tablet by mouth at onset of migraine. May repeat after 2 hours if needed.             Tobacco Best Practice  Nicotine replacement therapy not ordered due to: Patient is not interested     Patient is discharged with directions to take two or more scheduled antipsychotic agents:  No       Diet: Regular expect foods, if any, in allergy list.    Activity Level: As tolerated    Legal: Voluntary     Final Diagnosis for this level of Care:  Mood disorder with psychosis, history of bipolar disorder (primary diagnosis)  History of borderline personality disorder  Bulimia nervosa  Polysubstance use disorders  Alcohol withdrawal syndrome  Opiate withdrawal syndrome  Seizure disorder    This information is confidential and disclosure without patient consent or statutory authorization is prohibited by law.     Elizabeth Worley MD  Inpatient Psychiatry within normal limits

## 2024-05-02 NOTE — PROGRESS NOTE ADULT - SUBJECTIVE AND OBJECTIVE BOX
CTICU  CRITICAL  CARE  attending     Hand off received 					   Pertinent clinical, laboratory, radiographic, hemodynamic, echocardiographic, respiratory data, microbiologic data and chart were reviewed and analyzed frequently throughout the course of the day and night        68 year old female with ASTHMA, Chronic Pulmonary HTN,, CAD, HTN.  She was evaluated by her pulmonologist.  Dr. Cathy Serra, was noted to have an elevated D-Dimer and was advised to present to ED for workup. In ED a CTA C/A/P was performed which revealed a Type B dissection and she was admitted and discharged with close follow up.   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   She was deemed a candidate for TEVAR with Dr. Kwon and Dr. Suh and presented to [facility] for preop lumbar drain prior to OR     S/P TEVAR.        FAMILY HISTORY:  Family history of early CAD (Father)    PAST MEDICAL & SURGICAL HISTORY:  HTN (hypertension)  Prediabetes Mellitis  Mild tricuspid regurgitation  Enlarged aorta  CAD (coronary artery disease)  Ascending aorta dilation  Pulmonary hypertension  Chronic systolic right heart failure  2019 novel coronavirus disease (COVID-19)  Asthma  S/P hysterectomy  S/P         14 system review was unremarkable    Vital signs, hemodynamic and respiratory parameters were reviewed from the bedside nursing flow sheet.  ICU Vital Signs Last 24 Hrs  T(C): 36.9 (02 May 2024 17:26), Max: 37.2 (02 May 2024 01:01)  T(F): 98.5 (02 May 2024 17:26), Max: 99 (02 May 2024 01:01)  HR: 88 (02 May 2024 21:00) (69 - 101)  BP: 112/77 (02 May 2024 07:10) (112/77 - 112/77)  BP(mean): 91 (02 May 2024 07:10) (91 - 91)  ABP: 141/73 (02 May 2024 21:00) (141/73 - 171/90)  ABP(mean): 100 (02 May 2024 21:00) (100 - 125)  RR: 16 (02 May 2024 21:00) (16 - 19)  SpO2: 96% (02 May 2024 21:00) (92% - 100%)    O2 Parameters below as of 02 May 2024 21:00  Patient On (Oxygen Delivery Method): nasal cannula, high flow  O2 Flow (L/min): 60  O2 Concentration (%): 50      Adult Advanced Hemodynamics Last 24 Hrs  CVP(mm Hg): 15 (02 May 2024 21:00) (5 - 17)  CVP(cm H2O): --  CO: 6.4 (02 May 2024 21:00) (6.4 - 6.4)  CI: 3.4 (02 May 2024 21:00) (3.4 - 3.4)  PA: 89/33 (02 May 2024 21:00) (63/28 - 94/30)  PA(mean): 56 (02 May 2024 21:00) (43 - 59)  PCWP: --  SVR: 1061 (02 May 2024 21:00) (1061 - 1061)  SVRI:  (02 May 2024 21:00) ( - )  PVR: --  PVRI: --, ABG - ( 02 May 2024 16:32 )  pH, Arterial: 7.47  pH, Blood: x     /  pCO2: 33    /  pO2: 69    / HCO3: 24    / Base Excess: 0.9   /  SaO2: 95.2                Intake and output was reviewed and the fluid balance was calculated  Daily     Daily   I&O's Summary    01 May 2024 07:01  -  02 May 2024 07:00  --------------------------------------------------------  IN: 1696.1 mL / OUT: 1775 mL / NET: -78.9 mL    02 May 2024 07:01  -  02 May 2024 21:14  --------------------------------------------------------  IN: 1318.9 mL / OUT: 850 mL / NET: 468.9 mL            Neuro: Improved strength in both lower extremities.  Neck: No JVD.  CVS: S1, S2, No S3.  Lungs: Good air entry bilaterally.  Abd: Soft. No tenderness. + Bowel sounds.  Vascular: + DP/PT.  Extremities: No edema.  Lymphatic: Normal.  Skin: No abnormalities.      labs  CBC Full  -  ( 02 May 2024 16:32 )  WBC Count : 12.69 K/uL  RBC Count : 4.21 M/uL  Hemoglobin : 11.2 g/dL  Hematocrit : 31.7 %  Platelet Count - Automated : 116 K/uL  Mean Cell Volume : 75.3 fl  Mean Cell Hemoglobin : 26.6 pg  Mean Cell Hemoglobin Concentration : 35.3 gm/dL  Auto Neutrophil # : 9.83 K/uL  Auto Lymphocyte # : 1.06 K/uL  Auto Monocyte # : 1.50 K/uL  Auto Eosinophil # : 0.05 K/uL  Auto Basophil # : 0.03 K/uL  Auto Neutrophil % : 77.5 %  Auto Lymphocyte % : 8.4 %  Auto Monocyte % : 11.8 %  Auto Eosinophil % : 0.4 %  Auto Basophil % : 0.2 %    05-02    139  |  101  |  31<H>  ----------------------------<  136<H>  3.6   |  24  |  0.74    Ca    10.1      02 May 2024 16:49  Phos  2.4     05-02  Mg     2.1     05-02    TPro  7.6  /  Alb  5.0  /  TBili  2.1<H>  /  DBili  x   /  AST  136<H>  /  ALT  135<H>  /  AlkPhos  98  05-02    PT/INR - ( 02 May 2024 16:32 )   PT: 12.1 sec;   INR: 1.06          PTT - ( 02 May 2024 16:32 )  PTT:24.9 sec  The current medications were reviewed   MEDICATIONS  (STANDING):  albuterol    90 MICROgram(s) HFA Inhaler 2 Puff(s) Inhalation every 6 hours  albuterol/ipratropium for Nebulization 3 milliLiter(s) Nebulizer every 6 hours  ceFAZolin   IVPB      ceFAZolin   IVPB 2000 milliGRAM(s) IV Intermittent every 8 hours  chlorhexidine 2% Cloths 1 Application(s) Topical daily  dextrose 10% Bolus 125 milliLiter(s) IV Bolus once  dextrose 5%. 1000 milliLiter(s) (100 mL/Hr) IV Continuous <Continuous>  dextrose 5%. 1000 milliLiter(s) (50 mL/Hr) IV Continuous <Continuous>  dextrose 50% Injectable 12.5 Gram(s) IV Push once  dextrose 50% Injectable 25 Gram(s) IV Push once  furosemide   Injectable 20 milliGRAM(s) IV Push every 12 hours  glucagon  Injectable 1 milliGRAM(s) IntraMuscular once  influenza  Vaccine (HIGH DOSE) 0.7 milliLiter(s) IntraMuscular once  insulin lispro (ADMELOG) corrective regimen sliding scale   SubCutaneous Before meals and at bedtime  macitentan 10 milliGRAM(s) Oral <User Schedule>  midodrine 10 milliGRAM(s) Oral every 8 hours  milrinone Infusion 0.375 MICROgram(s)/kG/Min (9.18 mL/Hr) IV Continuous <Continuous>  pantoprazole    Tablet 40 milliGRAM(s) Oral daily  phenylephrine    Infusion 0.399 MICROgram(s)/kG/Min (12.2 mL/Hr) IV Continuous <Continuous>  polyethylene glycol 3350 17 Gram(s) Oral daily  potassium phosphate IVPB 15 milliMole(s) IV Intermittent once  riociguat 2.5 milliGRAM(s) Oral every 8 hours  senna 2 Tablet(s) Oral at bedtime  sodium chloride 0.9%. 1000 milliLiter(s) (10 mL/Hr) IV Continuous <Continuous>  vasopressin Infusion 0.02 Unit(s)/Min (3 mL/Hr) IV Continuous <Continuous>    MEDICATIONS  (PRN):  dextrose Oral Gel 15 Gram(s) Oral once PRN Blood Glucose LESS THAN 70 milliGRAM(s)/deciliter  sodium chloride 0.65% Nasal 1 Spray(s) Both Nostrils three times a day PRN dry nose          68 year old  Female admitted with type B dissection.  S/P TEVAR (Dual Grafts).  Post operative course complicated by celiac artery thrombus and non obstructive DVT of left femoral and left popliteal vein.  Venous Duplex: Small nonobstructive likely chronic nonobstructive thrombus in themidportion of the left femoral vein and left popliteal vein. She was started on IV heparin.  CT ABDOMEN: Interval TEVAR of Type B aortic dissection. Stable aneurysmal dilatation of false lumen below distal graft attachment site which is perfused.  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 Distal common hepatic artery is perfused.  Multifocal splenic infarcts and multiple small renal infarcts bilaterally, likely embolic etiology.  Further hospital postoperative course complicated by weakness in both legs. Patient started on vasopressor support to keep MAP above 100. IV heparin  was discontinued.    MRI: The lower thoracic and upper lumbar spine is partially obscured by magnetic susceptibility artifact caused by the endovascular stent graft.  A lumbar drain is seen entering the spinal canal at the L1-L2 level on CT   scan performed earlier today, is not clearly visualized on the MRI.  BONES: There is no fracture or bone marrow edema.  ALIGNMENT: The alignment is normal.  SACROILIAC JOINTS/SACRUM: There is no sacral fracture. The SI joints are partially visualized but are intact.  CONUS AND CAUDA EQUINA: The distal cord and conus are obscured by susceptibility artifact.  VISUALIZED INTRAPELVIC/INTRA-ABDOMINAL SOFT TISSUES: Normal.  PARASPINAL SOFT TISSUES: Normal.  LOWER THORACIC SPINE: The T11-T12 and T12-L1 levels are obscured by susceptibility artifact.  L1-L2: Evaluation is degraded by susceptibility artifact.  Minimal disc bulge.  No spinal canal stenosis or neural foraminal narrowing.  L2-L3: Small disc bulge.  No spinal canal stenosis.  Minimal neural foraminal narrowing.  L3-L4: Small disc bulge.  Mild facet hypertrophy.  Minimal spinal canal stenosis.  Mild neural foraminal narrowing, right greater than left.  L4-L5: Small disc bulge.  Moderate facet hypertrophy with small facet joint effusions bilaterally.  Mild ligamentum flavum hypertrophy.  Mild   spinal canal stenosis.  Mild neural foraminal narrowing bilaterally.  L5-S1: Small disc bulge.  Moderate facet hypertrophy with trace facet joint effusions bilaterally.  Mild to moderate neural foraminal narrowing bilaterally.  SUMMARY:  No MRI evidence of lumbar spine fracture, malalignment or suspicious  osseous lesion. Multilevel lumbar spondylosis and degenerative disc disease  No evidence for cauda equina compression or nerve root impingement.  Lumbar drain was removed today. Neuro exam back to the baseline.   PA systolic pressure close to 90.   Stable blood pressure.  Good oxygenation.  Fair urine out put.        My plan includes :  Keep MAP . (IV vasopressin @ 0.03)  High dose Statin Rx.  IV milrinone  Resume IV heparin in AM followed by NOAC.  Bronchodilator Rx.  Macitentan and riociguat for pulmonary hypertension.   D/C phenylephrine.  Close hemodynamic monitoring.  Monitor for arrhythmias and monitor parameters for organ perfusion  Monitor neurologic status  Monitor renal function.  Head of the bed should remain elevated to 45 deg .   Chest PT and IS will be encouraged  Monitor adequacy of oxygenation .  Nutritional goals will be met using po eventually , ensure adequate caloric intake and monitor the same  Stress ulcer and VTE prophylaxis will be achieved    Glycemic control is satisfactory  Electrolytes have been repleted as necessary and wound care has been carried out. Pain control has been achieved.   Aggressive physical therapy and early mobility and ambulation goals will be met   The family was updated about the course and plan  CRITICAL CARE TIME SPENT in evaluation and management, reassessments, review and interpretation of labs and x-rays, hemodynamic management, formulating a plan and coordinating care: ___30____ MIN.  Time does not include procedural time.  CTICU ATTENDING     					    Yanick Degroot MD

## 2024-05-02 NOTE — PROGRESS NOTE ADULT - SUBJECTIVE AND OBJECTIVE BOX
PULMONARY CONSULT SERVICE FOLLOW-UP NOTE    INTERVAL HPI:  Reviewed chart and overnight events; patient seen and examined at bedside. She feels significantly improved overall, denies dyspnea currently.  She was able to walk today.   Pressors are being weaned.   No new complaints otherwise.     MEDICATIONS:  Pulmonary:  albuterol    90 MICROgram(s) HFA Inhaler 2 Puff(s) Inhalation every 6 hours  albuterol/ipratropium for Nebulization 3 milliLiter(s) Nebulizer every 6 hours    Antimicrobials:  ceFAZolin   IVPB 2000 milliGRAM(s) IV Intermittent every 8 hours  ceFAZolin   IVPB        Anticoagulants:    Cardiac:  furosemide   Injectable 20 milliGRAM(s) IV Push every 12 hours  macitentan 10 milliGRAM(s) Oral <User Schedule>  midodrine 10 milliGRAM(s) Oral every 8 hours  milrinone Infusion 0.375 MICROgram(s)/kG/Min IV Continuous <Continuous>  phenylephrine    Infusion 0.399 MICROgram(s)/kG/Min IV Continuous <Continuous>  riociguat 2.5 milliGRAM(s) Oral every 8 hours      Allergies    No Known Allergies    Intolerances        Vital Signs Last 24 Hrs  T(C): 36.9 (02 May 2024 17:26), Max: 37.2 (02 May 2024 01:01)  T(F): 98.5 (02 May 2024 17:26), Max: 99 (02 May 2024 01:01)  HR: 74 (02 May 2024 18:00) (69 - 83)  BP: 112/77 (02 May 2024 07:10) (112/77 - 112/77)  BP(mean): 91 (02 May 2024 07:10) (91 - 91)  RR: 18 (02 May 2024 18:00) (16 - 19)  SpO2: 100% (02 May 2024 18:00) (91% - 100%)    Parameters below as of 02 May 2024 18:00  Patient On (Oxygen Delivery Method): nasal cannula w/ humidification    O2 Concentration (%): 50    05-01 @ 07:01  -  05-02 @ 07:00  --------------------------------------------------------  IN: 1696.1 mL / OUT: 1775 mL / NET: -78.9 mL    05-02 @ 07:01  -  05-02 @ 19:41  --------------------------------------------------------  IN: 1105.6 mL / OUT: 550 mL / NET: 555.6 mL          PHYSICAL EXAM:  Constitutional: NAD  HEENT: NC/AT; PERRL, anicteric sclera; MMM  Neck: supple  Cardiovascular: +S1/S2, RRR  Respiratory: Decreased air entry at bases  Gastrointestinal: soft, NT/ND  Extremities: WWP; no edema, clubbing or cyanosis, moving bilateral lower extremities   Vascular: 2+ radial pulses B/L  Neurological: awake and alert; HORNER    LABS:  ABG - ( 02 May 2024 16:32 )  pH, Arterial: 7.47  pH, Blood: x     /  pCO2: 33    /  pO2: 69    / HCO3: 24    / Base Excess: 0.9   /  SaO2: 95.2                CBC Full  -  ( 02 May 2024 16:32 )  WBC Count : 12.69 K/uL  RBC Count : 4.21 M/uL  Hemoglobin : 11.2 g/dL  Hematocrit : 31.7 %  Platelet Count - Automated : 116 K/uL  Mean Cell Volume : 75.3 fl  Mean Cell Hemoglobin : 26.6 pg  Mean Cell Hemoglobin Concentration : 35.3 gm/dL  Auto Neutrophil # : 9.83 K/uL  Auto Lymphocyte # : 1.06 K/uL  Auto Monocyte # : 1.50 K/uL  Auto Eosinophil # : 0.05 K/uL  Auto Basophil # : 0.03 K/uL  Auto Neutrophil % : 77.5 %  Auto Lymphocyte % : 8.4 %  Auto Monocyte % : 11.8 %  Auto Eosinophil % : 0.4 %  Auto Basophil % : 0.2 %    05-02    139  |  101  |  31<H>  ----------------------------<  136<H>  3.6   |  24  |  0.74    Ca    10.1      02 May 2024 16:49  Phos  2.4     05-02  Mg     2.1     05-02    TPro  7.6  /  Alb  5.0  /  TBili  2.1<H>  /  DBili  x   /  AST  136<H>  /  ALT  135<H>  /  AlkPhos  98  05-02    PT/INR - ( 02 May 2024 16:32 )   PT: 12.1 sec;   INR: 1.06          PTT - ( 02 May 2024 16:32 )  PTT:24.9 sec      Urinalysis Basic - ( 02 May 2024 16:49 )    Color: x / Appearance: x / SG: x / pH: x  Gluc: 136 mg/dL / Ketone: x  / Bili: x / Urobili: x   Blood: x / Protein: x / Nitrite: x   Leuk Esterase: x / RBC: x / WBC x   Sq Epi: x / Non Sq Epi: x / Bacteria: x                RADIOLOGY & ADDITIONAL STUDIES:

## 2024-05-03 LAB
ALBUMIN SERPL ELPH-MCNC: 4.4 G/DL — SIGNIFICANT CHANGE UP (ref 3.3–5)
ALBUMIN SERPL ELPH-MCNC: 4.4 G/DL — SIGNIFICANT CHANGE UP (ref 3.3–5)
ALP SERPL-CCNC: 100 U/L — SIGNIFICANT CHANGE UP (ref 40–120)
ALP SERPL-CCNC: 109 U/L — SIGNIFICANT CHANGE UP (ref 40–120)
ALT FLD-CCNC: 115 U/L — HIGH (ref 10–45)
ALT FLD-CCNC: 122 U/L — HIGH (ref 10–45)
AMYLASE P1 CFR SERPL: 84 U/L — SIGNIFICANT CHANGE UP (ref 25–125)
ANION GAP SERPL CALC-SCNC: 12 MMOL/L — SIGNIFICANT CHANGE UP (ref 5–17)
ANION GAP SERPL CALC-SCNC: 14 MMOL/L — SIGNIFICANT CHANGE UP (ref 5–17)
APTT BLD: 22.2 SEC — LOW (ref 24.5–35.6)
APTT BLD: 23.2 SEC — LOW (ref 24.5–35.6)
AST SERPL-CCNC: 114 U/L — HIGH (ref 10–40)
AST SERPL-CCNC: 99 U/L — HIGH (ref 10–40)
BASE EXCESS BLDV CALC-SCNC: 3.2 MMOL/L — HIGH (ref -2–3)
BASOPHILS # BLD AUTO: 0.02 K/UL — SIGNIFICANT CHANGE UP (ref 0–0.2)
BASOPHILS # BLD AUTO: 0.02 K/UL — SIGNIFICANT CHANGE UP (ref 0–0.2)
BASOPHILS NFR BLD AUTO: 0.2 % — SIGNIFICANT CHANGE UP (ref 0–2)
BASOPHILS NFR BLD AUTO: 0.2 % — SIGNIFICANT CHANGE UP (ref 0–2)
BILIRUB SERPL-MCNC: 2 MG/DL — HIGH (ref 0.2–1.2)
BILIRUB SERPL-MCNC: 2 MG/DL — HIGH (ref 0.2–1.2)
BUN SERPL-MCNC: 29 MG/DL — HIGH (ref 7–23)
BUN SERPL-MCNC: 29 MG/DL — HIGH (ref 7–23)
CALCIUM SERPL-MCNC: 10 MG/DL — SIGNIFICANT CHANGE UP (ref 8.4–10.5)
CALCIUM SERPL-MCNC: 9.8 MG/DL — SIGNIFICANT CHANGE UP (ref 8.4–10.5)
CHLORIDE SERPL-SCNC: 100 MMOL/L — SIGNIFICANT CHANGE UP (ref 96–108)
CHLORIDE SERPL-SCNC: 102 MMOL/L — SIGNIFICANT CHANGE UP (ref 96–108)
CO2 BLDV-SCNC: 29.2 MMOL/L — HIGH (ref 22–26)
CO2 SERPL-SCNC: 22 MMOL/L — SIGNIFICANT CHANGE UP (ref 22–31)
CO2 SERPL-SCNC: 26 MMOL/L — SIGNIFICANT CHANGE UP (ref 22–31)
CREAT SERPL-MCNC: 0.83 MG/DL — SIGNIFICANT CHANGE UP (ref 0.5–1.3)
CREAT SERPL-MCNC: 0.86 MG/DL — SIGNIFICANT CHANGE UP (ref 0.5–1.3)
EGFR: 74 ML/MIN/1.73M2 — SIGNIFICANT CHANGE UP
EGFR: 77 ML/MIN/1.73M2 — SIGNIFICANT CHANGE UP
EOSINOPHIL # BLD AUTO: 0.12 K/UL — SIGNIFICANT CHANGE UP (ref 0–0.5)
EOSINOPHIL # BLD AUTO: 0.19 K/UL — SIGNIFICANT CHANGE UP (ref 0–0.5)
EOSINOPHIL NFR BLD AUTO: 0.9 % — SIGNIFICANT CHANGE UP (ref 0–6)
EOSINOPHIL NFR BLD AUTO: 1.8 % — SIGNIFICANT CHANGE UP (ref 0–6)
GAS PNL BLDA: SIGNIFICANT CHANGE UP
GAS PNL BLDA: SIGNIFICANT CHANGE UP
GAS PNL BLDV: SIGNIFICANT CHANGE UP
GLUCOSE BLDC GLUCOMTR-MCNC: 105 MG/DL — HIGH (ref 70–99)
GLUCOSE BLDC GLUCOMTR-MCNC: 130 MG/DL — HIGH (ref 70–99)
GLUCOSE BLDC GLUCOMTR-MCNC: 139 MG/DL — HIGH (ref 70–99)
GLUCOSE BLDC GLUCOMTR-MCNC: 85 MG/DL — SIGNIFICANT CHANGE UP (ref 70–99)
GLUCOSE SERPL-MCNC: 112 MG/DL — HIGH (ref 70–99)
GLUCOSE SERPL-MCNC: 136 MG/DL — HIGH (ref 70–99)
HCO3 BLDV-SCNC: 28 MMOL/L — SIGNIFICANT CHANGE UP (ref 22–29)
HCT VFR BLD CALC: 31.6 % — LOW (ref 34.5–45)
HCT VFR BLD CALC: 36.3 % — SIGNIFICANT CHANGE UP (ref 34.5–45)
HGB BLD-MCNC: 10.9 G/DL — LOW (ref 11.5–15.5)
HGB BLD-MCNC: 12.4 G/DL — SIGNIFICANT CHANGE UP (ref 11.5–15.5)
IMM GRANULOCYTES NFR BLD AUTO: 1.6 % — HIGH (ref 0–0.9)
IMM GRANULOCYTES NFR BLD AUTO: 1.7 % — HIGH (ref 0–0.9)
INR BLD: 1.19 — HIGH (ref 0.85–1.18)
INR BLD: 1.2 — HIGH (ref 0.85–1.18)
LACTATE SERPL-SCNC: 0.8 MMOL/L — SIGNIFICANT CHANGE UP (ref 0.5–2)
LIDOCAIN IGE QN: 80 U/L — HIGH (ref 7–60)
LYMPHOCYTES # BLD AUTO: 0.9 K/UL — LOW (ref 1–3.3)
LYMPHOCYTES # BLD AUTO: 1.33 K/UL — SIGNIFICANT CHANGE UP (ref 1–3.3)
LYMPHOCYTES # BLD AUTO: 12.9 % — LOW (ref 13–44)
LYMPHOCYTES # BLD AUTO: 7 % — LOW (ref 13–44)
MAGNESIUM SERPL-MCNC: 2 MG/DL — SIGNIFICANT CHANGE UP (ref 1.6–2.6)
MAGNESIUM SERPL-MCNC: 2.1 MG/DL — SIGNIFICANT CHANGE UP (ref 1.6–2.6)
MCHC RBC-ENTMCNC: 26.4 PG — LOW (ref 27–34)
MCHC RBC-ENTMCNC: 26.4 PG — LOW (ref 27–34)
MCHC RBC-ENTMCNC: 34.2 GM/DL — SIGNIFICANT CHANGE UP (ref 32–36)
MCHC RBC-ENTMCNC: 34.5 GM/DL — SIGNIFICANT CHANGE UP (ref 32–36)
MCV RBC AUTO: 76.5 FL — LOW (ref 80–100)
MCV RBC AUTO: 77.2 FL — LOW (ref 80–100)
MONOCYTES # BLD AUTO: 1.09 K/UL — HIGH (ref 0–0.9)
MONOCYTES # BLD AUTO: 1.27 K/UL — HIGH (ref 0–0.9)
MONOCYTES NFR BLD AUTO: 10.6 % — SIGNIFICANT CHANGE UP (ref 2–14)
MONOCYTES NFR BLD AUTO: 9.8 % — SIGNIFICANT CHANGE UP (ref 2–14)
NEUTROPHILS # BLD AUTO: 10.41 K/UL — HIGH (ref 1.8–7.4)
NEUTROPHILS # BLD AUTO: 7.53 K/UL — HIGH (ref 1.8–7.4)
NEUTROPHILS NFR BLD AUTO: 72.9 % — SIGNIFICANT CHANGE UP (ref 43–77)
NEUTROPHILS NFR BLD AUTO: 80.4 % — HIGH (ref 43–77)
NRBC # BLD: 0 /100 WBCS — SIGNIFICANT CHANGE UP (ref 0–0)
NRBC # BLD: 0 /100 WBCS — SIGNIFICANT CHANGE UP (ref 0–0)
PCO2 BLDV: 42 MMHG — SIGNIFICANT CHANGE UP (ref 39–42)
PH BLDV: 7.43 — SIGNIFICANT CHANGE UP (ref 7.32–7.43)
PHOSPHATE SERPL-MCNC: 2.7 MG/DL — SIGNIFICANT CHANGE UP (ref 2.5–4.5)
PHOSPHATE SERPL-MCNC: 3.3 MG/DL — SIGNIFICANT CHANGE UP (ref 2.5–4.5)
PLATELET # BLD AUTO: 107 K/UL — LOW (ref 150–400)
PLATELET # BLD AUTO: 109 K/UL — LOW (ref 150–400)
PO2 BLDV: 35 MMHG — SIGNIFICANT CHANGE UP (ref 25–45)
POTASSIUM SERPL-MCNC: 3.3 MMOL/L — LOW (ref 3.5–5.3)
POTASSIUM SERPL-MCNC: 3.7 MMOL/L — SIGNIFICANT CHANGE UP (ref 3.5–5.3)
POTASSIUM SERPL-SCNC: 3.3 MMOL/L — LOW (ref 3.5–5.3)
POTASSIUM SERPL-SCNC: 3.7 MMOL/L — SIGNIFICANT CHANGE UP (ref 3.5–5.3)
PROT SERPL-MCNC: 6.9 G/DL — SIGNIFICANT CHANGE UP (ref 6–8.3)
PROT SERPL-MCNC: 7.3 G/DL — SIGNIFICANT CHANGE UP (ref 6–8.3)
PROTHROM AB SERPL-ACNC: 13.5 SEC — HIGH (ref 9.5–13)
PROTHROM AB SERPL-ACNC: 13.6 SEC — HIGH (ref 9.5–13)
RBC # BLD: 4.13 M/UL — SIGNIFICANT CHANGE UP (ref 3.8–5.2)
RBC # BLD: 4.7 M/UL — SIGNIFICANT CHANGE UP (ref 3.8–5.2)
RBC # FLD: 15.7 % — HIGH (ref 10.3–14.5)
RBC # FLD: 15.9 % — HIGH (ref 10.3–14.5)
SAO2 % BLDV: 57.9 % — LOW (ref 67–88)
SODIUM SERPL-SCNC: 138 MMOL/L — SIGNIFICANT CHANGE UP (ref 135–145)
SODIUM SERPL-SCNC: 138 MMOL/L — SIGNIFICANT CHANGE UP (ref 135–145)
WBC # BLD: 10.32 K/UL — SIGNIFICANT CHANGE UP (ref 3.8–10.5)
WBC # BLD: 12.94 K/UL — HIGH (ref 3.8–10.5)
WBC # FLD AUTO: 10.32 K/UL — SIGNIFICANT CHANGE UP (ref 3.8–10.5)
WBC # FLD AUTO: 12.94 K/UL — HIGH (ref 3.8–10.5)

## 2024-05-03 PROCEDURE — 99232 SBSQ HOSP IP/OBS MODERATE 35: CPT | Mod: GC

## 2024-05-03 PROCEDURE — 71045 X-RAY EXAM CHEST 1 VIEW: CPT | Mod: 26

## 2024-05-03 PROCEDURE — 99291 CRITICAL CARE FIRST HOUR: CPT

## 2024-05-03 PROCEDURE — 99292 CRITICAL CARE ADDL 30 MIN: CPT

## 2024-05-03 RX ORDER — HEPARIN SODIUM 5000 [USP'U]/ML
500 INJECTION INTRAVENOUS; SUBCUTANEOUS
Qty: 25000 | Refills: 0 | Status: DISCONTINUED | OUTPATIENT
Start: 2024-05-03 | End: 2024-05-04

## 2024-05-03 RX ORDER — POTASSIUM CHLORIDE 20 MEQ
40 PACKET (EA) ORAL ONCE
Refills: 0 | Status: COMPLETED | OUTPATIENT
Start: 2024-05-03 | End: 2024-05-03

## 2024-05-03 RX ORDER — POTASSIUM CHLORIDE 20 MEQ
40 PACKET (EA) ORAL EVERY 4 HOURS
Refills: 0 | Status: DISCONTINUED | OUTPATIENT
Start: 2024-05-03 | End: 2024-05-03

## 2024-05-03 RX ADMIN — Medication 40 MILLIEQUIVALENT(S): at 15:55

## 2024-05-03 RX ADMIN — Medication 100 MILLIGRAM(S): at 06:53

## 2024-05-03 RX ADMIN — CHLORHEXIDINE GLUCONATE 1 APPLICATION(S): 213 SOLUTION TOPICAL at 06:53

## 2024-05-03 RX ADMIN — Medication 40 MILLIEQUIVALENT(S): at 22:15

## 2024-05-03 RX ADMIN — Medication 3 MILLILITER(S): at 00:51

## 2024-05-03 RX ADMIN — POLYETHYLENE GLYCOL 3350 17 GRAM(S): 17 POWDER, FOR SOLUTION ORAL at 14:34

## 2024-05-03 RX ADMIN — HEPARIN SODIUM 5 UNIT(S)/HR: 5000 INJECTION INTRAVENOUS; SUBCUTANEOUS at 21:04

## 2024-05-03 RX ADMIN — Medication 3 MILLILITER(S): at 17:54

## 2024-05-03 RX ADMIN — Medication 20 MILLIGRAM(S): at 17:54

## 2024-05-03 RX ADMIN — PANTOPRAZOLE SODIUM 40 MILLIGRAM(S): 20 TABLET, DELAYED RELEASE ORAL at 12:40

## 2024-05-03 RX ADMIN — Medication 20 MILLIGRAM(S): at 06:54

## 2024-05-03 RX ADMIN — MIDODRINE HYDROCHLORIDE 10 MILLIGRAM(S): 2.5 TABLET ORAL at 22:15

## 2024-05-03 RX ADMIN — SENNA PLUS 2 TABLET(S): 8.6 TABLET ORAL at 22:15

## 2024-05-03 RX ADMIN — Medication 3 MILLILITER(S): at 06:26

## 2024-05-03 RX ADMIN — RIOCIGUAT 2.5 MILLIGRAM(S): 1.5 TABLET, FILM COATED ORAL at 17:53

## 2024-05-03 RX ADMIN — RIOCIGUAT 2.5 MILLIGRAM(S): 1.5 TABLET, FILM COATED ORAL at 06:54

## 2024-05-03 RX ADMIN — Medication 3 MILLILITER(S): at 15:39

## 2024-05-03 RX ADMIN — MIDODRINE HYDROCHLORIDE 10 MILLIGRAM(S): 2.5 TABLET ORAL at 06:53

## 2024-05-03 RX ADMIN — Medication 40 MILLIEQUIVALENT(S): at 06:53

## 2024-05-03 RX ADMIN — MACITENTAN 10 MILLIGRAM(S): 10 TABLET, FILM COATED ORAL at 14:38

## 2024-05-03 RX ADMIN — RIOCIGUAT 2.5 MILLIGRAM(S): 1.5 TABLET, FILM COATED ORAL at 22:15

## 2024-05-03 RX ADMIN — MIDODRINE HYDROCHLORIDE 10 MILLIGRAM(S): 2.5 TABLET ORAL at 14:41

## 2024-05-03 NOTE — PROGRESS NOTE ADULT - ASSESSMENT
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 Improving, now more mobile, walked 5/2/24, out of bed to chair today, and pressors are being weaned as tolerated to keep  for adequate spinal perfusion. Now off phenylephrine, coming down on vaso.     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 At that time her aortic dissection was noted, but felt to delay surgical intervention until PH improved  - RHC reviewed (8/2024) - Of note there was NO PAWP WAVEFORM, unable to wedge, calculations made on estimated PAWP of 15  RA 13  |  PA 82/28/48  |  PAWP 15  |  CO/CI (F) 4.64/2.54  |  PVR 7.1WU  |  PVR/SVR 0.4    - She was subsequently started on riociguat and follow up TTE showed significant improvement in PH as well as subjectively she noted improvement  - PA-C in place with no significant changes from her baseline other than increase in cardiac output, will plan for removal  - Reassessed pulmonary pressures 5/2/24, PVR is elevated but PVR/SVR ratio is 0.2, which signifies that elevated pressures are being driven by the high MAP goal currently (which she needs) rather than uncontrolled PH     Recommend:   - PA-Catheter can be removed today now that she is off phenylephrine and other pressors being weaned   - Continue with riociguat as she is at risk for rebound PH, although it may contribute to lower BP. Augment with pressor choices as outlined above  - Maintain relative diuresis, avoid fluid boluses and would keep pt relatively net negative   - Continue with macitentan given severity of PH at 10 mg, process started for ensuring she has this on discharge    Discharge plan:  Would benefit from evaluation in formal CTEPH clinic for consideration of PTE (may be difficult in setting of Type B aortic dissection) vs balloon angioplasty and possible escalation of medical therapy, this can be done as an outpatient. Patient can be seen in CTEPH clinic with Dr. Cabrera and Dr. Benites. Continue with medical therapy for now    PH will continue to follow  Patient seen, evaluated, and discussed with Dr. Cabrera

## 2024-05-03 NOTE — PROGRESS NOTE ADULT - ASSESSMENT
67 y/o F PMH asthma, CTEPH, CAD, HTN, Type B dissection now s/p TEVAR, Groin sites soft, pulses palpable, episode of decreased motor strength seems to have been improved following increased in MAP, patient currently on Milrinone and vasopressin. Normal sensory to lower extremities, patient able to walk and sit in chair. Lumbar drain removed on 5/1. Pulmonary on board for pulmonary hypertension. CT imaging and MRI with no findings of nerve compression or root compression. 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 Distal common hepatic artery is perfused. Multifocal splenic infarcts and multiple small renal infarcts bilaterally, likely embolic etiology.    - Continue neurovasacular checks per TEVAR ERAS protocol  - B/l groin checks per TEVAR ERAS protocol  - Rest of care per CTICU  - vascular surgery will continue to follow patient

## 2024-05-03 NOTE — PROGRESS NOTE ADULT - SUBJECTIVE AND OBJECTIVE BOX
CTICU  CRITICAL  CARE  attending     Hand off received 					   Pertinent clinical, laboratory, radiographic, hemodynamic, echocardiographic, respiratory data, microbiologic data and chart were reviewed and analyzed frequently throughout the course of the day and night      68 year old female with ASTHMA, Chronic Pulmonary HTN,, CAD, HTN.  She was evaluated by her pulmonologist.  Dr. Cathy Serra, was noted to have an elevated D-Dimer and was advised to present to ED for workup. In ED a CTA C/A/P was performed which revealed a Type B dissection and she was admitted and discharged with close follow up.   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   She was deemed a candidate for TEVAR with Dr. Kwon and Dr. Suh and presented to [facility] for preop lumbar drain prior to OR     S/P TEVAR.        FAMILY HISTORY:  Family history of early CAD (Father)    PAST MEDICAL & SURGICAL HISTORY:  HTN (hypertension)  Prediabetes  Mild tricuspid regurgitation  Enlarged aorta  CAD (coronary artery disease)  Ascending aorta dilation  Pulmonary hypertension  Chronic systolic right heart failure  2019 novel coronavirus disease (COVID-19)  Asthma  S/P hysterectomy  S/P         14 system review was unremarkable    Vital signs, hemodynamic and respiratory parameters were reviewed from the bedside nursing flow sheet.  ICU Vital Signs Last 24 Hrs  T(C): 37.2 (03 May 2024 17:30), Max: 37.4 (03 May 2024 05:01)  T(F): 98.9 (03 May 2024 17:30), Max: 99.3 (03 May 2024 05:01)  HR: 86 (03 May 2024 20:09) (65 - 115)  BP: --  BP(mean): --  ABP: 149/80 (03 May 2024 15:00) (141/73 - 171/87)  ABP(mean): 106 (03 May 2024 15:00) (95 - 141)  RR: 18 (03 May 2024 20:09) (14 - 19)  SpO2: 96% (03 May 2024 20:09) (93% - 100%)    O2 Parameters below as of 03 May 2024 21:00  Patient On (Oxygen Delivery Method): nasal cannula, high flow  O2 Flow (L/min): 60  O2 Concentration (%): 50      Adult Advanced Hemodynamics Last 24 Hrs  CVP(mm Hg): 4 (03 May 2024 15:00) (-2 - 16)  CVP(cm H2O): --  CO: 6.4 (02 May 2024 21:00) (6.4 - 6.4)  CI: 3.4 (02 May 2024 21:00) (3.4 - 3.4)  PA: 79/26 (03 May 2024 14:00) (56/51 - 91/28)  PA(mean): 48 (03 May 2024 14:00) (38 - 56)  PCWP: --  SVR: 1061 (02 May 2024 21:00) (1061 - 1061)  SVRI:  (02 May 2024 21:00) ( - )  PVR: --  PVRI: --, ABG - ( 03 May 2024 12:45 )  pH, Arterial: 7.48  pH, Blood: x     /  pCO2: 32    /  pO2: 77    / HCO3: 24    / Base Excess: 0.9   /  SaO2: 97.1                Intake and output was reviewed and the fluid balance was calculated  Daily     Daily   I&O's Summary    02 May 2024 07:01  -  03 May 2024 07:00  --------------------------------------------------------  IN: 1876.3 mL / OUT: 1350 mL / NET: 526.3 mL    03 May 2024 07:01  -  03 May 2024 20:21  --------------------------------------------------------  IN: 16.1 mL / OUT: 425 mL / NET: -408.9 mL            Neuro: No change in the Neuro status from the baseline. Moves all 4 extremities.  Neck: No JVD.  CVS: S1, S2, No S3.  Lungs: Good air entry bilaterally.  Abd: Soft. No tenderness. + Bowel sounds.  Vascular: + DP/PT.  Extremities: No edema.  Lymphatic: Normal.  Skin: No abnormalities.      labs  CBC Full  -  ( 03 May 2024 12:45 )  WBC Count : 12.94 K/uL  RBC Count : 4.70 M/uL  Hemoglobin : 12.4 g/dL  Hematocrit : 36.3 %  Platelet Count - Automated : 107 K/uL  Mean Cell Volume : 77.2 fl  Mean Cell Hemoglobin : 26.4 pg  Mean Cell Hemoglobin Concentration : 34.2 gm/dL  Auto Neutrophil # : 10.41 K/uL  Auto Lymphocyte # : 0.90 K/uL  Auto Monocyte # : 1.27 K/uL  Auto Eosinophil # : 0.12 K/uL  Auto Basophil # : 0.02 K/uL  Auto Neutrophil % : 80.4 %  Auto Lymphocyte % : 7.0 %  Auto Monocyte % : 9.8 %  Auto Eosinophil % : 0.9 %  Auto Basophil % : 0.2 %    05-    138  |  102  |  29<H>  ----------------------------<  136<H>  3.7   |  22  |  0.86    Ca    10.0      03 May 2024 12:45  Phos  2.7     05-03  Mg     2.1     05-03    TPro  7.3  /  Alb  4.4  /  TBili  2.0<H>  /  DBili  x   /  AST  99<H>  /  ALT  115<H>  /  AlkPhos  109  05-03    PT/INR - ( 03 May 2024 12:45 )   PT: 13.6 sec;   INR: 1.20          PTT - ( 03 May 2024 12:45 )  PTT:22.2 sec  The current medications were reviewed   MEDICATIONS  (STANDING):  albuterol    90 MICROgram(s) HFA Inhaler 2 Puff(s) Inhalation every 6 hours  albuterol/ipratropium for Nebulization 3 milliLiter(s) Nebulizer every 6 hours  chlorhexidine 2% Cloths 1 Application(s) Topical daily  dextrose 10% Bolus 125 milliLiter(s) IV Bolus once  dextrose 5%. 1000 milliLiter(s) (50 mL/Hr) IV Continuous <Continuous>  dextrose 5%. 1000 milliLiter(s) (100 mL/Hr) IV Continuous <Continuous>  dextrose 50% Injectable 12.5 Gram(s) IV Push once  dextrose 50% Injectable 25 Gram(s) IV Push once  furosemide   Injectable 20 milliGRAM(s) IV Push every 12 hours  glucagon  Injectable 1 milliGRAM(s) IntraMuscular once  heparin  Infusion 500 Unit(s)/Hr (5 mL/Hr) IV Continuous <Continuous>  influenza  Vaccine (HIGH DOSE) 0.7 milliLiter(s) IntraMuscular once  insulin lispro (ADMELOG) corrective regimen sliding scale   SubCutaneous Before meals and at bedtime  macitentan 10 milliGRAM(s) Oral <User Schedule>  midodrine 10 milliGRAM(s) Oral every 8 hours  milrinone Infusion 0.25 MICROgram(s)/kG/Min (6.12 mL/Hr) IV Continuous <Continuous>  pantoprazole    Tablet 40 milliGRAM(s) Oral daily  polyethylene glycol 3350 17 Gram(s) Oral daily  potassium chloride    Tablet ER 40 milliEquivalent(s) Oral every 4 hours  riociguat 2.5 milliGRAM(s) Oral every 8 hours  senna 2 Tablet(s) Oral at bedtime  sodium chloride 0.9%. 1000 milliLiter(s) (10 mL/Hr) IV Continuous <Continuous>    MEDICATIONS  (PRN):  dextrose Oral Gel 15 Gram(s) Oral once PRN Blood Glucose LESS THAN 70 milliGRAM(s)/deciliter  sodium chloride 0.65% Nasal 1 Spray(s) Both Nostrils three times a day PRN dry nose          68 year old  Female admitted with type B dissection.  S/P TEVAR (Dual Grafts).  Post operative course complicated by celiac artery thrombus and non obstructive DVT of left femoral and left popliteal vein.  Venous Duplex: Small nonobstructive likely chronic nonobstructive thrombus in themidportion of the left femoral vein and left popliteal vein. She was started on IV heparin.  CT ABDOMEN: Interval TEVAR of Type B aortic dissection. Stable aneurysmal dilatation of false lumen below distal graft attachment site which is perfused.  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 Distal common hepatic artery is perfused.  Multifocal splenic infarcts and multiple small renal infarcts bilaterally, likely embolic etiology.  Further hospital postoperative course complicated by weakness in both legs. Patient started on vasopressor support to keep MAP above 100. IV heparin  was discontinued.    MRI: The lower thoracic and upper lumbar spine is partially obscured by magnetic susceptibility artifact caused by the endovascular stent graft.  A lumbar drain is seen entering the spinal canal at the L1-L2 level on CT   scan performed earlier today, is not clearly visualized on the MRI.  BONES: There is no fracture or bone marrow edema.  ALIGNMENT: The alignment is normal.  SACROILIAC JOINTS/SACRUM: There is no sacral fracture. The SI joints are partially visualized but are intact.  CONUS AND CAUDA EQUINA: The distal cord and conus are obscured by susceptibility artifact.  VISUALIZED INTRAPELVIC/INTRA-ABDOMINAL SOFT TISSUES: Normal.  PARASPINAL SOFT TISSUES: Normal.  LOWER THORACIC SPINE: The T11-T12 and T12-L1 levels are obscured by susceptibility artifact.  L1-L2: Evaluation is degraded by susceptibility artifact.  Minimal disc bulge.  No spinal canal stenosis or neural foraminal narrowing.  L2-L3: Small disc bulge.  No spinal canal stenosis.  Minimal neural foraminal narrowing.  L3-L4: Small disc bulge.  Mild facet hypertrophy.  Minimal spinal canal stenosis.  Mild neural foraminal narrowing, right greater than left.  L4-L5: Small disc bulge.  Moderate facet hypertrophy with small facet joint effusions bilaterally.  Mild ligamentum flavum hypertrophy.  Mild   spinal canal stenosis.  Mild neural foraminal narrowing bilaterally.  L5-S1: Small disc bulge.  Moderate facet hypertrophy with trace facet joint effusions bilaterally.  Mild to moderate neural foraminal narrowing bilaterally.  SUMMARY:  No MRI evidence of lumbar spine fracture, malalignment or suspicious  osseous lesion. Multilevel lumbar spondylosis and degenerative disc disease  No evidence for cauda equina compression or nerve root impingement.  Lumbar drain was removed today. Neuro exam back to the baseline.   Hemodynamically stable.  Good oxygenation.  Fair urine out put.            My plan includes :  Low dose milrinone.  Statin and Betablocker.  Bronchodilator Rx  Close hemodynamic monitoring   Monitor for arrhythmias and monitor parameters for organ perfusion  Monitor neurologic status  Monitor renal function.  Anti pulmonary HTN Rx. (Macitentan and riociguat).  Head of the bed should remain elevated to 45 deg .   Chest PT and IS will be encouraged  Monitor adequacy of oxygenation   Nutritional goals will be met using po eventually , ensure adequate caloric intake and monitor the same  Stress ulcer and VTE prophylaxis will be achieved    Glycemic control is satisfactory  Electrolytes have been repleted as necessary and wound care has been carried out. Pain control has been achieved.   Aggressive physical therapy and early mobility and ambulation goals will be met   The family was updated about the course and plan  CRITICAL CARE TIME SPENT in evaluation and management, reassessments, review and interpretation of labs and x-rays,  hemodynamic management, formulating a plan and coordinating care: ___30____ MIN.  Time does not include procedural time.  CTICU ATTENDING     					    Yanick Degroot MD

## 2024-05-03 NOTE — PROGRESS NOTE ADULT - SUBJECTIVE AND OBJECTIVE BOX
STATUS POST:  s/p TEVAR on 4/26    POST OPERATIVE DAY #: 7    ON: ON Milrinone and Vasopressin    SUBJECTIVE: Pt seen and examined at bedside this am by surgery team. Reports improving lower extremity strength able to sit on bed walked with PT on 5/2    MEDICATIONS  (STANDING):  albuterol    90 MICROgram(s) HFA Inhaler 2 Puff(s) Inhalation every 6 hours  albuterol/ipratropium for Nebulization 3 milliLiter(s) Nebulizer every 6 hours  ceFAZolin   IVPB      ceFAZolin   IVPB 2000 milliGRAM(s) IV Intermittent every 8 hours  chlorhexidine 2% Cloths 1 Application(s) Topical daily  dextrose 10% Bolus 125 milliLiter(s) IV Bolus once  dextrose 5%. 1000 milliLiter(s) (50 mL/Hr) IV Continuous <Continuous>  dextrose 5%. 1000 milliLiter(s) (100 mL/Hr) IV Continuous <Continuous>  dextrose 50% Injectable 12.5 Gram(s) IV Push once  dextrose 50% Injectable 25 Gram(s) IV Push once  furosemide   Injectable 20 milliGRAM(s) IV Push every 12 hours  glucagon  Injectable 1 milliGRAM(s) IntraMuscular once  influenza  Vaccine (HIGH DOSE) 0.7 milliLiter(s) IntraMuscular once  insulin lispro (ADMELOG) corrective regimen sliding scale   SubCutaneous Before meals and at bedtime  macitentan 10 milliGRAM(s) Oral <User Schedule>  midodrine 10 milliGRAM(s) Oral every 8 hours  milrinone Infusion 0.375 MICROgram(s)/kG/Min (9.18 mL/Hr) IV Continuous <Continuous>  pantoprazole    Tablet 40 milliGRAM(s) Oral daily  polyethylene glycol 3350 17 Gram(s) Oral daily  potassium chloride    Tablet ER 40 milliEquivalent(s) Oral every 4 hours  riociguat 2.5 milliGRAM(s) Oral every 8 hours  senna 2 Tablet(s) Oral at bedtime  sodium chloride 0.9%. 1000 milliLiter(s) (10 mL/Hr) IV Continuous <Continuous>  vasopressin Infusion 0.02 Unit(s)/Min (3 mL/Hr) IV Continuous <Continuous>    MEDICATIONS  (PRN):  dextrose Oral Gel 15 Gram(s) Oral once PRN Blood Glucose LESS THAN 70 milliGRAM(s)/deciliter  sodium chloride 0.65% Nasal 1 Spray(s) Both Nostrils three times a day PRN dry nose      Vital Signs Last 24 Hrs  T(C): 37.4 (03 May 2024 05:01), Max: 37.4 (03 May 2024 05:01)  T(F): 99.3 (03 May 2024 05:01), Max: 99.3 (03 May 2024 05:01)  HR: 97 (03 May 2024 07:00) (65 - 101)  BP: --  BP(mean): --  RR: 16 (03 May 2024 07:00) (14 - 18)  SpO2: 93% (03 May 2024 07:00) (92% - 100%)    Parameters below as of 03 May 2024 07:00  Patient On (Oxygen Delivery Method): nasal cannula, high flow  O2 Flow (L/min): 60  O2 Concentration (%): 50    Physical Exam:  General: NAD, well appearing  Pulmonary: NLB on HFNC  Cardiovascular: RRR, no MGR  Abdominal: Soft nt nd, L groin soft, incision c/d/i   Extremities: WWP  Pulses: palpable pulses 2+ b/l groin and in BUE (radial ulnar) and BLE (DP,PT)   Neuro: AOx3, preserved sensory and ROM in lower extremities.          I&O's Detail    02 May 2024 07:01  -  03 May 2024 07:00  --------------------------------------------------------  IN:    Albumin 25%  -  50 mL: 100 mL    Albumin 5%  - 250 mL: 250 mL    IV PiggyBack: 200 mL    IV PiggyBack: 250 mL    Milrinone: 217.7 mL    Phenylephrine: 36.6 mL    PRBCs (Packed Red Blood Cells): 500 mL    sodium chloride 0.9%: 220 mL    Vasopressin: 102 mL  Total IN: 1876.3 mL    OUT:    Voided (mL): 1350 mL  Total OUT: 1350 mL    Total NET: 526.3 mL      03 May 2024 07:01  -  03 May 2024 07:27  --------------------------------------------------------  IN:    Milrinone: 6.1 mL    sodium chloride 0.9%: 10 mL  Total IN: 16.1 mL    OUT:  Total OUT: 0 mL    Total NET: 16.1 mL          LABS:                        10.9   10.32 )-----------( 109      ( 03 May 2024 03:03 )             31.6     05-03    138  |  100  |  29<H>  ----------------------------<  112<H>  3.3<L>   |  26  |  0.83    Ca    9.8      03 May 2024 03:03  Phos  3.3     05-03  Mg     2.0     05-03    TPro  6.9  /  Alb  4.4  /  TBili  2.0<H>  /  DBili  x   /  AST  114<H>  /  ALT  122<H>  /  AlkPhos  100  05-03    PT/INR - ( 03 May 2024 03:03 )   PT: 13.5 sec;   INR: 1.19          PTT - ( 03 May 2024 03:03 )  PTT:23.2 sec  Urinalysis Basic - ( 03 May 2024 03:03 )    Color: x / Appearance: x / SG: x / pH: x  Gluc: 112 mg/dL / Ketone: x  / Bili: x / Urobili: x   Blood: x / Protein: x / Nitrite: x   Leuk Esterase: x / RBC: x / WBC x   Sq Epi: x / Non Sq Epi: x / Bacteria: x

## 2024-05-03 NOTE — PROGRESS NOTE ADULT - SUBJECTIVE AND OBJECTIVE BOX
INTERVAL HPI/OVERNIGHT EVENTS:    4/26: TEVAR - 2 grafts deployed: most proximal covering left subclavian, most distal ~5cm superior to celiac trunk     69yo Female Hx asthma, CTEPH, CAD, HTN, known Type B dissection found on assessment for elevated D-dimer     CTa C/A/P: Type B dissection - plan BP control and monitoring  Repeat CTa 2/2024: stable type B dissection with proximal descending thoracic aortic aneurysm stable since prior exam 8/2023 and patent celiac axis originating from false lumen.     Deemed a candidate for TEVAR  Admitted for PreOp lumbar drain placement 4/25    taken to OR 4/26  no intraop blood; 1.6L Crystalloid given   arrived to ICU post-op Extubated - moving all extremities well     4/27 - LD clamped and patient up and ambulating  drained overnight and again clamped then removed 4/28     3 Phase CT obtained 4/28  CTa C/A/P: Filling defects in lobar and segmental pulmonary arteries RUL c/w pulmonary thromboemboli. No evidence of acute right heart strain.  Interval TEVAR of Type B aortic dissection. Stable aneurysmal dilatation of false lumen below distal graft attachment site which is perfused (3:52, 8:97), there is thrombus within the false lumen at this level and there is embolic occlusion of the celiac trunk extending into common   hepatic artery, left gastric and splenic arteries. Distal common hepatic artery is perfused.  Multifocal splenic infarcts and multiple small renal infarcts bilaterally, likely embolic etiology.    Heparin infusion started    4/29 - new onset LE weakness   MAP driven up and heparin stopped ; Lumbar drain placed ; mannitol/3% bolus/decadron    Improvement noted in LE strength     CT Head: no acute intracranial abnormalities; Chronic appearing left posterior corona radiata and right thalamic   infarcts.  CT 4/29: CT thoracic spine:  No acute fracture or traumatic subluxation.  Vertebral body height and facet alignment maintained.  No significant spinal canal stenosis or neural foraminal narrowing.    CT lumbar spine:  No acute fracture traumatic subluxation.  Vertebral body height and facet alignment maintained.  Degenerative changes detailed in the body the report.    MR L spine 4/29: No MRI evidence of lumbar spine fracture, malalignment or suspicious   osseous lesion.    ECHO 4/29: Normal left ventricular size and systolic function. The right ventricle is not well visualized, appears mildly dilated and hypokinetic in limited views. Normal atria. Pulmonary hypertension present, pulmonary artery systolic pressure is 73 mmHg. No significant valvular disease. No pericardial effusion.    Pulm HTN consulted (Dr Cabrera) -   riociguat started ; Boo and insurance authorization requested for dual agent prescription for pHTN    4/30  - good LE strength (normal); ongoing LD drainage and maintaining MAP goals  LE doppler     LE doppler: Small nonobstructive likely chronic nonobstructive thrombus in the midportion of the left femoral vein and left popliteal vein.  systemic AC held in light of LD in place    5/1 - LD capped and patient up and ambulating   5/2 - LD removed  monitoring LFT (improving trend)    No acute events reported overnight  Primacor dec to 0.25 (7a)    ICU Vital Signs Last 24 Hrs  T(C): 37.4 (03 May 2024 05:01), Max: 37.4 (03 May 2024 05:01)  T(F): 99.3 (03 May 2024 05:01), Max: 99.3 (03 May 2024 05:01)  HR: 100 (03 May 2024 09:00) (65 - 101) sinus   ABP: 156/72 (03 May 2024 09:00) (141/73 - 171/87)  ABP(mean): 104 (03 May 2024 09:00) (100 - 125)  RR: 15 (03 May 2024 09:00) (14 - 18)  SpO2: 97% (03 May 2024 09:00) (92% - 100%) HFNC 60/50    Qtts: Primacor 0.125    I&O's Summary    02 May 2024 07:01  -  03 May 2024 07:00  --------------------------------------------------------  IN: 1876.3 mL / OUT: 1350 mL / NET: 526.3 mL    03 May 2024 07:01  -  03 May 2024 11:40  --------------------------------------------------------  IN: 16.1 mL / OUT: 150 mL / NET: -133.9 mL    Physical Exam    Heart - regular (-) rub/gallop  Lungs - CTA anterior - no rhonchi/wheeze  Abd - (+)BS soft NTND (-)r/r/g  Ext - warm to touch; no cyanosis/clubbing  Neuro - alert/oriented and interactive- able to stand and march in place; no deficits on exam   Skin - no rash     LABS:                        10.9   10.32 )-----------( 109      ( 03 May 2024 03:03 )             31.6     05-03    138  |  100  |  29<H>  ----------------------------<  112<H>  3.3<L>   |  26  |  0.83    Ca    9.8      03 May 2024 03:03  Phos  3.3     05-03  Mg     2.0     05-03    TPro  6.9  /  Alb  4.4  /  TBili  2.0<H>  /  DBili  x   /  AST  114<H>  /  ALT  122<H>  /  AlkPhos  100  05-03    PT/INR - ( 03 May 2024 03:03 )   PT: 13.5 sec;   INR: 1.19     PTT - ( 03 May 2024 03:03 )  PTT:23.2 sec    ABG - ( 03 May 2024 03:03 )  pH, Arterial: 7.46  pH, Blood: x     /  pCO2: 38    /  pO2: 73    / HCO3: 27    / Base Excess: 3.1   /  SaO2: 95.6      RADIOLOGY & ADDITIONAL STUDIES: reviewed     Patient with Type B aneurysm s/p TEVAR with PreOp LD removed  but required replacement for new LE Weakness POD#3 - improved with interventions - inc MAP/LD etc. remains on inotropic support . Pulm HTN consult following & optimizing medications (Boo off 5/2) - cont to improve     1. CV  maintain MAP  for perfusion   cont primacor - plan slow wean  cont to monitor neuro/vascular checks and avoid extremes of BP  sinus rhythm   Midodrine 10 q 8h   Lasix 20 IV q12h and Potassium supplementation     2. Pulm   known pulm HTN  consult following and optimizing medications  swan in place - hope to remove swan later today - to d/w pulm HTN   Nebs   cont Pulm HTN meds - auth pending   titrate HFNC as able   incentive spirometry and ambulate with staff assist   plan start heparin today (full AC - no bolus) in light of chronic LE DVT and PE - titrate per pTT    3. Neuro  cont neuro/vascular checks   PT    monitor LFT (imp trend)  Maintain glycemic control     DVT prophylaxis being addressed ; GI prophylaxis in place   Bowel regimen - no recorded BM per flow sheet - per staff - BM 5/2    d/w patient/staff and CTS    I have spent/provided stated minutes of critical care time to this patient: 90 Awake

## 2024-05-03 NOTE — PROGRESS NOTE ADULT - ATTENDING COMMENTS
CTEPH and PAH 2/2 D/T's, s/p TEVAR c/b spinal cord ischemia due to decreased perfusion possibly 2/2 PAH, now improved with augmentation of MAP. Phenylephrine has been weaned off, vaso decreasing, and she continues to have normal LE strength. Agree with discontinuation of PA-C today, continue to wean vasopressin and milrinone. Lumbar drain removed, please resume A/C when feasible. Tolerating riociguat and macitentan without issue. Still awaiting prior auth for macitentan upon discharge, paperwork has been received by [facility], insurance auth in progress. Will follow up on Monday.

## 2024-05-03 NOTE — PROGRESS NOTE ADULT - SUBJECTIVE AND OBJECTIVE BOX
PULMONARY CONSULT SERVICE FOLLOW-UP NOTE    INTERVAL HPI:  Reviewed chart and overnight events; patient seen and examined at bedside. Feels subjectively weaker today, however she says it may more likely be due to poor sleep and neck pain. Out of bed to chair today.  Denies dyspnea, chest pain/tightness, palpitations, headache. Slowly improving daily.       MEDICATIONS:  Pulmonary:  albuterol    90 MICROgram(s) HFA Inhaler 2 Puff(s) Inhalation every 6 hours  albuterol/ipratropium for Nebulization 3 milliLiter(s) Nebulizer every 6 hours    Antimicrobials:    Anticoagulants:    Cardiac:  furosemide   Injectable 20 milliGRAM(s) IV Push every 12 hours  macitentan 10 milliGRAM(s) Oral <User Schedule>  midodrine 10 milliGRAM(s) Oral every 8 hours  milrinone Infusion 0.25 MICROgram(s)/kG/Min IV Continuous <Continuous>  riociguat 2.5 milliGRAM(s) Oral every 8 hours      Allergies    No Known Allergies    Intolerances        Vital Signs Last 24 Hrs  T(C): 37.4 (03 May 2024 05:01), Max: 37.4 (03 May 2024 05:01)  T(F): 99.3 (03 May 2024 05:01), Max: 99.3 (03 May 2024 05:01)  HR: 86 (03 May 2024 15:00) (65 - 115)  BP: --  BP(mean): --  RR: 18 (03 May 2024 14:00) (14 - 19)  SpO2: 100% (03 May 2024 14:00) (93% - 100%)    Parameters below as of 03 May 2024 15:00  Patient On (Oxygen Delivery Method): nasal cannula, high flow  O2 Flow (L/min): 60  O2 Concentration (%): 50    05-02 @ 07:01  -  05-03 @ 07:00  --------------------------------------------------------  IN: 1876.3 mL / OUT: 1350 mL / NET: 526.3 mL    05-03 @ 07:01  -  05-03 @ 15:18  --------------------------------------------------------  IN: 16.1 mL / OUT: 425 mL / NET: -408.9 mL          PHYSICAL EXAM:  Constitutional: NAD, resting comfortably on HFNC  HEENT: NC/AT; PERRL, anicteric sclera; MMM  Neck: supple  Cardiovascular: +S1/S2, RRR  Respiratory: Decreased air entry at bases  Gastrointestinal: soft, NT/ND  Extremities: WWP; no edema, clubbing or cyanosis  Vascular: 2+ radial pulses B/L  Neurological: awake and alert; HORNER    LABS:  ABG - ( 03 May 2024 12:45 )  pH, Arterial: 7.48  pH, Blood: x     /  pCO2: 32    /  pO2: 77    / HCO3: 24    / Base Excess: 0.9   /  SaO2: 97.1                CBC Full  -  ( 03 May 2024 12:45 )  WBC Count : 12.94 K/uL  RBC Count : 4.70 M/uL  Hemoglobin : 12.4 g/dL  Hematocrit : 36.3 %  Platelet Count - Automated : 107 K/uL  Mean Cell Volume : 77.2 fl  Mean Cell Hemoglobin : 26.4 pg  Mean Cell Hemoglobin Concentration : 34.2 gm/dL  Auto Neutrophil # : 10.41 K/uL  Auto Lymphocyte # : 0.90 K/uL  Auto Monocyte # : 1.27 K/uL  Auto Eosinophil # : 0.12 K/uL  Auto Basophil # : 0.02 K/uL  Auto Neutrophil % : 80.4 %  Auto Lymphocyte % : 7.0 %  Auto Monocyte % : 9.8 %  Auto Eosinophil % : 0.9 %  Auto Basophil % : 0.2 %    05-03    138  |  102  |  29<H>  ----------------------------<  136<H>  3.7   |  22  |  0.86    Ca    10.0      03 May 2024 12:45  Phos  2.7     05-03  Mg     2.1     05-03    TPro  7.3  /  Alb  4.4  /  TBili  2.0<H>  /  DBili  x   /  AST  99<H>  /  ALT  115<H>  /  AlkPhos  109  05-03    PT/INR - ( 03 May 2024 12:45 )   PT: 13.6 sec;   INR: 1.20          PTT - ( 03 May 2024 12:45 )  PTT:22.2 sec      Urinalysis Basic - ( 03 May 2024 12:45 )    Color: x / Appearance: x / SG: x / pH: x  Gluc: 136 mg/dL / Ketone: x  / Bili: x / Urobili: x   Blood: x / Protein: x / Nitrite: x   Leuk Esterase: x / RBC: x / WBC x   Sq Epi: x / Non Sq Epi: x / Bacteria: x                RADIOLOGY & ADDITIONAL STUDIES:  CXR 5/3/24:  Findings:  impression: Stable positioning of support devices. Right pulmonary artery   enlargement. Left basilar opacity, decreased. Left basilar discoid   atelectasis. Bilateral diffuse opacities, unchanged. Cardiomegaly,   thoracic aortic dilatation, thoracic aortic endovascular stent. Stable   bony structures. Dextroscoliosis.

## 2024-05-04 LAB
ALBUMIN SERPL ELPH-MCNC: 3.9 G/DL — SIGNIFICANT CHANGE UP (ref 3.3–5)
ALBUMIN SERPL ELPH-MCNC: 4 G/DL — SIGNIFICANT CHANGE UP (ref 3.3–5)
ALBUMIN SERPL ELPH-MCNC: 4.1 G/DL — SIGNIFICANT CHANGE UP (ref 3.3–5)
ALP SERPL-CCNC: 104 U/L — SIGNIFICANT CHANGE UP (ref 40–120)
ALP SERPL-CCNC: 108 U/L — SIGNIFICANT CHANGE UP (ref 40–120)
ALP SERPL-CCNC: 111 U/L — SIGNIFICANT CHANGE UP (ref 40–120)
ALT FLD-CCNC: 125 U/L — HIGH (ref 10–45)
ALT FLD-CCNC: 131 U/L — HIGH (ref 10–45)
ALT FLD-CCNC: 137 U/L — HIGH (ref 10–45)
AMYLASE P1 CFR SERPL: 116 U/L — SIGNIFICANT CHANGE UP (ref 25–125)
AMYLASE P1 CFR SERPL: 119 U/L — SIGNIFICANT CHANGE UP (ref 25–125)
ANION GAP SERPL CALC-SCNC: 13 MMOL/L — SIGNIFICANT CHANGE UP (ref 5–17)
ANION GAP SERPL CALC-SCNC: 14 MMOL/L — SIGNIFICANT CHANGE UP (ref 5–17)
ANION GAP SERPL CALC-SCNC: 14 MMOL/L — SIGNIFICANT CHANGE UP (ref 5–17)
ANISOCYTOSIS BLD QL: SLIGHT — SIGNIFICANT CHANGE UP
ANISOCYTOSIS BLD QL: SLIGHT — SIGNIFICANT CHANGE UP
APTT BLD: 30.8 SEC — SIGNIFICANT CHANGE UP (ref 24.5–35.6)
APTT BLD: 37.8 SEC — HIGH (ref 24.5–35.6)
APTT BLD: 38.8 SEC — HIGH (ref 24.5–35.6)
AST SERPL-CCNC: 103 U/L — HIGH (ref 10–40)
AST SERPL-CCNC: 105 U/L — HIGH (ref 10–40)
AST SERPL-CCNC: 96 U/L — HIGH (ref 10–40)
BASE EXCESS BLDV CALC-SCNC: -0.5 MMOL/L — SIGNIFICANT CHANGE UP (ref -2–3)
BASE EXCESS BLDV CALC-SCNC: 1.3 MMOL/L — SIGNIFICANT CHANGE UP (ref -2–3)
BASE EXCESS BLDV CALC-SCNC: 2.8 MMOL/L — SIGNIFICANT CHANGE UP (ref -2–3)
BASOPHILS # BLD AUTO: 0 K/UL — SIGNIFICANT CHANGE UP (ref 0–0.2)
BASOPHILS # BLD AUTO: 0 K/UL — SIGNIFICANT CHANGE UP (ref 0–0.2)
BASOPHILS # BLD AUTO: 0.01 K/UL — SIGNIFICANT CHANGE UP (ref 0–0.2)
BASOPHILS NFR BLD AUTO: 0 % — SIGNIFICANT CHANGE UP (ref 0–2)
BASOPHILS NFR BLD AUTO: 0 % — SIGNIFICANT CHANGE UP (ref 0–2)
BASOPHILS NFR BLD AUTO: 0.1 % — SIGNIFICANT CHANGE UP (ref 0–2)
BILIRUB SERPL-MCNC: 2.3 MG/DL — HIGH (ref 0.2–1.2)
BILIRUB SERPL-MCNC: 2.3 MG/DL — HIGH (ref 0.2–1.2)
BILIRUB SERPL-MCNC: 2.4 MG/DL — HIGH (ref 0.2–1.2)
BUN SERPL-MCNC: 31 MG/DL — HIGH (ref 7–23)
BUN SERPL-MCNC: 34 MG/DL — HIGH (ref 7–23)
BUN SERPL-MCNC: 36 MG/DL — HIGH (ref 7–23)
BURR CELLS BLD QL SMEAR: PRESENT — SIGNIFICANT CHANGE UP
CALCIUM SERPL-MCNC: 9.5 MG/DL — SIGNIFICANT CHANGE UP (ref 8.4–10.5)
CALCIUM SERPL-MCNC: 9.6 MG/DL — SIGNIFICANT CHANGE UP (ref 8.4–10.5)
CALCIUM SERPL-MCNC: 9.7 MG/DL — SIGNIFICANT CHANGE UP (ref 8.4–10.5)
CHLORIDE SERPL-SCNC: 102 MMOL/L — SIGNIFICANT CHANGE UP (ref 96–108)
CHLORIDE SERPL-SCNC: 103 MMOL/L — SIGNIFICANT CHANGE UP (ref 96–108)
CHLORIDE SERPL-SCNC: 104 MMOL/L — SIGNIFICANT CHANGE UP (ref 96–108)
CO2 BLDV-SCNC: 25.4 MMOL/L — SIGNIFICANT CHANGE UP (ref 22–26)
CO2 BLDV-SCNC: 27.1 MMOL/L — HIGH (ref 22–26)
CO2 BLDV-SCNC: 28.4 MMOL/L — HIGH (ref 22–26)
CO2 SERPL-SCNC: 19 MMOL/L — LOW (ref 22–31)
CO2 SERPL-SCNC: 21 MMOL/L — LOW (ref 22–31)
CO2 SERPL-SCNC: 22 MMOL/L — SIGNIFICANT CHANGE UP (ref 22–31)
CREAT SERPL-MCNC: 0.97 MG/DL — SIGNIFICANT CHANGE UP (ref 0.5–1.3)
CREAT SERPL-MCNC: 1.02 MG/DL — SIGNIFICANT CHANGE UP (ref 0.5–1.3)
CREAT SERPL-MCNC: 1.13 MG/DL — SIGNIFICANT CHANGE UP (ref 0.5–1.3)
EGFR: 53 ML/MIN/1.73M2 — LOW
EGFR: 60 ML/MIN/1.73M2 — SIGNIFICANT CHANGE UP
EGFR: 64 ML/MIN/1.73M2 — SIGNIFICANT CHANGE UP
EOSINOPHIL # BLD AUTO: 0.09 K/UL — SIGNIFICANT CHANGE UP (ref 0–0.5)
EOSINOPHIL # BLD AUTO: 0.38 K/UL — SIGNIFICANT CHANGE UP (ref 0–0.5)
EOSINOPHIL # BLD AUTO: 0.68 K/UL — HIGH (ref 0–0.5)
EOSINOPHIL NFR BLD AUTO: 0.7 % — SIGNIFICANT CHANGE UP (ref 0–6)
EOSINOPHIL NFR BLD AUTO: 2.6 % — SIGNIFICANT CHANGE UP (ref 0–6)
EOSINOPHIL NFR BLD AUTO: 4.3 % — SIGNIFICANT CHANGE UP (ref 0–6)
GAS PNL BLDA: SIGNIFICANT CHANGE UP
GAS PNL BLDV: SIGNIFICANT CHANGE UP
GLUCOSE BLDC GLUCOMTR-MCNC: 122 MG/DL — HIGH (ref 70–99)
GLUCOSE BLDC GLUCOMTR-MCNC: 130 MG/DL — HIGH (ref 70–99)
GLUCOSE BLDC GLUCOMTR-MCNC: 156 MG/DL — HIGH (ref 70–99)
GLUCOSE BLDC GLUCOMTR-MCNC: 159 MG/DL — HIGH (ref 70–99)
GLUCOSE SERPL-MCNC: 132 MG/DL — HIGH (ref 70–99)
GLUCOSE SERPL-MCNC: 133 MG/DL — HIGH (ref 70–99)
GLUCOSE SERPL-MCNC: 136 MG/DL — HIGH (ref 70–99)
HCO3 BLDV-SCNC: 24 MMOL/L — SIGNIFICANT CHANGE UP (ref 22–29)
HCO3 BLDV-SCNC: 26 MMOL/L — SIGNIFICANT CHANGE UP (ref 22–29)
HCO3 BLDV-SCNC: 27 MMOL/L — SIGNIFICANT CHANGE UP (ref 22–29)
HCT VFR BLD CALC: 33.9 % — LOW (ref 34.5–45)
HCT VFR BLD CALC: 34.8 % — SIGNIFICANT CHANGE UP (ref 34.5–45)
HCT VFR BLD CALC: 35.6 % — SIGNIFICANT CHANGE UP (ref 34.5–45)
HGB BLD-MCNC: 11.8 G/DL — SIGNIFICANT CHANGE UP (ref 11.5–15.5)
HGB BLD-MCNC: 12.2 G/DL — SIGNIFICANT CHANGE UP (ref 11.5–15.5)
HGB BLD-MCNC: 12.5 G/DL — SIGNIFICANT CHANGE UP (ref 11.5–15.5)
HYPOCHROMIA BLD QL: SIGNIFICANT CHANGE UP
IMM GRANULOCYTES NFR BLD AUTO: 2 % — HIGH (ref 0–0.9)
INR BLD: 1.25 — HIGH (ref 0.85–1.18)
INR BLD: 1.29 — HIGH (ref 0.85–1.18)
INR BLD: 1.31 — HIGH (ref 0.85–1.18)
LACTATE SERPL-SCNC: 0.7 MMOL/L — SIGNIFICANT CHANGE UP (ref 0.5–2)
LACTATE SERPL-SCNC: 0.8 MMOL/L — SIGNIFICANT CHANGE UP (ref 0.5–2)
LACTATE SERPL-SCNC: 0.9 MMOL/L — SIGNIFICANT CHANGE UP (ref 0.5–2)
LIDOCAIN IGE QN: 137 U/L — HIGH (ref 7–60)
LIDOCAIN IGE QN: 148 U/L — HIGH (ref 7–60)
LYMPHOCYTES # BLD AUTO: 1.36 K/UL — SIGNIFICANT CHANGE UP (ref 1–3.3)
LYMPHOCYTES # BLD AUTO: 1.92 K/UL — SIGNIFICANT CHANGE UP (ref 1–3.3)
LYMPHOCYTES # BLD AUTO: 10.7 % — LOW (ref 13–44)
LYMPHOCYTES # BLD AUTO: 13.2 % — SIGNIFICANT CHANGE UP (ref 13–44)
LYMPHOCYTES # BLD AUTO: 17.2 % — SIGNIFICANT CHANGE UP (ref 13–44)
LYMPHOCYTES # BLD AUTO: 2.73 K/UL — SIGNIFICANT CHANGE UP (ref 1–3.3)
MAGNESIUM SERPL-MCNC: 2.1 MG/DL — SIGNIFICANT CHANGE UP (ref 1.6–2.6)
MAGNESIUM SERPL-MCNC: 2.1 MG/DL — SIGNIFICANT CHANGE UP (ref 1.6–2.6)
MAGNESIUM SERPL-MCNC: 2.2 MG/DL — SIGNIFICANT CHANGE UP (ref 1.6–2.6)
MANUAL SMEAR VERIFICATION: SIGNIFICANT CHANGE UP
MANUAL SMEAR VERIFICATION: SIGNIFICANT CHANGE UP
MCHC RBC-ENTMCNC: 26.6 PG — LOW (ref 27–34)
MCHC RBC-ENTMCNC: 26.7 PG — LOW (ref 27–34)
MCHC RBC-ENTMCNC: 26.7 PG — LOW (ref 27–34)
MCHC RBC-ENTMCNC: 34.8 GM/DL — SIGNIFICANT CHANGE UP (ref 32–36)
MCHC RBC-ENTMCNC: 35.1 GM/DL — SIGNIFICANT CHANGE UP (ref 32–36)
MCHC RBC-ENTMCNC: 35.1 GM/DL — SIGNIFICANT CHANGE UP (ref 32–36)
MCV RBC AUTO: 75.8 FL — LOW (ref 80–100)
MCV RBC AUTO: 75.9 FL — LOW (ref 80–100)
MCV RBC AUTO: 76.7 FL — LOW (ref 80–100)
METAMYELOCYTES # FLD: 0.9 % — HIGH (ref 0–0)
METAMYELOCYTES # FLD: 1.8 % — HIGH (ref 0–0)
MICROCYTES BLD QL: SLIGHT — SIGNIFICANT CHANGE UP
MICROCYTES BLD QL: SLIGHT — SIGNIFICANT CHANGE UP
MONOCYTES # BLD AUTO: 0.38 K/UL — SIGNIFICANT CHANGE UP (ref 0–0.9)
MONOCYTES # BLD AUTO: 1.36 K/UL — HIGH (ref 0–0.9)
MONOCYTES # BLD AUTO: 1.55 K/UL — HIGH (ref 0–0.9)
MONOCYTES NFR BLD AUTO: 12.2 % — SIGNIFICANT CHANGE UP (ref 2–14)
MONOCYTES NFR BLD AUTO: 2.6 % — SIGNIFICANT CHANGE UP (ref 2–14)
MONOCYTES NFR BLD AUTO: 8.6 % — SIGNIFICANT CHANGE UP (ref 2–14)
NEUTROPHILS # BLD AUTO: 10.94 K/UL — HIGH (ref 1.8–7.4)
NEUTROPHILS # BLD AUTO: 11.48 K/UL — HIGH (ref 1.8–7.4)
NEUTROPHILS # BLD AUTO: 9.45 K/UL — HIGH (ref 1.8–7.4)
NEUTROPHILS NFR BLD AUTO: 69 % — SIGNIFICANT CHANGE UP (ref 43–77)
NEUTROPHILS NFR BLD AUTO: 74.3 % — SIGNIFICANT CHANGE UP (ref 43–77)
NEUTROPHILS NFR BLD AUTO: 78.9 % — HIGH (ref 43–77)
NRBC # BLD: 0 /100 WBCS — SIGNIFICANT CHANGE UP (ref 0–0)
NRBC # BLD: 1 /100 WBCS — HIGH (ref 0–0)
NRBC # BLD: SIGNIFICANT CHANGE UP /100 WBCS (ref 0–0)
OVALOCYTES BLD QL SMEAR: SLIGHT — SIGNIFICANT CHANGE UP
OVALOCYTES BLD QL SMEAR: SLIGHT — SIGNIFICANT CHANGE UP
PCO2 BLDV: 39 MMHG — SIGNIFICANT CHANGE UP (ref 39–42)
PCO2 BLDV: 40 MMHG — SIGNIFICANT CHANGE UP (ref 39–42)
PCO2 BLDV: 40 MMHG — SIGNIFICANT CHANGE UP (ref 39–42)
PH BLDV: 7.4 — SIGNIFICANT CHANGE UP (ref 7.32–7.43)
PH BLDV: 7.42 — SIGNIFICANT CHANGE UP (ref 7.32–7.43)
PH BLDV: 7.44 — HIGH (ref 7.32–7.43)
PHOSPHATE SERPL-MCNC: 2.5 MG/DL — SIGNIFICANT CHANGE UP (ref 2.5–4.5)
PHOSPHATE SERPL-MCNC: 2.7 MG/DL — SIGNIFICANT CHANGE UP (ref 2.5–4.5)
PHOSPHATE SERPL-MCNC: 2.8 MG/DL — SIGNIFICANT CHANGE UP (ref 2.5–4.5)
PLAT MORPH BLD: NORMAL — SIGNIFICANT CHANGE UP
PLAT MORPH BLD: NORMAL — SIGNIFICANT CHANGE UP
PLATELET # BLD AUTO: 39 K/UL — LOW (ref 150–400)
PLATELET # BLD AUTO: 50 K/UL — LOW (ref 150–400)
PLATELET # BLD AUTO: 73 K/UL — LOW (ref 150–400)
PO2 BLDV: 36 MMHG — SIGNIFICANT CHANGE UP (ref 25–45)
PO2 BLDV: 37 MMHG — SIGNIFICANT CHANGE UP (ref 25–45)
PO2 BLDV: 39 MMHG — SIGNIFICANT CHANGE UP (ref 25–45)
POIKILOCYTOSIS BLD QL AUTO: SLIGHT — SIGNIFICANT CHANGE UP
POLYCHROMASIA BLD QL SMEAR: SLIGHT — SIGNIFICANT CHANGE UP
POLYCHROMASIA BLD QL SMEAR: SLIGHT — SIGNIFICANT CHANGE UP
POTASSIUM SERPL-MCNC: 3.8 MMOL/L — SIGNIFICANT CHANGE UP (ref 3.5–5.3)
POTASSIUM SERPL-MCNC: 3.9 MMOL/L — SIGNIFICANT CHANGE UP (ref 3.5–5.3)
POTASSIUM SERPL-MCNC: 4.2 MMOL/L — SIGNIFICANT CHANGE UP (ref 3.5–5.3)
POTASSIUM SERPL-SCNC: 3.8 MMOL/L — SIGNIFICANT CHANGE UP (ref 3.5–5.3)
POTASSIUM SERPL-SCNC: 3.9 MMOL/L — SIGNIFICANT CHANGE UP (ref 3.5–5.3)
POTASSIUM SERPL-SCNC: 4.2 MMOL/L — SIGNIFICANT CHANGE UP (ref 3.5–5.3)
PROT SERPL-MCNC: 6.5 G/DL — SIGNIFICANT CHANGE UP (ref 6–8.3)
PROT SERPL-MCNC: 6.5 G/DL — SIGNIFICANT CHANGE UP (ref 6–8.3)
PROT SERPL-MCNC: 6.6 G/DL — SIGNIFICANT CHANGE UP (ref 6–8.3)
PROTHROM AB SERPL-ACNC: 14.2 SEC — HIGH (ref 9.5–13)
PROTHROM AB SERPL-ACNC: 14.6 SEC — HIGH (ref 9.5–13)
PROTHROM AB SERPL-ACNC: 14.8 SEC — HIGH (ref 9.5–13)
RBC # BLD: 4.42 M/UL — SIGNIFICANT CHANGE UP (ref 3.8–5.2)
RBC # BLD: 4.59 M/UL — SIGNIFICANT CHANGE UP (ref 3.8–5.2)
RBC # BLD: 4.69 M/UL — SIGNIFICANT CHANGE UP (ref 3.8–5.2)
RBC # FLD: 16 % — HIGH (ref 10.3–14.5)
RBC # FLD: 16.3 % — HIGH (ref 10.3–14.5)
RBC # FLD: 16.6 % — HIGH (ref 10.3–14.5)
RBC BLD AUTO: ABNORMAL
RBC BLD AUTO: ABNORMAL
SAO2 % BLDV: 61.1 % — LOW (ref 67–88)
SAO2 % BLDV: 61.8 % — LOW (ref 67–88)
SAO2 % BLDV: 68.7 % — SIGNIFICANT CHANGE UP (ref 67–88)
SODIUM SERPL-SCNC: 137 MMOL/L — SIGNIFICANT CHANGE UP (ref 135–145)
SODIUM SERPL-SCNC: 137 MMOL/L — SIGNIFICANT CHANGE UP (ref 135–145)
SODIUM SERPL-SCNC: 138 MMOL/L — SIGNIFICANT CHANGE UP (ref 135–145)
TARGETS BLD QL SMEAR: SLIGHT — SIGNIFICANT CHANGE UP
TARGETS BLD QL SMEAR: SLIGHT — SIGNIFICANT CHANGE UP
VARIANT LYMPHS # BLD: 0.9 % — SIGNIFICANT CHANGE UP (ref 0–6)
WBC # BLD: 12.71 K/UL — HIGH (ref 3.8–10.5)
WBC # BLD: 14.55 K/UL — HIGH (ref 3.8–10.5)
WBC # BLD: 15.85 K/UL — HIGH (ref 3.8–10.5)
WBC # FLD AUTO: 12.71 K/UL — HIGH (ref 3.8–10.5)
WBC # FLD AUTO: 14.55 K/UL — HIGH (ref 3.8–10.5)
WBC # FLD AUTO: 15.85 K/UL — HIGH (ref 3.8–10.5)

## 2024-05-04 PROCEDURE — 71045 X-RAY EXAM CHEST 1 VIEW: CPT | Mod: 26

## 2024-05-04 PROCEDURE — 99291 CRITICAL CARE FIRST HOUR: CPT

## 2024-05-04 PROCEDURE — 76705 ECHO EXAM OF ABDOMEN: CPT | Mod: 26

## 2024-05-04 RX ORDER — POTASSIUM CHLORIDE 20 MEQ
20 PACKET (EA) ORAL ONCE
Refills: 0 | Status: COMPLETED | OUTPATIENT
Start: 2024-05-04 | End: 2024-05-04

## 2024-05-04 RX ORDER — VASOPRESSIN 20 [USP'U]/ML
0.02 INJECTION INTRAVENOUS
Qty: 40 | Refills: 0 | Status: DISCONTINUED | OUTPATIENT
Start: 2024-05-04 | End: 2024-05-08

## 2024-05-04 RX ORDER — HEPARIN SODIUM 5000 [USP'U]/ML
1000 INJECTION INTRAVENOUS; SUBCUTANEOUS
Qty: 25000 | Refills: 0 | Status: DISCONTINUED | OUTPATIENT
Start: 2024-05-04 | End: 2024-05-05

## 2024-05-04 RX ADMIN — Medication 3 MILLILITER(S): at 11:59

## 2024-05-04 RX ADMIN — POLYETHYLENE GLYCOL 3350 17 GRAM(S): 17 POWDER, FOR SOLUTION ORAL at 11:39

## 2024-05-04 RX ADMIN — Medication 20 MILLIGRAM(S): at 06:15

## 2024-05-04 RX ADMIN — MIDODRINE HYDROCHLORIDE 10 MILLIGRAM(S): 2.5 TABLET ORAL at 06:15

## 2024-05-04 RX ADMIN — MILRINONE LACTATE 6.12 MICROGRAM(S)/KG/MIN: 1 INJECTION, SOLUTION INTRAVENOUS at 09:16

## 2024-05-04 RX ADMIN — Medication 100 MILLIEQUIVALENT(S): at 15:24

## 2024-05-04 RX ADMIN — RIOCIGUAT 2.5 MILLIGRAM(S): 1.5 TABLET, FILM COATED ORAL at 15:24

## 2024-05-04 RX ADMIN — MIDODRINE HYDROCHLORIDE 10 MILLIGRAM(S): 2.5 TABLET ORAL at 15:24

## 2024-05-04 RX ADMIN — HEPARIN SODIUM 1000 UNIT(S)/HR: 5000 INJECTION INTRAVENOUS; SUBCUTANEOUS at 22:31

## 2024-05-04 RX ADMIN — Medication 3 MILLILITER(S): at 06:03

## 2024-05-04 RX ADMIN — Medication 2: at 22:28

## 2024-05-04 RX ADMIN — PANTOPRAZOLE SODIUM 40 MILLIGRAM(S): 20 TABLET, DELAYED RELEASE ORAL at 11:39

## 2024-05-04 RX ADMIN — Medication 20 MILLIGRAM(S): at 18:46

## 2024-05-04 RX ADMIN — VASOPRESSIN 3 UNIT(S)/MIN: 20 INJECTION INTRAVENOUS at 17:03

## 2024-05-04 RX ADMIN — MILRINONE LACTATE 6.12 MICROGRAM(S)/KG/MIN: 1 INJECTION, SOLUTION INTRAVENOUS at 22:29

## 2024-05-04 RX ADMIN — SENNA PLUS 2 TABLET(S): 8.6 TABLET ORAL at 22:28

## 2024-05-04 RX ADMIN — Medication 2: at 18:46

## 2024-05-04 RX ADMIN — RIOCIGUAT 2.5 MILLIGRAM(S): 1.5 TABLET, FILM COATED ORAL at 06:15

## 2024-05-04 RX ADMIN — Medication 3 MILLILITER(S): at 00:08

## 2024-05-04 RX ADMIN — MACITENTAN 10 MILLIGRAM(S): 10 TABLET, FILM COATED ORAL at 11:39

## 2024-05-04 RX ADMIN — Medication 3 MILLILITER(S): at 17:13

## 2024-05-04 RX ADMIN — CHLORHEXIDINE GLUCONATE 1 APPLICATION(S): 213 SOLUTION TOPICAL at 06:16

## 2024-05-04 RX ADMIN — Medication 20 MILLIEQUIVALENT(S): at 05:23

## 2024-05-04 RX ADMIN — MIDODRINE HYDROCHLORIDE 10 MILLIGRAM(S): 2.5 TABLET ORAL at 22:28

## 2024-05-04 RX ADMIN — RIOCIGUAT 2.5 MILLIGRAM(S): 1.5 TABLET, FILM COATED ORAL at 22:28

## 2024-05-04 NOTE — PROGRESS NOTE ADULT - ASSESSMENT
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  S/p  TEVAR (prox portion covering L subclavian, distal portion ~5cm above celiac trunk)   4/26  Course complicated by LE weakness after LD removed and concern for cord ischemia attributed to RV failure in the setting of known severe pulm hypertension and high PAPs  Requiring replacement of LD and [place] 4/29  Neuro exam improved with addition of Mil also higher MAPs  LD and [place] dc'd 5/2  A/p  Improving hemodynamics on riociguat and macitentan( CTEPH/PAH)/ Daniel weaned off  Slowly weaning Mil in the next 2 days monitoring perfusion markers, has a slight jump in LFTs - plateauing now   Avoiding hepatotoxic agents   Midodrine 10 q8 for MAPs>75 mmhg with holding parameter   Neuro check per protocol   standing lasix for Neg FB/ lytes supplemented   ADAT/Glycemic control/ ICU ppx  Started on Hep drip for AC (  left sided fem/pop DVTs )--transition to NOAC in the next 24 hours   Central line / PAC form 4/29 -- Dc introducer sheath today/ central line tmr     ATTENDING: I have personally and independently provided 105 min of critical care services. This excludes any time spent on separate procedures or teaching.

## 2024-05-04 NOTE — PROGRESS NOTE ADULT - SUBJECTIVE AND OBJECTIVE BOX
CTICU  CRITICAL  CARE  attending     Hand off received 					   Pertinent clinical, laboratory, radiographic, hemodynamic, echocardiographic, respiratory data, microbiologic data and chart were reviewed and analyzed frequently throughout the course of the day and night        68 year old female with ASTHMA, Chronic Pulmonary HTN,, CAD, HTN.  She was evaluated by her pulmonologist.  Dr. Cathy Serra, was noted to have an elevated D-Dimer and was advised to present to ED for workup. In ED a CTA C/A/P was performed which revealed a Type B dissection and she was admitted and discharged with close follow up.   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   She was deemed a candidate for TEVAR with Dr. Kwon and Dr. Suh and presented to [facility] for preop lumbar drain prior to OR     S/P TEVAR.  Hospital course complicated by weakness in lower extremities.        FAMILY HISTORY:  Family history of early CAD (Father)    PAST MEDICAL & SURGICAL HISTORY:  HTN (hypertension)  Prediabetes  Mild tricuspid regurgitation  Enlarged aorta  CAD (coronary artery disease)  Ascending aorta dilation  Pulmonary hypertension  Chronic systolic right heart failure  2019 novel coronavirus disease (COVID-19)  Asthma  S/P hysterectomy  S/P       14 system review was unremarkable    Vital signs, hemodynamic and respiratory parameters were reviewed from the bedside nursing flow sheet.  ICU Vital Signs Last 24 Hrs  T(C): 36.7 (04 May 2024 17:22), Max: 37.3 (04 May 2024 01:01)  T(F): 98.1 (04 May 2024 17:22), Max: 99.2 (04 May 2024 01:01)  HR: 78 (04 May 2024 20:25) (73 - 100)  BP: --  BP(mean): --  ABP: 138/72 (04 May 2024 20:00) (105/84 - 144/77)  ABP(mean): 94 (04 May 2024 20:00) (76 - 102)  RR: 18 (04 May 2024 20:25) (14 - 24)  SpO2: 100% (04 May 2024 20:25) (92% - 100%)    O2 Parameters below as of 04 May 2024 21:00  Patient On (Oxygen Delivery Method): nasal cannula, high flow  O2 Flow (L/min): 50  O2 Concentration (%): 40      Adult Advanced Hemodynamics Last 24 Hrs  CVP(mm Hg): 0 (04 May 2024 19:00) (0 - 8)  CVP(cm H2O): --  CO: --  CI: --  PA: --  PA(mean): --  PCWP: --  SVR: --  SVRI: --  PVR: --  PVRI: --, ABG - ( 04 May 2024 17:55 )  pH, Arterial: 7.44  pH, Blood: x     /  pCO2: 28    /  pO2: 70    / HCO3: 19    / Base Excess: -3.8  /  SaO2: 96.1                Intake and output was reviewed and the fluid balance was calculated  Daily     Daily   I&O's Summary    03 May 2024 07:01  -  04 May 2024 07:00  --------------------------------------------------------  IN: 1494.2 mL / OUT: 625 mL / NET: 869.2 mL    04 May 2024 07:01  -  04 May 2024 21:16  --------------------------------------------------------  IN: 896.3 mL / OUT: 950 mL / NET: -53.7 mL        All lines and drain sites were assessed    Neuro: Good strength in lower extremities but have problems in weight bearing and ambulation.  Neck: No JVD.  CVS: S1, S2, No S3.  Lungs: Good air entry bilaterally.  Abd: Soft. No tenderness. + Bowel sounds.  Vascular: + DP/PT.  Extremities: No edema.  Lymphatic: Normal.  Skin: No abnormalities.      labs  CBC Full  -  ( 04 May 2024 17:34 )  WBC Count : 14.55 K/uL  RBC Count : 4.59 M/uL  Hemoglobin : 12.2 g/dL  Hematocrit : 34.8 %  Platelet Count - Automated : 39 K/uL  Mean Cell Volume : 75.8 fl  Mean Cell Hemoglobin : 26.6 pg  Mean Cell Hemoglobin Concentration : 35.1 gm/dL  Auto Neutrophil # : 11.48 K/uL  Auto Lymphocyte # : 1.92 K/uL  Auto Monocyte # : 0.38 K/uL  Auto Eosinophil # : 0.38 K/uL  Auto Basophil # : 0.00 K/uL  Auto Neutrophil % : 78.9 %  Auto Lymphocyte % : 13.2 %  Auto Monocyte % : 2.6 %  Auto Eosinophil % : 2.6 %  Auto Basophil % : 0.0 %    05-04    137  |  104  |  36<H>  ----------------------------<  133<H>  4.2   |  19<L>  |  1.13    Ca    9.7      04 May 2024 17:34  Phos  2.7     05-04  Mg     2.2     05-04    TPro  6.5  /  Alb  3.9  /  TBili  2.3<H>  /  DBili  x   /  AST  96<H>  /  ALT  131<H>  /  AlkPhos  108  05-04    PT/INR - ( 04 May 2024 17:34 )   PT: 14.8 sec;   INR: 1.31          PTT - ( 04 May 2024 17:34 )  PTT:38.8 sec  The current medications were reviewed   MEDICATIONS  (STANDING):  albuterol    90 MICROgram(s) HFA Inhaler 2 Puff(s) Inhalation every 6 hours  albuterol/ipratropium for Nebulization 3 milliLiter(s) Nebulizer every 6 hours  chlorhexidine 2% Cloths 1 Application(s) Topical daily  dextrose 10% Bolus 125 milliLiter(s) IV Bolus once  dextrose 5%. 1000 milliLiter(s) (100 mL/Hr) IV Continuous <Continuous>  dextrose 5%. 1000 milliLiter(s) (50 mL/Hr) IV Continuous <Continuous>  dextrose 50% Injectable 12.5 Gram(s) IV Push once  dextrose 50% Injectable 25 Gram(s) IV Push once  furosemide   Injectable 20 milliGRAM(s) IV Push every 12 hours  glucagon  Injectable 1 milliGRAM(s) IntraMuscular once  heparin  Infusion. 1000 Unit(s)/Hr (10 mL/Hr) IV Continuous <Continuous>  influenza  Vaccine (HIGH DOSE) 0.7 milliLiter(s) IntraMuscular once  insulin lispro (ADMELOG) corrective regimen sliding scale   SubCutaneous Before meals and at bedtime  macitentan 10 milliGRAM(s) Oral <User Schedule>  midodrine 10 milliGRAM(s) Oral every 8 hours  milrinone Infusion 0.25 MICROgram(s)/kG/Min (6.12 mL/Hr) IV Continuous <Continuous>  pantoprazole    Tablet 40 milliGRAM(s) Oral daily  polyethylene glycol 3350 17 Gram(s) Oral daily  riociguat 2.5 milliGRAM(s) Oral every 8 hours  senna 2 Tablet(s) Oral at bedtime  sodium chloride 0.9%. 1000 milliLiter(s) (10 mL/Hr) IV Continuous <Continuous>  vasopressin Infusion 0.02 Unit(s)/Min (3 mL/Hr) IV Continuous <Continuous>    MEDICATIONS  (PRN):  dextrose Oral Gel 15 Gram(s) Oral once PRN Blood Glucose LESS THAN 70 milliGRAM(s)/deciliter  sodium chloride 0.65% Nasal 1 Spray(s) Both Nostrils three times a day PRN dry nose              68 year old  Female admitted with type B dissection.  S/P TEVAR (Dual Grafts).  Post operative course complicated by celiac artery thrombus and non obstructive DVT of left femoral and left popliteal vein.  Venous Duplex: Small nonobstructive likely chronic nonobstructive thrombus in themidportion of the left femoral vein and left popliteal vein. She was started on IV heparin.  CT ABDOMEN: Interval TEVAR of Type B aortic dissection. Stable aneurysmal dilatation of false lumen below distal graft attachment site which is perfused.  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 Distal common hepatic artery is perfused.  Multifocal splenic infarcts and multiple small renal infarcts bilaterally, likely embolic etiology.  Further hospital postoperative course complicated by weakness in both legs. Patient started on vasopressor support to keep MAP above 100. IV heparin  was discontinued.    MRI: The lower thoracic and upper lumbar spine is partially obscured by magnetic susceptibility artifact caused by the endovascular stent graft.  A lumbar drain is seen entering the spinal canal at the L1-L2 level on CT   scan performed earlier today, is not clearly visualized on the MRI.  BONES: There is no fracture or bone marrow edema.  ALIGNMENT: The alignment is normal.  SACROILIAC JOINTS/SACRUM: There is no sacral fracture. The SI joints are partially visualized but are intact.  CONUS AND CAUDA EQUINA: The distal cord and conus are obscured by susceptibility artifact.  VISUALIZED INTRAPELVIC/INTRA-ABDOMINAL SOFT TISSUES: Normal.  PARASPINAL SOFT TISSUES: Normal.  LOWER THORACIC SPINE: The T11-T12 and T12-L1 levels are obscured by susceptibility artifact.  L1-L2: Evaluation is degraded by susceptibility artifact.  Minimal disc bulge.  No spinal canal stenosis or neural foraminal narrowing.  L2-L3: Small disc bulge.  No spinal canal stenosis.  Minimal neural foraminal narrowing.  L3-L4: Small disc bulge.  Mild facet hypertrophy.  Minimal spinal canal stenosis.  Mild neural foraminal narrowing, right greater than left.  L4-L5: Small disc bulge.  Moderate facet hypertrophy with small facet joint effusions bilaterally.  Mild ligamentum flavum hypertrophy.  Mild   spinal canal stenosis.  Mild neural foraminal narrowing bilaterally.  L5-S1: Small disc bulge.  Moderate facet hypertrophy with trace facet joint effusions bilaterally.  Mild to moderate neural foraminal narrowing bilaterally.  SUMMARY:  No MRI evidence of lumbar spine fracture, malalignment or suspicious  osseous lesion. Multilevel lumbar spondylosis and degenerative disc disease  No evidence for cauda equina compression or nerve root impingement.  She is having difficulty in ambulation (Legs are buckling).   Hemodynamically stable.  Good oxygenation.  Fair urine out put.        My plan includes :  Keep MAP > 100.  IV vasopressin and midodrine.  Increase IV heparin.  Bronchodilator Rx.  Gentle Diuresis.  Close hemodynamic monitoring   Monitor for arrhythmias and monitor parameters for organ perfusion  Monitor neurologic status  Monitor renal function.  Head of the bed should remain elevated to 45 deg .   Chest PT and IS will be encouraged  Monitor adequacy of oxygenation   Nutritional goals will be met using po eventually , ensure adequate caloric intake and monitor the same  Stress ulcer and VTE prophylaxis will be achieved    Glycemic control is satisfactory  Electrolytes have been repleted as necessary and wound care has been carried out. Pain control has been achieved.   Aggressive physical therapy and early mobility and ambulation goals will be met   The family was updated about the course and plan  CRITICAL CARE TIME SPENT in evaluation and management, reassessments, review and interpretation of labs and x-rays, hemodynamic management, formulating a plan and coordinating care: ___30____ MIN.  Time does not include procedural time.  CTICU ATTENDING     					    Yanick Degroot MD

## 2024-05-04 NOTE — PROGRESS NOTE ADULT - SUBJECTIVE AND OBJECTIVE BOX
INTERVAl COURSE  LD and [place] dc'd  Remained on 0.25 of Mil/ No LE weakness  Up and ambulating   LFTs plateauing  On Hep drip for LE DVTs      VITALS  Vital Signs Last 24 Hrs  T(C): 36.2 (04 May 2024 09:27), Max: 37.3 (04 May 2024 01:01)  T(F): 97.2 (04 May 2024 09:27), Max: 99.2 (04 May 2024 01:01)  HR: 93 (04 May 2024 11:00) (76 - 100)  BP: 120/66  BP(mean): 85  RR: 20 (04 May 2024 11:00) (14 - 21)  SpO2: 99% (04 May 2024 11:00) (92% - 100%)    Parameters below as of 04 May 2024 11:00  Patient On (Oxygen Delivery Method): nasal cannula, high flow  O2 Flow (L/min): 50  O2 Concentration (%): 60    I&O's Summary  04 May 2024 07:01  -  04 May 2024 12:16  --------------------------------------------------------  IN: 92.4 mL / OUT: 100 mL / NET: -7.6 mL        PHYSICAL EXAM  General: A&Ox 3; NAD  Respiratory: CTA B/L  Cardiovascular: Regular rhythm/rate  Gastrointestinal: Soft; NTND  Extremities: WWP; no edema  Neurological:  CNII-XII grossly intact; no obvious focal deficits    LABS/IMAGING/EKG/ETC  Reviewed.

## 2024-05-04 NOTE — PROGRESS NOTE ADULT - SUBJECTIVE AND OBJECTIVE BOX
S: Seen on AM rounds. Seated in chair eating breakfast and talking to family on phone. States she continues to feel weak upon standing but she has been able to walk w/ PT or nurses. On milrinone, also some pressor support to maintain high maps for spinal perfusion.     o: AFVSS.     Exam: Awake, alert. Pleasant. Well appearing.   CV: HDS, WWP, normotensive  Pulm: Room air, no distress  Abd: Soft groins, soft abdomen. No abdominal tenderness.   Ext: Lower extremities w/ minimal edema. 2+ DP/PT bilaterally. 5/5 hip and knee flexion and extension left and right.       LABS:  cret                        12.2   14.55 )-----------( 39       ( 04 May 2024 17:34 )             34.8     05-04    137  |  103  |  34<H>  ----------------------------<  132<H>  3.9   |  21<L>  |  1.02    Ca    9.6      04 May 2024 12:26  Phos  2.8     05-04  Mg     2.1     05-04    TPro  6.6  /  Alb  4.0  /  TBili  2.4<H>  /  DBili  x   /  AST  105<H>  /  ALT  137<H>  /  AlkPhos  111  05-04    PT/INR - ( 04 May 2024 17:34 )   PT: 14.8 sec;   INR: 1.31          PTT - ( 04 May 2024 17:34 )  PTT:38.8 sec    A/P: 67 y/o F PMH asthma, CTEPH, CAD, HTN, Type B dissection now s/p TEVAR, Groin sites soft, pulses palpable. Still reporting subjective weakness but able to ambulate and no clearly weak on exam. Currently on Milrinone and vasopressin. Normal sensory to lower extremities, patient able to walk and sit in chair. Lumbar drain removed on 5/1. Pulmonary on board for pulmonary hypertension. CT imaging and MRI with no findings of nerve compression or root compression. 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 Distal common hepatic artery is perfused. Multifocal splenic infarcts and multiple small renal infarcts bilaterally, likely embolic etiology. No abdominal symptoms - tolerating diet without pain or tenderness.     - Continue neurovasacular checks per TEVAR ERAS protocol  - B/l groin checks per TEVAR ERAS protocol  - Rest of care per CTICU  - vascular surgery will continue to follow patient

## 2024-05-05 LAB
ACANTHOCYTES BLD QL SMEAR: SLIGHT — SIGNIFICANT CHANGE UP
ALBUMIN SERPL ELPH-MCNC: 3.8 G/DL — SIGNIFICANT CHANGE UP (ref 3.3–5)
ALBUMIN SERPL ELPH-MCNC: 3.8 G/DL — SIGNIFICANT CHANGE UP (ref 3.3–5)
ALP SERPL-CCNC: 101 U/L — SIGNIFICANT CHANGE UP (ref 40–120)
ALP SERPL-CCNC: 108 U/L — SIGNIFICANT CHANGE UP (ref 40–120)
ALT FLD-CCNC: 124 U/L — HIGH (ref 10–45)
ALT FLD-CCNC: 126 U/L — HIGH (ref 10–45)
AMYLASE P1 CFR SERPL: 83 U/L — SIGNIFICANT CHANGE UP (ref 25–125)
ANION GAP SERPL CALC-SCNC: 12 MMOL/L — SIGNIFICANT CHANGE UP (ref 5–17)
ANION GAP SERPL CALC-SCNC: 13 MMOL/L — SIGNIFICANT CHANGE UP (ref 5–17)
ANISOCYTOSIS BLD QL: SLIGHT — SIGNIFICANT CHANGE UP
ANISOCYTOSIS BLD QL: SLIGHT — SIGNIFICANT CHANGE UP
APTT BLD: 32.7 SEC — SIGNIFICANT CHANGE UP (ref 24.5–35.6)
APTT BLD: 35.1 SEC — SIGNIFICANT CHANGE UP (ref 24.5–35.6)
APTT BLD: 51.3 SEC — HIGH (ref 24.5–35.6)
AST SERPL-CCNC: 80 U/L — HIGH (ref 10–40)
AST SERPL-CCNC: 85 U/L — HIGH (ref 10–40)
BASE EXCESS BLDV CALC-SCNC: 3.3 MMOL/L — HIGH (ref -2–3)
BASE EXCESS BLDV CALC-SCNC: 4.2 MMOL/L — HIGH (ref -2–3)
BASOPHILS # BLD AUTO: 0.03 K/UL — SIGNIFICANT CHANGE UP (ref 0–0.2)
BASOPHILS # BLD AUTO: 0.03 K/UL — SIGNIFICANT CHANGE UP (ref 0–0.2)
BASOPHILS NFR BLD AUTO: 0.2 % — SIGNIFICANT CHANGE UP (ref 0–2)
BASOPHILS NFR BLD AUTO: 0.3 % — SIGNIFICANT CHANGE UP (ref 0–2)
BILIRUB SERPL-MCNC: 2 MG/DL — HIGH (ref 0.2–1.2)
BILIRUB SERPL-MCNC: 2 MG/DL — HIGH (ref 0.2–1.2)
BUN SERPL-MCNC: 24 MG/DL — HIGH (ref 7–23)
BUN SERPL-MCNC: 29 MG/DL — HIGH (ref 7–23)
CA-I SERPL-SCNC: 1.28 MMOL/L — SIGNIFICANT CHANGE UP (ref 1.15–1.33)
CALCIUM SERPL-MCNC: 9.2 MG/DL — SIGNIFICANT CHANGE UP (ref 8.4–10.5)
CALCIUM SERPL-MCNC: 9.5 MG/DL — SIGNIFICANT CHANGE UP (ref 8.4–10.5)
CHLORIDE SERPL-SCNC: 101 MMOL/L — SIGNIFICANT CHANGE UP (ref 96–108)
CHLORIDE SERPL-SCNC: 98 MMOL/L — SIGNIFICANT CHANGE UP (ref 96–108)
CO2 BLDV-SCNC: 29.1 MMOL/L — HIGH (ref 22–26)
CO2 BLDV-SCNC: 30.6 MMOL/L — HIGH (ref 22–26)
CO2 SERPL-SCNC: 22 MMOL/L — SIGNIFICANT CHANGE UP (ref 22–31)
CO2 SERPL-SCNC: 24 MMOL/L — SIGNIFICANT CHANGE UP (ref 22–31)
CREAT SERPL-MCNC: 0.78 MG/DL — SIGNIFICANT CHANGE UP (ref 0.5–1.3)
CREAT SERPL-MCNC: 0.85 MG/DL — SIGNIFICANT CHANGE UP (ref 0.5–1.3)
DACRYOCYTES BLD QL SMEAR: SLIGHT — SIGNIFICANT CHANGE UP
EGFR: 75 ML/MIN/1.73M2 — SIGNIFICANT CHANGE UP
EGFR: 83 ML/MIN/1.73M2 — SIGNIFICANT CHANGE UP
EOSINOPHIL # BLD AUTO: 0.28 K/UL — SIGNIFICANT CHANGE UP (ref 0–0.5)
EOSINOPHIL # BLD AUTO: 0.31 K/UL — SIGNIFICANT CHANGE UP (ref 0–0.5)
EOSINOPHIL NFR BLD AUTO: 2.3 % — SIGNIFICANT CHANGE UP (ref 0–6)
EOSINOPHIL NFR BLD AUTO: 2.4 % — SIGNIFICANT CHANGE UP (ref 0–6)
GAS PNL BLDA: SIGNIFICANT CHANGE UP
GAS PNL BLDA: SIGNIFICANT CHANGE UP
GAS PNL BLDV: 133 MMOL/L — LOW (ref 136–145)
GAS PNL BLDV: SIGNIFICANT CHANGE UP
GAS PNL BLDV: SIGNIFICANT CHANGE UP
GLUCOSE BLDC GLUCOMTR-MCNC: 150 MG/DL — HIGH (ref 70–99)
GLUCOSE BLDC GLUCOMTR-MCNC: 157 MG/DL — HIGH (ref 70–99)
GLUCOSE BLDC GLUCOMTR-MCNC: 163 MG/DL — HIGH (ref 70–99)
GLUCOSE SERPL-MCNC: 159 MG/DL — HIGH (ref 70–99)
GLUCOSE SERPL-MCNC: 161 MG/DL — HIGH (ref 70–99)
HCO3 BLDV-SCNC: 28 MMOL/L — SIGNIFICANT CHANGE UP (ref 22–29)
HCO3 BLDV-SCNC: 29 MMOL/L — SIGNIFICANT CHANGE UP (ref 22–29)
HCT VFR BLD CALC: 32.9 % — LOW (ref 34.5–45)
HCT VFR BLD CALC: 33.3 % — LOW (ref 34.5–45)
HEPARIN-PF4 AB RESULT: 20.2 U/ML — HIGH (ref 0–0.9)
HGB BLD-MCNC: 11.4 G/DL — LOW (ref 11.5–15.5)
HGB BLD-MCNC: 11.4 G/DL — LOW (ref 11.5–15.5)
IMM GRANULOCYTES NFR BLD AUTO: 2.1 % — HIGH (ref 0–0.9)
IMM GRANULOCYTES NFR BLD AUTO: 2.4 % — HIGH (ref 0–0.9)
INR BLD: 1.28 — HIGH (ref 0.85–1.18)
INR BLD: 1.39 — HIGH (ref 0.85–1.18)
INR BLD: 1.47 — HIGH (ref 0.85–1.18)
LACTATE SERPL-SCNC: 1.5 MMOL/L — SIGNIFICANT CHANGE UP (ref 0.5–2)
LIDOCAIN IGE QN: 74 U/L — HIGH (ref 7–60)
LYMPHOCYTES # BLD AUTO: 1.36 K/UL — SIGNIFICANT CHANGE UP (ref 1–3.3)
LYMPHOCYTES # BLD AUTO: 1.63 K/UL — SIGNIFICANT CHANGE UP (ref 1–3.3)
LYMPHOCYTES # BLD AUTO: 11.7 % — LOW (ref 13–44)
LYMPHOCYTES # BLD AUTO: 12.3 % — LOW (ref 13–44)
MAGNESIUM SERPL-MCNC: 1.9 MG/DL — SIGNIFICANT CHANGE UP (ref 1.6–2.6)
MAGNESIUM SERPL-MCNC: 1.9 MG/DL — SIGNIFICANT CHANGE UP (ref 1.6–2.6)
MANUAL SMEAR VERIFICATION: SIGNIFICANT CHANGE UP
MANUAL SMEAR VERIFICATION: SIGNIFICANT CHANGE UP
MCHC RBC-ENTMCNC: 26.8 PG — LOW (ref 27–34)
MCHC RBC-ENTMCNC: 26.9 PG — LOW (ref 27–34)
MCHC RBC-ENTMCNC: 34.2 GM/DL — SIGNIFICANT CHANGE UP (ref 32–36)
MCHC RBC-ENTMCNC: 34.7 GM/DL — SIGNIFICANT CHANGE UP (ref 32–36)
MCV RBC AUTO: 77.6 FL — LOW (ref 80–100)
MCV RBC AUTO: 78.2 FL — LOW (ref 80–100)
METAMYELOCYTES # FLD: 0.9 % — HIGH (ref 0–0)
MICROCYTES BLD QL: SLIGHT — SIGNIFICANT CHANGE UP
MICROCYTES BLD QL: SLIGHT — SIGNIFICANT CHANGE UP
MONOCYTES # BLD AUTO: 1.29 K/UL — HIGH (ref 0–0.9)
MONOCYTES # BLD AUTO: 1.39 K/UL — HIGH (ref 0–0.9)
MONOCYTES NFR BLD AUTO: 10.5 % — SIGNIFICANT CHANGE UP (ref 2–14)
MONOCYTES NFR BLD AUTO: 11.1 % — SIGNIFICANT CHANGE UP (ref 2–14)
MYELOCYTES NFR BLD: 0.9 % — HIGH (ref 0–0)
NEUTROPHILS # BLD AUTO: 8.45 K/UL — HIGH (ref 1.8–7.4)
NEUTROPHILS # BLD AUTO: 9.53 K/UL — HIGH (ref 1.8–7.4)
NEUTROPHILS NFR BLD AUTO: 72.3 % — SIGNIFICANT CHANGE UP (ref 43–77)
NEUTROPHILS NFR BLD AUTO: 72.4 % — SIGNIFICANT CHANGE UP (ref 43–77)
NEUTS BAND # BLD: 0.9 % — SIGNIFICANT CHANGE UP (ref 0–8)
NRBC # BLD: 0 /100 WBCS — SIGNIFICANT CHANGE UP (ref 0–0)
NRBC # BLD: 0 /100 WBCS — SIGNIFICANT CHANGE UP (ref 0–0)
OVALOCYTES BLD QL SMEAR: SLIGHT — SIGNIFICANT CHANGE UP
OVALOCYTES BLD QL SMEAR: SLIGHT — SIGNIFICANT CHANGE UP
PCO2 BLDV: 41 MMHG — SIGNIFICANT CHANGE UP (ref 39–42)
PCO2 BLDV: 44 MMHG — HIGH (ref 39–42)
PF4 HEPARIN CMPLX AB SER-ACNC: POSITIVE — SIGNIFICANT CHANGE UP
PH BLDV: 7.43 — SIGNIFICANT CHANGE UP (ref 7.32–7.43)
PH BLDV: 7.44 — HIGH (ref 7.32–7.43)
PHOSPHATE SERPL-MCNC: 2.6 MG/DL — SIGNIFICANT CHANGE UP (ref 2.5–4.5)
PHOSPHATE SERPL-MCNC: 2.7 MG/DL — SIGNIFICANT CHANGE UP (ref 2.5–4.5)
PLAT MORPH BLD: NORMAL — SIGNIFICANT CHANGE UP
PLAT MORPH BLD: NORMAL — SIGNIFICANT CHANGE UP
PLATELET # BLD AUTO: 27 K/UL — LOW (ref 150–400)
PLATELET # BLD AUTO: 27 K/UL — LOW (ref 150–400)
PO2 BLDV: 37 MMHG — SIGNIFICANT CHANGE UP (ref 25–45)
PO2 BLDV: <33 MMHG — SIGNIFICANT CHANGE UP (ref 25–45)
POIKILOCYTOSIS BLD QL AUTO: SLIGHT — SIGNIFICANT CHANGE UP
POLYCHROMASIA BLD QL SMEAR: SLIGHT — SIGNIFICANT CHANGE UP
POTASSIUM BLDV-SCNC: 3.8 MMOL/L — SIGNIFICANT CHANGE UP (ref 3.5–5.1)
POTASSIUM SERPL-MCNC: 3.7 MMOL/L — SIGNIFICANT CHANGE UP (ref 3.5–5.3)
POTASSIUM SERPL-MCNC: 4.2 MMOL/L — SIGNIFICANT CHANGE UP (ref 3.5–5.3)
POTASSIUM SERPL-SCNC: 3.7 MMOL/L — SIGNIFICANT CHANGE UP (ref 3.5–5.3)
POTASSIUM SERPL-SCNC: 4.2 MMOL/L — SIGNIFICANT CHANGE UP (ref 3.5–5.3)
PROT SERPL-MCNC: 6.4 G/DL — SIGNIFICANT CHANGE UP (ref 6–8.3)
PROT SERPL-MCNC: 6.5 G/DL — SIGNIFICANT CHANGE UP (ref 6–8.3)
PROTHROM AB SERPL-ACNC: 14.5 SEC — HIGH (ref 9.5–13)
PROTHROM AB SERPL-ACNC: 15.7 SEC — HIGH (ref 9.5–13)
PROTHROM AB SERPL-ACNC: 16.6 SEC — HIGH (ref 9.5–13)
RBC # BLD: 4.24 M/UL — SIGNIFICANT CHANGE UP (ref 3.8–5.2)
RBC # BLD: 4.26 M/UL — SIGNIFICANT CHANGE UP (ref 3.8–5.2)
RBC # FLD: 15.9 % — HIGH (ref 10.3–14.5)
RBC # FLD: 16 % — HIGH (ref 10.3–14.5)
RBC BLD AUTO: ABNORMAL
RBC BLD AUTO: ABNORMAL
SAO2 % BLDV: 53.4 % — LOW (ref 67–88)
SAO2 % BLDV: 65.4 % — LOW (ref 67–88)
SODIUM SERPL-SCNC: 133 MMOL/L — LOW (ref 135–145)
SODIUM SERPL-SCNC: 137 MMOL/L — SIGNIFICANT CHANGE UP (ref 135–145)
TARGETS BLD QL SMEAR: SLIGHT — SIGNIFICANT CHANGE UP
TARGETS BLD QL SMEAR: SLIGHT — SIGNIFICANT CHANGE UP
WBC # BLD: 11.66 K/UL — HIGH (ref 3.8–10.5)
WBC # BLD: 13.21 K/UL — HIGH (ref 3.8–10.5)
WBC # FLD AUTO: 11.66 K/UL — HIGH (ref 3.8–10.5)
WBC # FLD AUTO: 13.21 K/UL — HIGH (ref 3.8–10.5)

## 2024-05-05 PROCEDURE — 71045 X-RAY EXAM CHEST 1 VIEW: CPT | Mod: 26

## 2024-05-05 PROCEDURE — 99291 CRITICAL CARE FIRST HOUR: CPT

## 2024-05-05 PROCEDURE — 99292 CRITICAL CARE ADDL 30 MIN: CPT

## 2024-05-05 RX ORDER — MAGNESIUM SULFATE 500 MG/ML
2 VIAL (ML) INJECTION ONCE
Refills: 0 | Status: COMPLETED | OUTPATIENT
Start: 2024-05-05 | End: 2024-05-05

## 2024-05-05 RX ORDER — ARGATROBAN 50 MG/50ML
1.4 INJECTION, SOLUTION INTRAVENOUS
Qty: 50 | Refills: 0 | Status: DISCONTINUED | OUTPATIENT
Start: 2024-05-05 | End: 2024-05-10

## 2024-05-05 RX ORDER — TAMSULOSIN HYDROCHLORIDE 0.4 MG/1
0.4 CAPSULE ORAL AT BEDTIME
Refills: 0 | Status: DISCONTINUED | OUTPATIENT
Start: 2024-05-05 | End: 2024-05-07

## 2024-05-05 RX ORDER — POTASSIUM CHLORIDE 20 MEQ
20 PACKET (EA) ORAL ONCE
Refills: 0 | Status: COMPLETED | OUTPATIENT
Start: 2024-05-05 | End: 2024-05-05

## 2024-05-05 RX ORDER — MILRINONE LACTATE 1 MG/ML
0.12 INJECTION, SOLUTION INTRAVENOUS
Qty: 20 | Refills: 0 | Status: DISCONTINUED | OUTPATIENT
Start: 2024-05-05 | End: 2024-05-06

## 2024-05-05 RX ORDER — OXYCODONE HYDROCHLORIDE 5 MG/1
5 TABLET ORAL EVERY 6 HOURS
Refills: 0 | Status: DISCONTINUED | OUTPATIENT
Start: 2024-05-05 | End: 2024-05-05

## 2024-05-05 RX ORDER — ALBUMIN HUMAN 25 %
250 VIAL (ML) INTRAVENOUS ONCE
Refills: 0 | Status: COMPLETED | OUTPATIENT
Start: 2024-05-05 | End: 2024-05-05

## 2024-05-05 RX ORDER — ARGATROBAN 50 MG/50ML
0.5 INJECTION, SOLUTION INTRAVENOUS
Qty: 50 | Refills: 0 | Status: DISCONTINUED | OUTPATIENT
Start: 2024-05-05 | End: 2024-05-05

## 2024-05-05 RX ADMIN — Medication 20 MILLIEQUIVALENT(S): at 06:17

## 2024-05-05 RX ADMIN — Medication 20 MILLIGRAM(S): at 06:16

## 2024-05-05 RX ADMIN — Medication 3 MILLILITER(S): at 23:10

## 2024-05-05 RX ADMIN — Medication 25 GRAM(S): at 15:35

## 2024-05-05 RX ADMIN — RIOCIGUAT 2.5 MILLIGRAM(S): 1.5 TABLET, FILM COATED ORAL at 06:16

## 2024-05-05 RX ADMIN — Medication 20 MILLIGRAM(S): at 16:54

## 2024-05-05 RX ADMIN — VASOPRESSIN 3 UNIT(S)/MIN: 20 INJECTION INTRAVENOUS at 22:16

## 2024-05-05 RX ADMIN — MILRINONE LACTATE 6.12 MICROGRAM(S)/KG/MIN: 1 INJECTION, SOLUTION INTRAVENOUS at 16:21

## 2024-05-05 RX ADMIN — RIOCIGUAT 2.5 MILLIGRAM(S): 1.5 TABLET, FILM COATED ORAL at 22:16

## 2024-05-05 RX ADMIN — RIOCIGUAT 2.5 MILLIGRAM(S): 1.5 TABLET, FILM COATED ORAL at 13:31

## 2024-05-05 RX ADMIN — Medication 3 MILLILITER(S): at 18:03

## 2024-05-05 RX ADMIN — ARGATROBAN 3.67 MICROGRAM(S)/KG/MIN: 50 INJECTION, SOLUTION INTRAVENOUS at 17:00

## 2024-05-05 RX ADMIN — Medication 3 MILLILITER(S): at 00:19

## 2024-05-05 RX ADMIN — MIDODRINE HYDROCHLORIDE 10 MILLIGRAM(S): 2.5 TABLET ORAL at 06:16

## 2024-05-05 RX ADMIN — MACITENTAN 10 MILLIGRAM(S): 10 TABLET, FILM COATED ORAL at 12:20

## 2024-05-05 RX ADMIN — PANTOPRAZOLE SODIUM 40 MILLIGRAM(S): 20 TABLET, DELAYED RELEASE ORAL at 12:20

## 2024-05-05 RX ADMIN — TAMSULOSIN HYDROCHLORIDE 0.4 MILLIGRAM(S): 0.4 CAPSULE ORAL at 22:17

## 2024-05-05 RX ADMIN — MIDODRINE HYDROCHLORIDE 10 MILLIGRAM(S): 2.5 TABLET ORAL at 13:32

## 2024-05-05 RX ADMIN — Medication 3 MILLILITER(S): at 05:03

## 2024-05-05 RX ADMIN — Medication 2: at 22:33

## 2024-05-05 RX ADMIN — MIDODRINE HYDROCHLORIDE 10 MILLIGRAM(S): 2.5 TABLET ORAL at 22:16

## 2024-05-05 RX ADMIN — CHLORHEXIDINE GLUCONATE 1 APPLICATION(S): 213 SOLUTION TOPICAL at 06:18

## 2024-05-05 RX ADMIN — Medication 3 MILLILITER(S): at 11:40

## 2024-05-05 RX ADMIN — Medication 125 MILLILITER(S): at 16:54

## 2024-05-05 RX ADMIN — Medication 2: at 16:54

## 2024-05-05 RX ADMIN — POLYETHYLENE GLYCOL 3350 17 GRAM(S): 17 POWDER, FOR SOLUTION ORAL at 12:20

## 2024-05-05 NOTE — PROGRESS NOTE ADULT - SUBJECTIVE AND OBJECTIVE BOX
POST OPERATIVE DAY  #9    Subjective: Patient seen and examined at bedside, no acute complaints. Patient states she is feeling well today, she has been using the incentive spirometer and ambulating. Patient states that intermittently she is not able to stand or walk since she feels weakness in her knees but overall this has been improving.    ROS:   Denies Headache, blurred vision, Chest Pain, SOB, Abdominal pain, nausea or vomiting     Social   argatroban Infusion 0.5  furosemide   Injectable 20  macitentan 10  midodrine 10  milrinone Infusion 0.25  riociguat 2.5      Allergies    No Known Allergies    Intolerances        Vital Signs Last 24 Hrs  T(C): 36.4 (05 May 2024 09:17), Max: 36.7 (04 May 2024 17:22)  T(F): 97.5 (05 May 2024 09:17), Max: 98.1 (04 May 2024 17:22)  HR: 70 (05 May 2024 09:00) (70 - 103)  BP: --  BP(mean): --  RR: 20 (05 May 2024 09:00) (15 - 26)  SpO2: 100% (05 May 2024 09:00) (93% - 100%)    Parameters below as of 05 May 2024 09:00  Patient On (Oxygen Delivery Method): nasal cannula, high flow  O2 Flow (L/min): 50  O2 Concentration (%): 40  I&O's Summary    04 May 2024 07:01  -  05 May 2024 07:00  --------------------------------------------------------  IN: 1436.4 mL / OUT: 2385 mL / NET: -948.6 mL    05 May 2024 07:01  -  05 May 2024 09:27  --------------------------------------------------------  IN: 21.2 mL / OUT: 675 mL / NET: -653.8 mL        PHYSICAL EXAM:  General: NAD, pt resting comfortably in bed  Pulm: No respiratory distress, nonlabored breathing, on high maxwell  CVS: NSR, HDS. on milrinone and vasopressin  Abd: Soft, NT, ND  Extremities: WWP, small amount of edema. Motor and sensory function grossly intact b/l UE and LE. Strength 5/5 x4.  Pulses: palp DP/PT bilaterally        LABS:                        11.4   13.21 )-----------( 27       ( 05 May 2024 03:19 )             32.9     05-05    137  |  101  |  29<H>  ----------------------------<  159<H>  3.7   |  24  |  0.85    Ca    9.5      05 May 2024 03:19  Phos  2.7     05-05  Mg     1.9     05-05    TPro  6.4  /  Alb  3.8  /  TBili  2.0<H>  /  DBili  x   /  AST  85<H>  /  ALT  126<H>  /  AlkPhos  101  05-05    PT/INR - ( 05 May 2024 03:19 )   PT: 14.5 sec;   INR: 1.28          PTT - ( 05 May 2024 03:19 )  PTT:51.3 sec      A/P: 69 y/o F PMH asthma, CTEPH, CAD, HTN, Type B dissection now s/p TEVAR, Groin sites soft, pulses palpable. Still reporting subjective weakness but able to ambulate and no clearly weak on exam. Currently on Milrinone and vasopressin. Normal sensory to lower extremities, patient able to walk and sit in chair. Lumbar drain removed on 5/1. Pulmonary on board for pulmonary hypertension. CT imaging and MRI with no findings of nerve compression or root compression. 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 Distal common hepatic artery is perfused. Multifocal splenic infarcts and multiple small renal infarcts bilaterally, likely embolic etiology. No abdominal symptoms - tolerating diet without pain or tenderness.     - Continue neurovascular and b/l groin checks checks per TEVAR ERAS protocol  - Rest of care per CTICU  - Vascular surgery will continue to follow  - Call [phone] with any questions or concerns

## 2024-05-05 NOTE — PROCEDURE NOTE - NSINFORMCONSENT_GEN_A_CORE
Benefits, risks, and possible complications of procedure explained to patient/caregiver who verbalized understanding and gave verbal consent.
Benefits, risks, and possible complications of procedure explained to patient/caregiver who verbalized understanding and gave verbal consent.
Benefits, risks, and possible complications of procedure explained to patient/caregiver who verbalized understanding and gave written consent.
Benefits, risks, and possible complications of procedure explained to patient/caregiver who verbalized understanding and gave written consent.
Benefits, risks, and possible complications of procedure explained to patient/caregiver who verbalized understanding and gave verbal consent.
Benefits, risks, and possible complications of procedure explained to patient/caregiver who verbalized understanding and gave verbal consent.
This was an emergent procedure.

## 2024-05-05 NOTE — PROGRESS NOTE ADULT - SUBJECTIVE AND OBJECTIVE BOX
CTICU  CRITICAL  CARE  attending     Hand off received 					   Pertinent clinical, laboratory, radiographic, hemodynamic, echocardiographic, respiratory data, microbiologic data and chart were reviewed and analyzed frequently throughout the course of the day  Patient seen and examined with CTS/ SH attending at bedside  Pt is a 68yr old female with PMH asthma, CTEPH, CAD, HTN, Type B Dissection admitted for TEVAR. Pt underwent LD placement . : Pt underwent endovascular repair of thoracic aorta covering L subclavian to ~5cm above celiac trunk ([place]). Arrived Extubated. Clamped  and  and drained overnight. Ld removed in afternoon. : early AM pt unable to move LE, stroke code called. NSGY replaced LD. MAPs driven up. CT imaging and MRI imaging performed. Pulmonary consulted for CTEPH mgmt. Boo and primacor restarted with new swan placement. 1 pRBC, mannitol, and decadron also given. : Midodrine started. : Macitetan started. Boo weaned to 5. LD capped during day. : LD removed. 5/3: Primacor .25. [place] removed. : Weakness in LE and urinary retention. Vaso restarted. : HIT positive, argatroban started. NSGY recommending MRI. RUE midline placed.     FAMILY HISTORY:  Family history of early CAD (Father)  PAST MEDICAL & SURGICAL HISTORY:  HTN (hypertension)  Prediabetes  Mild tricuspid regurgitation  Enlarged aorta  CAD (coronary artery disease)  Ascending aorta dilation  Pulmonary hypertension  Chronic systolic right heart failure  2019 novel coronavirus disease (COVID-19)  Asthma  S/P hysterectomy  S/P         Patient is a 68y old  Female who presents with a chief complaint of type B dissection.      14 system review was unremarkable    Vital signs, hemodynamic and respiratory parameters were reviewed from the bedside nursing flowsheet.  ICU Vital Signs Last 24 Hrs  T(C): 36.4 (05 May 2024 16:44), Max: 36.6 (04 May 2024 21:25)  T(F): 97.5 (05 May 2024 16:44), Max: 97.9 (04 May 2024 21:25)  HR: 74 (05 May 2024 19:00) (64 - 103)  BP: --  BP(mean): --  ABP: 141/63 (05 May 2024 19:00) (118/61 - 176/90)  ABP(mean): 94 (05 May 2024 19:00) (79 - 125)  RR: 20 (05 May 2024 19:00) (12 - 26)  SpO2: 93% (05 May 2024 19:00) (91% - 100%)    O2 Parameters below as of 05 May 2024 20:00  Patient On (Oxygen Delivery Method): nasal cannula w/ humidification          Adult Advanced Hemodynamics Last 24 Hrs  CVP(mm Hg): 273 (05 May 2024 13:00) (0 - 273)  CVP(cm H2O): --  CO: --  CI: --  PA: --  PA(mean): --  PCWP: --  SVR: --  SVRI: --  PVR: --  PVRI: --, ABG - ( 05 May 2024 13:38 )  pH, Arterial: 7.44  pH, Blood: x     /  pCO2: 30    /  pO2: 128   / HCO3: 20    / Base Excess: -2.7  /  SaO2: 99.9                Intake and output was reviewed and the fluid balance was calculated  Daily     Daily Weight in k.3 (05 May 2024 07:00)  I&O's Summary    04 May 2024 07:01  -  05 May 2024 07:00  --------------------------------------------------------  IN: 1436.4 mL / OUT: 2385 mL / NET: -948.6 mL    05 May 2024 07:01  -  05 May 2024 19:30  --------------------------------------------------------  IN: 507.1 mL / OUT: 1920 mL / NET: -1412.9 mL        All lines and drain sites were assessed  Glycemic trend was reviewedCAPILLARY BLOOD GLUCOSE      POCT Blood Glucose.: 157 mg/dL (05 May 2024 16:37)      Neuro: well appearing female in nad, 5/5 strength throughout bl upper and lower extremities  HEENT: mmm  Heart: s1 s2  Lungs: clear bl  Abdomen: soft nt nd  Extremities: wwp    Lines:  RUE DL midline   R axillary arterial line       labs  CBC Full  -  ( 05 May 2024 13:38 )  WBC Count : 11.66 K/uL  RBC Count : 4.26 M/uL  Hemoglobin : 11.4 g/dL  Hematocrit : 33.3 %  Platelet Count - Automated : 27 K/uL  Mean Cell Volume : 78.2 fl  Mean Cell Hemoglobin : 26.8 pg  Mean Cell Hemoglobin Concentration : 34.2 gm/dL  Auto Neutrophil # : 8.45 K/uL  Auto Lymphocyte # : 1.36 K/uL  Auto Monocyte # : 1.29 K/uL  Auto Eosinophil # : 0.28 K/uL  Auto Basophil # : 0.03 K/uL  Auto Neutrophil % : 72.4 %  Auto Lymphocyte % : 11.7 %  Auto Monocyte % : 11.1 %  Auto Eosinophil % : 2.4 %  Auto Basophil % : 0.3 %    05-05    133<L>  |  98  |  24<H>  ----------------------------<  161<H>  4.2   |  22  |  0.78    Ca    9.2      05 May 2024 13:38  Phos  2.6     05-05  Mg     1.9     05-05    TPro  6.5  /  Alb  3.8  /  TBili  2.0<H>  /  DBili  x   /  AST  80<H>  /  ALT  124<H>  /  AlkPhos  108  05-05    PT/INR - ( 05 May 2024 13:38 )   PT: 15.7 sec;   INR: 1.39          PTT - ( 05 May 2024 13:38 )  PTT:35.1 sec  The current medications were reviewed   MEDICATIONS  (STANDING):  albuterol    90 MICROgram(s) HFA Inhaler 2 Puff(s) Inhalation every 6 hours  albuterol/ipratropium for Nebulization 3 milliLiter(s) Nebulizer every 6 hours  argatroban Infusion 0.75 MICROgram(s)/kG/Min (3.67 mL/Hr) IV Continuous <Continuous>  chlorhexidine 2% Cloths 1 Application(s) Topical daily  dextrose 10% Bolus 125 milliLiter(s) IV Bolus once  dextrose 5%. 1000 milliLiter(s) (50 mL/Hr) IV Continuous <Continuous>  dextrose 5%. 1000 milliLiter(s) (100 mL/Hr) IV Continuous <Continuous>  dextrose 50% Injectable 12.5 Gram(s) IV Push once  dextrose 50% Injectable 25 Gram(s) IV Push once  furosemide   Injectable 20 milliGRAM(s) IV Push every 12 hours  glucagon  Injectable 1 milliGRAM(s) IntraMuscular once  influenza  Vaccine (HIGH DOSE) 0.7 milliLiter(s) IntraMuscular once  insulin lispro (ADMELOG) corrective regimen sliding scale   SubCutaneous Before meals and at bedtime  macitentan 10 milliGRAM(s) Oral <User Schedule>  midodrine 10 milliGRAM(s) Oral every 8 hours  milrinone Infusion 0.25 MICROgram(s)/kG/Min (6.12 mL/Hr) IV Continuous <Continuous>  pantoprazole    Tablet 40 milliGRAM(s) Oral daily  polyethylene glycol 3350 17 Gram(s) Oral daily  riociguat 2.5 milliGRAM(s) Oral every 8 hours  senna 2 Tablet(s) Oral at bedtime  sodium chloride 0.9%. 1000 milliLiter(s) (10 mL/Hr) IV Continuous <Continuous>  tamsulosin 0.4 milliGRAM(s) Oral at bedtime  vasopressin Infusion 0.02 Unit(s)/Min (3 mL/Hr) IV Continuous <Continuous>    MEDICATIONS  (PRN):  dextrose Oral Gel 15 Gram(s) Oral once PRN Blood Glucose LESS THAN 70 milliGRAM(s)/deciliter  oxyCODONE    IR 5 milliGRAM(s) Oral every 6 hours PRN Moderate Pain (4 - 6)  sodium chloride 0.65% Nasal 1 Spray(s) Both Nostrils three times a day PRN dry nose      Assessment/Plan:  68yr old female with PMH asthma, CTEPH, CAD, HTN, Type B Dissection admitted for TEVAR.    POD6 TEVAR ([facility], )  Continue with MAP goals >90, with vasopressin  Wean primacor to .125  Trend end organ perfusion markers  On midodrine  Serial neuro checks, q1h  CTEPH mgmt  Appreciate pulmonary consult  Continue Riociguat and macitetan  HIT positive, continue argatroban, PTT per protocol  Transaminitis-monitor  Urinary retention sp stevens replacement 5/5  Diet as tolerated  Replete lytes prn  Monitor CT output  GI/DVT PPX  Bowel Regimen  Pain control  OOB with PT  Close hemodynamic, ventilatory and drain monitoring and management per post op routine  Monitor for arrhythmias and monitor parameters for organ perfusion  Beta blockade not administered due to hemodynamic instability and bradycardia  Monitor neurologic status  Head of the bed should remain elevated to 45 deg   Chest PT and IS will be encouraged  Monitor adequacy of oxygenation and ventilation and attempt to wean oxygen  Antibiotic regimen will be tailored to the clinical, laboratory and microbiologic data  Nutritional goals will be met using po eventually, ensure adequate caloric intake and monitor the same  Stress ulcer and VTE prophylaxis will be achieved    Glycemic control is satisfactory  Electrolytes have been repleted as necessary and wound care has been carried out   Pain control has been achieved.   Aggressive physical therapy and early mobility and ambulation goals will be met   The family was updated about the course and plan.    CRITICAL CARE TIME personally provided by me  in evaluation and management, reassessments, review and interpretation of labs and x-rays, ventilator and hemodynamic management, formulating a plan and coordinating care: ___60____ MIN.  Time does not include procedural time.    CTICU ATTENDING     					  Cherelle Redmond MD

## 2024-05-05 NOTE — PROCEDURE NOTE - NSPROCDETAILS_GEN_ALL_CORE
location identified, draped/prepped, sterile technique used, needle inserted/introduced/area cleaned in sterile fashion
location identified, draped/prepped, sterile technique used, needle inserted/introduced/connected to a pressurized flush line/sutured in place/hemostasis with direct pressure, dressing applied/all materials/supplies accounted for at end of procedure
ultrasound guidance with use of sterile gel and probe cove
sterile dressing applied/sterile technique, catheter placed/ultrasound guidance with use of sterile gel and probe cove
location identified, draped/prepped, sterile technique used/sterile dressing applied/sterile technique, catheter placed/ultrasound guidance
location identified, draped/prepped, sterile technique used, needle inserted/introduced/positive blood return obtained via catheter/connected to a pressurized flush line/sutured in place/hemostasis with direct pressure, dressing applied/Seldinger technique

## 2024-05-05 NOTE — PROGRESS NOTE ADULT - SUBJECTIVE AND OBJECTIVE BOX
INTERVAL COURSE  POD# 9   TEVAR   Remained on Mil 0.25/Vaso started for higher MAP goals overnight  Strong forces bilaterally 5/5 - sensation intact/ but unsteady and unable to ambulate   Retained urine stevens replaced   HIT positive/ plt down to 27/ heparin stopped     VITALS  Vital Signs Last 24 Hrs  T(C): 36.4 (05 May 2024 09:17), Max: 36.7 (04 May 2024 17:22)  T(F): 97.5 (05 May 2024 09:17), Max: 98.1 (04 May 2024 17:22)  HR: 70 (05 May 2024 09:00) (70 - 103)  BP: 163/82  BP(mean): 115  RR: 20 (05 May 2024 09:00) (15 - 26)  SpO2: 100% (05 May 2024 09:00) (93% - 100%)    Parameters below as of 05 May 2024 09:00  Patient On (Oxygen Delivery Method): nasal cannula, high flow  O2 Flow (L/min): 50  O2 Concentration (%): 40    I&O's Summary  04 May 2024 07:01  -  05 May 2024 07:00  --------------------------------------------------------  IN: 1436.4 mL / OUT: 2385 mL / NET: -948.6 mL        PHYSICAL EXAM  General: A&Ox 3; NAD  Respiratory: CTA B/L; no wheezes  Cardiovascular: Regular rhythm/rate  Gastrointestinal: Soft; NTND   Extremities: WWP; no edema   Neurological:  CNII-XII grossly intact; no obvious focal deficits    LABS/IMAGING/EKG/ETC  Reviewed.

## 2024-05-05 NOTE — PROGRESS NOTE ADULT - ASSESSMENT
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  S/p  TEVAR (prox portion covering L subclavian, distal portion ~5cm above celiac trunk)   4/26  Course complicated by LE weakness after LD removed and concern for cord ischemia attributed to RV failure in the setting of known severe pulm hypertension and high PAPs  Requiring replacement of LD and [place] 4/29  Neuro exam improved with addition of Mil also higher MAPs  LD and swan dc'd 5/2  A/p  Improving hemodynamics on riociguat and macitentan( CTEPH/PAH)  LFTs improving-->slowly weaning off Mil in the next 2 days monitoring perfusion markers  Intact forces and sensation x 4 though unsteady and weak-->Vaso started for higher MAP goals and help with ambulation-Continue neuro check per protocol   Fam replaced for retention --? neurogenic voiding dysfunction with SCI is under differential though less concerning as bladder filling sensation is intact  Standing lasix for Neg FB/ lytes supplemented   Thrombocytopenia plt ~27/HIT positive-- Hep drip stopped starting argatroban   ADAT/Glycemic control/ ICU ppx  Dc central line day 6/ Midline to be placed     ATTENDING: I have personally and independently provided 105 min of critical care services. This excludes any time spent on separate procedures or teaching. 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  S/p  TEVAR (prox portion covering L subclavian, distal portion ~5cm above celiac trunk)   4/26  Course complicated by LE weakness after LD removed and concern for cord ischemia attributed to RV failure in the setting of known severe pulm hypertension and high PAPs  Requiring replacement of LD and [place] 4/29  Neuro exam improved with addition of Mil also higher MAPs  LD and swan dc'd 5/2  A/p  Improving hemodynamics on riociguat and macitentan( CTEPH/PAH)  LFTs improving-->slowly weaning off Mil in the next 2 days monitoring perfusion markers  Intact forces and sensation x 4 though unsteady and weak-->Vaso started for higher MAP goals and help with ambulation-Continue neuro check per protocol   Fam replaced for retention --? neurogenic voiding dysfunction with SCI is likely though less concerning as bladder filling sensation is intact  Standing lasix for Neg FB/ lytes supplemented   Thrombocytopenia plt ~27/HIT positive-- Hep drip stopped starting argatroban   ADAT/Glycemic control/ ICU ppx  Dc central line day 6/ Midline to be placed     ATTENDING: I have personally and independently provided 105 min of critical care services. This excludes any time spent on separate procedures or teaching. 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  S/p  TEVAR (prox portion covering L subclavian, distal portion ~5cm above celiac trunk)   4/26  Course complicated by LE weakness after LD removed and concern for cord ischemia attributed to RV failure in the setting of known severe pulm hypertension and high PAPs  Requiring replacement of LD and [place] 4/29  Neuro exam improved with addition of Mil also higher MAPs  LD and swan dc'd 5/2  A/p  Improving hemodynamics on riociguat and macitentan( CTEPH/PAH)  LFTs improving-->slowly weaning off Mil in the next 2 days monitoring perfusion markers  Intact forces and sensation x 4 though unsteady and weak-->Vaso started for higher MAP goals and help with ambulation-Continue neuro check per protocol   Fam replaced for retention --? neurogenic voiding dysfunction with SCI is likely though less concerning as bladder filling sensation is intact-- starting [place]-blockers   Standing lasix for Neg FB/ lytes supplemented   Thrombocytopenia plt ~27/HIT positive-- Hep drip stopped starting argatroban   ADAT/Glycemic control/ ICU ppx  Dc central line day 6/ Midline to be placed     ATTENDING: I have personally and independently provided 105 min of critical care services. This excludes any time spent on separate procedures or teaching.

## 2024-05-05 NOTE — PROCEDURE NOTE - PROCEDURE DATE TIME, MLM
02-May-2024 13:00
25-Apr-2024 17:46
28-Apr-2024 13:48
29-Apr-2024 05:32
25-Apr-2024 19:31
30-Apr-2024
05-May-2024 12:19
29-Apr-2024 06:54
29-Apr-2024

## 2024-05-05 NOTE — PROCEDURE NOTE - NSINDICATIONS_GEN_A_CORE
therapeutic reduction in CSF
venous access
critical illness
arterial puncture to obtain ABG's/critical patient/monitoring purposes
critical illness
monitoring purposes

## 2024-05-05 NOTE — PROCEDURE NOTE - NSSITEPREP_SKIN_A_CORE
povidone iodine (if allergic to chlorhexidine)
chlorhexidine
chlorhexidine
chlorhexidine/Adherence to aseptic technique: hand hygiene prior to donning barriers (gown, gloves), don cap and mask, sterile drape over patient
povidone iodine (if allergic to chlorhexidine)/Adherence to aseptic technique: hand hygiene prior to donning barriers (gown, gloves), don cap and mask, sterile drape over patient
chlorhexidine
chlorhexidine

## 2024-05-06 LAB
ALBUMIN SERPL ELPH-MCNC: 4 G/DL — SIGNIFICANT CHANGE UP (ref 3.3–5)
ALBUMIN SERPL ELPH-MCNC: 4.1 G/DL — SIGNIFICANT CHANGE UP (ref 3.3–5)
ALBUMIN SERPL ELPH-MCNC: 4.3 G/DL — SIGNIFICANT CHANGE UP (ref 3.3–5)
ALP SERPL-CCNC: 112 U/L — SIGNIFICANT CHANGE UP (ref 40–120)
ALP SERPL-CCNC: 99 U/L — SIGNIFICANT CHANGE UP (ref 40–120)
ALP SERPL-CCNC: 99 U/L — SIGNIFICANT CHANGE UP (ref 40–120)
ALT FLD-CCNC: 105 U/L — HIGH (ref 10–45)
ALT FLD-CCNC: 106 U/L — HIGH (ref 10–45)
ALT FLD-CCNC: 126 U/L — HIGH (ref 10–45)
ANION GAP SERPL CALC-SCNC: 10 MMOL/L — SIGNIFICANT CHANGE UP (ref 5–17)
ANION GAP SERPL CALC-SCNC: 11 MMOL/L — SIGNIFICANT CHANGE UP (ref 5–17)
ANION GAP SERPL CALC-SCNC: 12 MMOL/L — SIGNIFICANT CHANGE UP (ref 5–17)
APTT BLD: 42.4 SEC — HIGH (ref 24.5–35.6)
APTT BLD: 45.8 SEC — HIGH (ref 24.5–35.6)
APTT BLD: 50.6 SEC — HIGH (ref 24.5–35.6)
APTT BLD: 55 SEC — HIGH (ref 24.5–35.6)
AST SERPL-CCNC: 59 U/L — HIGH (ref 10–40)
AST SERPL-CCNC: 60 U/L — HIGH (ref 10–40)
AST SERPL-CCNC: 78 U/L — HIGH (ref 10–40)
BASOPHILS # BLD AUTO: 0.01 K/UL — SIGNIFICANT CHANGE UP (ref 0–0.2)
BASOPHILS # BLD AUTO: 0.03 K/UL — SIGNIFICANT CHANGE UP (ref 0–0.2)
BASOPHILS NFR BLD AUTO: 0.1 % — SIGNIFICANT CHANGE UP (ref 0–2)
BASOPHILS NFR BLD AUTO: 0.3 % — SIGNIFICANT CHANGE UP (ref 0–2)
BILIRUB SERPL-MCNC: 1.5 MG/DL — HIGH (ref 0.2–1.2)
BILIRUB SERPL-MCNC: 1.5 MG/DL — HIGH (ref 0.2–1.2)
BILIRUB SERPL-MCNC: 1.7 MG/DL — HIGH (ref 0.2–1.2)
BUN SERPL-MCNC: 26 MG/DL — HIGH (ref 7–23)
BUN SERPL-MCNC: 26 MG/DL — HIGH (ref 7–23)
BUN SERPL-MCNC: 29 MG/DL — HIGH (ref 7–23)
CALCIUM SERPL-MCNC: 9.3 MG/DL — SIGNIFICANT CHANGE UP (ref 8.4–10.5)
CALCIUM SERPL-MCNC: 9.4 MG/DL — SIGNIFICANT CHANGE UP (ref 8.4–10.5)
CALCIUM SERPL-MCNC: 9.6 MG/DL — SIGNIFICANT CHANGE UP (ref 8.4–10.5)
CHLORIDE SERPL-SCNC: 97 MMOL/L — SIGNIFICANT CHANGE UP (ref 96–108)
CHLORIDE SERPL-SCNC: 97 MMOL/L — SIGNIFICANT CHANGE UP (ref 96–108)
CHLORIDE SERPL-SCNC: 98 MMOL/L — SIGNIFICANT CHANGE UP (ref 96–108)
CO2 SERPL-SCNC: 26 MMOL/L — SIGNIFICANT CHANGE UP (ref 22–31)
CO2 SERPL-SCNC: 27 MMOL/L — SIGNIFICANT CHANGE UP (ref 22–31)
CO2 SERPL-SCNC: 28 MMOL/L — SIGNIFICANT CHANGE UP (ref 22–31)
CREAT SERPL-MCNC: 0.74 MG/DL — SIGNIFICANT CHANGE UP (ref 0.5–1.3)
CREAT SERPL-MCNC: 0.8 MG/DL — SIGNIFICANT CHANGE UP (ref 0.5–1.3)
CREAT SERPL-MCNC: 0.82 MG/DL — SIGNIFICANT CHANGE UP (ref 0.5–1.3)
EGFR: 78 ML/MIN/1.73M2 — SIGNIFICANT CHANGE UP
EGFR: 80 ML/MIN/1.73M2 — SIGNIFICANT CHANGE UP
EGFR: 88 ML/MIN/1.73M2 — SIGNIFICANT CHANGE UP
EOSINOPHIL # BLD AUTO: 0.23 K/UL — SIGNIFICANT CHANGE UP (ref 0–0.5)
EOSINOPHIL # BLD AUTO: 0.31 K/UL — SIGNIFICANT CHANGE UP (ref 0–0.5)
EOSINOPHIL NFR BLD AUTO: 2.1 % — SIGNIFICANT CHANGE UP (ref 0–6)
EOSINOPHIL NFR BLD AUTO: 2.9 % — SIGNIFICANT CHANGE UP (ref 0–6)
GAS PNL BLDA: SIGNIFICANT CHANGE UP
GLUCOSE BLDC GLUCOMTR-MCNC: 131 MG/DL — HIGH (ref 70–99)
GLUCOSE SERPL-MCNC: 118 MG/DL — HIGH (ref 70–99)
GLUCOSE SERPL-MCNC: 135 MG/DL — HIGH (ref 70–99)
GLUCOSE SERPL-MCNC: 147 MG/DL — HIGH (ref 70–99)
HCT VFR BLD CALC: 28.7 % — LOW (ref 34.5–45)
HCT VFR BLD CALC: 30.3 % — LOW (ref 34.5–45)
HCT VFR BLD CALC: 30.6 % — LOW (ref 34.5–45)
HGB BLD-MCNC: 10.3 G/DL — LOW (ref 11.5–15.5)
HGB BLD-MCNC: 10.6 G/DL — LOW (ref 11.5–15.5)
HGB BLD-MCNC: 9.8 G/DL — LOW (ref 11.5–15.5)
IMM GRANULOCYTES NFR BLD AUTO: 1 % — HIGH (ref 0–0.9)
IMM GRANULOCYTES NFR BLD AUTO: 1.3 % — HIGH (ref 0–0.9)
INR BLD: 1.53 — HIGH (ref 0.85–1.18)
INR BLD: 1.66 — HIGH (ref 0.85–1.18)
INR BLD: 1.78 — HIGH (ref 0.85–1.18)
INR BLD: 1.78 — HIGH (ref 0.85–1.18)
LACTATE SERPL-SCNC: 1 MMOL/L — SIGNIFICANT CHANGE UP (ref 0.5–2)
LACTATE SERPL-SCNC: 1.1 MMOL/L — SIGNIFICANT CHANGE UP (ref 0.5–2)
LYMPHOCYTES # BLD AUTO: 1.15 K/UL — SIGNIFICANT CHANGE UP (ref 1–3.3)
LYMPHOCYTES # BLD AUTO: 1.19 K/UL — SIGNIFICANT CHANGE UP (ref 1–3.3)
LYMPHOCYTES # BLD AUTO: 10.5 % — LOW (ref 13–44)
LYMPHOCYTES # BLD AUTO: 11.2 % — LOW (ref 13–44)
MAGNESIUM SERPL-MCNC: 2 MG/DL — SIGNIFICANT CHANGE UP (ref 1.6–2.6)
MAGNESIUM SERPL-MCNC: 2.2 MG/DL — SIGNIFICANT CHANGE UP (ref 1.6–2.6)
MAGNESIUM SERPL-MCNC: 2.2 MG/DL — SIGNIFICANT CHANGE UP (ref 1.6–2.6)
MCHC RBC-ENTMCNC: 26.4 PG — LOW (ref 27–34)
MCHC RBC-ENTMCNC: 26.6 PG — LOW (ref 27–34)
MCHC RBC-ENTMCNC: 26.7 PG — LOW (ref 27–34)
MCHC RBC-ENTMCNC: 33.7 GM/DL — SIGNIFICANT CHANGE UP (ref 32–36)
MCHC RBC-ENTMCNC: 34.1 GM/DL — SIGNIFICANT CHANGE UP (ref 32–36)
MCHC RBC-ENTMCNC: 35 GM/DL — SIGNIFICANT CHANGE UP (ref 32–36)
MCV RBC AUTO: 76.3 FL — LOW (ref 80–100)
MCV RBC AUTO: 77.8 FL — LOW (ref 80–100)
MCV RBC AUTO: 78.5 FL — LOW (ref 80–100)
MONOCYTES # BLD AUTO: 1.11 K/UL — HIGH (ref 0–0.9)
MONOCYTES # BLD AUTO: 1.2 K/UL — HIGH (ref 0–0.9)
MONOCYTES NFR BLD AUTO: 10.5 % — SIGNIFICANT CHANGE UP (ref 2–14)
MONOCYTES NFR BLD AUTO: 10.9 % — SIGNIFICANT CHANGE UP (ref 2–14)
NEUTROPHILS # BLD AUTO: 7.88 K/UL — HIGH (ref 1.8–7.4)
NEUTROPHILS # BLD AUTO: 8.23 K/UL — HIGH (ref 1.8–7.4)
NEUTROPHILS NFR BLD AUTO: 74.3 % — SIGNIFICANT CHANGE UP (ref 43–77)
NEUTROPHILS NFR BLD AUTO: 74.9 % — SIGNIFICANT CHANGE UP (ref 43–77)
NRBC # BLD: 0 /100 WBCS — SIGNIFICANT CHANGE UP (ref 0–0)
PHOSPHATE SERPL-MCNC: 2.4 MG/DL — LOW (ref 2.5–4.5)
PHOSPHATE SERPL-MCNC: 2.8 MG/DL — SIGNIFICANT CHANGE UP (ref 2.5–4.5)
PHOSPHATE SERPL-MCNC: 3.1 MG/DL — SIGNIFICANT CHANGE UP (ref 2.5–4.5)
PLATELET # BLD AUTO: 32 K/UL — LOW (ref 150–400)
PLATELET # BLD AUTO: 36 K/UL — LOW (ref 150–400)
PLATELET # BLD AUTO: 41 K/UL — LOW (ref 150–400)
POTASSIUM SERPL-MCNC: 3.6 MMOL/L — SIGNIFICANT CHANGE UP (ref 3.5–5.3)
POTASSIUM SERPL-MCNC: 3.8 MMOL/L — SIGNIFICANT CHANGE UP (ref 3.5–5.3)
POTASSIUM SERPL-MCNC: 4.2 MMOL/L — SIGNIFICANT CHANGE UP (ref 3.5–5.3)
POTASSIUM SERPL-SCNC: 3.6 MMOL/L — SIGNIFICANT CHANGE UP (ref 3.5–5.3)
POTASSIUM SERPL-SCNC: 3.8 MMOL/L — SIGNIFICANT CHANGE UP (ref 3.5–5.3)
POTASSIUM SERPL-SCNC: 4.2 MMOL/L — SIGNIFICANT CHANGE UP (ref 3.5–5.3)
PROT SERPL-MCNC: 6.3 G/DL — SIGNIFICANT CHANGE UP (ref 6–8.3)
PROT SERPL-MCNC: 6.7 G/DL — SIGNIFICANT CHANGE UP (ref 6–8.3)
PROT SERPL-MCNC: 7.1 G/DL — SIGNIFICANT CHANGE UP (ref 6–8.3)
PROTHROM AB SERPL-ACNC: 17.2 SEC — HIGH (ref 9.5–13)
PROTHROM AB SERPL-ACNC: 18.6 SEC — HIGH (ref 9.5–13)
PROTHROM AB SERPL-ACNC: 19.9 SEC — HIGH (ref 9.5–13)
PROTHROM AB SERPL-ACNC: 19.9 SEC — HIGH (ref 9.5–13)
RBC # BLD: 3.69 M/UL — LOW (ref 3.8–5.2)
RBC # BLD: 3.9 M/UL — SIGNIFICANT CHANGE UP (ref 3.8–5.2)
RBC # BLD: 3.97 M/UL — SIGNIFICANT CHANGE UP (ref 3.8–5.2)
RBC # FLD: 15.9 % — HIGH (ref 10.3–14.5)
SODIUM SERPL-SCNC: 135 MMOL/L — SIGNIFICANT CHANGE UP (ref 135–145)
SODIUM SERPL-SCNC: 135 MMOL/L — SIGNIFICANT CHANGE UP (ref 135–145)
SODIUM SERPL-SCNC: 136 MMOL/L — SIGNIFICANT CHANGE UP (ref 135–145)
WBC # BLD: 10.61 K/UL — HIGH (ref 3.8–10.5)
WBC # BLD: 10.98 K/UL — HIGH (ref 3.8–10.5)
WBC # BLD: 11.36 K/UL — HIGH (ref 3.8–10.5)
WBC # FLD AUTO: 10.61 K/UL — HIGH (ref 3.8–10.5)
WBC # FLD AUTO: 10.98 K/UL — HIGH (ref 3.8–10.5)
WBC # FLD AUTO: 11.36 K/UL — HIGH (ref 3.8–10.5)

## 2024-05-06 PROCEDURE — 99292 CRITICAL CARE ADDL 30 MIN: CPT | Mod: 24

## 2024-05-06 PROCEDURE — 99233 SBSQ HOSP IP/OBS HIGH 50: CPT | Mod: GC

## 2024-05-06 PROCEDURE — 99291 CRITICAL CARE FIRST HOUR: CPT

## 2024-05-06 PROCEDURE — 76604 US EXAM CHEST: CPT | Mod: 26,GC,59

## 2024-05-06 RX ORDER — POTASSIUM CHLORIDE 20 MEQ
40 PACKET (EA) ORAL ONCE
Refills: 0 | Status: COMPLETED | OUTPATIENT
Start: 2024-05-06 | End: 2024-05-06

## 2024-05-06 RX ORDER — SUCRALFATE 1 G
1 TABLET ORAL
Refills: 0 | Status: DISCONTINUED | OUTPATIENT
Start: 2024-05-06 | End: 2024-05-06

## 2024-05-06 RX ORDER — ALBUMIN HUMAN 25 %
250 VIAL (ML) INTRAVENOUS ONCE
Refills: 0 | Status: COMPLETED | OUTPATIENT
Start: 2024-05-06 | End: 2024-05-06

## 2024-05-06 RX ORDER — SODIUM,POTASSIUM PHOSPHATES 278-250MG
1 POWDER IN PACKET (EA) ORAL ONCE
Refills: 0 | Status: COMPLETED | OUTPATIENT
Start: 2024-05-06 | End: 2024-05-06

## 2024-05-06 RX ORDER — URSODIOL 250 MG/1
300 TABLET, FILM COATED ORAL
Refills: 0 | Status: DISCONTINUED | OUTPATIENT
Start: 2024-05-06 | End: 2024-05-06

## 2024-05-06 RX ADMIN — PANTOPRAZOLE SODIUM 40 MILLIGRAM(S): 20 TABLET, DELAYED RELEASE ORAL at 11:07

## 2024-05-06 RX ADMIN — Medication 3 MILLILITER(S): at 17:52

## 2024-05-06 RX ADMIN — Medication 3 MILLILITER(S): at 13:50

## 2024-05-06 RX ADMIN — Medication 20 MILLIGRAM(S): at 16:50

## 2024-05-06 RX ADMIN — MIDODRINE HYDROCHLORIDE 10 MILLIGRAM(S): 2.5 TABLET ORAL at 06:27

## 2024-05-06 RX ADMIN — Medication 40 MILLIEQUIVALENT(S): at 13:14

## 2024-05-06 RX ADMIN — Medication 40 MILLIEQUIVALENT(S): at 06:27

## 2024-05-06 RX ADMIN — RIOCIGUAT 2.5 MILLIGRAM(S): 1.5 TABLET, FILM COATED ORAL at 13:15

## 2024-05-06 RX ADMIN — CHLORHEXIDINE GLUCONATE 1 APPLICATION(S): 213 SOLUTION TOPICAL at 06:28

## 2024-05-06 RX ADMIN — MIDODRINE HYDROCHLORIDE 10 MILLIGRAM(S): 2.5 TABLET ORAL at 22:25

## 2024-05-06 RX ADMIN — MACITENTAN 10 MILLIGRAM(S): 10 TABLET, FILM COATED ORAL at 11:18

## 2024-05-06 RX ADMIN — VASOPRESSIN 3 UNIT(S)/MIN: 20 INJECTION INTRAVENOUS at 11:06

## 2024-05-06 RX ADMIN — MILRINONE LACTATE 3.06 MICROGRAM(S)/KG/MIN: 1 INJECTION, SOLUTION INTRAVENOUS at 11:06

## 2024-05-06 RX ADMIN — Medication 3 MILLILITER(S): at 05:53

## 2024-05-06 RX ADMIN — MIDODRINE HYDROCHLORIDE 10 MILLIGRAM(S): 2.5 TABLET ORAL at 13:15

## 2024-05-06 RX ADMIN — POLYETHYLENE GLYCOL 3350 17 GRAM(S): 17 POWDER, FOR SOLUTION ORAL at 11:07

## 2024-05-06 RX ADMIN — ARGATROBAN 4.9 MICROGRAM(S)/KG/MIN: 50 INJECTION, SOLUTION INTRAVENOUS at 11:06

## 2024-05-06 RX ADMIN — RIOCIGUAT 2.5 MILLIGRAM(S): 1.5 TABLET, FILM COATED ORAL at 22:20

## 2024-05-06 RX ADMIN — RIOCIGUAT 2.5 MILLIGRAM(S): 1.5 TABLET, FILM COATED ORAL at 06:28

## 2024-05-06 RX ADMIN — Medication 3 MILLILITER(S): at 23:17

## 2024-05-06 RX ADMIN — Medication 20 MILLIGRAM(S): at 06:27

## 2024-05-06 RX ADMIN — Medication 1 PACKET(S): at 13:14

## 2024-05-06 RX ADMIN — Medication 125 MILLILITER(S): at 11:09

## 2024-05-06 RX ADMIN — TAMSULOSIN HYDROCHLORIDE 0.4 MILLIGRAM(S): 0.4 CAPSULE ORAL at 22:25

## 2024-05-06 NOTE — PROGRESS NOTE ADULT - ATTENDING COMMENTS
As above, CTEPH complicating TEVAR with post surgical spinal cord ischemia, may be flow related 2/2 severe PH. Now on dual therapy and tolerating well, monitoring closely for pulmonary edema/fluid retention which is known side effect of macitentan. At risk as pt's initial PAWP 20mmHg (although of note, no waveform documented in RHC review). Weaning milrinone but remains on vasopressin. Thorough exam as above does not document pulmonary edema as etiology for hypoxia, pt now 97% on 6L NC OOBTC, suspect some atelectasis and/or OHS/CARLEE given occurred at night. She remains net negative on maintenance diuresis. Continue current therapy and MAP goal per primary team. If there is documented weakness with lower MAP goal and we remain unable to wean pressors may need to consider lowering riociguat dose. Prefer to maintain this option as last resort. Rest as above.

## 2024-05-06 NOTE — PROGRESS NOTE ADULT - SUBJECTIVE AND OBJECTIVE BOX
Subjective:     ROS:   Denies Headache, blurred vision, Chest Pain, SOB, Abdominal pain, nausea or vomiting     Social   argatroban Infusion 1  furosemide   Injectable 20  macitentan 10  midodrine 10  milrinone Infusion 0.125  riociguat 2.5      Allergies    No Known Allergies    Intolerances        Vital Signs Last 24 Hrs  T(C): 36.5 (06 May 2024 09:14), Max: 36.7 (05 May 2024 21:34)  T(F): 97.7 (06 May 2024 09:14), Max: 98.1 (05 May 2024 21:34)  HR: 71 (06 May 2024 09:00) (64 - 78)  BP: --  BP(mean): --  RR: 28 (06 May 2024 09:00) (10 - 28)  SpO2: 100% (06 May 2024 09:00) (83% - 100%)    Parameters below as of 06 May 2024 09:00  Patient On (Oxygen Delivery Method): nasal cannula w/ humidification  O2 Flow (L/min): 6    I&O's Summary    05 May 2024 07:01  -  06 May 2024 07:00  --------------------------------------------------------  IN: 933.2 mL / OUT: 2825 mL / NET: -1891.8 mL    06 May 2024 07:01  -  06 May 2024 09:53  --------------------------------------------------------  IN: 33.9 mL / OUT: 250 mL / NET: -216.1 mL        Physical Exam:  General: NAD, pt resting comfortably in chair  Pulm: No respiratory distress, nonlabored breathing, on nasal cannula   CVS: NSR,   Abd: Soft, NT, ND  Extremities: WWP, small amount of edema. Motor and sensory function grossly intact b/l UE and LE. Strength 5/5 on LLE, 4.5/5 on RLE, 5/5 on RUE, and 5/5 on LUE  Pulses: palp DP/PT bilaterally      LABS:                        10.3   11.36 )-----------( 32       ( 06 May 2024 02:26 )             30.6     05-06    136  |  98  |  29<H>  ----------------------------<  147<H>  3.6   |  26  |  0.80    Ca    9.4      06 May 2024 02:26  Phos  2.8     05-06  Mg     2.2     05-06    TPro  6.3  /  Alb  4.0  /  TBili  1.5<H>  /  DBili  x   /  AST  59<H>  /  ALT  106<H>  /  AlkPhos  99  05-06    PT/INR - ( 06 May 2024 02:26 )   PT: 17.2 sec;   INR: 1.53          PTT - ( 06 May 2024 02:26 )  PTT:42.4 sec    A/P: 69 y/o F PMH asthma, CTEPH, CAD, HTN, Type B dissection now s/p TEVAR, Groin sites soft, pulses palpable. Still reporting subjective weakness but able to ambulate and no clearly weak on exam. Currently on Milrinone and vasopressin. Normal sensory to lower extremities, patient able to walk and sit in chair. Lumbar drain removed on 5/1. Pulmonary on board for pulmonary hypertension. CT imaging and MRI with no findings of nerve compression or root compression. 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 Distal common hepatic artery is perfused. Multifocal splenic infarcts and multiple small renal infarcts bilaterally, likely embolic etiology. No abdominal symptoms - tolerating diet without pain or tenderness. Reports improvement in bilateral leg weakness, notes she has less wobbling while walking over the weekend.     - Continue neurovascular and b/l groin checks checks per TEVAR ERAS protocol  - Rest of care per CTICU  - Vascular surgery will continue to follow  - Call [phone] with any questions or concerns       Subjective: Patient seen. Patient reports feeling stronger in her legs. She denies any chest pain or SOB.     ROS:   Denies Headache, blurred vision, Chest Pain, SOB, Abdominal pain, nausea or vomiting     Social   argatroban Infusion 1  furosemide   Injectable 20  macitentan 10  midodrine 10  milrinone Infusion 0.125  riociguat 2.5      Allergies    No Known Allergies    Intolerances        Vital Signs Last 24 Hrs  T(C): 36.5 (06 May 2024 09:14), Max: 36.7 (05 May 2024 21:34)  T(F): 97.7 (06 May 2024 09:14), Max: 98.1 (05 May 2024 21:34)  HR: 71 (06 May 2024 09:00) (64 - 78)  BP: --  BP(mean): --  RR: 28 (06 May 2024 09:00) (10 - 28)  SpO2: 100% (06 May 2024 09:00) (83% - 100%)    Parameters below as of 06 May 2024 09:00  Patient On (Oxygen Delivery Method): nasal cannula w/ humidification  O2 Flow (L/min): 6    I&O's Summary    05 May 2024 07:01  -  06 May 2024 07:00  --------------------------------------------------------  IN: 933.2 mL / OUT: 2825 mL / NET: -1891.8 mL    06 May 2024 07:01  -  06 May 2024 09:53  --------------------------------------------------------  IN: 33.9 mL / OUT: 250 mL / NET: -216.1 mL        Physical Exam:  General: NAD, pt resting comfortably in chair  Pulm: No respiratory distress, nonlabored breathing, on nasal cannula   CVS: NSR,   Abd: Soft, NT, ND  Extremities: WWP, small amount of edema. Motor and sensory function intact b/l UE and LE. Strength 5/5 on LLE, 4.5/5 on RLE, 5/5 on RUE, and 5/5 on LUE  Pulses: palp DP/PT bilaterally      LABS:                        10.3   11.36 )-----------( 32       ( 06 May 2024 02:26 )             30.6     05-06    136  |  98  |  29<H>  ----------------------------<  147<H>  3.6   |  26  |  0.80    Ca    9.4      06 May 2024 02:26  Phos  2.8     05-06  Mg     2.2     05-06    TPro  6.3  /  Alb  4.0  /  TBili  1.5<H>  /  DBili  x   /  AST  59<H>  /  ALT  106<H>  /  AlkPhos  99  05-06    PT/INR - ( 06 May 2024 02:26 )   PT: 17.2 sec;   INR: 1.53          PTT - ( 06 May 2024 02:26 )  PTT:42.4 sec    A/P: 69 y/o F PMH asthma, CTEPH, CAD, HTN, Type B dissection now s/p TEVAR, Groin sites soft, pulses palpable. Still reporting subjective weakness but able to ambulate and no clearly weak on exam. Currently on Milrinone and vasopressin. Normal sensory to lower extremities, patient able to walk and sit in chair. Lumbar drain removed on 5/1. Pulmonary on board for pulmonary hypertension. CT imaging and MRI with no findings of nerve compression or root compression. 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 Distal common hepatic artery is perfused. Multifocal splenic infarcts and multiple small renal infarcts bilaterally, likely embolic etiology. No abdominal symptoms - tolerating diet without pain or tenderness. Reports improvement in bilateral leg weakness, notes she has less wobbling while walking over the weekend.     - Continue neurovascular and b/l groin checks checks per TEVAR protocol  - Rest of care per CTICU  - Vascular surgery will continue to follow  - Call [phone] with any questions or concerns

## 2024-05-06 NOTE — PROGRESS NOTE ADULT - SUBJECTIVE AND OBJECTIVE BOX
INTERVAL COURSE  Mil and Vaso unchanged- argatroban dose adjusted in am PLT count platauing ~ 30  LFTs continue to improve   Forces intact 5/5/ x 4 - feeling overall stronger  Ambulation improving significantly       VITALS  Vital Signs Last 24 Hrs  T(C): 36.5 (06 May 2024 09:14), Max: 36.7 (05 May 2024 21:34)  T(F): 97.7 (06 May 2024 09:14), Max: 98.1 (05 May 2024 21:34)  HR: 77 (06 May 2024 10:00) (64 - 78)  BP: 123/63  BP(mean): 85  RR: 18 (06 May 2024 10:00) (10 - 28)  SpO2: 100% (06 May 2024 10:00) (83% - 100%)  Parameters below as of 06 May 2024 10:00  Patient On (Oxygen Delivery Method): nasal cannula w/ humidification  O2 Flow (L/min): 6    I&O's Summary  05 May 2024 07:01  -  06 May 2024 07:00  --------------------------------------------------------  IN: 933.2 mL / OUT: 2825 mL / NET: -1891.8 mL    PHYSICAL EXAM  General: A&Ox 3; NAD  Head: NC/AT;   Eyes: PERRL; EOMI; anicteric sclera  Neck: Supple; no JVD  Respiratory: CTA B/L; no wheezes/crackles/rales auscultated w/ good air movement  Cardiovascular: Regular rhythm/rate; S1/S2; no gallops or murmurs auscultated  Gastrointestinal: Soft; NTND w/out rebound tenderness or guarding; bowel sounds normal  Extremities: WWP; no edema or cyanosis; radial/pedal pulses palpable  Neurological:  CNII-XII grossly intact; no obvious focal deficits    LABS/IMAGING/EKG/ETC  Reviewed.

## 2024-05-06 NOTE — PROGRESS NOTE ADULT - ASSESSMENT
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 Improving, now more mobile, walked 5/2/24, out of bed to chair today, and pressors are being weaned as tolerated to keep  for adequate spinal perfusion. Now off phenylephrine, coming down on vaso. She was subsequently started on riociguat and follow up TTE showed significant improvement in PH as well as subjectively she noted improvement. PA-C in place with no significant changes from her baseline other than increase in cardiac output, will plan for removal. Reassessed pulmonary pressures 5/2/24, PVR is elevated but PVR/SVR ratio is 0.2, which signifies that elevated pressures are being driven by the high MAP goal currently (which she needs) rather than uncontrolled PH     #CTEPH  #PAH 2/2 Drugs/toxins    - Per review of out patient medical records, she had severe PH noted on TTE 3/2023 and [place], underwent CTA PE protocol after d dimer noted to be elevated, found to have webs and linear defects of R main PA, RUL, RLL with associated mosaicism, V/Q also significantly positive for CTEPH. At that time her aortic dissection was noted, but felt to delay surgical intervention until PH improved  - RHC reviewed (8/2024) - Of note there was NO PAWP WAVEFORM, unable to wedge, calculations made on estimated PAWP of 15  RA 13  |  PA 82/28/48  |  PAWP 15  |  CO/CI (F) 4.64/2.54  |  PVR 7.1WU  |  PVR/SVR 0.4    Her SpO2 overnight fluctuating from 80s to low 90s raising concern for decompensation. Notably macitentan can unmask/worsen Group 2 disease if LH unable to accommodate increased return, however no signs of fluid overload/pulmonary edema on ultrasound and physical exam. Will continue with this dose. Bedside ultrasound today showed LLL atelectasis but no pleural effusions, A-line dominant pattern with few B lines at bases in lower zones.     Recommend:   - Now s/p removal of PA-Catheter and central line (has midline for pressors). Need to determine long term plan for pressors. One approach is to wean pressors with lower MAP goal and assess if LE weakness returns/worsens, will defer to primary team for how quickly pressors will be weaned. Riociguat dosing can also be adjusted to facilitate weaning off pressors and achieving MAP goals since riociguat tends to lower BP.   - Continue with riociguat at current dose for now  - Maintain relative diuresis, avoid fluid boluses and would keep pt relatively net negative (current net negative 1.8L)   - Continue with macitentan given severity of PH at 10 mg, she notes that she got approved from insurance to have as outpatient.     Discharge plan:  Plan for evaluation in formal CTEPH clinic for consideration of PTE (may be difficult in setting of Type B aortic dissection) vs balloon angioplasty and possible escalation of medical therapy, this can be done as an outpatient. Patient can be seen in CTEPH clinic with Dr. Cabrera and Dr. Benites. Continue with medical therapy for now    PH will continue to follow  Patient seen, evaluated, and discussed with Dr. Deborah Bar MD  [facility]

## 2024-05-06 NOTE — PROCEDURE NOTE - NSPROCNAME_GEN_A_CORE
Midline Insertion
Arterial Puncture/Cannulation
General
General
Arterial Puncture/Cannulation
Central Line Insertion
General
Point of Care Ultrasound Lung
Central Line Insertion
Lumbar Puncture

## 2024-05-06 NOTE — PROGRESS NOTE ADULT - ASSESSMENT
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  S/p  TEVAR (prox portion covering L subclavian, distal portion ~5cm above celiac trunk)   4/26  Course complicated by LE weakness after LD removed and concern for cord ischemia attributed to RV failure in the setting of known severe pulm hypertension and high PAPs  Requiring replacement of LD and [place] 4/29  Neuro exam improved with addition of Mil also higher MAPs  LD and swan dc'd 5/2  A/p  Forces intact generalized weakness improving/ up and ambulating - nsgy recommending MRI T/L if weakness recurs   Improving hemodynamics on riociguat and macitentan( CTEPH/PAH)  LFTs improving-->slowly weaning off Mil in the next 24 hour  Continue diuretics for neg FB and RV unloading - lytes supplemented   Fam replaced for retention --? neurogenic voiding dysfunction with SCI is likely though less concerning as bladder filling sensation is intact-- started tameka-blockers will monitor   Thrombocytopenia plt ~30/HIT positive-- adjusting argatroban for goal aPTT 60-80  ADAT/Glycemic control/ ICU ppx  New right arm midline placed 5/5     ATTENDING: I have personally and independently provided 105 min of critical care services. This excludes any time spent on separate procedures or teaching.

## 2024-05-06 NOTE — PROCEDURE NOTE - NSUS ED ADDITIONAL DETAIL1 FT
Few areas of B lines in lower lobes L>R  However A line pattern dominant  Small area of consolidation/atelectasis in left lower zone at mid-posterior axillary line.   No pleural effusions

## 2024-05-06 NOTE — PROGRESS NOTE ADULT - SUBJECTIVE AND OBJECTIVE BOX
CTICU  CRITICAL  CARE  attending     Hand off received 					   Pertinent clinical, laboratory, radiographic, hemodynamic, echocardiographic, respiratory data, microbiologic data and chart were reviewed and analyzed frequently throughout the course of the day and night        68 year old female with ASTHMA, Chronic Pulmonary HTN,, CAD, HTN.  She was evaluated by her pulmonologist.  Dr. Cathy Serra, was noted to have an elevated D-Dimer and was advised to present to ED for workup. In ED a CTA C/A/P was performed which revealed a Type B dissection and she was admitted and discharged with close follow up.   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   She was deemed a candidate for TEVAR with Dr. Kwon and Dr. Suh and presented to [facility] for preop lumbar drain prior to OR     S/P TEVAR.  Hospital course complicated by weakness in lower extremities.          FAMILY HISTORY:  Family history of early CAD (Father)    PAST MEDICAL & SURGICAL HISTORY:  HTN (hypertension)  Prediabetes  Mild tricuspid regurgitation  Enlarged aorta  CAD (coronary artery disease)  Ascending aorta dilation  Pulmonary hypertension  Chronic systolic right heart failure  2019 novel coronavirus disease (COVID-19)  Asthma  S/P hysterectomy  S/P           14 system review was unremarkable    Vital signs, hemodynamic and respiratory parameters were reviewed from the bedside nursing flow sheet.  ICU Vital Signs Last 24 Hrs  T(C): 36.7 (06 May 2024 17:01), Max: 36.7 (05 May 2024 21:34)  T(F): 98.1 (06 May 2024 17:01), Max: 98.1 (05 May 2024 21:34)  HR: 80 (06 May 2024 21:00) (65 - 86)  BP: --  BP(mean): --  ABP: 135/69 (06 May 2024 21:00) (120/55 - 155/69)  ABP(mean): 96 (06 May 2024 21:00) (78 - 106)  RR: 27 (06 May 2024 21:00) (10 - 28)  SpO2: 100% (06 May 2024 21:00) (83% - 100%)    O2 Parameters below as of 06 May 2024 21:00  Patient On (Oxygen Delivery Method): nasal cannula w/ humidification  O2 Flow (L/min): 6        Adult Advanced Hemodynamics Last 24 Hrs  CVP(mm Hg): --  CVP(cm H2O): --  CO: --  CI: --  PA: --  PA(mean): --  PCWP: --  SVR: --  SVRI: --  PVR: --  PVRI: --, ABG - ( 06 May 2024 20:24 )  pH, Arterial: 7.49  pH, Blood: x     /  pCO2: 37    /  pO2: 64    / HCO3: 28    / Base Excess: 4.7   /  SaO2: 94.0                Intake and output was reviewed and the fluid balance was calculated  Daily     Daily   I&O's Summary    05 May 2024 07:01  -  06 May 2024 07:00  --------------------------------------------------------  IN: 933.2 mL / OUT: 2825 mL / NET: -1891.8 mL    06 May 2024 07:01  -  06 May 2024 21:20  --------------------------------------------------------  IN: 320.5 mL / OUT: 1120 mL / NET: -799.5 mL          Neuro: Improved strength in the lower extremities. Able to walk in the hallway.  Neck: No JVD.  CVS: S1, S1, No S3.  Lungs: Good air entry bilaterally.  Abd: Soft. No tenderness. + Bowel sounds.  Vascular: + DP/PT.  Extremities: No edema.  Lymphatic: Normal.  Skin: No abnormalities.      labs  CBC Full  -  ( 06 May 2024 20:29 )  WBC Count : 10.61 K/uL  RBC Count : 3.69 M/uL  Hemoglobin : 9.8 g/dL  Hematocrit : 28.7 %  Platelet Count - Automated : 41 K/uL  Mean Cell Volume : 77.8 fl  Mean Cell Hemoglobin : 26.6 pg  Mean Cell Hemoglobin Concentration : 34.1 gm/dL  Auto Neutrophil # : 7.88 K/uL  Auto Lymphocyte # : 1.19 K/uL  Auto Monocyte # : 1.11 K/uL  Auto Eosinophil # : 0.31 K/uL  Auto Basophil # : 0.01 K/uL  Auto Neutrophil % : 74.3 %  Auto Lymphocyte % : 11.2 %  Auto Monocyte % : 10.5 %  Auto Eosinophil % : 2.9 %  Auto Basophil % : 0.1 %        135  |  97  |  26<H>  ----------------------------<  118<H>  4.2   |  27  |  0.82    Ca    9.3      06 May 2024 20:29  Phos  3.1     -  Mg     2.0     -    TPro  6.7  /  Alb  4.1  /  TBili  1.5<H>  /  DBili  x   /  AST  60<H>  /  ALT  105<H>  /  AlkPhos  99  -    PT/INR - ( 06 May 2024 20:29 )   PT: 19.9 sec;   INR: 1.78          PTT - ( 06 May 2024 20:29 )  PTT:55.0 sec  The current medications were reviewed   MEDICATIONS  (STANDING):  albuterol    90 MICROgram(s) HFA Inhaler 2 Puff(s) Inhalation every 6 hours  albuterol/ipratropium for Nebulization 3 milliLiter(s) Nebulizer every 6 hours  argatroban Infusion 1.25 MICROgram(s)/kG/Min (6.12 mL/Hr) IV Continuous <Continuous>  chlorhexidine 2% Cloths 1 Application(s) Topical daily  furosemide   Injectable 20 milliGRAM(s) IV Push every 12 hours  influenza  Vaccine (HIGH DOSE) 0.7 milliLiter(s) IntraMuscular once  macitentan 10 milliGRAM(s) Oral <User Schedule>  midodrine 10 milliGRAM(s) Oral every 8 hours  pantoprazole    Tablet 40 milliGRAM(s) Oral daily  polyethylene glycol 3350 17 Gram(s) Oral daily  riociguat 2.5 milliGRAM(s) Oral every 8 hours  senna 2 Tablet(s) Oral at bedtime  sodium chloride 0.9%. 1000 milliLiter(s) (10 mL/Hr) IV Continuous <Continuous>  tamsulosin 0.4 milliGRAM(s) Oral at bedtime  vasopressin Infusion 0.02 Unit(s)/Min (3 mL/Hr) IV Continuous <Continuous>    MEDICATIONS  (PRN):  oxyCODONE    IR 5 milliGRAM(s) Oral every 6 hours PRN Moderate Pain (4 - 6)  sodium chloride 0.65% Nasal 1 Spray(s) Both Nostrils three times a day PRN dry nose              68 year old  Female admitted with type B dissection.  S/P TEVAR (Dual Grafts).  Post operative course complicated by celiac artery thrombus and non obstructive DVT of left femoral and left popliteal vein.  Venous Duplex: Small nonobstructive likely chronic nonobstructive thrombus in themidportion of the left femoral vein and left popliteal vein. She was started on IV heparin.  CT ABDOMEN: Interval TEVAR of Type B aortic dissection. Stable aneurysmal dilatation of false lumen below distal graft attachment site which is perfused.  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 Distal common hepatic artery is perfused.  Multifocal splenic infarcts and multiple small renal infarcts bilaterally, likely embolic etiology.  Further hospital postoperative course complicated by weakness in both legs. Patient started on vasopressor support to keep MAP above 100. IV heparin  was discontinued.    MRI: The lower thoracic and upper lumbar spine is partially obscured by magnetic susceptibility artifact caused by the endovascular stent graft.  A lumbar drain is seen entering the spinal canal at the L1-L2 level on CT   scan performed earlier today, is not clearly visualized on the MRI.  BONES: There is no fracture or bone marrow edema.  ALIGNMENT: The alignment is normal.  SACROILIAC JOINTS/SACRUM: There is no sacral fracture. The SI joints are partially visualized but are intact.  CONUS AND CAUDA EQUINA: The distal cord and conus are obscured by susceptibility artifact.  VISUALIZED INTRAPELVIC/INTRA-ABDOMINAL SOFT TISSUES: Normal.  PARASPINAL SOFT TISSUES: Normal.  LOWER THORACIC SPINE: The T11-T12 and T12-L1 levels are obscured by susceptibility artifact.  L1-L2: Evaluation is degraded by susceptibility artifact.  Minimal disc bulge.  No spinal canal stenosis or neural foraminal narrowing.  L2-L3: Small disc bulge.  No spinal canal stenosis.  Minimal neural foraminal narrowing.  L3-L4: Small disc bulge.  Mild facet hypertrophy.  Minimal spinal canal stenosis.  Mild neural foraminal narrowing, right greater than left.  L4-L5: Small disc bulge.  Moderate facet hypertrophy with small facet joint effusions bilaterally.  Mild ligamentum flavum hypertrophy.  Mild   spinal canal stenosis.  Mild neural foraminal narrowing bilaterally.  L5-S1: Small disc bulge.  Moderate facet hypertrophy with trace facet joint effusions bilaterally.  Mild to moderate neural foraminal narrowing bilaterally.  SUMMARY:  No MRI evidence of lumbar spine fracture, malalignment or suspicious  osseous lesion. Multilevel lumbar spondylosis and degenerative disc disease  No evidence for cauda equina compression or nerve root impingement.  She is having difficulty in ambulation over the weekend. Improvement noted.  Further Hospital course complicated by heparin induced thrombocytopenia. She was started on IV argatroban.  IV vasopressin and milrinone have been weaned off. MAP goal has been liberalized to 80.  Hemodynamically stable.  Good oxygenation.  Fair urine out put.        My plan includes :  IV Argatroban infusion.  PO statin  PO midodrine.  Bronchodilator Rx  Riociguat and macitentan for pulmonary hypertension.   Gentle Diuresis   Close hemodynamic monitoring.  Monitor for arrhythmias and monitor parameters for organ perfusion  Monitor neurologic status  Monitor renal function.  Head of the bed should remain elevated to 45 deg .   Chest PT and IS will be encouraged  Monitor adequacy of oxygenation   Nutritional goals will be met using po eventually , ensure adequate caloric intake and monitor the same  Stress ulcer and VTE prophylaxis will be achieved    Glycemic control is satisfactory  Electrolytes have been repleted as necessary and wound care has been carried out. Pain control has been achieved.   Aggressive physical therapy and early mobility and ambulation goals will be met   The family was updated about the course and plan  CRITICAL CARE TIME SPENT in evaluation and management, reassessments, review and interpretation of labs and x-rays, hemodynamic management, formulating a plan and coordinating care: ___30____ MIN.  Time does not include procedural time.  CTICU ATTENDING     					    Yanick Degroot MD

## 2024-05-06 NOTE — PROGRESS NOTE ADULT - SUBJECTIVE AND OBJECTIVE BOX
PULMONARY CONSULT SERVICE FOLLOW-UP NOTE    INTERVAL HPI:  Reviewed chart and overnight events; patient seen and examined at bedside.   She continues to feel stronger every day. Seen just after completion of her PT, which entailed walking the unit hallways, tolerated well.   She feels comfortable currently, denies dyspnea and is not feeling particularly limited by her breathing during exertion.     MEDICATIONS:  Pulmonary:  albuterol    90 MICROgram(s) HFA Inhaler 2 Puff(s) Inhalation every 6 hours  albuterol/ipratropium for Nebulization 3 milliLiter(s) Nebulizer every 6 hours    Antimicrobials:    Anticoagulants:  argatroban Infusion 1.25 MICROgram(s)/kG/Min IV Continuous <Continuous>    Cardiac:  furosemide   Injectable 20 milliGRAM(s) IV Push every 12 hours  macitentan 10 milliGRAM(s) Oral <User Schedule>  midodrine 10 milliGRAM(s) Oral every 8 hours  milrinone Infusion 0.125 MICROgram(s)/kG/Min IV Continuous <Continuous>  riociguat 2.5 milliGRAM(s) Oral every 8 hours      Allergies    No Known Allergies    Intolerances        Vital Signs Last 24 Hrs  T(C): 36.4 (06 May 2024 12:38), Max: 36.7 (05 May 2024 21:34)  T(F): 97.6 (06 May 2024 12:38), Max: 98.1 (05 May 2024 21:34)  HR: 76 (06 May 2024 13:00) (64 - 78)  BP: --  BP(mean): --  RR: 22 (06 May 2024 13:00) (10 - 28)  SpO2: 96% (06 May 2024 13:00) (83% - 100%)    Parameters below as of 06 May 2024 13:00  Patient On (Oxygen Delivery Method): nasal cannula w/ humidification  O2 Flow (L/min): 6      05-05 @ 07:01  -  05-06 @ 07:00  --------------------------------------------------------  IN: 933.2 mL / OUT: 2825 mL / NET: -1891.8 mL    05-06 @ 07:01 - 05-06 @ 13:47  --------------------------------------------------------  IN: 152.7 mL / OUT: 430 mL / NET: -277.3 mL          PHYSICAL EXAM:  Constitutional: NAD  HEENT: NC/AT; PERRL, anicteric sclera; MMM  Neck: supple  Cardiovascular: +S1/S2, RRR  Respiratory: Decreased air entry at bases  Gastrointestinal: soft, NT/ND  Extremities: WWP; no edema, clubbing or cyanosis  Vascular: 2+ radial pulses B/L  Neurological: awake and alert; HORNER    LABS:  ABG - ( 06 May 2024 12:45 )  pH, Arterial: 7.49  pH, Blood: x     /  pCO2: 33    /  pO2: 70    / HCO3: 25    / Base Excess: 2.2   /  SaO2: 96.4                CBC Full  -  ( 06 May 2024 10:54 )  WBC Count : 10.98 K/uL  RBC Count : 3.97 M/uL  Hemoglobin : 10.6 g/dL  Hematocrit : 30.3 %  Platelet Count - Automated : 36 K/uL  Mean Cell Volume : 76.3 fl  Mean Cell Hemoglobin : 26.7 pg  Mean Cell Hemoglobin Concentration : 35.0 gm/dL  Auto Neutrophil # : 8.23 K/uL  Auto Lymphocyte # : 1.15 K/uL  Auto Monocyte # : 1.20 K/uL  Auto Eosinophil # : 0.23 K/uL  Auto Basophil # : 0.03 K/uL  Auto Neutrophil % : 74.9 %  Auto Lymphocyte % : 10.5 %  Auto Monocyte % : 10.9 %  Auto Eosinophil % : 2.1 %  Auto Basophil % : 0.3 %    05-06    135  |  97  |  26<H>  ----------------------------<  135<H>  3.8   |  28  |  0.74    Ca    9.6      06 May 2024 10:54  Phos  2.4     05-06  Mg     2.2     05-06    TPro  7.1  /  Alb  4.3  /  TBili  1.7<H>  /  DBili  x   /  AST  78<H>  /  ALT  126<H>  /  AlkPhos  112  05-06    PT/INR - ( 06 May 2024 10:54 )   PT: 18.6 sec;   INR: 1.66          PTT - ( 06 May 2024 10:54 )  PTT:45.8 sec      Urinalysis Basic - ( 06 May 2024 10:54 )    Color: x / Appearance: x / SG: x / pH: x  Gluc: 135 mg/dL / Ketone: x  / Bili: x / Urobili: x   Blood: x / Protein: x / Nitrite: x   Leuk Esterase: x / RBC: x / WBC x   Sq Epi: x / Non Sq Epi: x / Bacteria: x                RADIOLOGY & ADDITIONAL STUDIES:

## 2024-05-07 LAB
ALBUMIN SERPL ELPH-MCNC: 4 G/DL — SIGNIFICANT CHANGE UP (ref 3.3–5)
ALBUMIN SERPL ELPH-MCNC: 4.3 G/DL — SIGNIFICANT CHANGE UP (ref 3.3–5)
ALBUMIN SERPL ELPH-MCNC: 4.6 G/DL — SIGNIFICANT CHANGE UP (ref 3.3–5)
ALP SERPL-CCNC: 100 U/L — SIGNIFICANT CHANGE UP (ref 40–120)
ALP SERPL-CCNC: 108 U/L — SIGNIFICANT CHANGE UP (ref 40–120)
ALP SERPL-CCNC: 110 U/L — SIGNIFICANT CHANGE UP (ref 40–120)
ALT FLD-CCNC: 108 U/L — HIGH (ref 10–45)
ALT FLD-CCNC: 123 U/L — HIGH (ref 10–45)
ALT FLD-CCNC: 125 U/L — HIGH (ref 10–45)
ANION GAP SERPL CALC-SCNC: 11 MMOL/L — SIGNIFICANT CHANGE UP (ref 5–17)
ANION GAP SERPL CALC-SCNC: 13 MMOL/L — SIGNIFICANT CHANGE UP (ref 5–17)
ANION GAP SERPL CALC-SCNC: 13 MMOL/L — SIGNIFICANT CHANGE UP (ref 5–17)
APTT BLD: 49.3 SEC — HIGH (ref 24.5–35.6)
APTT BLD: 50.8 SEC — HIGH (ref 24.5–35.6)
APTT BLD: 51.1 SEC — HIGH (ref 24.5–35.6)
AST SERPL-CCNC: 64 U/L — HIGH (ref 10–40)
AST SERPL-CCNC: 70 U/L — HIGH (ref 10–40)
AST SERPL-CCNC: 73 U/L — HIGH (ref 10–40)
BASOPHILS # BLD AUTO: 0.01 K/UL — SIGNIFICANT CHANGE UP (ref 0–0.2)
BASOPHILS # BLD AUTO: 0.03 K/UL — SIGNIFICANT CHANGE UP (ref 0–0.2)
BASOPHILS NFR BLD AUTO: 0.1 % — SIGNIFICANT CHANGE UP (ref 0–2)
BASOPHILS NFR BLD AUTO: 0.3 % — SIGNIFICANT CHANGE UP (ref 0–2)
BILIRUB SERPL-MCNC: 1.6 MG/DL — HIGH (ref 0.2–1.2)
BUN SERPL-MCNC: 20 MG/DL — SIGNIFICANT CHANGE UP (ref 7–23)
BUN SERPL-MCNC: 22 MG/DL — SIGNIFICANT CHANGE UP (ref 7–23)
BUN SERPL-MCNC: 24 MG/DL — HIGH (ref 7–23)
CALCIUM SERPL-MCNC: 9.3 MG/DL — SIGNIFICANT CHANGE UP (ref 8.4–10.5)
CALCIUM SERPL-MCNC: 9.5 MG/DL — SIGNIFICANT CHANGE UP (ref 8.4–10.5)
CALCIUM SERPL-MCNC: 9.8 MG/DL — SIGNIFICANT CHANGE UP (ref 8.4–10.5)
CHLORIDE SERPL-SCNC: 100 MMOL/L — SIGNIFICANT CHANGE UP (ref 96–108)
CHLORIDE SERPL-SCNC: 98 MMOL/L — SIGNIFICANT CHANGE UP (ref 96–108)
CHLORIDE SERPL-SCNC: 99 MMOL/L — SIGNIFICANT CHANGE UP (ref 96–108)
CO2 SERPL-SCNC: 25 MMOL/L — SIGNIFICANT CHANGE UP (ref 22–31)
CO2 SERPL-SCNC: 26 MMOL/L — SIGNIFICANT CHANGE UP (ref 22–31)
CO2 SERPL-SCNC: 27 MMOL/L — SIGNIFICANT CHANGE UP (ref 22–31)
CORTIS F PM SERPL-MCNC: 24.52 UG/DL — HIGH (ref 2.68–10.5)
CREAT SERPL-MCNC: 0.74 MG/DL — SIGNIFICANT CHANGE UP (ref 0.5–1.3)
CREAT SERPL-MCNC: 0.74 MG/DL — SIGNIFICANT CHANGE UP (ref 0.5–1.3)
CREAT SERPL-MCNC: 0.83 MG/DL — SIGNIFICANT CHANGE UP (ref 0.5–1.3)
EGFR: 77 ML/MIN/1.73M2 — SIGNIFICANT CHANGE UP
EGFR: 88 ML/MIN/1.73M2 — SIGNIFICANT CHANGE UP
EGFR: 88 ML/MIN/1.73M2 — SIGNIFICANT CHANGE UP
EOSINOPHIL # BLD AUTO: 0.26 K/UL — SIGNIFICANT CHANGE UP (ref 0–0.5)
EOSINOPHIL # BLD AUTO: 0.3 K/UL — SIGNIFICANT CHANGE UP (ref 0–0.5)
EOSINOPHIL NFR BLD AUTO: 2.3 % — SIGNIFICANT CHANGE UP (ref 0–6)
EOSINOPHIL NFR BLD AUTO: 3 % — SIGNIFICANT CHANGE UP (ref 0–6)
GAS PNL BLDA: SIGNIFICANT CHANGE UP
GLUCOSE SERPL-MCNC: 111 MG/DL — HIGH (ref 70–99)
GLUCOSE SERPL-MCNC: 143 MG/DL — HIGH (ref 70–99)
GLUCOSE SERPL-MCNC: 150 MG/DL — HIGH (ref 70–99)
HCT VFR BLD CALC: 27.3 % — LOW (ref 34.5–45)
HCT VFR BLD CALC: 27.5 % — LOW (ref 34.5–45)
HCT VFR BLD CALC: 30.2 % — LOW (ref 34.5–45)
HGB BLD-MCNC: 10.4 G/DL — LOW (ref 11.5–15.5)
HGB BLD-MCNC: 9.5 G/DL — LOW (ref 11.5–15.5)
HGB BLD-MCNC: 9.6 G/DL — LOW (ref 11.5–15.5)
IMM GRANULOCYTES NFR BLD AUTO: 1.1 % — HIGH (ref 0–0.9)
IMM GRANULOCYTES NFR BLD AUTO: 1.1 % — HIGH (ref 0–0.9)
INR BLD: 1.66 — HIGH (ref 0.85–1.18)
INR BLD: 1.76 — HIGH (ref 0.85–1.18)
INR BLD: 1.78 — HIGH (ref 0.85–1.18)
LACTATE SERPL-SCNC: 0.7 MMOL/L — SIGNIFICANT CHANGE UP (ref 0.5–2)
LACTATE SERPL-SCNC: 0.8 MMOL/L — SIGNIFICANT CHANGE UP (ref 0.5–2)
LACTATE SERPL-SCNC: 1.1 MMOL/L — SIGNIFICANT CHANGE UP (ref 0.5–2)
LYMPHOCYTES # BLD AUTO: 1.31 K/UL — SIGNIFICANT CHANGE UP (ref 1–3.3)
LYMPHOCYTES # BLD AUTO: 1.31 K/UL — SIGNIFICANT CHANGE UP (ref 1–3.3)
LYMPHOCYTES # BLD AUTO: 11.6 % — LOW (ref 13–44)
LYMPHOCYTES # BLD AUTO: 13.1 % — SIGNIFICANT CHANGE UP (ref 13–44)
MAGNESIUM SERPL-MCNC: 1.9 MG/DL — SIGNIFICANT CHANGE UP (ref 1.6–2.6)
MAGNESIUM SERPL-MCNC: 2 MG/DL — SIGNIFICANT CHANGE UP (ref 1.6–2.6)
MCHC RBC-ENTMCNC: 26.6 PG — LOW (ref 27–34)
MCHC RBC-ENTMCNC: 26.7 PG — LOW (ref 27–34)
MCHC RBC-ENTMCNC: 27 PG — SIGNIFICANT CHANGE UP (ref 27–34)
MCHC RBC-ENTMCNC: 34.4 GM/DL — SIGNIFICANT CHANGE UP (ref 32–36)
MCHC RBC-ENTMCNC: 34.8 GM/DL — SIGNIFICANT CHANGE UP (ref 32–36)
MCHC RBC-ENTMCNC: 34.9 GM/DL — SIGNIFICANT CHANGE UP (ref 32–36)
MCV RBC AUTO: 76.4 FL — LOW (ref 80–100)
MCV RBC AUTO: 76.5 FL — LOW (ref 80–100)
MCV RBC AUTO: 78.4 FL — LOW (ref 80–100)
MONOCYTES # BLD AUTO: 0.98 K/UL — HIGH (ref 0–0.9)
MONOCYTES # BLD AUTO: 1.04 K/UL — HIGH (ref 0–0.9)
MONOCYTES NFR BLD AUTO: 10.4 % — SIGNIFICANT CHANGE UP (ref 2–14)
MONOCYTES NFR BLD AUTO: 8.6 % — SIGNIFICANT CHANGE UP (ref 2–14)
NEUTROPHILS # BLD AUTO: 7.24 K/UL — SIGNIFICANT CHANGE UP (ref 1.8–7.4)
NEUTROPHILS # BLD AUTO: 8.62 K/UL — HIGH (ref 1.8–7.4)
NEUTROPHILS NFR BLD AUTO: 72.3 % — SIGNIFICANT CHANGE UP (ref 43–77)
NEUTROPHILS NFR BLD AUTO: 76.1 % — SIGNIFICANT CHANGE UP (ref 43–77)
NRBC # BLD: 0 /100 WBCS — SIGNIFICANT CHANGE UP (ref 0–0)
PHOSPHATE SERPL-MCNC: 2.8 MG/DL — SIGNIFICANT CHANGE UP (ref 2.5–4.5)
PHOSPHATE SERPL-MCNC: 3 MG/DL — SIGNIFICANT CHANGE UP (ref 2.5–4.5)
PLATELET # BLD AUTO: 45 K/UL — LOW (ref 150–400)
PLATELET # BLD AUTO: 51 K/UL — LOW (ref 150–400)
PLATELET # BLD AUTO: 53 K/UL — LOW (ref 150–400)
POTASSIUM SERPL-MCNC: 3.4 MMOL/L — LOW (ref 3.5–5.3)
POTASSIUM SERPL-MCNC: 3.8 MMOL/L — SIGNIFICANT CHANGE UP (ref 3.5–5.3)
POTASSIUM SERPL-MCNC: 4.6 MMOL/L — SIGNIFICANT CHANGE UP (ref 3.5–5.3)
POTASSIUM SERPL-SCNC: 3.4 MMOL/L — LOW (ref 3.5–5.3)
POTASSIUM SERPL-SCNC: 3.8 MMOL/L — SIGNIFICANT CHANGE UP (ref 3.5–5.3)
POTASSIUM SERPL-SCNC: 4.6 MMOL/L — SIGNIFICANT CHANGE UP (ref 3.5–5.3)
PROT SERPL-MCNC: 6.3 G/DL — SIGNIFICANT CHANGE UP (ref 6–8.3)
PROT SERPL-MCNC: 6.9 G/DL — SIGNIFICANT CHANGE UP (ref 6–8.3)
PROT SERPL-MCNC: 7.3 G/DL — SIGNIFICANT CHANGE UP (ref 6–8.3)
PROTHROM AB SERPL-ACNC: 18.6 SEC — HIGH (ref 9.5–13)
PROTHROM AB SERPL-ACNC: 19.7 SEC — HIGH (ref 9.5–13)
PROTHROM AB SERPL-ACNC: 20 SEC — HIGH (ref 9.5–13)
RBC # BLD: 3.57 M/UL — LOW (ref 3.8–5.2)
RBC # BLD: 3.6 M/UL — LOW (ref 3.8–5.2)
RBC # BLD: 3.85 M/UL — SIGNIFICANT CHANGE UP (ref 3.8–5.2)
RBC # FLD: 15.9 % — HIGH (ref 10.3–14.5)
RBC # FLD: 16 % — HIGH (ref 10.3–14.5)
RBC # FLD: 16.1 % — HIGH (ref 10.3–14.5)
SODIUM SERPL-SCNC: 137 MMOL/L — SIGNIFICANT CHANGE UP (ref 135–145)
SODIUM SERPL-SCNC: 137 MMOL/L — SIGNIFICANT CHANGE UP (ref 135–145)
SODIUM SERPL-SCNC: 138 MMOL/L — SIGNIFICANT CHANGE UP (ref 135–145)
WBC # BLD: 10.01 K/UL — SIGNIFICANT CHANGE UP (ref 3.8–10.5)
WBC # BLD: 11.33 K/UL — HIGH (ref 3.8–10.5)
WBC # BLD: 9.94 K/UL — SIGNIFICANT CHANGE UP (ref 3.8–10.5)
WBC # FLD AUTO: 10.01 K/UL — SIGNIFICANT CHANGE UP (ref 3.8–10.5)
WBC # FLD AUTO: 11.33 K/UL — HIGH (ref 3.8–10.5)
WBC # FLD AUTO: 9.94 K/UL — SIGNIFICANT CHANGE UP (ref 3.8–10.5)

## 2024-05-07 PROCEDURE — 71045 X-RAY EXAM CHEST 1 VIEW: CPT | Mod: 26

## 2024-05-07 PROCEDURE — 99291 CRITICAL CARE FIRST HOUR: CPT | Mod: 24

## 2024-05-07 PROCEDURE — 99292 CRITICAL CARE ADDL 30 MIN: CPT

## 2024-05-07 RX ORDER — POTASSIUM CHLORIDE 20 MEQ
20 PACKET (EA) ORAL ONCE
Refills: 0 | Status: COMPLETED | OUTPATIENT
Start: 2024-05-07 | End: 2024-05-07

## 2024-05-07 RX ORDER — MACITENTAN 10 MG/1
1 TABLET, FILM COATED ORAL
Refills: 0
Start: 2024-05-07

## 2024-05-07 RX ORDER — ALBUMIN HUMAN 25 %
50 VIAL (ML) INTRAVENOUS ONCE
Refills: 0 | Status: COMPLETED | OUTPATIENT
Start: 2024-05-07 | End: 2024-05-07

## 2024-05-07 RX ORDER — POTASSIUM CHLORIDE 20 MEQ
40 PACKET (EA) ORAL ONCE
Refills: 0 | Status: COMPLETED | OUTPATIENT
Start: 2024-05-07 | End: 2024-05-07

## 2024-05-07 RX ORDER — MAGNESIUM OXIDE 400 MG ORAL TABLET 241.3 MG
800 TABLET ORAL ONCE
Refills: 0 | Status: COMPLETED | OUTPATIENT
Start: 2024-05-07 | End: 2024-05-07

## 2024-05-07 RX ORDER — MIDODRINE HYDROCHLORIDE 2.5 MG/1
15 TABLET ORAL EVERY 8 HOURS
Refills: 0 | Status: DISCONTINUED | OUTPATIENT
Start: 2024-05-07 | End: 2024-05-12

## 2024-05-07 RX ORDER — HYDROCORTISONE 20 MG
100 TABLET ORAL ONCE
Refills: 0 | Status: COMPLETED | OUTPATIENT
Start: 2024-05-07 | End: 2024-05-07

## 2024-05-07 RX ORDER — ALBUMIN HUMAN 25 %
50 VIAL (ML) INTRAVENOUS
Refills: 0 | Status: COMPLETED | OUTPATIENT
Start: 2024-05-07 | End: 2024-05-07

## 2024-05-07 RX ORDER — FLUDROCORTISONE ACETATE 0.1 MG/1
0.2 TABLET ORAL DAILY
Refills: 0 | Status: DISCONTINUED | OUTPATIENT
Start: 2024-05-07 | End: 2024-05-10

## 2024-05-07 RX ADMIN — Medication 200 MILLILITER(S): at 12:59

## 2024-05-07 RX ADMIN — RIOCIGUAT 2.5 MILLIGRAM(S): 1.5 TABLET, FILM COATED ORAL at 07:00

## 2024-05-07 RX ADMIN — FLUDROCORTISONE ACETATE 0.2 MILLIGRAM(S): 0.1 TABLET ORAL at 19:44

## 2024-05-07 RX ADMIN — RIOCIGUAT 2.5 MILLIGRAM(S): 1.5 TABLET, FILM COATED ORAL at 14:10

## 2024-05-07 RX ADMIN — Medication 3 MILLILITER(S): at 11:54

## 2024-05-07 RX ADMIN — Medication 100 MILLIEQUIVALENT(S): at 12:35

## 2024-05-07 RX ADMIN — Medication 20 MILLIGRAM(S): at 07:00

## 2024-05-07 RX ADMIN — Medication 100 MILLIGRAM(S): at 18:17

## 2024-05-07 RX ADMIN — Medication 3 MILLILITER(S): at 06:06

## 2024-05-07 RX ADMIN — MIDODRINE HYDROCHLORIDE 15 MILLIGRAM(S): 2.5 TABLET ORAL at 21:43

## 2024-05-07 RX ADMIN — MAGNESIUM OXIDE 400 MG ORAL TABLET 800 MILLIGRAM(S): 241.3 TABLET ORAL at 11:24

## 2024-05-07 RX ADMIN — Medication 50 MILLILITER(S): at 14:06

## 2024-05-07 RX ADMIN — SENNA PLUS 2 TABLET(S): 8.6 TABLET ORAL at 21:44

## 2024-05-07 RX ADMIN — MIDODRINE HYDROCHLORIDE 15 MILLIGRAM(S): 2.5 TABLET ORAL at 12:35

## 2024-05-07 RX ADMIN — Medication 3 MILLILITER(S): at 17:21

## 2024-05-07 RX ADMIN — MACITENTAN 10 MILLIGRAM(S): 10 TABLET, FILM COATED ORAL at 11:52

## 2024-05-07 RX ADMIN — Medication 40 MILLIEQUIVALENT(S): at 11:24

## 2024-05-07 RX ADMIN — ARGATROBAN 6.12 MICROGRAM(S)/KG/MIN: 50 INJECTION, SOLUTION INTRAVENOUS at 14:12

## 2024-05-07 RX ADMIN — CHLORHEXIDINE GLUCONATE 1 APPLICATION(S): 213 SOLUTION TOPICAL at 06:00

## 2024-05-07 RX ADMIN — RIOCIGUAT 2.5 MILLIGRAM(S): 1.5 TABLET, FILM COATED ORAL at 21:43

## 2024-05-07 RX ADMIN — Medication 50 MILLILITER(S): at 12:07

## 2024-05-07 RX ADMIN — MIDODRINE HYDROCHLORIDE 10 MILLIGRAM(S): 2.5 TABLET ORAL at 07:00

## 2024-05-07 RX ADMIN — Medication 3 MILLILITER(S): at 23:33

## 2024-05-07 RX ADMIN — Medication 20 MILLIEQUIVALENT(S): at 02:26

## 2024-05-07 RX ADMIN — PANTOPRAZOLE SODIUM 40 MILLIGRAM(S): 20 TABLET, DELAYED RELEASE ORAL at 11:19

## 2024-05-07 NOTE — PROGRESS NOTE ADULT - SUBJECTIVE AND OBJECTIVE BOX
STATUS POST: s/p TEVAR on 4/26    SUBJECTIVE: Pt seen and examined at bedside this am by surgery team. Tolerating diet    MEDICATIONS  (STANDING):  albuterol    90 MICROgram(s) HFA Inhaler 2 Puff(s) Inhalation every 6 hours  albuterol/ipratropium for Nebulization 3 milliLiter(s) Nebulizer every 6 hours  argatroban Infusion 1.25 MICROgram(s)/kG/Min (6.12 mL/Hr) IV Continuous <Continuous>  chlorhexidine 2% Cloths 1 Application(s) Topical daily  influenza  Vaccine (HIGH DOSE) 0.7 milliLiter(s) IntraMuscular once  macitentan 10 milliGRAM(s) Oral <User Schedule>  midodrine 15 milliGRAM(s) Oral every 8 hours  pantoprazole    Tablet 40 milliGRAM(s) Oral daily  polyethylene glycol 3350 17 Gram(s) Oral daily  riociguat 2.5 milliGRAM(s) Oral every 8 hours  senna 2 Tablet(s) Oral at bedtime  sodium chloride 0.9%. 1000 milliLiter(s) (10 mL/Hr) IV Continuous <Continuous>  vasopressin Infusion 0.02 Unit(s)/Min (3 mL/Hr) IV Continuous <Continuous>    MEDICATIONS  (PRN):  oxyCODONE    IR 5 milliGRAM(s) Oral every 6 hours PRN Moderate Pain (4 - 6)  sodium chloride 0.65% Nasal 1 Spray(s) Both Nostrils three times a day PRN dry nose      Vital Signs Last 24 Hrs  T(C): 36.6 (07 May 2024 12:46), Max: 37.4 (06 May 2024 22:55)  T(F): 97.9 (07 May 2024 12:46), Max: 99.3 (06 May 2024 22:55)  HR: 72 (07 May 2024 15:00) (63 - 86)  BP: --  BP(mean): --  RR: 18 (07 May 2024 15:00) (17 - 27)  SpO2: 90% (07 May 2024 15:00) (90% - 100%)    Parameters below as of 07 May 2024 15:00  Patient On (Oxygen Delivery Method): nasal cannula w/ humidification  O2 Flow (L/min): 6      Physical Exam:  General: NAD, pt resting comfortably in chair  Pulm: No respiratory distress, nonlabored breathing, on nasal cannula   CVS: NSR,   Abd: Soft, NT, ND  Extremities: WWP, small amount of edema. Motor and sensory function intact b/l UE and LE. Strength 5/5 on LLE, 4.5/5 on RLE, 5/5 on RUE, and 5/5 on LUE  Pulses: palp DP/PT bilaterally          I&O's Detail    06 May 2024 07:01  -  07 May 2024 07:00  --------------------------------------------------------  IN:    Argatroban: 24.5 mL    Argatroban: 115.9 mL    Milrinone: 21.1 mL    sodium chloride 0.9%: 230 mL    Vasopressin: 103.5 mL  Total IN: 495 mL    OUT:    Indwelling Catheter - Urethral (mL): 1465 mL  Total OUT: 1465 mL    Total NET: -970 mL      07 May 2024 07:01  -  07 May 2024 15:23  --------------------------------------------------------  IN:    Albumin 25%  -  50 mL: 150 mL    Argatroban: 48.8 mL    IV PiggyBack: 100 mL    sodium chloride 0.9%: 80 mL    Vasopressin: 69 mL  Total IN: 447.8 mL    OUT:    Indwelling Catheter - Urethral (mL): 1005 mL  Total OUT: 1005 mL    Total NET: -557.2 mL          LABS:                        9.6    9.94  )-----------( 51       ( 07 May 2024 14:55 )             27.5     05-07    137  |  98  |  22  ----------------------------<  150<H>  3.4<L>   |  26  |  0.74    Ca    9.5      07 May 2024 09:47  Phos  2.8     05-07  Mg     1.9     05-07    TPro  6.9  /  Alb  4.3  /  TBili  1.6<H>  /  DBili  x   /  AST  73<H>  /  ALT  123<H>  /  AlkPhos  110  05-07    PT/INR - ( 07 May 2024 09:47 )   PT: 20.0 sec;   INR: 1.78          PTT - ( 07 May 2024 09:47 )  PTT:50.8 sec  Urinalysis Basic - ( 07 May 2024 09:47 )    Color: x / Appearance: x / SG: x / pH: x  Gluc: 150 mg/dL / Ketone: x  / Bili: x / Urobili: x   Blood: x / Protein: x / Nitrite: x   Leuk Esterase: x / RBC: x / WBC x   Sq Epi: x / Non Sq Epi: x / Bacteria: x

## 2024-05-07 NOTE — PROGRESS NOTE ADULT - ASSESSMENT
67 y/o F PMH asthma, CTEPH, CAD, HTN, Type B dissection now s/p TEVAR, Groin sites soft, pulses palpable. Still reporting subjective weakness but able to ambulate and no clearly weak on exam. Currently on Milrinone and vasopressin. Normal sensory to lower extremities, patient able to walk and sit in chair. Lumbar drain removed on 5/1. Pulmonary on board for pulmonary hypertension. CT imaging and MRI with no findings of nerve compression or root compression. 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 Distal common hepatic artery is perfused. Multifocal splenic infarcts and multiple small renal infarcts bilaterally, likely embolic etiology. No abdominal symptoms - tolerating diet without pain or tenderness.     - Continue neurovasacular checks per TEVAR ERAS protocol  - B/l groin checks per TEVAR ERAS protocol  - Rest of care per CTICU  - vascular surgery will continue to follow patient

## 2024-05-07 NOTE — PROGRESS NOTE ADULT - SUBJECTIVE AND OBJECTIVE BOX
CTICU  CRITICAL  CARE  attending     Hand off received 					   Pertinent clinical, laboratory, radiographic, hemodynamic, echocardiographic, respiratory data, microbiologic data and chart were reviewed and analyzed frequently throughout the course of the day and night  Patient seen and examined with CTS/ SH attending at bedside  Pt is a 68y , Female, HEALTH ISSUES - PROBLEM Dx:  Ascending aorta dilation        , FAMILY HISTORY:  Family history of early CAD (Father)    PAST MEDICAL & SURGICAL HISTORY:  HTN (hypertension)      Prediabetes      Mild tricuspid regurgitation      Enlarged aorta      CAD (coronary artery disease)      Ascending aorta dilation      Pulmonary hypertension      Chronic systolic right heart failure      2019 novel coronavirus disease (COVID-19)      Asthma      S/P hysterectomy      S/P         Patient is a 68y old  Female who presents with a chief complaint of type B dissection (07 May 2024 15:23)      14 system review was unremarkable    Vital signs, hemodynamic and respiratory parameters were reviewed from the bedside nursing flowsheet.  ICU Vital Signs Last 24 Hrs  T(C): 36.6 (07 May 2024 12:46), Max: 37.4 (06 May 2024 22:55)  T(F): 97.9 (07 May 2024 12:46), Max: 99.3 (06 May 2024 22:55)  HR: 72 (07 May 2024 15:00) (63 - 86)  BP: --  BP(mean): --  ABP: 142/67 (07 May 2024 15:00) (114/53 - 152/73)  ABP(mean): 99 (07 May 2024 15:00) (74 - 107)  RR: 18 (07 May 2024 15:00) (17 - 27)  SpO2: 90% (07 May 2024 15:00) (90% - 100%)    O2 Parameters below as of 07 May 2024 15:00  Patient On (Oxygen Delivery Method): nasal cannula w/ humidification  O2 Flow (L/min): 6        Adult Advanced Hemodynamics Last 24 Hrs  CVP(mm Hg): --  CVP(cm H2O): --  CO: --  CI: --  PA: --  PA(mean): --  PCWP: --  SVR: --  SVRI: --  PVR: --  PVRI: --, ABG - ( 07 May 2024 14:53 )  pH, Arterial: 7.50  pH, Blood: x     /  pCO2: 32    /  pO2: 70    / HCO3: 25    / Base Excess: 2.4   /  SaO2: 96.5                Intake and output was reviewed and the fluid balance was calculated  Daily     Daily   I&O's Summary    06 May 2024 07:01  -  07 May 2024 07:00  --------------------------------------------------------  IN: 495 mL / OUT: 1465 mL / NET: -970 mL    07 May 2024 07:01  -  07 May 2024 16:00  --------------------------------------------------------  IN: 447.8 mL / OUT: 1005 mL / NET: -557.2 mL        All lines and drain sites were assessed  [facility]      POCT Blood Glucose.: 131 mg/dL (06 May 2024 06:53)    No acute change in mental status  Auscultation of the chest reveals equal bs  Abdomen is soft  Extremities are warm and well perfused  Wounds appear clean and unremarkable  Antibiotics are periop    labs  CBC Full  -  ( 07 May 2024 14:55 )  WBC Count : 9.94 K/uL  RBC Count : 3.60 M/uL  Hemoglobin : 9.6 g/dL  Hematocrit : 27.5 %  Platelet Count - Automated : 51 K/uL  Mean Cell Volume : 76.4 fl  Mean Cell Hemoglobin : 26.7 pg  Mean Cell Hemoglobin Concentration : 34.9 gm/dL  Auto Neutrophil # : x  Auto Lymphocyte # : x  Auto Monocyte # : x  Auto Eosinophil # : x  Auto Basophil # : x  Auto Neutrophil % : x  Auto Lymphocyte % : x  Auto Monocyte % : x  Auto Eosinophil % : x  Auto Basophil % : x        138  |  100  |  20  ----------------------------<  143<H>  4.6   |  25  |  0.74    Ca    9.8      07 May 2024 14:55  Phos  2.8     05-  Mg     1.9     -    TPro  7.3  /  Alb  4.6  /  TBili  1.6<H>  /  DBili  x   /  AST  70<H>  /  ALT  125<H>  /  AlkPhos  108  -07    PT/INR - ( 07 May 2024 09:47 )   PT: 20.0 sec;   INR: 1.78          PTT - ( 07 May 2024 09:47 )  PTT:50.8 sec  The current medications were reviewed   MEDICATIONS  (STANDING):  albuterol    90 MICROgram(s) HFA Inhaler 2 Puff(s) Inhalation every 6 hours  albuterol/ipratropium for Nebulization 3 milliLiter(s) Nebulizer every 6 hours  argatroban Infusion 1.25 MICROgram(s)/kG/Min (6.12 mL/Hr) IV Continuous <Continuous>  chlorhexidine 2% Cloths 1 Application(s) Topical daily  influenza  Vaccine (HIGH DOSE) 0.7 milliLiter(s) IntraMuscular once  macitentan 10 milliGRAM(s) Oral <User Schedule>  midodrine 15 milliGRAM(s) Oral every 8 hours  pantoprazole    Tablet 40 milliGRAM(s) Oral daily  polyethylene glycol 3350 17 Gram(s) Oral daily  riociguat 2.5 milliGRAM(s) Oral every 8 hours  senna 2 Tablet(s) Oral at bedtime  sodium chloride 0.9%. 1000 milliLiter(s) (10 mL/Hr) IV Continuous <Continuous>  vasopressin Infusion 0.02 Unit(s)/Min (3 mL/Hr) IV Continuous <Continuous>    MEDICATIONS  (PRN):  oxyCODONE    IR 5 milliGRAM(s) Oral every 6 hours PRN Moderate Pain (4 - 6)  sodium chloride 0.65% Nasal 1 Spray(s) Both Nostrils three times a day PRN dry nose       PROBLEM LIST/ ASSESSMENT:  HEALTH ISSUES - PROBLEM Dx:  Ascending aorta dilation        ,   Patient is a 68y old  Female who presents with a chief complaint of type B dissection (07 May 2024 15:23)     s/p cardiac surgery    Vasogenic shock due to hypotension in the cticu , will keep on pressors    expected post - op Hypovolemic shock - > 20% intravascular depletion will replete volume        Acute post operative pulmonary insufficiency ruled in due to hypoxemia, O2 sats < 91% on RA treated with HFNC              My plan includes :  close hemodynamic, ventilatory and drain monitoring and management per post op routine    Monitor for arrhythmias and monitor parameters for organ perfusion  beta blockade not administered due to hemodynamic instability and bradycardia  monitor neurologic status  Head of the bed should remain elevated to 45 deg .   chest PT and IS will be encouraged  monitor adequacy of oxygenation and ventilation and attempt to wean oxygen  antibiotic regimen will be tailored to the clinical, laboratory and microbiologic data  Nutritional goals will be met using po eventually , ensure adequate caloric intake and montior the same  Stress ulcer and VTE prophylaxis will be achieved    Glycemic control is satisfactory  Electrolytes have been repleted as necessary and wound care has been carried out. Pain control has been achieved.   agressive physical therapy and early mobility and ambulation goals will be met   The family was updated about the course and plan  CRITICAL CARE TIME Upon my evaluation, this patient had a high probability of imminent or life-threatening deterioration due to the above problems which required my direct attention, intervention, and personal management.  I have personally provided 150 minutes of critical care time exclusive of time spent on separately billable procedures. Time included review of laboratory data, radiology results, discussion with consultants, and monitoring for potential decompensation. Interventions were performed as documented abovepersonally provided by me  in evaluation and management, reassessments, review and interpretation of labs and x-rays, ventilator and hemodynamic management, formulating a plan and coordinating care: ___150___ MIN.  Time does not include procedural time.    CTICU ATTENDING     					    Eder Conway MD

## 2024-05-07 NOTE — PROGRESS NOTE ADULT - SUBJECTIVE AND OBJECTIVE BOX
CTICU  CRITICAL  CARE  attending     Hand off received 					   Pertinent clinical, laboratory, radiographic, hemodynamic, echocardiographic, respiratory data, microbiologic data and chart were reviewed and analyzed frequently throughout the course of the day and night        68 year old female with ASTHMA, Chronic Pulmonary HTN,, CAD, HTN.  She was evaluated by her pulmonologist.  Dr. Cathy Serra, was noted to have an elevated D-Dimer and was advised to present to ED for workup. In ED a CTA C/A/P was performed which revealed a Type B dissection and she was admitted and discharged with close follow up.   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   She was deemed a candidate for TEVAR with Dr. Kwon and Dr. Suh and presented to [facility] for preop lumbar drain prior to OR     S/P TEVAR.  Hospital course complicated by weakness in lower extremities, heparin induced thrombocytopenia, DVT.        FAMILY HISTORY:  Family history of early CAD (Father)    PAST MEDICAL & SURGICAL HISTORY:  HTN (hypertension)  Prediabetes  Mild tricuspid regurgitation  Enlarged aorta  CAD (coronary artery disease)  Ascending aorta dilation  Pulmonary hypertension  Chronic systolic right heart failure  2019 novel coronavirus disease (COVID-19)  Asthma  S/P hysterectomy  S/P           14 system review was unremarkable    Vital signs, hemodynamic and respiratory parameters were reviewed from the bedside nursing flow sheet.  ICU Vital Signs Last 24 Hrs  T(C): 36.7 (07 May 2024 17:13), Max: 37.4 (06 May 2024 22:55)  T(F): 98.1 (07 May 2024 17:13), Max: 99.3 (06 May 2024 22:55)  HR: 94 (07 May 2024 21:00) (63 - 96)  BP: --  BP(mean): --  ABP: 137/63 (07 May 2024 21:00) (114/53 - 152/73)  ABP(mean): 92 (07 May 2024 21:00) (74 - 107)  RR: 25 (07 May 2024 21:00) (18 - 25)  SpO2: 100% (07 May 2024 21:00) (89% - 100%)    O2 Parameters below as of 07 May 2024 21:00  Patient On (Oxygen Delivery Method): nasal cannula w/ humidification  O2 Flow (L/min): 6        Adult Advanced Hemodynamics Last 24 Hrs  CVP(mm Hg): --  CVP(cm H2O): --  CO: --  CI: --  PA: --  PA(mean): --  PCWP: --  SVR: --  SVRI: --  PVR: --  PVRI: --, ABG - ( 07 May 2024 14:53 )  pH, Arterial: 7.50  pH, Blood: x     /  pCO2: 32    /  pO2: 70    / HCO3: 25    / Base Excess: 2.4   /  SaO2: 96.5                Intake and output was reviewed and the fluid balance was calculated  Daily     Daily   I&O's Summary    06 May 2024 07:01  -  07 May 2024 07:00  --------------------------------------------------------  IN: 495 mL / OUT: 1465 mL / NET: -970 mL    07 May 2024 07:01  -  07 May 2024 22:01  --------------------------------------------------------  IN: 560.9 mL / OUT: 1310 mL / NET: -749.1 mL        Neuro: Neuro status close the baseline. Moves all 4 extremities.  Neck: No JVD.  CVS: S1, S2, No S3.  Lungs: Good air entry bilerally.  Abd: Soft. No tenderness. + Bowel sounds.  Vascular: + DP/PT.  Extremities: No edema.  Lymphatic: Normal.  Skin: No abnormalities.      labs  CBC Full  -  ( 07 May 2024 14:55 )  WBC Count : 9.94 K/uL  RBC Count : 3.60 M/uL  Hemoglobin : 9.6 g/dL  Hematocrit : 27.5 %  Platelet Count - Automated : 51 K/uL  Mean Cell Volume : 76.4 fl  Mean Cell Hemoglobin : 26.7 pg  Mean Cell Hemoglobin Concentration : 34.9 gm/dL  Auto Neutrophil # : x  Auto Lymphocyte # : x  Auto Monocyte # : x  Auto Eosinophil # : x  Auto Basophil # : x  Auto Neutrophil % : x  Auto Lymphocyte % : x  Auto Monocyte % : x  Auto Eosinophil % : x  Auto Basophil % : x        138  |  100  |  20  ----------------------------<  143<H>  4.6   |  25  |  0.74    Ca    9.8      07 May 2024 14:55  Phos  2.8       Mg     1.9         TPro  7.3  /  Alb  4.6  /  TBili  1.6<H>  /  DBili  x   /  AST  70<H>  /  ALT  125<H>  /  AlkPhos  108      PT/INR - ( 07 May 2024 09:47 )   PT: 20.0 sec;   INR: 1.78          PTT - ( 07 May 2024 09:47 )  PTT:50.8 sec  The current medications were reviewed   MEDICATIONS  (STANDING):  albuterol    90 MICROgram(s) HFA Inhaler 2 Puff(s) Inhalation every 6 hours  albuterol/ipratropium for Nebulization 3 milliLiter(s) Nebulizer every 6 hours  argatroban Infusion 1.25 MICROgram(s)/kG/Min (6.12 mL/Hr) IV Continuous <Continuous>  chlorhexidine 2% Cloths 1 Application(s) Topical daily  fludroCORTISONE 0.2 milliGRAM(s) Oral daily  influenza  Vaccine (HIGH DOSE) 0.7 milliLiter(s) IntraMuscular once  macitentan 10 milliGRAM(s) Oral <User Schedule>  midodrine 15 milliGRAM(s) Oral every 8 hours  pantoprazole    Tablet 40 milliGRAM(s) Oral daily  polyethylene glycol 3350 17 Gram(s) Oral daily  riociguat 2.5 milliGRAM(s) Oral every 8 hours  senna 2 Tablet(s) Oral at bedtime  sodium chloride 0.9%. 1000 milliLiter(s) (10 mL/Hr) IV Continuous <Continuous>  vasopressin Infusion 0.02 Unit(s)/Min (3 mL/Hr) IV Continuous <Continuous>    MEDICATIONS  (PRN):  oxyCODONE    IR 5 milliGRAM(s) Oral every 6 hours PRN Moderate Pain (4 - 6)  sodium chloride 0.65% Nasal 1 Spray(s) Both Nostrils three times a day PRN dry nose          68 year old  Female admitted with type B dissection.  S/P TEVAR (Dual Grafts).  Post operative course complicated by celiac artery thrombus and non obstructive DVT of left femoral and left popliteal vein.  Venous Duplex: Small nonobstructive likely chronic nonobstructive thrombus in themidportion of the left femoral vein and left popliteal vein. She was started on IV heparin.  CT ABDOMEN: Interval TEVAR of Type B aortic dissection. Stable aneurysmal dilatation of false lumen below distal graft attachment site which is perfused.  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 Distal common hepatic artery is perfused.  Multifocal splenic infarcts and multiple small renal infarcts bilaterally, likely embolic etiology.  Further hospital postoperative course complicated by weakness in both legs. Patient started on vasopressor support to keep MAP above 100. IV heparin  was discontinued.    MRI: The lower thoracic and upper lumbar spine is partially obscured by magnetic susceptibility artifact caused by the endovascular stent graft.  A lumbar drain is seen entering the spinal canal at the L1-L2 level on CT   scan performed earlier today, is not clearly visualized on the MRI.  BONES: There is no fracture or bone marrow edema.  ALIGNMENT: The alignment is normal.  SACROILIAC JOINTS/SACRUM: There is no sacral fracture. The SI joints are partially visualized but are intact.  CONUS AND CAUDA EQUINA: The distal cord and conus are obscured by susceptibility artifact.  VISUALIZED INTRAPELVIC/INTRA-ABDOMINAL SOFT TISSUES: Normal.  PARASPINAL SOFT TISSUES: Normal.  LOWER THORACIC SPINE: The T11-T12 and T12-L1 levels are obscured by susceptibility artifact.  L1-L2: Evaluation is degraded by susceptibility artifact.  Minimal disc bulge.  No spinal canal stenosis or neural foraminal narrowing.  L2-L3: Small disc bulge.  No spinal canal stenosis.  Minimal neural foraminal narrowing.  L3-L4: Small disc bulge.  Mild facet hypertrophy.  Minimal spinal canal stenosis.  Mild neural foraminal narrowing, right greater than left.  L4-L5: Small disc bulge.  Moderate facet hypertrophy with small facet joint effusions bilaterally.  Mild ligamentum flavum hypertrophy.  Mild   spinal canal stenosis.  Mild neural foraminal narrowing bilaterally.  L5-S1: Small disc bulge.  Moderate facet hypertrophy with trace facet joint effusions bilaterally.  Mild to moderate neural foraminal narrowing bilaterally.  SUMMARY:  No MRI evidence of lumbar spine fracture, malalignment or suspicious  osseous lesion. Multilevel lumbar spondylosis and degenerative disc disease  No evidence for cauda equina compression or nerve root impingement.  She is having difficulty in ambulation over the weekend. Improvement noted.  Further Hospital course complicated by heparin induced thrombocytopenia. She was started on IV argatroban.  Hemodynamically stable.  Good oxygenation.  Fair urine out put.          My plan includes :  IV argatroban for heparin induced thrombocytopenia.  Higher MAP goals.  D/C vasopressin.  PO midodrine   PO fludrocortisone   PO riociguat macitentan for pulmonary HTN  Statin Rx.  Bronchodilator Rx.   Close hemodynamic monitoring   Monitor for arrhythmias and monitor parameters for organ perfusion  Monitor neurologic status  Monitor renal function.  Head of the bed should remain elevated to 45 deg .   Chest PT and IS will be encouraged  Monitor adequacy of oxygenation   Nutritional goals will be met using po eventually , ensure adequate caloric intake and monitor the same  Stress ulcer and VTE prophylaxis will be achieved    Glycemic control is satisfactory  Electrolytes have been repleted as necessary and wound care has been carried out. Pain control has been achieved.   Aggressive physical therapy and early mobility and ambulation goals will be met   The family was updated about the course and plan  CRITICAL CARE TIME SPENT in evaluation and management, reassessments, review and interpretation of labs and x-rays, hemodynamic management, formulating a plan and coordinating care: ___25____ MIN.  Time does not include procedural time.  CTICU ATTENDING     					    Yanick Degroot MD

## 2024-05-08 LAB
ADD ON TEST-SPECIMEN IN LAB: SIGNIFICANT CHANGE UP
ALBUMIN SERPL ELPH-MCNC: 4.1 G/DL — SIGNIFICANT CHANGE UP (ref 3.3–5)
ALP SERPL-CCNC: 98 U/L — SIGNIFICANT CHANGE UP (ref 40–120)
ALT FLD-CCNC: 125 U/L — HIGH (ref 10–45)
ANION GAP SERPL CALC-SCNC: 13 MMOL/L — SIGNIFICANT CHANGE UP (ref 5–17)
APTT BLD: 54.6 SEC — HIGH (ref 24.5–35.6)
AST SERPL-CCNC: 68 U/L — HIGH (ref 10–40)
BASOPHILS # BLD AUTO: 0.02 K/UL — SIGNIFICANT CHANGE UP (ref 0–0.2)
BASOPHILS NFR BLD AUTO: 0.2 % — SIGNIFICANT CHANGE UP (ref 0–2)
BILIRUB SERPL-MCNC: 1.4 MG/DL — HIGH (ref 0.2–1.2)
BUN SERPL-MCNC: 22 MG/DL — SIGNIFICANT CHANGE UP (ref 7–23)
CALCIUM SERPL-MCNC: 9.8 MG/DL — SIGNIFICANT CHANGE UP (ref 8.4–10.5)
CHLORIDE SERPL-SCNC: 101 MMOL/L — SIGNIFICANT CHANGE UP (ref 96–108)
CO2 SERPL-SCNC: 22 MMOL/L — SIGNIFICANT CHANGE UP (ref 22–31)
CREAT SERPL-MCNC: 0.8 MG/DL — SIGNIFICANT CHANGE UP (ref 0.5–1.3)
EGFR: 80 ML/MIN/1.73M2 — SIGNIFICANT CHANGE UP
EOSINOPHIL # BLD AUTO: 0.02 K/UL — SIGNIFICANT CHANGE UP (ref 0–0.5)
EOSINOPHIL NFR BLD AUTO: 0.2 % — SIGNIFICANT CHANGE UP (ref 0–6)
GAS PNL BLDA: SIGNIFICANT CHANGE UP
GLUCOSE SERPL-MCNC: 148 MG/DL — HIGH (ref 70–99)
HCT VFR BLD CALC: 26.7 % — LOW (ref 34.5–45)
HGB BLD-MCNC: 9.4 G/DL — LOW (ref 11.5–15.5)
IMM GRANULOCYTES NFR BLD AUTO: 1.3 % — HIGH (ref 0–0.9)
INR BLD: 1.78 — HIGH (ref 0.85–1.18)
LACTATE SERPL-SCNC: 0.7 MMOL/L — SIGNIFICANT CHANGE UP (ref 0.5–2)
LYMPHOCYTES # BLD AUTO: 0.74 K/UL — LOW (ref 1–3.3)
LYMPHOCYTES # BLD AUTO: 6.6 % — LOW (ref 13–44)
MAGNESIUM SERPL-MCNC: 2.3 MG/DL — SIGNIFICANT CHANGE UP (ref 1.6–2.6)
MCHC RBC-ENTMCNC: 27 PG — SIGNIFICANT CHANGE UP (ref 27–34)
MCHC RBC-ENTMCNC: 35.2 GM/DL — SIGNIFICANT CHANGE UP (ref 32–36)
MCV RBC AUTO: 76.7 FL — LOW (ref 80–100)
MONOCYTES # BLD AUTO: 0.75 K/UL — SIGNIFICANT CHANGE UP (ref 0–0.9)
MONOCYTES NFR BLD AUTO: 6.7 % — SIGNIFICANT CHANGE UP (ref 2–14)
NEUTROPHILS # BLD AUTO: 9.5 K/UL — HIGH (ref 1.8–7.4)
NEUTROPHILS NFR BLD AUTO: 85 % — HIGH (ref 43–77)
NRBC # BLD: 0 /100 WBCS — SIGNIFICANT CHANGE UP (ref 0–0)
PHOSPHATE SERPL-MCNC: 2.6 MG/DL — SIGNIFICANT CHANGE UP (ref 2.5–4.5)
PLATELET # BLD AUTO: 61 K/UL — LOW (ref 150–400)
POTASSIUM SERPL-MCNC: 4.6 MMOL/L — SIGNIFICANT CHANGE UP (ref 3.5–5.3)
POTASSIUM SERPL-SCNC: 4.6 MMOL/L — SIGNIFICANT CHANGE UP (ref 3.5–5.3)
PROT SERPL-MCNC: 6.7 G/DL — SIGNIFICANT CHANGE UP (ref 6–8.3)
PROTHROM AB SERPL-ACNC: 20 SEC — HIGH (ref 9.5–13)
RBC # BLD: 3.48 M/UL — LOW (ref 3.8–5.2)
RBC # FLD: 16.3 % — HIGH (ref 10.3–14.5)
SODIUM SERPL-SCNC: 136 MMOL/L — SIGNIFICANT CHANGE UP (ref 135–145)
UNFRACTIONATED HEPARIN INTERPRETATION: SIGNIFICANT CHANGE UP
UNFRACTIONATED HEPARIN RESULT: POSITIVE
UNFRACTIONATED HEPARIN-HIGH DOSE: 1 % — SIGNIFICANT CHANGE UP
UNFRACTIONATED HEPARIN-LOW DOSE: 100 % — SIGNIFICANT CHANGE UP
WBC # BLD: 11.17 K/UL — HIGH (ref 3.8–10.5)
WBC # FLD AUTO: 11.17 K/UL — HIGH (ref 3.8–10.5)

## 2024-05-08 PROCEDURE — 99291 CRITICAL CARE FIRST HOUR: CPT

## 2024-05-08 PROCEDURE — 99232 SBSQ HOSP IP/OBS MODERATE 35: CPT | Mod: GC

## 2024-05-08 RX ORDER — FUROSEMIDE 40 MG
20 TABLET ORAL ONCE
Refills: 0 | Status: COMPLETED | OUTPATIENT
Start: 2024-05-08 | End: 2024-05-08

## 2024-05-08 RX ORDER — MACITENTAN 10 MG/1
10 TABLET, FILM COATED ORAL
Qty: 30 | Refills: 5 | Status: ACTIVE | COMMUNITY
Start: 2024-05-06

## 2024-05-08 RX ADMIN — Medication 20 MILLIGRAM(S): at 10:34

## 2024-05-08 RX ADMIN — Medication 20 MILLIGRAM(S): at 20:02

## 2024-05-08 RX ADMIN — Medication 3 MILLILITER(S): at 06:26

## 2024-05-08 RX ADMIN — VASOPRESSIN 3 UNIT(S)/MIN: 20 INJECTION INTRAVENOUS at 06:33

## 2024-05-08 RX ADMIN — PANTOPRAZOLE SODIUM 40 MILLIGRAM(S): 20 TABLET, DELAYED RELEASE ORAL at 11:24

## 2024-05-08 RX ADMIN — Medication 3 MILLILITER(S): at 13:11

## 2024-05-08 RX ADMIN — RIOCIGUAT 2.5 MILLIGRAM(S): 1.5 TABLET, FILM COATED ORAL at 22:05

## 2024-05-08 RX ADMIN — RIOCIGUAT 2.5 MILLIGRAM(S): 1.5 TABLET, FILM COATED ORAL at 13:11

## 2024-05-08 RX ADMIN — RIOCIGUAT 2.5 MILLIGRAM(S): 1.5 TABLET, FILM COATED ORAL at 07:06

## 2024-05-08 RX ADMIN — CHLORHEXIDINE GLUCONATE 1 APPLICATION(S): 213 SOLUTION TOPICAL at 06:37

## 2024-05-08 RX ADMIN — POLYETHYLENE GLYCOL 3350 17 GRAM(S): 17 POWDER, FOR SOLUTION ORAL at 11:24

## 2024-05-08 RX ADMIN — FLUDROCORTISONE ACETATE 0.2 MILLIGRAM(S): 0.1 TABLET ORAL at 07:07

## 2024-05-08 RX ADMIN — MIDODRINE HYDROCHLORIDE 15 MILLIGRAM(S): 2.5 TABLET ORAL at 07:06

## 2024-05-08 RX ADMIN — ARGATROBAN 6.12 MICROGRAM(S)/KG/MIN: 50 INJECTION, SOLUTION INTRAVENOUS at 02:50

## 2024-05-08 RX ADMIN — MIDODRINE HYDROCHLORIDE 15 MILLIGRAM(S): 2.5 TABLET ORAL at 13:10

## 2024-05-08 RX ADMIN — Medication 3 MILLILITER(S): at 17:15

## 2024-05-08 RX ADMIN — MACITENTAN 10 MILLIGRAM(S): 10 TABLET, FILM COATED ORAL at 11:58

## 2024-05-08 NOTE — PROGRESS NOTE ADULT - SUBJECTIVE AND OBJECTIVE BOX
CTICU  CRITICAL  CARE  attending     Hand off received 					   Pertinent clinical, laboratory, radiographic, hemodynamic, echocardiographic, respiratory data, microbiologic data and chart were reviewed and analyzed frequently throughout the course of the day and night      68 year old female with ASTHMA, Chronic Pulmonary HTN,, CAD, HTN.  She was evaluated by her pulmonologist.  Dr. Cathy Serra, was noted to have an elevated D-Dimer and was advised to present to ED for workup. In ED a CTA C/A/P was performed which revealed a Type B dissection and she was admitted and discharged with close follow up.   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   She was deemed a candidate for TEVAR with Dr. Kwon and Dr. Suh and presented to [facility] for preop lumbar drain prior to OR     S/P TEVAR.  Hospital course complicated by weakness in lower extremities, heparin induced thrombocytopenia, DVT.          FAMILY HISTORY:  Family history of early CAD (Father)    PAST MEDICAL & SURGICAL HISTORY:  HTN (hypertension)  Prediabetes  Mild tricuspid regurgitation  Enlarged aorta  CAD (coronary artery disease)  Ascending aorta dilation  Pulmonary hypertension  Chronic systolic right heart failure  2019 novel coronavirus disease (COVID-19)  Asthma  S/P hysterectomy  S/P         14 system review was unremarkable    Vital signs, hemodynamic and respiratory parameters were reviewed from the bedside nursing flow sheet.  ICU Vital Signs Last 24 Hrs  T(C): 36.6 (08 May 2024 17:16), Max: 36.8 (08 May 2024 01:08)  T(F): 97.8 (08 May 2024 17:16), Max: 98.3 (08 May 2024 01:08)  HR: 101 (08 May 2024 19:00) (78 - 101)  BP: 123/58 (08 May 2024 19:00) (117/56 - 144/65)  BP(mean): 84 (08 May 2024 19:00) (78 - 93)  ABP: 138/70 (08 May 2024 19:00) (105/47 - 146/72)  ABP(mean): 95 (08 May 2024 19:00) (67 - 106)  RR: 25 (08 May 2024 19:00) (18 - 29)  SpO2: 99% (08 May 2024 19:00) (90% - 100%)    O2 Parameters below as of 08 May 2024 20:00  Patient On (Oxygen Delivery Method): nasal cannula w/ humidification  O2 Flow (L/min): 2        Adult Advanced Hemodynamics Last 24 Hrs  CVP(mm Hg): --  CVP(cm H2O): --  CO: --  CI: --  PA: --  PA(mean): --  PCWP: --  SVR: --  SVRI: --  PVR: --  PVRI: --, ABG - ( 08 May 2024 01:56 )  pH, Arterial: 7.50  pH, Blood: x     /  pCO2: 33    /  pO2: 50    / HCO3: 26    / Base Excess: 2.9   /  SaO2: 87.6                Intake and output was reviewed and the fluid balance was calculated  Daily     Daily   I&O's Summary    07 May 2024 07:01  -  08 May 2024 07:00  --------------------------------------------------------  IN: 750.4 mL / OUT: 1745 mL / NET: -994.6 mL    08 May 2024 07:01  -  08 May 2024 19:48  --------------------------------------------------------  IN: 209.3 mL / OUT: 190 mL / NET: 19.3 mL            Neuro: No focal motor deficit.  Neck: No JVD.  CVS: S1, S1, No S3.  Lungs: Good air entry bilaterally.  Abd: Soft. No tenderness. + Bowel sounds.  Vascular: + DP/PT.  Extremities: No edema.  Lymphatic: Normal.  Skin: No abnormalities.      labs  CBC Full  -  ( 08 May 2024 01:56 )  WBC Count : 11.17 K/uL  RBC Count : 3.48 M/uL  Hemoglobin : 9.4 g/dL  Hematocrit : 26.7 %  Platelet Count - Automated : 61 K/uL  Mean Cell Volume : 76.7 fl  Mean Cell Hemoglobin : 27.0 pg  Mean Cell Hemoglobin Concentration : 35.2 gm/dL  Auto Neutrophil # : 9.50 K/uL  Auto Lymphocyte # : 0.74 K/uL  Auto Monocyte # : 0.75 K/uL  Auto Eosinophil # : 0.02 K/uL  Auto Basophil # : 0.02 K/uL  Auto Neutrophil % : 85.0 %  Auto Lymphocyte % : 6.6 %  Auto Monocyte % : 6.7 %  Auto Eosinophil % : 0.2 %  Auto Basophil % : 0.2 %        136  |  101  |  22  ----------------------------<  148<H>  4.6   |  22  |  0.80    Ca    9.8      08 May 2024 01:56  Phos  2.6     05-08  Mg     2.3     -    TPro  6.7  /  Alb  4.1  /  TBili  1.4<H>  /  DBili  x   /  AST  68<H>  /  ALT  125<H>  /  AlkPhos  98  05-08    PT/INR - ( 08 May 2024 01:56 )   PT: 20.0 sec;   INR: 1.78          PTT - ( 08 May 2024 01:56 )  PTT:54.6 sec  The current medications were reviewed   MEDICATIONS  (STANDING):  albuterol    90 MICROgram(s) HFA Inhaler 2 Puff(s) Inhalation every 6 hours  albuterol/ipratropium for Nebulization 3 milliLiter(s) Nebulizer every 6 hours  argatroban Infusion 1.25 MICROgram(s)/kG/Min (6.12 mL/Hr) IV Continuous <Continuous>  chlorhexidine 2% Cloths 1 Application(s) Topical daily  fludroCORTISONE 0.2 milliGRAM(s) Oral daily  furosemide   Injectable 20 milliGRAM(s) IV Push once  influenza  Vaccine (HIGH DOSE) 0.7 milliLiter(s) IntraMuscular once  macitentan 10 milliGRAM(s) Oral <User Schedule>  midodrine 15 milliGRAM(s) Oral every 8 hours  pantoprazole    Tablet 40 milliGRAM(s) Oral daily  polyethylene glycol 3350 17 Gram(s) Oral daily  riociguat 2.5 milliGRAM(s) Oral every 8 hours  senna 2 Tablet(s) Oral at bedtime  sodium chloride 0.9%. 1000 milliLiter(s) (10 mL/Hr) IV Continuous <Continuous>    MEDICATIONS  (PRN):  oxyCODONE    IR 5 milliGRAM(s) Oral every 6 hours PRN Moderate Pain (4 - 6)  sodium chloride 0.65% Nasal 1 Spray(s) Both Nostrils three times a day PRN dry nose          68 year old  Female admitted with type B dissection.  S/P TEVAR (Dual Grafts).  Post operative course complicated by celiac artery thrombus and non obstructive DVT of left femoral and left popliteal vein.  Venous Duplex: Small nonobstructive likely chronic nonobstructive thrombus in themidportion of the left femoral vein and left popliteal vein. She was started on IV heparin.  CT ABDOMEN: Interval TEVAR of Type B aortic dissection. Stable aneurysmal dilatation of false lumen below distal graft attachment site which is perfused.  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 Distal common hepatic artery is perfused.  Multifocal splenic infarcts and multiple small renal infarcts bilaterally, likely embolic etiology.  Further hospital postoperative course complicated by weakness in both legs. Patient started on vasopressor support to keep MAP above 100. IV heparin  was discontinued.    MRI: The lower thoracic and upper lumbar spine is partially obscured by magnetic susceptibility artifact caused by the endovascular stent graft.  A lumbar drain is seen entering the spinal canal at the L1-L2 level on CT   scan performed earlier today, is not clearly visualized on the MRI.  BONES: There is no fracture or bone marrow edema.  ALIGNMENT: The alignment is normal.  SACROILIAC JOINTS/SACRUM: There is no sacral fracture. The SI joints are partially visualized but are intact.  CONUS AND CAUDA EQUINA: The distal cord and conus are obscured by susceptibility artifact.  VISUALIZED INTRAPELVIC/INTRA-ABDOMINAL SOFT TISSUES: Normal.  PARASPINAL SOFT TISSUES: Normal.  LOWER THORACIC SPINE: The T11-T12 and T12-L1 levels are obscured by susceptibility artifact.  L1-L2: Evaluation is degraded by susceptibility artifact.  Minimal disc bulge.  No spinal canal stenosis or neural foraminal narrowing.  L2-L3: Small disc bulge.  No spinal canal stenosis.  Minimal neural foraminal narrowing.  L3-L4: Small disc bulge.  Mild facet hypertrophy.  Minimal spinal canal stenosis.  Mild neural foraminal narrowing, right greater than left.  L4-L5: Small disc bulge.  Moderate facet hypertrophy with small facet joint effusions bilaterally.  Mild ligamentum flavum hypertrophy.  Mild   spinal canal stenosis.  Mild neural foraminal narrowing bilaterally.  L5-S1: Small disc bulge.  Moderate facet hypertrophy with trace facet joint effusions bilaterally.  Mild to moderate neural foraminal narrowing bilaterally.  SUMMARY:  No MRI evidence of lumbar spine fracture, malalignment or suspicious  osseous lesion. Multilevel lumbar spondylosis and degenerative disc disease  No evidence for cauda equina compression or nerve root impingement.  She is having difficulty in ambulation over the weekend. Improvement noted.  Further Hospital course complicated by heparin induced thrombocytopenia. She was started on IV argatroban.  Hemodynamically stable.  Good oxygenation.  Fair urine out put.            My plan includes :  My plan includes :  IV argatroban for heparin induced thrombocytopenia.  Higher MAP goals.  D/C vasopressin.  PO midodrine   PO fludrocortisone   PO riociguat macitentan for pulmonary HTN  Statin Rx.  Bronchodilator Rx.   Close hemodynamic monitoring   Monitor for arrhythmias and monitor parameters for organ perfusion  Monitor neurologic status  Monitor renal function.  Head of the bed should remain elevated to 45 deg .   Chest PT and IS will be encouraged  Monitor adequacy of oxygenation   Nutritional goals will be met using po eventually , ensure adequate caloric intake and monitor the same  Stress ulcer and VTE prophylaxis will be achieved    Glycemic control is satisfactory  Electrolytes have been repleted as necessary and wound care has been carried out. Pain control has been achieved.   Aggressive physical therapy and early mobility and ambulation goals will be met   The family was updated about the course and plan        CRITICAL CARE TIME SPENT in evaluation and management, reassessments, review and interpretation of labs and x-rays, hemodynamic management, formulating a plan and coordinating care: ___25____ MIN.  Time does not include procedural time.    Yanick Degroot MD

## 2024-05-08 NOTE — PROGRESS NOTE ADULT - SUBJECTIVE AND OBJECTIVE BOX
SUBJECTIVE: Pt seen and examined at bedside this am by surgery team. No events overnight    MEDICATIONS  (STANDING):  albuterol    90 MICROgram(s) HFA Inhaler 2 Puff(s) Inhalation every 6 hours  albuterol/ipratropium for Nebulization 3 milliLiter(s) Nebulizer every 6 hours  argatroban Infusion 1.25 MICROgram(s)/kG/Min (6.12 mL/Hr) IV Continuous <Continuous>  chlorhexidine 2% Cloths 1 Application(s) Topical daily  fludroCORTISONE 0.2 milliGRAM(s) Oral daily  influenza  Vaccine (HIGH DOSE) 0.7 milliLiter(s) IntraMuscular once  macitentan 10 milliGRAM(s) Oral <User Schedule>  midodrine 15 milliGRAM(s) Oral every 8 hours  pantoprazole    Tablet 40 milliGRAM(s) Oral daily  polyethylene glycol 3350 17 Gram(s) Oral daily  riociguat 2.5 milliGRAM(s) Oral every 8 hours  senna 2 Tablet(s) Oral at bedtime  sodium chloride 0.9%. 1000 milliLiter(s) (10 mL/Hr) IV Continuous <Continuous>    MEDICATIONS  (PRN):  oxyCODONE    IR 5 milliGRAM(s) Oral every 6 hours PRN Moderate Pain (4 - 6)  sodium chloride 0.65% Nasal 1 Spray(s) Both Nostrils three times a day PRN dry nose      Vital Signs Last 24 Hrs  T(C): 36.4 (08 May 2024 12:40), Max: 36.8 (08 May 2024 01:08)  T(F): 97.6 (08 May 2024 12:40), Max: 98.3 (08 May 2024 01:08)  HR: 96 (08 May 2024 13:00) (70 - 101)  BP: 133/78 (08 May 2024 13:00) (117/59 - 144/65)  BP(mean): 89 (08 May 2024 13:00) (80 - 93)  RR: 24 (08 May 2024 12:00) (18 - 26)  SpO2: 100% (08 May 2024 13:00) (89% - 100%)    Parameters below as of 08 May 2024 13:00  Patient On (Oxygen Delivery Method): nasal cannula w/ humidification      Physical Exam:  General: NAD, pt resting comfortably in chair  Pulm: No respiratory distress, nonlabored breathing, on nasal cannula   CVS: NSR,   Abd: Soft, NT, ND  Extremities: WWP, small amount of edema. Motor and sensory function intact b/l UE and LE. Strength 5/5 on LLE, 4.5/5 on RLE, 5/5 on RUE, and 5/5 on LUE  Pulses: palp DP/PT bilaterally  I&O's Detail    07 May 2024 07:01  -  08 May 2024 07:00  --------------------------------------------------------  IN:    Albumin 25%  -  50 mL: 150 mL    Argatroban: 146.4 mL    IV PiggyBack: 100 mL    sodium chloride 0.9%: 240 mL    Vasopressin: 114 mL  Total IN: 750.4 mL    OUT:    Indwelling Catheter - Urethral (mL): 1745 mL  Total OUT: 1745 mL    Total NET: -994.6 mL      08 May 2024 07:01  -  08 May 2024 13:54  --------------------------------------------------------  IN:    Argatroban: 36.6 mL    sodium chloride 0.9%: 60 mL  Total IN: 96.6 mL    OUT:    Indwelling Catheter - Urethral (mL): 190 mL  Total OUT: 190 mL    Total NET: -93.4 mL          LABS:                        9.4    11.17 )-----------( 61       ( 08 May 2024 01:56 )             26.7     05-08    136  |  101  |  22  ----------------------------<  148<H>  4.6   |  22  |  0.80    Ca    9.8      08 May 2024 01:56  Phos  2.6     05-08  Mg     2.3     05-08    TPro  6.7  /  Alb  4.1  /  TBili  1.4<H>  /  DBili  x   /  AST  68<H>  /  ALT  125<H>  /  AlkPhos  98  05-08    PT/INR - ( 08 May 2024 01:56 )   PT: 20.0 sec;   INR: 1.78          PTT - ( 08 May 2024 01:56 )  PTT:54.6 sec  Urinalysis Basic - ( 08 May 2024 01:56 )    Color: x / Appearance: x / SG: x / pH: x  Gluc: 148 mg/dL / Ketone: x  / Bili: x / Urobili: x   Blood: x / Protein: x / Nitrite: x   Leuk Esterase: x / RBC: x / WBC x   Sq Epi: x / Non Sq Epi: x / Bacteria: x

## 2024-05-08 NOTE — PROGRESS NOTE ADULT - SUBJECTIVE AND OBJECTIVE BOX
PULMONARY CONSULT SERVICE FOLLOW-UP NOTE    INTERVAL HPI:  Reviewed chart and overnight events; patient seen and examined at bedside.   Patient feels stronger today, now off vasopressin, which was the last IV vasopressor.  She walked 2 lanes down the [place] today with PT, and felt fine with that. Denies dyspnea or lower extremity weakness.  No chest pain/discomfort/lightheadedness.     MEDICATIONS:  Pulmonary:  albuterol    90 MICROgram(s) HFA Inhaler 2 Puff(s) Inhalation every 6 hours  albuterol/ipratropium for Nebulization 3 milliLiter(s) Nebulizer every 6 hours    Antimicrobials:    Anticoagulants:  argatroban Infusion 1.25 MICROgram(s)/kG/Min IV Continuous <Continuous>    Cardiac:  macitentan 10 milliGRAM(s) Oral <User Schedule>  midodrine 15 milliGRAM(s) Oral every 8 hours  riociguat 2.5 milliGRAM(s) Oral every 8 hours      Allergies    heparin (Other (Moderate))    Intolerances        Vital Signs Last 24 Hrs  T(C): 36.6 (08 May 2024 17:16), Max: 36.8 (08 May 2024 01:08)  T(F): 97.8 (08 May 2024 17:16), Max: 98.3 (08 May 2024 01:08)  HR: 84 (08 May 2024 22:00) (78 - 101)  BP: 144/67 (08 May 2024 22:00) (117/56 - 144/67)  BP(mean): 96 (08 May 2024 22:00) (78 - 96)  RR: 22 (08 May 2024 22:00) (18 - 29)  SpO2: 97% (08 May 2024 22:00) (93% - 100%)    Parameters below as of 08 May 2024 22:00  Patient On (Oxygen Delivery Method): room air        05-07 @ 07:01  -  05-08 @ 07:00  --------------------------------------------------------  IN: 750.4 mL / OUT: 1745 mL / NET: -994.6 mL    05-08 @ 07:01  -  05-08 @ 22:38  --------------------------------------------------------  IN: 221.5 mL / OUT: 790 mL / NET: -568.5 mL          PHYSICAL EXAM:  Constitutional: NAD on 2L NC. Off NC she maintains % SpO2  HEENT: NC/AT; PERRL, anicteric sclera; MMM  Neck: supple  Cardiovascular: +S1/S2, RRR  Respiratory: Decreased air entry at bases  Gastrointestinal: soft, NT/ND  Extremities: WWP; no edema, clubbing or cyanosis  Vascular: 2+ radial pulses B/L  Neurological: awake and alert; HORNER    LABS:  ABG - ( 08 May 2024 01:56 )  pH, Arterial: 7.50  pH, Blood: x     /  pCO2: 33    /  pO2: 50    / HCO3: 26    / Base Excess: 2.9   /  SaO2: 87.6                CBC Full  -  ( 08 May 2024 01:56 )  WBC Count : 11.17 K/uL  RBC Count : 3.48 M/uL  Hemoglobin : 9.4 g/dL  Hematocrit : 26.7 %  Platelet Count - Automated : 61 K/uL  Mean Cell Volume : 76.7 fl  Mean Cell Hemoglobin : 27.0 pg  Mean Cell Hemoglobin Concentration : 35.2 gm/dL  Auto Neutrophil # : 9.50 K/uL  Auto Lymphocyte # : 0.74 K/uL  Auto Monocyte # : 0.75 K/uL  Auto Eosinophil # : 0.02 K/uL  Auto Basophil # : 0.02 K/uL  Auto Neutrophil % : 85.0 %  Auto Lymphocyte % : 6.6 %  Auto Monocyte % : 6.7 %  Auto Eosinophil % : 0.2 %  Auto Basophil % : 0.2 %    05-08    136  |  101  |  22  ----------------------------<  148<H>  4.6   |  22  |  0.80    Ca    9.8      08 May 2024 01:56  Phos  2.6     05-08  Mg     2.3     05-08    TPro  6.7  /  Alb  4.1  /  TBili  1.4<H>  /  DBili  x   /  AST  68<H>  /  ALT  125<H>  /  AlkPhos  98  05-08    PT/INR - ( 08 May 2024 01:56 )   PT: 20.0 sec;   INR: 1.78          PTT - ( 08 May 2024 01:56 )  PTT:54.6 sec      Urinalysis Basic - ( 08 May 2024 01:56 )    Color: x / Appearance: x / SG: x / pH: x  Gluc: 148 mg/dL / Ketone: x  / Bili: x / Urobili: x   Blood: x / Protein: x / Nitrite: x   Leuk Esterase: x / RBC: x / WBC x   Sq Epi: x / Non Sq Epi: x / Bacteria: x                RADIOLOGY & ADDITIONAL STUDIES:

## 2024-05-08 NOTE — PROGRESS NOTE ADULT - SUBJECTIVE AND OBJECTIVE BOX
CTICU  CRITICAL  CARE  attending     Hand off received 					   Pertinent clinical, laboratory, radiographic, hemodynamic, echocardiographic, respiratory data, microbiologic data and chart were reviewed and analyzed frequently throughout the course of the day  Patient seen and examined with CTS/ SH attending at bedside  Pt is a 68yr old female with PMH asthma, CTEPH, CAD, HTN, Type B Dissection admitted for TEVAR. Pt underwent LD placement . : Pt underwent endovascular repair of thoracic aorta covering L subclavian to ~5cm above celiac trunk ([place]). Arrived Extubated. Clamped  and  and drained overnight. Ld removed in afternoon. : early AM pt unable to move LE, stroke code called. NSGY replaced LD. MAPs driven up. CT imaging and MRI imaging performed. Pulmonary consulted for CTEPH mgmt. Boo and primacor restarted with new swan placement. 1 pRBC, mannitol, and decadron also given. : Midodrine started. : Macitetan started. Boo weaned to 5. LD capped during day. : LD removed. 5/3: Primacor .25. [place] removed. : Weakness in LE and urinary retention. Vaso restarted. : HIT positive, argatroban started. NSGY recommending MRI. RUE midline placed. :     FAMILY HISTORY:  Family history of early CAD (Father)  PAST MEDICAL & SURGICAL HISTORY:  HTN (hypertension)  Prediabetes  Mild tricuspid regurgitation  Enlarged aorta  CAD (coronary artery disease)  Ascending aorta dilation  Pulmonary hypertension  Chronic systolic right heart failure  2019 novel coronavirus disease (COVID-19)  Asthma  S/P hysterectomy  S/P         Patient is a 68y old  Female who presents with a chief complaint of Type B dissection.      14 system review was unremarkable    Vital signs, hemodynamic and respiratory parameters were reviewed from the bedside nursing flowsheet.  ICU Vital Signs Last 24 Hrs  T(C): 36.7 (08 May 2024 07:14), Max: 36.8 (08 May 2024 01:08)  T(F): 98.1 (08 May 2024 07:14), Max: 98.3 (08 May 2024 01:08)  HR: 101 (08 May 2024 07:00) (63 - 101)  BP: 144/65 (08 May 2024 06:00) (144/65 - 144/65)  BP(mean): 93 (08 May 2024 06:00) (93 - 93)  ABP: 114/51 (08 May 2024 07:00) (114/51 - 152/73)  ABP(mean): 71 (08 May 2024 07:00) (71 - 107)  RR: 20 (08 May 2024 07:00) (18 - 26)  SpO2: 98% (08 May 2024 07:00) (89% - 100%)    O2 Parameters below as of 08 May 2024 07:00  Patient On (Oxygen Delivery Method): nasal cannula w/ humidification  O2 Flow (L/min): 2        Adult Advanced Hemodynamics Last 24 Hrs  CVP(mm Hg): --  CVP(cm H2O): --  CO: --  CI: --  PA: --  PA(mean): --  PCWP: --  SVR: --  SVRI: --  PVR: --  PVRI: --, ABG - ( 08 May 2024 01:56 )  pH, Arterial: 7.50  pH, Blood: x     /  pCO2: 33    /  pO2: 50    / HCO3: 26    / Base Excess: 2.9   /  SaO2: 87.6                Intake and output was reviewed and the fluid balance was calculated  Daily     Daily   I&O's Summary    07 May 2024 07:01  -  08 May 2024 07:00  --------------------------------------------------------  IN: 750.4 mL / OUT: 1745 mL / NET: -994.6 mL        All lines and drain sites were assessed  Glycemic trend was reviewedCAPILLARY BLOOD GLUCOSE          Neuro:  HEENT:  Heart:  Lungs:  Abdomen:  Extremities:    Lines:    Tubes:      labs  CBC Full  -  ( 08 May 2024 01:56 )  WBC Count : 11.17 K/uL  RBC Count : 3.48 M/uL  Hemoglobin : 9.4 g/dL  Hematocrit : 26.7 %  Platelet Count - Automated : 61 K/uL  Mean Cell Volume : 76.7 fl  Mean Cell Hemoglobin : 27.0 pg  Mean Cell Hemoglobin Concentration : 35.2 gm/dL  Auto Neutrophil # : 9.50 K/uL  Auto Lymphocyte # : 0.74 K/uL  Auto Monocyte # : 0.75 K/uL  Auto Eosinophil # : 0.02 K/uL  Auto Basophil # : 0.02 K/uL  Auto Neutrophil % : 85.0 %  Auto Lymphocyte % : 6.6 %  Auto Monocyte % : 6.7 %  Auto Eosinophil % : 0.2 %  Auto Basophil % : 0.2 %        138  |  100  |  20  ----------------------------<  143<H>  4.6   |  25  |  0.74    Ca    9.8      07 May 2024 14:55  Phos  2.6     -  Mg     2.3     -    TPro  7.3  /  Alb  4.6  /  TBili  1.6<H>  /  DBili  x   /  AST  70<H>  /  ALT  125<H>  /  AlkPhos  108  07    PT/INR - ( 08 May 2024 01:56 )   PT: 20.0 sec;   INR: 1.78          PTT - ( 08 May 2024 01:56 )  PTT:54.6 sec  The current medications were reviewed   MEDICATIONS  (STANDING):  albuterol    90 MICROgram(s) HFA Inhaler 2 Puff(s) Inhalation every 6 hours  albuterol/ipratropium for Nebulization 3 milliLiter(s) Nebulizer every 6 hours  argatroban Infusion 1.25 MICROgram(s)/kG/Min (6.12 mL/Hr) IV Continuous <Continuous>  chlorhexidine 2% Cloths 1 Application(s) Topical daily  fludroCORTISONE 0.2 milliGRAM(s) Oral daily  influenza  Vaccine (HIGH DOSE) 0.7 milliLiter(s) IntraMuscular once  macitentan 10 milliGRAM(s) Oral <User Schedule>  midodrine 15 milliGRAM(s) Oral every 8 hours  pantoprazole    Tablet 40 milliGRAM(s) Oral daily  polyethylene glycol 3350 17 Gram(s) Oral daily  riociguat 2.5 milliGRAM(s) Oral every 8 hours  senna 2 Tablet(s) Oral at bedtime  sodium chloride 0.9%. 1000 milliLiter(s) (10 mL/Hr) IV Continuous <Continuous>  vasopressin Infusion 0.02 Unit(s)/Min (3 mL/Hr) IV Continuous <Continuous>    MEDICATIONS  (PRN):  oxyCODONE    IR 5 milliGRAM(s) Oral every 6 hours PRN Moderate Pain (4 - 6)  sodium chloride 0.65% Nasal 1 Spray(s) Both Nostrils three times a day PRN dry nose      Assessment/Plan:         s/p cardiac surgery    Acute systolic and diastolic heart failure evidenced by SOB and parenchymal infiltrates; will treat with diuresis    Cardiogenic shock on ionotropy    Vasogenic shock due to hypotension in the cticu , will keep on pressors    Hypovolemic shock - > 20% intravascular depletion will replete volume    Acute blood loss anemia with relative hypotension treated with > 1 unit PC    Acute respiratory failure ruled in due to hypoxemia, O2 sats < 91% on RA treated with HFNC    Acute respiratory failure ruled in due to hypercapnea on abg will treat with mechanical ventilation    Acute respiratory failure ruled in due to prolonged mechanical ventilation > 24 hrs on the vent due to failure to wean due to weak respiratory mechanics    Toxic metabolic encephalopathy ; sundowning due to anesthesia pain medications    Acidosis evidenced by anion gap and negative base excess    Acute kidney injury - creatinine > 0.3 due to combined prerenal and intrarenal factors can presume ATN    ESRD    Acute anastasia-operative ischemic stroke    Moderate protein calorie malutrition    Diet as tolerated  Replete lytes prn  Monitor CT output  GI/DVT PPX  Bowel Regimen  Pain control  OOB with PT    Titrate pressor support to maintain MAP >70  Titrate inotrope support to maintain CI >2.2/MVO2 >60  Close hemodynamic, ventilatory and drain monitoring and management per post op routine  Monitor for arrhythmias and monitor parameters for organ perfusion  Beta blockade not administered due to hemodynamic instability and bradycardia  Monitor neurologic status  Head of the bed should remain elevated to 45 deg   Chest PT and IS will be encouraged  Monitor adequacy of oxygenation and ventilation and attempt to wean oxygen  Antibiotic regimen will be tailored to the clinical, laboratory and microbiologic data  Nutritional goals will be met using po eventually, ensure adequate caloric intake and monitor the same  Stress ulcer and VTE prophylaxis will be achieved    Glycemic control is satisfactory  Electrolytes have been repleted as necessary and wound care has been carried out   Pain control has been achieved.   Aggressive physical therapy and early mobility and ambulation goals will be met   The family was updated about the course and plan.    CRITICAL CARE TIME personally provided by me  in evaluation and management, reassessments, review and interpretation of labs and x-rays, ventilator and hemodynamic management, formulating a plan and coordinating care: ___105____ MIN.  Time does not include procedural time.    CTICU ATTENDING     					  Cherelle Redmond MD CTICU  CRITICAL  CARE  attending     Hand off received 					   Pertinent clinical, laboratory, radiographic, hemodynamic, echocardiographic, respiratory data, microbiologic data and chart were reviewed and analyzed frequently throughout the course of the day  Patient seen and examined with CTS/ SH attending at bedside  Pt is a 68yr old female with PMH asthma, CTEPH, CAD, HTN, Type B Dissection admitted for TEVAR. Pt underwent LD placement . : Pt underwent endovascular repair of thoracic aorta covering L subclavian to ~5cm above celiac trunk ([place]). Arrived Extubated. Clamped  and  and drained overnight. Ld removed in afternoon. : early AM pt unable to move LE, stroke code called. NSGY replaced LD. MAPs driven up. CT imaging and MRI imaging performed. Pulmonary consulted for CTEPH mgmt. Boo and primacor restarted with new swan placement. 1 pRBC, mannitol, and decadron also given. : Midodrine started. : Macitetan started. Boo weaned to 5. LD capped during day. : LD removed. 5/3: Primacor .25. [place] removed. : Weakness in LE and urinary retention. Vaso restarted. : HIT positive, argatroban started. NSGY recommending MRI. RUE midline placed. : Primacor off. : On and off vaso for MAP goals and improved LE strength.     FAMILY HISTORY:  Family history of early CAD (Father)  PAST MEDICAL & SURGICAL HISTORY:  HTN (hypertension)  Prediabetes  Mild tricuspid regurgitation  Enlarged aorta  CAD (coronary artery disease)  Ascending aorta dilation  Pulmonary hypertension  Chronic systolic right heart failure  2019 novel coronavirus disease (COVID-19)  Asthma  S/P hysterectomy  S/P         Patient is a 68y old  Female who presents with a chief complaint of Type B dissection.      14 system review was unremarkable    Vital signs, hemodynamic and respiratory parameters were reviewed from the bedside nursing flowsheet.  ICU Vital Signs Last 24 Hrs  T(C): 36.7 (08 May 2024 07:14), Max: 36.8 (08 May 2024 01:08)  T(F): 98.1 (08 May 2024 07:14), Max: 98.3 (08 May 2024 01:08)  HR: 101 (08 May 2024 07:00) (63 - 101)  BP: 144/65 (08 May 2024 06:00) (144/65 - 144/65)  BP(mean): 93 (08 May 2024 06:00) (93 - 93)  ABP: 114/51 (08 May 2024 07:00) (114/51 - 152/73)  ABP(mean): 71 (08 May 2024 07:00) (71 - 107)  RR: 20 (08 May 2024 07:00) (18 - 26)  SpO2: 98% (08 May 2024 07:00) (89% - 100%)    O2 Parameters below as of 08 May 2024 07:00  Patient On (Oxygen Delivery Method): nasal cannula w/ humidification  O2 Flow (L/min): 2        Adult Advanced Hemodynamics Last 24 Hrs  CVP(mm Hg): --  CVP(cm H2O): --  CO: --  CI: --  PA: --  PA(mean): --  PCWP: --  SVR: --  SVRI: --  PVR: --  PVRI: --, ABG - ( 08 May 2024 01:56 )  pH, Arterial: 7.50  pH, Blood: x     /  pCO2: 33    /  pO2: 50    / HCO3: 26    / Base Excess: 2.9   /  SaO2: 87.6                Intake and output was reviewed and the fluid balance was calculated  Daily     Daily   I&O's Summary    07 May 2024 07:01  -  08 May 2024 07:00  --------------------------------------------------------  IN: 750.4 mL / OUT: 1745 mL / NET: -994.6 mL        All lines and drain sites were assessed  Glycemic trend was reviewedCAPILLARY BLOOD GLUCOSE    Neuro: well appearing female in nad, 5/5 strength throughout bl upper and lower extremities  HEENT: mmm  Heart: s1 s2  Lungs: clear bl  Abdomen: soft nt nd  Extremities: wwp    Lines:  RUE DL midline 5/5  R axillary arterial line       labs  CBC Full  -  ( 08 May 2024 01:56 )  WBC Count : 11.17 K/uL  RBC Count : 3.48 M/uL  Hemoglobin : 9.4 g/dL  Hematocrit : 26.7 %  Platelet Count - Automated : 61 K/uL  Mean Cell Volume : 76.7 fl  Mean Cell Hemoglobin : 27.0 pg  Mean Cell Hemoglobin Concentration : 35.2 gm/dL  Auto Neutrophil # : 9.50 K/uL  Auto Lymphocyte # : 0.74 K/uL  Auto Monocyte # : 0.75 K/uL  Auto Eosinophil # : 0.02 K/uL  Auto Basophil # : 0.02 K/uL  Auto Neutrophil % : 85.0 %  Auto Lymphocyte % : 6.6 %  Auto Monocyte % : 6.7 %  Auto Eosinophil % : 0.2 %  Auto Basophil % : 0.2 %        138  |  100  |  20  ----------------------------<  143<H>  4.6   |  25  |  0.74    Ca    9.8      07 May 2024 14:55  Phos  2.6     -  Mg     2.3     -    TPro  7.3  /  Alb  4.6  /  TBili  1.6<H>  /  DBili  x   /  AST  70<H>  /  ALT  125<H>  /  AlkPhos  108  05-07    PT/INR - ( 08 May 2024 01:56 )   PT: 20.0 sec;   INR: 1.78          PTT - ( 08 May 2024 01:56 )  PTT:54.6 sec  The current medications were reviewed   MEDICATIONS  (STANDING):  albuterol    90 MICROgram(s) HFA Inhaler 2 Puff(s) Inhalation every 6 hours  albuterol/ipratropium for Nebulization 3 milliLiter(s) Nebulizer every 6 hours  argatroban Infusion 1.25 MICROgram(s)/kG/Min (6.12 mL/Hr) IV Continuous <Continuous>  chlorhexidine 2% Cloths 1 Application(s) Topical daily  fludroCORTISONE 0.2 milliGRAM(s) Oral daily  influenza  Vaccine (HIGH DOSE) 0.7 milliLiter(s) IntraMuscular once  macitentan 10 milliGRAM(s) Oral <User Schedule>  midodrine 15 milliGRAM(s) Oral every 8 hours  pantoprazole    Tablet 40 milliGRAM(s) Oral daily  polyethylene glycol 3350 17 Gram(s) Oral daily  riociguat 2.5 milliGRAM(s) Oral every 8 hours  senna 2 Tablet(s) Oral at bedtime  sodium chloride 0.9%. 1000 milliLiter(s) (10 mL/Hr) IV Continuous <Continuous>  vasopressin Infusion 0.02 Unit(s)/Min (3 mL/Hr) IV Continuous <Continuous>    MEDICATIONS  (PRN):  oxyCODONE    IR 5 milliGRAM(s) Oral every 6 hours PRN Moderate Pain (4 - 6)  sodium chloride 0.65% Nasal 1 Spray(s) Both Nostrils three times a day PRN dry nose      Assessment/Plan:  68yr old female with PMH asthma, CTEPH, CAD, HTN, Type B Dissection admitted for TEVAR.    [facility]  Continue with MAP goals >90, with vasopressin, would elect for liberalization and true LE weakness evaluation off pressors  On midodrine  Fludrocortisone started  Serial neuro checks, q1h  CTEPH mgmt  Appreciate pulmonary consult  Continue Riociguat and macitetan  HIT positive, continue argatroban, PTT per protocol  Transaminitis-monitor  Fludrocortisone started, cortisol 24-would consider dc  Urinary retention sp stevens replacement 5/5  Diet as tolerated  Replete lytes prn  GI/DVT PPX  Bowel Regimen  Pain control  OOB with PT  Close hemodynamic, ventilatory and drain monitoring and management per post op routine  Monitor for arrhythmias and monitor parameters for organ perfusion  Beta blockade not administered due to hemodynamic instability and bradycardia  Monitor neurologic status  Head of the bed should remain elevated to 45 deg   Chest PT and IS will be encouraged  Monitor adequacy of oxygenation and ventilation and attempt to wean oxygen  Antibiotic regimen will be tailored to the clinical, laboratory and microbiologic data  Nutritional goals will be met using po eventually, ensure adequate caloric intake and monitor the same  Stress ulcer and VTE prophylaxis will be achieved    Glycemic control is satisfactory  Electrolytes have been repleted as necessary and wound care has been carried out   Pain control has been achieved.   Aggressive physical therapy and early mobility and ambulation goals will be met   The family was updated about the course and plan.    CRITICAL CARE TIME personally provided by me  in evaluation and management, reassessments, review and interpretation of labs and x-rays, ventilator and hemodynamic management, formulating a plan and coordinating care: ___105____ MIN.  Time does not include procedural time.    CTICU ATTENDING     					  Cherelle Redmond MD

## 2024-05-08 NOTE — PROGRESS NOTE ADULT - ASSESSMENT
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, severe combined pre capillary PH, suspect also with a component of PAH 2/2 drugs/toxins given history of phen fen use, Type B aortic dissection, obesity, systemic hypertension who presented for elective TEVAR, complicated by spinal ischemia for which PH was consulted in setting of need to use aggressive vasopressors to increase spinal perfusion/MAP. She was subsequently started on riociguat and follow up TTE showed significant improvement in PH as well as subjectively she noted improvement. PA-C showed with no significant changes from her baseline other than increase in cardiac output, now removed. Now she feels clinically improved, walking multiple lanes in [place], off IV pressors and on minimum oxygen.    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 At that time her aortic dissection was noted, but felt to delay surgical intervention until PH improved  - [facility] reviewed (8/2024) - Of note there was NO PAWP WAVEFORM, unable to wedge, calculations made on estimated PAWP of 15  RA 13  |  PA 82/28/48  |  PAWP 15  |  CO/CI (F) 4.64/2.54  |  PVR 7.1WU  |  PVR/SVR 0.4    She continues to improved clinically evident by lower NC O2 supplements and increasing exercise tolerance, now walking more than prior. IV vasopressor agents have be weaned off, and she denies weakness/changes in LE strength. Working with insurance to get macitentan approved.     Recommend:   - Now off IV pressors and on minimal oxygen. When NC removed at bedside this evening  she maintained % SpO2. Continue to wean off as tolerated  - Continue with riociguat at current dose  - Maintain relative diuresis, avoid fluid boluses and would keep pt relatively net negative  - Continue with macitentan given severity of PH at 10 mg, working on insurance approval    Discharge plan:  Plan for evaluation in formal CTEPH clinic for consideration of PTE (may be difficult in setting of Type B aortic dissection) vs balloon angioplasty and possible escalation of medical therapy, as an outpatient.   Follow up in CTEPH clinic with Dr. Cabrera and Dr. Benites. Continue with medical therapy for now    PH will continue to follow  Patient seen, evaluated, and discussed with Dr. Deborah Bar MD  [facility] Fellow

## 2024-05-08 NOTE — PROGRESS NOTE ADULT - ATTENDING COMMENTS
As above, CTEPH and aortic dissection s/p TEVAR c/b spinal cord ischemia, now improved on riociguat (home dose) + macitentan (new start this admission), officially weaned off pressors, also tolerating RA with SpO2 >95% when nasal cannula removed. Ambulating without difficulty, only complaint is urinary retention. Will continue with current dosing of PH medications, LFTs improving. Thrombocytopenia 2/2 HIT improving off heparin now on argatroban, to switch to eliquis when platelets improve. Plan to follow up in CTEPH clinic on discharge

## 2024-05-08 NOTE — PROGRESS NOTE ADULT - ASSESSMENT
69 y/o F PMH asthma, CTEPH, CAD, HTN, Type B dissection now s/p TEVAR, Groin sites soft, pulses palpable. Still reporting subjective weakness but able to ambulate and no clearly weak on exam. Currently on Milrinone and vasopressin. Normal sensory to lower extremities, patient able to walk and sit in chair. Lumbar drain removed on 5/1. Pulmonary on board for pulmonary hypertension. CT imaging and MRI with no findings of nerve compression or root compression. 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 Distal common hepatic artery is perfused. Multifocal splenic infarcts and multiple small renal infarcts bilaterally, likely embolic etiology. No abdominal symptoms - tolerating diet without pain or tenderness.     - Continue neurovasacular checks per TEVAR ERAS protocol  - B/l groin checks per TEVAR ERAS protocol  - Rest of care per CTICU  - vascular surgery will continue to follow patient

## 2024-05-09 LAB
ALBUMIN SERPL ELPH-MCNC: 3.9 G/DL — SIGNIFICANT CHANGE UP (ref 3.3–5)
ALBUMIN SERPL ELPH-MCNC: 4.3 G/DL — SIGNIFICANT CHANGE UP (ref 3.3–5)
ALBUMIN SERPL ELPH-MCNC: 4.6 G/DL — SIGNIFICANT CHANGE UP (ref 3.3–5)
ALP SERPL-CCNC: 106 U/L — SIGNIFICANT CHANGE UP (ref 40–120)
ALP SERPL-CCNC: 98 U/L — SIGNIFICANT CHANGE UP (ref 40–120)
ALP SERPL-CCNC: 99 U/L — SIGNIFICANT CHANGE UP (ref 40–120)
ALT FLD-CCNC: 139 U/L — HIGH (ref 10–45)
ALT FLD-CCNC: 144 U/L — HIGH (ref 10–45)
ALT FLD-CCNC: 159 U/L — HIGH (ref 10–45)
AMYLASE P1 CFR SERPL: 121 U/L — SIGNIFICANT CHANGE UP (ref 25–125)
ANION GAP SERPL CALC-SCNC: 13 MMOL/L — SIGNIFICANT CHANGE UP (ref 5–17)
ANION GAP SERPL CALC-SCNC: 13 MMOL/L — SIGNIFICANT CHANGE UP (ref 5–17)
ANION GAP SERPL CALC-SCNC: 14 MMOL/L — SIGNIFICANT CHANGE UP (ref 5–17)
APPEARANCE UR: ABNORMAL
APTT BLD: 55.2 SEC — HIGH (ref 24.5–35.6)
APTT BLD: 56.5 SEC — HIGH (ref 24.5–35.6)
APTT BLD: 61.8 SEC — HIGH (ref 24.5–35.6)
AST SERPL-CCNC: 73 U/L — HIGH (ref 10–40)
AST SERPL-CCNC: 76 U/L — HIGH (ref 10–40)
AST SERPL-CCNC: 88 U/L — HIGH (ref 10–40)
BACTERIA # UR AUTO: ABNORMAL /HPF
BASOPHILS # BLD AUTO: 0.03 K/UL — SIGNIFICANT CHANGE UP (ref 0–0.2)
BASOPHILS # BLD AUTO: 0.04 K/UL — SIGNIFICANT CHANGE UP (ref 0–0.2)
BASOPHILS # BLD AUTO: 0.05 K/UL — SIGNIFICANT CHANGE UP (ref 0–0.2)
BASOPHILS NFR BLD AUTO: 0.3 % — SIGNIFICANT CHANGE UP (ref 0–2)
BASOPHILS NFR BLD AUTO: 0.3 % — SIGNIFICANT CHANGE UP (ref 0–2)
BASOPHILS NFR BLD AUTO: 0.4 % — SIGNIFICANT CHANGE UP (ref 0–2)
BILIRUB SERPL-MCNC: 1 MG/DL — SIGNIFICANT CHANGE UP (ref 0.2–1.2)
BILIRUB SERPL-MCNC: 1.2 MG/DL — SIGNIFICANT CHANGE UP (ref 0.2–1.2)
BILIRUB SERPL-MCNC: 1.2 MG/DL — SIGNIFICANT CHANGE UP (ref 0.2–1.2)
BILIRUB UR-MCNC: NEGATIVE — SIGNIFICANT CHANGE UP
BUN SERPL-MCNC: 29 MG/DL — HIGH (ref 7–23)
BUN SERPL-MCNC: 30 MG/DL — HIGH (ref 7–23)
BUN SERPL-MCNC: 35 MG/DL — HIGH (ref 7–23)
CALCIUM SERPL-MCNC: 10 MG/DL — SIGNIFICANT CHANGE UP (ref 8.4–10.5)
CALCIUM SERPL-MCNC: 9.4 MG/DL — SIGNIFICANT CHANGE UP (ref 8.4–10.5)
CALCIUM SERPL-MCNC: 9.9 MG/DL — SIGNIFICANT CHANGE UP (ref 8.4–10.5)
CAST: 12 /LPF — HIGH (ref 0–4)
CHLORIDE SERPL-SCNC: 104 MMOL/L — SIGNIFICANT CHANGE UP (ref 96–108)
CHLORIDE SERPL-SCNC: 105 MMOL/L — SIGNIFICANT CHANGE UP (ref 96–108)
CHLORIDE SERPL-SCNC: 105 MMOL/L — SIGNIFICANT CHANGE UP (ref 96–108)
CO2 SERPL-SCNC: 22 MMOL/L — SIGNIFICANT CHANGE UP (ref 22–31)
CO2 SERPL-SCNC: 23 MMOL/L — SIGNIFICANT CHANGE UP (ref 22–31)
CO2 SERPL-SCNC: 23 MMOL/L — SIGNIFICANT CHANGE UP (ref 22–31)
COLOR SPEC: SIGNIFICANT CHANGE UP
CREAT SERPL-MCNC: 1.02 MG/DL — SIGNIFICANT CHANGE UP (ref 0.5–1.3)
CREAT SERPL-MCNC: 1.05 MG/DL — SIGNIFICANT CHANGE UP (ref 0.5–1.3)
CREAT SERPL-MCNC: 1.21 MG/DL — SIGNIFICANT CHANGE UP (ref 0.5–1.3)
DIFF PNL FLD: ABNORMAL
EGFR: 49 ML/MIN/1.73M2 — LOW
EGFR: 58 ML/MIN/1.73M2 — LOW
EGFR: 60 ML/MIN/1.73M2 — SIGNIFICANT CHANGE UP
EOSINOPHIL # BLD AUTO: 0.21 K/UL — SIGNIFICANT CHANGE UP (ref 0–0.5)
EOSINOPHIL # BLD AUTO: 0.21 K/UL — SIGNIFICANT CHANGE UP (ref 0–0.5)
EOSINOPHIL # BLD AUTO: 0.23 K/UL — SIGNIFICANT CHANGE UP (ref 0–0.5)
EOSINOPHIL NFR BLD AUTO: 1.6 % — SIGNIFICANT CHANGE UP (ref 0–6)
EOSINOPHIL NFR BLD AUTO: 1.6 % — SIGNIFICANT CHANGE UP (ref 0–6)
EOSINOPHIL NFR BLD AUTO: 1.9 % — SIGNIFICANT CHANGE UP (ref 0–6)
FINE GRAN CASTS #/AREA URNS AUTO: PRESENT
GAS PNL BLDA: SIGNIFICANT CHANGE UP
GLUCOSE SERPL-MCNC: 107 MG/DL — HIGH (ref 70–99)
GLUCOSE SERPL-MCNC: 114 MG/DL — HIGH (ref 70–99)
GLUCOSE SERPL-MCNC: 126 MG/DL — HIGH (ref 70–99)
GLUCOSE UR QL: NEGATIVE MG/DL — SIGNIFICANT CHANGE UP
HCT VFR BLD CALC: 28.3 % — LOW (ref 34.5–45)
HCT VFR BLD CALC: 29 % — LOW (ref 34.5–45)
HCT VFR BLD CALC: 31.3 % — LOW (ref 34.5–45)
HGB BLD-MCNC: 10.9 G/DL — LOW (ref 11.5–15.5)
HGB BLD-MCNC: 9.9 G/DL — LOW (ref 11.5–15.5)
HGB BLD-MCNC: 9.9 G/DL — LOW (ref 11.5–15.5)
IMM GRANULOCYTES NFR BLD AUTO: 1.3 % — HIGH (ref 0–0.9)
IMM GRANULOCYTES NFR BLD AUTO: 1.4 % — HIGH (ref 0–0.9)
IMM GRANULOCYTES NFR BLD AUTO: 1.5 % — HIGH (ref 0–0.9)
INR BLD: 1.78 — HIGH (ref 0.85–1.18)
INR BLD: 1.89 — HIGH (ref 0.85–1.18)
INR BLD: 2.07 — HIGH (ref 0.85–1.18)
KETONES UR-MCNC: NEGATIVE MG/DL — SIGNIFICANT CHANGE UP
LACTATE SERPL-SCNC: 0.6 MMOL/L — SIGNIFICANT CHANGE UP (ref 0.5–2)
LACTATE SERPL-SCNC: 0.9 MMOL/L — SIGNIFICANT CHANGE UP (ref 0.5–2)
LEUKOCYTE ESTERASE UR-ACNC: ABNORMAL
LIDOCAIN IGE QN: 156 U/L — HIGH (ref 7–60)
LYMPHOCYTES # BLD AUTO: 1.38 K/UL — SIGNIFICANT CHANGE UP (ref 1–3.3)
LYMPHOCYTES # BLD AUTO: 1.47 K/UL — SIGNIFICANT CHANGE UP (ref 1–3.3)
LYMPHOCYTES # BLD AUTO: 1.51 K/UL — SIGNIFICANT CHANGE UP (ref 1–3.3)
LYMPHOCYTES # BLD AUTO: 10.2 % — LOW (ref 13–44)
LYMPHOCYTES # BLD AUTO: 11.4 % — LOW (ref 13–44)
LYMPHOCYTES # BLD AUTO: 12.8 % — LOW (ref 13–44)
MAGNESIUM SERPL-MCNC: 2.2 MG/DL — SIGNIFICANT CHANGE UP (ref 1.6–2.6)
MAGNESIUM SERPL-MCNC: 2.3 MG/DL — SIGNIFICANT CHANGE UP (ref 1.6–2.6)
MAGNESIUM SERPL-MCNC: 2.3 MG/DL — SIGNIFICANT CHANGE UP (ref 1.6–2.6)
MCHC RBC-ENTMCNC: 26.8 PG — LOW (ref 27–34)
MCHC RBC-ENTMCNC: 26.8 PG — LOW (ref 27–34)
MCHC RBC-ENTMCNC: 27 PG — SIGNIFICANT CHANGE UP (ref 27–34)
MCHC RBC-ENTMCNC: 34.1 GM/DL — SIGNIFICANT CHANGE UP (ref 32–36)
MCHC RBC-ENTMCNC: 34.8 GM/DL — SIGNIFICANT CHANGE UP (ref 32–36)
MCHC RBC-ENTMCNC: 35 GM/DL — SIGNIFICANT CHANGE UP (ref 32–36)
MCV RBC AUTO: 76.7 FL — LOW (ref 80–100)
MCV RBC AUTO: 76.9 FL — LOW (ref 80–100)
MCV RBC AUTO: 79 FL — LOW (ref 80–100)
MONOCYTES # BLD AUTO: 0.87 K/UL — SIGNIFICANT CHANGE UP (ref 0–0.9)
MONOCYTES # BLD AUTO: 0.9 K/UL — SIGNIFICANT CHANGE UP (ref 0–0.9)
MONOCYTES # BLD AUTO: 1.03 K/UL — HIGH (ref 0–0.9)
MONOCYTES NFR BLD AUTO: 6.7 % — SIGNIFICANT CHANGE UP (ref 2–14)
MONOCYTES NFR BLD AUTO: 7.4 % — SIGNIFICANT CHANGE UP (ref 2–14)
MONOCYTES NFR BLD AUTO: 8 % — SIGNIFICANT CHANGE UP (ref 2–14)
MUCOUS THREADS # UR AUTO: PRESENT
NEUTROPHILS # BLD AUTO: 10.79 K/UL — HIGH (ref 1.8–7.4)
NEUTROPHILS # BLD AUTO: 9.03 K/UL — HIGH (ref 1.8–7.4)
NEUTROPHILS # BLD AUTO: 9.98 K/UL — HIGH (ref 1.8–7.4)
NEUTROPHILS NFR BLD AUTO: 76.2 % — SIGNIFICANT CHANGE UP (ref 43–77)
NEUTROPHILS NFR BLD AUTO: 77.1 % — HIGH (ref 43–77)
NEUTROPHILS NFR BLD AUTO: 79.9 % — HIGH (ref 43–77)
NITRITE UR-MCNC: NEGATIVE — SIGNIFICANT CHANGE UP
NRBC # BLD: 0 /100 WBCS — SIGNIFICANT CHANGE UP (ref 0–0)
PH UR: 5 — SIGNIFICANT CHANGE UP (ref 5–8)
PHOSPHATE SERPL-MCNC: 2.8 MG/DL — SIGNIFICANT CHANGE UP (ref 2.5–4.5)
PHOSPHATE SERPL-MCNC: 3 MG/DL — SIGNIFICANT CHANGE UP (ref 2.5–4.5)
PHOSPHATE SERPL-MCNC: 3.6 MG/DL — SIGNIFICANT CHANGE UP (ref 2.5–4.5)
PLATELET # BLD AUTO: 82 K/UL — LOW (ref 150–400)
PLATELET # BLD AUTO: 83 K/UL — LOW (ref 150–400)
PLATELET # BLD AUTO: 87 K/UL — LOW (ref 150–400)
POTASSIUM SERPL-MCNC: 3.7 MMOL/L — SIGNIFICANT CHANGE UP (ref 3.5–5.3)
POTASSIUM SERPL-MCNC: 3.9 MMOL/L — SIGNIFICANT CHANGE UP (ref 3.5–5.3)
POTASSIUM SERPL-MCNC: 4.3 MMOL/L — SIGNIFICANT CHANGE UP (ref 3.5–5.3)
POTASSIUM SERPL-SCNC: 3.7 MMOL/L — SIGNIFICANT CHANGE UP (ref 3.5–5.3)
POTASSIUM SERPL-SCNC: 3.9 MMOL/L — SIGNIFICANT CHANGE UP (ref 3.5–5.3)
POTASSIUM SERPL-SCNC: 4.3 MMOL/L — SIGNIFICANT CHANGE UP (ref 3.5–5.3)
PROCALCITONIN SERPL-MCNC: 0.17 NG/ML — HIGH (ref 0.02–0.1)
PROT SERPL-MCNC: 6.2 G/DL — SIGNIFICANT CHANGE UP (ref 6–8.3)
PROT SERPL-MCNC: 7 G/DL — SIGNIFICANT CHANGE UP (ref 6–8.3)
PROT SERPL-MCNC: 7.1 G/DL — SIGNIFICANT CHANGE UP (ref 6–8.3)
PROT UR-MCNC: 100 MG/DL
PROTHROM AB SERPL-ACNC: 20 SEC — HIGH (ref 9.5–13)
PROTHROM AB SERPL-ACNC: 21.1 SEC — HIGH (ref 9.5–13)
PROTHROM AB SERPL-ACNC: 23.1 SEC — HIGH (ref 9.5–13)
RBC # BLD: 3.67 M/UL — LOW (ref 3.8–5.2)
RBC # BLD: 3.69 M/UL — LOW (ref 3.8–5.2)
RBC # BLD: 4.07 M/UL — SIGNIFICANT CHANGE UP (ref 3.8–5.2)
RBC # FLD: 16.3 % — HIGH (ref 10.3–14.5)
RBC # FLD: 16.5 % — HIGH (ref 10.3–14.5)
RBC # FLD: 16.7 % — HIGH (ref 10.3–14.5)
RBC CASTS # UR COMP ASSIST: 7 /HPF — HIGH (ref 0–4)
SODIUM SERPL-SCNC: 140 MMOL/L — SIGNIFICANT CHANGE UP (ref 135–145)
SODIUM SERPL-SCNC: 141 MMOL/L — SIGNIFICANT CHANGE UP (ref 135–145)
SODIUM SERPL-SCNC: 141 MMOL/L — SIGNIFICANT CHANGE UP (ref 135–145)
SP GR SPEC: 1.02 — SIGNIFICANT CHANGE UP (ref 1–1.03)
SQUAMOUS # UR AUTO: 4 /HPF — SIGNIFICANT CHANGE UP (ref 0–5)
UROBILINOGEN FLD QL: 1 MG/DL — SIGNIFICANT CHANGE UP (ref 0.2–1)
WBC # BLD: 11.83 K/UL — HIGH (ref 3.8–10.5)
WBC # BLD: 12.93 K/UL — HIGH (ref 3.8–10.5)
WBC # BLD: 13.49 K/UL — HIGH (ref 3.8–10.5)
WBC # FLD AUTO: 11.83 K/UL — HIGH (ref 3.8–10.5)
WBC # FLD AUTO: 12.93 K/UL — HIGH (ref 3.8–10.5)
WBC # FLD AUTO: 13.49 K/UL — HIGH (ref 3.8–10.5)
WBC CLUMPS # UR AUTO: PRESENT
WBC UR QL: 155 /HPF — HIGH (ref 0–5)

## 2024-05-09 PROCEDURE — 99292 CRITICAL CARE ADDL 30 MIN: CPT | Mod: 24

## 2024-05-09 PROCEDURE — 72128 CT CHEST SPINE W/O DYE: CPT | Mod: 26

## 2024-05-09 PROCEDURE — 99291 CRITICAL CARE FIRST HOUR: CPT | Mod: 24

## 2024-05-09 PROCEDURE — 99223 1ST HOSP IP/OBS HIGH 75: CPT | Mod: GC

## 2024-05-09 PROCEDURE — 72131 CT LUMBAR SPINE W/O DYE: CPT | Mod: 26

## 2024-05-09 RX ORDER — CEFTRIAXONE 500 MG/1
1000 INJECTION, POWDER, FOR SOLUTION INTRAMUSCULAR; INTRAVENOUS ONCE
Refills: 0 | Status: COMPLETED | OUTPATIENT
Start: 2024-05-09 | End: 2024-05-09

## 2024-05-09 RX ORDER — ALBUMIN HUMAN 25 %
100 VIAL (ML) INTRAVENOUS ONCE
Refills: 0 | Status: COMPLETED | OUTPATIENT
Start: 2024-05-09 | End: 2024-05-09

## 2024-05-09 RX ORDER — CEFTRIAXONE 500 MG/1
1000 INJECTION, POWDER, FOR SOLUTION INTRAMUSCULAR; INTRAVENOUS EVERY 24 HOURS
Refills: 0 | Status: DISCONTINUED | OUTPATIENT
Start: 2024-05-10 | End: 2024-05-13

## 2024-05-09 RX ORDER — CEFTRIAXONE 500 MG/1
INJECTION, POWDER, FOR SOLUTION INTRAMUSCULAR; INTRAVENOUS
Refills: 0 | Status: DISCONTINUED | OUTPATIENT
Start: 2024-05-09 | End: 2024-05-13

## 2024-05-09 RX ORDER — POTASSIUM CHLORIDE 20 MEQ
20 PACKET (EA) ORAL ONCE
Refills: 0 | Status: COMPLETED | OUTPATIENT
Start: 2024-05-09 | End: 2024-05-09

## 2024-05-09 RX ORDER — POTASSIUM CHLORIDE 20 MEQ
40 PACKET (EA) ORAL ONCE
Refills: 0 | Status: COMPLETED | OUTPATIENT
Start: 2024-05-09 | End: 2024-05-09

## 2024-05-09 RX ORDER — ALBUMIN HUMAN 25 %
50 VIAL (ML) INTRAVENOUS
Refills: 0 | Status: COMPLETED | OUTPATIENT
Start: 2024-05-09 | End: 2024-05-09

## 2024-05-09 RX ADMIN — MIDODRINE HYDROCHLORIDE 15 MILLIGRAM(S): 2.5 TABLET ORAL at 22:14

## 2024-05-09 RX ADMIN — MIDODRINE HYDROCHLORIDE 15 MILLIGRAM(S): 2.5 TABLET ORAL at 13:37

## 2024-05-09 RX ADMIN — MIDODRINE HYDROCHLORIDE 15 MILLIGRAM(S): 2.5 TABLET ORAL at 06:19

## 2024-05-09 RX ADMIN — Medication 100 MILLILITER(S): at 15:19

## 2024-05-09 RX ADMIN — Medication 3 MILLILITER(S): at 05:57

## 2024-05-09 RX ADMIN — ARGATROBAN 6.12 MICROGRAM(S)/KG/MIN: 50 INJECTION, SOLUTION INTRAVENOUS at 09:56

## 2024-05-09 RX ADMIN — Medication 3 MILLILITER(S): at 11:18

## 2024-05-09 RX ADMIN — RIOCIGUAT 2.5 MILLIGRAM(S): 1.5 TABLET, FILM COATED ORAL at 06:19

## 2024-05-09 RX ADMIN — RIOCIGUAT 2.5 MILLIGRAM(S): 1.5 TABLET, FILM COATED ORAL at 13:36

## 2024-05-09 RX ADMIN — Medication 50 MILLILITER(S): at 10:20

## 2024-05-09 RX ADMIN — ARGATROBAN 6.12 MICROGRAM(S)/KG/MIN: 50 INJECTION, SOLUTION INTRAVENOUS at 19:04

## 2024-05-09 RX ADMIN — Medication 40 MILLIEQUIVALENT(S): at 06:20

## 2024-05-09 RX ADMIN — CEFTRIAXONE 1000 MILLIGRAM(S): 500 INJECTION, POWDER, FOR SOLUTION INTRAMUSCULAR; INTRAVENOUS at 15:57

## 2024-05-09 RX ADMIN — CHLORHEXIDINE GLUCONATE 1 APPLICATION(S): 213 SOLUTION TOPICAL at 07:08

## 2024-05-09 RX ADMIN — PANTOPRAZOLE SODIUM 40 MILLIGRAM(S): 20 TABLET, DELAYED RELEASE ORAL at 11:18

## 2024-05-09 RX ADMIN — Medication 20 MILLIEQUIVALENT(S): at 11:18

## 2024-05-09 RX ADMIN — Medication 3 MILLILITER(S): at 23:09

## 2024-05-09 RX ADMIN — Medication 3 MILLILITER(S): at 17:48

## 2024-05-09 RX ADMIN — MACITENTAN 10 MILLIGRAM(S): 10 TABLET, FILM COATED ORAL at 11:18

## 2024-05-09 RX ADMIN — FLUDROCORTISONE ACETATE 0.2 MILLIGRAM(S): 0.1 TABLET ORAL at 09:54

## 2024-05-09 RX ADMIN — RIOCIGUAT 2.5 MILLIGRAM(S): 1.5 TABLET, FILM COATED ORAL at 22:14

## 2024-05-09 RX ADMIN — Medication 50 MILLILITER(S): at 11:00

## 2024-05-09 NOTE — CONSULT NOTE ADULT - SUBJECTIVE AND OBJECTIVE BOX
Hematology Consult Note    HPI:  68 year old female with past medical history of asthma, CTEPH, CAD, HTN, Type B dissection who was seen by the outpatient office and was deemed a good candidate for TEVAR. Back in 2023 she was seen by her outpatient pulmonologist, Dr. Cathy Serra, was noted to have an elevated D-Dimer and was advised to present to ED for workup. In ED a CTA C/A/P was performed which revealed a Type B dissection and she was admitted and discharged with close follow up. 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 She was deemed a candidate for TEVAR with Dr. Kwon and Dr. Suh admitted for preop lumbar drain placement on 24 and TEVAR 24 (proximal portion covering L subclavian distal portion ~5cm above the celiac trunk). LD removed on . She was briefly on heparin gtt -24 because a clot was found in the celiac trunk, however hep gtt was stopped as the patient developed neurological symptoms (LE weakness). A new lumbar drain was placed , neuro workup came back negative with improving symptoms, and so the lumbar drain was removed 24. Anticoagulation was restarted 5/3, but discontinued  given the ensuing drop in platelet count to a carolyn of 27k. Heparin was discontinued, and patient was placed on argatroban gtt with a noted uptrend in platelet counts, most recently 83k. Heparin PF4 and PAVAN came back positive. Hematology consulted for anticoagulation       Allergies    heparin (Other (Moderate))    Intolerances        MEDICATIONS  (STANDING):  albumin human 25% IVPB 100 milliLiter(s) IV Intermittent once  albuterol    90 MICROgram(s) HFA Inhaler 2 Puff(s) Inhalation every 6 hours  albuterol/ipratropium for Nebulization 3 milliLiter(s) Nebulizer every 6 hours  argatroban Infusion 1.25 MICROgram(s)/kG/Min (6.12 mL/Hr) IV Continuous <Continuous>  cefTRIAXone   IVPB      chlorhexidine 2% Cloths 1 Application(s) Topical daily  fludroCORTISONE 0.2 milliGRAM(s) Oral daily  influenza  Vaccine (HIGH DOSE) 0.7 milliLiter(s) IntraMuscular once  macitentan 10 milliGRAM(s) Oral <User Schedule>  midodrine 15 milliGRAM(s) Oral every 8 hours  pantoprazole    Tablet 40 milliGRAM(s) Oral daily  polyethylene glycol 3350 17 Gram(s) Oral daily  riociguat 2.5 milliGRAM(s) Oral every 8 hours  senna 2 Tablet(s) Oral at bedtime  sodium chloride 0.9%. 1000 milliLiter(s) (10 mL/Hr) IV Continuous <Continuous>    MEDICATIONS  (PRN):  oxyCODONE    IR 5 milliGRAM(s) Oral every 6 hours PRN Moderate Pain (4 - 6)  sodium chloride 0.65% Nasal 1 Spray(s) Both Nostrils three times a day PRN dry nose      PAST MEDICAL & SURGICAL HISTORY:  HTN (hypertension)      Prediabetes      Mild tricuspid regurgitation      Enlarged aorta      CAD (coronary artery disease)      Ascending aorta dilation      Pulmonary hypertension      Chronic systolic right heart failure      2019 novel coronavirus disease (COVID-19)      Asthma      S/P hysterectomy      S/P           FAMILY HISTORY:  Family history of early CAD (Father)        SOCIAL HISTORY: No EtOH, no tobacco    REVIEW OF SYSTEMS:    CONSTITUTIONAL: No weakness, fevers or chills  EYES/ENT: No visual changes;  No vertigo or throat pain   NECK: No pain or stiffness  RESPIRATORY: No cough, wheezing, hemoptysis; No shortness of breath  CARDIOVASCULAR: No chest pain or palpitations  GASTROINTESTINAL: No abdominal or epigastric pain. No nausea, vomiting, or hematemesis; No diarrhea or constipation. No melena or hematochezia.  GENITOURINARY: No dysuria, frequency or hematuria  NEUROLOGICAL: No numbness or weakness  SKIN: No itching, burning, rashes, or lesions   All other review of systems is negative unless indicated above.        T(F): 98.1 (24 @ 12:36), Max: 98.8 (24 @ 22:43)  HR: 103 (24 @ 14:00)  BP: 144/67 (24 @ 22:00)  RR: 18 (24 @ 14:00)  SpO2: 100% (24 @ 14:00)  Wt(kg): --    GENERAL: NAD, well-developed  HEAD:  Atraumatic, Normocephalic  EYES: EOMI, PERRLA, conjunctiva and sclera clear  NECK: Supple, No JVD  CHEST/LUNG: Clear to auscultation bilaterally; No wheeze  HEART: Regular rate and rhythm; No murmurs, rubs, or gallops  ABDOMEN: Soft, Nontender, Nondistended; Bowel sounds present  EXTREMITIES:  2+ Peripheral Pulses, No clubbing, cyanosis, or edema  NEUROLOGY: non-focal  SKIN: No rashes or lesions                          10.9   13.49 )-----------( 83       ( 09 May 2024 09:19 )             31.3           141  |  105  |  30<H>  ----------------------------<  114<H>  3.9   |  23  |  1.05    Ca    9.9      09 May 2024 09:19  Phos  3.0       Mg     2.3         TPro  7.1  /  Alb  4.3  /  TBili  1.2  /  DBili  x   /  AST  88<H>  /  ALT  159<H>  /  AlkPhos  106        Magnesium: 2.3 mg/dL ( @ 09:19)  Phosphorus: 3.0 mg/dL ( @ 09:19)  Magnesium: 2.2 mg/dL ( @ 03:08)  Phosphorus: 3.6 mg/dL ( @ 03:08)       Hematology Consult Note    HPI:  68 year old female with past medical history of asthma, CTEPH, CAD, HTN, Type B dissection who was seen by the outpatient office and was deemed a good candidate for TEVAR. Back in 2023 she was seen by her outpatient pulmonologist, Dr. Cathy Serra, was noted to have an elevated D-Dimer and was advised to present to ED for workup. In ED a CTA C/A/P was performed which revealed a Type B dissection and she was admitted and discharged with close follow up. 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 She was deemed a candidate for TEVAR with Dr. Kwon and Dr. Suh admitted for preop lumbar drain placement on 24 and TEVAR 24 (proximal portion covering L subclavian distal portion ~5cm above the celiac trunk). LD removed on . She was briefly on heparin gtt -24 because a clot was found in the celiac trunk, however hep gtt was stopped as the patient developed neurological symptoms (LE weakness). A new lumbar drain was placed , neuro workup came back negative with improving symptoms, and so the lumbar drain was removed 24. Anticoagulation was restarted 5/3, but discontinued  given the ensuing drop in platelet count to a carolyn of 27k. Heparin was discontinued, and patient was placed on argatroban gtt with a noted uptrend in platelet counts, most recently 83k. Heparin PF4 and PAVAN came back positive. Hematology consulted for anticoagulation       Allergies    heparin (Other (Moderate))    Intolerances        MEDICATIONS  (STANDING):  albumin human 25% IVPB 100 milliLiter(s) IV Intermittent once  albuterol    90 MICROgram(s) HFA Inhaler 2 Puff(s) Inhalation every 6 hours  albuterol/ipratropium for Nebulization 3 milliLiter(s) Nebulizer every 6 hours  argatroban Infusion 1.25 MICROgram(s)/kG/Min (6.12 mL/Hr) IV Continuous <Continuous>  cefTRIAXone   IVPB      chlorhexidine 2% Cloths 1 Application(s) Topical daily  fludroCORTISONE 0.2 milliGRAM(s) Oral daily  influenza  Vaccine (HIGH DOSE) 0.7 milliLiter(s) IntraMuscular once  macitentan 10 milliGRAM(s) Oral <User Schedule>  midodrine 15 milliGRAM(s) Oral every 8 hours  pantoprazole    Tablet 40 milliGRAM(s) Oral daily  polyethylene glycol 3350 17 Gram(s) Oral daily  riociguat 2.5 milliGRAM(s) Oral every 8 hours  senna 2 Tablet(s) Oral at bedtime  sodium chloride 0.9%. 1000 milliLiter(s) (10 mL/Hr) IV Continuous <Continuous>    MEDICATIONS  (PRN):  oxyCODONE    IR 5 milliGRAM(s) Oral every 6 hours PRN Moderate Pain (4 - 6)  sodium chloride 0.65% Nasal 1 Spray(s) Both Nostrils three times a day PRN dry nose      PAST MEDICAL & SURGICAL HISTORY:  HTN (hypertension)      Prediabetes      Mild tricuspid regurgitation      Enlarged aorta      CAD (coronary artery disease)      Ascending aorta dilation      Pulmonary hypertension      Chronic systolic right heart failure      2019 novel coronavirus disease (COVID-19)      Asthma      S/P hysterectomy      S/P           FAMILY HISTORY:  Family history of early CAD (Father)          REVIEW OF SYSTEMS:    CONSTITUTIONAL: No weakness, fevers or chills  EYES/ENT: No visual changes;  No vertigo or throat pain   NECK: No pain or stiffness  RESPIRATORY: No cough, wheezing, hemoptysis; No shortness of breath  CARDIOVASCULAR: No chest pain or palpitations  GASTROINTESTINAL: No abdominal or epigastric pain. No nausea, vomiting, or hematemesis; No diarrhea or constipation. No melena or hematochezia.  GENITOURINARY: No dysuria, frequency or hematuria  NEUROLOGICAL: No numbness or weakness  SKIN: No itching, burning, rashes, or lesions   All other review of systems is negative unless indicated above.        T(F): 98.1 (24 @ 12:36), Max: 98.8 (24 @ 22:43)  HR: 103 (24 @ 14:00)  BP: 144/67 (24 @ 22:00)  RR: 18 (24 @ 14:00)  SpO2: 100% (24 @ 14:00)  Wt(kg): --    GENERAL: NAD  HEAD:  Atraumatic, Normocephalic  EYES: EOMI, PERRLA, conjunctiva and sclera clear  NECK: Supple, No JVD  CHEST/LUNG: Clear to auscultation bilaterally; No wheeze; appears comfortable on supplemental oxygen  HEART: tachycardic  ABDOMEN: Soft, Nontender, Nondistended; Bowel sounds present  : +stevens  EXTREMITIES:  2+ Peripheral Pulses, No clubbing, cyanosis, or edema  NEUROLOGY: non-focal  SKIN: No rashes or lesions                          10.9   13.49 )-----------( 83       ( 09 May 2024 09:19 )             31.3           141  |  105  |  30<H>  ----------------------------<  114<H>  3.9   |  23  |  1.05    Ca    9.9      09 May 2024 09:19  Phos  3.0       Mg     2.3         TPro  7.1  /  Alb  4.3  /  TBili  1.2  /  DBili  x   /  AST  88<H>  /  ALT  159<H>  /  AlkPhos  106        Magnesium: 2.3 mg/dL ( @ 09:19)  Phosphorus: 3.0 mg/dL ( @ 09:19)  Magnesium: 2.2 mg/dL ( @ 03:08)  Phosphorus: 3.6 mg/dL ( @ 03:08)

## 2024-05-09 NOTE — CONSULT NOTE ADULT - SUBJECTIVE AND OBJECTIVE BOX
CONSULT NOTE:      HPI:  68 year old female with past medical history of asthma, CTEPH, CAD, HTN, Type B dissection who was seen by the outpatient office and then admitted to CT surg service for TEVAR with Dr. Kwon and Dr. Suh. Called to see patient for urinary retention. Per patient she has never seen a urologist before and denies any urologic history. She denies any prior episodes of urinary retention. She denies and consitpation and states she is ambulating. Per patient she was having some numbness in her lower extremites following her procedure last week.     ICU Vital Signs Last 24 Hrs  T(C): --  T(F): --  HR: --  BP: --  BP(mean): --  ABP: --  ABP(mean): --  RR: --  SpO2: --     (25 Apr 2024 11:00)      Vital Signs Last 24 Hrs  T(C): 36.7 (09 May 2024 12:36), Max: 37.1 (08 May 2024 22:43)  T(F): 98.1 (09 May 2024 12:36), Max: 98.8 (08 May 2024 22:43)  HR: 96 (09 May 2024 10:00) (81 - 101)  BP: 144/67 (08 May 2024 22:00) (118/59 - 144/67)  BP(mean): 96 (08 May 2024 22:00) (82 - 96)  RR: 18 (09 May 2024 10:00) (2 - 29)  SpO2: 98% (09 May 2024 10:00) (90% - 100%)    Parameters below as of 09 May 2024 10:00  Patient On (Oxygen Delivery Method): nasal cannula w/ humidification  O2 Flow (L/min): 2    I&O's Summary    08 May 2024 07:01  -  09 May 2024 07:00  --------------------------------------------------------  IN: 516.4 mL / OUT: 1270 mL / NET: -753.6 mL    09 May 2024 07:01  -  09 May 2024 13:03  --------------------------------------------------------  IN: 124.4 mL / OUT: 100 mL / NET: 24.4 mL        PE:  Gen: NAD  Abd: soft, nd, nt  : stevens in place with clear urine draining      LABS:                        10.9   13.49 )-----------( 83       ( 09 May 2024 09:19 )             31.3     05-09    141  |  105  |  30<H>  ----------------------------<  114<H>  3.9   |  23  |  1.05    Ca    9.9      09 May 2024 09:19  Phos  3.0     05-09  Mg     2.3     05-09    TPro  7.1  /  Alb  4.3  /  TBili  1.2  /  DBili  x   /  AST  88<H>  /  ALT  159<H>  /  AlkPhos  106  05-09    PT/INR - ( 09 May 2024 09:19 )   PT: 21.1 sec;   INR: 1.89          PTT - ( 09 May 2024 09:19 )  PTT:56.5 sec  Cultures      A/P 67 yo f with acute urinary retention likely secondary to spinal shock  1) would leave catheter for 1 week, if still inpatient can do TOV the day before discharge  2) if fails tov will need outpatient urodynamics  3) can follow up at resident clinic or if patients wishes to follow up closer to home in [place] she can see Dr Carlos Darby at [facility]

## 2024-05-09 NOTE — PROGRESS NOTE ADULT - SUBJECTIVE AND OBJECTIVE BOX
CTICU  CRITICAL  CARE  attending     Hand off received 					   Pertinent clinical, laboratory, radiographic, hemodynamic, echocardiographic, respiratory data, microbiologic data and chart were reviewed and analyzed frequently throughout the course of the day and night  Patient seen and examined with CTS/ SH attending at bedside  Pt is a 68y , Female, HEALTH ISSUES - PROBLEM Dx:  Ascending aorta dilation        , FAMILY HISTORY:  Family history of early CAD (Father)    PAST MEDICAL & SURGICAL HISTORY:  HTN (hypertension)      Prediabetes      Mild tricuspid regurgitation      Enlarged aorta      CAD (coronary artery disease)      Ascending aorta dilation      Pulmonary hypertension      Chronic systolic right heart failure      2019 novel coronavirus disease (COVID-19)      Asthma      S/P hysterectomy      S/P         Patient is a 68y old  Female who presents with a chief complaint of type B dissection (09 May 2024 15:04)      14 system review was unremarkable    Vital signs, hemodynamic and respiratory parameters were reviewed from the bedside nursing flowsheet.  ICU Vital Signs Last 24 Hrs  T(C): 37 (09 May 2024 16:44), Max: 37.1 (08 May 2024 22:43)  T(F): 98.6 (09 May 2024 16:44), Max: 98.8 (08 May 2024 22:43)  HR: 81 (09 May 2024 17:00) (81 - 106)  BP: 144/67 (08 May 2024 22:00) (123/58 - 144/67)  BP(mean): 96 (08 May 2024 22:00) (84 - 96)  ABP: 125/59 (09 May 2024 17:00) (99/50 - 142/75)  ABP(mean): 85 (09 May 2024 17:00) (67 - 100)  RR: 16 (09 May 2024 17:00) (2 - 26)  SpO2: 97% (09 May 2024 17:00) (90% - 100%)    O2 Parameters below as of 09 May 2024 17:00  Patient On (Oxygen Delivery Method): nasal cannula w/ humidification  O2 Flow (L/min): 2        Adult Advanced Hemodynamics Last 24 Hrs  CVP(mm Hg): --  CVP(cm H2O): --  CO: --  CI: --  PA: --  PA(mean): --  PCWP: --  SVR: --  SVRI: --  PVR: --  PVRI: --, ABG - ( 09 May 2024 16:13 )  pH, Arterial: 7.51  pH, Blood: x     /  pCO2: 30    /  pO2: 59    / HCO3: 24    / Base Excess: 1.7   /  SaO2: 92.8                Intake and output was reviewed and the fluid balance was calculated  Daily     Daily   I&O's Summary    08 May 2024 07:01  -  09 May 2024 07:00  --------------------------------------------------------  IN: 516.4 mL / OUT: 1270 mL / NET: -753.6 mL    09 May 2024 07:01  -  09 May 2024 18:43  --------------------------------------------------------  IN: 317.1 mL / OUT: 340 mL / NET: -22.9 mL        All lines and drain sites were assessed  Glycemic trend was reviewedCAPILLARY BLOOD GLUCOSE        No acute change in mental status  Auscultation of the chest reveals equal bs  Abdomen is soft  Extremities are warm and well perfused  Wounds appear clean and unremarkable  Antibiotics are periop    labs  CBC Full  -  ( 09 May 2024 16:17 )  WBC Count : 12.93 K/uL  RBC Count : 3.67 M/uL  Hemoglobin : 9.9 g/dL  Hematocrit : 29.0 %  Platelet Count - Automated : 87 K/uL  Mean Cell Volume : 79.0 fl  Mean Cell Hemoglobin : 27.0 pg  Mean Cell Hemoglobin Concentration : 34.1 gm/dL  Auto Neutrophil # : 9.98 K/uL  Auto Lymphocyte # : 1.47 K/uL  Auto Monocyte # : 1.03 K/uL  Auto Eosinophil # : 0.21 K/uL  Auto Basophil # : 0.05 K/uL  Auto Neutrophil % : 77.1 %  Auto Lymphocyte % : 11.4 %  Auto Monocyte % : 8.0 %  Auto Eosinophil % : 1.6 %  Auto Basophil % : 0.4 %        140  |  105  |  35<H>  ----------------------------<  126<H>  4.3   |  22  |  1.21    Ca    10.0      09 May 2024 16:17  Phos  2.8     05-  Mg     2.3     -    TPro  7.0  /  Alb  4.6  /  TBili  1.0  /  DBili  x   /  AST  76<H>  /  ALT  144<H>  /  AlkPhos  98  05-09    PT/INR - ( 09 May 2024 16:17 )   PT: 23.1 sec;   INR: 2.07          PTT - ( 09 May 2024 16:17 )  PTT:61.8 sec  The current medications were reviewed   MEDICATIONS  (STANDING):  albuterol    90 MICROgram(s) HFA Inhaler 2 Puff(s) Inhalation every 6 hours  albuterol/ipratropium for Nebulization 3 milliLiter(s) Nebulizer every 6 hours  argatroban Infusion 1.25 MICROgram(s)/kG/Min (6.12 mL/Hr) IV Continuous <Continuous>  cefTRIAXone   IVPB      chlorhexidine 2% Cloths 1 Application(s) Topical daily  fludroCORTISONE 0.2 milliGRAM(s) Oral daily  influenza  Vaccine (HIGH DOSE) 0.7 milliLiter(s) IntraMuscular once  macitentan 10 milliGRAM(s) Oral <User Schedule>  midodrine 15 milliGRAM(s) Oral every 8 hours  pantoprazole    Tablet 40 milliGRAM(s) Oral daily  polyethylene glycol 3350 17 Gram(s) Oral daily  riociguat 2.5 milliGRAM(s) Oral every 8 hours  senna 2 Tablet(s) Oral at bedtime  sodium chloride 0.9%. 1000 milliLiter(s) (10 mL/Hr) IV Continuous <Continuous>    MEDICATIONS  (PRN):  oxyCODONE    IR 5 milliGRAM(s) Oral every 6 hours PRN Moderate Pain (4 - 6)  sodium chloride 0.65% Nasal 1 Spray(s) Both Nostrils three times a day PRN dry nose       PROBLEM LIST/ ASSESSMENT:  HEALTH ISSUES - PROBLEM Dx:  Ascending aorta dilation        ,   Patient is a 68y old  Female who presents with a chief complaint of type B dissection (09 May 2024 15:04)     s/p cardiac surgery    Vasogenic shock due to hypotension in the cticu , will keep on pressors              My plan includes :  close hemodynamic, ventilatory and drain monitoring and management per post op routine    Monitor for arrhythmias and monitor parameters for organ perfusion  beta blockade not administered due to hemodynamic instability and bradycardia  monitor neurologic status  Head of the bed should remain elevated to 45 deg .   chest PT and IS will be encouraged  monitor adequacy of oxygenation and ventilation and attempt to wean oxygen  antibiotic regimen will be tailored to the clinical, laboratory and microbiologic data  Nutritional goals will be met using po eventually , ensure adequate caloric intake and montior the same  Stress ulcer and VTE prophylaxis will be achieved    Glycemic control is satisfactory  Electrolytes have been repleted as necessary and wound care has been carried out. Pain control has been achieved.   agressive physical therapy and early mobility and ambulation goals will be met   The family was updated about the course and plan  CRITICAL CARE TIME Upon my evaluation, this patient had a high probability of imminent or life-threatening deterioration due to the above problems which required my direct attention, intervention, and personal management.  I have personally provided 150 minutes of critical care time exclusive of time spent on separately billable procedures. Time included review of laboratory data, radiology results, discussion with consultants, and monitoring for potential decompensation. Interventions were performed as documented abovepersonally provided by me  in evaluation and management, reassessments, review and interpretation of labs and x-rays, ventilator and hemodynamic management, formulating a plan and coordinating care: ___150___ MIN.  Time does not include procedural time.    CTICU ATTENDING     					    Eder Conway MD

## 2024-05-09 NOTE — CONSULT NOTE ADULT - ASSESSMENT
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 Hematology was consulted for anticoagulation recommendations.    #HIT  confirmed by positive Heparin-PF4 antibodies and PAVAN  platelet carolyn 27k on 5/5/24  Patient was transitioned to argatroban from heparin with noted uptrend of platelet count, 83k on 5/9/24    DOACs are reasonable anticoagulants, and patient was previously on Eliquis for her CTEPH, with regular follow-up with [facility] hematologist, Dr. Bradley Goldberg.  -Reasonable to discharge her on Eliquis, which she has tolerated well in the past  -Please discharge her with follow up with Dr. Goldberg    #anemia  #leukocytosis  Patient with normal WBC prior to this admission, so leukocytosis is likely reactive    For the anemia, please check  -ferritin, iron with tibc  -b12, folate  -LDH, haptoglobin, reticulocyte count    To be discussed with Hematology attending, Dr. Grimes. Recommendations final after attending attestation. 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 Hematology was consulted for anticoagulation recommendations.    #HIT  confirmed by positive Heparin-PF4 antibodies and PAVAN  platelet carolyn 27k on 5/5/24  Patient was transitioned to argatroban from heparin with noted uptrend of platelet count, 83k on 5/9/24    As per the 2018 [facility] Clinical practice guidelines for management of DON, recommendations to transition from a parenteral agent to warfarin require a platelet count recovery to >150k. Transitioning from a parenteral agent to a DOAC does not carry this requirement as long as patient is deemed clinically stable. While we would prefer transitioning to a DOAC as soon as her platelet count recovers to normal levels, we would defer to the primary team. Please note that you should start the DOAC within 2 hours of stopping argatroban gtt.     -Reasonable to discharge her on Eliquis, which she has tolerated well in the past  -Please discharge her with follow up with her outpatient hematologist, Dr. Bradley Goldberg    #anemia  #leukocytosis  Patient with normal WBC prior to this admission, so leukocytosis is likely reactive    For the anemia, please check  -ferritin, iron with tibc  -b12, folate  -LDH, haptoglobin, reticulocyte count    Case discussed with Hematology attending, Dr. Grimes. Recommendations final after attending attestation. 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 Hematology was consulted for anticoagulation recommendations.    #DON  confirmed by positive Heparin-PF4 antibodies and PAVAN, and noted celiac trunk thrombus  platelet carolyn 27k on 5/5/24  Patient was transitioned to argatroban from heparin with noted uptrend of platelet count, 83k on 5/9/24    As per the 2018 [facility] Clinical practice guidelines for management of DON, recommendations to transition from a parenteral agent to warfarin require a platelet count recovery to >150k. Transitioning from a parenteral agent to a DOAC does not carry this requirement as long as patient is deemed clinically stable. While we would prefer transitioning to a DOAC as soon as her platelet count recovers to normal levels, we would defer to the primary team. Please note that you should start the DOAC within 2 hours of stopping argatroban gtt.     -Reasonable to discharge her on Eliquis, which she has tolerated well in the past  -Please discharge her with follow up with her outpatient hematologist, Dr. Bradley Goldberg    #anemia  #leukocytosis  Patient with normal WBC prior to this admission, so leukocytosis is likely reactive    For the anemia, please check  -ferritin, iron with tibc  -b12, folate  -LDH, haptoglobin, reticulocyte count    Case discussed with Hematology attending, Dr. Grimes. Recommendations final after attending attestation. 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 Hematology was consulted for anticoagulation recommendations.    #DON  confirmed by positive Heparin-PF4 antibodies and PAVAN, and noted celiac trunk thrombus  platelet carolyn 27k on 5/5/24  Patient was transitioned to argatroban from heparin with noted uptrend of platelet count, 83k on 5/9/24    As per the 2018 [facility] Clinical practice guidelines for management of DON, recommendations to transition from a parenteral agent to warfarin require a platelet count recovery to >150k. Transitioning from a parenteral agent to a DOAC does not carry this requirement as long as patient is deemed clinically stable. While we would prefer transitioning to a DOAC as soon as her platelet count recovers to normal levels, we would defer to the primary team. Please note that you should start the DOAC within 2 hours of stopping argatroban gtt.     -Reasonable to discharge her on Eliquis, which she has tolerated well in the past  -Please discharge her with follow up with her outpatient hematologist, Dr. Bradley Goldberg    #anemia  #leukocytosis  Patient with normal WBC prior to this admission, so leukocytosis is likely reactive    For the anemia, please check  -ferritin, iron with tibc  -b12, folate  -LDH, haptoglobin, reticulocyte count    Patient seen and case discussed with Hematology attending, Dr. Grimes. Recommendations final after attending attestation.

## 2024-05-09 NOTE — CONSULT NOTE ADULT - ATTENDING COMMENTS
68 year old female with past medical history of asthma, CTEPH, CAD, HTN, Type B dissection who was seen by the outpatient office and was deemed a good candidate for TEVAR. She was deemed a candidate for TEVAR with Dr. Kwon and Dr. Suh admitted for preop lumbar drain placement on 4/25/24 and TEVAR 4/26/24 (proximal portion covering L subclavian distal portion ~5cm above the celiac trunk). LD removed on 4/28. She was briefly on heparin gtt 4/28-4/29/24 because a clot was found in the celiac trunk, however hep gtt was stopped as the patient developed neurological symptoms (LE weakness). A new lumbar drain was placed 4/29, neuro workup came back negative with improving symptoms, and so the lumbar drain was removed 5/2/24. Anticoagulation was restarted 5/3, but discontinued 5/5 given the ensuing drop in platelet count to a carolyn of 27k. Heparin was discontinued, and patient was placed on argatroban gtt with a noted uptrend in platelet counts,  83k on 5/9 Heparin PF4 and PAVAN are positive. Hematology consulted for anticoagulation  confirmed by positive Heparin-PF4 antibodies and PAVAN, and noted celiac trunk thrombus  platelet carolyn 27k on 5/5/24  Patient was transitioned to argatroban from heparin with noted uptrend of platelet count, 83k on 5/9/24. Has been on Argatroban for 5 days. Can transition to DOAC as platelets continue upward trend.  Patient seen and discussed with fellow and discussed with intensivist.

## 2024-05-10 LAB
ALBUMIN SERPL ELPH-MCNC: 4 G/DL — SIGNIFICANT CHANGE UP (ref 3.3–5)
ALBUMIN SERPL ELPH-MCNC: 4.2 G/DL — SIGNIFICANT CHANGE UP (ref 3.3–5)
ALP SERPL-CCNC: 89 U/L — SIGNIFICANT CHANGE UP (ref 40–120)
ALP SERPL-CCNC: 95 U/L — SIGNIFICANT CHANGE UP (ref 40–120)
ALT FLD-CCNC: 130 U/L — HIGH (ref 10–45)
ALT FLD-CCNC: 142 U/L — HIGH (ref 10–45)
AMYLASE P1 CFR SERPL: 112 U/L — SIGNIFICANT CHANGE UP (ref 25–125)
ANION GAP SERPL CALC-SCNC: 12 MMOL/L — SIGNIFICANT CHANGE UP (ref 5–17)
ANION GAP SERPL CALC-SCNC: 14 MMOL/L — SIGNIFICANT CHANGE UP (ref 5–17)
APTT BLD: 48.5 SEC — HIGH (ref 24.5–35.6)
APTT BLD: 50.6 SEC — HIGH (ref 24.5–35.6)
APTT BLD: 52.8 SEC — HIGH (ref 24.5–35.6)
APTT BLD: 57.2 SEC — HIGH (ref 24.5–35.6)
APTT BLD: >200 SEC — CRITICAL HIGH (ref 24.5–35.6)
AST SERPL-CCNC: 63 U/L — HIGH (ref 10–40)
AST SERPL-CCNC: 65 U/L — HIGH (ref 10–40)
BASOPHILS # BLD AUTO: 0.05 K/UL — SIGNIFICANT CHANGE UP (ref 0–0.2)
BASOPHILS # BLD AUTO: 0.06 K/UL — SIGNIFICANT CHANGE UP (ref 0–0.2)
BASOPHILS NFR BLD AUTO: 0.4 % — SIGNIFICANT CHANGE UP (ref 0–2)
BASOPHILS NFR BLD AUTO: 0.5 % — SIGNIFICANT CHANGE UP (ref 0–2)
BILIRUB SERPL-MCNC: 0.9 MG/DL — SIGNIFICANT CHANGE UP (ref 0.2–1.2)
BILIRUB SERPL-MCNC: 1 MG/DL — SIGNIFICANT CHANGE UP (ref 0.2–1.2)
BUN SERPL-MCNC: 25 MG/DL — HIGH (ref 7–23)
BUN SERPL-MCNC: 28 MG/DL — HIGH (ref 7–23)
CALCIUM SERPL-MCNC: 9.4 MG/DL — SIGNIFICANT CHANGE UP (ref 8.4–10.5)
CALCIUM SERPL-MCNC: 9.8 MG/DL — SIGNIFICANT CHANGE UP (ref 8.4–10.5)
CHLORIDE SERPL-SCNC: 107 MMOL/L — SIGNIFICANT CHANGE UP (ref 96–108)
CHLORIDE SERPL-SCNC: 109 MMOL/L — HIGH (ref 96–108)
CO2 SERPL-SCNC: 20 MMOL/L — LOW (ref 22–31)
CO2 SERPL-SCNC: 22 MMOL/L — SIGNIFICANT CHANGE UP (ref 22–31)
CREAT SERPL-MCNC: 0.95 MG/DL — SIGNIFICANT CHANGE UP (ref 0.5–1.3)
CREAT SERPL-MCNC: 1.15 MG/DL — SIGNIFICANT CHANGE UP (ref 0.5–1.3)
EGFR: 52 ML/MIN/1.73M2 — LOW
EGFR: 65 ML/MIN/1.73M2 — SIGNIFICANT CHANGE UP
EOSINOPHIL # BLD AUTO: 0.22 K/UL — SIGNIFICANT CHANGE UP (ref 0–0.5)
EOSINOPHIL # BLD AUTO: 0.23 K/UL — SIGNIFICANT CHANGE UP (ref 0–0.5)
EOSINOPHIL NFR BLD AUTO: 1.9 % — SIGNIFICANT CHANGE UP (ref 0–6)
EOSINOPHIL NFR BLD AUTO: 2 % — SIGNIFICANT CHANGE UP (ref 0–6)
GAS PNL BLDA: SIGNIFICANT CHANGE UP
GAS PNL BLDA: SIGNIFICANT CHANGE UP
GLUCOSE SERPL-MCNC: 108 MG/DL — HIGH (ref 70–99)
GLUCOSE SERPL-MCNC: 123 MG/DL — HIGH (ref 70–99)
HCT VFR BLD CALC: 27.6 % — LOW (ref 34.5–45)
HCT VFR BLD CALC: 29.4 % — LOW (ref 34.5–45)
HGB BLD-MCNC: 10.3 G/DL — LOW (ref 11.5–15.5)
HGB BLD-MCNC: 9.6 G/DL — LOW (ref 11.5–15.5)
IMM GRANULOCYTES NFR BLD AUTO: 1.3 % — HIGH (ref 0–0.9)
IMM GRANULOCYTES NFR BLD AUTO: 1.4 % — HIGH (ref 0–0.9)
INR BLD: 1.83 — HIGH (ref 0.85–1.18)
INR BLD: 1.9 — HIGH (ref 0.85–1.18)
LIDOCAIN IGE QN: 114 U/L — HIGH (ref 7–60)
LYMPHOCYTES # BLD AUTO: 1.31 K/UL — SIGNIFICANT CHANGE UP (ref 1–3.3)
LYMPHOCYTES # BLD AUTO: 1.68 K/UL — SIGNIFICANT CHANGE UP (ref 1–3.3)
LYMPHOCYTES # BLD AUTO: 11.1 % — LOW (ref 13–44)
LYMPHOCYTES # BLD AUTO: 14.4 % — SIGNIFICANT CHANGE UP (ref 13–44)
MAGNESIUM SERPL-MCNC: 2.2 MG/DL — SIGNIFICANT CHANGE UP (ref 1.6–2.6)
MAGNESIUM SERPL-MCNC: 2.3 MG/DL — SIGNIFICANT CHANGE UP (ref 1.6–2.6)
MCHC RBC-ENTMCNC: 27 PG — SIGNIFICANT CHANGE UP (ref 27–34)
MCHC RBC-ENTMCNC: 27 PG — SIGNIFICANT CHANGE UP (ref 27–34)
MCHC RBC-ENTMCNC: 34.8 GM/DL — SIGNIFICANT CHANGE UP (ref 32–36)
MCHC RBC-ENTMCNC: 35 GM/DL — SIGNIFICANT CHANGE UP (ref 32–36)
MCV RBC AUTO: 77 FL — LOW (ref 80–100)
MCV RBC AUTO: 77.5 FL — LOW (ref 80–100)
MONOCYTES # BLD AUTO: 0.64 K/UL — SIGNIFICANT CHANGE UP (ref 0–0.9)
MONOCYTES # BLD AUTO: 0.83 K/UL — SIGNIFICANT CHANGE UP (ref 0–0.9)
MONOCYTES NFR BLD AUTO: 5.4 % — SIGNIFICANT CHANGE UP (ref 2–14)
MONOCYTES NFR BLD AUTO: 7.1 % — SIGNIFICANT CHANGE UP (ref 2–14)
NEUTROPHILS # BLD AUTO: 8.74 K/UL — HIGH (ref 1.8–7.4)
NEUTROPHILS # BLD AUTO: 9.39 K/UL — HIGH (ref 1.8–7.4)
NEUTROPHILS NFR BLD AUTO: 74.8 % — SIGNIFICANT CHANGE UP (ref 43–77)
NEUTROPHILS NFR BLD AUTO: 79.7 % — HIGH (ref 43–77)
NRBC # BLD: 0 /100 WBCS — SIGNIFICANT CHANGE UP (ref 0–0)
NRBC # BLD: 0 /100 WBCS — SIGNIFICANT CHANGE UP (ref 0–0)
PHOSPHATE SERPL-MCNC: 3.1 MG/DL — SIGNIFICANT CHANGE UP (ref 2.5–4.5)
PHOSPHATE SERPL-MCNC: 3.6 MG/DL — SIGNIFICANT CHANGE UP (ref 2.5–4.5)
PLATELET # BLD AUTO: 85 K/UL — LOW (ref 150–400)
PLATELET # BLD AUTO: 88 K/UL — LOW (ref 150–400)
POTASSIUM SERPL-MCNC: 3.8 MMOL/L — SIGNIFICANT CHANGE UP (ref 3.5–5.3)
POTASSIUM SERPL-MCNC: 4.2 MMOL/L — SIGNIFICANT CHANGE UP (ref 3.5–5.3)
POTASSIUM SERPL-SCNC: 3.8 MMOL/L — SIGNIFICANT CHANGE UP (ref 3.5–5.3)
POTASSIUM SERPL-SCNC: 4.2 MMOL/L — SIGNIFICANT CHANGE UP (ref 3.5–5.3)
PROT SERPL-MCNC: 6.2 G/DL — SIGNIFICANT CHANGE UP (ref 6–8.3)
PROT SERPL-MCNC: 6.6 G/DL — SIGNIFICANT CHANGE UP (ref 6–8.3)
PROTHROM AB SERPL-ACNC: 20.5 SEC — HIGH (ref 9.5–13)
PROTHROM AB SERPL-ACNC: 21.3 SEC — HIGH (ref 9.5–13)
RBC # BLD: 3.56 M/UL — LOW (ref 3.8–5.2)
RBC # BLD: 3.82 M/UL — SIGNIFICANT CHANGE UP (ref 3.8–5.2)
RBC # FLD: 16.6 % — HIGH (ref 10.3–14.5)
RBC # FLD: 16.9 % — HIGH (ref 10.3–14.5)
SODIUM SERPL-SCNC: 141 MMOL/L — SIGNIFICANT CHANGE UP (ref 135–145)
SODIUM SERPL-SCNC: 143 MMOL/L — SIGNIFICANT CHANGE UP (ref 135–145)
WBC # BLD: 11.68 K/UL — HIGH (ref 3.8–10.5)
WBC # BLD: 11.78 K/UL — HIGH (ref 3.8–10.5)
WBC # FLD AUTO: 11.68 K/UL — HIGH (ref 3.8–10.5)
WBC # FLD AUTO: 11.78 K/UL — HIGH (ref 3.8–10.5)

## 2024-05-10 PROCEDURE — 71045 X-RAY EXAM CHEST 1 VIEW: CPT | Mod: 26

## 2024-05-10 PROCEDURE — 99232 SBSQ HOSP IP/OBS MODERATE 35: CPT | Mod: GC

## 2024-05-10 PROCEDURE — 99233 SBSQ HOSP IP/OBS HIGH 50: CPT

## 2024-05-10 RX ORDER — FLUDROCORTISONE ACETATE 0.1 MG/1
0.1 TABLET ORAL DAILY
Refills: 0 | Status: DISCONTINUED | OUTPATIENT
Start: 2024-05-10 | End: 2024-05-10

## 2024-05-10 RX ORDER — POTASSIUM CHLORIDE 20 MEQ
40 PACKET (EA) ORAL ONCE
Refills: 0 | Status: COMPLETED | OUTPATIENT
Start: 2024-05-10 | End: 2024-05-10

## 2024-05-10 RX ORDER — FLUDROCORTISONE ACETATE 0.1 MG/1
0.1 TABLET ORAL DAILY
Refills: 0 | Status: COMPLETED | OUTPATIENT
Start: 2024-05-11 | End: 2024-05-12

## 2024-05-10 RX ORDER — ARGATROBAN 50 MG/50ML
1.5 INJECTION, SOLUTION INTRAVENOUS
Qty: 50 | Refills: 0 | Status: DISCONTINUED | OUTPATIENT
Start: 2024-05-10 | End: 2024-05-11

## 2024-05-10 RX ADMIN — RIOCIGUAT 2.5 MILLIGRAM(S): 1.5 TABLET, FILM COATED ORAL at 22:18

## 2024-05-10 RX ADMIN — CEFTRIAXONE 100 MILLIGRAM(S): 500 INJECTION, POWDER, FOR SOLUTION INTRAMUSCULAR; INTRAVENOUS at 15:55

## 2024-05-10 RX ADMIN — MIDODRINE HYDROCHLORIDE 15 MILLIGRAM(S): 2.5 TABLET ORAL at 14:16

## 2024-05-10 RX ADMIN — CHLORHEXIDINE GLUCONATE 1 APPLICATION(S): 213 SOLUTION TOPICAL at 06:27

## 2024-05-10 RX ADMIN — Medication 3 MILLILITER(S): at 12:55

## 2024-05-10 RX ADMIN — RIOCIGUAT 2.5 MILLIGRAM(S): 1.5 TABLET, FILM COATED ORAL at 14:16

## 2024-05-10 RX ADMIN — Medication 40 MILLIEQUIVALENT(S): at 11:03

## 2024-05-10 RX ADMIN — FLUDROCORTISONE ACETATE 0.2 MILLIGRAM(S): 0.1 TABLET ORAL at 06:24

## 2024-05-10 RX ADMIN — Medication 3 MILLILITER(S): at 05:52

## 2024-05-10 RX ADMIN — MIDODRINE HYDROCHLORIDE 15 MILLIGRAM(S): 2.5 TABLET ORAL at 22:18

## 2024-05-10 RX ADMIN — RIOCIGUAT 2.5 MILLIGRAM(S): 1.5 TABLET, FILM COATED ORAL at 06:24

## 2024-05-10 RX ADMIN — PANTOPRAZOLE SODIUM 40 MILLIGRAM(S): 20 TABLET, DELAYED RELEASE ORAL at 11:03

## 2024-05-10 RX ADMIN — ARGATROBAN 7.1 MICROGRAM(S)/KG/MIN: 50 INJECTION, SOLUTION INTRAVENOUS at 11:37

## 2024-05-10 RX ADMIN — MACITENTAN 10 MILLIGRAM(S): 10 TABLET, FILM COATED ORAL at 11:03

## 2024-05-10 RX ADMIN — Medication 3 MILLILITER(S): at 17:06

## 2024-05-10 NOTE — PROGRESS NOTE ADULT - SUBJECTIVE AND OBJECTIVE BOX
SUBJECTIVE: Pt seen and examined at bedside this am by surgery team. Tolerating diet, denies abdominal pain, denies any lower extremity weakness.    MEDICATIONS  (STANDING):  albuterol    90 MICROgram(s) HFA Inhaler 2 Puff(s) Inhalation every 6 hours  albuterol/ipratropium for Nebulization 3 milliLiter(s) Nebulizer every 6 hours  argatroban Infusion 1.45 MICROgram(s)/kG/Min (7.1 mL/Hr) IV Continuous <Continuous>  cefTRIAXone   IVPB      cefTRIAXone   IVPB 1000 milliGRAM(s) IV Intermittent every 24 hours  chlorhexidine 2% Cloths 1 Application(s) Topical daily  influenza  Vaccine (HIGH DOSE) 0.7 milliLiter(s) IntraMuscular once  macitentan 10 milliGRAM(s) Oral <User Schedule>  midodrine 15 milliGRAM(s) Oral every 8 hours  pantoprazole    Tablet 40 milliGRAM(s) Oral daily  polyethylene glycol 3350 17 Gram(s) Oral daily  riociguat 2.5 milliGRAM(s) Oral every 8 hours  senna 2 Tablet(s) Oral at bedtime  sodium chloride 0.9%. 1000 milliLiter(s) (10 mL/Hr) IV Continuous <Continuous>    MEDICATIONS  (PRN):  oxyCODONE    IR 5 milliGRAM(s) Oral every 6 hours PRN Moderate Pain (4 - 6)  sodium chloride 0.65% Nasal 1 Spray(s) Both Nostrils three times a day PRN dry nose      Vital Signs Last 24 Hrs  T(C): 36.4 (10 May 2024 14:59), Max: 37 (09 May 2024 16:44)  T(F): 97.6 (10 May 2024 14:59), Max: 98.6 (09 May 2024 16:44)  HR: 100 (10 May 2024 12:00) (74 - 100)  BP: 148/67 (10 May 2024 12:00) (140/61 - 148/67)  BP(mean): 97 (10 May 2024 12:00) (88 - 97)  RR: 16 (10 May 2024 12:00) (14 - 18)  SpO2: 100% (10 May 2024 12:00) (93% - 100%)    Parameters below as of 10 May 2024 12:00  Patient On (Oxygen Delivery Method): nasal cannula w/ humidification  O2 Flow (L/min): 2      Physical Exam:  General: NAD, pt resting comfortably in chair  Pulm: No respiratory distress, nonlabored breathing, on nasal cannula   CVS: NSR,   Abd: Soft, NT, ND  Extremities: WWP, small amount of edema. Motor and sensory function intact b/l UE and LE. Strength 5/5 on LLE, 4.5/5 on RLE, 5/5 on RUE, and 5/5 on LUE  Pulses: palp DP/PT bilaterally    I&O's Detail    09 May 2024 07:01  -  10 May 2024 07:00  --------------------------------------------------------  IN:    Albumin 25%  -  50 mL: 200 mL    Argatroban: 128.1 mL    Argatroban: 20.7 mL    IV PiggyBack: 50 mL    Oral Fluid: 720 mL  Total IN: 1118.8 mL    OUT:    Indwelling Catheter - Urethral (mL): 825 mL    Voided (mL): 25 mL  Total OUT: 850 mL    Total NET: 268.8 mL      10 May 2024 07:01  -  10 May 2024 16:26  --------------------------------------------------------  IN:    Argatroban: 71 mL    IV PiggyBack: 50 mL    Oral Fluid: 100 mL  Total IN: 221 mL    OUT:    Indwelling Catheter - Urethral (mL): 810 mL  Total OUT: 810 mL    Total NET: -589 mL          LABS:                        10.3   11.78 )-----------( 85       ( 10 May 2024 08:26 )             29.4     05-10    141  |  107  |  25<H>  ----------------------------<  123<H>  3.8   |  20<L>  |  0.95    Ca    9.8      10 May 2024 08:26  Phos  3.1     05-10  Mg     2.2     05-10    TPro  6.6  /  Alb  4.2  /  TBili  1.0  /  DBili  x   /  AST  65<H>  /  ALT  142<H>  /  AlkPhos  95  05-10    PT/INR - ( 10 May 2024 08:26 )   PT: 21.3 sec;   INR: 1.90          PTT - ( 10 May 2024 13:47 )  PTT:50.6 sec  Urinalysis Basic - ( 10 May 2024 08:26 )    Color: x / Appearance: x / SG: x / pH: x  Gluc: 123 mg/dL / Ketone: x  / Bili: x / Urobili: x   Blood: x / Protein: x / Nitrite: x   Leuk Esterase: x / RBC: x / WBC x   Sq Epi: x / Non Sq Epi: x / Bacteria: x

## 2024-05-10 NOTE — PROGRESS NOTE ADULT - SUBJECTIVE AND OBJECTIVE BOX
Patient discussed on morning rounds with Dr. Kwon  9E to 9L transfer/acceptance note     Operation / Date: 4/26/2024 TEVAR     SUBJECTIVE ASSESSMENT:        Vital Signs Last 24 Hrs  T(C): 36.7 (10 May 2024 09:23), Max: 37 (09 May 2024 16:44)  T(F): 98 (10 May 2024 09:23), Max: 98.6 (09 May 2024 16:44)  HR: 100 (10 May 2024 12:00) (74 - 103)  BP: 148/67 (10 May 2024 12:00) (140/61 - 148/67)  BP(mean): 97 (10 May 2024 12:00) (88 - 97)  RR: 16 (10 May 2024 12:00) (14 - 18)  SpO2: 100% (10 May 2024 12:00) (93% - 100%)    Parameters below as of 10 May 2024 12:00  Patient On (Oxygen Delivery Method): nasal cannula w/ humidification  O2 Flow (L/min): 2    I&O's Detail    09 May 2024 07:01  -  10 May 2024 07:00  --------------------------------------------------------  IN:    Albumin 25%  -  50 mL: 200 mL    Argatroban: 128.1 mL    Argatroban: 20.7 mL    IV PiggyBack: 50 mL    Oral Fluid: 720 mL  Total IN: 1118.8 mL    OUT:    Indwelling Catheter - Urethral (mL): 825 mL    Voided (mL): 25 mL  Total OUT: 850 mL    Total NET: 268.8 mL      10 May 2024 07:01  -  10 May 2024 13:44  --------------------------------------------------------  IN:    Argatroban: 28.4 mL    Oral Fluid: 100 mL  Total IN: 128.4 mL    OUT:    Indwelling Catheter - Urethral (mL): 140 mL  Total OUT: 140 mL    Total NET: -11.6 mL    CHEST TUBE:  No  WOOD DRAIN:  No  EPICARDIAL WIRES: No  TIE DOWNS: No  ALBERTS: Yes, urinary retention     PHYSICAL EXAM:    General: Patient lying comfortably in bed, no acute distress     Neurological: Alert and oriented. No focal neurological deficits. LE strength 5/5 bilaterally     Cardiovascular: S1S2, RRR, no murmurs appreciated on exam     Respiratory: Clear to ausculation bilaterally     Gastrointestinal: Abdomen soft, non tender, non distended     Extremities: Warm and well perfused. No edema or calf tenderness     Vascular: Peripheral pulses 2+ bilaterally     Incision Sites: Groin sites C/D/I, no hematoma     LABS:                        10.3   11.78 )-----------( 85       ( 10 May 2024 08:26 )             29.4       COUMADIN:  No     PT/INR - ( 10 May 2024 08:26 )   PT: 21.3 sec;   INR: 1.90          PTT - ( 10 May 2024 08:26 )  PTT:52.8 sec    05-10    141  |  107  |  25<H>  ----------------------------<  123<H>  3.8   |  20<L>  |  0.95    Ca    9.8      10 May 2024 08:26  Phos  3.1     05-10  Mg     2.2     05-10    TPro  6.6  /  Alb  4.2  /  TBili  1.0  /  DBili  x   /  AST  65<H>  /  ALT  142<H>  /  AlkPhos  95  05-10      Urinalysis Basic - ( 10 May 2024 08:26 )    Color: x / Appearance: x / SG: x / pH: x  Gluc: 123 mg/dL / Ketone: x  / Bili: x / Urobili: x   Blood: x / Protein: x / Nitrite: x   Leuk Esterase: x / RBC: x / WBC x   Sq Epi: x / Non Sq Epi: x / Bacteria: x        MEDICATIONS  (STANDING):  albuterol    90 MICROgram(s) HFA Inhaler 2 Puff(s) Inhalation every 6 hours  albuterol/ipratropium for Nebulization 3 milliLiter(s) Nebulizer every 6 hours  argatroban Infusion 1.45 MICROgram(s)/kG/Min (7.1 mL/Hr) IV Continuous <Continuous>  cefTRIAXone   IVPB 1000 milliGRAM(s) IV Intermittent every 24 hours  chlorhexidine 2% Cloths 1 Application(s) Topical daily  influenza  Vaccine (HIGH DOSE) 0.7 milliLiter(s) IntraMuscular once  macitentan 10 milliGRAM(s) Oral <User Schedule>  midodrine 15 milliGRAM(s) Oral every 8 hours  pantoprazole    Tablet 40 milliGRAM(s) Oral daily  polyethylene glycol 3350 17 Gram(s) Oral daily  riociguat 2.5 milliGRAM(s) Oral every 8 hours  senna 2 Tablet(s) Oral at bedtime  sodium chloride 0.9%. 1000 milliLiter(s) (10 mL/Hr) IV Continuous <Continuous>    MEDICATIONS  (PRN):  oxyCODONE    IR 5 milliGRAM(s) Oral every 6 hours PRN Moderate Pain (4 - 6)  sodium chloride 0.65% Nasal 1 Spray(s) Both Nostrils three times a day PRN dry nose        RADIOLOGY & ADDITIONAL TESTS:

## 2024-05-10 NOTE — PROGRESS NOTE ADULT - ASSESSMENT
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 She was subsequently started on riociguat and follow up TTE showed significant improvement in PH as well as subjectively clinical improvement. PA-C showed with no significant changes from her baseline other than increase in cardiac output, now catheter removed. She continued to feel stronger everyday, walking multiple lanes in [place], off IV pressors and now off oxygen and stepped down. Her macitentan has been approved and scheduled to arrive at her home on Monday.    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 At that time her aortic dissection was noted, but felt to delay surgical intervention until PH improved  RHC reviewed (8/2024) - Of note there was no PAWP waveform, unable to wedge, calculations made on estimated PAWP of 15  RA 13  |  PA 82/28/48  |  PAWP 15  |  CO/CI (F) 4.64/2.54  |  PVR 7.1WU  |  PVR/SVR 0.4      Recommend:   - Now off IV pressors, maintainig MAP well, and removed supplemental oxygen this afternoon, she maintained 100% SpO2.   - Continue with riociguat at current dose  - Maintain relative diuresis, avoid fluid boluses and would keep pt relatively net negative  - Continue with macitentan given severity of PH at 10 mg, approved by insurance    Discharge plan:  Plan for evaluation in formal CTEPH clinic for consideration of PTE at [facility] (may be difficult in setting of Type B aortic dissection) vs balloon angioplasty and possible escalation of medical therapy, as an outpatient.   Follow up in CTEPH clinic with Dr. Cabrera and Dr. Benites. Continue with medical therapy for now    PH will continue to follow  Patient seen and evaluated at bedside by fellow and plan discussed with Dr. Deborah aBr MD  [facility] Fellow

## 2024-05-10 NOTE — PROGRESS NOTE ADULT - SUBJECTIVE AND OBJECTIVE BOX
PULMONARY CONSULT SERVICE FOLLOW-UP NOTE    INTERVAL HPI:  Reviewed chart and overnight events; patient seen and examined at bedside.   No new complaints today, feels well. She walked from ICU to stepdown unit.   In chair, no respiratory complaints, on minimum oxygen which was turned off at bedside.  Continues to feel stronger everyday.     MEDICATIONS:  Pulmonary:  albuterol    90 MICROgram(s) HFA Inhaler 2 Puff(s) Inhalation every 6 hours  albuterol/ipratropium for Nebulization 3 milliLiter(s) Nebulizer every 6 hours    Antimicrobials:  cefTRIAXone   IVPB      cefTRIAXone   IVPB 1000 milliGRAM(s) IV Intermittent every 24 hours    Anticoagulants:  argatroban Infusion 1.45 MICROgram(s)/kG/Min IV Continuous <Continuous>    Cardiac:  macitentan 10 milliGRAM(s) Oral <User Schedule>  midodrine 15 milliGRAM(s) Oral every 8 hours  riociguat 2.5 milliGRAM(s) Oral every 8 hours      Allergies    heparin (Other (Moderate))    Intolerances        Vital Signs Last 24 Hrs  T(C): 36.7 (10 May 2024 09:23), Max: 37 (09 May 2024 16:44)  T(F): 98 (10 May 2024 09:23), Max: 98.6 (09 May 2024 16:44)  HR: 100 (10 May 2024 12:00) (74 - 102)  BP: 148/67 (10 May 2024 12:00) (140/61 - 148/67)  BP(mean): 97 (10 May 2024 12:00) (88 - 97)  RR: 16 (10 May 2024 12:00) (14 - 18)  SpO2: 100% (10 May 2024 12:00) (93% - 100%)    Parameters below as of 10 May 2024 12:00  Patient On (Oxygen Delivery Method): nasal cannula w/ humidification  O2 Flow (L/min): 2      05-09 @ 07:01  -  05-10 @ 07:00  --------------------------------------------------------  IN: 1118.8 mL / OUT: 850 mL / NET: 268.8 mL    05-10 @ 07:01  -  05-10 @ 14:01  --------------------------------------------------------  IN: 149.7 mL / OUT: 200 mL / NET: -50.3 mL          PHYSICAL EXAM:  Constitutional: NAD, now off oxygen  HEENT: NC/AT; PERRL, anicteric sclera; MMM  Neck: supple  Cardiovascular: +S1/S2, RRR  Respiratory: Decreased air entry at bases  Gastrointestinal: soft, NT/ND  Extremities: WWP; no edema, clubbing or cyanosis  Vascular: 2+ radial pulses B/L  Neurological: awake and alert; HORNER    LABS:  ABG - ( 10 May 2024 08:39 )  pH, Arterial: 7.49  pH, Blood: x     /  pCO2: 30    /  pO2: 67    / HCO3: 23    / Base Excess: 0.4   /  SaO2: 96.1                CBC Full  -  ( 10 May 2024 08:26 )  WBC Count : 11.78 K/uL  RBC Count : 3.82 M/uL  Hemoglobin : 10.3 g/dL  Hematocrit : 29.4 %  Platelet Count - Automated : 85 K/uL  Mean Cell Volume : 77.0 fl  Mean Cell Hemoglobin : 27.0 pg  Mean Cell Hemoglobin Concentration : 35.0 gm/dL  Auto Neutrophil # : 9.39 K/uL  Auto Lymphocyte # : 1.31 K/uL  Auto Monocyte # : 0.64 K/uL  Auto Eosinophil # : 0.22 K/uL  Auto Basophil # : 0.06 K/uL  Auto Neutrophil % : 79.7 %  Auto Lymphocyte % : 11.1 %  Auto Monocyte % : 5.4 %  Auto Eosinophil % : 1.9 %  Auto Basophil % : 0.5 %    05-10    141  |  107  |  25<H>  ----------------------------<  123<H>  3.8   |  20<L>  |  0.95    Ca    9.8      10 May 2024 08:26  Phos  3.1     05-10  Mg     2.2     05-10    TPro  6.6  /  Alb  4.2  /  TBili  1.0  /  DBili  x   /  AST  65<H>  /  ALT  142<H>  /  AlkPhos  95  05-10    PT/INR - ( 10 May 2024 08:26 )   PT: 21.3 sec;   INR: 1.90          PTT - ( 10 May 2024 08:26 )  PTT:52.8 sec      Urinalysis Basic - ( 10 May 2024 08:26 )    Color: x / Appearance: x / SG: x / pH: x  Gluc: 123 mg/dL / Ketone: x  / Bili: x / Urobili: x   Blood: x / Protein: x / Nitrite: x   Leuk Esterase: x / RBC: x / WBC x   Sq Epi: x / Non Sq Epi: x / Bacteria: x                RADIOLOGY & ADDITIONAL STUDIES:

## 2024-05-10 NOTE — PROGRESS NOTE ADULT - ATTENDING COMMENTS
As above, CTEPH and aortic dissection s/p TEVAR c/b spinal cord ischemia, now improved on riociguat (home dose) + macitentan (new start this admission), officially weaned off pressors, also tolerating RA with SpO2 >95% when nasal cannula removed. Ambulating without difficulty, only complaint is urinary retention. Will continue with current dosing of PH medications, LFTs improving. Thrombocytopenia 2/2 HIT improving off heparin now on argatroban, to switch to eliquis when platelets improve. Plan to follow up in CTEPH clinic on discharge. Confirmed with patient that macitentan is approved and being delivered on Monday.

## 2024-05-10 NOTE — PROGRESS NOTE ADULT - ASSESSMENT
68F PMH asthma, CTEPH, CAD, HTN, Type B aortic dissection s/p TEVAR 4/26 with multiple endovascular grafts with most distal graft 5 cm above celiac trunk, course complicated by bilateral LE weakness and clots in the celiac trunk, previously on pressors, now improving LE strength and stable.  consulted for possible neurogenic bladder as patient has been failing TOV with asymptomatic PVRs of 700-800s. Never saw a urologist, never had issues urinating before surgery. Last BM 5/8. Symptoms may be due to spinal shock vs constipation and not ambulating normally. U/A with mod leuks    Recs:  - continue catheter for 1 week (5/8), can try TOV with PVR before discharge if leaving earlier  - if fails tov will need outpatient urodynamics  - f/u UCx  - Follow with resident clinic - (Call [phone] to schedule your appointment.  The office is located at [address] [place] [address], [place] [address].) or if patients wishes to follow up closer to home in [place] she can see Dr Carlos Darby at [facility]  - discussed with attending  - Urology signing off, please reconsult or reach out for further questions

## 2024-05-10 NOTE — PROGRESS NOTE ADULT - ASSESSMENT
69 y/o F PMH asthma, CTEPH, CAD, HTN, Type B dissection now s/p TEVAR, Groin sites soft, pulses palpable. Still reporting subjective weakness but able to ambulate and no clearly weak on exam. Currently on Milrinone and vasopressin. Normal sensory to lower extremities, patient able to walk and sit in chair. Lumbar drain removed on 5/1. Pulmonary on board for pulmonary hypertension. CT imaging and MRI with no findings of nerve compression or root compression. 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 Distal common hepatic artery is perfused. Multifocal splenic infarcts and multiple small renal infarcts bilaterally, likely embolic etiology. No abdominal symptoms - tolerating diet without pain or tenderness. Course complicated with HIT, on Argatroban drip, off pressors for the past 2 days with preserved motor and sensory function.     - Continue neurovasacular checks per TEVAR ERAS protocol  - B/l groin checks per TEVAR ERAS protocol  - Rest of care per CT  - vascular surgery will continue to follow patient

## 2024-05-10 NOTE — PROGRESS NOTE ADULT - SUBJECTIVE AND OBJECTIVE BOX
CTICU  CRITICAL  CARE  attending     Hand off received 					   Pertinent clinical, laboratory, radiographic, hemodynamic, echocardiographic, respiratory data, microbiologic data and chart were reviewed and analyzed frequently throughout the course of the day  Patient seen and examined with CTS/ SH attending at bedside  Pt is a 68yr old female with PMH asthma, CTEPH, CAD, HTN, Type B Dissection admitted for TEVAR. Pt underwent LD placement . : Pt underwent endovascular repair of thoracic aorta covering L subclavian to ~5cm above celiac trunk ([place]). Arrived Extubated. Clamped  and  and drained overnight. Ld removed in afternoon. : early AM pt unable to move LE, stroke code called. NSGY replaced LD. MAPs driven up. CT imaging and MRI imaging performed. Pulmonary consulted for CTEPH mgmt. Boo and primacor restarted with new swan placement. 1 pRBC, mannitol, and decadron also given. : Midodrine started. : Macitetan started. Boo weaned to 5. LD capped during day. : LD removed. 5/3: Primacor .25. [place] removed. : Weakness in LE and urinary retention. Vaso restarted. : HIT positive, argatroban started. NSGY recommending MRI. RUE midline placed. : Primacor off. : On and off vaso for MAP goals and improved LE strength. : Vaso off and patient ambulating with no LE weakness. TOV attempted. Failed and stevens reinserted in late evening. :  consulted and recommended stevens remain x 1 week. Ua requested and ceftriaxone started based on results. Heme consulted and recommended resuming home eliqius.     FAMILY HISTORY:  Family history of early CAD (Father)  PAST MEDICAL & SURGICAL HISTORY:  HTN (hypertension)  Prediabetes  Mild tricuspid regurgitation  Enlarged aorta  CAD (coronary artery disease)  Ascending aorta dilation  Pulmonary hypertension  Chronic systolic right heart failure  2019 novel coronavirus disease (COVID-19)  Asthma  S/P hysterectomy  S/P         Patient is a 68y old  Female who presents with a chief complaint of type B dissection.      14 system review was unremarkable    Vital signs, hemodynamic and respiratory parameters were reviewed from the bedside nursing flowsheet.  ICU Vital Signs Last 24 Hrs  T(C): 36.5 (10 May 2024 05:01), Max: 37 (09 May 2024 16:44)  T(F): 97.7 (10 May 2024 05:01), Max: 98.6 (09 May 2024 16:44)  HR: 95 (10 May 2024 08:00) (74 - 106)  BP: --  BP(mean): --  ABP: 141/70 (10 May 2024 08:) (99/50 - 141/70)  ABP(mean): 99 (10 May 2024 08:) (67 - 99)  RR: 15 (10 May 2024 08:) (14 - 20)  SpO2: 93% (10 May 2024 08:) (93% - 100%)    O2 Parameters below as of 10 May 2024 08:00  Patient On (Oxygen Delivery Method): nasal cannula w/ humidification  O2 Flow (L/min): 2        Adult Advanced Hemodynamics Last 24 Hrs  CVP(mm Hg): --  CVP(cm H2O): --  CO: --  CI: --  PA: --  PA(mean): --  PCWP: --  SVR: --  SVRI: --  PVR: --  PVRI: --, ABG - ( 10 May 2024 03:09 )  pH, Arterial: 7.46  pH, Blood: x     /  pCO2: 32    /  pO2: 65    / HCO3: 23    / Base Excess: -0.3  /  SaO2: 94.0                Intake and output was reviewed and the fluid balance was calculated  Daily     Daily   I&O's Summary    09 May 2024 07:  -  10 May 2024 07:00  --------------------------------------------------------  IN: 1118.8 mL / OUT: 850 mL / NET: 268.8 mL    10 May 2024 07:01  -  10 May 2024 08:24  --------------------------------------------------------  IN: 7.1 mL / OUT: 40 mL / NET: -32.9 mL        All lines and drain sites were assessed  Glycemic trend was reviewedCAPILLARY BLOOD GLUCOSE      Neuro: well appearing female in nad, 5/5 strength throughout bl upper and lower extremities  HEENT: mmm  Heart: s1 s2  Lungs: clear bl  Abdomen: soft nt nd  Extremities: wwp    Lines:  RUE DL midline   R axillary arterial line       labs  CBC Full  -  ( 10 May 2024 03:09 )  WBC Count : 11.68 K/uL  RBC Count : 3.56 M/uL  Hemoglobin : 9.6 g/dL  Hematocrit : 27.6 %  Platelet Count - Automated : 88 K/uL  Mean Cell Volume : 77.5 fl  Mean Cell Hemoglobin : 27.0 pg  Mean Cell Hemoglobin Concentration : 34.8 gm/dL  Auto Neutrophil # : 8.74 K/uL  Auto Lymphocyte # : 1.68 K/uL  Auto Monocyte # : 0.83 K/uL  Auto Eosinophil # : 0.23 K/uL  Auto Basophil # : 0.05 K/uL  Auto Neutrophil % : 74.8 %  Auto Lymphocyte % : 14.4 %  Auto Monocyte % : 7.1 %  Auto Eosinophil % : 2.0 %  Auto Basophil % : 0.4 %    05-10    143  |  109<H>  |  28<H>  ----------------------------<  108<H>  4.2   |  22  |  1.15    Ca    9.4      10 May 2024 03:09  Phos  3.6     05-10  Mg     2.3     05-10    TPro  6.2  /  Alb  4.0  /  TBili  0.9  /  DBili  x   /  AST  63<H>  /  ALT  130<H>  /  AlkPhos  89  05-10    PT/INR - ( 10 May 2024 03:09 )   PT: 20.5 sec;   INR: 1.83          PTT - ( 10 May 2024 03:09 )  PTT:48.5 sec  The current medications were reviewed   MEDICATIONS  (STANDING):  albuterol    90 MICROgram(s) HFA Inhaler 2 Puff(s) Inhalation every 6 hours  albuterol/ipratropium for Nebulization 3 milliLiter(s) Nebulizer every 6 hours  argatroban Infusion 1.45 MICROgram(s)/kG/Min (7.1 mL/Hr) IV Continuous <Continuous>  cefTRIAXone   IVPB      cefTRIAXone   IVPB 1000 milliGRAM(s) IV Intermittent every 24 hours  chlorhexidine 2% Cloths 1 Application(s) Topical daily  fludroCORTISONE 0.2 milliGRAM(s) Oral daily  influenza  Vaccine (HIGH DOSE) 0.7 milliLiter(s) IntraMuscular once  macitentan 10 milliGRAM(s) Oral <User Schedule>  midodrine 15 milliGRAM(s) Oral every 8 hours  pantoprazole    Tablet 40 milliGRAM(s) Oral daily  polyethylene glycol 3350 17 Gram(s) Oral daily  riociguat 2.5 milliGRAM(s) Oral every 8 hours  senna 2 Tablet(s) Oral at bedtime  sodium chloride 0.9%. 1000 milliLiter(s) (10 mL/Hr) IV Continuous <Continuous>    MEDICATIONS  (PRN):  oxyCODONE    IR 5 milliGRAM(s) Oral every 6 hours PRN Moderate Pain (4 - 6)  sodium chloride 0.65% Nasal 1 Spray(s) Both Nostrils three times a day PRN dry nose      Assessment/Plan:  68yr old female with PMH asthma, CTEPH, CAD, HTN, Type B Dissection admitted for TEVAR.    POD15 TEVAR ([facility], )  On midodrine  Fludrocortisone started, would stop given random cortisol 24.5, rising wbc  Serial neuro checks  CTEPH mgmt-home meds to arrive   Appreciate pulmonary consult  Continue Riociguat and macitetan  HIT positive, continue argatroban, and resume eliquis plan for today  Transaminitis-monitor  Urinary retention sp stevens replacement -keep for 1 week per   Ceftriaxone started based on UA-no cx sent, will send one today  Transaminitis-monitor  Diet as tolerated  Replete lytes prn  GI/DVT PPX  Bowel Regimen  Pain control  OOB with PT  Close hemodynamic, ventilatory and drain monitoring and management per post op routine  Monitor for arrhythmias and monitor parameters for organ perfusion  Beta blockade not administered due to hemodynamic instability and bradycardia  Monitor neurologic status  Head of the bed should remain elevated to 45 deg   Chest PT and IS will be encouraged  Monitor adequacy of oxygenation and ventilation and attempt to wean oxygen  Antibiotic regimen will be tailored to the clinical, laboratory and microbiologic data  Nutritional goals will be met using po eventually, ensure adequate caloric intake and monitor the same  Stress ulcer and VTE prophylaxis will be achieved    Glycemic control is satisfactory  Electrolytes have been repleted as necessary and wound care has been carried out   Pain control has been achieved.   Aggressive physical therapy and early mobility and ambulation goals will be met   The family was updated about the course and plan.    CRITICAL CARE TIME personally provided by me  in evaluation and management, reassessments, review and interpretation of labs and x-rays, ventilator and hemodynamic management, formulating a plan and coordinating care: ___35____ MIN.  Time does not include procedural time.        CTICU ATTENDING     					  Cherelle Redmond MD

## 2024-05-10 NOTE — PROGRESS NOTE ADULT - ASSESSMENT
A/P: 68 year old female with PMHx  of Asthma, CTEPH, CAD, HTN, Type B Dissection referred to Dr. Kwon / Dr. Suh for intervention and presented to [facility] on 4/25/2024 for preoperative lumbar drain prior to planned TEVAR. Patient underwent transfemoral TEVAR with Dr. Kwon/Dr. Suh on 4/26/24, Procedure was uncomplicated and she was transferred to CT ICU post operatively extubated and stable. Post operatively patient had no neurological deficits and lumbar drain was removed on POD#2. She had routine post operative CT scan that revealed new splenic infarcts and PE and she was started on low dose heparin gtt. Overnight POD#2-3 patient had new LE weakness, neurosurgery was emergently called and lumbar drain was replaced with improvement in symptoms and remained in place until POD#6. MRI was obstructed by artifact from TEVAR graft and CT was negative for spinal hemorrhage or infarct. Patient had persistent hypoxia post operatively and pulm was consulted for CTEPH and she was started on additional medication with improvement. On POD#8 patient complained of LE unsteadiness with urinary retention, she was restarted on pressors to keep MAPs elevated with improvement. 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 Her pressors were slowly weaned from POD#10-12., She continued to remain stable and transferred to  on POD#14.     Neurovascular: Bilateral LE weakness, now improved.   - Continue oxycodone PRN     Cardiovascular: Hemodynamically stable. HR controlled.  - Permissive HTN for LE weakness, continue midodrine 15mg TID.     Respiratory: 02 Sat = 100% on RA.  - CTEPH, pulm HTN team following, Continue macitentan 10mg and riociguat 2.5mg Q8   - Continue inhalers and nebulizers   - Encourage C+DB and Use of IS 10x / hr while awake.  -CXR stable     GI: Stable.  - Continue PO diet   - Continue protonix 40mg for GI ppx  - Continue bowel regimen     Renal / : BUN/Cr 25/0.95.  - Urinary rentention, likely neurogenic in nature. Urology following, recommend keeping stevens x 1 week prior to TOV  - Pyuria, continue IV ceftriaxone 1g   - Monitor renal function.  - Monitor I/O's.    Endocrine: hgba1c and TSH wnl   - no active issues      Hematologic: HIT   - Continue argatroban @ 1.45, with goal PTT 50-70. next PTT 1pm   - Heme following, follow up when to resume oral AC   - LE duplex - chronic non obstructive DVT femoral and popliteal     Prophylaxis:  -DVT prophylaxis with Argatroban      Disposition: transfer to

## 2024-05-10 NOTE — PROGRESS NOTE ADULT - SUBJECTIVE AND OBJECTIVE BOX
UROLOGY PROGRESS NOTE    SUBJECTIVE: Patient seen and examined bedside. LE weakness improving. Patient denies fevers/chills, , nausea/vomiting, CP/SOB, and pain is controlled. Ambulating, tolerating stevens, Bm few days ago    argatroban Infusion 1.45 MICROgram(s)/kG/Min IV Continuous <Continuous>  cefTRIAXone   IVPB      cefTRIAXone   IVPB 1000 milliGRAM(s) IV Intermittent every 24 hours  macitentan 10 milliGRAM(s) Oral <User Schedule>  midodrine 15 milliGRAM(s) Oral every 8 hours  riociguat 2.5 milliGRAM(s) Oral every 8 hours      Vital Signs Last 24 Hrs  T(C): 36.7 (10 May 2024 09:23), Max: 37 (09 May 2024 16:44)  T(F): 98 (10 May 2024 09:23), Max: 98.6 (09 May 2024 16:44)  HR: 99 (10 May 2024 09:00) (74 - 106)  BP: --  BP(mean): --  RR: 15 (10 May 2024 09:00) (14 - 20)  SpO2: 99% (10 May 2024 09:00) (93% - 100%)    Parameters below as of 10 May 2024 09:00  Patient On (Oxygen Delivery Method): nasal cannula w/ humidification  O2 Flow (L/min): 2    I&O's Detail    09 May 2024 07:01  -  10 May 2024 07:00  --------------------------------------------------------  IN:    Albumin 25%  -  50 mL: 200 mL    Argatroban: 128.1 mL    Argatroban: 20.7 mL    IV PiggyBack: 50 mL    Oral Fluid: 720 mL  Total IN: 1118.8 mL    OUT:    Indwelling Catheter - Urethral (mL): 825 mL    Voided (mL): 25 mL  Total OUT: 850 mL    Total NET: 268.8 mL      10 May 2024 07:01  -  10 May 2024 09:39  --------------------------------------------------------  IN:    Argatroban: 14.2 mL  Total IN: 14.2 mL    OUT:    Indwelling Catheter - Urethral (mL): 80 mL  Total OUT: 80 mL    Total NET: -65.8 mL          PHYSICAL EXAM    General: NAD, resting comfortably in chair  C/V: NSR  Pulm: Nonlabored breathing, no respiratory distress on NC  Abd: soft, ND/NT, no rebound tenderness, no guarding  : stevens draining clear yellow urine.  Extrem: [place]      LABS:                          10.3   11.78 )-----------( 85       ( 10 May 2024 08:26 )             29.4     05-10    141  |  107  |  25<H>  ----------------------------<  123<H>  3.8   |  20<L>  |  0.95    Ca    9.8      10 May 2024 08:26  Phos  3.1     05-10  Mg     2.2     05-10    TPro  6.6  /  Alb  4.2  /  TBili  1.0  /  DBili  x   /  AST  65<H>  /  ALT  142<H>  /  AlkPhos  95  05-10    PT/INR - ( 10 May 2024 08:26 )   PT: 21.3 sec;   INR: 1.90          PTT - ( 10 May 2024 08:26 )  PTT:52.8 sec  Urinalysis Basic - ( 10 May 2024 08:26 )    Color: x / Appearance: x / SG: x / pH: x  Gluc: 123 mg/dL / Ketone: x  / Bili: x / Urobili: x   Blood: x / Protein: x / Nitrite: x   Leuk Esterase: x / RBC: x / WBC x   Sq Epi: x / Non Sq Epi: x / Bacteria: x            CULTURES:          RADIOLOGY & ADDITIONAL STUDIES:

## 2024-05-11 LAB
ANION GAP SERPL CALC-SCNC: 11 MMOL/L — SIGNIFICANT CHANGE UP (ref 5–17)
APTT BLD: 52.6 SEC — HIGH (ref 24.5–35.6)
APTT BLD: 59 SEC — HIGH (ref 24.5–35.6)
BUN SERPL-MCNC: 24 MG/DL — HIGH (ref 7–23)
CALCIUM SERPL-MCNC: 10 MG/DL — SIGNIFICANT CHANGE UP (ref 8.4–10.5)
CHLORIDE SERPL-SCNC: 110 MMOL/L — HIGH (ref 96–108)
CO2 SERPL-SCNC: 20 MMOL/L — LOW (ref 22–31)
CREAT SERPL-MCNC: 1.03 MG/DL — SIGNIFICANT CHANGE UP (ref 0.5–1.3)
EGFR: 59 ML/MIN/1.73M2 — LOW
GLUCOSE SERPL-MCNC: 106 MG/DL — HIGH (ref 70–99)
HCT VFR BLD CALC: 29.9 % — LOW (ref 34.5–45)
HGB BLD-MCNC: 9.8 G/DL — LOW (ref 11.5–15.5)
INR BLD: 1.9 — HIGH (ref 0.85–1.18)
MAGNESIUM SERPL-MCNC: 2.3 MG/DL — SIGNIFICANT CHANGE UP (ref 1.6–2.6)
MCHC RBC-ENTMCNC: 26.3 PG — LOW (ref 27–34)
MCHC RBC-ENTMCNC: 32.8 GM/DL — SIGNIFICANT CHANGE UP (ref 32–36)
MCV RBC AUTO: 80.4 FL — SIGNIFICANT CHANGE UP (ref 80–100)
NRBC # BLD: 0 /100 WBCS — SIGNIFICANT CHANGE UP (ref 0–0)
PLATELET # BLD AUTO: 99 K/UL — LOW (ref 150–400)
POTASSIUM SERPL-MCNC: 4.2 MMOL/L — SIGNIFICANT CHANGE UP (ref 3.5–5.3)
POTASSIUM SERPL-SCNC: 4.2 MMOL/L — SIGNIFICANT CHANGE UP (ref 3.5–5.3)
PROTHROM AB SERPL-ACNC: 21.3 SEC — HIGH (ref 9.5–13)
RBC # BLD: 3.72 M/UL — LOW (ref 3.8–5.2)
RBC # FLD: 16.6 % — HIGH (ref 10.3–14.5)
SODIUM SERPL-SCNC: 141 MMOL/L — SIGNIFICANT CHANGE UP (ref 135–145)
WBC # BLD: 11.78 K/UL — HIGH (ref 3.8–10.5)
WBC # FLD AUTO: 11.78 K/UL — HIGH (ref 3.8–10.5)

## 2024-05-11 PROCEDURE — 71045 X-RAY EXAM CHEST 1 VIEW: CPT | Mod: 26

## 2024-05-11 RX ORDER — APIXABAN 2.5 MG/1
10 TABLET, FILM COATED ORAL EVERY 12 HOURS
Refills: 0 | Status: DISCONTINUED | OUTPATIENT
Start: 2024-05-11 | End: 2024-05-13

## 2024-05-11 RX ADMIN — ARGATROBAN 7.34 MICROGRAM(S)/KG/MIN: 50 INJECTION, SOLUTION INTRAVENOUS at 14:58

## 2024-05-11 RX ADMIN — FLUDROCORTISONE ACETATE 0.1 MILLIGRAM(S): 0.1 TABLET ORAL at 05:59

## 2024-05-11 RX ADMIN — RIOCIGUAT 2.5 MILLIGRAM(S): 1.5 TABLET, FILM COATED ORAL at 22:01

## 2024-05-11 RX ADMIN — PANTOPRAZOLE SODIUM 40 MILLIGRAM(S): 20 TABLET, DELAYED RELEASE ORAL at 12:52

## 2024-05-11 RX ADMIN — APIXABAN 10 MILLIGRAM(S): 2.5 TABLET, FILM COATED ORAL at 18:00

## 2024-05-11 RX ADMIN — MACITENTAN 10 MILLIGRAM(S): 10 TABLET, FILM COATED ORAL at 12:53

## 2024-05-11 RX ADMIN — Medication 3 MILLILITER(S): at 05:59

## 2024-05-11 RX ADMIN — ARGATROBAN 7.34 MICROGRAM(S)/KG/MIN: 50 INJECTION, SOLUTION INTRAVENOUS at 01:10

## 2024-05-11 RX ADMIN — CHLORHEXIDINE GLUCONATE 1 APPLICATION(S): 213 SOLUTION TOPICAL at 05:47

## 2024-05-11 RX ADMIN — CEFTRIAXONE 100 MILLIGRAM(S): 500 INJECTION, POWDER, FOR SOLUTION INTRAMUSCULAR; INTRAVENOUS at 16:13

## 2024-05-11 RX ADMIN — RIOCIGUAT 2.5 MILLIGRAM(S): 1.5 TABLET, FILM COATED ORAL at 14:58

## 2024-05-11 RX ADMIN — MIDODRINE HYDROCHLORIDE 15 MILLIGRAM(S): 2.5 TABLET ORAL at 14:58

## 2024-05-11 RX ADMIN — Medication 3 MILLILITER(S): at 01:06

## 2024-05-11 RX ADMIN — RIOCIGUAT 2.5 MILLIGRAM(S): 1.5 TABLET, FILM COATED ORAL at 05:58

## 2024-05-11 RX ADMIN — ARGATROBAN 7.34 MICROGRAM(S)/KG/MIN: 50 INJECTION, SOLUTION INTRAVENOUS at 07:07

## 2024-05-11 NOTE — PROGRESS NOTE ADULT - ASSESSMENT
68 year old female with PMHx  of Asthma, CTEPH, CAD, HTN, Type B Dissection referred to Dr. Kwon / Dr. Suh for intervention and presented to [facility] on 4/25/2024 for preoperative lumbar drain prior to planned TEVAR. Patient underwent transfemoral TEVAR with Dr. Kwon/Dr. Suh on 4/26/24, Procedure was uncomplicated and she was transferred to CT ICU post operatively extubated and stable. Post operatively patient had no neurological deficits and lumbar drain was removed on POD#2. She had routine post operative CT scan that revealed new splenic infarcts and PE and she was started on low dose heparin gtt. Overnight POD#2-3 patient had new LE weakness, neurosurgery was emergently called and lumbar drain was replaced with improvement in symptoms and remained in place until POD#6. MRI was obstructed by artifact from TEVAR graft and CT was negative for spinal hemorrhage or infarct. Patient had persistent hypoxia post operatively and pulm was consulted for CTEPH and she was started on additional medication with improvement. On POD#8 patient complained of LE unsteadiness with urinary retention, she was restarted on pressors to keep MAPs elevated with improvement. 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 Her pressors were slowly weaned from POD#10-12., She continued to remain stable and transferred to  on POD#14.  68 year old female with PMHx  of Asthma, CTEPH, CAD, HTN, Type B Dissection referred to Dr. Kwon / Dr. Suh for intervention and presented to [facility] on 4/25/2024 for preoperative lumbar drain prior to planned TEVAR. Patient underwent transfemoral TEVAR with Dr. Kwon/Dr. Suh on 4/26/24, Procedure was uncomplicated and she was transferred to CT ICU post operatively extubated and stable. Post operatively patient had no neurological deficits and lumbar drain was removed on POD#2. She had routine post operative CT scan that revealed new splenic infarcts and PE and she was started on low dose heparin gtt. Overnight POD#2-3 patient had new LE weakness, neurosurgery was emergently called and lumbar drain was replaced with improvement in symptoms and remained in place until POD#6. MRI was obstructed by artifact from TEVAR graft and CT was negative for spinal hemorrhage or infarct. Patient had persistent hypoxia post operatively and pulm was consulted for CTEPH and she was started on additional medication with improvement. On POD#8 patient complained of LE unsteadiness with urinary retention, she was restarted on pressors to keep MAPs elevated with improvement. 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 Her pressors were slowly weaned from POD#10-12., She continued to remain stable and transferred to  on POD#14. POD 15 pt continues to be stable, plan to start eliquis tonight per hematology and will TOV tonight per .     Plan:    Neurovascular:   -Pain well controlled with current regimen. PRN's: oxy    Cardiovascular:   -Hemodynamically stable.   -Monitor: BP, HR, tele  -s/p TEVAR    -still strong bilaterally in LE    -c/w midodrine 15mg Q8h to maintain slightly higher MAPs  -Pulm HTN    -c/w Macitentan 10mg QD    -c/w Adempas 2.5mg Q12h     Respiratory:   -Oxygenating well on room air  -Encourage continued use of IS 10x/hr and frequent ambulation  -CXR:    GI:  -GI PPX:  -PO Diet  -Bowel Regimen:     Renal / :  -Continue to monitor renal function: BUN/Cr  -Monitor I/O's daily     Endocrine:    -No hx of DM or thyroid dx  -A1c:  -TSH:    Hematologic:  -CBC: H/H-  -Coagulation Panel.    ID:  -Temperature:   -CBC: WBC-  -Continue to observe for SIRS/Sepsis Syndrome.    Prophylaxis:  -DVT prophylaxis with 5000 SubQ Heparin q8h.  -Continue with SCD's b/l while patient is at rest     Disposition:  -Discharge home once patient is medically ready   68 year old female with PMHx  of Asthma, CTEPH, CAD, HTN, Type B Dissection referred to Dr. Kwon / Dr. Suh for intervention and presented to [facility] on 4/25/2024 for preoperative lumbar drain prior to planned TEVAR. Patient underwent transfemoral TEVAR with Dr. Kwon/Dr. Suh on 4/26/24, Procedure was uncomplicated and she was transferred to CT ICU post operatively extubated and stable. Post operatively patient had no neurological deficits and lumbar drain was removed on POD#2. She had routine post operative CT scan that revealed new splenic infarcts and PE and she was started on low dose heparin gtt. Overnight POD#2-3 patient had new LE weakness, neurosurgery was emergently called and lumbar drain was replaced with improvement in symptoms and remained in place until POD#6. MRI was obstructed by artifact from TEVAR graft and CT was negative for spinal hemorrhage or infarct. Patient had persistent hypoxia post operatively and pulm was consulted for CTEPH and she was started on additional medication with improvement. On POD#8 patient complained of LE unsteadiness with urinary retention, she was restarted on pressors to keep MAPs elevated with improvement. 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 Her pressors were slowly weaned from POD#10-12., She continued to remain stable and transferred to  on POD#14. POD 15 pt continues to be stable, plan to start eliquis tonight per hematology and will TOV tonight per .     Plan:    Neurovascular:   -Pain well controlled with current regimen. PRN's: oxy    Cardiovascular:   -Hemodynamically stable.   -Monitor: BP, HR, tele  -s/p TEVAR    -still strong bilaterally in LE    -c/w midodrine 15mg Q8h to maintain slightly higher MAPs    -continue to wean steroids-florinef .1mg daily   -Pulm HTN    -c/w Macitentan 10mg QD    -c/w Adempas 2.5mg Q12h     Respiratory:   -Oxygenating well on room air  -Encourage continued use of IS 10x/hr and frequent ambulation  -CXR: WNL    GI:  -GI PPX: protonix  -PO Diet  -Bowel Regimen: miralax, senna    Renal / :  -Continue to monitor renal function: BUN/Cr 24/1.03  -Monitor I/O's daily   -Sarwat in place, spoke with SRATHAK who reccomends NATALEENORMAN tonight      Endocrine:    -No hx of DM or thyroid dx  -A1c:6  -TSH: 2    Hematologic:  -CBC: H/H- 9.8/29  -Coagulation Panel.  -HIT    -transition to eliquis tonight    ID:  -Temperature: 36.8  -UTI    -c/w ceftriaxone 1 G q12h  -CBC: WBC- 11  -Continue to observe for SIRS/Sepsis Syndrome.    Prophylaxis:  -DVT prophylaxis with argatroban  -Continue with SCD's b/l while patient is at rest     Disposition:  -Discharge home once patient is medically ready

## 2024-05-11 NOTE — PROGRESS NOTE ADULT - SUBJECTIVE AND OBJECTIVE BOX
Patient discussed on morning rounds with Dr. Hill    OPERATION & DATE: 4/26 TEVAR    SUBJECTIVE ASSESSMENT: resting comfortably in bed in NAD, legs are strong, no acute complaints at this time.     VITAL SIGNS:  Vital Signs Last 24 Hrs  T(C): 37 (11 May 2024 09:19), Max: 37 (11 May 2024 09:19)  T(F): 98.6 (11 May 2024 09:19), Max: 98.6 (11 May 2024 09:19)  HR: 92 (11 May 2024 08:51) (76 - 102)  BP: 139/69 (11 May 2024 08:51) (137/62 - 161/73)  BP(mean): 93 (11 May 2024 08:51) (89 - 105)  RR: 18 (11 May 2024 08:51) (16 - 18)  SpO2: 95% (11 May 2024 08:51) (95% - 100%)    Parameters below as of 11 May 2024 08:51  Patient On (Oxygen Delivery Method): room air      I&O's Detail    10 May 2024 07:01  -  11 May 2024 07:00  --------------------------------------------------------  IN:    Argatroban: 71 mL    Argatroban: 7.8 mL    Argatroban: 54.6 mL    Argatroban: 51.1 mL    IV PiggyBack: 50 mL    Oral Fluid: 340 mL  Total IN: 574.5 mL    OUT:    Indwelling Catheter - Urethral (mL): 910 mL  Total OUT: 910 mL    Total NET: -335.5 mL        CHEST TUBE:  none  WOOD DRAIN:  none  EPICARDIAL WIRES: none  STITCHES: none  STAPLES: none  ALBERTS: in place  CENTRAL LINE: none  MIDLINE/PICC: none  WOUND VAC: none    PHYSICAL EXAM:  General: resting comfortably in bed in [facility]  Neurological: AOx3. Motor skills grossly intact  Cardiovascular: Normal S1/S2. Regular rate/rhythm. No murmurs  Respiratory: Lungs CTA bilaterally. No wheezing or rales  Gastrointestinal: +BS in all 4 quadrants. Non-distended. Soft. Non-tender  Extremities: Strength 5/5 b/l upper/lower extremities. Sensation grossly intact upper/lower extremities. No edema. No calf tenderness.  Vascular: Radial 2+bilaterally, DP 2+ b/l  Incision Sites: NA      LABS:                        9.8    11.78 )-----------( 99       ( 11 May 2024 06:30 )             29.9     PT/INR - ( 11 May 2024 06:30 )   PT: 21.3 sec;   INR: 1.90          PTT - ( 11 May 2024 12:29 )  PTT:59.0 sec  05-11    141  |  110<H>  |  24<H>  ----------------------------<  106<H>  4.2   |  20<L>  |  1.03    Ca    10.0      11 May 2024 06:30  Phos  3.1     05-10  Mg     2.3     05-11    TPro  6.6  /  Alb  4.2  /  TBili  1.0  /  DBili  x   /  AST  65<H>  /  ALT  142<H>  /  AlkPhos  95  05-10    Urinalysis Basic - ( 11 May 2024 06:30 )    Color: x / Appearance: x / SG: x / pH: x  Gluc: 106 mg/dL / Ketone: x  / Bili: x / Urobili: x   Blood: x / Protein: x / Nitrite: x   Leuk Esterase: x / RBC: x / WBC x   Sq Epi: x / Non Sq Epi: x / Bacteria: x      MEDICATIONS  (STANDING):  albuterol    90 MICROgram(s) HFA Inhaler 2 Puff(s) Inhalation every 6 hours  albuterol/ipratropium for Nebulization 3 milliLiter(s) Nebulizer every 6 hours  apixaban 10 milliGRAM(s) Oral every 12 hours  argatroban Infusion 1.5 MICROgram(s)/kG/Min (7.34 mL/Hr) IV Continuous <Continuous>  cefTRIAXone   IVPB      cefTRIAXone   IVPB 1000 milliGRAM(s) IV Intermittent every 24 hours  chlorhexidine 2% Cloths 1 Application(s) Topical daily  fludroCORTISONE 0.1 milliGRAM(s) Oral daily  influenza  Vaccine (HIGH DOSE) 0.7 milliLiter(s) IntraMuscular once  macitentan 10 milliGRAM(s) Oral <User Schedule>  midodrine 15 milliGRAM(s) Oral every 8 hours  pantoprazole    Tablet 40 milliGRAM(s) Oral daily  polyethylene glycol 3350 17 Gram(s) Oral daily  riociguat 2.5 milliGRAM(s) Oral every 8 hours  senna 2 Tablet(s) Oral at bedtime  sodium chloride 0.9%. 1000 milliLiter(s) (10 mL/Hr) IV Continuous <Continuous>    MEDICATIONS  (PRN):  oxyCODONE    IR 5 milliGRAM(s) Oral every 6 hours PRN Moderate Pain (4 - 6)  sodium chloride 0.65% Nasal 1 Spray(s) Both Nostrils three times a day PRN dry nose    RADIOLOGY & ADDITIONAL TESTS:

## 2024-05-12 LAB
ALBUMIN SERPL ELPH-MCNC: 3.7 G/DL — SIGNIFICANT CHANGE UP (ref 3.3–5)
ALP SERPL-CCNC: 95 U/L — SIGNIFICANT CHANGE UP (ref 40–120)
ALT FLD-CCNC: 99 U/L — HIGH (ref 10–45)
ANION GAP SERPL CALC-SCNC: 13 MMOL/L — SIGNIFICANT CHANGE UP (ref 5–17)
AST SERPL-CCNC: 40 U/L — SIGNIFICANT CHANGE UP (ref 10–40)
BILIRUB SERPL-MCNC: 0.8 MG/DL — SIGNIFICANT CHANGE UP (ref 0.2–1.2)
BUN SERPL-MCNC: 17 MG/DL — SIGNIFICANT CHANGE UP (ref 7–23)
CALCIUM SERPL-MCNC: 9.5 MG/DL — SIGNIFICANT CHANGE UP (ref 8.4–10.5)
CHLORIDE SERPL-SCNC: 109 MMOL/L — HIGH (ref 96–108)
CO2 SERPL-SCNC: 20 MMOL/L — LOW (ref 22–31)
CREAT SERPL-MCNC: 1.06 MG/DL — SIGNIFICANT CHANGE UP (ref 0.5–1.3)
EGFR: 57 ML/MIN/1.73M2 — LOW
GLUCOSE SERPL-MCNC: 99 MG/DL — SIGNIFICANT CHANGE UP (ref 70–99)
HCT VFR BLD CALC: 29.9 % — LOW (ref 34.5–45)
HGB BLD-MCNC: 10 G/DL — LOW (ref 11.5–15.5)
MAGNESIUM SERPL-MCNC: 2.2 MG/DL — SIGNIFICANT CHANGE UP (ref 1.6–2.6)
MCHC RBC-ENTMCNC: 26.8 PG — LOW (ref 27–34)
MCHC RBC-ENTMCNC: 33.4 GM/DL — SIGNIFICANT CHANGE UP (ref 32–36)
MCV RBC AUTO: 80.2 FL — SIGNIFICANT CHANGE UP (ref 80–100)
NRBC # BLD: 0 /100 WBCS — SIGNIFICANT CHANGE UP (ref 0–0)
PLATELET # BLD AUTO: 81 K/UL — LOW (ref 150–400)
POTASSIUM SERPL-MCNC: 3.8 MMOL/L — SIGNIFICANT CHANGE UP (ref 3.5–5.3)
POTASSIUM SERPL-SCNC: 3.8 MMOL/L — SIGNIFICANT CHANGE UP (ref 3.5–5.3)
PROT SERPL-MCNC: 6.2 G/DL — SIGNIFICANT CHANGE UP (ref 6–8.3)
RBC # BLD: 3.73 M/UL — LOW (ref 3.8–5.2)
RBC # FLD: 16.5 % — HIGH (ref 10.3–14.5)
SODIUM SERPL-SCNC: 142 MMOL/L — SIGNIFICANT CHANGE UP (ref 135–145)
WBC # BLD: 10.29 K/UL — SIGNIFICANT CHANGE UP (ref 3.8–10.5)
WBC # FLD AUTO: 10.29 K/UL — SIGNIFICANT CHANGE UP (ref 3.8–10.5)

## 2024-05-12 PROCEDURE — 71045 X-RAY EXAM CHEST 1 VIEW: CPT | Mod: 26

## 2024-05-12 RX ORDER — SODIUM CHLORIDE 9 MG/ML
500 INJECTION, SOLUTION INTRAVENOUS ONCE
Refills: 0 | Status: COMPLETED | OUTPATIENT
Start: 2024-05-12 | End: 2024-05-12

## 2024-05-12 RX ORDER — POTASSIUM CHLORIDE 20 MEQ
40 PACKET (EA) ORAL ONCE
Refills: 0 | Status: COMPLETED | OUTPATIENT
Start: 2024-05-12 | End: 2024-05-12

## 2024-05-12 RX ADMIN — Medication 3 MILLILITER(S): at 23:54

## 2024-05-12 RX ADMIN — RIOCIGUAT 2.5 MILLIGRAM(S): 1.5 TABLET, FILM COATED ORAL at 06:00

## 2024-05-12 RX ADMIN — APIXABAN 10 MILLIGRAM(S): 2.5 TABLET, FILM COATED ORAL at 06:00

## 2024-05-12 RX ADMIN — MACITENTAN 10 MILLIGRAM(S): 10 TABLET, FILM COATED ORAL at 12:10

## 2024-05-12 RX ADMIN — SENNA PLUS 2 TABLET(S): 8.6 TABLET ORAL at 21:53

## 2024-05-12 RX ADMIN — RIOCIGUAT 2.5 MILLIGRAM(S): 1.5 TABLET, FILM COATED ORAL at 15:47

## 2024-05-12 RX ADMIN — APIXABAN 10 MILLIGRAM(S): 2.5 TABLET, FILM COATED ORAL at 17:32

## 2024-05-12 RX ADMIN — CEFTRIAXONE 100 MILLIGRAM(S): 500 INJECTION, POWDER, FOR SOLUTION INTRAMUSCULAR; INTRAVENOUS at 15:47

## 2024-05-12 RX ADMIN — Medication 3 MILLILITER(S): at 00:54

## 2024-05-12 RX ADMIN — Medication 3 MILLILITER(S): at 05:54

## 2024-05-12 RX ADMIN — FLUDROCORTISONE ACETATE 0.1 MILLIGRAM(S): 0.1 TABLET ORAL at 06:00

## 2024-05-12 RX ADMIN — Medication 3 MILLILITER(S): at 17:31

## 2024-05-12 RX ADMIN — PANTOPRAZOLE SODIUM 40 MILLIGRAM(S): 20 TABLET, DELAYED RELEASE ORAL at 12:10

## 2024-05-12 RX ADMIN — RIOCIGUAT 2.5 MILLIGRAM(S): 1.5 TABLET, FILM COATED ORAL at 21:53

## 2024-05-12 RX ADMIN — Medication 3 MILLILITER(S): at 12:10

## 2024-05-12 RX ADMIN — CHLORHEXIDINE GLUCONATE 1 APPLICATION(S): 213 SOLUTION TOPICAL at 05:53

## 2024-05-12 RX ADMIN — Medication 40 MILLIEQUIVALENT(S): at 09:50

## 2024-05-12 RX ADMIN — SODIUM CHLORIDE 1000 MILLILITER(S): 9 INJECTION, SOLUTION INTRAVENOUS at 17:33

## 2024-05-12 NOTE — PROGRESS NOTE ADULT - ASSESSMENT
68 year old female with PMHx  of Asthma, CTEPH, CAD, HTN, Type B Dissection referred to Dr. Kwon / Dr. Suh for intervention and presented to [facility] on 4/25/2024 for preoperative lumbar drain prior to planned TEVAR. Patient underwent transfemoral TEVAR with Dr. Kwon/Dr. Suh on 4/26/24, Procedure was uncomplicated and she was transferred to CT ICU post operatively extubated and stable. Post operatively patient had no neurological deficits and lumbar drain was removed on POD#2. She had routine post operative CT scan that revealed new splenic infarcts and PE and she was started on low dose heparin gtt. Overnight POD#2-3 patient had new LE weakness, neurosurgery was emergently called and lumbar drain was replaced with improvement in symptoms and remained in place until POD#6. MRI was obstructed by artifact from TEVAR graft and CT was negative for spinal hemorrhage or infarct. Patient had persistent hypoxia post operatively and pulm was consulted for CTEPH and she was started on additional medication with improvement. On POD#8 patient complained of LE unsteadiness with urinary retention, she was restarted on pressors to keep MAPs elevated with improvement. 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 Her pressors were slowly weaned from POD#10-12., She continued to remain stable and transferred to  on POD#14. POD 15 pt continues to be stable, plan to start eliquis in PM per hematology and will TOV per . POD 16 awaiting TOV.     Plan:    Neurovascular:   -Pain well controlled with current regimen. PRN's: oxy    Cardiovascular:   -Hemodynamically stable.   -Monitor: BP, HR, tele  -s/p TEVAR    -still strong bilaterally in LE    -midodrine held given adequate BP, will continue to monitor and start BB if possible  -Pulm HTN    -c/w Macitentan 10mg QD    -c/w Adempas 2.5mg Q12h     Respiratory:   -Oxygenating well on room air  -Encourage continued use of IS 10x/hr and frequent ambulation  -CXR: WNL    GI:  -GI PPX: protonix  -PO Diet  -Bowel Regimen: miralax, senna    Renal / :  -Continue to monitor renal function: BUN/Cr 17/1.06  -Monitor I/O's daily   -Awaiting TOV      Endocrine:    -No hx of DM or thyroid dx  -A1c:6  -TSH: 2    Hematologic:  -CBC: H/H- 10/80  -Coagulation Panel.  -HIT    -PLT 81 today    -will f/u with heme plan for AC, continue with eliquis for now    ID:  -Temperature: 37.2  -UTI    -c/w ceftriaxone 1 G q12h  -CBC: WBC- 10  -Continue to observe for SIRS/Sepsis Syndrome.    Prophylaxis:  -DVT prophylaxis with eliquis  -Continue with SCD's b/l while patient is at rest     Disposition:  -Discharge home once patient is medically ready

## 2024-05-12 NOTE — PROVIDER CONTACT NOTE (OTHER) - SITUATION
patient has voided 100ml since stevens removal, most recent bladder lora reveals 420ml urine in bladder post void, victoriano PHAM made aware no new orders given, patient is comfortable no distress.
no urine output,, bladder scan 473ml. pa mildred aware no orders at this time

## 2024-05-12 NOTE — PROGRESS NOTE ADULT - SUBJECTIVE AND OBJECTIVE BOX
Patient discussed on morning rounds with Dr. Hill    OPERATION & DATE: 4/26 TEVAR    SUBJECTIVE ASSESSMENT: Resting comfortably in bed, NAD, has not been able to void yet but has not had the urge    VITAL SIGNS:  Vital Signs Last 24 Hrs  T(C): 36.4 (12 May 2024 09:13), Max: 37.2 (12 May 2024 00:15)  T(F): 97.5 (12 May 2024 09:13), Max: 99 (12 May 2024 00:15)  HR: 102 (12 May 2024 09:02) (82 - 102)  BP: 98/56 (12 May 2024 09:02) (98/56 - 152/66)  BP(mean): 73 (12 May 2024 09:02) (73 - 95)  RR: 17 (12 May 2024 09:02) (17 - 18)  SpO2: 100% (12 May 2024 09:02) (92% - 100%)    Parameters below as of 12 May 2024 09:02  Patient On (Oxygen Delivery Method): room air      I&O's Detail    11 May 2024 07:01  -  12 May 2024 07:00  --------------------------------------------------------  IN:    Argatroban: 65.7 mL    Oral Fluid: 480 mL  Total IN: 545.7 mL    OUT:    Indwelling Catheter - Urethral (mL): 525 mL  Total OUT: 525 mL    Total NET: 20.7 mL        CHEST TUBE:  none  WOOD DRAIN:  none  EPICARDIAL WIRES:  none  STITCHES: none  STAPLES: none  ALBERTS: none  CENTRAL LINE: none  MIDLINE/PICC: none  WOUND VAC: none    PHYSICAL EXAM:  General: resting comfortably in bed in NAD, no acute complaints at this time  Neurological: AOx3. Motor skills grossly intact  Cardiovascular: Normal S1/S2. Regular rate/rhythm. No murmurs  Respiratory: Lungs CTA bilaterally. No wheezing or rales  Gastrointestinal: +BS in all 4 quadrants. Non-distended. Soft. Non-tender  Extremities: Strength 5/5 b/l upper/lower extremities. Sensation grossly intact upper/lower extremities. No edema. No calf tenderness.  Vascular: Radial 2+bilaterally, DP 2+ b/l  Incision Sites: none      LABS:                        10.0   10.29 )-----------( 81       ( 12 May 2024 06:37 )             29.9     PT/INR - ( 11 May 2024 06:30 )   PT: 21.3 sec;   INR: 1.90          PTT - ( 11 May 2024 12:29 )  PTT:59.0 sec  05-12    142  |  109<H>  |  17  ----------------------------<  99  3.8   |  20<L>  |  1.06    Ca    9.5      12 May 2024 06:37  Mg     2.2     05-12    TPro  6.2  /  Alb  3.7  /  TBili  0.8  /  DBili  x   /  AST  40  /  ALT  99<H>  /  AlkPhos  95  05-12    Urinalysis Basic - ( 12 May 2024 06:37 )    Color: x / Appearance: x / SG: x / pH: x  Gluc: 99 mg/dL / Ketone: x  / Bili: x / Urobili: x   Blood: x / Protein: x / Nitrite: x   Leuk Esterase: x / RBC: x / WBC x   Sq Epi: x / Non Sq Epi: x / Bacteria: x      MEDICATIONS  (STANDING):  albuterol    90 MICROgram(s) HFA Inhaler 2 Puff(s) Inhalation every 6 hours  albuterol/ipratropium for Nebulization 3 milliLiter(s) Nebulizer every 6 hours  apixaban 10 milliGRAM(s) Oral every 12 hours  cefTRIAXone   IVPB 1000 milliGRAM(s) IV Intermittent every 24 hours  cefTRIAXone   IVPB      chlorhexidine 2% Cloths 1 Application(s) Topical daily  influenza  Vaccine (HIGH DOSE) 0.7 milliLiter(s) IntraMuscular once  macitentan 10 milliGRAM(s) Oral <User Schedule>  pantoprazole    Tablet 40 milliGRAM(s) Oral daily  polyethylene glycol 3350 17 Gram(s) Oral daily  potassium chloride    Tablet ER 40 milliEquivalent(s) Oral once  riociguat 2.5 milliGRAM(s) Oral every 8 hours  senna 2 Tablet(s) Oral at bedtime  sodium chloride 0.9%. 1000 milliLiter(s) (10 mL/Hr) IV Continuous <Continuous>    MEDICATIONS  (PRN):  oxyCODONE    IR 5 milliGRAM(s) Oral every 6 hours PRN Moderate Pain (4 - 6)  sodium chloride 0.65% Nasal 1 Spray(s) Both Nostrils three times a day PRN dry nose    RADIOLOGY & ADDITIONAL TESTS:

## 2024-05-13 ENCOUNTER — TRANSCRIPTION ENCOUNTER (OUTPATIENT)
Age: 69
End: 2024-05-13

## 2024-05-13 LAB
ANION GAP SERPL CALC-SCNC: 12 MMOL/L — SIGNIFICANT CHANGE UP (ref 5–17)
APTT BLD: 65.2 SEC — HIGH (ref 24.5–35.6)
BUN SERPL-MCNC: 13 MG/DL — SIGNIFICANT CHANGE UP (ref 7–23)
CALCIUM SERPL-MCNC: 9.4 MG/DL — SIGNIFICANT CHANGE UP (ref 8.4–10.5)
CHLORIDE SERPL-SCNC: 110 MMOL/L — HIGH (ref 96–108)
CLOSURE TME COLL+EPINEP BLD: 72 K/UL — LOW (ref 150–400)
CO2 SERPL-SCNC: 21 MMOL/L — LOW (ref 22–31)
CREAT SERPL-MCNC: 1.07 MG/DL — SIGNIFICANT CHANGE UP (ref 0.5–1.3)
EGFR: 57 ML/MIN/1.73M2 — LOW
GLUCOSE SERPL-MCNC: 108 MG/DL — HIGH (ref 70–99)
HCT VFR BLD CALC: 29 % — LOW (ref 34.5–45)
HGB BLD-MCNC: 9.6 G/DL — LOW (ref 11.5–15.5)
MAGNESIUM SERPL-MCNC: 2.1 MG/DL — SIGNIFICANT CHANGE UP (ref 1.6–2.6)
MCHC RBC-ENTMCNC: 26.7 PG — LOW (ref 27–34)
MCHC RBC-ENTMCNC: 33.1 GM/DL — SIGNIFICANT CHANGE UP (ref 32–36)
MCV RBC AUTO: 80.8 FL — SIGNIFICANT CHANGE UP (ref 80–100)
NRBC # BLD: 0 /100 WBCS — SIGNIFICANT CHANGE UP (ref 0–0)
PLATELET # BLD AUTO: 74 K/UL — LOW (ref 150–400)
POTASSIUM SERPL-MCNC: 4.2 MMOL/L — SIGNIFICANT CHANGE UP (ref 3.5–5.3)
POTASSIUM SERPL-SCNC: 4.2 MMOL/L — SIGNIFICANT CHANGE UP (ref 3.5–5.3)
RBC # BLD: 3.59 M/UL — LOW (ref 3.8–5.2)
RBC # FLD: 16.8 % — HIGH (ref 10.3–14.5)
SODIUM SERPL-SCNC: 143 MMOL/L — SIGNIFICANT CHANGE UP (ref 135–145)
WBC # BLD: 9.57 K/UL — SIGNIFICANT CHANGE UP (ref 3.8–10.5)
WBC # FLD AUTO: 9.57 K/UL — SIGNIFICANT CHANGE UP (ref 3.8–10.5)

## 2024-05-13 PROCEDURE — 99233 SBSQ HOSP IP/OBS HIGH 50: CPT

## 2024-05-13 PROCEDURE — 71045 X-RAY EXAM CHEST 1 VIEW: CPT | Mod: 26

## 2024-05-13 PROCEDURE — 99232 SBSQ HOSP IP/OBS MODERATE 35: CPT | Mod: GC

## 2024-05-13 RX ORDER — POLYETHYLENE GLYCOL 3350 17 G/17G
17 POWDER, FOR SOLUTION ORAL
Qty: 119 | Refills: 0
Start: 2024-05-13 | End: 2024-05-19

## 2024-05-13 RX ORDER — ALBUTEROL 90 UG/1
2 AEROSOL, METERED ORAL
Qty: 0 | Refills: 0 | DISCHARGE
Start: 2024-05-13

## 2024-05-13 RX ORDER — AMLODIPINE BESYLATE 2.5 MG/1
1 TABLET ORAL
Refills: 0 | DISCHARGE

## 2024-05-13 RX ORDER — APIXABAN 2.5 MG/1
5 TABLET, FILM COATED ORAL
Refills: 0 | Status: DISCONTINUED | OUTPATIENT
Start: 2024-05-13 | End: 2024-05-13

## 2024-05-13 RX ORDER — SENNA PLUS 8.6 MG/1
2 TABLET ORAL
Qty: 14 | Refills: 0
Start: 2024-05-13 | End: 2024-05-19

## 2024-05-13 RX ORDER — ARGATROBAN 50 MG/50ML
1.84 INJECTION, SOLUTION INTRAVENOUS
Qty: 50 | Refills: 0 | Status: DISCONTINUED | OUTPATIENT
Start: 2024-05-13 | End: 2024-05-18

## 2024-05-13 RX ORDER — PANTOPRAZOLE SODIUM 20 MG/1
1 TABLET, DELAYED RELEASE ORAL
Qty: 30 | Refills: 0
Start: 2024-05-13 | End: 2024-06-11

## 2024-05-13 RX ORDER — CARVEDILOL PHOSPHATE 80 MG/1
1 CAPSULE, EXTENDED RELEASE ORAL
Refills: 0 | DISCHARGE

## 2024-05-13 RX ADMIN — ARGATROBAN 7.34 MICROGRAM(S)/KG/MIN: 50 INJECTION, SOLUTION INTRAVENOUS at 23:26

## 2024-05-13 RX ADMIN — RIOCIGUAT 2.5 MILLIGRAM(S): 1.5 TABLET, FILM COATED ORAL at 14:45

## 2024-05-13 RX ADMIN — PANTOPRAZOLE SODIUM 40 MILLIGRAM(S): 20 TABLET, DELAYED RELEASE ORAL at 11:57

## 2024-05-13 RX ADMIN — MACITENTAN 10 MILLIGRAM(S): 10 TABLET, FILM COATED ORAL at 11:58

## 2024-05-13 RX ADMIN — Medication 3 MILLILITER(S): at 05:44

## 2024-05-13 RX ADMIN — APIXABAN 10 MILLIGRAM(S): 2.5 TABLET, FILM COATED ORAL at 05:46

## 2024-05-13 RX ADMIN — RIOCIGUAT 2.5 MILLIGRAM(S): 1.5 TABLET, FILM COATED ORAL at 05:44

## 2024-05-13 RX ADMIN — Medication 3 MILLILITER(S): at 11:57

## 2024-05-13 RX ADMIN — Medication 3 MILLILITER(S): at 17:03

## 2024-05-13 RX ADMIN — CHLORHEXIDINE GLUCONATE 1 APPLICATION(S): 213 SOLUTION TOPICAL at 05:44

## 2024-05-13 RX ADMIN — ARGATROBAN 7.34 MICROGRAM(S)/KG/MIN: 50 INJECTION, SOLUTION INTRAVENOUS at 17:02

## 2024-05-13 RX ADMIN — SENNA PLUS 2 TABLET(S): 8.6 TABLET ORAL at 22:18

## 2024-05-13 RX ADMIN — RIOCIGUAT 2.5 MILLIGRAM(S): 1.5 TABLET, FILM COATED ORAL at 22:18

## 2024-05-13 NOTE — PROGRESS NOTE ADULT - SUBJECTIVE AND OBJECTIVE BOX
PULMONARY CONSULT SERVICE FOLLOW-UP NOTE    INTERVAL HPI:  Reviewed chart and overnight events; patient seen and examined at bedside. Patient continues to feel improved.  Notes that she has been walking daily without weakness or dyspnea.  Feels closer to baseline. Primary team attempted to remove stevens with TOV trials but failed, planning to send home with urology follow up and stevens.     MEDICATIONS:  Pulmonary:  albuterol    90 MICROgram(s) HFA Inhaler 2 Puff(s) Inhalation every 6 hours  albuterol/ipratropium for Nebulization 3 milliLiter(s) Nebulizer every 6 hours    Antimicrobials:    Anticoagulants:  argatroban Infusion 1.499 MICROgram(s)/kG/Min IV Continuous <Continuous>    Cardiac:  macitentan 10 milliGRAM(s) Oral <User Schedule>  riociguat 2.5 milliGRAM(s) Oral every 8 hours      Allergies    heparin (Other (Moderate))    Intolerances        Vital Signs Last 24 Hrs  T(C): 36.4 (13 May 2024 12:22), Max: 37.1 (12 May 2024 17:31)  T(F): 97.5 (13 May 2024 12:22), Max: 98.8 (12 May 2024 17:31)  HR: 108 (13 May 2024 13:50) (92 - 112)  BP: 124/57 (13 May 2024 13:50) (124/57 - 161/76)  BP(mean): 79 (13 May 2024 13:50) (79 - 109)  RR: 18 (13 May 2024 08:57) (18 - 20)  SpO2: 100% (13 May 2024 08:57) (88% - 100%)    Parameters below as of 13 May 2024 12:48  Patient On (Oxygen Delivery Method): room air        05-12 @ 07:01  -  05-13 @ 07:00  --------------------------------------------------------  IN: 1460 mL / OUT: 2050 mL / NET: -590 mL    05-13 @ 07:01 - 05-13 @ 15:35  --------------------------------------------------------  IN: 150 mL / OUT: 300 mL / NET: -150 mL          PHYSICAL EXAM:  Constitutional: NAD, on room air  HEENT: NC/AT; PERRL, anicteric sclera; MMM  Neck: supple  Cardiovascular: +S1/S2, RRR  Respiratory: CTA B/L; no W/R/R, slightly decreased entry at bases  Gastrointestinal: soft, NT/ND  Extremities: WWP; no edema, clubbing or cyanosis  Vascular: 2+ radial pulses B/L  Neurological: awake and alert; HORNER    LABS:      CBC Full  -  ( 13 May 2024 05:30 )  WBC Count : 9.57 K/uL  RBC Count : 3.59 M/uL  Hemoglobin : 9.6 g/dL  Hematocrit : 29.0 %  Platelet Count - Automated : 74 K/uL  Mean Cell Volume : 80.8 fl  Mean Cell Hemoglobin : 26.7 pg  Mean Cell Hemoglobin Concentration : 33.1 gm/dL  Auto Neutrophil # : x  Auto Lymphocyte # : x  Auto Monocyte # : x  Auto Eosinophil # : x  Auto Basophil # : x  Auto Neutrophil % : x  Auto Lymphocyte % : x  Auto Monocyte % : x  Auto Eosinophil % : x  Auto Basophil % : x    05-13    143  |  110<H>  |  13  ----------------------------<  108<H>  4.2   |  21<L>  |  1.07    Ca    9.4      13 May 2024 05:30  Mg     2.1     05-13    TPro  6.2  /  Alb  3.7  /  TBili  0.8  /  DBili  x   /  AST  40  /  ALT  99<H>  /  AlkPhos  95  05-12          Urinalysis Basic - ( 13 May 2024 05:30 )    Color: x / Appearance: x / SG: x / pH: x  Gluc: 108 mg/dL / Ketone: x  / Bili: x / Urobili: x   Blood: x / Protein: x / Nitrite: x   Leuk Esterase: x / RBC: x / WBC x   Sq Epi: x / Non Sq Epi: x / Bacteria: x                RADIOLOGY & ADDITIONAL STUDIES:

## 2024-05-13 NOTE — DISCHARGE NOTE PROVIDER - NSDCFUSCHEDAPPT_GEN_ALL_CORE_FT
Carlos Darby  [facility]  UROLOGY [address] [facility]  Scheduled Appointment: 05/20/2024    Tres Byrne  [facility]  NEUROLOGY [address] [facility]  Scheduled Appointment: 05/21/2024    Franky Suh  [facility]  CTS[facility] [address]  Scheduled Appointment: 05/30/2024    Freeman Benites  [facility]  [facility] [address]  Scheduled Appointment: 06/07/2024    [facility]  [facility]  Scheduled Appointment: 07/23/2024    Goldberg, Bradley  [facility]  [facility]  Scheduled Appointment: 07/23/2024     Goldberg, Bradley  [facility]  [facility]  Scheduled Appointment: 05/30/2024    Franky Suh  [facility]  [facility] [address]  Scheduled Appointment: 05/30/2024    Freeman Benites  [facility]  [facility] [address]  Scheduled Appointment: 06/07/2024    [facility]  [facility]  Scheduled Appointment: 07/23/2024    Goldberg, Bradley  [facility]  [facility]  Scheduled Appointment: 07/23/2024

## 2024-05-13 NOTE — DISCHARGE NOTE PROVIDER - HOSPITAL COURSE
Patient discussed on morning rounds with Dr. 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 Kwon and Dr. Suh to be a good candidate for TEVAR. Patient was pre-admitted for lumbar drain placement and now s/p TEVAR (proximally covering L subclavian, distal ending 5cm above celiac) on 4/26/24 by . Patient admitted to CTICU with lumbar drain in place. On POD2, LD was clamped and eventually removed as patient was ambulating. CTA C/A/P obtained revealing splenic and PEs, so patient started on hep gtt     DISCHARGE PHYSICAL EXAM:  Appearance: No acute distress. Sitting in chair   Neurologic: AAOx3, no AMS or focal deficits.  Responds appropriately to verbal and physical stimuli; exhibits purposeful movement in all extremities.  Cardiovascular: RRR, S1 S2. No m/r/g.  Respiratory: No acute respiratory distress. CTA b/l, no w/r/r.   Gastrointestinal:  Soft, non-tender, non-distended, + BS.	  Skin: No rashes. No ecchymoses. No cyanosis.  Extremities: +R groin non tender with no hematoma palpated, no dehisence. +mild pitting edema noted to b/l LE. Exhibits normal range of motion, no clubbing, cyanosis.  Vascular: Palpable b/l DP/PT pulses   _ _ _ _  _ _  REMOVAL CHECKLIST:        [X] PICC/Midline  _ _ _ _ _ _ _ _ _ _ _ _   MEDICATION DISCHARGE CHECKLIST      Anticoagulation        [X] NOAC, [ ] Reason: DON, hx of PE              Cost/Insurance barriers addressed: YES  _ _ _ _ _ _ _ _ _ _ _ _  RELEVANT LABS/IMAGING:  _ _ _ _ _ _ _ _ _ _ _ _  CLINICAL FOLLOW UP NEEDS:     [ ] Labwork           Labs needed:           When/Timing:           Outpatient team aware: YES/NO     _ _ _ _ _ _ _ _ _ _ _ _  Over 35 minutes was spent with the patient reviewing the discharge material including medications, follow up appointments, recovery, concerning symptoms, and how to contact their health care providers if they have questions   Patient discussed on morning rounds with Dr. 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 Kwon and Dr. Suh to be a good candidate for TEVAR. Patient was pre-admitted for lumbar drain placement and now s/p TEVAR (proximally covering L subclavian, distal ending 5cm above celiac) on 4/26/24 by . Patient admitted to CTICU with lumbar drain in place. On POD2, LD was clamped and eventually removed as patient was ambulating. CTA C/A/P obtained revealing splenic and PEs. POD 3 patient was complaining of LE weakness, LD was replaced with improved strength w/ negative MRI L-spine. POD 4, LE venous duplex w/ L fem/ pop DVTs and pulm was consulted for ongoing CTEPH management. Negative MRI L-spine and patient with continued strength to LE, so LD capped on POD 5 and pulled on POD 6. POD 6, patient was started on hep gtt with the swan cath removed on POD 7. On POD 8, patient with unsteadiness and urinary retention, so stevens placed and remained of Mil/ Vaso for higher MAP goals. 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 POD 10-POD 12, patient continued to ambulate while maintaining higher MAPs, eventually weaned off pressor support on POD 14. POD 14 fernando was removed and patient was transferred to [facility]. POD 14, urology was consulted for urinary retention, recommending to leave catheter in for 1 week and if continues to fail TOV, can leave stevens in place and ensure she has follow up with urology. Hematology also consulted on POD 14 for platelet recovery and AC recommendations, recommending patient is transitioned to NOAC once plt >100k.      DISCHARGE PHYSICAL EXAM:  Appearance: No acute distress. Sitting in chair   Neurologic: AAOx3, no AMS or focal deficits.  Responds appropriately to verbal and physical stimuli; exhibits purposeful movement in all extremities.  Cardiovascular: RRR, S1 S2. No m/r/g.  Respiratory: No acute respiratory distress. CTA b/l, no w/r/r.   Gastrointestinal:  Soft, non-tender, non-distended, + BS.	  Skin: No rashes. No ecchymoses. No cyanosis.  Extremities: +R groin non tender with no hematoma palpated, no dehisence. +mild pitting edema noted to b/l LE. Exhibits normal range of motion, no clubbing, cyanosis.  Vascular: Palpable b/l DP/PT pulses   _ _ _ _  _ _  REMOVAL CHECKLIST:        [X] PICC/Midline  _ _ _ _ _ _ _ _ _ _ _ _   MEDICATION DISCHARGE CHECKLIST      Anticoagulation        [X] NOAC, [ ] Reason: DON, hx of PE              Cost/Insurance barriers addressed: YES  _ _ _ _ _ _ _ _ _ _ _ _  RELEVANT LABS/IMAGING:  _ _ _ _ _ _ _ _ _ _ _ _  CLINICAL FOLLOW UP NEEDS:     [ ] Labwork           Labs needed:           When/Timing:           Outpatient team aware: YES/NO     _ _ _ _ _ _ _ _ _ _ _ _  Over 35 minutes was spent with the patient reviewing the discharge material including medications, follow up appointments, recovery, concerning symptoms, and how to contact their health care providers if they have questions   Patient discussed on morning rounds with Dr. 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 Kwon and Dr. Suh to be a good candidate for TEVAR. Patient was pre-admitted for lumbar drain placement and now s/p TEVAR (proximally covering L subclavian, distal ending 5cm above celiac) on 4/26/24 by . Patient admitted to CTICU with lumbar drain in place. On POD2, LD was clamped and eventually removed as patient was ambulating. CTA C/A/P obtained revealing splenic and PEs. POD 3 patient was complaining of LE weakness, LD was replaced with improved strength w/ negative MRI L-spine. POD 4, LE venous duplex w/ L fem/ pop DVTs and pulm was consulted for ongoing CTEPH management. Negative MRI L-spine and patient with continued strength to LE, so LD capped on POD 5 and pulled on POD 6. POD 6, patient was started on hep gtt with the swan cath removed on POD 7. On POD 8, patient with unsteadiness and urinary retention, so stevens placed and remained of Mil/ Vaso for higher MAP goals. 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 POD 10-POD 12, patient continued to ambulate while maintaining higher MAPs, eventually weaned off pressor support on POD 14. POD 14 fernando was removed and patient was transferred to [facility]. POD 14, urology was consulted for urinary retention, recommending to leave catheter in for 1 week and if continues to fail TOV, can leave stevens in place and ensure she has follow up with urology. Hematology also consulted on POD 14 for platelet recovery and AC recommendations, recommending patient is transitioned to NOAC once plt >100k. Platelets uptrended and patient was transitioned to loading dose Eliquis 10mg 2 times a day. POD 17 patient failed TOV again, so decision made to leave stevens in place for discharge. Hematology recommending repeat plt test this week upon discharge. Patient notes her home [facility] should be arriving at her home today. Patient continued to remain hemodynamically stable and ambulating on room air. As per , patient is ready for discharge home on 5/13/24.       DISCHARGE PHYSICAL EXAM:  Appearance: No acute distress. Sitting in chair   Neurologic: AAOx3, no AMS or focal deficits.  Responds appropriately to verbal and physical stimuli; exhibits purposeful movement in all extremities.  Cardiovascular: RRR, S1 S2. No m/r/g.  Respiratory: No acute respiratory distress. CTA b/l, no w/r/r.   Gastrointestinal:  Soft, non-tender, non-distended, + BS.	  Skin: No rashes. No ecchymoses. No cyanosis.  Extremities: +R groin non tender with no hematoma palpated, no dehisence. +mild pitting edema noted to b/l LE. Exhibits normal range of motion, no clubbing, cyanosis.  Vascular: Palpable b/l DP/PT pulses   _ _ _ _  _ _  REMOVAL CHECKLIST:        [X] PICC/Midline  _ _ _ _ _ _ _ _ _ _ _ _   MEDICATION DISCHARGE CHECKLIST      Anticoagulation        [X] NOAC, [ ] Reason: DON, hx of PE              Cost/Insurance barriers addressed: YES  _ _ _ _ _ _ _ _ _ _ _ _  RELEVANT LABS/IMAGING:  _ _ _ _ _ _ _ _ _ _ _ _  CLINICAL FOLLOW UP NEEDS:     [ ] Labwork           Labs needed:           When/Timing:           Outpatient team aware: YES/NO     _ _ _ _ _ _ _ _ _ _ _ _  Over 35 minutes was spent with the patient reviewing the discharge material including medications, follow up appointments, recovery, concerning symptoms, and how to contact their health care providers if they have questions   Patient discussed on morning rounds with Dr. 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 Kwon and Dr. Suh to be a good candidate for TEVAR. Patient was pre-admitted for lumbar drain placement and now s/p TEVAR (proximally covering L subclavian, distal ending 5cm above celiac) on 4/26/24 by . Patient admitted to CTICU with lumbar drain in place. On POD2, LD was clamped and eventually removed as patient was ambulating. CTA C/A/P obtained revealing splenic and PEs. POD 3 patient was complaining of LE weakness, LD was replaced with improved strength w/ negative MRI L-spine. POD 4, LE venous duplex w/ L fem/ pop DVTs and pulm was consulted for ongoing CTEPH management. Negative MRI L-spine and patient with continued strength to LE, so LD capped on POD 5 and pulled on POD 6. POD 6, patient was started on hep gtt with the swan cath removed on POD 7. On POD 8, patient with unsteadiness and urinary retention, so stevens placed and remained of Mil/ Vaso for higher MAP goals. 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 POD 10-POD 12, patient continued to ambulate while maintaining higher MAPs, eventually weaned off pressor support on POD 14. POD 14 fernando was removed and patient was transferred to [facility]. POD 14, urology was consulted for urinary retention, recommending to leave catheter in for 1 week and if continues to fail TOV, can leave stevens in place and ensure she has follow up with urology. Hematology also consulted on POD 14 for platelet recovery and AC recommendations, recommending patient is transitioned to NOAC once plt >100k. Platelets uptrended and patient was transitioned to loading dose Eliquis 10mg 2 times a day. POD 17 hematology recommending repeat plt test this week upon discharge and urology recommending to discharge patient with stevens in place. Patient notes her home [facility] should be arriving at her home today. Patient continued to remain hemodynamically stable and ambulating on room air. As per , patient is ready for discharge home on 5/13/24.       DISCHARGE PHYSICAL EXAM:  Appearance: No acute distress. Sitting in chair   Neurologic: AAOx3, no AMS or focal deficits.  Responds appropriately to verbal and physical stimuli; exhibits purposeful movement in all extremities.  Cardiovascular: RRR, S1 S2. No m/r/g.  Respiratory: No acute respiratory distress. CTA b/l, no w/r/r.   Gastrointestinal:  Soft, non-tender, non-distended, + BS.	  Skin: No rashes. No ecchymoses. No cyanosis.  Extremities: +R groin non tender with no hematoma palpated, no dehisence. +mild pitting edema noted to b/l LE. Exhibits normal range of motion, no clubbing, cyanosis.  Vascular: Palpable b/l DP/PT pulses   _ _ _ _  _ _  REMOVAL CHECKLIST:        [X] PICC/Midline  _ _ _ _ _ _ _ _ _ _ _ _   MEDICATION DISCHARGE CHECKLIST      Anticoagulation        [X] NOAC, [ ] Reason: DON, hx of PE              Cost/Insurance barriers addressed: YES  _ _ _ _ _ _ _ _ _ _ _ _  RELEVANT LABS/IMAGING:  _ _ _ _ _ _ _ _ _ _ _ _  CLINICAL FOLLOW UP NEEDS:     [ ] Labwork           Labs needed:           When/Timing:           Outpatient team aware: YES/NO     _ _ _ _ _ _ _ _ _ _ _ _  Over 35 minutes was spent with the patient reviewing the discharge material including medications, follow up appointments, recovery, concerning symptoms, and how to contact their health care providers if they have questions   Patient discussed on morning rounds with Dr. 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 Kwon and Dr. Suh to be a good candidate for TEVAR. Patient was pre-admitted for lumbar drain placement and now s/p TEVAR (proximally covering L subclavian, distal ending 5cm above celiac) on 4/26/24 by . Patient admitted to CTICU with lumbar drain in place. On POD2, LD was clamped and eventually removed as patient was ambulating. CTA C/A/P obtained revealing splenic and PEs. POD 3 patient was complaining of LE weakness, LD was replaced with improved strength w/ negative MRI L-spine. POD 4, LE venous duplex w/ L fem/ pop DVTs and pulm was consulted for ongoing CTEPH management. Negative MRI L-spine and patient with continued strength to LE, so LD capped on POD 5 and pulled on POD 6. POD 6, patient was started on hep gtt with the swan cath removed on POD 7. On POD 8, patient with unsteadiness and urinary retention, so stevens placed and remained of Mil/ Vaso for higher MAP goals. 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 POD 10-POD 12, patient continued to ambulate while maintaining higher MAPs, eventually weaned off pressor support on POD 14. POD 14 fernando was removed and patient was transferred to [facility]. POD 14, urology was consulted for urinary retention, recommending to leave catheter in for 1 week and if continues to fail TOV, can leave stevens in place and ensure she has follow up with urology. Hematology also consulted on POD 14 for platelet recovery and AC recommendations, recommending patient is transitioned to NOAC once plt >100k. Platelets uptrended and patient was transitioned to loading dose Eliquis 10mg 2 times a day. POD 17 hematology recommending repeat plt test this week upon discharge and urology recommending to discharge patient with stevens in place. Patient notes her home [facility] should be arriving at her home today. Patient continued to remain hemodynamically stable and ambulating on room air. As per , patient is ready for discharge home on 5/13/24.         DISCHARGE PHYSICAL EXAM:  Appearance: No acute distress. Sitting in chair   Neurologic: AAOx3, no AMS or focal deficits.  Responds appropriately to verbal and physical stimuli; exhibits purposeful movement in all extremities.  Cardiovascular: RRR, S1 S2. No m/r/g.  Respiratory: No acute respiratory distress. CTA b/l, no w/r/r.   Gastrointestinal:  Soft, non-tender, non-distended, + BS.	  Skin: No rashes. No ecchymoses. No cyanosis.  Extremities: +R groin non tender with no hematoma palpated, no dehisence. +mild pitting edema noted to b/l LE. Exhibits normal range of motion, no clubbing, cyanosis.  Vascular: Palpable b/l DP/PT pulses   _ _ _ _  _ _  REMOVAL CHECKLIST:        [X] PICC/Midline  _ _ _ _ _ _ _ _ _ _ _ _   MEDICATION DISCHARGE CHECKLIST      Anticoagulation        [X] NOAC, [ ] Reason: DON, hx of PE              Cost/Insurance barriers addressed: YES  _ _ _ _ _ _ _ _ _ _ _ _  RELEVANT LABS/IMAGING:  _ _ _ _ _ _ _ _ _ _ _ _  CLINICAL FOLLOW UP NEEDS:     [ ] Labwork           Labs needed:           When/Timing:           Outpatient team aware: YES/NO     _ _ _ _ _ _ _ _ _ _ _ _  Over 35 minutes was spent with the patient reviewing the discharge material including medications, follow up appointments, recovery, concerning symptoms, and how to contact their health care providers if they have questions   Patient discussed on morning rounds with Dr. 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 Kwon and Dr. Suh to be a good candidate for TEVAR. Patient was pre-admitted for lumbar drain placement and now s/p TEVAR (proximally covering L subclavian, distal ending 5cm above celiac) on 4/26/24 by . Patient admitted to CTICU with lumbar drain in place. On POD2, LD was clamped and eventually removed as patient was ambulating. CTA C/A/P obtained revealing splenic and PEs. POD 3 patient was complaining of LE weakness, LD was replaced with improved strength w/ negative MRI L-spine. Concern for possible transient spinal cord infarct, improved, discharge NIHSS 0. POD 4, LE venous duplex w/ L fem/ pop DVTs and pulm was consulted for ongoing CTEPH management. Negative MRI L-spine and patient with continued strength to LE, so LD capped on POD 5 and pulled on POD 6. POD 6, patient was started on hep gtt with the swan cath removed on POD 7. On POD 8, patient with unsteadiness and urinary retention, so stevens placed and remained of Mil/ Vaso for higher MAP goals. 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 POD 10-POD 12, patient continued to ambulate while maintaining higher MAPs, eventually weaned off pressor support on POD 14. POD 14 fernando was removed and patient was transferred to [facility]. POD 14, urology was consulted for urinary retention, recommending to leave catheter in for 1 week and if continues to fail TOV, can leave stevens in place and ensure she has follow up with urology. Hematology also consulted on POD 14 for platelet recovery and AC recommendations, recommending patient is transitioned to NOAC once plt >100k. Platelets uptrended and patient was transitioned to loading dose Eliquis 10mg 2 times a day. POD 17 hematology recommending repeat plt test this week upon discharge and urology recommending to discharge patient with stevens in place. Patient notes her home [facility] should be arriving at her home today. Patient continued to remain hemodynamically stable and ambulating on room air. As per , patient is ready for discharge home on 5/13/24.         DISCHARGE PHYSICAL EXAM:  Appearance: No acute distress. Sitting in chair   Neurologic: AAOx3, no AMS or focal deficits.  Responds appropriately to verbal and physical stimuli; exhibits purposeful movement in all extremities.  Cardiovascular: RRR, S1 S2. No m/r/g.  Respiratory: No acute respiratory distress. CTA b/l, no w/r/r.   Gastrointestinal:  Soft, non-tender, non-distended, + BS.	  Skin: No rashes. No ecchymoses. No cyanosis.  Extremities: +R groin non tender with no hematoma palpated, no dehisence. +mild pitting edema noted to b/l LE. Exhibits normal range of motion, no clubbing, cyanosis.  Vascular: Palpable b/l DP/PT pulses   _ _ _ _  _ _  REMOVAL CHECKLIST:        [X] PICC/Midline  _ _ _ _ _ _ _ _ _ _ _ _   MEDICATION DISCHARGE CHECKLIST      Anticoagulation        [X] NOAC, [ ] Reason: DON, hx of PE              Cost/Insurance barriers addressed: YES  _ _ _ _ _ _ _ _ _ _ _ _  RELEVANT LABS/IMAGING:  _ _ _ _ _ _ _ _ _ _ _ _  CLINICAL FOLLOW UP NEEDS:     [ ] Labwork           Labs needed:           When/Timing:           Outpatient team aware: YES/NO     _ _ _ _ _ _ _ _ _ _ _ _  Over 35 minutes was spent with the patient reviewing the discharge material including medications, follow up appointments, recovery, concerning symptoms, and how to contact their health care providers if they have questions   Patient discussed on morning rounds with Dr. Kwon     Operation Date: s/p TEVAR, 4/26/24     Primary Surgeon/Attending MD:     Referring Physician: Dr.Mai Serra    Hospital Course:  68 year old female with PMHx  of Asthma, CTEPH, CAD, HTN, Type B Dissection referred to Dr. Kwon / Dr. Suh for intervention and presented to [facility] on 4/25/2024 for preoperative lumbar drain prior to planned TEVAR. Patient underwent transfemoral TEVAR with Dr. Kwon/Dr. Suh on 4/26/24. Procedure was uncomplicated and she was transferred to CT ICU post operatively extubated and stable. Post operatively patient had no neurological deficits and lumbar drain was removed on POD#2. She had routine post operative CT scan that revealed new splenic infarcts and PE and she was started on low dose heparin gtt. Overnight POD#2-3 patient had new LE weakness, neurosurgery was emergently called and lumbar drain was replaced with improvement in symptoms and remained in place until POD#6. MRI was obstructed by artifact from TEVAR graft and CT was negative for spinal hemorrhage or infarct. Patient had persistent hypoxia post operatively and pulm was consulted for CTEPH and she was started on additional medication with improvement. On POD#8 patient complained of LE unsteadiness with urinary retention, she was restarted on pressors to keep MAPs elevated with improvement. 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 Her pressors were slowly weaned from POD#10-12., POD #13, UA w/ culture obtained as patient retaining, UA positive, started on Ceftriaxone x4 days, discontinued as patient asymptomatic (cultures sensitive to CTX). She continued to remain stable with ambulation improving and transferred to  on POD#14. Hematology was consulted on POD 14, patient was transitioned from argatroban to Eliquis on POD 15. Urology reconsulted noting if patient fails TOV, she may leave stevens in place and follow up in their clinic. Patient failed overnight TOV on POD 17, so stevens replaced and left in place for discharge. On POD 18, hematology re-consulted, recommending to place patient back on argatroban gtt until plt >100k. POD #19-21, patient's platelets continued to be below goal, hematology reconsulted, pending further lab work up, pan culture and CTA C/A/P. POD22: CTA CAP done, UA obtained +Candida discussed w/  and given patient is asymptomatic no need to treat. POD23 urology noting patient can TOV one more time affter 7 days, stevens removed, passed TOV; however, was retaining over night, so stevens placed again. POD24 plts remain in the 70s, CTA C/A/P reviewed w/  found w/ normal post surgical findings and known endoleak, NTD. LE venous duplex obtained today revealing chronic LLE recannulation changes, no acute DVTs. POD 25 plts uptrended from 72>79, remains on therapeutic titrating argatroban gtt. POD26 plts uptrend 93>79. continue argatroban until platelets >100. POD27: platelets this , eliquis started at 10mg BID x7 days per heme onc. POD28 Platelets this morning are 115, after discussion with Dr. Kwon the patient is stable and ready for discharge home today. Patient is ambulating well on room air without assistance, tolerating diet. Patient will be discharged home with Eliquis 10mg BID x7 days and 5 mg BID thereafter with outpatient followup.           DISCHARGE PHYSICAL EXAM:  Appearance: No acute distress. Sitting in chair   Neurologic: AAOx3, no AMS or focal deficits.  Responds appropriately to verbal and physical stimuli; exhibits purposeful movement in all extremities.  Cardiovascular: RRR, S1 S2. No m/r/g.  Respiratory: No acute respiratory distress. CTA b/l, no w/r/r.   Gastrointestinal:  Soft, non-tender, non-distended, + BS.	  Skin: No rashes. No ecchymoses. No cyanosis.  Extremities: groin non tender with no hematoma palpated, no dehisence. +mild pitting edema noted to b/l LE. Exhibits normal range of motion, no clubbing, cyanosis.  Vascular: Palpable b/l DP/PT pulses   _ _ _ _  _ _  REMOVAL CHECKLIST:        [X] PICC/Midline  _ _ _ _ _ _ _ _ _ _ _ _   MEDICATION DISCHARGE CHECKLIST      Anticoagulation        [X] NOAC, [ ] Reason: DON, hx of PE              Cost/Insurance barriers addressed: YES  _ _ _ _ _ _ _ _ _ _ _ _  RELEVANT LABS/IMAGING:  _ _ _ _ _ _ _ _ _ _ _ _  Over 35 minutes was spent with the patient reviewing the discharge material including medications, follow up appointments, recovery, concerning symptoms, and how to contact their health care providers if they have questions

## 2024-05-13 NOTE — PROGRESS NOTE ADULT - ASSESSMENT
A/P:    68 year old female with PMHx  of Asthma, CTEPH, CAD, HTN, Type B Dissection referred to Dr. Kwon / Dr. Suh for intervention and presented to [facility] on 4/25/2024 for preoperative lumbar drain prior to planned TEVAR. Patient underwent transfemoral TEVAR with Dr. Kwon/Dr. Suh on 4/26/24, Procedure was uncomplicated and she was transferred to CT ICU post operatively extubated and stable. Post operatively patient had no neurological deficits and lumbar drain was removed on POD#2. She had routine post operative CT scan that revealed new splenic infarcts and PE and she was started on low dose heparin gtt. Overnight POD#2-3 patient had new LE weakness, neurosurgery was emergently called and lumbar drain was replaced with improvement in symptoms and remained in place until POD#6. MRI was obstructed by artifact from TEVAR graft and CT was negative for spinal hemorrhage or infarct. Patient had persistent hypoxia post operatively and pulm was consulted for CTEPH and she was started on additional medication with improvement. On POD#8 patient complained of LE unsteadiness with urinary retention, she was restarted on pressors to keep MAPs elevated with improvement. 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 Her pressors were slowly weaned from POD#10-12., She continued to remain stable and transferred to  on POD#14. Hematology was consulted on POD 14, patient was transitioned from argatroban to Eliquis on POD 15. Urology reconsulted noting if patient fails TOV< she may leave stevens in place and follow up in their clinic. Patient failed overnight TOV on POD 17, so stevens replaced and left in place for discharge. On POD 18, hematology re-consulted, recommending to place patient back on argatroban gtt until plt >100k. Will continue to monitor platelets.     Neurovascular: No delirium. Pain well controlled with current regimen.  -PRN's.    Cardiovascular: Hemodynamically stable. HR controlled.  -BP: 124/57 HR: 108  -Will hold off on restarting home BP medications as BP is within goal.   -S/p TEVAR: continue to monitor    -Holding home atorvastatin 20mg due to elevated LFTs    Respiratory: 02 Sat = 98% on RA.  -Encourage C+DB and Use of IS 10x / hr while awake.  -CXR: stable cardiomegaly, s/p TEVAR, no acute findings  -CTEPH:    Macitentan 10mg daily    Riociguat 2.5 q8hrs  -Asthma:    Albuterol nebulizer q6hrs     GI: Stable.  -PPX   Pantoprazole 40mg daily, Miralax daily  -PO Diet.    Renal / :  -Monitor renal function.  -Monitor I/O's.  -Urinary retention   Maintain stevens in place, plan to discharge with urology follow up.   - UTI: s/p 3 days of IV CTX    Endocrine:    -A1c: 5.9%  -TSH: 2.090    Hematologic:  -CBC: RBC 3.59, hgb stable 9.6, plt 81>74  -Coagulation Panel: will obtain PTT tonight to check argatroban gtt   -HIT:    Appreciate hematology consult    HOLD Eliquis until plt >100k    Will start on argatroban gtt again at 5pm 5/13, fu PTT at 7PM and continue to titrate     ID:  -Temperature: afebrile   -CBC: WBC 9.57  -Observe for SIRS/Sepsis Syndrome.    Prophylaxis:  -will start on titrating therapeutic argatroban gtt.  -SCD's    Disposition:  -floor status

## 2024-05-13 NOTE — PROGRESS NOTE ADULT - SUBJECTIVE AND OBJECTIVE BOX
Patient discussed on morning rounds with Dr. Kwon    Operation / Date: TEVAR (proximally covering L subclavian, distal ending 5cm above celiac) on 4/26/24    SUBJECTIVE ASSESSMENT:  68y Female with no complaints this morning aside from eagerness to return home. Denies headaches, changes in vision, chest pain, back pain, sob, abd pain, groin pain, LE numbness/tingling/pain weakness.     Vital Signs Last 24 Hrs  T(C): 36.4 (13 May 2024 12:22), Max: 37.1 (12 May 2024 17:31)  T(F): 97.5 (13 May 2024 12:22), Max: 98.8 (12 May 2024 17:31)  HR: 108 (13 May 2024 13:50) (92 - 112)  BP: 124/57 (13 May 2024 13:50) (124/57 - 161/76)  BP(mean): 79 (13 May 2024 13:50) (79 - 109)  RR: 18 (13 May 2024 08:57) (18 - 20)  SpO2: 100% (13 May 2024 08:57) (88% - 100%)    Parameters below as of 13 May 2024 12:48  Patient On (Oxygen Delivery Method): room air    I&O's Detail    12 May 2024 07:01  -  13 May 2024 07:00  --------------------------------------------------------  IN:    IV PiggyBack: 50 mL    Lactated Ringers Bolus: 500 mL    Oral Fluid: 910 mL  Total IN: 1460 mL    OUT:    Indwelling Catheter - Urethral (mL): 1950 mL    Voided (mL): 100 mL  Total OUT: 2050 mL    Total NET: -590 mL      13 May 2024 07:01  -  13 May 2024 14:33  --------------------------------------------------------  IN:    Oral Fluid: 150 mL  Total IN: 150 mL    OUT:    Indwelling Catheter - Urethral (mL): 150 mL  Total OUT: 150 mL    Total NET: 0 mL          CHEST TUBE:  No.   WOOD DRAIN:  No.  EPICARDIAL WIRES: /No.  TIE DOWNS: No.  ALBERTS: Yes    PHYSICAL EXAM:  Appearance: No acute distress. Sitting up in chair  Neurologic: AAOx3, no AMS or focal deficits.  Responds appropriately to verbal and physical stimuli; exhibits purposeful movement in all extremities.  Cardiovascular: RRR, S1 S2. No m/r/g.  Respiratory: No acute respiratory distress. CTA b/l, no w/r/r.   Gastrointestinal:  Soft, non-tender, non-distended, + BS.	  Skin: No rashes. No ecchymoses. No cyanosis.  Extremities: +b/l groins well approximated without dehisence/ drainage/ TTP. +mild b/l LE edema. Exhibits normal range of motion, no clubbing, cyanosis.  Vascular: Palpable DP pulses.     LABS:                        9.6    9.57  )-----------( 74       ( 13 May 2024 05:30 )             29.0       COUMADIN: No. REASON: HIT, now on argatroban gtt        05-13    143  |  110<H>  |  13  ----------------------------<  108<H>  4.2   |  21<L>  |  1.07    Ca    9.4      13 May 2024 05:30  Mg     2.1     05-13    TPro  6.2  /  Alb  3.7  /  TBili  0.8  /  DBili  x   /  AST  40  /  ALT  99<H>  /  AlkPhos  95  05-12      Urinalysis Basic - ( 13 May 2024 05:30 )    Color: x / Appearance: x / SG: x / pH: x  Gluc: 108 mg/dL / Ketone: x  / Bili: x / Urobili: x   Blood: x / Protein: x / Nitrite: x   Leuk Esterase: x / RBC: x / WBC x   Sq Epi: x / Non Sq Epi: x / Bacteria: x        MEDICATIONS  (STANDING):  albuterol    90 MICROgram(s) HFA Inhaler 2 Puff(s) Inhalation every 6 hours  albuterol/ipratropium for Nebulization 3 milliLiter(s) Nebulizer every 6 hours  argatroban Infusion 1.499 MICROgram(s)/kG/Min (7.34 mL/Hr) IV Continuous <Continuous>  chlorhexidine 2% Cloths 1 Application(s) Topical daily  influenza  Vaccine (HIGH DOSE) 0.7 milliLiter(s) IntraMuscular once  macitentan 10 milliGRAM(s) Oral <User Schedule>  pantoprazole    Tablet 40 milliGRAM(s) Oral daily  polyethylene glycol 3350 17 Gram(s) Oral daily  riociguat 2.5 milliGRAM(s) Oral every 8 hours  senna 2 Tablet(s) Oral at bedtime  sodium chloride 0.9%. 1000 milliLiter(s) (10 mL/Hr) IV Continuous <Continuous>    MEDICATIONS  (PRN):  sodium chloride 0.65% Nasal 1 Spray(s) Both Nostrils three times a day PRN dry nose        RADIOLOGY & ADDITIONAL TESTS:

## 2024-05-13 NOTE — PROGRESS NOTE ADULT - ATTENDING COMMENTS
I evaluated the patient with the hematology fellow, Dr Monroy. 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   DON was being managed w/ a direct thrombin inhibitor (argatroban) but she was switched to PO anticoagulation with eliquis when the platelet count got to 99 on 5/11/24.  After that, the platelet count trended down to 81 yesterday and now 74 today.    Recommend holding Eliquis and switching back to parenteral anticoagulation w/ argatroban.  Follow daily CBC.  If platelet count continues to drop, will need to explore the possibility of other factors contributing to t-penia.  Would wait to switch off parenteral anticoagulation until platelet count is approximating normal.  Then would be reasonable to load w/ eliquis and discharge on that drug.    Anemia w/ borderline low MCV noted. Please send ferritin, iron panel w/  next set of labs and replete if deficient.    She will need hematology f/u after discharge and has established at [facility].

## 2024-05-13 NOTE — DISCHARGE NOTE PROVIDER - NSDCACTIVITY_GEN_ALL_CORE
Do not drive or operate machinery/Showering allowed/Stairs allowed/Walking - Indoors allowed/No heavy lifting/straining/Walking - Outdoors allowed/Activity as tolerated

## 2024-05-13 NOTE — PROGRESS NOTE ADULT - ASSESSMENT
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 Hematology was consulted for anticoagulation recommendations.    #DON  confirmed by positive Heparin-PF4 antibodies and PAVAN, and noted celiac trunk thrombus  platelet carolyn 27k on 5/5/24  Patient was transitioned to argatroban from heparin with noted uptrend of platelet count, 83k on 5/9/24  Patient transitioned to eliquis 10mg BID on 5/11 when platelets were at 99k --> 81k -->74k today    As per the 2018 [facility] Clinical practice guidelines for management of DON, recommendations to transition from a parenteral agent to warfarin require a platelet count recovery to >150k. Transitioning from a parenteral agent to a DOAC does not carry this requirement as long as patient is deemed clinically stable. While we would prefer transitioning to a DOAC as soon as her platelet count recovers to normal levels, we would defer to the primary team. Please note that you should start the DOAC within 2 hours of stopping argatroban gtt.     -Given the downtrend in platelet counts, our recommendation is to put her back on argatroban gtt until platelet counts normalize, prior to switching to a DOAC  -please start argatroban gtt 12 hours from last dose of Eliquis    -Reasonable to discharge her on Eliquis, which she has tolerated well in the past  -Please discharge her with follow up with her outpatient hematologist, Dr. Bradley Goldberg    #anemia  #leukocytosis  Patient with normal WBC prior to this admission, so leukocytosis is likely reactive    For the anemia, please check  -ferritin, iron with tibc  -b12, folate  -LDH, haptoglobin, reticulocyte count    Discussed with Dr. Kendall

## 2024-05-13 NOTE — DISCHARGE NOTE PROVIDER - PROVIDER TOKENS
PROVIDER:[TOKEN:[[id number]],SCHEDULEDAPPT:[05/30/2024],SCHEDULEDAPPTTIME:[02:00 PM]],PROVIDER:[TOKEN:[[id number]],FOLLOWUP:[1 week]],PROVIDER:[TOKEN:[[id number]],SCHEDULEDAPPT:[05/20/2024],SCHEDULEDAPPTTIME:[10:30 AM]],PROVIDER:[TOKEN:[[id number]],SCHEDULEDAPPT:[05/21/2024],SCHEDULEDAPPTTIME:[01:30 PM]],FREE:[LAST:[Altschul],FIRST:[Tiffanie],PHONE:[(   )    -],FAX:[(   )    -],ADDRESS:[[address] [place] [address]],SCHEDULEDAPPT:[06/07/2024],SCHEDULEDAPPTTIME:[12:00 AM]] PROVIDER:[TOKEN:[[id number]],SCHEDULEDAPPT:[05/30/2024],SCHEDULEDAPPTTIME:[02:00 PM]],PROVIDER:[TOKEN:[[id number]],SCHEDULEDAPPT:[05/30/2024]],PROVIDER:[TOKEN:[[id number]],SCHEDULEDAPPT:[05/20/2024],SCHEDULEDAPPTTIME:[10:30 AM]],PROVIDER:[TOKEN:[[id number]],SCHEDULEDAPPT:[05/21/2024],SCHEDULEDAPPTTIME:[01:30 PM]],FREE:[LAST:[Altschul],FIRST:[Tiffanie],PHONE:[(   )    -],FAX:[(   )    -],ADDRESS:[[address] [place] [address]],SCHEDULEDAPPT:[06/07/2024],SCHEDULEDAPPTTIME:[12:00 AM]]

## 2024-05-13 NOTE — DISCHARGE NOTE PROVIDER - NSDCCPTREATMENT_GEN_ALL_CORE_FT
PRINCIPAL PROCEDURE  Procedure: Endovascular repair of thoracic aorta  Findings and Treatment: TEVAR (prox portion covering L subclavian, distal portion ~5cm above celiac trunk)

## 2024-05-13 NOTE — DISCHARGE NOTE PROVIDER - NSDCMRMEDTOKEN_GEN_ALL_CORE_FT
Adempas 2.5 mg oral tablet: 1 tab(s) orally 3 times a day  amLODIPine 10 mg oral tablet: 1 orally once a day  Anoro Ellipta 62.5 mcg-25 mcg/inh inhalation powder: 1 puff(s) inhaled once a day  atorvastatin 20 mg oral tablet: 1 tab(s) orally once a day (at bedtime)  carvedilol 6.25 mg oral tablet: 1 tab(s) orally 2 times a day  Eliquis 5 mg oral tablet: 1 tab(s) orally 2 times a day  furosemide 20 mg oral tablet: 1 tab(s) orally once a day (in the afternoon)   Adempas 2.5 mg oral tablet: 1 tab(s) orally 3 times a day  albuterol 90 mcg/inh inhalation aerosol: 2 puff(s) inhaled every 6 hours  Anoro Ellipta 62.5 mcg-25 mcg/inh inhalation powder: 1 puff(s) inhaled once a day  Eliquis 5 mg oral tablet: 1 tab(s) orally 2 times a day  furosemide 20 mg oral tablet: 1 tab(s) orally once a day (in the afternoon) as needed for leg swelling  pantoprazole 40 mg oral delayed release tablet: 1 tab(s) orally once a day  polyethylene glycol 3350 oral powder for reconstitution: 17 gram(s) orally once a day as needed for  constipation  senna leaf extract oral tablet: 2 tab(s) orally once a day (at bedtime) as needed for  constipation   acetaminophen 325 mg oral tablet: 2 tab(s) orally every 6 hours As needed Mild Pain (1 - 3)  Adempas 2.5 mg oral tablet: 1 tab(s) orally 3 times a day  Anoro Ellipta 62.5 mcg-25 mcg/inh inhalation powder: 1 puff(s) inhaled once a day  atorvastatin 20 mg oral tablet: 1 tab(s) orally once a day (at bedtime)  carvedilol 3.125 mg oral tablet: 1 tab(s) orally every 12 hours  Eliquis 5 mg oral tablet: 1 tab(s) orally 2 times a day Please take 10mg of eliquis twice a day until the 5/30 in the morning. On 5/30 for the night dose please take 5mg. Starting on 5/31 please take 5mg twice a day  Lasix 20 mg oral tablet: 1 tab(s) orally once a day  macitentan 10 mg oral tablet: 1 tab(s) orally once a day 12 pm  pantoprazole 40 mg oral delayed release tablet: 1 tab(s) orally once a day  pantoprazole 40 mg oral delayed release tablet: 1 tab(s) orally once a day  polyethylene glycol 3350 oral powder for reconstitution: 17 gram(s) orally once a day  Potassium Chloride (Eqv-Klor-Con 10) 10 mEq oral tablet, extended release: 1 tab(s) orally once a day please take with lasix  senna leaf extract oral tablet: 2 tab(s) orally once a day (at bedtime)

## 2024-05-13 NOTE — DISCHARGE NOTE PROVIDER - NSDCFUADDAPPT_GEN_ALL_CORE_FT
Dr.Bradley Goldberg's office will reach out to schedule your appointment with the hematology team.   If you have any further questions or don't hear from Dr.Goldberg's team by 5/15, please call #[phone] Dr.Bradley Goldberg's office will reach out to schedule your appointment with the hematology team.   If you have any further questions or don't hear from Dr.Goldberg's team by tuesday, please call #[phone]

## 2024-05-13 NOTE — DISCHARGE NOTE PROVIDER - CARE PROVIDER_API CALL
Franky Suh  Thoracic Surgery  [address] [place] [address]  Phone: [phone]  Fax: [phone]  Scheduled Appointment: 05/30/2024 02:00 PM    GOLDBERG, BRADLEY GORDON  [address] [place] [address]  Phone: [phone]  Fax: ()-  Follow Up Time: 1 week    Carlos Darby  Urology  [address] [place] [address]  Phone: [phone]  Fax: [phone]  Scheduled Appointment: 05/20/2024 10:30 AM    Tres Byrne  Neurology  [address] [place] [address] [address]  [place] [address]  Phone: [phone]  Fax: [phone]  Scheduled Appointment: 05/21/2024 01:30 PM    Tiffanie Cabrera  [address] [place] [address]  Phone: (   )    -  Fax: (   )    -  Scheduled Appointment: 06/07/2024 12:00 AM   Franky Suh  Thoracic Surgery  [address] [place] [address]  Phone: [phone]  Fax: [phone]  Scheduled Appointment: 05/30/2024 02:00 PM    GOLDBERG, BRADLEY GORDON  [address] [place] [address]  Phone: [phone]  Fax: ()-  Scheduled Appointment: 05/30/2024    Carlos Darby  Urology  [address] [place] [address]  Phone: [phone]  Fax: [phone]  Scheduled Appointment: 05/20/2024 10:30 AM    Tres Byrne  Neurology  [address] [place] [address] [address]  [place] [address]  Phone: [phone]  Fax: [phone]  Scheduled Appointment: 05/21/2024 01:30 PM    Tiffanie Cabrera  [address] [place] [address]  Phone: (   )    -  Fax: (   )    -  Scheduled Appointment: 06/07/2024 12:00 AM

## 2024-05-13 NOTE — PROGRESS NOTE ADULT - SUBJECTIVE AND OBJECTIVE BOX
INTERVAL HPI/OVERNIGHT EVENTS:  Was transitioned to Eliquis 10mg BID  on 5/11 when platelet count was at 99k, however, platelets continue to downtrend, most recently 74k.  SUBJECTIVE: Patient seen and examined at bedside.    VITAL SIGNS:  ICU Vital Signs Last 24 Hrs  T(C): 36.4 (13 May 2024 12:22), Max: 37.1 (12 May 2024 17:31)  T(F): 97.5 (13 May 2024 12:22), Max: 98.8 (12 May 2024 17:31)  HR: 108 (13 May 2024 13:50) (92 - 112)  BP: 124/57 (13 May 2024 13:50) (124/57 - 161/76)  BP(mean): 79 (13 May 2024 13:50) (79 - 109)  ABP: --  ABP(mean): --  RR: 18 (13 May 2024 08:57) (18 - 20)  SpO2: 100% (13 May 2024 08:57) (88% - 100%)    O2 Parameters below as of 13 May 2024 12:48  Patient On (Oxygen Delivery Method): room air              05-12 @ 07:01 - 05-13 @ 07:00  --------------------------------------------------------  IN: 1460 mL / OUT: 2050 mL / NET: -590 mL    05-13 @ 07:01 - 05-13 @ 14:09  --------------------------------------------------------  IN: 150 mL / OUT: 150 mL / NET: 0 mL      CAPILLARY BLOOD GLUCOSE          PHYSICAL EXAM:    Constitutional: NAD, sitting in chair  HEENT: NC/AT; PERRL, anicteric sclera; MMM  Neck: supple, no JVD  Cardiovascular: tachycardic  Respiratory: CTA B/L, no W/R/R  Extremities: WWP; no LE edema; no clubbing or cyanosis  Dermatologic: normal color and turgor; no visible rashes  Neurological: AAOx3; nonfocal    MEDICATIONS:  MEDICATIONS  (STANDING):  albuterol    90 MICROgram(s) HFA Inhaler 2 Puff(s) Inhalation every 6 hours  albuterol/ipratropium for Nebulization 3 milliLiter(s) Nebulizer every 6 hours  apixaban 5 milliGRAM(s) Oral two times a day  chlorhexidine 2% Cloths 1 Application(s) Topical daily  influenza  Vaccine (HIGH DOSE) 0.7 milliLiter(s) IntraMuscular once  macitentan 10 milliGRAM(s) Oral <User Schedule>  pantoprazole    Tablet 40 milliGRAM(s) Oral daily  polyethylene glycol 3350 17 Gram(s) Oral daily  riociguat 2.5 milliGRAM(s) Oral every 8 hours  senna 2 Tablet(s) Oral at bedtime  sodium chloride 0.9%. 1000 milliLiter(s) (10 mL/Hr) IV Continuous <Continuous>    MEDICATIONS  (PRN):  sodium chloride 0.65% Nasal 1 Spray(s) Both Nostrils three times a day PRN dry nose      ALLERGIES:  Allergies    heparin (Other (Moderate))    Intolerances        LABS:                        9.6    9.57  )-----------( 74       ( 13 May 2024 05:30 )             29.0     05-13    143  |  110<H>  |  13  ----------------------------<  108<H>  4.2   |  21<L>  |  1.07    Ca    9.4      13 May 2024 05:30  Mg     2.1     05-13    TPro  6.2  /  Alb  3.7  /  TBili  0.8  /  DBili  x   /  AST  40  /  ALT  99<H>  /  AlkPhos  95  05-12      Urinalysis Basic - ( 13 May 2024 05:30 )    Color: x / Appearance: x / SG: x / pH: x  Gluc: 108 mg/dL / Ketone: x  / Bili: x / Urobili: x   Blood: x / Protein: x / Nitrite: x   Leuk Esterase: x / RBC: x / WBC x   Sq Epi: x / Non Sq Epi: x / Bacteria: x        RADIOLOGY & ADDITIONAL TESTS: Reviewed.

## 2024-05-13 NOTE — PROGRESS NOTE ADULT - ATTENDING COMMENTS
As above, CTEPH and aortic dissection s/p TEVAR c/b spinal cord ischemia, now improved on riociguat (home dose) + macitentan (new start this admission), officially weaned off pressors, also tolerating RA with SpO2 >95% when nasal cannula removed. Ambulating without difficulty, only complaint is urinary retention. Will continue with current dosing of PH medications, LFTs improving. Thrombocytopenia 2/2 HIT improving off heparin now on argatroban, to switch to eliquis when platelets improve. Plan to follow up in CTEPH clinic on discharge. Confirmed with patient that macitentan is approved and being delivered on Monday. As above, CTEPH and aortic dissection s/p TEVAR c/b spinal cord ischemia, now improved on riociguat (home dose) + macitentan (new start this admission), officially weaned off pressors, also tolerating RA with SpO2 >95% when nasal cannula removed. Ambulating without difficulty, only complaint is urinary retention. Will continue with current dosing of PH medications, LFTs improving, had transitioned to eliquis given plts 99k. Plts decreased again, plan to switch back to argatroban per heme/onc. Otherwise stable for discharge. Macitentan to be delivered today, appt made with CTEPH clinic fo 6/7/24 and 11 AM ([facility], [place]).

## 2024-05-13 NOTE — DISCHARGE NOTE PROVIDER - NSDCCPCAREPLAN_GEN_ALL_CORE_FT
PRINCIPAL DISCHARGE DIAGNOSIS  Diagnosis: Aortic aneurysm with dissection  Assessment and Plan of Treatment:       SECONDARY DISCHARGE DIAGNOSES  Diagnosis: Heparin induced thrombocytopenia  Assessment and Plan of Treatment:     Diagnosis: Hypertension  Assessment and Plan of Treatment:     Diagnosis: Urinary retention  Assessment and Plan of Treatment:     Diagnosis: CTEPH (chronic thromboembolic pulmonary hypertension)  Assessment and Plan of Treatment:     Diagnosis: Asthma  Assessment and Plan of Treatment:      PRINCIPAL DISCHARGE DIAGNOSIS  Diagnosis: Aortic aneurysm with dissection  Assessment and Plan of Treatment:       SECONDARY DISCHARGE DIAGNOSES  Diagnosis: Heparin induced thrombocytopenia  Assessment and Plan of Treatment:     Diagnosis: Hypertension  Assessment and Plan of Treatment:     Diagnosis: Urinary retention  Assessment and Plan of Treatment:     Diagnosis: CTEPH (chronic thromboembolic pulmonary hypertension)  Assessment and Plan of Treatment:     Diagnosis: Asthma  Assessment and Plan of Treatment:     Diagnosis: PE (pulmonary thromboembolism)  Assessment and Plan of Treatment:     Diagnosis: DVT, lower extremity  Assessment and Plan of Treatment:

## 2024-05-13 NOTE — DISCHARGE NOTE PROVIDER - CARE PROVIDERS DIRECT ADDRESSES
,[email or web address],[email or web address]

## 2024-05-13 NOTE — PROGRESS NOTE ADULT - ASSESSMENT
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 She was subsequently started on riociguat and follow up TTE showed significant improvement in PH as well as subjectively clinical improvement. PA-C showed with no significant changes from her baseline other than increase in cardiac output, now catheter removed. She continued to feel stronger everyday, walking multiple lanes in [place], off IV pressors and now off oxygen and stepped down. Her macitentan has been approved and scheduled to arrive at her home on Monday 5/13/24. Per review of out patient medical records, she had severe PH noted on TTE 3/2023 and DELGADO, underwent CTA PE protocol after d dimer noted to be elevated, found to have webs and linear defects of R main PA, RUL, RLL with associated mosaicism, V/Q also significantly positive for CTEPH. At that time her aortic dissection was noted, but felt to delay surgical intervention until PH improved  RHC reviewed (8/2024) - Of note there was no PAWP waveform, unable to wedge, calculations made on estimated PAWP of 15  RA 13  |  PA 82/28/48  |  PAWP 15  |  CO/CI (F) 4.64/2.54  |  PVR 7.1WU  |  PVR/SVR 0.4    #CTEPH  #PAH 2/2 Drugs/toxins    Patient is clinically improved now off IV pressors, maintainig MAP well, and off continuous oxygen for 2 days, she maintained >92% SpO2.  Notably restarted on argatroban as platelets decreased. Spoke with primary team who explained that he    Recommend:   - Continue with riociguat at current dose  - Maintain relative diuresis, avoid fluid boluses and would keep pt relatively net negative  - Continue with macitentan given severity of PH at 10 mg, approved by insurance    Discharge plan:  Plan for evaluation in formal CTEPH clinic for consideration of PTE at [facility] (may be difficult in setting of Type B aortic dissection) vs balloon angioplasty and possible escalation of medical therapy, as an outpatient.   Follow up in CTEPH clinic with Dr. Cabrera and Dr. Benites. Continue with medical therapy for now    PH will continue to follow  Patient seen and evaluated at bedside by fellow and plan discussed with Dr. 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 She was subsequently started on riociguat and follow up TTE showed significant improvement in PH as well as subjectively clinical improvement. PA-C showed with no significant changes from her baseline other than increase in cardiac output, now catheter removed. She continued to feel stronger everyday, walking multiple lanes in [place], off IV pressors and now off oxygen and stepped down. Her macitentan has been approved and scheduled to arrive at her home on Monday 5/13/24. Per review of out patient medical records, she had severe PH noted on TTE 3/2023 and DELGADO, underwent CTA PE protocol after d dimer noted to be elevated, found to have webs and linear defects of R main PA, RUL, RLL with associated mosaicism, V/Q also significantly positive for CTEPH. At that time her aortic dissection was noted, but felt to delay surgical intervention until PH improved  RHC reviewed (8/2024) - Of note there was no PAWP waveform, unable to wedge, calculations made on estimated PAWP of 15  RA 13  |  PA 82/28/48  |  PAWP 15  |  CO/CI (F) 4.64/2.54  |  PVR 7.1WU  |  PVR/SVR 0.4    #CTEPH  #PAH 2/2 Drugs/toxins    Patient is clinically improved now off IV pressors, maintaining MAP well, and off continuous oxygen for 2 days.  Notably restarted on argatroban as platelets decreased in setting of HIT. Hematology following. Tentatively plan to initiate apixaban 10mg bd (for 7 days followed by 5mg bd) when platelets normalize.     Recommend:   - Continue with riociguat at current dose  - Maintain relative diuresis, avoid fluid boluses and would keep pt relatively net negative  - Continue with macitentan given severity of PH at 10 mg, approved by insurance    Discharge plan:  Plan for evaluation in formal CTEPH clinic for consideration of PTE at [facility] (may be difficult in setting of Type B aortic dissection) vs balloon angioplasty and possible escalation of medical therapy, as an outpatient.   Follow up in CTEPH clinic with Dr. Cabrera and Dr. Benites. Continue with medical therapy for now    PH will continue to follow  Patient seen, evaluated and discussed with Dr. Cabrera, Pulmonary Hypertension Attending.     Kendall Bar MD  [facility] Fellow

## 2024-05-14 LAB
-  AMOXICILLIN/CLAVULANIC ACID: SIGNIFICANT CHANGE UP
-  AMPICILLIN/SULBACTAM: SIGNIFICANT CHANGE UP
-  AMPICILLIN: SIGNIFICANT CHANGE UP
-  CEFAZOLIN: SIGNIFICANT CHANGE UP
-  CEFEPIME: SIGNIFICANT CHANGE UP
-  CEFOXITIN: SIGNIFICANT CHANGE UP
-  CEFTRIAXONE: SIGNIFICANT CHANGE UP
-  CIPROFLOXACIN: SIGNIFICANT CHANGE UP
-  GENTAMICIN: SIGNIFICANT CHANGE UP
-  LEVOFLOXACIN: SIGNIFICANT CHANGE UP
-  NITROFURANTOIN: SIGNIFICANT CHANGE UP
-  PIPERACILLIN/TAZOBACTAM: SIGNIFICANT CHANGE UP
-  TOBRAMYCIN: SIGNIFICANT CHANGE UP
-  TRIMETHOPRIM/SULFAMETHOXAZOLE: SIGNIFICANT CHANGE UP
ALBUMIN SERPL ELPH-MCNC: 3.7 G/DL — SIGNIFICANT CHANGE UP (ref 3.3–5)
ALP SERPL-CCNC: 90 U/L — SIGNIFICANT CHANGE UP (ref 40–120)
ALT FLD-CCNC: 79 U/L — HIGH (ref 10–45)
ANION GAP SERPL CALC-SCNC: 10 MMOL/L — SIGNIFICANT CHANGE UP (ref 5–17)
APTT BLD: 59.2 SEC — HIGH (ref 24.5–35.6)
APTT BLD: 65.7 SEC — HIGH (ref 24.5–35.6)
AST SERPL-CCNC: 35 U/L — SIGNIFICANT CHANGE UP (ref 10–40)
BILIRUB SERPL-MCNC: 0.7 MG/DL — SIGNIFICANT CHANGE UP (ref 0.2–1.2)
BUN SERPL-MCNC: 14 MG/DL — SIGNIFICANT CHANGE UP (ref 7–23)
CALCIUM SERPL-MCNC: 9.3 MG/DL — SIGNIFICANT CHANGE UP (ref 8.4–10.5)
CHLORIDE SERPL-SCNC: 110 MMOL/L — HIGH (ref 96–108)
CO2 SERPL-SCNC: 22 MMOL/L — SIGNIFICANT CHANGE UP (ref 22–31)
CREAT SERPL-MCNC: 1.12 MG/DL — SIGNIFICANT CHANGE UP (ref 0.5–1.3)
CULTURE RESULTS: ABNORMAL
EGFR: 54 ML/MIN/1.73M2 — LOW
GLUCOSE SERPL-MCNC: 101 MG/DL — HIGH (ref 70–99)
HCT VFR BLD CALC: 29.9 % — LOW (ref 34.5–45)
HGB BLD-MCNC: 9.7 G/DL — LOW (ref 11.5–15.5)
MAGNESIUM SERPL-MCNC: 2.2 MG/DL — SIGNIFICANT CHANGE UP (ref 1.6–2.6)
MCHC RBC-ENTMCNC: 26.8 PG — LOW (ref 27–34)
MCHC RBC-ENTMCNC: 32.4 GM/DL — SIGNIFICANT CHANGE UP (ref 32–36)
MCV RBC AUTO: 82.6 FL — SIGNIFICANT CHANGE UP (ref 80–100)
METHOD TYPE: SIGNIFICANT CHANGE UP
NRBC # BLD: 0 /100 WBCS — SIGNIFICANT CHANGE UP (ref 0–0)
ORGANISM # SPEC MICROSCOPIC CNT: ABNORMAL
ORGANISM # SPEC MICROSCOPIC CNT: SIGNIFICANT CHANGE UP
PLATELET # BLD AUTO: 82 K/UL — LOW (ref 150–400)
POTASSIUM SERPL-MCNC: 4.3 MMOL/L — SIGNIFICANT CHANGE UP (ref 3.5–5.3)
POTASSIUM SERPL-SCNC: 4.3 MMOL/L — SIGNIFICANT CHANGE UP (ref 3.5–5.3)
PROT SERPL-MCNC: 6.2 G/DL — SIGNIFICANT CHANGE UP (ref 6–8.3)
RBC # BLD: 3.62 M/UL — LOW (ref 3.8–5.2)
RBC # FLD: 16.8 % — HIGH (ref 10.3–14.5)
SODIUM SERPL-SCNC: 142 MMOL/L — SIGNIFICANT CHANGE UP (ref 135–145)
SPECIMEN SOURCE: SIGNIFICANT CHANGE UP
WBC # BLD: 8.65 K/UL — SIGNIFICANT CHANGE UP (ref 3.8–10.5)
WBC # FLD AUTO: 8.65 K/UL — SIGNIFICANT CHANGE UP (ref 3.8–10.5)

## 2024-05-14 RX ORDER — CARVEDILOL PHOSPHATE 80 MG/1
3.12 CAPSULE, EXTENDED RELEASE ORAL EVERY 12 HOURS
Refills: 0 | Status: DISCONTINUED | OUTPATIENT
Start: 2024-05-14 | End: 2024-05-24

## 2024-05-14 RX ADMIN — Medication 3 MILLILITER(S): at 00:29

## 2024-05-14 RX ADMIN — RIOCIGUAT 2.5 MILLIGRAM(S): 1.5 TABLET, FILM COATED ORAL at 05:51

## 2024-05-14 RX ADMIN — RIOCIGUAT 2.5 MILLIGRAM(S): 1.5 TABLET, FILM COATED ORAL at 14:45

## 2024-05-14 RX ADMIN — CHLORHEXIDINE GLUCONATE 1 APPLICATION(S): 213 SOLUTION TOPICAL at 05:52

## 2024-05-14 RX ADMIN — ARGATROBAN 7.34 MICROGRAM(S)/KG/MIN: 50 INJECTION, SOLUTION INTRAVENOUS at 04:37

## 2024-05-14 RX ADMIN — CARVEDILOL PHOSPHATE 3.12 MILLIGRAM(S): 80 CAPSULE, EXTENDED RELEASE ORAL at 14:49

## 2024-05-14 RX ADMIN — PANTOPRAZOLE SODIUM 40 MILLIGRAM(S): 20 TABLET, DELAYED RELEASE ORAL at 12:58

## 2024-05-14 RX ADMIN — ARGATROBAN 7.34 MICROGRAM(S)/KG/MIN: 50 INJECTION, SOLUTION INTRAVENOUS at 20:41

## 2024-05-14 RX ADMIN — MACITENTAN 10 MILLIGRAM(S): 10 TABLET, FILM COATED ORAL at 12:58

## 2024-05-14 RX ADMIN — RIOCIGUAT 2.5 MILLIGRAM(S): 1.5 TABLET, FILM COATED ORAL at 21:34

## 2024-05-14 NOTE — PROGRESS NOTE ADULT - ASSESSMENT
68 year old female with PMHx  of Asthma, CTEPH, CAD, HTN, Type B Dissection referred to Dr. Kwon / Dr. Suh for intervention and presented to [facility] on 4/25/2024 for preoperative lumbar drain prior to planned TEVAR. Patient underwent transfemoral TEVAR with Dr. Kwon/Dr. Suh on 4/26/24, Procedure was uncomplicated and she was transferred to CT ICU post operatively extubated and stable. Post operatively patient had no neurological deficits and lumbar drain was removed on POD#2. She had routine post operative CT scan that revealed new splenic infarcts and PE and she was started on low dose heparin gtt. Overnight POD#2-3 patient had new LE weakness, neurosurgery was emergently called and lumbar drain was replaced with improvement in symptoms and remained in place until POD#6. MRI was obstructed by artifact from TEVAR graft and CT was negative for spinal hemorrhage or infarct. Patient had persistent hypoxia post operatively and pulm was consulted for CTEPH and she was started on additional medication with improvement. On POD#8 patient complained of LE unsteadiness with urinary retention, she was restarted on pressors to keep MAPs elevated with improvement. 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 Her pressors were slowly weaned from POD#10-12., She continued to remain stable and transferred to  on POD#14. Hematology was consulted on POD 14, patient was transitioned from argatroban to Eliquis on POD 15. Urology reconsulted noting if patient fails TOV< she may leave stevens in place and follow up in their clinic. Patient failed overnight TOV on POD 17, so stevens replaced and left in place for discharge. On POD 18, hematology re-consulted, recommending to place patient back on argatroban gtt until plt >100k. Will continue to monitor platelets.       Neurovascular: No delirium. Pain well controlled with current regimen.  -PRN's.    Cardiovascular: Hemodynamically stable. HR controlled.  -BP: 124/57 HR: 108  -Will hold off on restarting home BP medications as BP is within goal.   -S/p TEVAR: continue to monitor    -Holding home atorvastatin 20mg due to elevated LFTs    Respiratory: 02 Sat = 98% on RA.  -Encourage C+DB and Use of IS 10x / hr while awake.  -CXR: stable cardiomegaly, s/p TEVAR, no acute findings  -CTEPH:    Macitentan 10mg daily    Riociguat 2.5 q8hrs  -Asthma:    Albuterol nebulizer q6hrs     GI: Stable.  -PPX   Pantoprazole 40mg daily, Miralax daily  -PO Diet.    Renal / :  -Monitor renal function.  -Monitor I/O's.  -Urinary retention   Maintain stevens in place, plan to discharge with urology follow up.   - UTI: s/p 3 days of IV CTX    Endocrine:    -A1c: 5.9%  -TSH: 2.090    Hematologic:  -CBC: RBC 3.59, hgb stable 9.6, plt 81>74  -Coagulation Panel: will obtain PTT tonight to check argatroban gtt   -HIT:    Appreciate hematology consult    HOLD Eliquis until plt >100k    Will start on argatroban gtt again at 5pm 5/13, fu PTT at 7PM and continue to titrate     ID:  -Temperature: afebrile   -CBC: WBC 9.57  -Observe for SIRS/Sepsis Syndrome.    Prophylaxis:  -will start on titrating therapeutic argatroban gtt.  -SCD's    Disposition:  -floor status   68 year old female with PMHx  of Asthma, CTEPH, CAD, HTN, Type B Dissection referred to Dr. Kwon / Dr. Suh for intervention and presented to [facility] on 4/25/2024 for preoperative lumbar drain prior to planned TEVAR. Patient underwent transfemoral TEVAR with Dr. Kwon/Dr. Suh on 4/26/24, Procedure was uncomplicated and she was transferred to CT ICU post operatively extubated and stable. Post operatively patient had no neurological deficits and lumbar drain was removed on POD#2. She had routine post operative CT scan that revealed new splenic infarcts and PE and she was started on low dose heparin gtt. Overnight POD#2-3 patient had new LE weakness, neurosurgery was emergently called and lumbar drain was replaced with improvement in symptoms and remained in place until POD#6. MRI was obstructed by artifact from TEVAR graft and CT was negative for spinal hemorrhage or infarct. Patient had persistent hypoxia post operatively and pulm was consulted for CTEPH and she was started on additional medication with improvement. On POD#8 patient complained of LE unsteadiness with urinary retention, she was restarted on pressors to keep MAPs elevated with improvement. 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 Her pressors were slowly weaned from POD#10-12., She continued to remain stable and transferred to  on POD#14. Hematology was consulted on POD 14, patient was transitioned from argatroban to Eliquis on POD 15. Urology reconsulted noting if patient fails TOV< she may leave stevens in place and follow up in their clinic. Patient failed overnight TOV on POD 17, so stevens replaced and left in place for discharge. On POD 18, hematology re-consulted, recommending to place patient back on argatroban gtt until plt >100k. On POD#19 platelets still 82. PTT therapeutic on argatroban. Will continue on Argatroban and monitoring platelets.     Neurovascular: No delirium. Pain well controlled with current regimen.  -PRN's.    Cardiovascular: hisoty of CAD, HTN, Type B dissection s/p TEVAR was on midodrine for a while but now persistently hypertensive.   - Will start 3.125mg BID of coreg given tachycardia and hypertension. Hold off on home amlodipine for now.   - Holding home atorvastatin 20mg due to elevated LFTs. almost normalized. Consider restarting tomorrow.     Respiratory: 02 Sat = 98% on RA. history of CTEPH.   -Encourage C+DB and Use of IS 10x / hr while awake.  -continue CTEPH meds:  Macitentan 10mg daily and Riociguat 2.5 q8hrs (home meds delivered to home on 5/13)   -Asthma: Albuterol nebulizer q6hrs     GI: tolerating PO diet.   - PPX Pantoprazole 40mg daily  - bowel regiment with Miralax daily    Renal / : likely neurogenic bladder requiring stevens reinsertion x3.   - Will be discharge with stevens and urology follow up outpatient  - UTI: s/p 3 days of IV CTX. Will check sensitivities to see if UTI adequately treated.     Endocrine: A1c: 5.9%, TSH: 2.090  - continue to monitor BG     Hematologic: +HIT Platelets 82 this morning.   - Goal platelets > 100. will maintain argatroban until platelet goal reached. Hold eliquis for now.   - PTT goal 60-80.     ID: WBC 8.6 afebrile. Completed abx course for UTI.   - follow up urine culture to see if adequately treated.     Disposition: home when platelets recover

## 2024-05-14 NOTE — PROGRESS NOTE ADULT - SUBJECTIVE AND OBJECTIVE BOX
Patient discussed on morning rounds with Dr. Kwon    Operation / Date: 4/26/14: TEVAR     SUBJECTIVE ASSESSMENT:  68y Female reports feeling "fantastic". Reports feeling strong with ambulation. States that she is having bowel movements without incontinence. No complaints at this time.         Vital Signs Last 24 Hrs  T(C): 36.8 (14 May 2024 09:06), Max: 37.8 (14 May 2024 00:00)  T(F): 98.2 (14 May 2024 09:06), Max: 100 (14 May 2024 00:00)  HR: 100 (14 May 2024 08:36) (96 - 108)  BP: 159/70 (14 May 2024 08:36) (124/57 - 159/70)  BP(mean): 101 (14 May 2024 08:36) (79 - 103)  RR: 15 (14 May 2024 08:36) (15 - 18)  SpO2: 100% (14 May 2024 08:36) (98% - 100%)    Parameters below as of 14 May 2024 08:36  Patient On (Oxygen Delivery Method): room air      I&O's Detail    13 May 2024 07:01  -  14 May 2024 07:00  --------------------------------------------------------  IN:    Argatroban: 102.2 mL    Oral Fluid: 260 mL  Total IN: 362.2 mL    OUT:    Indwelling Catheter - Urethral (mL): 1325 mL  Total OUT: 1325 mL    Total NET: -962.8 mL      14 May 2024 07:01  -  14 May 2024 11:41  --------------------------------------------------------  IN:    Argatroban: 14.6 mL    Oral Fluid: 120 mL  Total IN: 134.6 mL    OUT:  Total OUT: 0 mL    Total NET: 134.6 mL          CHEST TUBE: No.   WOOD DRAIN: No.  EPICARDIAL WIRES:No.  TIE DOWNS: No.  ALBERTS: Yes    PHYSICAL EXAM:  GEN: NAD, looks comfortable  Neuro: A&Ox3.  No focal deficits.  Moving all extremities. Strong with equal strength in all extremities   CV: S1S2, regular, no murmurs appreciated.  No carotid bruits.  No JVD  Lungs: Clear B/L.  No wheezing, rales or rhonchi  ABD: Soft, non-tender, non-distended.  +Bowel sounds  EXT: Warm and well perfused.  No peripheral edema noted. All Distal pulses palpable bilaterally. Left groin ecchymosis non tender and soft.   Musculoskeletal: Moving all extremities with normal ROM, no joint swelling      LABS:                        9.7    8.65  )-----------( 82       ( 14 May 2024 05:30 )             29.9       PTT - ( 14 May 2024 05:30 )  PTT:65.7 sec    05-14    142  |  110<H>  |  14  ----------------------------<  101<H>  4.3   |  22  |  1.12    Ca    9.3      14 May 2024 05:30  Mg     2.2     05-14    TPro  6.2  /  Alb  3.7  /  TBili  0.7  /  DBili  x   /  AST  35  /  ALT  79<H>  /  AlkPhos  90  05-14      Urinalysis Basic - ( 14 May 2024 05:30 )    Color: x / Appearance: x / SG: x / pH: x  Gluc: 101 mg/dL / Ketone: x  / Bili: x / Urobili: x   Blood: x / Protein: x / Nitrite: x   Leuk Esterase: x / RBC: x / WBC x   Sq Epi: x / Non Sq Epi: x / Bacteria: x        MEDICATIONS  (STANDING):  albuterol    90 MICROgram(s) HFA Inhaler 2 Puff(s) Inhalation every 6 hours  albuterol/ipratropium for Nebulization 3 milliLiter(s) Nebulizer every 6 hours  argatroban Infusion 1.499 MICROgram(s)/kG/Min (7.34 mL/Hr) IV Continuous <Continuous>  chlorhexidine 2% Cloths 1 Application(s) Topical daily  influenza  Vaccine (HIGH DOSE) 0.7 milliLiter(s) IntraMuscular once  macitentan 10 milliGRAM(s) Oral <User Schedule>  pantoprazole    Tablet 40 milliGRAM(s) Oral daily  polyethylene glycol 3350 17 Gram(s) Oral daily  riociguat 2.5 milliGRAM(s) Oral every 8 hours  senna 2 Tablet(s) Oral at bedtime  sodium chloride 0.9%. 1000 milliLiter(s) (10 mL/Hr) IV Continuous <Continuous>    MEDICATIONS  (PRN):  sodium chloride 0.65% Nasal 1 Spray(s) Both Nostrils three times a day PRN dry nose        RADIOLOGY & ADDITIONAL TESTS:  < from: Xray Chest 1 View- PORTABLE-Routine (Xray Chest 1 View- PORTABLE-Routine in AM.) (05.13.24 @ 04:55) >    Findings/  impression: Stable cardiomegaly, thoracic aortic dilatation, thoracic   aortic endovascular stent. Right hilar vascular enlargement. Right lower   lung opacity. Stable bony structures.    --- End of Report ---    < end of copied text >

## 2024-05-14 NOTE — PROGRESS NOTE ADULT - ASSESSMENT
69 y/o F PMH asthma, CTEPH, CAD, HTN, Type B dissection now s/p TEVAR, Groin sites soft, pulses palpable. Still reporting subjective weakness but able to ambulate and no clearly weak on exam. Currently on Milrinone and vasopressin. Normal sensory to lower extremities, patient able to walk and sit in chair. Lumbar drain removed on 5/1. Pulmonary on board for pulmonary hypertension. CT imaging and MRI with no findings of nerve compression or root compression. 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 Distal common hepatic artery is perfused. Multifocal splenic infarcts and multiple small renal infarcts bilaterally, likely embolic etiology. No abdominal symptoms - tolerating diet without pain or tenderness. Course complicated with HIT, on Argatroban drip, with preserved motor and sensory function, pending up trending platelets to switch to oral AC.       - Rest of care per CT surgery  - vascular surgery will continue to follow patient

## 2024-05-14 NOTE — PROGRESS NOTE ADULT - SUBJECTIVE AND OBJECTIVE BOX
INTERVAL HPI/OVERNIGHT EVENTS:  Platelets on an upward trend  SUBJECTIVE: Patient seen and examined at bedside.    VITAL SIGNS:  ICU Vital Signs Last 24 Hrs  T(C): 36.5 (14 May 2024 14:23), Max: 37.8 (14 May 2024 00:00)  T(F): 97.7 (14 May 2024 14:23), Max: 100 (14 May 2024 00:00)  HR: 104 (14 May 2024 14:20) (96 - 106)  BP: 176/72 (14 May 2024 14:20) (112/56 - 176/72)  BP(mean): 103 (14 May 2024 14:20) (80 - 103)  ABP: --  ABP(mean): --  RR: 15 (14 May 2024 12:30) (15 - 18)  SpO2: 100% (14 May 2024 12:30) (98% - 100%)    O2 Parameters below as of 14 May 2024 12:30  Patient On (Oxygen Delivery Method): room air              05-13 @ 07:01 - 05-14 @ 07:00  --------------------------------------------------------  IN: 362.2 mL / OUT: 1325 mL / NET: -962.8 mL    05-14 @ 07:01  -  05-14 @ 17:06  --------------------------------------------------------  IN: 374.6 mL / OUT: 700 mL / NET: -325.4 mL      CAPILLARY BLOOD GLUCOSE          PHYSICAL EXAM:    Constitutional: NAD  HEENT: NC/AT; PERRL, anicteric sclera; MMM  Neck: supple, no JVD  Cardiovascular: +S1/S2, RRR  Respiratory: CTA B/L, no W/R/R  Gastrointestinal: abdomen soft, NT/ND; no rebound or guarding; +BSx4  Extremities: WWP; no LE edema; no clubbing or cyanosis  Vascular: 2+ radial, DP/PT pulses B/L  Dermatologic: normal color and turgor; no visible rashes, no bruising or petechiae  Neurological: AAOx3    MEDICATIONS:  MEDICATIONS  (STANDING):  albuterol    90 MICROgram(s) HFA Inhaler 2 Puff(s) Inhalation every 6 hours  albuterol/ipratropium for Nebulization 3 milliLiter(s) Nebulizer every 6 hours  argatroban Infusion 1.499 MICROgram(s)/kG/Min (7.34 mL/Hr) IV Continuous <Continuous>  carvedilol 3.125 milliGRAM(s) Oral every 12 hours  chlorhexidine 2% Cloths 1 Application(s) Topical daily  influenza  Vaccine (HIGH DOSE) 0.7 milliLiter(s) IntraMuscular once  macitentan 10 milliGRAM(s) Oral <User Schedule>  pantoprazole    Tablet 40 milliGRAM(s) Oral daily  polyethylene glycol 3350 17 Gram(s) Oral daily  riociguat 2.5 milliGRAM(s) Oral every 8 hours  senna 2 Tablet(s) Oral at bedtime  sodium chloride 0.9%. 1000 milliLiter(s) (10 mL/Hr) IV Continuous <Continuous>    MEDICATIONS  (PRN):  sodium chloride 0.65% Nasal 1 Spray(s) Both Nostrils three times a day PRN dry nose      ALLERGIES:  Allergies    heparin (Other (Moderate))    Intolerances        LABS:                        9.7    8.65  )-----------( 82       ( 14 May 2024 05:30 )             29.9     05-14    142  |  110<H>  |  14  ----------------------------<  101<H>  4.3   |  22  |  1.12    Ca    9.3      14 May 2024 05:30  Mg     2.2     05-14    TPro  6.2  /  Alb  3.7  /  TBili  0.7  /  DBili  x   /  AST  35  /  ALT  79<H>  /  AlkPhos  90  05-14    PTT - ( 14 May 2024 05:30 )  PTT:65.7 sec  Urinalysis Basic - ( 14 May 2024 05:30 )    Color: x / Appearance: x / SG: x / pH: x  Gluc: 101 mg/dL / Ketone: x  / Bili: x / Urobili: x   Blood: x / Protein: x / Nitrite: x   Leuk Esterase: x / RBC: x / WBC x   Sq Epi: x / Non Sq Epi: x / Bacteria: x        RADIOLOGY & ADDITIONAL TESTS: Reviewed.

## 2024-05-14 NOTE — PROGRESS NOTE ADULT - ASSESSMENT
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 Hematology was consulted for anticoagulation recommendations.    #DON  confirmed by positive Heparin-PF4 antibodies and PAVAN, and noted celiac trunk thrombus  platelet carolyn 27k on 5/5/24  Patient was transitioned to argatroban from heparin with noted uptrend of platelet count, 83k on 5/9/24  Patient transitioned to eliquis 10mg BID on 5/11 when platelets were at 99k --> 81k -->74k today    As per the 2018 [facility] Clinical practice guidelines for management of DON, recommendations to transition from a parenteral agent to warfarin require a platelet count recovery to >150k. Transitioning from a parenteral agent to a DOAC does not carry this requirement as long as patient is deemed clinically stable. While we would prefer transitioning to a DOAC as soon as her platelet count recovers to normal levels, we would defer to the primary team. Please note that you should start the DOAC within 2 hours of stopping argatroban gtt.     -Platelet count today 82k  -Given the downtrend in platelet counts, our recommendation is to put her back on argatroban gtt until platelet counts normalize, prior to switching to a DOAC  -please continue argatroban gtt    -Reasonable to discharge her on Eliquis, which she has tolerated well in the past  -Please discharge her with follow up with her outpatient hematologist, Dr. Bradley Goldberg    #anemia  #leukocytosis  Patient with normal WBC prior to this admission, so leukocytosis is likely reactive    Discussed with Dr. Kendall

## 2024-05-14 NOTE — PROGRESS NOTE ADULT - SUBJECTIVE AND OBJECTIVE BOX
STATUS POST:  TEVAR    SUBJECTIVE: Pt seen and examined at bedside this am by surgery team. Tolerating diet, no abdominal pain.    MEDICATIONS  (STANDING):  albuterol    90 MICROgram(s) HFA Inhaler 2 Puff(s) Inhalation every 6 hours  albuterol/ipratropium for Nebulization 3 milliLiter(s) Nebulizer every 6 hours  argatroban Infusion 1.499 MICROgram(s)/kG/Min (7.34 mL/Hr) IV Continuous <Continuous>  carvedilol 3.125 milliGRAM(s) Oral every 12 hours  chlorhexidine 2% Cloths 1 Application(s) Topical daily  influenza  Vaccine (HIGH DOSE) 0.7 milliLiter(s) IntraMuscular once  macitentan 10 milliGRAM(s) Oral <User Schedule>  pantoprazole    Tablet 40 milliGRAM(s) Oral daily  polyethylene glycol 3350 17 Gram(s) Oral daily  riociguat 2.5 milliGRAM(s) Oral every 8 hours  senna 2 Tablet(s) Oral at bedtime  sodium chloride 0.9%. 1000 milliLiter(s) (10 mL/Hr) IV Continuous <Continuous>    MEDICATIONS  (PRN):  sodium chloride 0.65% Nasal 1 Spray(s) Both Nostrils three times a day PRN dry nose      Vital Signs Last 24 Hrs  T(C): 36.5 (14 May 2024 14:23), Max: 37.8 (14 May 2024 00:00)  T(F): 97.7 (14 May 2024 14:23), Max: 100 (14 May 2024 00:00)  HR: 104 (14 May 2024 14:20) (96 - 106)  BP: 176/72 (14 May 2024 14:20) (112/56 - 176/72)  BP(mean): 103 (14 May 2024 14:20) (80 - 103)  RR: 15 (14 May 2024 12:30) (15 - 18)  SpO2: 100% (14 May 2024 12:30) (98% - 100%)    Parameters below as of 14 May 2024 12:30  Patient On (Oxygen Delivery Method): room air        Physical Exam  General: NAD, resting comfortably in bed  Pulmonary: Nonlabored breathing, no respiratory distress  CV: NSR  Abd: soft, NT/ND  Vascular:  Femoral: right +2 and left +2  Popliteal: right +2 and left +2. PT: right +2  left +2. DP: right +2  left +2. Warm extremities, no skin changes.    Extremities: (-) edema, warm, well-perfused, full ROM      I&O's Detail    13 May 2024 07:01  -  14 May 2024 07:00  --------------------------------------------------------  IN:    Argatroban: 102.2 mL    Oral Fluid: 260 mL  Total IN: 362.2 mL    OUT:    Indwelling Catheter - Urethral (mL): 1325 mL  Total OUT: 1325 mL    Total NET: -962.8 mL      14 May 2024 07:01  -  14 May 2024 17:58  --------------------------------------------------------  IN:    Argatroban: 14.6 mL    Oral Fluid: 360 mL  Total IN: 374.6 mL    OUT:    Indwelling Catheter - Urethral (mL): 700 mL  Total OUT: 700 mL    Total NET: -325.4 mL          LABS:                        9.7    8.65  )-----------( 82       ( 14 May 2024 05:30 )             29.9     05-14    142  |  110<H>  |  14  ----------------------------<  101<H>  4.3   |  22  |  1.12    Ca    9.3      14 May 2024 05:30  Mg     2.2     05-14    TPro  6.2  /  Alb  3.7  /  TBili  0.7  /  DBili  x   /  AST  35  /  ALT  79<H>  /  AlkPhos  90  05-14    PTT - ( 14 May 2024 05:30 )  PTT:65.7 sec  Urinalysis Basic - ( 14 May 2024 05:30 )    Color: x / Appearance: x / SG: x / pH: x  Gluc: 101 mg/dL / Ketone: x  / Bili: x / Urobili: x   Blood: x / Protein: x / Nitrite: x   Leuk Esterase: x / RBC: x / WBC x   Sq Epi: x / Non Sq Epi: x / Bacteria: x

## 2024-05-15 LAB
ALBUMIN SERPL ELPH-MCNC: 3.9 G/DL — SIGNIFICANT CHANGE UP (ref 3.3–5)
ALP SERPL-CCNC: 100 U/L — SIGNIFICANT CHANGE UP (ref 40–120)
ALT FLD-CCNC: 74 U/L — HIGH (ref 10–45)
ANION GAP SERPL CALC-SCNC: 13 MMOL/L — SIGNIFICANT CHANGE UP (ref 5–17)
APTT BLD: 66.1 SEC — HIGH (ref 24.5–35.6)
AST SERPL-CCNC: 34 U/L — SIGNIFICANT CHANGE UP (ref 10–40)
BASOPHILS # BLD AUTO: 0.05 K/UL — SIGNIFICANT CHANGE UP (ref 0–0.2)
BASOPHILS NFR BLD AUTO: 0.6 % — SIGNIFICANT CHANGE UP (ref 0–2)
BILIRUB SERPL-MCNC: 0.8 MG/DL — SIGNIFICANT CHANGE UP (ref 0.2–1.2)
BUN SERPL-MCNC: 16 MG/DL — SIGNIFICANT CHANGE UP (ref 7–23)
CALCIUM SERPL-MCNC: 9.8 MG/DL — SIGNIFICANT CHANGE UP (ref 8.4–10.5)
CHLORIDE SERPL-SCNC: 108 MMOL/L — SIGNIFICANT CHANGE UP (ref 96–108)
CO2 SERPL-SCNC: 22 MMOL/L — SIGNIFICANT CHANGE UP (ref 22–31)
CREAT SERPL-MCNC: 1.01 MG/DL — SIGNIFICANT CHANGE UP (ref 0.5–1.3)
EGFR: 61 ML/MIN/1.73M2 — SIGNIFICANT CHANGE UP
EOSINOPHIL # BLD AUTO: 0.28 K/UL — SIGNIFICANT CHANGE UP (ref 0–0.5)
EOSINOPHIL NFR BLD AUTO: 3.6 % — SIGNIFICANT CHANGE UP (ref 0–6)
GLUCOSE SERPL-MCNC: 96 MG/DL — SIGNIFICANT CHANGE UP (ref 70–99)
HCT VFR BLD CALC: 32.5 % — LOW (ref 34.5–45)
HGB BLD-MCNC: 10.4 G/DL — LOW (ref 11.5–15.5)
IMM GRANULOCYTES NFR BLD AUTO: 0.6 % — SIGNIFICANT CHANGE UP (ref 0–0.9)
INR BLD: 1.85 — HIGH (ref 0.85–1.18)
LYMPHOCYTES # BLD AUTO: 1.42 K/UL — SIGNIFICANT CHANGE UP (ref 1–3.3)
LYMPHOCYTES # BLD AUTO: 18.3 % — SIGNIFICANT CHANGE UP (ref 13–44)
MAGNESIUM SERPL-MCNC: 2.2 MG/DL — SIGNIFICANT CHANGE UP (ref 1.6–2.6)
MCHC RBC-ENTMCNC: 26 PG — LOW (ref 27–34)
MCHC RBC-ENTMCNC: 32 GM/DL — SIGNIFICANT CHANGE UP (ref 32–36)
MCV RBC AUTO: 81.3 FL — SIGNIFICANT CHANGE UP (ref 80–100)
MONOCYTES # BLD AUTO: 0.5 K/UL — SIGNIFICANT CHANGE UP (ref 0–0.9)
MONOCYTES NFR BLD AUTO: 6.5 % — SIGNIFICANT CHANGE UP (ref 2–14)
NEUTROPHILS # BLD AUTO: 5.44 K/UL — SIGNIFICANT CHANGE UP (ref 1.8–7.4)
NEUTROPHILS NFR BLD AUTO: 70.4 % — SIGNIFICANT CHANGE UP (ref 43–77)
NRBC # BLD: 0 /100 WBCS — SIGNIFICANT CHANGE UP (ref 0–0)
PHOSPHATE SERPL-MCNC: 3.1 MG/DL — SIGNIFICANT CHANGE UP (ref 2.5–4.5)
PLATELET # BLD AUTO: 86 K/UL — LOW (ref 150–400)
POTASSIUM SERPL-MCNC: 4.6 MMOL/L — SIGNIFICANT CHANGE UP (ref 3.5–5.3)
POTASSIUM SERPL-SCNC: 4.6 MMOL/L — SIGNIFICANT CHANGE UP (ref 3.5–5.3)
PROT SERPL-MCNC: 6.7 G/DL — SIGNIFICANT CHANGE UP (ref 6–8.3)
PROTHROM AB SERPL-ACNC: 20.7 SEC — HIGH (ref 9.5–13)
RBC # BLD: 4 M/UL — SIGNIFICANT CHANGE UP (ref 3.8–5.2)
RBC # FLD: 16.8 % — HIGH (ref 10.3–14.5)
SODIUM SERPL-SCNC: 143 MMOL/L — SIGNIFICANT CHANGE UP (ref 135–145)
WBC # BLD: 7.74 K/UL — SIGNIFICANT CHANGE UP (ref 3.8–10.5)
WBC # FLD AUTO: 7.74 K/UL — SIGNIFICANT CHANGE UP (ref 3.8–10.5)

## 2024-05-15 PROCEDURE — 71045 X-RAY EXAM CHEST 1 VIEW: CPT | Mod: 26

## 2024-05-15 RX ADMIN — CARVEDILOL PHOSPHATE 3.12 MILLIGRAM(S): 80 CAPSULE, EXTENDED RELEASE ORAL at 06:47

## 2024-05-15 RX ADMIN — RIOCIGUAT 2.5 MILLIGRAM(S): 1.5 TABLET, FILM COATED ORAL at 06:47

## 2024-05-15 RX ADMIN — Medication 3 MILLILITER(S): at 11:51

## 2024-05-15 RX ADMIN — MACITENTAN 10 MILLIGRAM(S): 10 TABLET, FILM COATED ORAL at 12:41

## 2024-05-15 RX ADMIN — ARGATROBAN 7.34 MICROGRAM(S)/KG/MIN: 50 INJECTION, SOLUTION INTRAVENOUS at 20:56

## 2024-05-15 RX ADMIN — ARGATROBAN 7.34 MICROGRAM(S)/KG/MIN: 50 INJECTION, SOLUTION INTRAVENOUS at 00:14

## 2024-05-15 RX ADMIN — RIOCIGUAT 2.5 MILLIGRAM(S): 1.5 TABLET, FILM COATED ORAL at 21:58

## 2024-05-15 RX ADMIN — CARVEDILOL PHOSPHATE 3.12 MILLIGRAM(S): 80 CAPSULE, EXTENDED RELEASE ORAL at 17:50

## 2024-05-15 RX ADMIN — ARGATROBAN 7.34 MICROGRAM(S)/KG/MIN: 50 INJECTION, SOLUTION INTRAVENOUS at 08:33

## 2024-05-15 RX ADMIN — PANTOPRAZOLE SODIUM 40 MILLIGRAM(S): 20 TABLET, DELAYED RELEASE ORAL at 11:52

## 2024-05-15 RX ADMIN — RIOCIGUAT 2.5 MILLIGRAM(S): 1.5 TABLET, FILM COATED ORAL at 13:41

## 2024-05-15 RX ADMIN — Medication 3 MILLILITER(S): at 17:50

## 2024-05-15 RX ADMIN — CHLORHEXIDINE GLUCONATE 1 APPLICATION(S): 213 SOLUTION TOPICAL at 06:40

## 2024-05-15 NOTE — PROGRESS NOTE ADULT - ASSESSMENT
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 Hematology was consulted for anticoagulation recommendations.    #DON  confirmed by positive Heparin-PF4 antibodies and PAVAN, and noted celiac trunk thrombus  platelet carolyn 27k on 5/5/24  Patient was transitioned to argatroban from heparin with noted uptrend of platelet count, 83k on 5/9/24  Patient transitioned to eliquis 10mg BID on 5/11 when platelets were at 99k --> 81k -->74k-> 82k-> 86k  today    As per the 2018 [facility] Clinical practice guidelines for management of DON, recommendations to transition from a parenteral agent to warfarin require a platelet count recovery to >150k. Transitioning from a parenteral agent to a DOAC does not carry this requirement as long as patient is deemed clinically stable. While we would prefer transitioning to a DOAC as soon as her platelet count recovers to normal levels, we would defer to the primary team. Please note that you should start the DOAC within 2 hours of stopping argatroban gtt.     Plan:  -Platelet count trending up to 86k  -Continue argatroban gtt until platelet counts normalize  -When platelet counts stabilize closer to normal range, would reasonable to discharge her on Eliquis, which she has tolerated well in the past  -Please discharge her with follow up with her outpatient hematologist, Dr. Bradley Goldberg    #anemia  #leukocytosis  Patient with normal WBC prior to this admission, so leukocytosis is likely reactive    Discussed with Dr. Kendall

## 2024-05-15 NOTE — PROGRESS NOTE ADULT - ASSESSMENT
68 year old female with PMHx  of Asthma, CTEPH, CAD, HTN, Type B Dissection referred to Dr. Kwon / Dr. Suh for intervention and presented to [facility] on 4/25/2024 for preoperative lumbar drain prior to planned TEVAR. Patient underwent transfemoral TEVAR with Dr. Kwon/Dr. Suh on 4/26/24, Procedure was uncomplicated and she was transferred to CT ICU post operatively extubated and stable. Post operatively patient had no neurological deficits and lumbar drain was removed on POD#2. She had routine post operative CT scan that revealed new splenic infarcts and PE and she was started on low dose heparin gtt. Overnight POD#2-3 patient had new LE weakness, neurosurgery was emergently called and lumbar drain was replaced with improvement in symptoms and remained in place until POD#6. MRI was obstructed by artifact from TEVAR graft and CT was negative for spinal hemorrhage or infarct. Patient had persistent hypoxia post operatively and pulm was consulted for CTEPH and she was started on additional medication with improvement. On POD#8 patient complained of LE unsteadiness with urinary retention, she was restarted on pressors to keep MAPs elevated with improvement. 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 Her pressors were slowly weaned from POD#10-12., She continued to remain stable and transferred to  on POD#14. Hematology was consulted on POD 14, patient was transitioned from argatroban to Eliquis on POD 15. Urology reconsulted noting if patient fails TOV< she may leave stevens in place and follow up in their clinic. Patient failed overnight TOV on POD 17, so stevens replaced and left in place for discharge. On POD 18, hematology re-consulted, recommending to place patient back on argatroban gtt until plt >100k. On POD#19 platelets still 82. PTT therapeutic on argatroban. Will continue on Argatroban and monitoring platelets. POD20: PTT 66, platelets 86, c/w argatroban for until platelets >100    Neurovascular: No delirium  -PRN's: tylenol dc'd for up trending LFTs, no pain     Cardiovascular: hisoty of CAD, HTN, Type B dissection s/p TEVAR   - 3.125mg BID of coreg given tachycardia and hypertension. Hold off on home amlodipine for now.   - Holding home atorvastatin 20mg due to elevated LFTs. downtrending, Consider restarting tomorrow.     Respiratory: 02 Sat = 98% on RA. history of CTEPH.   -Encourage C+DB and Use of IS 10x / hr while awake.  -continue CTEPH meds:  Macitentan 10mg daily and Riociguat 2.5 q8hrs   -Asthma: Albuterol nebulizer q6hrs   -CXR stable     GI: tolerating PO diet.   - PPX Pantoprazole 40mg daily  - bowel regiment with Miralax and senna daily    Renal / : likely neurogenic bladder requiring stevens reinsertion x3.   - Will be discharge with stevens and urology follow up outpatient  - UTI: s/p 3 days of IV CTX. ceftriaxone : S<=1     Endocrine: A1c: 5.9%, TSH: 2.090  - continue to monitor BG     Hematologic: +HIT Platelets 82 this morning.   - Goal platelets > 100. will maintain argatroban until platelet goal reached. Hold eliquis for now.   - PTT goal 60-80., last PTT 66     ID: WBC 7.74 afebrile. Completed abx course for UTI.   - sensitive to ceftriaxone     Disposition: home when platelets recover

## 2024-05-15 NOTE — PROGRESS NOTE ADULT - SUBJECTIVE AND OBJECTIVE BOX
Hematology Oncology Progress Note      SUBJECTIVE: Patient seen and examined at bedside. Denies fever, headache, nausea, vomiting, chest pain, shortness of breath, abdominal pain, changes in bowel movements or urination.      OBJECTIVE:    VITAL SIGNS:  ICU Vital Signs Last 24 Hrs  T(C): 36.1 (15 May 2024 14:32), Max: 36.6 (14 May 2024 22:17)  T(F): 96.9 (15 May 2024 14:32), Max: 97.8 (14 May 2024 22:17)  HR: 90 (15 May 2024 12:40) (90 - 96)  BP: 151/69 (15 May 2024 12:40) (105/51 - 163/74)  BP(mean): 99 (15 May 2024 12:40) (73 - 106)  ABP: --  ABP(mean): --  RR: 16 (15 May 2024 12:40) (16 - 16)  SpO2: 100% (15 May 2024 12:40) (100% - 100%)    O2 Parameters below as of 15 May 2024 12:40  Patient On (Oxygen Delivery Method): room air              05-14 @ 07:01  -  05-15 @ 07:00  --------------------------------------------------------  IN: 775.2 mL / OUT: 1560 mL / NET: -784.8 mL    05-15 @ 07:01  -  05-15 @ 17:22  --------------------------------------------------------  IN: 291.1 mL / OUT: 175 mL / NET: 116.1 mL      CAPILLARY BLOOD GLUCOSE          PHYSICAL EXAM:  Constitutional: NAD  HEENT: NC/AT; PERRL, anicteric sclera; MMM  Neck: supple, no JVD  Cardiovascular: +S1/S2, RRR  Respiratory: CTA B/L, no W/R/R  Gastrointestinal: abdomen soft, NT/ND; no rebound or guarding; +BSx4  Extremities: WWP; no LE edema; no clubbing or cyanosis  Vascular: 2+ radial, DP/PT pulses B/L  Dermatologic: normal color and turgor; no visible rashes, no bruising or petechiae  Neurological: AAOx3      MEDICATIONS:  MEDICATIONS  (STANDING):  albuterol    90 MICROgram(s) HFA Inhaler 2 Puff(s) Inhalation every 6 hours  albuterol/ipratropium for Nebulization 3 milliLiter(s) Nebulizer every 6 hours  argatroban Infusion 1.499 MICROgram(s)/kG/Min (7.34 mL/Hr) IV Continuous <Continuous>  carvedilol 3.125 milliGRAM(s) Oral every 12 hours  chlorhexidine 2% Cloths 1 Application(s) Topical daily  influenza  Vaccine (HIGH DOSE) 0.7 milliLiter(s) IntraMuscular once  macitentan 10 milliGRAM(s) Oral <User Schedule>  pantoprazole    Tablet 40 milliGRAM(s) Oral daily  polyethylene glycol 3350 17 Gram(s) Oral daily  riociguat 2.5 milliGRAM(s) Oral every 8 hours  senna 2 Tablet(s) Oral at bedtime  sodium chloride 0.9%. 1000 milliLiter(s) (10 mL/Hr) IV Continuous <Continuous>    MEDICATIONS  (PRN):  sodium chloride 0.65% Nasal 1 Spray(s) Both Nostrils three times a day PRN dry nose      ALLERGIES:  Allergies    heparin (Other (Moderate))    Intolerances        LABS:                        10.4   7.74  )-----------( 86       ( 15 May 2024 05:30 )             32.5     05-15    143  |  108  |  16  ----------------------------<  96  4.6   |  22  |  1.01    Ca    9.8      15 May 2024 05:30  Phos  3.1     05-15  Mg     2.2     05-15    TPro  6.7  /  Alb  3.9  /  TBili  0.8  /  DBili  x   /  AST  34  /  ALT  74<H>  /  AlkPhos  100  05-15    PT/INR - ( 15 May 2024 05:30 )   PT: 20.7 sec;   INR: 1.85          PTT - ( 15 May 2024 05:30 )  PTT:66.1 sec  Urinalysis Basic - ( 15 May 2024 05:30 )    Color: x / Appearance: x / SG: x / pH: x  Gluc: 96 mg/dL / Ketone: x  / Bili: x / Urobili: x   Blood: x / Protein: x / Nitrite: x   Leuk Esterase: x / RBC: x / WBC x   Sq Epi: x / Non Sq Epi: x / Bacteria: x            RADIOLOGY, PATHOLOGY, & ADDITIONAL TESTS: Reviewed.

## 2024-05-15 NOTE — PROGRESS NOTE ADULT - SUBJECTIVE AND OBJECTIVE BOX
Patient discussed on morning rounds with Dr. Kwon     Operation / Date: 4/26/14: TEVAR     SUBJECTIVE ASSESSMENT:  68y Female seen and assessed at bedside. Patient has no acute complaints of pain she is just eager to go home.         Vital Signs Last 24 Hrs  T(C): 36.1 (15 May 2024 14:32), Max: 36.6 (14 May 2024 22:17)  T(F): 96.9 (15 May 2024 14:32), Max: 97.8 (14 May 2024 22:17)  HR: 90 (15 May 2024 12:40) (90 - 98)  BP: 151/69 (15 May 2024 12:40) (105/51 - 163/74)  BP(mean): 99 (15 May 2024 12:40) (73 - 106)  RR: 16 (15 May 2024 12:40) (15 - 16)  SpO2: 100% (15 May 2024 12:40) (100% - 100%)    Parameters below as of 15 May 2024 12:40  Patient On (Oxygen Delivery Method): room air      I&O's Detail    14 May 2024 07:01  -  15 May 2024 07:00  --------------------------------------------------------  IN:    Argatroban: 175.2 mL    Oral Fluid: 600 mL  Total IN: 775.2 mL    OUT:    Indwelling Catheter - Urethral (mL): 1560 mL  Total OUT: 1560 mL    Total NET: -784.8 mL      15 May 2024 07:01  -  15 May 2024 15:23  --------------------------------------------------------  IN:    Argatroban: 51.1 mL    Oral Fluid: 240 mL  Total IN: 291.1 mL    OUT:    Indwelling Catheter - Urethral (mL): 175 mL  Total OUT: 175 mL    Total NET: 116.1 mL          CHEST TUBE: No.   WOOD DRAIN: No.  EPICARDIAL WIRES:No.  TIE DOWNS: No.  ALBERTS: Yes    PHYSICAL EXAM:  GEN: NAD, looks comfortable  Neuro: A&Ox3.  No focal deficits.  Moving all extremities. Strong with equal strength in all extremities   CV: S1S2, regular, no murmurs appreciated.  No carotid bruits.  No JVD  Lungs: Clear B/L.  No wheezing, rales or rhonchi  ABD: Soft, non-tender, non-distended.  +Bowel sounds  EXT: Warm and well perfused.  No peripheral edema noted. All Distal pulses palpable bilaterally. Left groin ecchymosis non tender and soft.   Musculoskeletal: Moving all extremities with normal ROM, no joint swelling    LABS:                        10.4   7.74  )-----------( 86       ( 15 May 2024 05:30 )             32.5         PT/INR - ( 15 May 2024 05:30 )   PT: 20.7 sec;   INR: 1.85          PTT - ( 15 May 2024 05:30 )  PTT:66.1 sec    05-15    143  |  108  |  16  ----------------------------<  96  4.6   |  22  |  1.01    Ca    9.8      15 May 2024 05:30  Phos  3.1     05-15  Mg     2.2     05-15    TPro  6.7  /  Alb  3.9  /  TBili  0.8  /  DBili  x   /  AST  34  /  ALT  74<H>  /  AlkPhos  100  05-15      Urinalysis Basic - ( 15 May 2024 05:30 )    Color: x / Appearance: x / SG: x / pH: x  Gluc: 96 mg/dL / Ketone: x  / Bili: x / Urobili: x   Blood: x / Protein: x / Nitrite: x   Leuk Esterase: x / RBC: x / WBC x   Sq Epi: x / Non Sq Epi: x / Bacteria: x        MEDICATIONS  (STANDING):  albuterol    90 MICROgram(s) HFA Inhaler 2 Puff(s) Inhalation every 6 hours  albuterol/ipratropium for Nebulization 3 milliLiter(s) Nebulizer every 6 hours  argatroban Infusion 1.499 MICROgram(s)/kG/Min (7.34 mL/Hr) IV Continuous <Continuous>  carvedilol 3.125 milliGRAM(s) Oral every 12 hours  chlorhexidine 2% Cloths 1 Application(s) Topical daily  influenza  Vaccine (HIGH DOSE) 0.7 milliLiter(s) IntraMuscular once  macitentan 10 milliGRAM(s) Oral <User Schedule>  pantoprazole    Tablet 40 milliGRAM(s) Oral daily  polyethylene glycol 3350 17 Gram(s) Oral daily  riociguat 2.5 milliGRAM(s) Oral every 8 hours  senna 2 Tablet(s) Oral at bedtime  sodium chloride 0.9%. 1000 milliLiter(s) (10 mL/Hr) IV Continuous <Continuous>    MEDICATIONS  (PRN):  sodium chloride 0.65% Nasal 1 Spray(s) Both Nostrils three times a day PRN dry nose        RADIOLOGY & ADDITIONAL TESTS:

## 2024-05-16 LAB
ANION GAP SERPL CALC-SCNC: 14 MMOL/L — SIGNIFICANT CHANGE UP (ref 5–17)
APTT BLD: 56.8 SEC — HIGH (ref 24.5–35.6)
BUN SERPL-MCNC: 12 MG/DL — SIGNIFICANT CHANGE UP (ref 7–23)
CALCIUM SERPL-MCNC: 9.3 MG/DL — SIGNIFICANT CHANGE UP (ref 8.4–10.5)
CHLORIDE SERPL-SCNC: 107 MMOL/L — SIGNIFICANT CHANGE UP (ref 96–108)
CO2 SERPL-SCNC: 20 MMOL/L — LOW (ref 22–31)
CREAT SERPL-MCNC: 1.01 MG/DL — SIGNIFICANT CHANGE UP (ref 0.5–1.3)
EGFR: 61 ML/MIN/1.73M2 — SIGNIFICANT CHANGE UP
GLUCOSE SERPL-MCNC: 106 MG/DL — HIGH (ref 70–99)
HCT VFR BLD CALC: 31.3 % — LOW (ref 34.5–45)
HGB BLD-MCNC: 10.2 G/DL — LOW (ref 11.5–15.5)
INR BLD: 1.81 — HIGH (ref 0.85–1.18)
MAGNESIUM SERPL-MCNC: 2.1 MG/DL — SIGNIFICANT CHANGE UP (ref 1.6–2.6)
MCHC RBC-ENTMCNC: 26.4 PG — LOW (ref 27–34)
MCHC RBC-ENTMCNC: 32.6 GM/DL — SIGNIFICANT CHANGE UP (ref 32–36)
MCV RBC AUTO: 80.9 FL — SIGNIFICANT CHANGE UP (ref 80–100)
NRBC # BLD: 0 /100 WBCS — SIGNIFICANT CHANGE UP (ref 0–0)
PLATELET # BLD AUTO: 80 K/UL — LOW (ref 150–400)
POTASSIUM SERPL-MCNC: 4 MMOL/L — SIGNIFICANT CHANGE UP (ref 3.5–5.3)
POTASSIUM SERPL-SCNC: 4 MMOL/L — SIGNIFICANT CHANGE UP (ref 3.5–5.3)
PROTHROM AB SERPL-ACNC: 20.3 SEC — HIGH (ref 9.5–13)
RBC # BLD: 3.87 M/UL — SIGNIFICANT CHANGE UP (ref 3.8–5.2)
RBC # FLD: 16.7 % — HIGH (ref 10.3–14.5)
SODIUM SERPL-SCNC: 141 MMOL/L — SIGNIFICANT CHANGE UP (ref 135–145)
WBC # BLD: 6.75 K/UL — SIGNIFICANT CHANGE UP (ref 3.8–10.5)
WBC # FLD AUTO: 6.75 K/UL — SIGNIFICANT CHANGE UP (ref 3.8–10.5)

## 2024-05-16 RX ORDER — ATORVASTATIN CALCIUM 80 MG/1
20 TABLET, FILM COATED ORAL AT BEDTIME
Refills: 0 | Status: DISCONTINUED | OUTPATIENT
Start: 2024-05-16 | End: 2024-05-24

## 2024-05-16 RX ADMIN — Medication 3 MILLILITER(S): at 07:25

## 2024-05-16 RX ADMIN — POLYETHYLENE GLYCOL 3350 17 GRAM(S): 17 POWDER, FOR SOLUTION ORAL at 11:34

## 2024-05-16 RX ADMIN — RIOCIGUAT 2.5 MILLIGRAM(S): 1.5 TABLET, FILM COATED ORAL at 15:03

## 2024-05-16 RX ADMIN — PANTOPRAZOLE SODIUM 40 MILLIGRAM(S): 20 TABLET, DELAYED RELEASE ORAL at 11:34

## 2024-05-16 RX ADMIN — CHLORHEXIDINE GLUCONATE 1 APPLICATION(S): 213 SOLUTION TOPICAL at 07:24

## 2024-05-16 RX ADMIN — Medication 3 MILLILITER(S): at 17:50

## 2024-05-16 RX ADMIN — ARGATROBAN 7.34 MICROGRAM(S)/KG/MIN: 50 INJECTION, SOLUTION INTRAVENOUS at 21:56

## 2024-05-16 RX ADMIN — ATORVASTATIN CALCIUM 20 MILLIGRAM(S): 80 TABLET, FILM COATED ORAL at 21:55

## 2024-05-16 RX ADMIN — RIOCIGUAT 2.5 MILLIGRAM(S): 1.5 TABLET, FILM COATED ORAL at 06:46

## 2024-05-16 RX ADMIN — CARVEDILOL PHOSPHATE 3.12 MILLIGRAM(S): 80 CAPSULE, EXTENDED RELEASE ORAL at 06:46

## 2024-05-16 RX ADMIN — MACITENTAN 10 MILLIGRAM(S): 10 TABLET, FILM COATED ORAL at 11:34

## 2024-05-16 RX ADMIN — Medication 3 MILLILITER(S): at 11:34

## 2024-05-16 RX ADMIN — CARVEDILOL PHOSPHATE 3.12 MILLIGRAM(S): 80 CAPSULE, EXTENDED RELEASE ORAL at 17:50

## 2024-05-16 RX ADMIN — RIOCIGUAT 2.5 MILLIGRAM(S): 1.5 TABLET, FILM COATED ORAL at 21:55

## 2024-05-16 NOTE — PROGRESS NOTE ADULT - SUBJECTIVE AND OBJECTIVE BOX
INTERVAL HPI/OVERNIGHT EVENTS:    SUBJECTIVE: Patient seen and examined at bedside.    VITAL SIGNS:  ICU Vital Signs Last 24 Hrs  T(C): 36.4 (16 May 2024 13:42), Max: 36.9 (16 May 2024 04:52)  T(F): 97.6 (16 May 2024 13:42), Max: 98.4 (16 May 2024 04:52)  HR: 100 (16 May 2024 12:16) (78 - 100)  BP: 140/63 (16 May 2024 12:16) (132/62 - 153/65)  BP(mean): 91 (16 May 2024 12:16) (89 - 96)  ABP: --  ABP(mean): --  RR: 12 (16 May 2024 12:16) (12 - 18)  SpO2: 96% (16 May 2024 12:16) (96% - 100%)    O2 Parameters below as of 16 May 2024 12:16  Patient On (Oxygen Delivery Method): room air              05-15 @ 07:01  -  05-16 @ 07:00  --------------------------------------------------------  IN: 415.2 mL / OUT: 1400 mL / NET: -984.8 mL    05-16 @ 07:01  -  05-16 @ 16:14  --------------------------------------------------------  IN: 58.4 mL / OUT: 210 mL / NET: -151.6 mL      CAPILLARY BLOOD GLUCOSE          PHYSICAL EXAM:    Constitutional: NAD  HEENT: NC/AT; PERRL, anicteric sclera; MMM  Neck: supple, no JVD  Cardiovascular: +S1/S2, RRR  Respiratory: CTA B/L, no W/R/R  Gastrointestinal: abdomen soft, NT/ND; no rebound or guarding; +BSx4  Genitourinary: +stevens catheter draining clear urine  Extremities: WWP; no LE edema; no clubbing or cyanosis  Vascular: 2+ radial, DP/PT and femoral pulses B/L  Dermatologic: normal color and turgor; no visible rashes  Neurological: AAOx3; nonfocal    MEDICATIONS:  MEDICATIONS  (STANDING):  albuterol    90 MICROgram(s) HFA Inhaler 2 Puff(s) Inhalation every 6 hours  albuterol/ipratropium for Nebulization 3 milliLiter(s) Nebulizer every 6 hours  argatroban Infusion 1.499 MICROgram(s)/kG/Min (7.34 mL/Hr) IV Continuous <Continuous>  atorvastatin 20 milliGRAM(s) Oral at bedtime  carvedilol 3.125 milliGRAM(s) Oral every 12 hours  chlorhexidine 2% Cloths 1 Application(s) Topical daily  influenza  Vaccine (HIGH DOSE) 0.7 milliLiter(s) IntraMuscular once  macitentan 10 milliGRAM(s) Oral <User Schedule>  pantoprazole    Tablet 40 milliGRAM(s) Oral daily  polyethylene glycol 3350 17 Gram(s) Oral daily  riociguat 2.5 milliGRAM(s) Oral every 8 hours  senna 2 Tablet(s) Oral at bedtime  sodium chloride 0.9%. 1000 milliLiter(s) (10 mL/Hr) IV Continuous <Continuous>    MEDICATIONS  (PRN):  sodium chloride 0.65% Nasal 1 Spray(s) Both Nostrils three times a day PRN dry nose      ALLERGIES:  Allergies    heparin (Other (Moderate))    Intolerances        LABS:                        10.2   6.75  )-----------( 80       ( 16 May 2024 05:30 )             31.3     05-16    141  |  107  |  12  ----------------------------<  106<H>  4.0   |  20<L>  |  1.01    Ca    9.3      16 May 2024 05:30  Phos  3.1     05-15  Mg     2.1     05-16    TPro  6.7  /  Alb  3.9  /  TBili  0.8  /  DBili  x   /  AST  34  /  ALT  74<H>  /  AlkPhos  100  05-15    PT/INR - ( 16 May 2024 05:30 )   PT: 20.3 sec;   INR: 1.81          PTT - ( 16 May 2024 05:30 )  PTT:56.8 sec  Urinalysis Basic - ( 16 May 2024 05:30 )    Color: x / Appearance: x / SG: x / pH: x  Gluc: 106 mg/dL / Ketone: x  / Bili: x / Urobili: x   Blood: x / Protein: x / Nitrite: x   Leuk Esterase: x / RBC: x / WBC x   Sq Epi: x / Non Sq Epi: x / Bacteria: x        RADIOLOGY & ADDITIONAL TESTS: Reviewed.

## 2024-05-16 NOTE — PROGRESS NOTE ADULT - ASSESSMENT
68 year old female with PMHx  of Asthma, CTEPH, CAD, HTN, Type B Dissection referred to Dr. Kwon / Dr. Suh for intervention and presented to [facility] on 4/25/2024 for preoperative lumbar drain prior to planned TEVAR. Patient underwent transfemoral TEVAR with Dr. Kwon/Dr. Suh on 4/26/24, Procedure was uncomplicated and she was transferred to CT ICU post operatively extubated and stable. Post operatively patient had no neurological deficits and lumbar drain was removed on POD#2. She had routine post operative CT scan that revealed new splenic infarcts and PE and she was started on low dose heparin gtt. Overnight POD#2-3 patient had new LE weakness, neurosurgery was emergently called and lumbar drain was replaced with improvement in symptoms and remained in place until POD#6. MRI was obstructed by artifact from TEVAR graft and CT was negative for spinal hemorrhage or infarct. Patient had persistent hypoxia post operatively and pulm was consulted for CTEPH and she was started on additional medication with improvement. On POD#8 patient complained of LE unsteadiness with urinary retention, she was restarted on pressors to keep MAPs elevated with improvement. 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 Her pressors were slowly weaned from POD#10-12., She continued to remain stable and transferred to  on POD#14. Hematology was consulted on POD 14, patient was transitioned from argatroban to Eliquis on POD 15. Urology reconsulted noting if patient fails TOV< she may leave stevens in place and follow up in their clinic. Patient failed overnight TOV on POD 17, so stevens replaced and left in place for discharge. On POD 18, hematology re-consulted, recommending to place patient back on argatroban gtt until plt >100k. On POD#19 platelets still 82. PTT therapeutic on argatroban. Will continue on Argatroban and monitoring platelets. POD20: PTT 66, platelets 86, c/w argatroban for until platelets >100    Neurovascular: No delirium  -PRN's: tylenol dc'd for up trending LFTs, no pain     Cardiovascular: hisoty of CAD, HTN, Type B dissection s/p TEVAR   - 3.125mg BID of coreg given tachycardia and hypertension. Hold off on home amlodipine for now.   - Resume home atorvastatin 20mg.    Respiratory: 02 Sat = 98% on RA. history of CTEPH.   -Encourage C+DB and Use of IS 10x / hr while awake.  -continue CTEPH meds:  Macitentan 10mg daily and Riociguat 2.5 q8hrs   -Asthma: Albuterol nebulizer q6hrs   -CXR stable     GI: tolerating PO diet.   - PPX Pantoprazole 40mg daily  - bowel regiment with Miralax and senna daily    Renal / : likely neurogenic bladder requiring stevens reinsertion x3.   - Will be discharge with stevens and urology follow up outpatient  - UTI: s/p 3 days of IV CTX. ceftriaxone : S<=1     Endocrine: A1c: 5.9%, TSH: 2.090  - continue to monitor BG     Hematologic: +HIT Platelets 80 this morning.   - Goal platelets > 100. will maintain argatroban until platelet goal reached. Hold eliquis for now.   - PTT goal 60-80., last PTT 56.8     ID: WBC 7.74 afebrile. Completed abx course for UTI.   - sensitive to ceftriaxone     Disposition: home when platelets recover

## 2024-05-16 NOTE — PROGRESS NOTE ADULT - SUBJECTIVE AND OBJECTIVE BOX
Patient discussed on morning rounds with Dr. Kwon    Operation / Date: 4/26 TEVAR    SUBJECTIVE ASSESSMENT:  68y Female seen and examined at bedside. Patient with no complaints.  Denies chest pain, shortness of breath, nausea, vomiting.        Vital Signs Last 24 Hrs  T(C): 36.5 (16 May 2024 08:54), Max: 36.9 (16 May 2024 04:52)  T(F): 97.7 (16 May 2024 08:54), Max: 98.4 (16 May 2024 04:52)  HR: 86 (16 May 2024 08:42) (78 - 100)  BP: 141/63 (16 May 2024 08:42) (132/62 - 153/65)  BP(mean): 91 (16 May 2024 08:42) (89 - 99)  RR: 13 (16 May 2024 08:42) (13 - 18)  SpO2: 96% (16 May 2024 08:42) (96% - 100%)    Parameters below as of 16 May 2024 08:42  Patient On (Oxygen Delivery Method): room air      I&O's Detail    15 May 2024 07:01  -  16 May 2024 07:00  --------------------------------------------------------  IN:    Argatroban: 175.2 mL    Oral Fluid: 240 mL  Total IN: 415.2 mL    OUT:    Indwelling Catheter - Urethral (mL): 900 mL    Voided (mL): 500 mL  Total OUT: 1400 mL    Total NET: -984.8 mL      16 May 2024 07:01  -  16 May 2024 12:24  --------------------------------------------------------  IN:    Argatroban: 21.9 mL  Total IN: 21.9 mL    OUT:    Indwelling Catheter - Urethral (mL): 60 mL  Total OUT: 60 mL    Total NET: -38.1 mL          CHEST TUBE:  No.   WOOD DRAIN:  No.  EPICARDIAL WIRES: No.  TIE DOWNS: No.  ALBERTS: No.    PHYSICAL EXAM:    GEN: NAD, looks comfortable  Psych: Mood appropriate  Neuro: A&Ox3.  No focal deficits.  Moving all extremities.   HEENT: No obvious abnormalities  CV: S1S2, regular, no murmurs appreciated.  No carotid bruits.  No JVD  Lungs: Clear B/L.  No wheezing, rales or rhonchi  ABD: Soft, non-tender, non-distended.  +Bowel sounds  EXT: Warm and well perfused.  No peripheral edema noted  Musculoskeletal: Moving all extremities with normal ROM, no joint swelling  PV: Pedal pulses palpable      LABS:                        10.2   6.75  )-----------( 80       ( 16 May 2024 05:30 )             31.3       COUMADIN:  Yes/No. REASON: .    PT/INR - ( 16 May 2024 05:30 )   PT: 20.3 sec;   INR: 1.81          PTT - ( 16 May 2024 05:30 )  PTT:56.8 sec    05-16    141  |  107  |  12  ----------------------------<  106<H>  4.0   |  20<L>  |  1.01    Ca    9.3      16 May 2024 05:30  Phos  3.1     05-15  Mg     2.1     05-16    TPro  6.7  /  Alb  3.9  /  TBili  0.8  /  DBili  x   /  AST  34  /  ALT  74<H>  /  AlkPhos  100  05-15      Urinalysis Basic - ( 16 May 2024 05:30 )    Color: x / Appearance: x / SG: x / pH: x  Gluc: 106 mg/dL / Ketone: x  / Bili: x / Urobili: x   Blood: x / Protein: x / Nitrite: x   Leuk Esterase: x / RBC: x / WBC x   Sq Epi: x / Non Sq Epi: x / Bacteria: x        MEDICATIONS  (STANDING):  albuterol    90 MICROgram(s) HFA Inhaler 2 Puff(s) Inhalation every 6 hours  albuterol/ipratropium for Nebulization 3 milliLiter(s) Nebulizer every 6 hours  argatroban Infusion 1.499 MICROgram(s)/kG/Min (7.34 mL/Hr) IV Continuous <Continuous>  carvedilol 3.125 milliGRAM(s) Oral every 12 hours  chlorhexidine 2% Cloths 1 Application(s) Topical daily  influenza  Vaccine (HIGH DOSE) 0.7 milliLiter(s) IntraMuscular once  macitentan 10 milliGRAM(s) Oral <User Schedule>  pantoprazole    Tablet 40 milliGRAM(s) Oral daily  polyethylene glycol 3350 17 Gram(s) Oral daily  riociguat 2.5 milliGRAM(s) Oral every 8 hours  senna 2 Tablet(s) Oral at bedtime  sodium chloride 0.9%. 1000 milliLiter(s) (10 mL/Hr) IV Continuous <Continuous>    MEDICATIONS  (PRN):  sodium chloride 0.65% Nasal 1 Spray(s) Both Nostrils three times a day PRN dry nose        RADIOLOGY & ADDITIONAL TESTS:

## 2024-05-16 NOTE — PROGRESS NOTE ADULT - ASSESSMENT
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 Hematology was consulted for anticoagulation recommendations.    #DON  confirmed by positive Heparin-PF4 antibodies and PAVAN, and noted celiac trunk thrombus  platelet carolyn 27k on 5/5/24  Patient was transitioned to argatroban from heparin with noted uptrend of platelet count, 83k on 5/9/24  Patient transitioned to eliquis 10mg BID on 5/11 when platelets were at 99k --> 81k -->74k --> 82k --> 86k -->80k today  As per the 2018 [facility] Clinical practice guidelines for management of DON, recommendations to transition from a parenteral agent to warfarin require a platelet count recovery to >150k. Transitioning from a parenteral agent to a DOAC does not carry this requirement as long as patient is deemed clinically stable. While we would prefer transitioning to a DOAC as soon as her platelet count recovers to normal levels, we would defer to the primary team. Please note that you should start the DOAC within 2 hours of stopping argatroban gtt.     Peripheral smear reviewed today 5/16/24 given downtrend in platelet count: Anisocytosis, poikilocytosis, hypochromic RBCs, rare schistocytes 0-2/hpf (no evidence to suggest thrombotic microangiopathy); neutrophils without evidence of toxic granulation to suggest infection; thrombocytopenia is real, no platelet clumping observed  Medication list reviewed; her last dose of Ceftriaxone, which can contribute to thrombocytopenia, was >3d ago, so unlikely to be contributory    Plan:    -Continue argatroban gtt until platelet counts normalize  -When platelet counts stabilize closer to normal range, would reasonable to discharge her on Eliquis, which she has tolerated well in the past  -Please discharge her with follow up with her outpatient hematologist, Dr. Bradley Goldberg    #anemia  #leukocytosis  Patient with normal WBC prior to this admission, so leukocytosis is likely reactive    Discussed with Dr. Kendall

## 2024-05-17 LAB
ADD ON TEST-SPECIMEN IN LAB: SIGNIFICANT CHANGE UP
ADD ON TEST-SPECIMEN IN LAB: SIGNIFICANT CHANGE UP
ALBUMIN SERPL ELPH-MCNC: 3.5 G/DL — SIGNIFICANT CHANGE UP (ref 3.3–5)
ALP SERPL-CCNC: 93 U/L — SIGNIFICANT CHANGE UP (ref 40–120)
ALT FLD-CCNC: 42 U/L — SIGNIFICANT CHANGE UP (ref 10–45)
ANION GAP SERPL CALC-SCNC: 11 MMOL/L — SIGNIFICANT CHANGE UP (ref 5–17)
APPEARANCE UR: CLEAR — SIGNIFICANT CHANGE UP
APTT BLD: 54.7 SEC — HIGH (ref 24.5–35.6)
AST SERPL-CCNC: 19 U/L — SIGNIFICANT CHANGE UP (ref 10–40)
BACTERIA # UR AUTO: NEGATIVE /HPF — SIGNIFICANT CHANGE UP
BILIRUB SERPL-MCNC: 0.9 MG/DL — SIGNIFICANT CHANGE UP (ref 0.2–1.2)
BILIRUB UR-MCNC: NEGATIVE — SIGNIFICANT CHANGE UP
BUN SERPL-MCNC: 13 MG/DL — SIGNIFICANT CHANGE UP (ref 7–23)
CALCIUM SERPL-MCNC: 9.4 MG/DL — SIGNIFICANT CHANGE UP (ref 8.4–10.5)
CAST: 2 /LPF — SIGNIFICANT CHANGE UP (ref 0–4)
CHLORIDE SERPL-SCNC: 109 MMOL/L — HIGH (ref 96–108)
CO2 SERPL-SCNC: 20 MMOL/L — LOW (ref 22–31)
COLOR SPEC: YELLOW — SIGNIFICANT CHANGE UP
CREAT SERPL-MCNC: 0.97 MG/DL — SIGNIFICANT CHANGE UP (ref 0.5–1.3)
D DIMER BLD IA.RAPID-MCNC: 6786 NG/ML DDU — HIGH
DAT POLY-SP REAG RBC QL: NEGATIVE — SIGNIFICANT CHANGE UP
DIFF PNL FLD: NEGATIVE — SIGNIFICANT CHANGE UP
EGFR: 64 ML/MIN/1.73M2 — SIGNIFICANT CHANGE UP
FIBRINOGEN PPP-MCNC: 284 MG/DL — SIGNIFICANT CHANGE UP (ref 200–445)
FINE GRAN CASTS #/AREA URNS AUTO: PRESENT
FOLATE SERPL-MCNC: 11.2 NG/ML — SIGNIFICANT CHANGE UP
GLUCOSE SERPL-MCNC: 101 MG/DL — HIGH (ref 70–99)
GLUCOSE UR QL: NEGATIVE MG/DL — SIGNIFICANT CHANGE UP
HAPTOGLOB SERPL-MCNC: SIGNIFICANT CHANGE UP (ref 34–200)
HCT VFR BLD CALC: 30.7 % — LOW (ref 34.5–45)
HGB BLD-MCNC: 9.9 G/DL — LOW (ref 11.5–15.5)
KETONES UR-MCNC: NEGATIVE MG/DL — SIGNIFICANT CHANGE UP
LDH SERPL L TO P-CCNC: 267 U/L — HIGH (ref 50–242)
LEUKOCYTE ESTERASE UR-ACNC: ABNORMAL
MAGNESIUM SERPL-MCNC: 2.3 MG/DL — SIGNIFICANT CHANGE UP (ref 1.6–2.6)
MCHC RBC-ENTMCNC: 26.6 PG — LOW (ref 27–34)
MCHC RBC-ENTMCNC: 32.2 GM/DL — SIGNIFICANT CHANGE UP (ref 32–36)
MCV RBC AUTO: 82.5 FL — SIGNIFICANT CHANGE UP (ref 80–100)
NITRITE UR-MCNC: NEGATIVE — SIGNIFICANT CHANGE UP
NRBC # BLD: 0 /100 WBCS — SIGNIFICANT CHANGE UP (ref 0–0)
PH UR: 7 — SIGNIFICANT CHANGE UP (ref 5–8)
PHOSPHATE SERPL-MCNC: 3.1 MG/DL — SIGNIFICANT CHANGE UP (ref 2.5–4.5)
PLATELET # BLD AUTO: 64 K/UL — LOW (ref 150–400)
POTASSIUM SERPL-MCNC: 4.1 MMOL/L — SIGNIFICANT CHANGE UP (ref 3.5–5.3)
POTASSIUM SERPL-SCNC: 4.1 MMOL/L — SIGNIFICANT CHANGE UP (ref 3.5–5.3)
PROT SERPL-MCNC: 6.3 G/DL — SIGNIFICANT CHANGE UP (ref 6–8.3)
PROT UR-MCNC: SIGNIFICANT CHANGE UP MG/DL
RBC # BLD: 3.72 M/UL — LOW (ref 3.8–5.2)
RBC # FLD: 16.8 % — HIGH (ref 10.3–14.5)
RBC CASTS # UR COMP ASSIST: 0 /HPF — SIGNIFICANT CHANGE UP (ref 0–4)
RETICS #: 71.8 K/UL — SIGNIFICANT CHANGE UP (ref 25–125)
RETICS/RBC NFR: 1.9 % — SIGNIFICANT CHANGE UP (ref 0.5–2.5)
SODIUM SERPL-SCNC: 140 MMOL/L — SIGNIFICANT CHANGE UP (ref 135–145)
SP GR SPEC: 1.01 — SIGNIFICANT CHANGE UP (ref 1–1.03)
SQUAMOUS # UR AUTO: 3 /HPF — SIGNIFICANT CHANGE UP (ref 0–5)
URATE CRY FLD QL MICRO: PRESENT
UROBILINOGEN FLD QL: 0.2 MG/DL — SIGNIFICANT CHANGE UP (ref 0.2–1)
VIT B12 SERPL-MCNC: 637 PG/ML — SIGNIFICANT CHANGE UP (ref 232–1245)
WBC # BLD: 6.44 K/UL — SIGNIFICANT CHANGE UP (ref 3.8–10.5)
WBC # FLD AUTO: 6.44 K/UL — SIGNIFICANT CHANGE UP (ref 3.8–10.5)
WBC UR QL: 3 /HPF — SIGNIFICANT CHANGE UP (ref 0–5)
YEAST-LIKE CELLS: PRESENT

## 2024-05-17 PROCEDURE — 99232 SBSQ HOSP IP/OBS MODERATE 35: CPT

## 2024-05-17 PROCEDURE — 71045 X-RAY EXAM CHEST 1 VIEW: CPT | Mod: 26

## 2024-05-17 RX ADMIN — Medication 3 MILLILITER(S): at 06:15

## 2024-05-17 RX ADMIN — RIOCIGUAT 2.5 MILLIGRAM(S): 1.5 TABLET, FILM COATED ORAL at 06:15

## 2024-05-17 RX ADMIN — ARGATROBAN 7.34 MICROGRAM(S)/KG/MIN: 50 INJECTION, SOLUTION INTRAVENOUS at 12:57

## 2024-05-17 RX ADMIN — SENNA PLUS 2 TABLET(S): 8.6 TABLET ORAL at 22:45

## 2024-05-17 RX ADMIN — CARVEDILOL PHOSPHATE 3.12 MILLIGRAM(S): 80 CAPSULE, EXTENDED RELEASE ORAL at 17:18

## 2024-05-17 RX ADMIN — CHLORHEXIDINE GLUCONATE 1 APPLICATION(S): 213 SOLUTION TOPICAL at 06:59

## 2024-05-17 RX ADMIN — RIOCIGUAT 2.5 MILLIGRAM(S): 1.5 TABLET, FILM COATED ORAL at 22:45

## 2024-05-17 RX ADMIN — Medication 3 MILLILITER(S): at 17:17

## 2024-05-17 RX ADMIN — ATORVASTATIN CALCIUM 20 MILLIGRAM(S): 80 TABLET, FILM COATED ORAL at 22:45

## 2024-05-17 RX ADMIN — CARVEDILOL PHOSPHATE 3.12 MILLIGRAM(S): 80 CAPSULE, EXTENDED RELEASE ORAL at 06:15

## 2024-05-17 RX ADMIN — MACITENTAN 10 MILLIGRAM(S): 10 TABLET, FILM COATED ORAL at 11:58

## 2024-05-17 RX ADMIN — PANTOPRAZOLE SODIUM 40 MILLIGRAM(S): 20 TABLET, DELAYED RELEASE ORAL at 11:43

## 2024-05-17 RX ADMIN — RIOCIGUAT 2.5 MILLIGRAM(S): 1.5 TABLET, FILM COATED ORAL at 13:54

## 2024-05-17 NOTE — PROGRESS NOTE ADULT - ASSESSMENT
A/P:  68 year old female with PMHx  of Asthma, CTEPH, CAD, HTN, Type B Dissection referred to Dr. Kwon / Dr. Suh for intervention and presented to [facility] on 4/25/2024 for preoperative lumbar drain prior to planned TEVAR. Patient underwent transfemoral TEVAR with Dr. Kwon/Dr. Suh on 4/26/24, Procedure was uncomplicated and she was transferred to CT ICU post operatively extubated and stable. Post operatively patient had no neurological deficits and lumbar drain was removed on POD#2. She had routine post operative CT scan that revealed new splenic infarcts and PE and she was started on low dose heparin gtt. Overnight POD#2-3 patient had new LE weakness, neurosurgery was emergently called and lumbar drain was replaced with improvement in symptoms and remained in place until POD#6. MRI was obstructed by artifact from TEVAR graft and CT was negative for spinal hemorrhage or infarct. Patient had persistent hypoxia post operatively and pulm was consulted for CTEPH and she was started on additional medication with improvement. On POD#8 patient complained of LE unsteadiness with urinary retention, she was restarted on pressors to keep MAPs elevated with improvement. 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 Her pressors were slowly weaned from POD#10-12., She continued to remain stable and transferred to  on POD#14. Hematology was consulted on POD 14, patient was transitioned from argatroban to Eliquis on POD 15. Urology reconsulted noting if patient fails TOV, she may leave stevens in place and follow up in their clinic. Patient failed overnight TOV on POD 17, so stevens replaced and left in place for discharge. On POD 18, hematology re-consulted, recommending to place patient back on argatroban gtt until plt >100k. POD #19-21, patient's platelets continued to be below goal, downtrending to 64 today, so hematology reconsulted, pending further lab work up. Patient remains hemodynamically stable without complaints and on titrating argatroban gtt.      Neurovascular: No delirium. Pain well controlled with current regimen.  -PRN's.    Cardiovascular: Hemodynamically stable. HR controlled.  -CAD  -HTN  -Type B dissection   BP. HR. EKG. TTE. Cardiac Panel. Lipid Panel. BNP.   -ASA. Plavix. BBlocker. Statin.      Respiratory: 02 Sat = 98% on RA.  -If on oxygen wean to RA from for O2 Sat > 93%.  -Encourage C+DB and Use of IS 10x / hr while awake.  -CXR.  -Asthma:  -CTEPH    GI: Stable.  -NPO after MN.  -PPX.  -PO Diet.    Renal / :  -Monitor renal function.  -Monitor I/O's.    Endocrine:    -A1c.  -TSH.    Hematologic:  -CBC.  -Coagulation Panel.  CTEPH  ID:  -Tempature.  -CBC.  -Observe for SIRS/Sepsis Syndrome.    Prophylaxis:  -DVT prophylaxis with 5000 SubQ Heparin q8h.  -SCD's    Disposition:  -ICU for frequent monitoring.   A/P:  68 year old female with PMHx  of Asthma, CTEPH, CAD, HTN, Type B Dissection referred to Dr. Kwon / Dr. Suh for intervention and presented to [facility] on 4/25/2024 for preoperative lumbar drain prior to planned TEVAR. Patient underwent transfemoral TEVAR with Dr. Kwon/Dr. Suh on 4/26/24, Procedure was uncomplicated and she was transferred to CT ICU post operatively extubated and stable. Post operatively patient had no neurological deficits and lumbar drain was removed on POD#2. She had routine post operative CT scan that revealed new splenic infarcts and PE and she was started on low dose heparin gtt. Overnight POD#2-3 patient had new LE weakness, neurosurgery was emergently called and lumbar drain was replaced with improvement in symptoms and remained in place until POD#6. MRI was obstructed by artifact from TEVAR graft and CT was negative for spinal hemorrhage or infarct. Patient had persistent hypoxia post operatively and pulm was consulted for CTEPH and she was started on additional medication with improvement. On POD#8 patient complained of LE unsteadiness with urinary retention, she was restarted on pressors to keep MAPs elevated with improvement. 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 Her pressors were slowly weaned from POD#10-12., POD #13, UA w/ culture obtained as patient retaining, UA positive, started on Ceftriaxone x4 days, discontinued as patient asymptomatic (cultures sensitive to CTX). She continued to remain stable and transferred to  on POD#14. Hematology was consulted on POD 14, patient was transitioned from argatroban to Eliquis on POD 15. Urology reconsulted noting if patient fails TOV, she may leave stevens in place and follow up in their clinic. Patient failed overnight TOV on POD 17, so stevens replaced and left in place for discharge. On POD 18, hematology re-consulted, recommending to place patient back on argatroban gtt until plt >100k. POD #19-21, patient's platelets continued to be below goal, downtrending to 64 today, so hematology reconsulted, pending further lab work up, pan culture and CTA C/A/P. Patient remains hemodynamically stable without complaints and on titrating argatroban gtt.      Neurovascular: No delirium. Pain well controlled with current regimen.  -Tylenol d/c for uptrending LFTs, wnl now, but pain controlled     Cardiovascular: Hemodynamically stable. HR controlled.  -CAD  -HTN: carvedilol 3.125mg q12hrs, holding home amlodipine now  -HLD: atorvastatin 20mg nightly   -Type B dissection: continued BP/HR control     Respiratory: 02 Sat = 98% on RA.  -Encourage C+DB and Use of IS 10x / hr while awake.  -Asthma: albuterol nebs q6hrs   -CTEPH: Macitentan 10mg daily, Riociguat 2.5mg q8hrs   -CXR: Redemonstration right hilar vascular enlargement and right lower lobe opacity. Stable cardiomegaly, thoracic aortic dilatation, thoracic aortic endovascular stent. Stable bony structures.    GI: Stable.  -Constipation prophylaxis: Miralax, senna   -PPX: pantoprazole   -PO Diet.    Renal / :  -Monitor renal function: Cr .97, BUN 13  -Monitor I/O's.  -Urinary retention s/p multiple failed TOV: will keep stevens in place upon discharge and have patient f/u urology   -UTI 5/9 cultures: s/p 4 days Ceftriaxone  [ ] Follow up UA w/cultures per hematology recs    Endocrine:    -A1c: 5.9%: will continue to monitor BS  -TSH: 2.090    Hematologic:  -CBC: RBC 3.72, hgb 9.9, plt 80>64   -Coagulation Panel: 54.7, PT 20.3, INR 1.81  -Hx of CTEPH, +HIT w/ downtrending platelets:   Goal PTT: 60-80, plts 54 today  [ ] Per hematology, will obtain CTA C/A/P to assess for clotting and pan cultures to r/o infection as consumptive etiology to downtrending plts  [ ] Appreciate hematology recommendations    ID:  -Temperature: afebrile  -CBC: WBC 6.44,   -Observe for SIRS/Sepsis Syndrome.  -UTI 5/9 cultures: s/p 4 days Ceftriaxone   [ ] Per hematology will obtain pan cultures to r/o infectious etiology for platelet consumption     Prophylaxis:  -DVT prophylaxis: on titrating argatroban gtt  -SCD's    Disposition:  -telemetry   A/P:  68 year old female with PMHx  of Asthma, CTEPH, CAD, HTN, Type B Dissection referred to Dr. Kwon / Dr. Suh for intervention and presented to [facility] on 4/25/2024 for preoperative lumbar drain prior to planned TEVAR. Patient underwent transfemoral TEVAR with Dr. Kwon/Dr. Suh on 4/26/24, Procedure was uncomplicated and she was transferred to CT ICU post operatively extubated and stable. Post operatively patient had no neurological deficits and lumbar drain was removed on POD#2. She had routine post operative CT scan that revealed new splenic infarcts and PE and she was started on low dose heparin gtt. Overnight POD#2-3 patient had new LE weakness, neurosurgery was emergently called and lumbar drain was replaced with improvement in symptoms and remained in place until POD#6. MRI was obstructed by artifact from TEVAR graft and CT was negative for spinal hemorrhage or infarct. Patient had persistent hypoxia post operatively and pulm was consulted for CTEPH and she was started on additional medication with improvement. On POD#8 patient complained of LE unsteadiness with urinary retention, she was restarted on pressors to keep MAPs elevated with improvement. 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 Her pressors were slowly weaned from POD#10-12., POD #13, UA w/ culture obtained as patient retaining, UA positive, started on Ceftriaxone x4 days, discontinued as patient asymptomatic (cultures sensitive to CTX). She continued to remain stable and transferred to  on POD#14. Hematology was consulted on POD 14, patient was transitioned from argatroban to Eliquis on POD 15. Urology reconsulted noting if patient fails TOV, she may leave stevens in place and follow up in their clinic. Patient failed overnight TOV on POD 17, so stevens replaced and left in place for discharge. On POD 18, hematology re-consulted, recommending to place patient back on argatroban gtt until plt >100k. POD #19-21, patient's platelets continued to be below goal, downtrending to 64 today, so hematology reconsulted, pending further lab work up, pan culture and CTA C/A/P. Patient remains hemodynamically stable without complaints and on titrating argatroban gtt.      Neurovascular: No delirium. Pain well controlled with current regimen.  -Tylenol d/c for uptrending LFTs, wnl now, but pain controlled     Cardiovascular: Hemodynamically stable. HR controlled.  -CAD  -HTN: carvedilol 3.125mg q12hrs, holding home amlodipine now  -HLD: atorvastatin 20mg nightly   -Type B dissection: continued BP/HR control     Respiratory: 02 Sat = 98% on RA.  -Encourage C+DB and Use of IS 10x / hr while awake.  -Asthma: albuterol nebs q6hrs   -CTEPH: Macitentan 10mg daily, Riociguat 2.5mg q8hrs   -CXR: Redemonstration right hilar vascular enlargement and right lower lobe opacity. Stable cardiomegaly, thoracic aortic dilatation, thoracic aortic endovascular stent. Stable bony structures.    GI: Stable.  -Constipation prophylaxis: Miralax, senna   -PPX: pantoprazole   -PO Diet.    Renal / :  -Monitor renal function: Cr .97, BUN 13  -Monitor I/O's.  -Urinary retention s/p multiple failed TOV: will keep stevens in place upon discharge and have patient f/u urology   -UTI 5/9 cultures: s/p 4 days Ceftriaxone  [ ] Follow up UA w/cultures per hematology recs    Endocrine:    -A1c: 5.9%: will continue to monitor BS  -TSH: 2.090    Hematologic:  -CBC: RBC 3.72, hgb 9.9, plt 80>64   -Coagulation Panel: 54.7, PT 20.3, INR 1.81  -Hx of CTEPH, +HIT w/ downtrending platelets:   Goal PTT: 60-80, plts 54 today   Continue on titrating argatroban  [ ] Per hematology, will obtain labs, CTA C/A/P to assess for clotting and pan cultures to r/o infection as consumptive etiology to downtrending plts  [ ] Appreciate hematology recommendations  [ ] Ensure hematology follow up     ID:  -Temperature: afebrile  -CBC: WBC 6.44,   -Observe for SIRS/Sepsis Syndrome.  -UTI 5/9 cultures: s/p 4 days Ceftriaxone   [ ] Per hematology will obtain pan cultures to r/o infectious etiology for platelet consumption     Prophylaxis:  -DVT prophylaxis: on titrating argatroban gtt  -SCD's    Disposition:  -telemetry

## 2024-05-17 NOTE — PROGRESS NOTE ADULT - SUBJECTIVE AND OBJECTIVE BOX
Patient discussed on morning rounds with Dr. Kwon     Operation / Date: TEVAR, 4/26/24    SUBJECTIVE ASSESSMENT:  68y Female without complaints this morning. Communicated further drop in platelets with patient, explained hematology was re-consulted for ongoing workup. Denies the following symptoms at rest and with ambulation: dizziness, changes in vision, unilateral weakness, chest pain, sob, abd pain, back pain, LE pain/numbness/tingling. weakness.     Vital Signs Last 24 Hrs  T(C): 36.8 (17 May 2024 06:54), Max: 36.8 (17 May 2024 06:54)  T(F): 98.3 (17 May 2024 06:54), Max: 98.3 (17 May 2024 06:54)  HR: 90 (17 May 2024 05:00) (86 - 106)  BP: 155/71 (17 May 2024 05:00) (127/57 - 155/71)  BP(mean): 102 (17 May 2024 05:00) (82 - 102)  RR: 16 (17 May 2024 05:00) (11 - 16)  SpO2: 95% (17 May 2024 05:00) (95% - 97%)    Parameters below as of 17 May 2024 05:00  Patient On (Oxygen Delivery Method): room air      I&O's Detail    16 May 2024 07:01  -  17 May 2024 07:00  --------------------------------------------------------  IN:    Argatroban: 167.9 mL    Oral Fluid: 300 mL  Total IN: 467.9 mL    OUT:    Indwelling Catheter - Urethral (mL): 895 mL  Total OUT: 895 mL    Total NET: -427.1 mL      17 May 2024 07:01  -  17 May 2024 09:14  --------------------------------------------------------  IN:    Argatroban: 7.3 mL  Total IN: 7.3 mL    OUT:    Indwelling Catheter - Urethral (mL): 50 mL  Total OUT: 50 mL    Total NET: -42.7 mL    CHEST TUBE:  No.  WOOD DRAIN:  No.  EPICARDIAL WIRES: No.  TIE DOWNS: No.  ALBERTS: Yes    PHYSICAL EXAM:  Appearance: No acute distress.  Neurologic: AAOx3, no AMS or focal deficits.  Responds appropriately to verbal and physical stimuli; exhibits purposeful movement in all extremities.  HEENT:   MMM, PERRLA, EOMI	b/l  Neck: Supple, + JVD/ - JVD; +/- Carotid Bruit   Cardiovascular: RRR, S1 S2. No m/r/g.  Respiratory: No acute respiratory distress. CTA b/l, no w/r/r.   Gastrointestinal:  Soft, non-tender, non-distended, + BS.	  Skin: No rashes. No ecchymoses. No cyanosis.  Extremities: Exhibits normal range of motion, no clubbing, cyanosis or edema.  Vascular: Peripheral pulses palpable 2+ bilaterally.    LABS:                        9.9    6.44  )-----------( 64       ( 17 May 2024 08:11 )             30.7       COUMADIN:  Yes/No. REASON: .    PT/INR - ( 16 May 2024 05:30 )   PT: 20.3 sec;   INR: 1.81          PTT - ( 17 May 2024 08:11 )  PTT:54.7 sec    05-17    140  |  109<H>  |  13  ----------------------------<  101<H>  4.1   |  20<L>  |  0.97    Ca    9.4      17 May 2024 08:11  Phos  3.1     05-17  Mg     2.3     05-17    TPro  6.3  /  Alb  3.5  /  TBili  0.9  /  DBili  x   /  AST  19  /  ALT  42  /  AlkPhos  93  05-17      Urinalysis Basic - ( 17 May 2024 08:11 )    Color: x / Appearance: x / SG: x / pH: x  Gluc: 101 mg/dL / Ketone: x  / Bili: x / Urobili: x   Blood: x / Protein: x / Nitrite: x   Leuk Esterase: x / RBC: x / WBC x   Sq Epi: x / Non Sq Epi: x / Bacteria: x        MEDICATIONS  (STANDING):  albuterol    90 MICROgram(s) HFA Inhaler 2 Puff(s) Inhalation every 6 hours  albuterol/ipratropium for Nebulization 3 milliLiter(s) Nebulizer every 6 hours  argatroban Infusion 1.499 MICROgram(s)/kG/Min (7.34 mL/Hr) IV Continuous <Continuous>  atorvastatin 20 milliGRAM(s) Oral at bedtime  carvedilol 3.125 milliGRAM(s) Oral every 12 hours  chlorhexidine 2% Cloths 1 Application(s) Topical daily  influenza  Vaccine (HIGH DOSE) 0.7 milliLiter(s) IntraMuscular once  macitentan 10 milliGRAM(s) Oral <User Schedule>  pantoprazole    Tablet 40 milliGRAM(s) Oral daily  polyethylene glycol 3350 17 Gram(s) Oral daily  riociguat 2.5 milliGRAM(s) Oral every 8 hours  senna 2 Tablet(s) Oral at bedtime  sodium chloride 0.9%. 1000 milliLiter(s) (10 mL/Hr) IV Continuous <Continuous>    MEDICATIONS  (PRN):  sodium chloride 0.65% Nasal 1 Spray(s) Both Nostrils three times a day PRN dry nose        RADIOLOGY & ADDITIONAL TESTS:     Patient discussed on morning rounds with Dr. Kwon     Operation / Date: TEVAR, 4/26/24    SUBJECTIVE ASSESSMENT:  68y Female without complaints this morning. Communicated further drop in platelets with patient, explained hematology was re-consulted for ongoing workup. Denies the following symptoms at rest and with ambulation: dizziness, changes in vision, unilateral weakness, chest pain, sob, abd pain, back pain, LE pain/numbness/tingling. weakness.     Vital Signs Last 24 Hrs  T(C): 36.8 (17 May 2024 06:54), Max: 36.8 (17 May 2024 06:54)  T(F): 98.3 (17 May 2024 06:54), Max: 98.3 (17 May 2024 06:54)  HR: 90 (17 May 2024 05:00) (86 - 106)  BP: 155/71 (17 May 2024 05:00) (127/57 - 155/71)  BP(mean): 102 (17 May 2024 05:00) (82 - 102)  RR: 16 (17 May 2024 05:00) (11 - 16)  SpO2: 95% (17 May 2024 05:00) (95% - 97%)    Parameters below as of 17 May 2024 05:00  Patient On (Oxygen Delivery Method): room air      I&O's Detail    16 May 2024 07:01  -  17 May 2024 07:00  --------------------------------------------------------  IN:    Argatroban: 167.9 mL    Oral Fluid: 300 mL  Total IN: 467.9 mL    OUT:    Indwelling Catheter - Urethral (mL): 895 mL  Total OUT: 895 mL    Total NET: -427.1 mL      17 May 2024 07:01  -  17 May 2024 09:14  --------------------------------------------------------  IN:    Argatroban: 7.3 mL  Total IN: 7.3 mL    OUT:    Indwelling Catheter - Urethral (mL): 50 mL  Total OUT: 50 mL    Total NET: -42.7 mL    CHEST TUBE:  No.  WOOD DRAIN:  No.  EPICARDIAL WIRES: No.  TIE DOWNS: No.  ALBERTS: Yes    PHYSICAL EXAM:  Appearance: No acute distress.  Neurologic: AAOx3, no AMS or focal deficits.  Responds appropriately to verbal and physical stimuli; exhibits purposeful movement in all extremities.  Cardiovascular: RRR, S1 S2. No m/r/g.  Respiratory: No acute respiratory distress. CTA b/l, no r/r. +some mild expiratory wheezing  Gastrointestinal:  Soft, non-tender, non-distended, + BS.	  Skin: No rashes. No ecchymoses. No cyanosis.  Extremities: +mild LE edema. Exhibits normal range of motion, no clubbing, cyanosis.  Vascular: Peripheral pulses palpable 2+ bilaterally.    LABS:                        9.9    6.44  )-----------( 64       ( 17 May 2024 08:11 )             30.7       COUMADIN:  Yes/No. REASON: .    PT/INR - ( 16 May 2024 05:30 )   PT: 20.3 sec;   INR: 1.81          PTT - ( 17 May 2024 08:11 )  PTT:54.7 sec    05-17    140  |  109<H>  |  13  ----------------------------<  101<H>  4.1   |  20<L>  |  0.97    Ca    9.4      17 May 2024 08:11  Phos  3.1     05-17  Mg     2.3     05-17    TPro  6.3  /  Alb  3.5  /  TBili  0.9  /  DBili  x   /  AST  19  /  ALT  42  /  AlkPhos  93  05-17      Urinalysis Basic - ( 17 May 2024 08:11 )    Color: x / Appearance: x / SG: x / pH: x  Gluc: 101 mg/dL / Ketone: x  / Bili: x / Urobili: x   Blood: x / Protein: x / Nitrite: x   Leuk Esterase: x / RBC: x / WBC x   Sq Epi: x / Non Sq Epi: x / Bacteria: x        MEDICATIONS  (STANDING):  albuterol    90 MICROgram(s) HFA Inhaler 2 Puff(s) Inhalation every 6 hours  albuterol/ipratropium for Nebulization 3 milliLiter(s) Nebulizer every 6 hours  argatroban Infusion 1.499 MICROgram(s)/kG/Min (7.34 mL/Hr) IV Continuous <Continuous>  atorvastatin 20 milliGRAM(s) Oral at bedtime  carvedilol 3.125 milliGRAM(s) Oral every 12 hours  chlorhexidine 2% Cloths 1 Application(s) Topical daily  influenza  Vaccine (HIGH DOSE) 0.7 milliLiter(s) IntraMuscular once  macitentan 10 milliGRAM(s) Oral <User Schedule>  pantoprazole    Tablet 40 milliGRAM(s) Oral daily  polyethylene glycol 3350 17 Gram(s) Oral daily  riociguat 2.5 milliGRAM(s) Oral every 8 hours  senna 2 Tablet(s) Oral at bedtime  sodium chloride 0.9%. 1000 milliLiter(s) (10 mL/Hr) IV Continuous <Continuous>    MEDICATIONS  (PRN):  sodium chloride 0.65% Nasal 1 Spray(s) Both Nostrils three times a day PRN dry nose        RADIOLOGY & ADDITIONAL TESTS:

## 2024-05-17 NOTE — PROGRESS NOTE ADULT - SUBJECTIVE AND OBJECTIVE BOX
INTERVAL HPI/OVERNIGHT EVENTS:    SUBJECTIVE: Patient seen and examined at bedside.    VITAL SIGNS:  ICU Vital Signs Last 24 Hrs  T(C): 36.7 (17 May 2024 13:21), Max: 36.9 (17 May 2024 09:30)  T(F): 98.1 (17 May 2024 13:21), Max: 98.5 (17 May 2024 09:30)  HR: 96 (17 May 2024 13:00) (86 - 106)  BP: 144/66 (17 May 2024 13:00) (127/57 - 155/71)  BP(mean): 95 (17 May 2024 13:00) (82 - 102)  ABP: --  ABP(mean): --  RR: 19 (17 May 2024 13:00) (11 - 19)  SpO2: 97% (17 May 2024 13:00) (95% - 97%)    O2 Parameters below as of 17 May 2024 13:00  Patient On (Oxygen Delivery Method): room air              05-16 @ 07:01  -  05-17 @ 07:00  --------------------------------------------------------64k  IN: 467.9 mL / OUT: 895 mL / NET: -427.1 mL    05-17 @ 07:01  -  05-17 @ 14:41  --------------------------------------------------------  IN: 21.9 mL / OUT: 85 mL / NET: -63.1 mL      CAPILLARY BLOOD GLUCOSE          PHYSICAL EXAM:    Constitutional: NAD  HEENT: NC/AT; PERRL, anicteric sclera; MMM  Neck: supple, no JVD  Cardiovascular: +S1/S2, RRR  Respiratory: CTA B/L, no W/R/R  Gastrointestinal: abdomen soft, NT/ND; no rebound or guarding; +BSx4  Genitourinary: +stevens draining clear urine  Extremities: WWP; no LE edema; no clubbing or cyanosis  Vascular: 2+ radial, DP/PT and femoral pulses B/L  Dermatologic: noted petechiae in shins, bruising around venipuncture sites  Neurological: AAOx3; nonfocal    MEDICATIONS:  MEDICATIONS  (STANDING):  albuterol    90 MICROgram(s) HFA Inhaler 2 Puff(s) Inhalation every 6 hours  albuterol/ipratropium for Nebulization 3 milliLiter(s) Nebulizer every 6 hours  argatroban Infusion 1.499 MICROgram(s)/kG/Min (7.34 mL/Hr) IV Continuous <Continuous>  atorvastatin 20 milliGRAM(s) Oral at bedtime  carvedilol 3.125 milliGRAM(s) Oral every 12 hours  chlorhexidine 2% Cloths 1 Application(s) Topical daily  influenza  Vaccine (HIGH DOSE) 0.7 milliLiter(s) IntraMuscular once  macitentan 10 milliGRAM(s) Oral <User Schedule>  pantoprazole    Tablet 40 milliGRAM(s) Oral daily  polyethylene glycol 3350 17 Gram(s) Oral daily  riociguat 2.5 milliGRAM(s) Oral every 8 hours  senna 2 Tablet(s) Oral at bedtime  sodium chloride 0.9%. 1000 milliLiter(s) (10 mL/Hr) IV Continuous <Continuous>    MEDICATIONS  (PRN):  sodium chloride 0.65% Nasal 1 Spray(s) Both Nostrils three times a day PRN dry nose      ALLERGIES:  Allergies    heparin (Other (Moderate))    Intolerances        LABS:                        9.9    6.44  )-----------( 64       ( 17 May 2024 08:11 )             30.7     05-17    140  |  109<H>  |  13  ----------------------------<  101<H>  4.1   |  20<L>  |  0.97    Ca    9.4      17 May 2024 08:11  Phos  3.1     05-17  Mg     2.3     05-17    TPro  6.3  /  Alb  3.5  /  TBili  0.9  /  DBili  x   /  AST  19  /  ALT  42  /  AlkPhos  93  05-17    PT/INR - ( 16 May 2024 05:30 )   PT: 20.3 sec;   INR: 1.81          PTT - ( 17 May 2024 08:11 )  PTT:54.7 sec  Urinalysis Basic - ( 17 May 2024 08:11 )    Color: x / Appearance: x / SG: x / pH: x  Gluc: 101 mg/dL / Ketone: x  / Bili: x / Urobili: x   Blood: x / Protein: x / Nitrite: x   Leuk Esterase: x / RBC: x / WBC x   Sq Epi: x / Non Sq Epi: x / Bacteria: x        RADIOLOGY & ADDITIONAL TESTS: Reviewed.

## 2024-05-17 NOTE — PROGRESS NOTE ADULT - ATTENDING COMMENTS
I evaluated the patient with the hematology fellow, Dr Monroy. 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       Her platelet count peaked at 99 but downtrended after being switched to eliquis.  After being restarted on argatroban and achieving therapeutic levels, her platelet count improved temporarily, but is now trending back down and is 64 today.    On evaluation today she is without complaint; just disappointed with her labs.  No new symptoms that might help explain the worsening thrombocytopenia.    Peripheral blood smear was reviewed.  Thrombocytopenia appears genuine; no platelet clumping noted.  WBC with normal maturation and morphology.  Very rare schistocytes noted;  RBC morphology is fairly unremarkable.  Thus the peripheral smear is not contributing much to the understanding of her thrombocytopenia    The question is if the t-penia is still caused by the DON, or if another contributing factor has developed.    None of her meds are apt to causing thrombocytopenia.  Was on ceftriaxone but off since 5/13.  Agree w/ obtaining CT scans for evaluation of progressive thrombus, fluid collections, etc.  R/O infection w/ pan-cultures.   Nutritional studies - B12/folate, ferritin/iron panel.  Daily CBC  At this time recommend continuing argatroban for DON until there is some stability with her platelet count and hopefully a platelet count closer to normal.    She will need hematology f/u after discharge and has established at [facility].

## 2024-05-17 NOTE — PROGRESS NOTE ADULT - ASSESSMENT
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 Hematology was consulted for anticoagulation recommendations.    #DON  confirmed by positive Heparin-PF4 antibodies and PAVAN, and noted celiac trunk thrombus  platelet carolyn 27k on 5/5/24  Patient was transitioned to argatroban from heparin with noted uptrend of platelet count, 83k on 5/9/24  Patient transitioned to eliquis 10mg BID on 5/11 when platelets were at 99k --> 81k -->74k --> 82k --> 86k -->80k -->64k  As per the 2018 [facility] Clinical practice guidelines for management of DON, recommendations to transition from a parenteral agent to warfarin require a platelet count recovery to >150k. Transitioning from a parenteral agent to a DOAC does not carry this requirement as long as patient is deemed clinically stable. While we would prefer transitioning to a DOAC as soon as her platelet count recovers to normal levels, we would defer to the primary team. Please note that you should start the DOAC within 2 hours of stopping argatroban gtt.     Peripheral smear reviewed today 5/16/24 given downtrend in platelet count: Anisocytosis, poikilocytosis, hypochromic RBCs, rare schistocytes 0-2/hpf (no evidence to suggest thrombotic microangiopathy); neutrophils without evidence of toxic granulation to suggest infection; thrombocytopenia is real, no platelet clumping observed  Medication list reviewed; her last dose of Ceftriaxone, which can contribute to thrombocytopenia, was >3d ago, so unlikely to be contributory; the other medications she is receiving are not associated with thrombocytopenia    Plan:    -Continue argatroban gtt until platelet counts normalize  -Please consider evaluating for clot progression/new clots  -When platelet counts stabilize closer to normal range, would reasonable to discharge her on Eliquis, which she has tolerated well in the past  -Please discharge her with follow up with her outpatient hematologist, Dr. Bradley Goldberg    #anemia  #leukocytosis  Patient with normal WBC prior to this admission, so leukocytosis is likely reactive    Discussed with Dr. 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 Hematology was consulted for anticoagulation recommendations.    #DON  confirmed by positive Heparin-PF4 antibodies and PAVAN, and noted celiac trunk thrombus  platelet carolyn 27k on 5/5/24  Patient was transitioned to argatroban from heparin with noted uptrend of platelet count, 83k on 5/9/24  Patient transitioned to eliquis 10mg BID on 5/11 when platelets were at 99k --> 81k -->74k --> 82k --> 86k -->80k -->64k  As per the 2018 [facility] Clinical practice guidelines for management of DON, recommendations to transition from a parenteral agent to warfarin require a platelet count recovery to >150k. Transitioning from a parenteral agent to a DOAC does not carry this requirement as long as patient is deemed clinically stable. While we would prefer transitioning to a DOAC as soon as her platelet count recovers to normal levels, we would defer to the primary team. Please note that you should start the DOAC within 2 hours of stopping argatroban gtt.     Peripheral smear reviewed today 5/16/24 given downtrend in platelet count: Anisocytosis, poikilocytosis, hypochromic RBCs, rare schistocytes 0-2/hpf (no evidence to suggest thrombotic microangiopathy); neutrophils without evidence of toxic granulation to suggest infection; thrombocytopenia is real, no platelet clumping observed  Medication list reviewed; her last dose of Ceftriaxone, which can contribute to thrombocytopenia, was >3d ago, so unlikely to be contributory; the other medications she is receiving are not associated with thrombocytopenia    Plan:    -Continue argatroban gtt until platelet counts normalize  -Please consider evaluating for clot progression/new clots  -Please workup for any occult infection  -When platelet counts stabilize closer to normal range, would reasonable to discharge her on Eliquis, which she has tolerated well in the past  -Please discharge her with follow up with her outpatient hematologist, Dr. Bradley Goldberg    #anemia  #leukocytosis  Patient with normal WBC prior to this admission, so leukocytosis is likely reactive    Discussed with Dr. 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 Hematology was consulted for anticoagulation recommendations.    #DON  confirmed by positive Heparin-PF4 antibodies and PAVAN, and noted celiac trunk thrombus  platelet carolyn 27k on 5/5/24  Patient was transitioned to argatroban from heparin with noted uptrend of platelet count, 83k on 5/9/24  Patient transitioned to eliquis 10mg BID on 5/11 when platelets were at 99k --> 81k -->74k --> 82k --> 86k -->80k -->64k  As per the 2018 [facility] Clinical practice guidelines for management of DON, recommendations to transition from a parenteral agent to warfarin require a platelet count recovery to >150k. Transitioning from a parenteral agent to a DOAC does not carry this requirement as long as patient is deemed clinically stable. While we would prefer transitioning to a DOAC as soon as her platelet count recovers to normal levels, we would defer to the primary team. Please note that you should start the DOAC within 2 hours of stopping argatroban gtt.     Peripheral smear reviewed today 5/16/24 given downtrend in platelet count: Anisocytosis, poikilocytosis, hypochromic RBCs, rare schistocytes 0-2/hpf (no evidence to suggest thrombotic microangiopathy); neutrophils without evidence of toxic granulation to suggest infection; thrombocytopenia is real, no platelet clumping observed  Medication list reviewed; her last dose of Ceftriaxone, which can contribute to thrombocytopenia, was >3d ago, so unlikely to be contributory; the other medications she is receiving are not associated with thrombocytopenia  Peripheral smear reviewed again 5/17/24 given further drop in platelet counts. Findings similar to slide from yesterday. Rare minimal clumping noticed (clumps of 4 platelets), not enough to account for the overall drop.    Plan:    -Continue argatroban gtt until platelet counts normalize  -Please consider evaluating for clot progression/new clots  -Please workup for any occult infection  -When platelet counts stabilize closer to normal range, would reasonable to discharge her on Eliquis, which she has tolerated well in the past  -Please discharge her with follow up with her outpatient hematologist, Dr. Bradley Goldberg    #anemia  #leukocytosis  Patient with normal WBC prior to this admission, so leukocytosis is likely reactive    Discussed with Dr. Kendall

## 2024-05-18 LAB
ANION GAP SERPL CALC-SCNC: 10 MMOL/L — SIGNIFICANT CHANGE UP (ref 5–17)
APTT BLD: 60.9 SEC — HIGH (ref 24.5–35.6)
APTT BLD: 62.1 SEC — HIGH (ref 24.5–35.6)
APTT BLD: 63.6 SEC — HIGH (ref 24.5–35.6)
BUN SERPL-MCNC: 13 MG/DL — SIGNIFICANT CHANGE UP (ref 7–23)
CALCIUM SERPL-MCNC: 9.2 MG/DL — SIGNIFICANT CHANGE UP (ref 8.4–10.5)
CHLORIDE SERPL-SCNC: 109 MMOL/L — HIGH (ref 96–108)
CO2 SERPL-SCNC: 22 MMOL/L — SIGNIFICANT CHANGE UP (ref 22–31)
CREAT SERPL-MCNC: 1.09 MG/DL — SIGNIFICANT CHANGE UP (ref 0.5–1.3)
EGFR: 55 ML/MIN/1.73M2 — LOW
GLUCOSE SERPL-MCNC: 99 MG/DL — SIGNIFICANT CHANGE UP (ref 70–99)
HCT VFR BLD CALC: 28.9 % — LOW (ref 34.5–45)
HGB BLD-MCNC: 9.5 G/DL — LOW (ref 11.5–15.5)
INR BLD: 1.86 — HIGH (ref 0.85–1.18)
MAGNESIUM SERPL-MCNC: 2.2 MG/DL — SIGNIFICANT CHANGE UP (ref 1.6–2.6)
MCHC RBC-ENTMCNC: 26.3 PG — LOW (ref 27–34)
MCHC RBC-ENTMCNC: 32.9 GM/DL — SIGNIFICANT CHANGE UP (ref 32–36)
MCV RBC AUTO: 80.1 FL — SIGNIFICANT CHANGE UP (ref 80–100)
NRBC # BLD: 0 /100 WBCS — SIGNIFICANT CHANGE UP (ref 0–0)
PLATELET # BLD AUTO: 75 K/UL — LOW (ref 150–400)
POTASSIUM SERPL-MCNC: 3.9 MMOL/L — SIGNIFICANT CHANGE UP (ref 3.5–5.3)
POTASSIUM SERPL-SCNC: 3.9 MMOL/L — SIGNIFICANT CHANGE UP (ref 3.5–5.3)
PROTHROM AB SERPL-ACNC: 20.8 SEC — HIGH (ref 9.5–13)
RBC # BLD: 3.61 M/UL — LOW (ref 3.8–5.2)
RBC # FLD: 16.9 % — HIGH (ref 10.3–14.5)
SODIUM SERPL-SCNC: 141 MMOL/L — SIGNIFICANT CHANGE UP (ref 135–145)
WBC # BLD: 6.34 K/UL — SIGNIFICANT CHANGE UP (ref 3.8–10.5)
WBC # FLD AUTO: 6.34 K/UL — SIGNIFICANT CHANGE UP (ref 3.8–10.5)

## 2024-05-18 PROCEDURE — 74174 CTA ABD&PLVS W/CONTRAST: CPT | Mod: 26

## 2024-05-18 PROCEDURE — 71045 X-RAY EXAM CHEST 1 VIEW: CPT | Mod: 26

## 2024-05-18 PROCEDURE — 71275 CT ANGIOGRAPHY CHEST: CPT | Mod: 26

## 2024-05-18 RX ORDER — ARGATROBAN 50 MG/50ML
1.84 INJECTION, SOLUTION INTRAVENOUS
Qty: 50 | Refills: 0 | Status: DISCONTINUED | OUTPATIENT
Start: 2024-05-18 | End: 2024-05-24

## 2024-05-18 RX ORDER — POTASSIUM CHLORIDE 20 MEQ
20 PACKET (EA) ORAL ONCE
Refills: 0 | Status: COMPLETED | OUTPATIENT
Start: 2024-05-18 | End: 2024-05-18

## 2024-05-18 RX ADMIN — ARGATROBAN 7.34 MICROGRAM(S)/KG/MIN: 50 INJECTION, SOLUTION INTRAVENOUS at 09:27

## 2024-05-18 RX ADMIN — CARVEDILOL PHOSPHATE 3.12 MILLIGRAM(S): 80 CAPSULE, EXTENDED RELEASE ORAL at 17:49

## 2024-05-18 RX ADMIN — CARVEDILOL PHOSPHATE 3.12 MILLIGRAM(S): 80 CAPSULE, EXTENDED RELEASE ORAL at 07:06

## 2024-05-18 RX ADMIN — ATORVASTATIN CALCIUM 20 MILLIGRAM(S): 80 TABLET, FILM COATED ORAL at 21:20

## 2024-05-18 RX ADMIN — ARGATROBAN 9 MICROGRAM(S)/KG/MIN: 50 INJECTION, SOLUTION INTRAVENOUS at 17:28

## 2024-05-18 RX ADMIN — RIOCIGUAT 2.5 MILLIGRAM(S): 1.5 TABLET, FILM COATED ORAL at 13:43

## 2024-05-18 RX ADMIN — RIOCIGUAT 2.5 MILLIGRAM(S): 1.5 TABLET, FILM COATED ORAL at 21:20

## 2024-05-18 RX ADMIN — Medication 20 MILLIEQUIVALENT(S): at 07:54

## 2024-05-18 RX ADMIN — MACITENTAN 10 MILLIGRAM(S): 10 TABLET, FILM COATED ORAL at 11:07

## 2024-05-18 RX ADMIN — CHLORHEXIDINE GLUCONATE 1 APPLICATION(S): 213 SOLUTION TOPICAL at 07:06

## 2024-05-18 RX ADMIN — SENNA PLUS 2 TABLET(S): 8.6 TABLET ORAL at 21:20

## 2024-05-18 RX ADMIN — PANTOPRAZOLE SODIUM 40 MILLIGRAM(S): 20 TABLET, DELAYED RELEASE ORAL at 11:07

## 2024-05-18 RX ADMIN — RIOCIGUAT 2.5 MILLIGRAM(S): 1.5 TABLET, FILM COATED ORAL at 07:06

## 2024-05-18 NOTE — PROGRESS NOTE ADULT - ASSESSMENT
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 Hematology was consulted for anticoagulation recommendations.    #DON  confirmed by positive Heparin-PF4 antibodies and PAVAN, and noted celiac trunk thrombus  platelet carolyn 27k on 5/5/24  Patient was transitioned to argatroban from heparin with noted uptrend of platelet count, 83k on 5/9/24  Patient transitioned to eliquis 10mg BID on 5/11 when platelets were at 99k --> 81k -->74k --> 82k --> 86k -->80k -->64k --> 75 k   As per the 2018 [facility] Clinical practice guidelines for management of DON, recommendations to transition from a parenteral agent to warfarin require a platelet count recovery to >150k. Transitioning from a parenteral agent to a DOAC does not carry this requirement as long as patient is deemed clinically stable. While we would prefer transitioning to a DOAC as soon as her platelet count recovers to normal levels, we would defer to the primary team. Please note that you should start the DOAC within 2 hours of stopping argatroban gtt.     Peripheral smear reviewed 5/16/24 given downtrend in platelet count: Anisocytosis, poikilocytosis, hypochromic RBCs, rare schistocytes 0-2/hpf (no evidence to suggest thrombotic microangiopathy); neutrophils without evidence of toxic granulation to suggest infection; thrombocytopenia is real, no platelet clumping observed  Medication list reviewed; her last dose of Ceftriaxone, which can contribute to thrombocytopenia, was >3d ago, so unlikely to be contributory; the other medications she is receiving are not associated with thrombocytopenia  Peripheral smear reviewed again 5/17/24 given further drop in platelet counts. Findings similar to slide from yesterday. Rare minimal clumping noticed (clumps of 4 platelets), not enough to account for the overall drop.    Plan:    -Continue argatroban gtt until platelet counts normalize  - infectious workup negative so far, will fu blood cx  - platelet decrease likely 2/2 clot progression seen on CT  - recommend BLE duplex   -When platelet counts stabilize closer to normal range, would reasonable to discharge her on Eliquis, which she has tolerated well in the past  -Please discharge her with follow up with her outpatient hematologist, Dr. Bradley Goldberg    #anemia  #leukocytosis  Patient with normal WBC prior to this admission, so leukocytosis is likely reactive    Discussed with Dr. Kendall

## 2024-05-18 NOTE — PROGRESS NOTE ADULT - SUBJECTIVE AND OBJECTIVE BOX
Patient discussed on morning rounds with Dr. James    Operation / Date:     SUBJECTIVE ASSESSMENT:  68y Female seen and assessed at bedside this morning. Patient has no acute complaints but is eager for her platelets to increase so she can go home         Vital Signs Last 24 Hrs  T(C): 36.4 (18 May 2024 13:45), Max: 36.9 (18 May 2024 09:10)  T(F): 97.6 (18 May 2024 13:45), Max: 98.5 (18 May 2024 09:10)  HR: 94 (18 May 2024 13:05) (88 - 102)  BP: 129/60 (18 May 2024 13:05) (119/56 - 151/72)  BP(mean): 87 (18 May 2024 13:05) (80 - 103)  RR: 12 (18 May 2024 13:05) (12 - 18)  SpO2: 100% (18 May 2024 13:05) (95% - 100%)    Parameters below as of 18 May 2024 13:05  Patient On (Oxygen Delivery Method): room air      I&O's Detail    17 May 2024 07:01  -  18 May 2024 07:00  --------------------------------------------------------  IN:    Argatroban: 153.3 mL    Oral Fluid: 510 mL  Total IN: 663.3 mL    OUT:    Blood Loss (mL): 0 mL    Indwelling Catheter - Urethral (mL): 960 mL    Voided (mL): 0 mL  Total OUT: 960 mL    Total NET: -296.7 mL      18 May 2024 07:01  -  18 May 2024 15:17  --------------------------------------------------------  IN:    Argatroban: 36.5 mL    Argatroban: 9 mL  Total IN: 45.5 mL    OUT:    Indwelling Catheter - Urethral (mL): 375 mL  Total OUT: 375 mL    Total NET: -329.5 mL        CHEST TUBE:  No.  WOOD DRAIN:  No.  EPICARDIAL WIRES: No.  TIE DOWNS: No.  ALBERTS: Yes    PHYSICAL EXAM:  Appearance: No acute distress.  Neurologic: AAOx3, no AMS or focal deficits.  Responds appropriately to verbal and physical stimuli; exhibits purposeful movement in all extremities.  Cardiovascular: RRR, S1 S2. No m/r/g.  Respiratory: No acute respiratory distress. CTA b/l, no r/r. +some mild expiratory wheezing  Gastrointestinal:  Soft, non-tender, non-distended, + BS.	  Skin: No rashes. No ecchymoses. No cyanosis.  Extremities: +mild LE edema. Exhibits normal range of motion, no clubbing, cyanosis.  Vascular: Peripheral pulses palpable 2+ bilaterally.  Incisions: groin sites c/d/i, no hematoma    LABS:                        9.5    6.34  )-----------( 75       ( 18 May 2024 05:30 )             28.9         PT/INR - ( 18 May 2024 05:30 )   PT: 20.8 sec;   INR: 1.86          PTT - ( 18 May 2024 05:30 )  PTT:60.9 sec    05-18    141  |  109<H>  |  13  ----------------------------<  99  3.9   |  22  |  1.09    Ca    9.2      18 May 2024 05:30  Phos  3.1     05-17  Mg     2.2     05-18    TPro  6.3  /  Alb  3.5  /  TBili  0.9  /  DBili  x   /  AST  19  /  ALT  42  /  AlkPhos  93  05-17      Urinalysis Basic - ( 18 May 2024 05:30 )    Color: x / Appearance: x / SG: x / pH: x  Gluc: 99 mg/dL / Ketone: x  / Bili: x / Urobili: x   Blood: x / Protein: x / Nitrite: x   Leuk Esterase: x / RBC: x / WBC x   Sq Epi: x / Non Sq Epi: x / Bacteria: x        MEDICATIONS  (STANDING):  albuterol    90 MICROgram(s) HFA Inhaler 2 Puff(s) Inhalation every 6 hours  albuterol/ipratropium for Nebulization 3 milliLiter(s) Nebulizer every 6 hours  argatroban Infusion 1.838 MICROgram(s)/kG/Min (9 mL/Hr) IV Continuous <Continuous>  atorvastatin 20 milliGRAM(s) Oral at bedtime  carvedilol 3.125 milliGRAM(s) Oral every 12 hours  chlorhexidine 2% Cloths 1 Application(s) Topical daily  influenza  Vaccine (HIGH DOSE) 0.7 milliLiter(s) IntraMuscular once  macitentan 10 milliGRAM(s) Oral <User Schedule>  pantoprazole    Tablet 40 milliGRAM(s) Oral daily  polyethylene glycol 3350 17 Gram(s) Oral daily  riociguat 2.5 milliGRAM(s) Oral every 8 hours  senna 2 Tablet(s) Oral at bedtime  sodium chloride 0.9%. 1000 milliLiter(s) (10 mL/Hr) IV Continuous <Continuous>    MEDICATIONS  (PRN):  sodium chloride 0.65% Nasal 1 Spray(s) Both Nostrils three times a day PRN dry nose        RADIOLOGY & ADDITIONAL TESTS:

## 2024-05-18 NOTE — PROGRESS NOTE ADULT - SUBJECTIVE AND OBJECTIVE BOX
NAEON, feeling well. Platelets 75 today.        Vital Signs Last 24 Hrs  T(C): 36.4 (18 May 2024 13:45), Max: 36.9 (18 May 2024 09:10)  T(F): 97.6 (18 May 2024 13:45), Max: 98.5 (18 May 2024 09:10)  HR: 94 (18 May 2024 13:05) (88 - 102)  BP: 129/60 (18 May 2024 13:05) (119/56 - 151/72)  BP(mean): 87 (18 May 2024 13:05) (80 - 103)  RR: 12 (18 May 2024 13:05) (12 - 18)  SpO2: 100% (18 May 2024 13:05) (95% - 100%)    Parameters below as of 18 May 2024 13:05  Patient On (Oxygen Delivery Method): room air      I&O's Detail    17 May 2024 07:01  -  18 May 2024 07:00  --------------------------------------------------------  IN:    Argatroban: 153.3 mL    Oral Fluid: 510 mL  Total IN: 663.3 mL    OUT:    Blood Loss (mL): 0 mL    Indwelling Catheter - Urethral (mL): 960 mL    Voided (mL): 0 mL  Total OUT: 960 mL    Total NET: -296.7 mL      18 May 2024 07:01  -  18 May 2024 15:17  --------------------------------------------------------  IN:    Argatroban: 36.5 mL    Argatroban: 9 mL  Total IN: 45.5 mL    OUT:    Indwelling Catheter - Urethral (mL): 375 mL  Total OUT: 375 mL    Total NET: -329.5 mL        CHEST TUBE:  No.  WOOD DRAIN:  No.  EPICARDIAL WIRES: No.  TIE DOWNS: No.  ALBERTS: Yes    PHYSICAL EXAM:  Appearance: No acute distress.  Neurologic: AAOx3, no AMS or focal deficits.  Responds appropriately to verbal and physical stimuli; exhibits purposeful movement in all extremities.  Cardiovascular: RRR, S1 S2. No m/r/g.  Respiratory: No acute respiratory distress. CTA b/l, no r/r. +some mild expiratory wheezing  Gastrointestinal:  Soft, non-tender, non-distended, + BS.	  Skin: No rashes. No ecchymoses. No cyanosis.  Extremities: +mild LE edema. Exhibits normal range of motion, no clubbing, cyanosis.  Vascular: Peripheral pulses palpable 2+ bilaterally.  Incisions: groin sites c/d/i, no hematoma    LABS:                        9.5    6.34  )-----------( 75       ( 18 May 2024 05:30 )             28.9         PT/INR - ( 18 May 2024 05:30 )   PT: 20.8 sec;   INR: 1.86          PTT - ( 18 May 2024 05:30 )  PTT:60.9 sec    05-18    141  |  109<H>  |  13  ----------------------------<  99  3.9   |  22  |  1.09    Ca    9.2      18 May 2024 05:30  Phos  3.1     05-17  Mg     2.2     05-18    TPro  6.3  /  Alb  3.5  /  TBili  0.9  /  DBili  x   /  AST  19  /  ALT  42  /  AlkPhos  93  05-17      Urinalysis Basic - ( 18 May 2024 05:30 )    Color: x / Appearance: x / SG: x / pH: x  Gluc: 99 mg/dL / Ketone: x  / Bili: x / Urobili: x   Blood: x / Protein: x / Nitrite: x   Leuk Esterase: x / RBC: x / WBC x   Sq Epi: x / Non Sq Epi: x / Bacteria: x        MEDICATIONS  (STANDING):  albuterol    90 MICROgram(s) HFA Inhaler 2 Puff(s) Inhalation every 6 hours  albuterol/ipratropium for Nebulization 3 milliLiter(s) Nebulizer every 6 hours  argatroban Infusion 1.838 MICROgram(s)/kG/Min (9 mL/Hr) IV Continuous <Continuous>  atorvastatin 20 milliGRAM(s) Oral at bedtime  carvedilol 3.125 milliGRAM(s) Oral every 12 hours  chlorhexidine 2% Cloths 1 Application(s) Topical daily  influenza  Vaccine (HIGH DOSE) 0.7 milliLiter(s) IntraMuscular once  macitentan 10 milliGRAM(s) Oral <User Schedule>  pantoprazole    Tablet 40 milliGRAM(s) Oral daily  polyethylene glycol 3350 17 Gram(s) Oral daily  riociguat 2.5 milliGRAM(s) Oral every 8 hours  senna 2 Tablet(s) Oral at bedtime  sodium chloride 0.9%. 1000 milliLiter(s) (10 mL/Hr) IV Continuous <Continuous>    MEDICATIONS  (PRN):  sodium chloride 0.65% Nasal 1 Spray(s) Both Nostrils three times a day PRN dry nose        RADIOLOGY & ADDITIONAL TESTS:

## 2024-05-18 NOTE — PROGRESS NOTE ADULT - ASSESSMENT
68 year old female with PMHx  of Asthma, CTEPH, CAD, HTN, Type B Dissection referred to Dr. Kwon / Dr. Suh for intervention and presented to [facility] on 4/25/2024 for preoperative lumbar drain prior to planned TEVAR. Patient underwent transfemoral TEVAR with Dr. Kwon/Dr. Suh on 4/26/24, Procedure was uncomplicated and she was transferred to CT ICU post operatively extubated and stable. Post operatively patient had no neurological deficits and lumbar drain was removed on POD#2. She had routine post operative CT scan that revealed new splenic infarcts and PE and she was started on low dose heparin gtt. Overnight POD#2-3 patient had new LE weakness, neurosurgery was emergently called and lumbar drain was replaced with improvement in symptoms and remained in place until POD#6. MRI was obstructed by artifact from TEVAR graft and CT was negative for spinal hemorrhage or infarct. Patient had persistent hypoxia post operatively and pulm was consulted for CTEPH and she was started on additional medication with improvement. On POD#8 patient complained of LE unsteadiness with urinary retention, she was restarted on pressors to keep MAPs elevated with improvement. 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 Her pressors were slowly weaned from POD#10-12., POD #13, UA w/ culture obtained as patient retaining, UA positive, started on Ceftriaxone x4 days, discontinued as patient asymptomatic (cultures sensitive to CTX). She continued to remain stable and transferred to  on POD#14. Hematology was consulted on POD 14, patient was transitioned from argatroban to Eliquis on POD 15. Urology reconsulted noting if patient fails TOV, she may leave stevens in place and follow up in their clinic. Patient failed overnight TOV on POD 17, so stevens replaced and left in place for discharge. On POD 18, hematology re-consulted, recommending to place patient back on argatroban gtt until plt >100k. POD #19-21, patient's platelets continued to be below goal, hematology reconsulted, pending further lab work up, pan culture and CTA C/A/P. POD22: CTA CAP done today. titrating argatroban gtt. Patient remains hemodynamically stable without complaints and on titrating argatroban gtt.      Neurovascular: No delirium. Pain well controlled with current regimen.  -Tylenol d/c for uptrending LFTs, no pain     Cardiovascular: Hemodynamically stable. HR controlled.  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  -Encourage C+DB and Use of IS 10x / hr while awake.  -Asthma: albuterol nebs q6hrs   -CTEPH: Macitentan 10mg daily, Riociguat 2.5mg q8hrs   -CXR: stable       GI: Stable.  -Constipation prophylaxis: Miralax, senna   -PPX: pantoprazole   -PO Diet.    Renal / :  -Monitor renal function: Cr 1.09 BUN 13  -Monitor I/O's.  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 f/u PTT   - CTA C/A/P to assess for clotting and pan cultures to r/o infection as consumptive etiology to downtrending plts  - Appreciate hematology recommendations    ID:  -Temperature: afebrile  -CBC: WBC 6.44,   -Observe for SIRS/Sepsis Syndrome.  -UTI 5/9 cultures: s/p 4 days Ceftriaxone   - Per hematology: pan cultures to r/o infectious etiology for platelet consumption: negative to date     Prophylaxis:  -DVT prophylaxis: on titrating argatroban gtt  -SCD's    Disposition:  - home when platelets >100   68 year old female with PMHx  of Asthma, CTEPH, CAD, HTN, Type B Dissection referred to Dr. Kwon / Dr. Suh for intervention and presented to [facility] on 4/25/2024 for preoperative lumbar drain prior to planned TEVAR. Patient underwent transfemoral TEVAR with Dr. Kwon/Dr. Suh on 4/26/24, Procedure was uncomplicated and she was transferred to CT ICU post operatively extubated and stable. Post operatively patient had no neurological deficits and lumbar drain was removed on POD#2. She had routine post operative CT scan that revealed new splenic infarcts and PE and she was started on low dose heparin gtt. Overnight POD#2-3 patient had new LE weakness, neurosurgery was emergently called and lumbar drain was replaced with improvement in symptoms and remained in place until POD#6. MRI was obstructed by artifact from TEVAR graft and CT was negative for spinal hemorrhage or infarct. Patient had persistent hypoxia post operatively and pulm was consulted for CTEPH and she was started on additional medication with improvement. On POD#8 patient complained of LE unsteadiness with urinary retention, she was restarted on pressors to keep MAPs elevated with improvement. 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 Her pressors were slowly weaned from POD#10-12., POD #13, UA w/ culture obtained as patient retaining, UA positive, started on Ceftriaxone x4 days, discontinued as patient asymptomatic (cultures sensitive to CTX). She continued to remain stable and transferred to  on POD#14. Hematology was consulted on POD 14, patient was transitioned from argatroban to Eliquis on POD 15. Urology reconsulted noting if patient fails TOV, she may leave stevens in place and follow up in their clinic. Patient failed overnight TOV on POD 17, so stevens replaced and left in place for discharge. On POD 18, hematology re-consulted, recommending to place patient back on argatroban gtt until plt >100k. POD #19-21, patient's platelets continued to be below goal, hematology reconsulted, pending further lab work up, pan culture and CTA C/A/P. POD22: CTA CAP done today. titrating argatroban gtt. Patient remains hemodynamically stable without complaints and on titrating argatroban gtt. As per urology can TOV one more time affter 7 days, stevens removed, pending TOV     Neurovascular: No delirium. Pain well controlled with current regimen.  -Tylenol d/c for uptrending LFTs, no pain     Cardiovascular: Hemodynamically stable. HR controlled.  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  -Encourage C+DB and Use of IS 10x / hr while awake.  -Asthma: albuterol nebs q6hrs   -CTEPH: Macitentan 10mg daily, Riociguat 2.5mg q8hrs   -CXR: stable       GI: Stable.  -Constipation prophylaxis: Miralax, senna   -PPX: pantoprazole   -PO Diet.    Renal / :  -Monitor renal function: Cr 1.09 BUN 13  -Monitor I/O's.  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 f/u PTT   - CTA C/A/P to assess for clotting and pan cultures to r/o infection as consumptive etiology to downtrending plts  - Appreciate hematology recommendations    ID:  -Temperature: afebrile  -CBC: WBC 6.44,   -UTI 5/9 cultures: s/p 4 days Ceftriaxone   - Per hematology: pan cultures to r/o infectious etiology for platelet consumption: negative to date     Prophylaxis:  -DVT prophylaxis: on titrating argatroban gtt  -SCD's    Disposition:  - home when platelets >100

## 2024-05-19 LAB
ALBUMIN SERPL ELPH-MCNC: 3.5 G/DL — SIGNIFICANT CHANGE UP (ref 3.3–5)
ALP SERPL-CCNC: 88 U/L — SIGNIFICANT CHANGE UP (ref 40–120)
ALT FLD-CCNC: 29 U/L — SIGNIFICANT CHANGE UP (ref 10–45)
ANION GAP SERPL CALC-SCNC: 11 MMOL/L — SIGNIFICANT CHANGE UP (ref 5–17)
APTT BLD: 62.9 SEC — HIGH (ref 24.5–35.6)
AST SERPL-CCNC: 16 U/L — SIGNIFICANT CHANGE UP (ref 10–40)
BILIRUB SERPL-MCNC: 1 MG/DL — SIGNIFICANT CHANGE UP (ref 0.2–1.2)
BUN SERPL-MCNC: 10 MG/DL — SIGNIFICANT CHANGE UP (ref 7–23)
CALCIUM SERPL-MCNC: 9.2 MG/DL — SIGNIFICANT CHANGE UP (ref 8.4–10.5)
CHLORIDE SERPL-SCNC: 108 MMOL/L — SIGNIFICANT CHANGE UP (ref 96–108)
CO2 SERPL-SCNC: 21 MMOL/L — LOW (ref 22–31)
CREAT SERPL-MCNC: 1.03 MG/DL — SIGNIFICANT CHANGE UP (ref 0.5–1.3)
CULTURE RESULTS: ABNORMAL
EGFR: 59 ML/MIN/1.73M2 — LOW
FERRITIN SERPL-MCNC: 232 NG/ML — SIGNIFICANT CHANGE UP (ref 13–330)
FOLATE SERPL-MCNC: 12.1 NG/ML — SIGNIFICANT CHANGE UP
GLUCOSE SERPL-MCNC: 106 MG/DL — HIGH (ref 70–99)
HCT VFR BLD CALC: 28.5 % — LOW (ref 34.5–45)
HEMOSIDERIN UR QL MICRO: SIGNIFICANT CHANGE UP
HGB BLD-MCNC: 9.5 G/DL — LOW (ref 11.5–15.5)
IRON SATN MFR SERPL: 13 % — LOW (ref 14–50)
IRON SATN MFR SERPL: 27 UG/DL — LOW (ref 30–160)
MAGNESIUM SERPL-MCNC: 2 MG/DL — SIGNIFICANT CHANGE UP (ref 1.6–2.6)
MCHC RBC-ENTMCNC: 26.8 PG — LOW (ref 27–34)
MCHC RBC-ENTMCNC: 33.3 GM/DL — SIGNIFICANT CHANGE UP (ref 32–36)
MCV RBC AUTO: 80.3 FL — SIGNIFICANT CHANGE UP (ref 80–100)
NRBC # BLD: 0 /100 WBCS — SIGNIFICANT CHANGE UP (ref 0–0)
PLATELET # BLD AUTO: 76 K/UL — LOW (ref 150–400)
POTASSIUM SERPL-MCNC: 4.1 MMOL/L — SIGNIFICANT CHANGE UP (ref 3.5–5.3)
POTASSIUM SERPL-SCNC: 4.1 MMOL/L — SIGNIFICANT CHANGE UP (ref 3.5–5.3)
PROT SERPL-MCNC: 6 G/DL — SIGNIFICANT CHANGE UP (ref 6–8.3)
RBC # BLD: 3.55 M/UL — LOW (ref 3.8–5.2)
RBC # FLD: 16.8 % — HIGH (ref 10.3–14.5)
SODIUM SERPL-SCNC: 140 MMOL/L — SIGNIFICANT CHANGE UP (ref 135–145)
SPECIMEN SOURCE: SIGNIFICANT CHANGE UP
TIBC SERPL-MCNC: 210 UG/DL — LOW (ref 220–430)
UIBC SERPL-MCNC: 183 UG/DL — SIGNIFICANT CHANGE UP (ref 110–370)
VIT B12 SERPL-MCNC: 635 PG/ML — SIGNIFICANT CHANGE UP (ref 232–1245)
WBC # BLD: 6.03 K/UL — SIGNIFICANT CHANGE UP (ref 3.8–10.5)
WBC # FLD AUTO: 6.03 K/UL — SIGNIFICANT CHANGE UP (ref 3.8–10.5)

## 2024-05-19 PROCEDURE — 71045 X-RAY EXAM CHEST 1 VIEW: CPT | Mod: 26

## 2024-05-19 RX ORDER — ACETAMINOPHEN 500 MG
650 TABLET ORAL EVERY 6 HOURS
Refills: 0 | Status: DISCONTINUED | OUTPATIENT
Start: 2024-05-19 | End: 2024-05-24

## 2024-05-19 RX ADMIN — ARGATROBAN 9 MICROGRAM(S)/KG/MIN: 50 INJECTION, SOLUTION INTRAVENOUS at 11:50

## 2024-05-19 RX ADMIN — ARGATROBAN 9 MICROGRAM(S)/KG/MIN: 50 INJECTION, SOLUTION INTRAVENOUS at 15:22

## 2024-05-19 RX ADMIN — CARVEDILOL PHOSPHATE 3.12 MILLIGRAM(S): 80 CAPSULE, EXTENDED RELEASE ORAL at 05:32

## 2024-05-19 RX ADMIN — PANTOPRAZOLE SODIUM 40 MILLIGRAM(S): 20 TABLET, DELAYED RELEASE ORAL at 11:04

## 2024-05-19 RX ADMIN — ATORVASTATIN CALCIUM 20 MILLIGRAM(S): 80 TABLET, FILM COATED ORAL at 22:28

## 2024-05-19 RX ADMIN — MACITENTAN 10 MILLIGRAM(S): 10 TABLET, FILM COATED ORAL at 11:04

## 2024-05-19 RX ADMIN — RIOCIGUAT 2.5 MILLIGRAM(S): 1.5 TABLET, FILM COATED ORAL at 05:32

## 2024-05-19 RX ADMIN — CHLORHEXIDINE GLUCONATE 1 APPLICATION(S): 213 SOLUTION TOPICAL at 05:34

## 2024-05-19 RX ADMIN — CARVEDILOL PHOSPHATE 3.12 MILLIGRAM(S): 80 CAPSULE, EXTENDED RELEASE ORAL at 18:25

## 2024-05-19 RX ADMIN — SENNA PLUS 2 TABLET(S): 8.6 TABLET ORAL at 22:28

## 2024-05-19 RX ADMIN — RIOCIGUAT 2.5 MILLIGRAM(S): 1.5 TABLET, FILM COATED ORAL at 22:28

## 2024-05-19 RX ADMIN — RIOCIGUAT 2.5 MILLIGRAM(S): 1.5 TABLET, FILM COATED ORAL at 14:12

## 2024-05-19 NOTE — PROGRESS NOTE ADULT - SUBJECTIVE AND OBJECTIVE BOX
SRI. Duplex pending. Feels well. Offers no complaints. Plts 76 today.        Vital Signs Last 24 Hrs  T(C): 36.9 (19 May 2024 09:21), Max: 37.2 (19 May 2024 00:19)  T(F): 98.5 (19 May 2024 09:21), Max: 99 (19 May 2024 00:19)  HR: 86 (19 May 2024 09:00) (86 - 94)  BP: 135/62 (19 May 2024 09:00) (129/60 - 164/73)  BP(mean): 89 (19 May 2024 09:00) (87 - 105)  RR: 12 (19 May 2024 09:00) (12 - 18)  SpO2: 100% (19 May 2024 09:00) (98% - 100%)    Parameters below as of 19 May 2024 09:00  Patient On (Oxygen Delivery Method): room air      I&O's Detail    18 May 2024 07:01  -  19 May 2024 07:00  --------------------------------------------------------  IN:    Argatroban: 36.5 mL    Argatroban: 54 mL    Argatroban: 36 mL  Total IN: 126.5 mL    OUT:    Blood Loss (mL): 0 mL    Indwelling Catheter - Urethral (mL): 375 mL    Oral Fluid: 0 mL    Voided (mL): 1150 mL  Total OUT: 1525 mL    Total NET: -1398.5 mL      19 May 2024 07:01  -  19 May 2024 10:46  --------------------------------------------------------  IN:    Argatroban: 27 mL  Total IN: 27 mL    OUT:  Total OUT: 0 mL    Total NET: 27 mL          CHEST TUBE:  No.  WOOD DRAIN:  No.  EPICARDIAL WIRES: No.  TIE DOWNS: No.  ALBERTS: Yes    PHYSICAL EXAM:  Appearance: No acute distress.  Neurologic: AAOx3, no AMS or focal deficits.  Responds appropriately to verbal and physical stimuli; exhibits purposeful movement in all extremities.  Cardiovascular: RRR, S1 S2. No m/r/g.  Respiratory: No acute respiratory distress. CTA b/l, no r/r. +some mild expiratory wheezing  Gastrointestinal:  Soft, non-tender, non-distended, + BS.	  Skin: No rashes. No ecchymoses. No cyanosis.  Extremities: +mild LE edema. Exhibits normal range of motion, no clubbing, cyanosis.  Vascular: Peripheral pulses palpable 2+ bilaterally. groins soft without hematoma       LABS:                        9.5    6.03  )-----------( 76       ( 19 May 2024 05:30 )             28.5     PT/INR - ( 18 May 2024 05:30 )   PT: 20.8 sec;   INR: 1.86          PTT - ( 19 May 2024 05:30 )  PTT:62.9 sec    05-19    140  |  108  |  10  ----------------------------<  106<H>  4.1   |  21<L>  |  1.03    Ca    9.2      19 May 2024 05:30  Mg     2.0     05-19    TPro  6.0  /  Alb  3.5  /  TBili  1.0  /  DBili  x   /  AST  16  /  ALT  29  /  AlkPhos  88  05-19      Urinalysis Basic - ( 19 May 2024 05:30 )    Color: x / Appearance: x / SG: x / pH: x  Gluc: 106 mg/dL / Ketone: x  / Bili: x / Urobili: x   Blood: x / Protein: x / Nitrite: x   Leuk Esterase: x / RBC: x / WBC x   Sq Epi: x / Non Sq Epi: x / Bacteria: x        MEDICATIONS  (STANDING):  albuterol    90 MICROgram(s) HFA Inhaler 2 Puff(s) Inhalation every 6 hours  albuterol/ipratropium for Nebulization 3 milliLiter(s) Nebulizer every 6 hours  argatroban Infusion 1.838 MICROgram(s)/kG/Min (9 mL/Hr) IV Continuous <Continuous>  atorvastatin 20 milliGRAM(s) Oral at bedtime  carvedilol 3.125 milliGRAM(s) Oral every 12 hours  chlorhexidine 2% Cloths 1 Application(s) Topical daily  influenza  Vaccine (HIGH DOSE) 0.7 milliLiter(s) IntraMuscular once  macitentan 10 milliGRAM(s) Oral <User Schedule>  pantoprazole    Tablet 40 milliGRAM(s) Oral daily  polyethylene glycol 3350 17 Gram(s) Oral daily  riociguat 2.5 milliGRAM(s) Oral every 8 hours  senna 2 Tablet(s) Oral at bedtime  sodium chloride 0.9%. 1000 milliLiter(s) (10 mL/Hr) IV Continuous <Continuous>    MEDICATIONS  (PRN):  sodium chloride 0.65% Nasal 1 Spray(s) Both Nostrils three times a day PRN dry nose        RADIOLOGY & ADDITIONAL TESTS:

## 2024-05-19 NOTE — PROGRESS NOTE ADULT - SUBJECTIVE AND OBJECTIVE BOX
Patient discussed on morning rounds with Dr. James    Operation / Date: TEVAR, 4/26/24    SUBJECTIVE ASSESSMENT:  68y Female seen and assessed at bedside this morning. Patient is sitting comfortably in chair. No acute complaints this morning        Vital Signs Last 24 Hrs  T(C): 36.9 (19 May 2024 09:21), Max: 37.2 (19 May 2024 00:19)  T(F): 98.5 (19 May 2024 09:21), Max: 99 (19 May 2024 00:19)  HR: 86 (19 May 2024 09:00) (86 - 94)  BP: 135/62 (19 May 2024 09:00) (129/60 - 164/73)  BP(mean): 89 (19 May 2024 09:00) (87 - 105)  RR: 12 (19 May 2024 09:00) (12 - 18)  SpO2: 100% (19 May 2024 09:00) (98% - 100%)    Parameters below as of 19 May 2024 09:00  Patient On (Oxygen Delivery Method): room air      I&O's Detail    18 May 2024 07:01  -  19 May 2024 07:00  --------------------------------------------------------  IN:    Argatroban: 36.5 mL    Argatroban: 54 mL    Argatroban: 36 mL  Total IN: 126.5 mL    OUT:    Blood Loss (mL): 0 mL    Indwelling Catheter - Urethral (mL): 375 mL    Oral Fluid: 0 mL    Voided (mL): 1150 mL  Total OUT: 1525 mL    Total NET: -1398.5 mL      19 May 2024 07:01  -  19 May 2024 10:46  --------------------------------------------------------  IN:    Argatroban: 27 mL  Total IN: 27 mL    OUT:  Total OUT: 0 mL    Total NET: 27 mL          CHEST TUBE:  No.  WOOD DRAIN:  No.  EPICARDIAL WIRES: No.  TIE DOWNS: No.  ALBERTS: Yes    PHYSICAL EXAM:  Appearance: No acute distress.  Neurologic: AAOx3, no AMS or focal deficits.  Responds appropriately to verbal and physical stimuli; exhibits purposeful movement in all extremities.  Cardiovascular: RRR, S1 S2. No m/r/g.  Respiratory: No acute respiratory distress. CTA b/l, no r/r. +some mild expiratory wheezing  Gastrointestinal:  Soft, non-tender, non-distended, + BS.	  Skin: No rashes. No ecchymoses. No cyanosis.  Extremities: +mild LE edema. Exhibits normal range of motion, no clubbing, cyanosis.  Vascular: Peripheral pulses palpable 2+ bilaterally. groins soft without hematoma       LABS:                        9.5    6.03  )-----------( 76       ( 19 May 2024 05:30 )             28.5     PT/INR - ( 18 May 2024 05:30 )   PT: 20.8 sec;   INR: 1.86          PTT - ( 19 May 2024 05:30 )  PTT:62.9 sec    05-19    140  |  108  |  10  ----------------------------<  106<H>  4.1   |  21<L>  |  1.03    Ca    9.2      19 May 2024 05:30  Mg     2.0     05-19    TPro  6.0  /  Alb  3.5  /  TBili  1.0  /  DBili  x   /  AST  16  /  ALT  29  /  AlkPhos  88  05-19      Urinalysis Basic - ( 19 May 2024 05:30 )    Color: x / Appearance: x / SG: x / pH: x  Gluc: 106 mg/dL / Ketone: x  / Bili: x / Urobili: x   Blood: x / Protein: x / Nitrite: x   Leuk Esterase: x / RBC: x / WBC x   Sq Epi: x / Non Sq Epi: x / Bacteria: x        MEDICATIONS  (STANDING):  albuterol    90 MICROgram(s) HFA Inhaler 2 Puff(s) Inhalation every 6 hours  albuterol/ipratropium for Nebulization 3 milliLiter(s) Nebulizer every 6 hours  argatroban Infusion 1.838 MICROgram(s)/kG/Min (9 mL/Hr) IV Continuous <Continuous>  atorvastatin 20 milliGRAM(s) Oral at bedtime  carvedilol 3.125 milliGRAM(s) Oral every 12 hours  chlorhexidine 2% Cloths 1 Application(s) Topical daily  influenza  Vaccine (HIGH DOSE) 0.7 milliLiter(s) IntraMuscular once  macitentan 10 milliGRAM(s) Oral <User Schedule>  pantoprazole    Tablet 40 milliGRAM(s) Oral daily  polyethylene glycol 3350 17 Gram(s) Oral daily  riociguat 2.5 milliGRAM(s) Oral every 8 hours  senna 2 Tablet(s) Oral at bedtime  sodium chloride 0.9%. 1000 milliLiter(s) (10 mL/Hr) IV Continuous <Continuous>    MEDICATIONS  (PRN):  sodium chloride 0.65% Nasal 1 Spray(s) Both Nostrils three times a day PRN dry nose        RADIOLOGY & ADDITIONAL TESTS:     Patient discussed on morning rounds with Dr. James    Operation / Date: TEVAR, 4/26/24    SUBJECTIVE ASSESSMENT:  68y Female seen and assessed at bedside this morning. Patient is sitting comfortably in chair. No acute complaints this morning. Denies changes in vision, dizziness, chest pain, back pain, sob, abd pain, N/V/D, LE pain numbness or tingling, L hand pain numbness or tingling or weakness.     Vital Signs Last 24 Hrs  T(C): 36.9 (19 May 2024 09:21), Max: 37.2 (19 May 2024 00:19)  T(F): 98.5 (19 May 2024 09:21), Max: 99 (19 May 2024 00:19)  HR: 86 (19 May 2024 09:00) (86 - 94)  BP: 135/62 (19 May 2024 09:00) (129/60 - 164/73)  BP(mean): 89 (19 May 2024 09:00) (87 - 105)  RR: 12 (19 May 2024 09:00) (12 - 18)  SpO2: 100% (19 May 2024 09:00) (98% - 100%)    Parameters below as of 19 May 2024 09:00  Patient On (Oxygen Delivery Method): room air      I&O's Detail    18 May 2024 07:01  -  19 May 2024 07:00  --------------------------------------------------------  IN:    Argatroban: 36.5 mL    Argatroban: 54 mL    Argatroban: 36 mL  Total IN: 126.5 mL    OUT:    Blood Loss (mL): 0 mL    Indwelling Catheter - Urethral (mL): 375 mL    Oral Fluid: 0 mL    Voided (mL): 1150 mL  Total OUT: 1525 mL    Total NET: -1398.5 mL      19 May 2024 07:01  -  19 May 2024 10:46  --------------------------------------------------------  IN:    Argatroban: 27 mL  Total IN: 27 mL    OUT:  Total OUT: 0 mL    Total NET: 27 mL    CHEST TUBE:  No.  WOOD DRAIN:  No.  EPICARDIAL WIRES: No.  TIE DOWNS: No.  ALBERTS: Yes    PHYSICAL EXAM:  Appearance: No acute distress.  Neurologic: AAOx3, no AMS or focal deficits.  Responds appropriately to verbal and physical stimuli; exhibits purposeful movement in all extremities.  Cardiovascular: RRR, S1 S2. No m/r/g.  Respiratory: No acute respiratory distress. CTA b/l, no r/r. +some mild expiratory wheezing  Gastrointestinal:  Soft, non-tender, non-distended, + BS.	  Skin: No rashes. No ecchymoses. No cyanosis.  Extremities: +mild LE edema. Exhibits normal range of motion, no clubbing, cyanosis.  Vascular: Peripheral pulses palpable 2+ bilaterally. groins soft without hematoma       LABS:                        9.5    6.03  )-----------( 76       ( 19 May 2024 05:30 )             28.5     PT/INR - ( 18 May 2024 05:30 )   PT: 20.8 sec;   INR: 1.86          PTT - ( 19 May 2024 05:30 )  PTT:62.9 sec    05-19    140  |  108  |  10  ----------------------------<  106<H>  4.1   |  21<L>  |  1.03    Ca    9.2      19 May 2024 05:30  Mg     2.0     05-19    TPro  6.0  /  Alb  3.5  /  TBili  1.0  /  DBili  x   /  AST  16  /  ALT  29  /  AlkPhos  88  05-19      Urinalysis Basic - ( 19 May 2024 05:30 )    Color: x / Appearance: x / SG: x / pH: x  Gluc: 106 mg/dL / Ketone: x  / Bili: x / Urobili: x   Blood: x / Protein: x / Nitrite: x   Leuk Esterase: x / RBC: x / WBC x   Sq Epi: x / Non Sq Epi: x / Bacteria: x        MEDICATIONS  (STANDING):  albuterol    90 MICROgram(s) HFA Inhaler 2 Puff(s) Inhalation every 6 hours  albuterol/ipratropium for Nebulization 3 milliLiter(s) Nebulizer every 6 hours  argatroban Infusion 1.838 MICROgram(s)/kG/Min (9 mL/Hr) IV Continuous <Continuous>  atorvastatin 20 milliGRAM(s) Oral at bedtime  carvedilol 3.125 milliGRAM(s) Oral every 12 hours  chlorhexidine 2% Cloths 1 Application(s) Topical daily  influenza  Vaccine (HIGH DOSE) 0.7 milliLiter(s) IntraMuscular once  macitentan 10 milliGRAM(s) Oral <User Schedule>  pantoprazole    Tablet 40 milliGRAM(s) Oral daily  polyethylene glycol 3350 17 Gram(s) Oral daily  riociguat 2.5 milliGRAM(s) Oral every 8 hours  senna 2 Tablet(s) Oral at bedtime  sodium chloride 0.9%. 1000 milliLiter(s) (10 mL/Hr) IV Continuous <Continuous>    MEDICATIONS  (PRN):  sodium chloride 0.65% Nasal 1 Spray(s) Both Nostrils three times a day PRN dry nose        RADIOLOGY & ADDITIONAL TESTS:     Patient discussed on morning rounds with Dr. James    Operation / Date: TEVAR, 4/26/24    SUBJECTIVE ASSESSMENT:  68y Female seen and assessed at bedside this morning. Patient is sitting comfortably in chair. No acute complaints this morning.     Vital Signs Last 24 Hrs  T(C): 36.9 (19 May 2024 09:21), Max: 37.2 (19 May 2024 00:19)  T(F): 98.5 (19 May 2024 09:21), Max: 99 (19 May 2024 00:19)  HR: 86 (19 May 2024 09:00) (86 - 94)  BP: 135/62 (19 May 2024 09:00) (129/60 - 164/73)  BP(mean): 89 (19 May 2024 09:00) (87 - 105)  RR: 12 (19 May 2024 09:00) (12 - 18)  SpO2: 100% (19 May 2024 09:00) (98% - 100%)    Parameters below as of 19 May 2024 09:00  Patient On (Oxygen Delivery Method): room air      I&O's Detail    18 May 2024 07:01  -  19 May 2024 07:00  --------------------------------------------------------  IN:    Argatroban: 36.5 mL    Argatroban: 54 mL    Argatroban: 36 mL  Total IN: 126.5 mL    OUT:    Blood Loss (mL): 0 mL    Indwelling Catheter - Urethral (mL): 375 mL    Oral Fluid: 0 mL    Voided (mL): 1150 mL  Total OUT: 1525 mL    Total NET: -1398.5 mL      19 May 2024 07:01  -  19 May 2024 10:46  --------------------------------------------------------  IN:    Argatroban: 27 mL  Total IN: 27 mL    OUT:  Total OUT: 0 mL    Total NET: 27 mL    CHEST TUBE:  No.  WOOD DRAIN:  No.  EPICARDIAL WIRES: No.  TIE DOWNS: No.  ALBERTS: Yes    PHYSICAL EXAM:  Appearance: No acute distress.  Neurologic: AAOx3, no AMS or focal deficits.  Responds appropriately to verbal and physical stimuli; exhibits purposeful movement in all extremities.  Cardiovascular: RRR, S1 S2. No m/r/g.  Respiratory: No acute respiratory distress. CTA b/l, no r/r. +some mild expiratory wheezing  Gastrointestinal:  Soft, non-tender, non-distended, + BS.	  Skin: No rashes. No ecchymoses. No cyanosis.  Extremities: +mild LE edema. Exhibits normal range of motion, no clubbing, cyanosis.  Vascular: Peripheral pulses palpable 2+ bilaterally. groins soft without hematoma       LABS:                        9.5    6.03  )-----------( 76       ( 19 May 2024 05:30 )             28.5     PT/INR - ( 18 May 2024 05:30 )   PT: 20.8 sec;   INR: 1.86          PTT - ( 19 May 2024 05:30 )  PTT:62.9 sec    05-19    140  |  108  |  10  ----------------------------<  106<H>  4.1   |  21<L>  |  1.03    Ca    9.2      19 May 2024 05:30  Mg     2.0     05-19    TPro  6.0  /  Alb  3.5  /  TBili  1.0  /  DBili  x   /  AST  16  /  ALT  29  /  AlkPhos  88  05-19      Urinalysis Basic - ( 19 May 2024 05:30 )    Color: x / Appearance: x / SG: x / pH: x  Gluc: 106 mg/dL / Ketone: x  / Bili: x / Urobili: x   Blood: x / Protein: x / Nitrite: x   Leuk Esterase: x / RBC: x / WBC x   Sq Epi: x / Non Sq Epi: x / Bacteria: x        MEDICATIONS  (STANDING):  albuterol    90 MICROgram(s) HFA Inhaler 2 Puff(s) Inhalation every 6 hours  albuterol/ipratropium for Nebulization 3 milliLiter(s) Nebulizer every 6 hours  argatroban Infusion 1.838 MICROgram(s)/kG/Min (9 mL/Hr) IV Continuous <Continuous>  atorvastatin 20 milliGRAM(s) Oral at bedtime  carvedilol 3.125 milliGRAM(s) Oral every 12 hours  chlorhexidine 2% Cloths 1 Application(s) Topical daily  influenza  Vaccine (HIGH DOSE) 0.7 milliLiter(s) IntraMuscular once  macitentan 10 milliGRAM(s) Oral <User Schedule>  pantoprazole    Tablet 40 milliGRAM(s) Oral daily  polyethylene glycol 3350 17 Gram(s) Oral daily  riociguat 2.5 milliGRAM(s) Oral every 8 hours  senna 2 Tablet(s) Oral at bedtime  sodium chloride 0.9%. 1000 milliLiter(s) (10 mL/Hr) IV Continuous <Continuous>    MEDICATIONS  (PRN):  sodium chloride 0.65% Nasal 1 Spray(s) Both Nostrils three times a day PRN dry nose        RADIOLOGY & ADDITIONAL TESTS:

## 2024-05-19 NOTE — PROGRESS NOTE ADULT - ASSESSMENT
68 year old female with PMHx  of Asthma, CTEPH, CAD, HTN, Type B Dissection referred to Dr. Kwon / Dr. Suh for intervention and presented to [facility] on 4/25/2024 for preoperative lumbar drain prior to planned TEVAR. Patient underwent transfemoral TEVAR with Dr. Kwon/Dr. Suh on 4/26/24, Procedure was uncomplicated and she was transferred to CT ICU post operatively extubated and stable. Post operatively patient had no neurological deficits and lumbar drain was removed on POD#2. She had routine post operative CT scan that revealed new splenic infarcts and PE and she was started on low dose heparin gtt. Overnight POD#2-3 patient had new LE weakness, neurosurgery was emergently called and lumbar drain was replaced with improvement in symptoms and remained in place until POD#6. MRI was obstructed by artifact from TEVAR graft and CT was negative for spinal hemorrhage or infarct. Patient had persistent hypoxia post operatively and pulm was consulted for CTEPH and she was started on additional medication with improvement. On POD#8 patient complained of LE unsteadiness with urinary retention, she was restarted on pressors to keep MAPs elevated with improvement. 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 Her pressors were slowly weaned from POD#10-12., POD #13, UA w/ culture obtained as patient retaining, UA positive, started on Ceftriaxone x4 days, discontinued as patient asymptomatic (cultures sensitive to CTX). She continued to remain stable and transferred to  on POD#14. Hematology was consulted on POD 14, patient was transitioned from argatroban to Eliquis on POD 15. Urology reconsulted noting if patient fails TOV, she may leave stevens in place and follow up in their clinic. Patient failed overnight TOV on POD 17, so stevens replaced and left in place for discharge. On POD 18, hematology re-consulted, recommending to place patient back on argatroban gtt until plt >100k. POD #19-21, patient's platelets continued to be below goal, hematology reconsulted, pending further lab work up, pan culture and CTA C/A/P. POD22: CTA CAP done today. titrating argatroban gtt. Patient remains hemodynamically stable without complaints and on titrating argatroban gtt. As per urology can TOV one more time affter 7 days, stevens removed, passed TOV POD23: platelets remain in the 70's. CTA read and reviews by Dr. Kwon with normal post surgical findings. Pending LE duplex per heme/onc     Neurovascular: No delirium. Pain well controlled with current regimen.  - LFTS normalized : Tyelnol added back but patient has no pain     Cardiovascular: Hemodynamically stable. HR controlled.  -CAD  -HTN: carvedilol 3.125mg q12hrs  -HLD: atorvastatin 20mg nightly   -Type B dissection: s/p TEVAR 4/26/24 --> CTA C/A/P done yesterday which reveals ?endoleak and thrombus: reviewed by Dr. Kwon --> normal post surgical changes     Respiratory: 02 Sat = 98% on RA.  -Encourage C+DB and Use of IS 10x / hr while awake.  -Asthma: albuterol nebs q6hrs   -CTEPH: Macitentan 10mg daily, Riociguat 2.5mg q8hrs   -CXR: Stable     GI: Stable.  -Constipation prophylaxis: Miralax, senna   -PPX: pantoprazole   -PO Diet.    Renal / :  -Monitor renal function: 10/1.03  -Monitor I/O's.  -Urinary retention s/p multiple failed TOV, Stevens removed yesterday and passed TOV. Will follow post void residuals.    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

## 2024-05-20 ENCOUNTER — APPOINTMENT (OUTPATIENT)
Dept: UROLOGY | Facility: CLINIC | Age: 69
End: 2024-05-20

## 2024-05-20 LAB
ANION GAP SERPL CALC-SCNC: 10 MMOL/L — SIGNIFICANT CHANGE UP (ref 5–17)
APTT BLD: 65.7 SEC — HIGH (ref 24.5–35.6)
BUN SERPL-MCNC: 9 MG/DL — SIGNIFICANT CHANGE UP (ref 7–23)
CALCIUM SERPL-MCNC: 9.1 MG/DL — SIGNIFICANT CHANGE UP (ref 8.4–10.5)
CHLORIDE SERPL-SCNC: 108 MMOL/L — SIGNIFICANT CHANGE UP (ref 96–108)
CO2 SERPL-SCNC: 22 MMOL/L — SIGNIFICANT CHANGE UP (ref 22–31)
CREAT SERPL-MCNC: 1.04 MG/DL — SIGNIFICANT CHANGE UP (ref 0.5–1.3)
EGFR: 59 ML/MIN/1.73M2 — LOW
GLUCOSE SERPL-MCNC: 103 MG/DL — HIGH (ref 70–99)
HCT VFR BLD CALC: 29.7 % — LOW (ref 34.5–45)
HGB BLD-MCNC: 9.7 G/DL — LOW (ref 11.5–15.5)
MAGNESIUM SERPL-MCNC: 2.1 MG/DL — SIGNIFICANT CHANGE UP (ref 1.6–2.6)
MCHC RBC-ENTMCNC: 26.7 PG — LOW (ref 27–34)
MCHC RBC-ENTMCNC: 32.7 GM/DL — SIGNIFICANT CHANGE UP (ref 32–36)
MCV RBC AUTO: 81.8 FL — SIGNIFICANT CHANGE UP (ref 80–100)
NRBC # BLD: 0 /100 WBCS — SIGNIFICANT CHANGE UP (ref 0–0)
PLATELET # BLD AUTO: 72 K/UL — LOW (ref 150–400)
POTASSIUM SERPL-MCNC: 4.1 MMOL/L — SIGNIFICANT CHANGE UP (ref 3.5–5.3)
POTASSIUM SERPL-SCNC: 4.1 MMOL/L — SIGNIFICANT CHANGE UP (ref 3.5–5.3)
RBC # BLD: 3.63 M/UL — LOW (ref 3.8–5.2)
RBC # FLD: 16.6 % — HIGH (ref 10.3–14.5)
SODIUM SERPL-SCNC: 140 MMOL/L — SIGNIFICANT CHANGE UP (ref 135–145)
WBC # BLD: 5.21 K/UL — SIGNIFICANT CHANGE UP (ref 3.8–10.5)
WBC # FLD AUTO: 5.21 K/UL — SIGNIFICANT CHANGE UP (ref 3.8–10.5)

## 2024-05-20 PROCEDURE — 93970 EXTREMITY STUDY: CPT | Mod: 26

## 2024-05-20 RX ADMIN — MACITENTAN 10 MILLIGRAM(S): 10 TABLET, FILM COATED ORAL at 12:40

## 2024-05-20 RX ADMIN — ARGATROBAN 9 MICROGRAM(S)/KG/MIN: 50 INJECTION, SOLUTION INTRAVENOUS at 23:25

## 2024-05-20 RX ADMIN — CHLORHEXIDINE GLUCONATE 1 APPLICATION(S): 213 SOLUTION TOPICAL at 05:37

## 2024-05-20 RX ADMIN — CARVEDILOL PHOSPHATE 3.12 MILLIGRAM(S): 80 CAPSULE, EXTENDED RELEASE ORAL at 05:37

## 2024-05-20 RX ADMIN — ARGATROBAN 9 MICROGRAM(S)/KG/MIN: 50 INJECTION, SOLUTION INTRAVENOUS at 08:55

## 2024-05-20 RX ADMIN — PANTOPRAZOLE SODIUM 40 MILLIGRAM(S): 20 TABLET, DELAYED RELEASE ORAL at 12:40

## 2024-05-20 RX ADMIN — ATORVASTATIN CALCIUM 20 MILLIGRAM(S): 80 TABLET, FILM COATED ORAL at 22:50

## 2024-05-20 RX ADMIN — CARVEDILOL PHOSPHATE 3.12 MILLIGRAM(S): 80 CAPSULE, EXTENDED RELEASE ORAL at 17:38

## 2024-05-20 RX ADMIN — RIOCIGUAT 2.5 MILLIGRAM(S): 1.5 TABLET, FILM COATED ORAL at 15:51

## 2024-05-20 RX ADMIN — RIOCIGUAT 2.5 MILLIGRAM(S): 1.5 TABLET, FILM COATED ORAL at 05:37

## 2024-05-20 RX ADMIN — SENNA PLUS 2 TABLET(S): 8.6 TABLET ORAL at 22:50

## 2024-05-20 RX ADMIN — RIOCIGUAT 2.5 MILLIGRAM(S): 1.5 TABLET, FILM COATED ORAL at 23:07

## 2024-05-20 NOTE — CHART NOTE - NSCHARTNOTEFT_GEN_A_CORE
Admitting Diagnosis:   Patient is a 68y old  Female who presents with a chief complaint of type B dissection (03 May 2024 07:23)      PAST MEDICAL & SURGICAL HISTORY:  HTN (hypertension)      Prediabetes      Mild tricuspid regurgitation      Enlarged aorta      CAD (coronary artery disease)      Ascending aorta dilation      Pulmonary hypertension      Chronic systolic right heart failure      2019 novel coronavirus disease (COVID-19)      Asthma      S/P hysterectomy      S/P           Current Nutrition Order: DASH/TLC, CSTCHO, Ensure Max TID        PO Intake: Good (%) [   ]  Fair (50-75%) [   ] Poor (<25%) [   ] - 25-50%     GI Issues: no report of nausea/vomiting/diarrhea/constipation, last BM 5/2 per flowsheets     Skin Integrity: no pressure injuries documented, surgical incision L groin and R wrist, +1 generalized edema     Labs:       138  |  100  |  29<H>  ----------------------------<  112<H>  3.3<L>   |  26  |  0.83    Ca    9.8      03 May 2024 03:03  Phos  3.3     05-03  Mg     2.0     05-03    TPro  6.9  /  Alb  4.4  /  TBili  2.0<H>  /  DBili  x   /  AST  114<H>  /  ALT  122<H>  /  AlkPhos  100  05-03    CAPILLARY BLOOD GLUCOSE      POCT Blood Glucose.: 85 mg/dL (03 May 2024 07:30)  POCT Blood Glucose.: 127 mg/dL (02 May 2024 21:07)  POCT Blood Glucose.: 142 mg/dL (02 May 2024 12:52)      Medications:  MEDICATIONS  (STANDING):  albuterol    90 MICROgram(s) HFA Inhaler 2 Puff(s) Inhalation every 6 hours  albuterol/ipratropium for Nebulization 3 milliLiter(s) Nebulizer every 6 hours  ceFAZolin   IVPB 2000 milliGRAM(s) IV Intermittent every 8 hours  ceFAZolin   IVPB      chlorhexidine 2% Cloths 1 Application(s) Topical daily  dextrose 10% Bolus 125 milliLiter(s) IV Bolus once  dextrose 5%. 1000 milliLiter(s) (50 mL/Hr) IV Continuous <Continuous>  dextrose 5%. 1000 milliLiter(s) (100 mL/Hr) IV Continuous <Continuous>  dextrose 50% Injectable 12.5 Gram(s) IV Push once  dextrose 50% Injectable 25 Gram(s) IV Push once  furosemide   Injectable 20 milliGRAM(s) IV Push every 12 hours  glucagon  Injectable 1 milliGRAM(s) IntraMuscular once  influenza  Vaccine (HIGH DOSE) 0.7 milliLiter(s) IntraMuscular once  insulin lispro (ADMELOG) corrective regimen sliding scale   SubCutaneous Before meals and at bedtime  macitentan 10 milliGRAM(s) Oral <User Schedule>  midodrine 10 milliGRAM(s) Oral every 8 hours  milrinone Infusion 0.375 MICROgram(s)/kG/Min (9.18 mL/Hr) IV Continuous <Continuous>  pantoprazole    Tablet 40 milliGRAM(s) Oral daily  polyethylene glycol 3350 17 Gram(s) Oral daily  potassium chloride    Tablet ER 40 milliEquivalent(s) Oral every 4 hours  riociguat 2.5 milliGRAM(s) Oral every 8 hours  senna 2 Tablet(s) Oral at bedtime  sodium chloride 0.9%. 1000 milliLiter(s) (10 mL/Hr) IV Continuous <Continuous>  vasopressin Infusion 0.02 Unit(s)/Min (3 mL/Hr) IV Continuous <Continuous>    MEDICATIONS  (PRN):  dextrose Oral Gel 15 Gram(s) Oral once PRN Blood Glucose LESS THAN 70 milliGRAM(s)/deciliter  sodium chloride 0.65% Nasal 1 Spray(s) Both Nostrils three times a day PRN dry nose      Anthropometrics:  Dosing height: 64in  Dosing weight: 81.6kg/180 pounds  BMI: 30.8  IBW: 120 pounds                  %IBW: 150%    Weight Change: no new weights since admit     Nutrition Focused Physical Exam: Completed [   ]  Not Pertinent [   ]  >>no overt signs of muscle or fat wasting    Estimated energy needs:   Weight used for calculations	IBW  Estimated Energy Needs Weight (lbs)	120 lb  Estimated Energy Needs Weight (kg)	54.4 kg  Estimated Energy Needs From (maksim/kg)	25  Estimated Energy Needs To (maksim/kg)	30  Estimated Energy Needs Calculated From (maksim/kg)	1360  Estimated Energy Needs Calculated To (maksim/kg)	1632  Weight used for calculations	IBW  Estimated Protein Needs Weight (lbs)	120 lb  Estimated Protein Needs Weight (kg)	54.4 kg  Estimated Protein Needs From (g/kg)	1.2  Estimated Protein Needs To (g/kg)	1.4  Estimated Protein Needs Calculated From (g/kg)	65.28  Estimated Protein Needs Calculated To (g/kg)	76.16  Estimated Fluid Needs Weight (lbs)	120 lb  Estimated Fluid Needs Weight (kg)	54.4 kg  Estimated Fluid Needs From (ml/kg)	30  Estimated Fluid Needs To (ml/kg)	35  Estimated Fluid Needs Calculated From (ml/kg)	1632  Estimated Fluid Needs Calculated To (ml/kg)	1904  Other Calculations	Pt above % IBW (120#, 150%) thus ideal body weight used for all calculations (120#). Needs adjusted for age, post-op healing.    Subjective:   68 year old female with past medical history of asthma, CTEPH, CAD, HTN, Type B dissection who was seen by the outpatient office and was deemed a good candidate for TEVAR. Back in 2023 she was seen by her outpatient pulmonologist, Dr. Cathy Serra, was noted to have an elevated D-Dimer and was advised to present to ED for workup. In ED a CTA C/A/P was performed which revealed a Type B dissection and she was admitted and discharged with close follow up. 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 She was deemed a candidate for TEVAR with Dr. Kwon and Dr. Suh and presented to [facility] for preop lumbar drain prior to OR.    Patient seen for follow up assessment. Received OOB in chair, on HFNC. , /72, milrinone gtt. Continues on DASH/TLC, CSTCHO diet with Ensure Max TID. Pt endorses ongoing decreased appetite, however endorses good acceptance of Ensure supplements (prefers chocolate flavor- documented in [facility]). Per RN, PO intake inhibited by SOB. Encouraged continued PO intake as tolerated and asked for food preferences, however pt denies any at this time. No report of nausea/vomiting/diarrhea/constipation. Labs and medications reviewed. Lipase 80<H>, K 3.3<L>, BUN 29<H>, Bilirubin 2.0<H>, <H>, <H>, glucose 112-170 x 24 hours. Ordered for abx, NS, insulin sliding scale, potassium chloride, furosemide, bowel regimen. RD to remain available for additional nutrition interventions as needed.     Previous Nutrition Diagnosis: Inadequate Oral Intake related to decreased appetite as evidenced by pt reported poor PO intake, observed untouched lunch tray    Active [ x ]  Resolved [   ]    Goal: Pt to meet at least 75% of nutritional needs consistently     Recommendations:  1. Continue current diet order with Ensure Max Protein 3x/day (150 kcal, 30 g protein per serving)  2. Encourage and monitor PO intake, honor preferences as able    3. Monitor labs, wt trends, GI function, and skin integrity   4. Pain and bowel regimen per team   5. RD to remain available for additional nutrition interventions and diet edu as needed       Risk Level: High [ x ] Moderate [   ] Low [   ]
Admitting Diagnosis:   Patient is a 68y old  Female who presents with a chief complaint of type B dissection (08 May 2024 07:33)      PAST MEDICAL & SURGICAL HISTORY:  HTN (hypertension)      Prediabetes      Mild tricuspid regurgitation      Enlarged aorta      CAD (coronary artery disease)      Ascending aorta dilation      Pulmonary hypertension      Chronic systolic right heart failure      2019 novel coronavirus disease (COVID-19)      Asthma      S/P hysterectomy      S/P           Current Nutrition Order: DASH/TLC, CSTCHO, Ensure Max TID        PO Intake: Good (%) [   ]  Fair (50-75%) [ x ] Poor (<25%) [   ]    GI Issues: denies nausea/vomiting/diarrhea/constipation    Skin Integrity: no pressure injuries documented, surgical incision to L groin, R wrist, and back, +1 generalized edema; John score 20     Labs:       136  |  101  |  22  ----------------------------<  148<H>  4.6   |  22  |  0.80    Ca    9.8      08 May 2024 01:56  Phos  2.6     -  Mg     2.3         TPro  6.7  /  Alb  4.1  /  TBili  1.4<H>  /  DBili  x   /  AST  68<H>  /  ALT  125<H>  /  AlkPhos  98  -08    CAPILLARY BLOOD GLUCOSE          Medications:  MEDICATIONS  (STANDING):  albuterol    90 MICROgram(s) HFA Inhaler 2 Puff(s) Inhalation every 6 hours  albuterol/ipratropium for Nebulization 3 milliLiter(s) Nebulizer every 6 hours  argatroban Infusion 1.25 MICROgram(s)/kG/Min (6.12 mL/Hr) IV Continuous <Continuous>  chlorhexidine 2% Cloths 1 Application(s) Topical daily  fludroCORTISONE 0.2 milliGRAM(s) Oral daily  influenza  Vaccine (HIGH DOSE) 0.7 milliLiter(s) IntraMuscular once  macitentan 10 milliGRAM(s) Oral <User Schedule>  midodrine 15 milliGRAM(s) Oral every 8 hours  pantoprazole    Tablet 40 milliGRAM(s) Oral daily  polyethylene glycol 3350 17 Gram(s) Oral daily  riociguat 2.5 milliGRAM(s) Oral every 8 hours  senna 2 Tablet(s) Oral at bedtime  sodium chloride 0.9%. 1000 milliLiter(s) (10 mL/Hr) IV Continuous <Continuous>  vasopressin Infusion 0.02 Unit(s)/Min (3 mL/Hr) IV Continuous <Continuous>    MEDICATIONS  (PRN):  oxyCODONE    IR 5 milliGRAM(s) Oral every 6 hours PRN Moderate Pain (4 - 6)  sodium chloride 0.65% Nasal 1 Spray(s) Both Nostrils three times a day PRN dry nose      Anthropometrics:  Dosing height: 64in  Dosing weight: 81.6kg/180 pounds  BMI: 30.8  IBW: 120 pounds                  %IBW: 150%    Weight Change:   67.3kg/148.3 pounds on  - likely inaccurate? recommend to obtain new weight     Nutrition Focused Physical Exam: Completed [   ]  Not Pertinent [   ]  >>No overt signs or symptoms of muscle or fat wasting    Estimated energy needs:   Weight used for calculations	IBW  Estimated Energy Needs Weight (lbs)	120 lb  Estimated Energy Needs Weight (kg)	54.4 kg  Estimated Energy Needs From (maksim/kg)	25  Estimated Energy Needs To (maksim/kg)	30  Estimated Energy Needs Calculated From (maksim/kg)	1360  Estimated Energy Needs Calculated To (maksim/kg)	1632  Weight used for calculations	IBW  Estimated Protein Needs Weight (lbs)	120 lb  Estimated Protein Needs Weight (kg)	54.4 kg  Estimated Protein Needs From (g/kg)	1.2  Estimated Protein Needs To (g/kg)	1.4  Estimated Protein Needs Calculated From (g/kg)	65.28  Estimated Protein Needs Calculated To (g/kg)	76.16  Estimated Fluid Needs Weight (lbs)	120 lb  Estimated Fluid Needs Weight (kg)	54.4 kg  Estimated Fluid Needs From (ml/kg)	30  Estimated Fluid Needs To (ml/kg)	35  Estimated Fluid Needs Calculated From (ml/kg)	1632  Estimated Fluid Needs Calculated To (ml/kg)	1904  Other Calculations	Pt above % IBW (120#, 150%) thus ideal body weight used for all calculations (120#). Needs adjusted for age, post-op healing.    Subjective:   68 year old female with past medical history of asthma, CTEPH, CAD, HTN, Type B dissection who was seen by the outpatient office and was deemed a good candidate for TEVAR. Back in 2023 she was seen by her outpatient pulmonologist, Dr. Cathy Serra, was noted to have an elevated D-Dimer and was advised to present to ED for workup. In ED a CTA C/A/P was performed which revealed a Type B dissection and she was admitted and discharged with close follow up. 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 She was deemed a candidate for TEVAR with Dr. Kwon and Dr. Suh and presented to [facility] for preop lumbar drain prior to OR.    Pt seen for follow up. Received OOB in chair on 2L NC. MAP 83, /58. Endorses improving appetite, consumed 3 fruit cups and 1 Ensure for breakfast this AM. Endorses consuming ~100% dinner last night which consisted of salmon and mashed potatoes. Encouraged continued PO intake and asked for food preferences, however pt denies any at this time. No report of nausea/vomiting/diarrhea/constipation. Labs and medications reviewed. Electrolytes WDL, Bilirubin 1.4<H>, AST 68<H>, <H>, glucose 111-150 x 24 hours. Ordered for NS, midodrine, miralax, senna, protonix. RD to remain available for additional nutrition interventions as needed.     Previous Nutrition Diagnosis: Inadequate Oral Intake related to decreased appetite as evidenced by pt reported poor PO intake, observed untouched lunch tray    Active [ x - improving ]  Resolved [   ]    Goal: Pt to meet at least 75% of nutritional needs consistently     Recommendations:  1. Continue current diet order with Ensure Max Protein 3x/day (150 kcal, 30 g protein per serving)  2. Encourage and monitor PO intake, honor preferences as able    3. Monitor labs, wt trends, GI function, and skin integrity   4. Pain and bowel regimen per team   5. RD to remain available for additional nutrition interventions and diet edu as needed       Risk Level: High [ x ] Moderate [   ] Low [   ]
Admitting Diagnosis:   Patient is a 68y old  Female who presents with a chief complaint of type B dissection (20 May 2024 13:02)      PAST MEDICAL & SURGICAL HISTORY:  HTN (hypertension)      Prediabetes      Mild tricuspid regurgitation      Enlarged aorta      CAD (coronary artery disease)      Ascending aorta dilation      Pulmonary hypertension      Chronic systolic right heart failure      2019 novel coronavirus disease (COVID-19)      Asthma      S/P hysterectomy      S/P           Current Nutrition Order: DASH/TLC       PO Intake: Good (%) [ x ]  Fair (50-75%) [   ] Poor (<25%) [   ]    GI Issues: denies nausea/vomiting/diarrhea/constipation, last BM  per flowsheets     Skin Integrity: surgical incision L groin, R wrist, and back; +1 bilateral foot edema     Labs:       140  |  108  |  9   ----------------------------<  103<H>  4.1   |  22  |  1.04    Ca    9.1      20 May 2024 05:30  Mg     2.1         TPro  6.0  /  Alb  3.5  /  TBili  1.0  /  DBili  x   /  AST  16  /  ALT  29  /  AlkPhos  88  -    CAPILLARY BLOOD GLUCOSE          Medications:  MEDICATIONS  (STANDING):  albuterol    90 MICROgram(s) HFA Inhaler 2 Puff(s) Inhalation every 6 hours  argatroban Infusion 1.838 MICROgram(s)/kG/Min (9 mL/Hr) IV Continuous <Continuous>  atorvastatin 20 milliGRAM(s) Oral at bedtime  carvedilol 3.125 milliGRAM(s) Oral every 12 hours  chlorhexidine 2% Cloths 1 Application(s) Topical daily  influenza  Vaccine (HIGH DOSE) 0.7 milliLiter(s) IntraMuscular once  macitentan 10 milliGRAM(s) Oral <User Schedule>  pantoprazole    Tablet 40 milliGRAM(s) Oral daily  polyethylene glycol 3350 17 Gram(s) Oral daily  riociguat 2.5 milliGRAM(s) Oral every 8 hours  senna 2 Tablet(s) Oral at bedtime  sodium chloride 0.9%. 1000 milliLiter(s) (10 mL/Hr) IV Continuous <Continuous>    MEDICATIONS  (PRN):  acetaminophen     Tablet .. 650 milliGRAM(s) Oral every 6 hours PRN Mild Pain (1 - 3)  sodium chloride 0.65% Nasal 1 Spray(s) Both Nostrils three times a day PRN dry nose      Anthropometrics:  Dosing height: 64in  Dosing weight: 81.6kg/180 pounds  BMI: 30.8  IBW: 120 pounds                  %IBW: 150%    Weight Change:   67.3kg/148.3 pounds on  - likely inaccurate? recommend to obtain new weight     Nutrition Focused Physical Exam: Completed [   ]  Not Pertinent [   ]  >>No overt signs or symptoms of muscle or fat wasting    Estimated energy needs:   Weight used for calculations	IBW  Estimated Energy Needs Weight (lbs)	120 lb  Estimated Energy Needs Weight (kg)	54.4 kg  Estimated Energy Needs From (maksim/kg)	25  Estimated Energy Needs To (maksim/kg)	30  Estimated Energy Needs Calculated From (maksim/kg)	1360  Estimated Energy Needs Calculated To (maksim/kg)	1632  Weight used for calculations	IBW  Estimated Protein Needs Weight (lbs)	120 lb  Estimated Protein Needs Weight (kg)	54.4 kg  Estimated Protein Needs From (g/kg)	1.2  Estimated Protein Needs To (g/kg)	1.4  Estimated Protein Needs Calculated From (g/kg)	65.28  Estimated Protein Needs Calculated To (g/kg)	76.16  Estimated Fluid Needs Weight (lbs)	120 lb  Estimated Fluid Needs Weight (kg)	54.4 kg  Estimated Fluid Needs From (ml/kg)	30  Estimated Fluid Needs To (ml/kg)	35  Estimated Fluid Needs Calculated From (ml/kg)	1632  Estimated Fluid Needs Calculated To (ml/kg)	1904  Other Calculations	Pt above % IBW (120#, 150%) thus ideal body weight used for all calculations (120#). Needs adjusted for age, post-op healing.    Subjective:   68 year old female with past medical history of asthma, CTEPH, CAD, HTN, Type B dissection who was seen by the outpatient office and was deemed a good candidate for TEVAR. Back in 2023 she was seen by her outpatient pulmonologist, Dr. Cathy Serra, was noted to have an elevated D-Dimer and was advised to present to ED for workup. In ED a CTA C/A/P was performed which revealed a Type B dissection and she was admitted and discharged with close follow up. 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 She was deemed a candidate for TEVAR with Dr. Kwon and Dr. Suh and presented to [facility] for preop lumbar drain prior to OR.     Pt seen for follow up. Received OOB in chair on room air. Pt continues on DASH/TLC diet with good appetite and intake. Consumed 100% of breakfast which consisted of turkey [place], home fries, fruit cup. No new food preferences expressed at this time. No report of GI distress. Labs and medications reviewed. Electrolytes WDL, glucose 103-106 x 24 hours. Ordered for NS, miralax, senna. RD to remain available for additional nutrition interventions as needed.     Previous Nutrition Diagnosis: Inadequate Oral Intake related to decreased appetite as evidenced by pt reported poor PO intake, observed untouched lunch tray    Active [ x - improving ]  Resolved [   ]    Goal: Pt to meet at least 75% of nutritional needs consistently     Recommendations:  1. Continue DASH/TLC diet   2. Encourage and monitor PO intake, honor preferences as able    3. Monitor labs, wt trends, GI function, and skin integrity   4. Pain and bowel regimen per team   5. RD to remain available for additional nutrition interventions and diet edu as needed       Risk Level: High [   ] Moderate [ x ] Low [   ]
NSGY notified of concern for subjective leg weakness - Pt noted an episode of generalized weakess of b/l LEs 2d ago (described as "jello legs"), improving each day.     Exam:  HORNER 5/5 x 4  LD site c/d/i, no drainage    A/P:  68 F with PMHx CTEPH and type B aortic dissection admitted for TEVAR.  S/p TEVAR (prox portion covering L subclavian, distal portion ~5cm above celiac trunk) 4/26  Lumbar drain removed 5/2    Recommendations  - can consider MRI T/L spine if pt feels weakness is persistent  - can consider neurology consult if subjective weakness persists    d/w Dr. Randolph
Admitting Diagnosis:   Patient is a 68y old  Female who presents with a chief complaint of type B dissection (13 May 2024 12:12)      PAST MEDICAL & SURGICAL HISTORY:  HTN (hypertension)      Prediabetes      Mild tricuspid regurgitation      Enlarged aorta      CAD (coronary artery disease)      Ascending aorta dilation      Pulmonary hypertension      Chronic systolic right heart failure      2019 novel coronavirus disease (COVID-19)      Asthma      S/P hysterectomy      S/P           Current Nutrition Order: DASH/TLC       PO Intake: Good (%) [ x ]  Fair (50-75%) [   ] Poor (<25%) [   ]    GI Issues: denies nausea/vomiting/diarrhea/constipation, last BM  per flowsheets     Skin Integrity: surgical incision L groin, R wrist, and back; no pressure injuries or edema documented     Labs:       143  |  110<H>  |  13  ----------------------------<  108<H>  4.2   |  21<L>  |  1.07    Ca    9.4      13 May 2024 05:30  Mg     2.1         TPro  6.2  /  Alb  3.7  /  TBili  0.8  /  DBili  x   /  AST  40  /  ALT  99<H>  /  AlkPhos  95      CAPILLARY BLOOD GLUCOSE          Medications:  MEDICATIONS  (STANDING):  albuterol    90 MICROgram(s) HFA Inhaler 2 Puff(s) Inhalation every 6 hours  albuterol/ipratropium for Nebulization 3 milliLiter(s) Nebulizer every 6 hours  apixaban 5 milliGRAM(s) Oral two times a day  chlorhexidine 2% Cloths 1 Application(s) Topical daily  influenza  Vaccine (HIGH DOSE) 0.7 milliLiter(s) IntraMuscular once  macitentan 10 milliGRAM(s) Oral <User Schedule>  pantoprazole    Tablet 40 milliGRAM(s) Oral daily  polyethylene glycol 3350 17 Gram(s) Oral daily  riociguat 2.5 milliGRAM(s) Oral every 8 hours  senna 2 Tablet(s) Oral at bedtime  sodium chloride 0.9%. 1000 milliLiter(s) (10 mL/Hr) IV Continuous <Continuous>    MEDICATIONS  (PRN):  sodium chloride 0.65% Nasal 1 Spray(s) Both Nostrils three times a day PRN dry nose      Anthropometrics:  Dosing height: 64in  Dosing weight: 81.6kg/180 pounds  BMI: 30.8  IBW: 120 pounds                  %IBW: 150%    Weight Change:   67.3kg/148.3 pounds on  - likely inaccurate? recommend to obtain new weight     Nutrition Focused Physical Exam: Completed [   ]  Not Pertinent [   ]  >>No overt signs or symptoms of muscle or fat wasting    Estimated energy needs:   Weight used for calculations	IBW  Estimated Energy Needs Weight (lbs)	120 lb  Estimated Energy Needs Weight (kg)	54.4 kg  Estimated Energy Needs From (maksim/kg)	25  Estimated Energy Needs To (maksim/kg)	30  Estimated Energy Needs Calculated From (maksim/kg)	1360  Estimated Energy Needs Calculated To (maksim/kg)	1632  Weight used for calculations	IBW  Estimated Protein Needs Weight (lbs)	120 lb  Estimated Protein Needs Weight (kg)	54.4 kg  Estimated Protein Needs From (g/kg)	1.2  Estimated Protein Needs To (g/kg)	1.4  Estimated Protein Needs Calculated From (g/kg)	65.28  Estimated Protein Needs Calculated To (g/kg)	76.16  Estimated Fluid Needs Weight (lbs)	120 lb  Estimated Fluid Needs Weight (kg)	54.4 kg  Estimated Fluid Needs From (ml/kg)	30  Estimated Fluid Needs To (ml/kg)	35  Estimated Fluid Needs Calculated From (ml/kg)	1632  Estimated Fluid Needs Calculated To (ml/kg)	1904  Other Calculations	Pt above % IBW (120#, 150%) thus ideal body weight used for all calculations (120#). Needs adjusted for age, post-op healing.    Subjective:   68 year old female with past medical history of asthma, CTEPH, CAD, HTN, Type B dissection who was seen by the outpatient office and was deemed a good candidate for TEVAR. Back in 2023 she was seen by her outpatient pulmonologist, Dr. Cathy Serra, was noted to have an elevated D-Dimer and was advised to present to ED for workup. In ED a CTA C/A/P was performed which revealed a Type B dissection and she was admitted and discharged with close follow up. 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 She was deemed a candidate for TEVAR with Dr. Kwon and Dr. Suh and presented to [facility] for preop lumbar drain prior to OR.     Patient seen for follow up. Received OOB in chair on room air. Labs and medications reviewed. Electrolytes WDL, glucose  x 24 hours. Ordered for miralax, senna, protonix. On DASH/TLC diet with good appetite and intake. Pt states appetite has improved back to baseline. Consumed 100% of breakfast which consisted of yogurt, fruit cup, hashbrowns. Endorses drinking Ensure supplements occasionally. Educated pt on preference of meeting EER via real food versus Ensure supplements. Pt expressed understanding and wishes to receive one can daily in case of decreased appetite/intake. No report of GI distress or other nutritional concerns. RD to remain available for additional nutrition interventions as needed.     Previous Nutrition Diagnosis: Inadequate Oral Intake related to decreased appetite as evidenced by pt reported poor PO intake, observed untouched lunch tray    Active [ x - improving ]  Resolved [   ]    Goal: Pt to meet at least 75% of nutritional needs consistently     Recommendations:  1. Continue current diet order, recommend Ensure Max Protein 1x/day (150 kcal, 30 g protein per serving)  2. Encourage and monitor PO intake, honor preferences as able    3. Monitor labs, wt trends, GI function, and skin integrity   4. Pain and bowel regimen per team   5. RD to remain available for additional nutrition interventions and diet edu as needed       Risk Level: High [   ] Moderate [ x ] Low [   ]
Neurosurgery seen and examined patient at bedside.   Lumbar drain Incision c/d/i.  Plan per CT surgery to likely removed lumbar drain tomorrow.     PHYSICAL EXAM:  Constitutional: awake, alert, sitting up in bed. NAD.   Respiratory: non-labored breathing. Normal chest rise.   Cardiovascular: Regular rate and rhythm  Gastrointestinal:  Soft, nontender, nondistended.  .  Vascular: Extremities warm, no ulcers, no discoloration of skin.   Neurological: Gen: AA&O x 3, conversant, appropriate.      CN II-XII grossly intact.    Motor: HORNER x 4, RUE/LUE 5/5, RLE HF 4+/5 KE/KF 5/5 DF/PF 5/5, LLE HF 5/5 KE/KF 5/5 DF/PF 5/5     Sens: Sensation intact to light touch throughout.  Wound/incision: lumbar drain incision c/d/i

## 2024-05-20 NOTE — PROGRESS NOTE ADULT - ASSESSMENT
68 year old female with PMHx  of Asthma, CTEPH, CAD, HTN, Type B Dissection referred to Dr. Kwon / Dr. Suh for intervention and presented to [facility] on 4/25/2024 for preoperative lumbar drain prior to planned TEVAR. Patient underwent transfemoral TEVAR with Dr. Kwon/Dr. Suh on 4/26/24, Procedure was uncomplicated and she was transferred to CT ICU post operatively extubated and stable. Post operatively patient had no neurological deficits and lumbar drain was removed on POD#2. She had routine post operative CT scan that revealed new splenic infarcts and PE and she was started on low dose heparin gtt. Overnight POD#2-3 patient had new LE weakness, neurosurgery was emergently called and lumbar drain was replaced with improvement in symptoms and remained in place until POD#6. MRI was obstructed by artifact from TEVAR graft and CT was negative for spinal hemorrhage or infarct. Patient had persistent hypoxia post operatively and pulm was consulted for CTEPH and she was started on additional medication with improvement. On POD#8 patient complained of LE unsteadiness with urinary retention, she was restarted on pressors to keep MAPs elevated with improvement. 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 Her pressors were slowly weaned from POD#10-12., POD #13, UA w/ culture obtained as patient retaining, UA positive, started on Ceftriaxone x4 days, discontinued as patient asymptomatic (cultures sensitive to CTX). She continued to remain stable and transferred to  on POD#14. Hematology was consulted on POD 14, patient was transitioned from argatroban to Eliquis on POD 15. Urology reconsulted noting if patient fails TOV, she may leave stevens in place and follow up in their clinic. Patient failed overnight TOV on POD 17, so stevens replaced and left in place for discharge. On POD 18, hematology re-consulted, recommending to place patient back on argatroban gtt until plt >100k. POD #19-21, patient's platelets continued to be below goal, hematology reconsulted, pending further lab work up, pan culture and CTA C/A/P. POD22: CTA CAP done today, UA obtained +Candida discussed w/  and given patient is asymptomatic no need to treat. POD23 urology noting patient can TOV one more time affter 7 days, stevens removed, passed TOV; however, was retaining over night, so stevens placed again. POD24 plts remain in the 70s, CTA C/A/P reviewed w/  found w/ normal post surgical findings and known endoleak, NTD. LE venous duplex obtained today revealing chronic LLE recannulation changes, no acute DVTs. Continues on therapeutic titrating argatroban gtt.     Neurovascular: No delirium. Pain well controlled with current regimen.  - LFTS normalized : Tyelnol added back but patient has no pain     Cardiovascular: Hemodynamically stable. HR controlled.  -CAD  -HTN: carvedilol 3.125mg q12hrs  -HLD: atorvastatin 20mg nightly   -Type B dissection: s/p TEVAR 4/26/24 --> CTA C/A/P done yesterday which reveals ?endoleak and thrombus: reviewed by Dr. Kwon --> normal post surgical changes     Respiratory: 02 Sat = 98% on RA.  -Encourage C+DB and Use of IS 10x / hr while awake.  -Asthma: albuterol nebs q6hrs   -CTEPH: Macitentan 10mg daily, Riociguat 2.5mg q8hrs   -CXR: Stable     GI: Stable.  -Constipation prophylaxis: Miralax, senna   -PPX: pantoprazole   -PO Diet.    Renal / :  -Monitor renal function: 10/1.03  -Monitor I/O's.  -Urinary retention s/p multiple failed TOV, Stevens removed yesterday and passed TOV. Will follow post void residuals.    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:   Goal PTT: 60-80, plts 63 today   Continue on titrating argatroban  - hematology following: CTA C/A/P to assess for clotting and pan cultures to r/o infection as consumptive etiology to downtrending plts complete: increase thrombus burden and new thrombus in IJ --> requesting LE B/L duplex (pending)    ID:  -Temperature: afebrile  -CBC: WBC 6.03  -UTI 5/9 cultures: s/p 4 days Ceftriaxone   -cultures negative to date     Prophylaxis:  -DVT prophylaxis: on titrating argatroban gtt, last PTT 63   -SCD's    Disposition:  - home when platelets >100   68 year old female with PMHx  of Asthma, CTEPH, CAD, HTN, Type B Dissection referred to Dr. Kwon / Dr. Suh for intervention and presented to [facility] on 4/25/2024 for preoperative lumbar drain prior to planned TEVAR. Patient underwent transfemoral TEVAR with Dr. Kwon/Dr. Suh on 4/26/24, Procedure was uncomplicated and she was transferred to CT ICU post operatively extubated and stable. Post operatively patient had no neurological deficits and lumbar drain was removed on POD#2. She had routine post operative CT scan that revealed new splenic infarcts and PE and she was started on low dose heparin gtt. Overnight POD#2-3 patient had new LE weakness, neurosurgery was emergently called and lumbar drain was replaced with improvement in symptoms and remained in place until POD#6. MRI was obstructed by artifact from TEVAR graft and CT was negative for spinal hemorrhage or infarct. Patient had persistent hypoxia post operatively and pulm was consulted for CTEPH and she was started on additional medication with improvement. On POD#8 patient complained of LE unsteadiness with urinary retention, she was restarted on pressors to keep MAPs elevated with improvement. 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 Her pressors were slowly weaned from POD#10-12., POD #13, UA w/ culture obtained as patient retaining, UA positive, started on Ceftriaxone x4 days, discontinued as patient asymptomatic (cultures sensitive to CTX). She continued to remain stable and transferred to  on POD#14. Hematology was consulted on POD 14, patient was transitioned from argatroban to Eliquis on POD 15. Urology reconsulted noting if patient fails TOV, she may leave stevens in place and follow up in their clinic. Patient failed overnight TOV on POD 17, so stevens replaced and left in place for discharge. On POD 18, hematology re-consulted, recommending to place patient back on argatroban gtt until plt >100k. POD #19-21, patient's platelets continued to be below goal, hematology reconsulted, pending further lab work up, pan culture and CTA C/A/P. POD22: CTA CAP done today, UA obtained +Candida discussed w/  and given patient is asymptomatic no need to treat. POD23 urology noting patient can TOV one more time affter 7 days, stevens removed, passed TOV; however, was retaining over night, so stevens placed again. POD24 plts remain in the 70s, CTA C/A/P reviewed w/  found w/ normal post surgical findings and known endoleak, NTD. LE venous duplex obtained today revealing chronic LLE recannulation changes, no acute DVTs. Continues on therapeutic titrating argatroban gtt, monitor plts.     Neurovascular: No delirium. Pain well controlled with current regimen.  - LFTS normalized : Tyelnol added back prn but patient has no pain     Cardiovascular: Hemodynamically stable. HR controlled.  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  -Encourage C+DB and Use of IS 10x / hr while awake.  -Asthma: albuterol inhaler q6hrs   -CTEPH: Macitentan 10mg daily, Riociguat 2.5mg q8hrs   -CXR: Stable     GI: Stable.  -Constipation prophylaxis: Miralax, senna   -PPX: pantoprazole   -PO Diet.    Renal / :  -Monitor renal function: BUN 9, Cr 1.04  -Monitor I/O's.  -Urinary retention s/p multiple failed TOV, Stevens removed 5/19, passed TOV then retaining again, stevens placed again. 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 LE venous duplex only w/ chronic changes to LLE, no acute changes. Follow up heme recs    ID:  -Temperature: afebrile  -CBC: WBC 5.21  -UTI 5/9 cultures: s/p 4 days Ceftriaxone   -Blood cultures negative to date   -UA 5/18 growing Candida, reviewed w/ , no antibiotics needed, patient is asymptomatic, continue to monitor     Prophylaxis:  -DVT prophylaxis: on titrating argatroban gtt, last PTT 72   -SCD's    Disposition:  - telemetry, home when platelets >100

## 2024-05-20 NOTE — PROGRESS NOTE ADULT - SUBJECTIVE AND OBJECTIVE BOX
Patient discussed on morning rounds with Dr. Kwon       Operation / Date: TEVAR, 4/26/24    SUBJECTIVE ASSESSMENT:  68y Female without complaints today, really wanting to go home, but understands the need to stay. Denies dizziness, changes in vision, chest pain, back pain, sob, abd pain, N/V/D, urinary frequency/pain/urgency, LE swelling/numbness tingling or pain. Denies L hand cramping, numbness, tingling.     Vital Signs Last 24 Hrs  T(C): 36.1 (20 May 2024 08:56), Max: 37 (19 May 2024 17:26)  T(F): 97 (20 May 2024 08:56), Max: 98.6 (19 May 2024 17:26)  HR: 80 (20 May 2024 12:40) (80 - 98)  BP: 138/71 (20 May 2024 12:40) (124/58 - 159/73)  BP(mean): 97 (20 May 2024 12:40) (84 - 105)  RR: 18 (20 May 2024 12:40) (12 - 18)  SpO2: 100% (20 May 2024 12:40) (100% - 100%)    Parameters below as of 20 May 2024 12:40  Patient On (Oxygen Delivery Method): room air      I&O's Detail    19 May 2024 07:01  -  20 May 2024 07:00  --------------------------------------------------------  IN:    Argatroban: 189 mL  Total IN: 189 mL    OUT:    Indwelling Catheter - Urethral (mL): 1425 mL    Oral Fluid: 0 mL    Voided (mL): 425 mL  Total OUT: 1850 mL    Total NET: -1661 mL      20 May 2024 07:01  -  20 May 2024 13:02  --------------------------------------------------------  IN:    Argatroban: 36 mL    Oral Fluid: 120 mL  Total IN: 156 mL    OUT:    Indwelling Catheter - Urethral (mL): 150 mL  Total OUT: 150 mL    Total NET: 6 mL          CHEST TUBE:  No.   WOOD DRAIN:  No.  EPICARDIAL WIRES: No.  TIE DOWNS: No.  ALBERTS: Yes    PHYSICAL EXAM:  Appearance: No acute distress.  Neurologic: AAOx3, no AMS or focal deficits.  Responds appropriately to verbal and physical stimuli; exhibits purposeful movement in all extremities.  Cardiovascular: RRR, S1 S2. No m/r/g.  Respiratory: No acute respiratory distress. CTA b/l, no w/r/r.   Gastrointestinal:  Soft, non-tender, non-distended, + BS.	  Skin: No rashes. No ecchymoses. No cyanosis.  Extremities: Exhibits normal range of motion, no clubbing, cyanosis or edema.  Vascular: DP pulses palpable 2+ bilaterally. +strong palpable R radial, non palpable L radial normal cap refill     LABS:                        9.7    5.21  )-----------( 72       ( 20 May 2024 05:30 )             29.7       COUMADIN: No. REASON: not indicated, on argatroban gtt    PTT - ( 20 May 2024 05:30 )  PTT:65.7 sec    05-20    140  |  108  |  9   ----------------------------<  103<H>  4.1   |  22  |  1.04    Ca    9.1      20 May 2024 05:30  Mg     2.1     05-20    TPro  6.0  /  Alb  3.5  /  TBili  1.0  /  DBili  x   /  AST  16  /  ALT  29  /  AlkPhos  88  05-19      Urinalysis Basic - ( 20 May 2024 05:30 )    Color: x / Appearance: x / SG: x / pH: x  Gluc: 103 mg/dL / Ketone: x  / Bili: x / Urobili: x   Blood: x / Protein: x / Nitrite: x   Leuk Esterase: x / RBC: x / WBC x   Sq Epi: x / Non Sq Epi: x / Bacteria: x        MEDICATIONS  (STANDING):  albuterol    90 MICROgram(s) HFA Inhaler 2 Puff(s) Inhalation every 6 hours  argatroban Infusion 1.838 MICROgram(s)/kG/Min (9 mL/Hr) IV Continuous <Continuous>  atorvastatin 20 milliGRAM(s) Oral at bedtime  carvedilol 3.125 milliGRAM(s) Oral every 12 hours  chlorhexidine 2% Cloths 1 Application(s) Topical daily  influenza  Vaccine (HIGH DOSE) 0.7 milliLiter(s) IntraMuscular once  macitentan 10 milliGRAM(s) Oral <User Schedule>  pantoprazole    Tablet 40 milliGRAM(s) Oral daily  polyethylene glycol 3350 17 Gram(s) Oral daily  riociguat 2.5 milliGRAM(s) Oral every 8 hours  senna 2 Tablet(s) Oral at bedtime  sodium chloride 0.9%. 1000 milliLiter(s) (10 mL/Hr) IV Continuous <Continuous>    MEDICATIONS  (PRN):  acetaminophen     Tablet .. 650 milliGRAM(s) Oral every 6 hours PRN Mild Pain (1 - 3)  sodium chloride 0.65% Nasal 1 Spray(s) Both Nostrils three times a day PRN dry nose        RADIOLOGY & ADDITIONAL TESTS:

## 2024-05-20 NOTE — CHART NOTE - NSCHARTNOTESELECT_GEN_ALL_CORE
Neurosurgery/Event Note
Nutrition Services
Leg weakness/Event Note
Nutrition Services

## 2024-05-21 ENCOUNTER — APPOINTMENT (OUTPATIENT)
Dept: NEUROLOGY | Facility: CLINIC | Age: 69
End: 2024-05-21

## 2024-05-21 LAB
ALBUMIN SERPL ELPH-MCNC: 3.5 G/DL — SIGNIFICANT CHANGE UP (ref 3.3–5)
ALP SERPL-CCNC: 88 U/L — SIGNIFICANT CHANGE UP (ref 40–120)
ALT FLD-CCNC: 19 U/L — SIGNIFICANT CHANGE UP (ref 10–45)
ANION GAP SERPL CALC-SCNC: 9 MMOL/L — SIGNIFICANT CHANGE UP (ref 5–17)
APTT BLD: 72.3 SEC — HIGH (ref 24.5–35.6)
AST SERPL-CCNC: 15 U/L — SIGNIFICANT CHANGE UP (ref 10–40)
BILIRUB SERPL-MCNC: 0.8 MG/DL — SIGNIFICANT CHANGE UP (ref 0.2–1.2)
BUN SERPL-MCNC: 11 MG/DL — SIGNIFICANT CHANGE UP (ref 7–23)
CALCIUM SERPL-MCNC: 9.3 MG/DL — SIGNIFICANT CHANGE UP (ref 8.4–10.5)
CHLORIDE SERPL-SCNC: 109 MMOL/L — HIGH (ref 96–108)
CO2 SERPL-SCNC: 23 MMOL/L — SIGNIFICANT CHANGE UP (ref 22–31)
CREAT SERPL-MCNC: 1.06 MG/DL — SIGNIFICANT CHANGE UP (ref 0.5–1.3)
EGFR: 57 ML/MIN/1.73M2 — LOW
GLUCOSE SERPL-MCNC: 105 MG/DL — HIGH (ref 70–99)
HCT VFR BLD CALC: 28.6 % — LOW (ref 34.5–45)
HGB BLD-MCNC: 9.5 G/DL — LOW (ref 11.5–15.5)
MAGNESIUM SERPL-MCNC: 2.2 MG/DL — SIGNIFICANT CHANGE UP (ref 1.6–2.6)
MCHC RBC-ENTMCNC: 26.5 PG — LOW (ref 27–34)
MCHC RBC-ENTMCNC: 33.2 GM/DL — SIGNIFICANT CHANGE UP (ref 32–36)
MCV RBC AUTO: 79.9 FL — LOW (ref 80–100)
NRBC # BLD: 0 /100 WBCS — SIGNIFICANT CHANGE UP (ref 0–0)
PLATELET # BLD AUTO: 79 K/UL — LOW (ref 150–400)
POTASSIUM SERPL-MCNC: 4.2 MMOL/L — SIGNIFICANT CHANGE UP (ref 3.5–5.3)
POTASSIUM SERPL-SCNC: 4.2 MMOL/L — SIGNIFICANT CHANGE UP (ref 3.5–5.3)
PROT SERPL-MCNC: 6.2 G/DL — SIGNIFICANT CHANGE UP (ref 6–8.3)
RBC # BLD: 3.58 M/UL — LOW (ref 3.8–5.2)
RBC # FLD: 16.8 % — HIGH (ref 10.3–14.5)
SODIUM SERPL-SCNC: 141 MMOL/L — SIGNIFICANT CHANGE UP (ref 135–145)
WBC # BLD: 4.73 K/UL — SIGNIFICANT CHANGE UP (ref 3.8–10.5)
WBC # FLD AUTO: 4.73 K/UL — SIGNIFICANT CHANGE UP (ref 3.8–10.5)

## 2024-05-21 RX ADMIN — ARGATROBAN 9 MICROGRAM(S)/KG/MIN: 50 INJECTION, SOLUTION INTRAVENOUS at 11:11

## 2024-05-21 RX ADMIN — CARVEDILOL PHOSPHATE 3.12 MILLIGRAM(S): 80 CAPSULE, EXTENDED RELEASE ORAL at 05:51

## 2024-05-21 RX ADMIN — MACITENTAN 10 MILLIGRAM(S): 10 TABLET, FILM COATED ORAL at 11:32

## 2024-05-21 RX ADMIN — CARVEDILOL PHOSPHATE 3.12 MILLIGRAM(S): 80 CAPSULE, EXTENDED RELEASE ORAL at 17:43

## 2024-05-21 RX ADMIN — CHLORHEXIDINE GLUCONATE 1 APPLICATION(S): 213 SOLUTION TOPICAL at 06:05

## 2024-05-21 RX ADMIN — ARGATROBAN 9 MICROGRAM(S)/KG/MIN: 50 INJECTION, SOLUTION INTRAVENOUS at 15:30

## 2024-05-21 RX ADMIN — SENNA PLUS 2 TABLET(S): 8.6 TABLET ORAL at 21:44

## 2024-05-21 RX ADMIN — RIOCIGUAT 2.5 MILLIGRAM(S): 1.5 TABLET, FILM COATED ORAL at 15:30

## 2024-05-21 RX ADMIN — RIOCIGUAT 2.5 MILLIGRAM(S): 1.5 TABLET, FILM COATED ORAL at 21:43

## 2024-05-21 RX ADMIN — ATORVASTATIN CALCIUM 20 MILLIGRAM(S): 80 TABLET, FILM COATED ORAL at 21:44

## 2024-05-21 RX ADMIN — RIOCIGUAT 2.5 MILLIGRAM(S): 1.5 TABLET, FILM COATED ORAL at 05:58

## 2024-05-21 RX ADMIN — PANTOPRAZOLE SODIUM 40 MILLIGRAM(S): 20 TABLET, DELAYED RELEASE ORAL at 11:11

## 2024-05-21 NOTE — PROGRESS NOTE ADULT - SUBJECTIVE AND OBJECTIVE BOX
Patient discussed on morning rounds with       Operation / Date:     SUBJECTIVE ASSESSMENT:  68y Female w/out complaints this AM. Patient still waiting for plts>100. Otherwise denying dizziness, vision changes, chest pain, sob, abd pain, N/V/D, LE pain/numbness/ tingling.      Vital Signs Last 24 Hrs  T(C): 37.1 (21 May 2024 13:38), Max: 37.1 (21 May 2024 13:38)  T(F): 98.8 (21 May 2024 13:38), Max: 98.8 (21 May 2024 13:38)  HR: 80 (21 May 2024 13:00) (72 - 100)  BP: 136/63 (21 May 2024 13:00) (127/60 - 160/70)  BP(mean): 90 (21 May 2024 13:00) (87 - 102)  RR: 16 (21 May 2024 09:00) (16 - 18)  SpO2: 98% (21 May 2024 09:00) (98% - 100%)    Parameters below as of 21 May 2024 09:00  Patient On (Oxygen Delivery Method): room air      I&O's Detail    20 May 2024 07:01  -  21 May 2024 07:00  --------------------------------------------------------  IN:    Argatroban: 144 mL    Oral Fluid: 360 mL  Total IN: 504 mL    OUT:    Indwelling Catheter - Urethral (mL): 1225 mL  Total OUT: 1225 mL    Total NET: -721 mL      21 May 2024 07:01  -  21 May 2024 16:27  --------------------------------------------------------  IN:    Argatroban: 45 mL  Total IN: 45 mL    OUT:    Indwelling Catheter - Urethral (mL): 100 mL  Total OUT: 100 mL    Total NET: -55 mL    CHEST TUBE:  No.   WOOD DRAIN:  No.  EPICARDIAL WIRES: No.  TIE DOWNS: No.  ALBERTS: Yes    PHYSICAL EXAM:  Appearance: No acute distress.  Neurologic: AAOx3, no AMS or focal deficits.  Responds appropriately to verbal and physical stimuli; exhibits purposeful movement in all extremities.  Cardiovascular: RRR, S1 S2. No m/r/g.  Respiratory: No acute respiratory distress. CTA b/l, no w/r/r.   Gastrointestinal:  Soft, non-tender, non-distended, + BS.	  Skin: No rashes. No ecchymoses. No cyanosis.  Extremities: Exhibits normal range of motion, no clubbing, cyanosis or edema.  Vascular: DP pulses palpable bilaterally. +strong palpable R radial, non palpable L radial normal cap refill     LABS:                        9.5    4.73  )-----------( 79       ( 21 May 2024 05:30 )             28.6       COUMADIN:  No. REASON: not indicated    PTT - ( 21 May 2024 05:30 )  PTT:72.3 sec    05-21    141  |  109<H>  |  11  ----------------------------<  105<H>  4.2   |  23  |  1.06    Ca    9.3      21 May 2024 05:30  Mg     2.2     05-21    TPro  6.2  /  Alb  3.5  /  TBili  0.8  /  DBili  x   /  AST  15  /  ALT  19  /  AlkPhos  88  05-21      Urinalysis Basic - ( 21 May 2024 05:30 )    Color: x / Appearance: x / SG: x / pH: x  Gluc: 105 mg/dL / Ketone: x  / Bili: x / Urobili: x   Blood: x / Protein: x / Nitrite: x   Leuk Esterase: x / RBC: x / WBC x   Sq Epi: x / Non Sq Epi: x / Bacteria: x        MEDICATIONS  (STANDING):  albuterol    90 MICROgram(s) HFA Inhaler 2 Puff(s) Inhalation every 6 hours  argatroban Infusion 1.838 MICROgram(s)/kG/Min (9 mL/Hr) IV Continuous <Continuous>  atorvastatin 20 milliGRAM(s) Oral at bedtime  carvedilol 3.125 milliGRAM(s) Oral every 12 hours  chlorhexidine 2% Cloths 1 Application(s) Topical daily  influenza  Vaccine (HIGH DOSE) 0.7 milliLiter(s) IntraMuscular once  macitentan 10 milliGRAM(s) Oral <User Schedule>  pantoprazole    Tablet 40 milliGRAM(s) Oral daily  polyethylene glycol 3350 17 Gram(s) Oral daily  riociguat 2.5 milliGRAM(s) Oral every 8 hours  senna 2 Tablet(s) Oral at bedtime  sodium chloride 0.9%. 1000 milliLiter(s) (10 mL/Hr) IV Continuous <Continuous>    MEDICATIONS  (PRN):  acetaminophen     Tablet .. 650 milliGRAM(s) Oral every 6 hours PRN Mild Pain (1 - 3)  sodium chloride 0.65% Nasal 1 Spray(s) Both Nostrils three times a day PRN dry nose        RADIOLOGY & ADDITIONAL TESTS:

## 2024-05-21 NOTE — PROGRESS NOTE ADULT - SUBJECTIVE AND OBJECTIVE BOX
INTERVAL HPI/OVERNIGHT EVENTS:  NAOE  SUBJECTIVE: Patient seen and examined at bedside. No acute complaints. Dopplers showed no evidence of DVT    VITAL SIGNS:  ICU Vital Signs Last 24 Hrs  T(C): 37.1 (21 May 2024 13:38), Max: 37.1 (21 May 2024 13:38)  T(F): 98.8 (21 May 2024 13:38), Max: 98.8 (21 May 2024 13:38)  HR: 80 (21 May 2024 13:00) (72 - 100)  BP: 136/63 (21 May 2024 13:00) (127/60 - 160/70)  BP(mean): 90 (21 May 2024 13:00) (87 - 102)  ABP: --  ABP(mean): --  RR: 16 (21 May 2024 09:00) (16 - 18)  SpO2: 98% (21 May 2024 09:00) (98% - 100%)    O2 Parameters below as of 21 May 2024 09:00  Patient On (Oxygen Delivery Method): room air              05-20 @ 07:01  -  05-21 @ 07:00  --------------------------------------------------------  IN: 504 mL / OUT: 1225 mL / NET: -721 mL    05-21 @ 07:01  -  05-21 @ 14:40  --------------------------------------------------------  IN: 45 mL / OUT: 100 mL / NET: -55 mL      CAPILLARY BLOOD GLUCOSE          PHYSICAL EXAM:    Constitutional: NAD  HEENT: NC/AT; PERRL, anicteric sclera; MMM  Neck: supple, no JVD  Cardiovascular: +S1/S2, RRR  Respiratory: comfortable on room air  Gastrointestinal: abdomen soft, NT/ND; no rebound or guarding  Extremities: WWP; no LE edema; no clubbing or cyanosis  Vascular: 2+ radial, DP/PT pulses B/L  Neurological: AAOx3    MEDICATIONS:  MEDICATIONS  (STANDING):  albuterol    90 MICROgram(s) HFA Inhaler 2 Puff(s) Inhalation every 6 hours  argatroban Infusion 1.838 MICROgram(s)/kG/Min (9 mL/Hr) IV Continuous <Continuous>  atorvastatin 20 milliGRAM(s) Oral at bedtime  carvedilol 3.125 milliGRAM(s) Oral every 12 hours  chlorhexidine 2% Cloths 1 Application(s) Topical daily  influenza  Vaccine (HIGH DOSE) 0.7 milliLiter(s) IntraMuscular once  macitentan 10 milliGRAM(s) Oral <User Schedule>  pantoprazole    Tablet 40 milliGRAM(s) Oral daily  polyethylene glycol 3350 17 Gram(s) Oral daily  riociguat 2.5 milliGRAM(s) Oral every 8 hours  senna 2 Tablet(s) Oral at bedtime  sodium chloride 0.9%. 1000 milliLiter(s) (10 mL/Hr) IV Continuous <Continuous>    MEDICATIONS  (PRN):  acetaminophen     Tablet .. 650 milliGRAM(s) Oral every 6 hours PRN Mild Pain (1 - 3)  sodium chloride 0.65% Nasal 1 Spray(s) Both Nostrils three times a day PRN dry nose      ALLERGIES:  Allergies    heparin (Other (Moderate))    Intolerances        LABS:                        9.5    4.73  )-----------( 79       ( 21 May 2024 05:30 )             28.6     05-21    141  |  109<H>  |  11  ----------------------------<  105<H>  4.2   |  23  |  1.06    Ca    9.3      21 May 2024 05:30  Mg     2.2     05-21    TPro  6.2  /  Alb  3.5  /  TBili  0.8  /  DBili  x   /  AST  15  /  ALT  19  /  AlkPhos  88  05-21    PTT - ( 21 May 2024 05:30 )  PTT:72.3 sec  Urinalysis Basic - ( 21 May 2024 05:30 )    Color: x / Appearance: x / SG: x / pH: x  Gluc: 105 mg/dL / Ketone: x  / Bili: x / Urobili: x   Blood: x / Protein: x / Nitrite: x   Leuk Esterase: x / RBC: x / WBC x   Sq Epi: x / Non Sq Epi: x / Bacteria: x        RADIOLOGY & ADDITIONAL TESTS: Reviewed.

## 2024-05-21 NOTE — PROGRESS NOTE ADULT - ASSESSMENT
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 Hematology was consulted for anticoagulation recommendations.    #DON  confirmed by positive Heparin-PF4 antibodies and PAVAN, and noted celiac trunk thrombus  platelet carolyn 27k on 5/5/24  Patient was transitioned to argatroban from heparin with noted uptrend of platelet count, 83k on 5/9/24  Patient transitioned to eliquis 10mg BID on 5/11 when platelets were at 99k --> 81k -->74k --> 82k --> 86k -->80k -->64k --> 75k -->76k -->72k -->79k   As per the 2018 [facility] Clinical practice guidelines for management of DON, recommendations to transition from a parenteral agent to warfarin require a platelet count recovery to >150k. Transitioning from a parenteral agent to a DOAC does not carry this requirement as long as patient is deemed clinically stable. While we would prefer transitioning to a DOAC as soon as her platelet count recovers to normal levels, we would defer to the primary team. Please note that you should start the DOAC within 2 hours of stopping argatroban gtt.     Peripheral smear reviewed 5/16/24 given downtrend in platelet count: Anisocytosis, poikilocytosis, hypochromic RBCs, rare schistocytes 0-2/hpf (no evidence to suggest thrombotic microangiopathy); neutrophils without evidence of toxic granulation to suggest infection; thrombocytopenia is real, no platelet clumping observed  Medication list reviewed; her last dose of Ceftriaxone, which can contribute to thrombocytopenia, was >3d ago, so unlikely to be contributory; the other medications she is receiving are not associated with thrombocytopenia  Peripheral smear reviewed again 5/17/24 given further drop in platelet counts. Findings similar to slide from yesterday. Rare minimal clumping noticed (clumps of 4 platelets), not enough to account for the overall drop.    Plan:    -Continue argatroban gtt until platelet counts normalize  - infectious workup negative so far, will fu blood cx  - platelet decrease likely 2/2 clot progression seen on CT  -When platelet counts stabilize closer to normal range, would reasonable to discharge her on Eliquis, which she has tolerated well in the past  -Please discharge her with follow up with her outpatient hematologist, Dr. Bradley Goldberg    #anemia  #leukocytosis  Patient with normal WBC prior to this admission, so leukocytosis is likely reactive    Discussed with Dr. Kendall

## 2024-05-21 NOTE — PROGRESS NOTE ADULT - ASSESSMENT
68 year old female with PMHx  of Asthma, CTEPH, CAD, HTN, Type B Dissection referred to Dr. Kwon / Dr. Suh for intervention and presented to [facility] on 4/25/2024 for preoperative lumbar drain prior to planned TEVAR. Patient underwent transfemoral TEVAR with Dr. Kwon/Dr. Suh on 4/26/24, Procedure was uncomplicated and she was transferred to CT ICU post operatively extubated and stable. Post operatively patient had no neurological deficits and lumbar drain was removed on POD#2. She had routine post operative CT scan that revealed new splenic infarcts and PE and she was started on low dose heparin gtt. Overnight POD#2-3 patient had new LE weakness, neurosurgery was emergently called and lumbar drain was replaced with improvement in symptoms and remained in place until POD#6. MRI was obstructed by artifact from TEVAR graft and CT was negative for spinal hemorrhage or infarct. Patient had persistent hypoxia post operatively and pulm was consulted for CTEPH and she was started on additional medication with improvement. On POD#8 patient complained of LE unsteadiness with urinary retention, she was restarted on pressors to keep MAPs elevated with improvement. 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 Her pressors were slowly weaned from POD#10-12., POD #13, UA w/ culture obtained as patient retaining, UA positive, started on Ceftriaxone x4 days, discontinued as patient asymptomatic (cultures sensitive to CTX). She continued to remain stable and transferred to  on POD#14. Hematology was consulted on POD 14, patient was transitioned from argatroban to Eliquis on POD 15. Urology reconsulted noting if patient fails TOV, she may leave stevens in place and follow up in their clinic. Patient failed overnight TOV on POD 17, so stevens replaced and left in place for discharge. On POD 18, hematology re-consulted, recommending to place patient back on argatroban gtt until plt >100k. POD #19-21, patient's platelets continued to be below goal, hematology reconsulted, pending further lab work up, pan culture and CTA C/A/P. POD22: CTA CAP done today, UA obtained +Candida discussed w/  and given patient is asymptomatic no need to treat. POD23 urology noting patient can TOV one more time affter 7 days, stevens removed, passed TOV; however, was retaining over night, so stevens placed again. POD24 plts remain in the 70s, CTA C/A/P reviewed w/  found w/ normal post surgical findings and known endoleak, NTD. LE venous duplex obtained today revealing chronic LLE recannulation changes, no acute DVTs. POD 25 plts uptrended from 72>79, remains on therapeutic titrating argatroban gtt.    Neurovascular: No delirium. Pain well controlled with current regimen.  - LFTS normalized : Tyelnol added back prn but patient has no pain     Cardiovascular: Hemodynamically stable. HR controlled.  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  -Encourage C+DB and Use of IS 10x / hr while awake.  -Asthma: albuterol inhaler q6hrs   -CTEPH: Macitentan 10mg daily, Riociguat 2.5mg q8hrs   -CXR: Stable     GI: Stable.  -Constipation prophylaxis: Miralax, senna   -PPX: pantoprazole   -PO Diet.    Renal / :  -Monitor renal function: BUN 9, Cr 1.04  -Monitor I/O's.  -Urinary retention s/p multiple failed TOV, Stevens removed 5/19, passed TOV then retaining again, stevens placed again. 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 LE venous duplex only w/ chronic changes to LLE, no acute changes. Follow up heme recs    ID:  -Temperature: afebrile  -CBC: WBC 4.73  -UTI 5/9 cultures: s/p 4 days Ceftriaxone   -Blood cultures negative to date   -UA 5/18 growing Candida, reviewed w/ , no antibiotics needed, patient is asymptomatic, continue to monitor     Prophylaxis:  -DVT prophylaxis: on titrating argatroban gtt, last PTT 79  -SCD's    Disposition:  - telemetry, home when platelets >100

## 2024-05-22 LAB
ANION GAP SERPL CALC-SCNC: 10 MMOL/L — SIGNIFICANT CHANGE UP (ref 5–17)
APTT BLD: 69.7 SEC — HIGH (ref 24.5–35.6)
BUN SERPL-MCNC: 11 MG/DL — SIGNIFICANT CHANGE UP (ref 7–23)
CALCIUM SERPL-MCNC: 8.7 MG/DL — SIGNIFICANT CHANGE UP (ref 8.4–10.5)
CHLORIDE SERPL-SCNC: 110 MMOL/L — HIGH (ref 96–108)
CO2 SERPL-SCNC: 21 MMOL/L — LOW (ref 22–31)
CREAT SERPL-MCNC: 1 MG/DL — SIGNIFICANT CHANGE UP (ref 0.5–1.3)
EGFR: 61 ML/MIN/1.73M2 — SIGNIFICANT CHANGE UP
GLUCOSE SERPL-MCNC: 101 MG/DL — HIGH (ref 70–99)
HCT VFR BLD CALC: 29.4 % — LOW (ref 34.5–45)
HGB BLD-MCNC: 9.4 G/DL — LOW (ref 11.5–15.5)
MAGNESIUM SERPL-MCNC: 2 MG/DL — SIGNIFICANT CHANGE UP (ref 1.6–2.6)
MCHC RBC-ENTMCNC: 26.3 PG — LOW (ref 27–34)
MCHC RBC-ENTMCNC: 32 GM/DL — SIGNIFICANT CHANGE UP (ref 32–36)
MCV RBC AUTO: 82.4 FL — SIGNIFICANT CHANGE UP (ref 80–100)
NRBC # BLD: 0 /100 WBCS — SIGNIFICANT CHANGE UP (ref 0–0)
PLATELET # BLD AUTO: 93 K/UL — LOW (ref 150–400)
POTASSIUM SERPL-MCNC: 4 MMOL/L — SIGNIFICANT CHANGE UP (ref 3.5–5.3)
POTASSIUM SERPL-SCNC: 4 MMOL/L — SIGNIFICANT CHANGE UP (ref 3.5–5.3)
RBC # BLD: 3.57 M/UL — LOW (ref 3.8–5.2)
RBC # FLD: 16.6 % — HIGH (ref 10.3–14.5)
SODIUM SERPL-SCNC: 141 MMOL/L — SIGNIFICANT CHANGE UP (ref 135–145)
WBC # BLD: 4.64 K/UL — SIGNIFICANT CHANGE UP (ref 3.8–10.5)
WBC # FLD AUTO: 4.64 K/UL — SIGNIFICANT CHANGE UP (ref 3.8–10.5)

## 2024-05-22 RX ADMIN — CARVEDILOL PHOSPHATE 3.12 MILLIGRAM(S): 80 CAPSULE, EXTENDED RELEASE ORAL at 18:00

## 2024-05-22 RX ADMIN — ARGATROBAN 9 MICROGRAM(S)/KG/MIN: 50 INJECTION, SOLUTION INTRAVENOUS at 12:44

## 2024-05-22 RX ADMIN — MACITENTAN 10 MILLIGRAM(S): 10 TABLET, FILM COATED ORAL at 11:37

## 2024-05-22 RX ADMIN — PANTOPRAZOLE SODIUM 40 MILLIGRAM(S): 20 TABLET, DELAYED RELEASE ORAL at 11:37

## 2024-05-22 RX ADMIN — RIOCIGUAT 2.5 MILLIGRAM(S): 1.5 TABLET, FILM COATED ORAL at 21:56

## 2024-05-22 RX ADMIN — CARVEDILOL PHOSPHATE 3.12 MILLIGRAM(S): 80 CAPSULE, EXTENDED RELEASE ORAL at 06:16

## 2024-05-22 RX ADMIN — CHLORHEXIDINE GLUCONATE 1 APPLICATION(S): 213 SOLUTION TOPICAL at 06:16

## 2024-05-22 RX ADMIN — RIOCIGUAT 2.5 MILLIGRAM(S): 1.5 TABLET, FILM COATED ORAL at 14:27

## 2024-05-22 RX ADMIN — RIOCIGUAT 2.5 MILLIGRAM(S): 1.5 TABLET, FILM COATED ORAL at 06:35

## 2024-05-22 RX ADMIN — ARGATROBAN 9 MICROGRAM(S)/KG/MIN: 50 INJECTION, SOLUTION INTRAVENOUS at 18:50

## 2024-05-22 RX ADMIN — ATORVASTATIN CALCIUM 20 MILLIGRAM(S): 80 TABLET, FILM COATED ORAL at 21:56

## 2024-05-22 NOTE — PROGRESS NOTE ADULT - SUBJECTIVE AND OBJECTIVE BOX
Patient discussed on morning rounds with Dr. Kwon    Operation / Date: TEVAR 4/26/24     SUBJECTIVE ASSESSMENT:  68y Female seen and assessed at bedside this morning. Patient is excited that her platelets are up today and is hoping she is able to leave in the next few days.         Vital Signs Last 24 Hrs  T(C): 36.3 (22 May 2024 09:35), Max: 37.1 (21 May 2024 13:38)  T(F): 97.4 (22 May 2024 09:35), Max: 98.8 (21 May 2024 13:38)  HR: 90 (22 May 2024 09:00) (82 - 102)  BP: 109/54 (22 May 2024 09:00) (109/54 - 167/77)  BP(mean): 78 (22 May 2024 09:00) (78 - 113)  RR: 16 (22 May 2024 09:00) (16 - 17)  SpO2: 96% (22 May 2024 09:00) (96% - 98%)    Parameters below as of 22 May 2024 09:00  Patient On (Oxygen Delivery Method): room air      I&O's Detail    21 May 2024 07:01  -  22 May 2024 07:00  --------------------------------------------------------  IN:    Argatroban: 144 mL    Oral Fluid: 325 mL  Total IN: 469 mL    OUT:    Indwelling Catheter - Urethral (mL): 1150 mL  Total OUT: 1150 mL    Total NET: -681 mL      22 May 2024 07:01  -  22 May 2024 13:10  --------------------------------------------------------  IN:    Argatroban: 36 mL  Total IN: 36 mL    OUT:  Total OUT: 0 mL    Total NET: 36 mL          CHEST TUBE:  No.   WOOD DRAIN:  No.  EPICARDIAL WIRES: No.  TIE DOWNS: No.  ALBERTS: Yes    PHYSICAL EXAM:  Appearance: No acute distress.  Neurologic: AAOx3, no AMS or focal deficits.  Responds appropriately to verbal and physical stimuli; exhibits purposeful movement in all extremities.  Cardiovascular: RRR, S1 S2. No m/r/g.  Respiratory: No acute respiratory distress. CTA b/l, no w/r/r.   Gastrointestinal:  Soft, non-tender, non-distended, + BS.	  Skin: No rashes. No ecchymoses. No cyanosis.  Extremities: Exhibits normal range of motion, no clubbing, cyanosis or edema.  Vascular: DP pulses palpable bilaterally. +strong palpable R radial, non palpable L radial normal cap refill. L groin soft without hematoma     LABS:                        9.4    4.64  )-----------( 93       ( 22 May 2024 05:30 )             29.4       PTT - ( 22 May 2024 05:30 )  PTT:69.7 sec    05-22    141  |  110<H>  |  11  ----------------------------<  101<H>  4.0   |  21<L>  |  1.00    Ca    8.7      22 May 2024 05:30  Mg     2.0     05-22    TPro  6.2  /  Alb  3.5  /  TBili  0.8  /  DBili  x   /  AST  15  /  ALT  19  /  AlkPhos  88  05-21      Urinalysis Basic - ( 22 May 2024 05:30 )    Color: x / Appearance: x / SG: x / pH: x  Gluc: 101 mg/dL / Ketone: x  / Bili: x / Urobili: x   Blood: x / Protein: x / Nitrite: x   Leuk Esterase: x / RBC: x / WBC x   Sq Epi: x / Non Sq Epi: x / Bacteria: x        MEDICATIONS  (STANDING):  albuterol    90 MICROgram(s) HFA Inhaler 2 Puff(s) Inhalation every 6 hours  argatroban Infusion 1.838 MICROgram(s)/kG/Min (9 mL/Hr) IV Continuous <Continuous>  atorvastatin 20 milliGRAM(s) Oral at bedtime  carvedilol 3.125 milliGRAM(s) Oral every 12 hours  chlorhexidine 2% Cloths 1 Application(s) Topical daily  influenza  Vaccine (HIGH DOSE) 0.7 milliLiter(s) IntraMuscular once  macitentan 10 milliGRAM(s) Oral <User Schedule>  pantoprazole    Tablet 40 milliGRAM(s) Oral daily  polyethylene glycol 3350 17 Gram(s) Oral daily  riociguat 2.5 milliGRAM(s) Oral every 8 hours  senna 2 Tablet(s) Oral at bedtime  sodium chloride 0.9%. 1000 milliLiter(s) (10 mL/Hr) IV Continuous <Continuous>    MEDICATIONS  (PRN):  acetaminophen     Tablet .. 650 milliGRAM(s) Oral every 6 hours PRN Mild Pain (1 - 3)  sodium chloride 0.65% Nasal 1 Spray(s) Both Nostrils three times a day PRN dry nose        RADIOLOGY & ADDITIONAL TESTS:

## 2024-05-22 NOTE — PROGRESS NOTE ADULT - ASSESSMENT
68 year old female with PMHx  of Asthma, CTEPH, CAD, HTN, Type B Dissection referred to Dr. Kwon / Dr. Suh for intervention and presented to [facility] on 4/25/2024 for preoperative lumbar drain prior to planned TEVAR. Patient underwent transfemoral TEVAR with Dr. Kwon/Dr. Suh on 4/26/24, Procedure was uncomplicated and she was transferred to CT ICU post operatively extubated and stable. Post operatively patient had no neurological deficits and lumbar drain was removed on POD#2. She had routine post operative CT scan that revealed new splenic infarcts and PE and she was started on low dose heparin gtt. Overnight POD#2-3 patient had new LE weakness, neurosurgery was emergently called and lumbar drain was replaced with improvement in symptoms and remained in place until POD#6. MRI was obstructed by artifact from TEVAR graft and CT was negative for spinal hemorrhage or infarct. Patient had persistent hypoxia post operatively and pulm was consulted for CTEPH and she was started on additional medication with improvement. On POD#8 patient complained of LE unsteadiness with urinary retention, she was restarted on pressors to keep MAPs elevated with improvement. 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 Her pressors were slowly weaned from POD#10-12., POD #13, UA w/ culture obtained as patient retaining, UA positive, started on Ceftriaxone x4 days, discontinued as patient asymptomatic (cultures sensitive to CTX). She continued to remain stable and transferred to  on POD#14. Hematology was consulted on POD 14, patient was transitioned from argatroban to Eliquis on POD 15. Urology reconsulted noting if patient fails TOV, she may leave stevens in place and follow up in their clinic. Patient failed overnight TOV on POD 17, so stevens replaced and left in place for discharge. On POD 18, hematology re-consulted, recommending to place patient back on argatroban gtt until plt >100k. POD #19-21, patient's platelets continued to be below goal, hematology reconsulted, pending further lab work up, pan culture and CTA C/A/P. POD22: CTA CAP done today, UA obtained +Candida discussed w/  and given patient is asymptomatic no need to treat. POD23 urology noting patient can TOV one more time affter 7 days, stevens removed, passed TOV; however, was retaining over night, so stevens placed again. POD24 plts remain in the 70s, CTA C/A/P reviewed w/  found w/ normal post surgical findings and known endoleak, NTD. LE venous duplex obtained today revealing chronic LLE recannulation changes, no acute DVTs. POD 25 plts uptrended from 72>79, remains on therapeutic titrating argatroban gtt. POD26 plts uptrend 93>79. continue argatroban until platelets >100    Neurovascular: No delirium. Pain well controlled with current regimen.  - LFTS normalized : Tylenol added back prn but patient has no pain     Cardiovascular: Hemodynamically stable. HR controlled.  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  -Encourage C+DB and Use of IS 10x / hr while awake.  -Asthma: albuterol inhaler q6hrs   -CTEPH: Macitentan 10mg daily, Riociguat 2.5mg q8hrs   -CXR: Stable     GI: Stable.  -Constipation prophylaxis: Miralax, senna   -PPX: pantoprazole   -PO Diet.    Renal / :  -Monitor renal function: BUN 11, Cr. 1.0  -Monitor I/O's.  -Urinary retention s/p multiple failed TOV, Stevens removed 5/19, passed TOV then retaining again, stevens placed again. 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 LE venous duplex only w/ chronic changes to LLE, no acute changes. Follow up heme recs    ID:  -Temperature: afebrile  -CBC: WBC 4.64  -UTI 5/9 cultures: s/p 4 days Ceftriaxone   -Blood cultures negative to date   -UA 5/18 growing Candida, reviewed w/ , no antibiotics needed, patient is asymptomatic, continue to monitor     Prophylaxis:  -DVT prophylaxis: on titrating argatroban gtt, last PTT 69  -SCD's    Disposition:  - telemetry, home when platelets >100

## 2024-05-22 NOTE — PROGRESS NOTE ADULT - SUBJECTIVE AND OBJECTIVE BOX
plt ct uptrending, 93 today   patient with no new complaints     ANTIBIOTICS/RELEVANT:    MEDICATIONS  (STANDING):  albuterol    90 MICROgram(s) HFA Inhaler 2 Puff(s) Inhalation every 6 hours  argatroban Infusion 1.838 MICROgram(s)/kG/Min (9 mL/Hr) IV Continuous <Continuous>  atorvastatin 20 milliGRAM(s) Oral at bedtime  carvedilol 3.125 milliGRAM(s) Oral every 12 hours  chlorhexidine 2% Cloths 1 Application(s) Topical daily  influenza  Vaccine (HIGH DOSE) 0.7 milliLiter(s) IntraMuscular once  macitentan 10 milliGRAM(s) Oral <User Schedule>  pantoprazole    Tablet 40 milliGRAM(s) Oral daily  polyethylene glycol 3350 17 Gram(s) Oral daily  riociguat 2.5 milliGRAM(s) Oral every 8 hours  senna 2 Tablet(s) Oral at bedtime  sodium chloride 0.9%. 1000 milliLiter(s) (10 mL/Hr) IV Continuous <Continuous>    MEDICATIONS  (PRN):  acetaminophen     Tablet .. 650 milliGRAM(s) Oral every 6 hours PRN Mild Pain (1 - 3)  sodium chloride 0.65% Nasal 1 Spray(s) Both Nostrils three times a day PRN dry nose      Vital Signs Last 24 Hrs  T(C): 36.6 (22 May 2024 13:16), Max: 36.7 (22 May 2024 01:02)  T(F): 97.9 (22 May 2024 13:16), Max: 98 (22 May 2024 01:02)  HR: 89 (22 May 2024 13:00) (82 - 102)  BP: 129/61 (22 May 2024 13:00) (109/54 - 167/77)  BP(mean): 86 (22 May 2024 13:00) (78 - 113)  RR: 17 (22 May 2024 13:00) (16 - 17)  SpO2: 94% (22 May 2024 13:00) (94% - 98%)    Parameters below as of 22 May 2024 13:00  Patient On (Oxygen Delivery Method): room air        PHYSICAL EXAM:  General: in no acute distress  Eyes: EOMI intact bilaterally. Anicteric sclerae, moist conjunctivae  HENT: Moist mucous membranes  Neck: Trachea midline, supple  Lungs: CTA B/L. No wheezes, rales, or rhonchi  Cardiovascular: RRR. No murmurs, rubs, or gallops  Abdomen: Soft, non-tender non-distended; No rebound or guarding  Extremities: WWP, No clubbing, cyanosis or edema  Neurological: Alert and oriented  Skin: Warm and dry. No obvious rash     LABS:                        9.4    4.64  )-----------( 93       ( 22 May 2024 05:30 )             29.4     05-22    141  |  110<H>  |  11  ----------------------------<  101<H>  4.0   |  21<L>  |  1.00    Ca    8.7      22 May 2024 05:30  Mg     2.0     05-22    TPro  6.2  /  Alb  3.5  /  TBili  0.8  /  DBili  x   /  AST  15  /  ALT  19  /  AlkPhos  88  05-21    PTT - ( 22 May 2024 05:30 )  PTT:69.7 sec  Urinalysis Basic - ( 22 May 2024 05:30 )

## 2024-05-22 NOTE — PROGRESS NOTE ADULT - ASSESSMENT
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 Hematology was consulted for anticoagulation recommendations.    #DON  confirmed by positive Heparin-PF4 antibodies and PAVAN, and noted celiac trunk thrombus  platelet carolyn 27k on 5/5/24  Patient was transitioned to argatroban from heparin with noted uptrend of platelet count, 83k on 5/9/24  Patient transitioned to eliquis 10mg BID on 5/11 when platelets were at 99k   As per the 2018 [facility] Clinical practice guidelines for management of DON, recommendations to transition from a parenteral agent to warfarin require a platelet count recovery to >150k. Transitioning from a parenteral agent to a DOAC does not carry this requirement as long as patient is deemed clinically stable. While we would prefer transitioning to a DOAC as soon as her platelet count recovers to normal levels, we would defer to the primary team. Please note that you should start the DOAC within 2 hours of stopping argatroban gtt.     Peripheral smear reviewed 5/16/24 given downtrend in platelet count: Anisocytosis, poikilocytosis, hypochromic RBCs, rare schistocytes 0-2/hpf (no evidence to suggest thrombotic microangiopathy); neutrophils without evidence of toxic granulation to suggest infection; thrombocytopenia is real, no platelet clumping observed  Medication list reviewed; her last dose of Ceftriaxone, which can contribute to thrombocytopenia, was >3d ago, so unlikely to be contributory; the other medications she is receiving are not associated with thrombocytopenia  Peripheral smear reviewed again 5/17/24 given further drop in platelet counts. Findings similar to slide from yesterday. Rare minimal clumping noticed (clumps of 4 platelets), not enough to account for the overall drop.    Plan:    -Continue argatroban gtt until platelet counts normalize  - infectious workup negative so far, will fu blood cx  - platelet decrease likely 2/2 clot progression seen on CT  -When platelet counts stabilize closer to normal range, would reasonable to discharge her on Eliquis, which she has tolerated well in the past  -Please discharge her with follow up with her outpatient hematologist, Dr. Bradley Goldberg, will update outpatient hematology upon discharge from [facility]         Discussed with Dr. Kendall

## 2024-05-23 LAB
ANION GAP SERPL CALC-SCNC: 10 MMOL/L — SIGNIFICANT CHANGE UP (ref 5–17)
APTT BLD: 63.8 SEC — HIGH (ref 24.5–35.6)
BUN SERPL-MCNC: 17 MG/DL — SIGNIFICANT CHANGE UP (ref 7–23)
CALCIUM SERPL-MCNC: 8.8 MG/DL — SIGNIFICANT CHANGE UP (ref 8.4–10.5)
CHLORIDE SERPL-SCNC: 107 MMOL/L — SIGNIFICANT CHANGE UP (ref 96–108)
CO2 SERPL-SCNC: 22 MMOL/L — SIGNIFICANT CHANGE UP (ref 22–31)
CREAT SERPL-MCNC: 1.1 MG/DL — SIGNIFICANT CHANGE UP (ref 0.5–1.3)
CULTURE RESULTS: SIGNIFICANT CHANGE UP
EGFR: 55 ML/MIN/1.73M2 — LOW
GLUCOSE SERPL-MCNC: 107 MG/DL — HIGH (ref 70–99)
HCT VFR BLD CALC: 29.2 % — LOW (ref 34.5–45)
HGB BLD-MCNC: 9.6 G/DL — LOW (ref 11.5–15.5)
MAGNESIUM SERPL-MCNC: 2 MG/DL — SIGNIFICANT CHANGE UP (ref 1.6–2.6)
MCHC RBC-ENTMCNC: 26.5 PG — LOW (ref 27–34)
MCHC RBC-ENTMCNC: 32.9 GM/DL — SIGNIFICANT CHANGE UP (ref 32–36)
MCV RBC AUTO: 80.7 FL — SIGNIFICANT CHANGE UP (ref 80–100)
NRBC # BLD: 0 /100 WBCS — SIGNIFICANT CHANGE UP (ref 0–0)
PLATELET # BLD AUTO: 101 K/UL — LOW (ref 150–400)
POTASSIUM SERPL-MCNC: 4 MMOL/L — SIGNIFICANT CHANGE UP (ref 3.5–5.3)
POTASSIUM SERPL-SCNC: 4 MMOL/L — SIGNIFICANT CHANGE UP (ref 3.5–5.3)
RBC # BLD: 3.62 M/UL — LOW (ref 3.8–5.2)
RBC # FLD: 16.4 % — HIGH (ref 10.3–14.5)
SODIUM SERPL-SCNC: 139 MMOL/L — SIGNIFICANT CHANGE UP (ref 135–145)
SPECIMEN SOURCE: SIGNIFICANT CHANGE UP
WBC # BLD: 5.73 K/UL — SIGNIFICANT CHANGE UP (ref 3.8–10.5)
WBC # FLD AUTO: 5.73 K/UL — SIGNIFICANT CHANGE UP (ref 3.8–10.5)

## 2024-05-23 RX ORDER — APIXABAN 2.5 MG/1
10 TABLET, FILM COATED ORAL EVERY 12 HOURS
Refills: 0 | Status: DISCONTINUED | OUTPATIENT
Start: 2024-05-23 | End: 2024-05-24

## 2024-05-23 RX ADMIN — CARVEDILOL PHOSPHATE 3.12 MILLIGRAM(S): 80 CAPSULE, EXTENDED RELEASE ORAL at 05:11

## 2024-05-23 RX ADMIN — CARVEDILOL PHOSPHATE 3.12 MILLIGRAM(S): 80 CAPSULE, EXTENDED RELEASE ORAL at 17:53

## 2024-05-23 RX ADMIN — RIOCIGUAT 2.5 MILLIGRAM(S): 1.5 TABLET, FILM COATED ORAL at 22:05

## 2024-05-23 RX ADMIN — ATORVASTATIN CALCIUM 20 MILLIGRAM(S): 80 TABLET, FILM COATED ORAL at 22:05

## 2024-05-23 RX ADMIN — PANTOPRAZOLE SODIUM 40 MILLIGRAM(S): 20 TABLET, DELAYED RELEASE ORAL at 12:14

## 2024-05-23 RX ADMIN — ARGATROBAN 9 MICROGRAM(S)/KG/MIN: 50 INJECTION, SOLUTION INTRAVENOUS at 07:21

## 2024-05-23 RX ADMIN — RIOCIGUAT 2.5 MILLIGRAM(S): 1.5 TABLET, FILM COATED ORAL at 05:11

## 2024-05-23 RX ADMIN — MACITENTAN 10 MILLIGRAM(S): 10 TABLET, FILM COATED ORAL at 12:14

## 2024-05-23 RX ADMIN — RIOCIGUAT 2.5 MILLIGRAM(S): 1.5 TABLET, FILM COATED ORAL at 13:18

## 2024-05-23 RX ADMIN — ARGATROBAN 9 MICROGRAM(S)/KG/MIN: 50 INJECTION, SOLUTION INTRAVENOUS at 13:18

## 2024-05-23 RX ADMIN — APIXABAN 10 MILLIGRAM(S): 2.5 TABLET, FILM COATED ORAL at 18:55

## 2024-05-23 RX ADMIN — CHLORHEXIDINE GLUCONATE 1 APPLICATION(S): 213 SOLUTION TOPICAL at 05:10

## 2024-05-23 NOTE — PROGRESS NOTE ADULT - SUBJECTIVE AND OBJECTIVE BOX
Patient discussed on morning rounds with Dr. Kwon    Operation / Date: TEVAR 4/26/24     SUBJECTIVE ASSESSMENT:  68y Female seen and assessed at bedside this morning. patient is very excited that her platelets are trending in the correct direction and is eager to get home         Vital Signs Last 24 Hrs  T(C): 36.2 (23 May 2024 13:10), Max: 37.2 (22 May 2024 22:52)  T(F): 97.2 (23 May 2024 13:10), Max: 99 (22 May 2024 22:52)  HR: 90 (23 May 2024 13:19) (84 - 98)  BP: 129/59 (23 May 2024 13:19) (112/55 - 139/63)  BP(mean): 85 (23 May 2024 13:19) (77 - 98)  RR: 17 (23 May 2024 13:19) (17 - 18)  SpO2: 94% (23 May 2024 13:19) (93% - 94%)    Parameters below as of 23 May 2024 13:19  Patient On (Oxygen Delivery Method): room air      I&O's Detail    22 May 2024 07:01  -  23 May 2024 07:00  --------------------------------------------------------  IN:    Argatroban: 216 mL  Total IN: 216 mL    OUT:    Indwelling Catheter - Urethral (mL): 550 mL  Total OUT: 550 mL    Total NET: -334 mL      23 May 2024 07:01  -  23 May 2024 16:02  --------------------------------------------------------  IN:    Argatroban: 54 mL    Oral Fluid: 480 mL  Total IN: 534 mL    OUT:    Indwelling Catheter - Urethral (mL): 300 mL  Total OUT: 300 mL    Total NET: 234 mL          CHEST TUBE:  No.   WOOD DRAIN:  No.  EPICARDIAL WIRES: No.  TIE DOWNS: No.  ALBERTS: Yes    PHYSICAL EXAM:  Appearance: No acute distress.  Neurologic: AAOx3, no AMS or focal deficits.  Responds appropriately to verbal and physical stimuli; exhibits purposeful movement in all extremities.  Cardiovascular: RRR, S1 S2. No m/r/g.  Respiratory: No acute respiratory distress. CTA b/l, no w/r/r.   Gastrointestinal:  Soft, non-tender, non-distended, + BS.	  Skin: No rashes. No ecchymoses. No cyanosis.  Extremities: Exhibits normal range of motion, no clubbing, cyanosis or edema.  Vascular: DP pulses palpable bilaterally. +strong palpable R radial, non palpable L radial normal cap refill. L groin soft without hematoma    LABS:                        9.6    5.73  )-----------( 101      ( 23 May 2024 05:30 )             29.2     PTT - ( 23 May 2024 09:06 )  PTT:63.8 sec    05-23    139  |  107  |  17  ----------------------------<  107<H>  4.0   |  22  |  1.10    Ca    8.8      23 May 2024 05:30  Mg     2.0     05-23        Urinalysis Basic - ( 23 May 2024 05:30 )    Color: x / Appearance: x / SG: x / pH: x  Gluc: 107 mg/dL / Ketone: x  / Bili: x / Urobili: x   Blood: x / Protein: x / Nitrite: x   Leuk Esterase: x / RBC: x / WBC x   Sq Epi: x / Non Sq Epi: x / Bacteria: x        MEDICATIONS  (STANDING):  albuterol    90 MICROgram(s) HFA Inhaler 2 Puff(s) Inhalation every 6 hours  argatroban Infusion 1.838 MICROgram(s)/kG/Min (9 mL/Hr) IV Continuous <Continuous>  atorvastatin 20 milliGRAM(s) Oral at bedtime  carvedilol 3.125 milliGRAM(s) Oral every 12 hours  chlorhexidine 2% Cloths 1 Application(s) Topical daily  macitentan 10 milliGRAM(s) Oral <User Schedule>  pantoprazole    Tablet 40 milliGRAM(s) Oral daily  polyethylene glycol 3350 17 Gram(s) Oral daily  riociguat 2.5 milliGRAM(s) Oral every 8 hours  senna 2 Tablet(s) Oral at bedtime  sodium chloride 0.9%. 1000 milliLiter(s) (10 mL/Hr) IV Continuous <Continuous>    MEDICATIONS  (PRN):  acetaminophen     Tablet .. 650 milliGRAM(s) Oral every 6 hours PRN Mild Pain (1 - 3)  sodium chloride 0.65% Nasal 1 Spray(s) Both Nostrils three times a day PRN dry nose        RADIOLOGY & ADDITIONAL TESTS:

## 2024-05-23 NOTE — PROGRESS NOTE ADULT - PROVIDER SPECIALTY LIST ADULT
CT Surgery
Critical Care
Heme/Onc
Neurology
Neurology
Pulmonology
Pulmonology
Vascular Surgery
CT Surgery
Critical Care
Pulmonology
Pulmonology
Vascular Surgery
CT Surgery
Critical Care
Heme/Onc
Heme/Onc
Neurology
Pulmonology
Pulmonology
Urology
Vascular Surgery
Vascular Surgery
0 = understands/communicates without difficulty
CT Surgery
Critical Care
Heme/Onc
Pulmonology
Vascular Surgery
Vascular Surgery

## 2024-05-23 NOTE — PROGRESS NOTE ADULT - SUBJECTIVE AND OBJECTIVE BOX
INTERVAL HPI/OVERNIGHT EVENTS:    SUBJECTIVE: Patient seen and examined at bedside.    VITAL SIGNS:  ICU Vital Signs Last 24 Hrs  T(C): 36.2 (23 May 2024 13:10), Max: 37.2 (22 May 2024 22:52)  T(F): 97.2 (23 May 2024 13:10), Max: 99 (22 May 2024 22:52)  HR: 90 (23 May 2024 13:19) (84 - 98)  BP: 129/59 (23 May 2024 13:19) (112/55 - 139/63)  BP(mean): 85 (23 May 2024 13:19) (77 - 98)  ABP: --  ABP(mean): --  RR: 17 (23 May 2024 13:19) (17 - 18)  SpO2: 94% (23 May 2024 13:19) (93% - 94%)    O2 Parameters below as of 23 May 2024 13:19  Patient On (Oxygen Delivery Method): room air              05-22 @ 07:01  -  05-23 @ 07:00  --------------------------------------------------------  IN: 216 mL / OUT: 550 mL / NET: -334 mL    05-23 @ 07:01  -  05-23 @ 17:49  --------------------------------------------------------  IN: 1050 mL / OUT: 430 mL / NET: 620 mL      CAPILLARY BLOOD GLUCOSE          PHYSICAL EXAM:    Constitutional: NAD  HEENT: NC/AT; PERRL, anicteric sclera; MMM  Neck: supple, no JVD  Cardiovascular: +S1/S2, RRR  Respiratory: no increased work of breathing  Gastrointestinal: abdomen soft, NT/ND; no rebound or guarding; +BSx4  Extremities: WWP; no LE edema; no clubbing or cyanosis  Vascular: 2+ radial, DP/PT pulses B/L  Dermatologic: normal color and turgor; no visible rashes  Neurological: AAOX3; nonfocal    MEDICATIONS:  MEDICATIONS  (STANDING):  albuterol    90 MICROgram(s) HFA Inhaler 2 Puff(s) Inhalation every 6 hours  argatroban Infusion 1.838 MICROgram(s)/kG/Min (9 mL/Hr) IV Continuous <Continuous>  atorvastatin 20 milliGRAM(s) Oral at bedtime  carvedilol 3.125 milliGRAM(s) Oral every 12 hours  chlorhexidine 2% Cloths 1 Application(s) Topical daily  macitentan 10 milliGRAM(s) Oral <User Schedule>  pantoprazole    Tablet 40 milliGRAM(s) Oral daily  polyethylene glycol 3350 17 Gram(s) Oral daily  riociguat 2.5 milliGRAM(s) Oral every 8 hours  senna 2 Tablet(s) Oral at bedtime  sodium chloride 0.9%. 1000 milliLiter(s) (10 mL/Hr) IV Continuous <Continuous>    MEDICATIONS  (PRN):  acetaminophen     Tablet .. 650 milliGRAM(s) Oral every 6 hours PRN Mild Pain (1 - 3)  sodium chloride 0.65% Nasal 1 Spray(s) Both Nostrils three times a day PRN dry nose      ALLERGIES:  Allergies    heparin (Other (Moderate))    Intolerances        LABS:                        9.6    5.73  )-----------( 101      ( 23 May 2024 05:30 )             29.2     05-23    139  |  107  |  17  ----------------------------<  107<H>  4.0   |  22  |  1.10    Ca    8.8      23 May 2024 05:30  Mg     2.0     05-23      PTT - ( 23 May 2024 09:06 )  PTT:63.8 sec  Urinalysis Basic - ( 23 May 2024 05:30 )    Color: x / Appearance: x / SG: x / pH: x  Gluc: 107 mg/dL / Ketone: x  / Bili: x / Urobili: x   Blood: x / Protein: x / Nitrite: x   Leuk Esterase: x / RBC: x / WBC x   Sq Epi: x / Non Sq Epi: x / Bacteria: x        RADIOLOGY & ADDITIONAL TESTS: Reviewed.

## 2024-05-23 NOTE — PROGRESS NOTE ADULT - ASSESSMENT
68 year old female with PMHx  of Asthma, CTEPH, CAD, HTN, Type B Dissection referred to Dr. Kwon / Dr. Suh for intervention and presented to [facility] on 4/25/2024 for preoperative lumbar drain prior to planned TEVAR. Patient underwent transfemoral TEVAR with Dr. Kwon/Dr. Suh on 4/26/24, Procedure was uncomplicated and she was transferred to CT ICU post operatively extubated and stable. Post operatively patient had no neurological deficits and lumbar drain was removed on POD#2. She had routine post operative CT scan that revealed new splenic infarcts and PE and she was started on low dose heparin gtt. Overnight POD#2-3 patient had new LE weakness, neurosurgery was emergently called and lumbar drain was replaced with improvement in symptoms and remained in place until POD#6. MRI was obstructed by artifact from TEVAR graft and CT was negative for spinal hemorrhage or infarct. Patient had persistent hypoxia post operatively and pulm was consulted for CTEPH and she was started on additional medication with improvement. On POD#8 patient complained of LE unsteadiness with urinary retention, she was restarted on pressors to keep MAPs elevated with improvement. 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 Her pressors were slowly weaned from POD#10-12., POD #13, UA w/ culture obtained as patient retaining, UA positive, started on Ceftriaxone x4 days, discontinued as patient asymptomatic (cultures sensitive to CTX). She continued to remain stable and transferred to  on POD#14. Hematology was consulted on POD 14, patient was transitioned from argatroban to Eliquis on POD 15. Urology reconsulted noting if patient fails TOV, she may leave stevens in place and follow up in their clinic. Patient failed overnight TOV on POD 17, so stevens replaced and left in place for discharge. On POD 18, hematology re-consulted, recommending to place patient back on argatroban gtt until plt >100k. POD #19-21, patient's platelets continued to be below goal, hematology reconsulted, pending further lab work up, pan culture and CTA C/A/P. POD22: CTA CAP done today, UA obtained +Candida discussed w/  and given patient is asymptomatic no need to treat. POD23 urology noting patient can TOV one more time affter 7 days, stevens removed, passed TOV; however, was retaining over night, so stevens placed again. POD24 plts remain in the 70s, CTA C/A/P reviewed w/  found w/ normal post surgical findings and known endoleak, NTD. LE venous duplex obtained today revealing chronic LLE recannulation changes, no acute DVTs. POD 25 plts uptrended from 72>79, remains on therapeutic titrating argatroban gtt. POD26 plts uptrend 93>79. continue argatroban until platelets >100. POD27: platelets this , heme onc requesting to wait until attending reviews case to resume Eliquis after rounding. Awaiting recs.     Neurovascular: No delirium. Pain well controlled with current regimen.  - LFTS normalized : Tylenol added back prn but patient has no pain     Cardiovascular: Hemodynamically stable. HR controlled.  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  -Encourage C+DB and Use of IS 10x / hr while awake.  -Asthma: albuterol inhaler q6hrs   -CTEPH: Macitentan 10mg daily, Riociguat 2.5mg q8hrs   -CXR: Stable     GI: Stable.  -Constipation prophylaxis: Miralax, senna   -PPX: pantoprazole   -PO Diet.    Renal / :  -Monitor renal function: BUN 17/1.10  -Monitor I/O's.  -Urinary retention s/p multiple failed TOV, Stevens removed 5/19, passed TOV then retaining again, stevens placed again5/19. leave in place for discharge   -UTI 5/9 cultures: s/p 4 days Ceftriaxone  -UA 5/18 growing Candida, reviewed w/ , no antibiotics needed, patient is asymptomatic, continue to monitor     Endocrine:    -A1c: 5.9%: will continue to monitor BS  -TSH: 2.090    Hematologic: HIT positive, continues on argatroban gtt   -CBC: hgb 9.4, plt 101 this AM , plan to restart eliquis when plts >100 but awaiting final rec from [facility] onc  -Coagulation Panel: PTT: 63  -Hx of CTEPH, +HIT w/ downtrending platelets:   Goal PTT: 60-80   Continue on titrating argatroban   Hematology following: CTA C/A/P to assess for clotting and pan cultures to r/o infection as consumptive etiology to downtrending plts complete: increase thrombus burden and new thrombus in IJ. LE venous duplex only w/ chronic changes to LLE, no acute changes. Follow up heme recs    ID:  -Temperature: afebrile  -CBC: WBC 4.64  -UTI 5/9 cultures: s/p 4 days Ceftriaxone   -Blood cultures negative to date   -UA 5/18 growing Candida, reviewed w/ , no antibiotics needed, patient is asymptomatic, continue to monitor     Prophylaxis:  -DVT prophylaxis: on titrating argatroban gtt, last PTT 63 --> potential start eliquis tonight and f/u PLTS in AM   -SCD's    Disposition:  - telemetry, home once PLTS normalize and restart on eliquis

## 2024-05-23 NOTE — PROGRESS NOTE ADULT - ASSESSMENT
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 Hematology was consulted for anticoagulation recommendations.    #DON  confirmed by positive Heparin-PF4 antibodies and PAVAN, and noted celiac trunk thrombus  platelet carolyn 27k on 5/5/24  Patient was transitioned to argatroban from heparin with noted uptrend of platelet count, 83k on 5/9/24  Patient transitioned to eliquis 10mg BID on 5/11 when platelets were at 99k   As per the 2018 [facility] Clinical practice guidelines for management of DON, recommendations to transition from a parenteral agent to warfarin require a platelet count recovery to >150k. Transitioning from a parenteral agent to a DOAC does not carry this requirement as long as patient is deemed clinically stable. While we would prefer transitioning to a DOAC as soon as her platelet count recovers to normal levels, we would defer to the primary team. Please note that you should start the DOAC within 2 hours of stopping argatroban gtt.   Now that her platelets have reached 101k, ok to transition to full anticoagulation dose eliquis:  Start the first dose 10mg within 2 hours of stopping argatroban gtt  She should be on 10mg BID for 7 days followed by 5mg BID   She has follow up with outpatient hematologist Dr. Bradley Goldberg 5/30/24 10:20am  f/u am platelet count              Discussed with Dr. Kendall

## 2024-05-23 NOTE — PROGRESS NOTE ADULT - REASON FOR ADMISSION
Type B dissection

## 2024-05-24 ENCOUNTER — TRANSCRIPTION ENCOUNTER (OUTPATIENT)
Age: 69
End: 2024-05-24

## 2024-05-24 VITALS
OXYGEN SATURATION: 96 % | SYSTOLIC BLOOD PRESSURE: 136 MMHG | DIASTOLIC BLOOD PRESSURE: 61 MMHG | RESPIRATION RATE: 18 BRPM | HEART RATE: 85 BPM

## 2024-05-24 LAB
ANION GAP SERPL CALC-SCNC: 10 MMOL/L — SIGNIFICANT CHANGE UP (ref 5–17)
APTT BLD: 38.1 SEC — HIGH (ref 24.5–35.6)
BUN SERPL-MCNC: 11 MG/DL — SIGNIFICANT CHANGE UP (ref 7–23)
CALCIUM SERPL-MCNC: 8.7 MG/DL — SIGNIFICANT CHANGE UP (ref 8.4–10.5)
CHLORIDE SERPL-SCNC: 109 MMOL/L — HIGH (ref 96–108)
CO2 SERPL-SCNC: 22 MMOL/L — SIGNIFICANT CHANGE UP (ref 22–31)
CREAT SERPL-MCNC: 1.14 MG/DL — SIGNIFICANT CHANGE UP (ref 0.5–1.3)
EGFR: 52 ML/MIN/1.73M2 — LOW
GLUCOSE SERPL-MCNC: 102 MG/DL — HIGH (ref 70–99)
HCT VFR BLD CALC: 28.5 % — LOW (ref 34.5–45)
HGB BLD-MCNC: 9.4 G/DL — LOW (ref 11.5–15.5)
MAGNESIUM SERPL-MCNC: 1.9 MG/DL — SIGNIFICANT CHANGE UP (ref 1.6–2.6)
MCHC RBC-ENTMCNC: 26.6 PG — LOW (ref 27–34)
MCHC RBC-ENTMCNC: 33 GM/DL — SIGNIFICANT CHANGE UP (ref 32–36)
MCV RBC AUTO: 80.5 FL — SIGNIFICANT CHANGE UP (ref 80–100)
NRBC # BLD: 0 /100 WBCS — SIGNIFICANT CHANGE UP (ref 0–0)
PLATELET # BLD AUTO: 115 K/UL — LOW (ref 150–400)
POTASSIUM SERPL-MCNC: 3.8 MMOL/L — SIGNIFICANT CHANGE UP (ref 3.5–5.3)
POTASSIUM SERPL-SCNC: 3.8 MMOL/L — SIGNIFICANT CHANGE UP (ref 3.5–5.3)
RBC # BLD: 3.54 M/UL — LOW (ref 3.8–5.2)
RBC # FLD: 16.5 % — HIGH (ref 10.3–14.5)
SODIUM SERPL-SCNC: 141 MMOL/L — SIGNIFICANT CHANGE UP (ref 135–145)
WBC # BLD: 5.47 K/UL — SIGNIFICANT CHANGE UP (ref 3.8–10.5)
WBC # FLD AUTO: 5.47 K/UL — SIGNIFICANT CHANGE UP (ref 3.8–10.5)

## 2024-05-24 PROCEDURE — 86850 RBC ANTIBODY SCREEN: CPT

## 2024-05-24 PROCEDURE — C1753: CPT

## 2024-05-24 PROCEDURE — 76705 ECHO EXAM OF ABDOMEN: CPT

## 2024-05-24 PROCEDURE — 84484 ASSAY OF TROPONIN QUANT: CPT

## 2024-05-24 PROCEDURE — 83070 ASSAY OF HEMOSIDERIN QUAL: CPT

## 2024-05-24 PROCEDURE — 85018 HEMOGLOBIN: CPT

## 2024-05-24 PROCEDURE — 83880 ASSAY OF NATRIURETIC PEPTIDE: CPT

## 2024-05-24 PROCEDURE — 84145 PROCALCITONIN (PCT): CPT

## 2024-05-24 PROCEDURE — 80053 COMPREHEN METABOLIC PANEL: CPT

## 2024-05-24 PROCEDURE — 86901 BLOOD TYPING SEROLOGIC RH(D): CPT

## 2024-05-24 PROCEDURE — 83540 ASSAY OF IRON: CPT

## 2024-05-24 PROCEDURE — 93970 EXTREMITY STUDY: CPT

## 2024-05-24 PROCEDURE — 81003 URINALYSIS AUTO W/O SCOPE: CPT

## 2024-05-24 PROCEDURE — 82553 CREATINE MB FRACTION: CPT

## 2024-05-24 PROCEDURE — 94660 CPAP INITIATION&MGMT: CPT

## 2024-05-24 PROCEDURE — P9045: CPT

## 2024-05-24 PROCEDURE — 87040 BLOOD CULTURE FOR BACTERIA: CPT

## 2024-05-24 PROCEDURE — 86923 COMPATIBILITY TEST ELECTRIC: CPT

## 2024-05-24 PROCEDURE — 97530 THERAPEUTIC ACTIVITIES: CPT

## 2024-05-24 PROCEDURE — 93306 TTE W/DOPPLER COMPLETE: CPT

## 2024-05-24 PROCEDURE — 83930 ASSAY OF BLOOD OSMOLALITY: CPT

## 2024-05-24 PROCEDURE — C1874: CPT

## 2024-05-24 PROCEDURE — 82607 VITAMIN B-12: CPT

## 2024-05-24 PROCEDURE — 97166 OT EVAL MOD COMPLEX 45 MIN: CPT

## 2024-05-24 PROCEDURE — P9016: CPT

## 2024-05-24 PROCEDURE — 86900 BLOOD TYPING SEROLOGIC ABO: CPT

## 2024-05-24 PROCEDURE — 82330 ASSAY OF CALCIUM: CPT

## 2024-05-24 PROCEDURE — C1760: CPT

## 2024-05-24 PROCEDURE — 87077 CULTURE AEROBIC IDENTIFY: CPT

## 2024-05-24 PROCEDURE — 85730 THROMBOPLASTIN TIME PARTIAL: CPT

## 2024-05-24 PROCEDURE — 82728 ASSAY OF FERRITIN: CPT

## 2024-05-24 PROCEDURE — 85027 COMPLETE CBC AUTOMATED: CPT

## 2024-05-24 PROCEDURE — 86022 PLATELET ANTIBODIES: CPT

## 2024-05-24 PROCEDURE — 80048 BASIC METABOLIC PNL TOTAL CA: CPT

## 2024-05-24 PROCEDURE — 84295 ASSAY OF SERUM SODIUM: CPT

## 2024-05-24 PROCEDURE — 71275 CT ANGIOGRAPHY CHEST: CPT | Mod: MC

## 2024-05-24 PROCEDURE — 85379 FIBRIN DEGRADATION QUANT: CPT

## 2024-05-24 PROCEDURE — 85045 AUTOMATED RETICULOCYTE COUNT: CPT

## 2024-05-24 PROCEDURE — 87086 URINE CULTURE/COLONY COUNT: CPT

## 2024-05-24 PROCEDURE — P9047: CPT

## 2024-05-24 PROCEDURE — 82533 TOTAL CORTISOL: CPT

## 2024-05-24 PROCEDURE — C2628: CPT

## 2024-05-24 PROCEDURE — 85025 COMPLETE CBC W/AUTO DIFF WBC: CPT

## 2024-05-24 PROCEDURE — 36415 COLL VENOUS BLD VENIPUNCTURE: CPT

## 2024-05-24 PROCEDURE — 76000 FLUOROSCOPY <1 HR PHYS/QHP: CPT

## 2024-05-24 PROCEDURE — 83036 HEMOGLOBIN GLYCOSYLATED A1C: CPT

## 2024-05-24 PROCEDURE — 81001 URINALYSIS AUTO W/SCOPE: CPT

## 2024-05-24 PROCEDURE — 82550 ASSAY OF CK (CPK): CPT

## 2024-05-24 PROCEDURE — 97535 SELF CARE MNGMENT TRAINING: CPT

## 2024-05-24 PROCEDURE — 82150 ASSAY OF AMYLASE: CPT

## 2024-05-24 PROCEDURE — C1889: CPT

## 2024-05-24 PROCEDURE — 97164 PT RE-EVAL EST PLAN CARE: CPT

## 2024-05-24 PROCEDURE — 83605 ASSAY OF LACTIC ACID: CPT

## 2024-05-24 PROCEDURE — C1894: CPT

## 2024-05-24 PROCEDURE — 83690 ASSAY OF LIPASE: CPT

## 2024-05-24 PROCEDURE — 86880 COOMBS TEST DIRECT: CPT

## 2024-05-24 PROCEDURE — 94640 AIRWAY INHALATION TREATMENT: CPT

## 2024-05-24 PROCEDURE — 83735 ASSAY OF MAGNESIUM: CPT

## 2024-05-24 PROCEDURE — 83550 IRON BINDING TEST: CPT

## 2024-05-24 PROCEDURE — 84443 ASSAY THYROID STIM HORMONE: CPT

## 2024-05-24 PROCEDURE — 85049 AUTOMATED PLATELET COUNT: CPT

## 2024-05-24 PROCEDURE — 82962 GLUCOSE BLOOD TEST: CPT

## 2024-05-24 PROCEDURE — 72128 CT CHEST SPINE W/O DYE: CPT | Mod: MC

## 2024-05-24 PROCEDURE — 83010 ASSAY OF HAPTOGLOBIN QUANT: CPT

## 2024-05-24 PROCEDURE — 94799 UNLISTED PULMONARY SVC/PX: CPT

## 2024-05-24 PROCEDURE — 72131 CT LUMBAR SPINE W/O DYE: CPT | Mod: MC

## 2024-05-24 PROCEDURE — 87186 SC STD MICRODIL/AGAR DIL: CPT

## 2024-05-24 PROCEDURE — 80061 LIPID PANEL: CPT

## 2024-05-24 PROCEDURE — 84132 ASSAY OF SERUM POTASSIUM: CPT

## 2024-05-24 PROCEDURE — C1769: CPT

## 2024-05-24 PROCEDURE — 83615 LACTATE (LD) (LDH) ENZYME: CPT

## 2024-05-24 PROCEDURE — 36430 TRANSFUSION BLD/BLD COMPNT: CPT

## 2024-05-24 PROCEDURE — 74174 CTA ABD&PLVS W/CONTRAST: CPT | Mod: MC

## 2024-05-24 PROCEDURE — 82803 BLOOD GASES ANY COMBINATION: CPT

## 2024-05-24 PROCEDURE — 85014 HEMATOCRIT: CPT

## 2024-05-24 PROCEDURE — 86891 AUTOLOGOUS BLOOD OP SALVAGE: CPT

## 2024-05-24 PROCEDURE — 72148 MRI LUMBAR SPINE W/O DYE: CPT | Mod: MC

## 2024-05-24 PROCEDURE — 97161 PT EVAL LOW COMPLEX 20 MIN: CPT

## 2024-05-24 PROCEDURE — 82746 ASSAY OF FOLIC ACID SERUM: CPT

## 2024-05-24 PROCEDURE — 71045 X-RAY EXAM CHEST 1 VIEW: CPT

## 2024-05-24 PROCEDURE — 84100 ASSAY OF PHOSPHORUS: CPT

## 2024-05-24 PROCEDURE — 82947 ASSAY GLUCOSE BLOOD QUANT: CPT

## 2024-05-24 PROCEDURE — 97116 GAIT TRAINING THERAPY: CPT

## 2024-05-24 PROCEDURE — 70450 CT HEAD/BRAIN W/O DYE: CPT | Mod: MC

## 2024-05-24 PROCEDURE — C1887: CPT

## 2024-05-24 PROCEDURE — 85384 FIBRINOGEN ACTIVITY: CPT

## 2024-05-24 PROCEDURE — 85610 PROTHROMBIN TIME: CPT

## 2024-05-24 RX ORDER — SENNA PLUS 8.6 MG/1
2 TABLET ORAL
Qty: 60 | Refills: 0
Start: 2024-05-24 | End: 2024-06-22

## 2024-05-24 RX ORDER — ATORVASTATIN CALCIUM 80 MG/1
1 TABLET, FILM COATED ORAL
Qty: 30 | Refills: 0
Start: 2024-05-24 | End: 2024-06-22

## 2024-05-24 RX ORDER — FUROSEMIDE 40 MG
1 TABLET ORAL
Qty: 7 | Refills: 0
Start: 2024-05-24 | End: 2024-05-30

## 2024-05-24 RX ORDER — POLYETHYLENE GLYCOL 3350 17 G/17G
17 POWDER, FOR SOLUTION ORAL
Qty: 510 | Refills: 0
Start: 2024-05-24 | End: 2024-06-22

## 2024-05-24 RX ORDER — CARVEDILOL PHOSPHATE 80 MG/1
1 CAPSULE, EXTENDED RELEASE ORAL
Qty: 60 | Refills: 0
Start: 2024-05-24 | End: 2024-06-22

## 2024-05-24 RX ORDER — PANTOPRAZOLE SODIUM 20 MG/1
1 TABLET, DELAYED RELEASE ORAL
Qty: 30 | Refills: 0
Start: 2024-05-24 | End: 2024-06-22

## 2024-05-24 RX ORDER — APIXABAN 2.5 MG/1
1 TABLET, FILM COATED ORAL
Qty: 72 | Refills: 0
Start: 2024-05-24 | End: 2024-06-22

## 2024-05-24 RX ORDER — FUROSEMIDE 40 MG
1 TABLET ORAL
Qty: 0 | Refills: 0 | DISCHARGE

## 2024-05-24 RX ORDER — POTASSIUM CHLORIDE 20 MEQ
40 PACKET (EA) ORAL ONCE
Refills: 0 | Status: COMPLETED | OUTPATIENT
Start: 2024-05-24 | End: 2024-05-24

## 2024-05-24 RX ORDER — ACETAMINOPHEN 500 MG
2 TABLET ORAL
Qty: 0 | Refills: 0 | DISCHARGE
Start: 2024-05-24

## 2024-05-24 RX ORDER — APIXABAN 2.5 MG/1
1 TABLET, FILM COATED ORAL
Refills: 0 | DISCHARGE

## 2024-05-24 RX ORDER — MACITENTAN 10 MG/1
1 TABLET, FILM COATED ORAL
Qty: 0 | Refills: 0 | DISCHARGE
Start: 2024-05-24

## 2024-05-24 RX ORDER — POTASSIUM CHLORIDE 20 MEQ
1 PACKET (EA) ORAL
Qty: 7 | Refills: 0
Start: 2024-05-24 | End: 2024-05-30

## 2024-05-24 RX ORDER — MAGNESIUM OXIDE 400 MG ORAL TABLET 241.3 MG
800 TABLET ORAL ONCE
Refills: 0 | Status: COMPLETED | OUTPATIENT
Start: 2024-05-24 | End: 2024-05-24

## 2024-05-24 RX ADMIN — PANTOPRAZOLE SODIUM 40 MILLIGRAM(S): 20 TABLET, DELAYED RELEASE ORAL at 12:53

## 2024-05-24 RX ADMIN — Medication 40 MILLIEQUIVALENT(S): at 09:27

## 2024-05-24 RX ADMIN — MACITENTAN 10 MILLIGRAM(S): 10 TABLET, FILM COATED ORAL at 13:16

## 2024-05-24 RX ADMIN — RIOCIGUAT 2.5 MILLIGRAM(S): 1.5 TABLET, FILM COATED ORAL at 13:30

## 2024-05-24 RX ADMIN — MAGNESIUM OXIDE 400 MG ORAL TABLET 800 MILLIGRAM(S): 241.3 TABLET ORAL at 09:27

## 2024-05-24 RX ADMIN — RIOCIGUAT 2.5 MILLIGRAM(S): 1.5 TABLET, FILM COATED ORAL at 06:49

## 2024-05-24 RX ADMIN — CARVEDILOL PHOSPHATE 3.12 MILLIGRAM(S): 80 CAPSULE, EXTENDED RELEASE ORAL at 06:56

## 2024-05-24 RX ADMIN — CHLORHEXIDINE GLUCONATE 1 APPLICATION(S): 213 SOLUTION TOPICAL at 06:57

## 2024-05-24 RX ADMIN — APIXABAN 10 MILLIGRAM(S): 2.5 TABLET, FILM COATED ORAL at 06:49

## 2024-05-24 NOTE — DISCHARGE NOTE NURSING/CASE MANAGEMENT/SOCIAL WORK - NSDCFUADDAPPT_GEN_ALL_CORE_FT
Dr.Bradley Goldberg's office will reach out to schedule your appointment with the hematology team.   If you have any further questions or don't hear from Dr.Goldberg's team by tuesday, please call #[phone]

## 2024-05-24 NOTE — DISCHARGE NOTE NURSING/CASE MANAGEMENT/SOCIAL WORK - PATIENT PORTAL LINK FT
You can access the FollowMyHealth Patient Portal offered by [facility] by registering at the following website: [email or web address]. By joining [facility]’s FollowMyHealth portal, you will also be able to view your health information using other applications (apps) compatible with our system.

## 2024-05-24 NOTE — DISCHARGE NOTE NURSING/CASE MANAGEMENT/SOCIAL WORK - NSDCPEFALRISK_GEN_ALL_CORE
For information on Fall & Injury Prevention, visit: [email or web address] OR  [email or web address] OR  https://www.cdc.gov/steadi/patient.html

## 2024-05-29 ENCOUNTER — NON-APPOINTMENT (OUTPATIENT)
Age: 69
End: 2024-05-29

## 2024-05-29 RX ORDER — SENNOSIDES 8.6 MG/1
8.6 TABLET ORAL
Refills: 0 | Status: ACTIVE | COMMUNITY

## 2024-05-29 RX ORDER — LORATADINE 10 MG
17 TABLET,DISINTEGRATING ORAL DAILY
Refills: 1 | Status: ACTIVE | COMMUNITY

## 2024-05-30 ENCOUNTER — RESULT REVIEW (OUTPATIENT)
Age: 69
End: 2024-05-30

## 2024-05-30 ENCOUNTER — APPOINTMENT (OUTPATIENT)
Dept: HEMATOLOGY ONCOLOGY | Facility: CLINIC | Age: 69
End: 2024-05-30
Payer: MEDICARE

## 2024-05-30 ENCOUNTER — APPOINTMENT (OUTPATIENT)
Dept: CARDIOTHORACIC SURGERY | Facility: CLINIC | Age: 69
End: 2024-05-30
Payer: MEDICARE

## 2024-05-30 VITALS
BODY MASS INDEX: 29.14 KG/M2 | OXYGEN SATURATION: 95 % | RESPIRATION RATE: 16 BRPM | WEIGHT: 169.73 LBS | SYSTOLIC BLOOD PRESSURE: 138 MMHG | DIASTOLIC BLOOD PRESSURE: 73 MMHG | TEMPERATURE: 97.7 F | HEART RATE: 88 BPM

## 2024-05-30 VITALS — SYSTOLIC BLOOD PRESSURE: 86 MMHG | DIASTOLIC BLOOD PRESSURE: 61 MMHG

## 2024-05-30 LAB
BASOPHILS # BLD AUTO: 0.05 K/UL — SIGNIFICANT CHANGE UP (ref 0–0.2)
BASOPHILS NFR BLD AUTO: 0.7 % — SIGNIFICANT CHANGE UP (ref 0–2)
EOSINOPHIL # BLD AUTO: 0.09 K/UL — SIGNIFICANT CHANGE UP (ref 0–0.5)
EOSINOPHIL NFR BLD AUTO: 1.3 % — SIGNIFICANT CHANGE UP (ref 0–6)
HCT VFR BLD CALC: 32 % — LOW (ref 34.5–45)
HGB BLD-MCNC: 10.7 G/DL — LOW (ref 11.5–15.5)
IMM GRANULOCYTES NFR BLD AUTO: 0.6 % — SIGNIFICANT CHANGE UP (ref 0–0.9)
LYMPHOCYTES # BLD AUTO: 1.37 K/UL — SIGNIFICANT CHANGE UP (ref 1–3.3)
LYMPHOCYTES # BLD AUTO: 20.4 % — SIGNIFICANT CHANGE UP (ref 13–44)
MCHC RBC-ENTMCNC: 26.2 PG — LOW (ref 27–34)
MCHC RBC-ENTMCNC: 33.5 G/DL — SIGNIFICANT CHANGE UP (ref 32–36)
MCV RBC AUTO: 78 FL — LOW (ref 80–100)
MONOCYTES # BLD AUTO: 0.74 K/UL — SIGNIFICANT CHANGE UP (ref 0–0.9)
MONOCYTES NFR BLD AUTO: 11 % — SIGNIFICANT CHANGE UP (ref 2–14)
NEUTROPHILS # BLD AUTO: 4.42 K/UL — SIGNIFICANT CHANGE UP (ref 1.8–7.4)
NEUTROPHILS NFR BLD AUTO: 66 % — SIGNIFICANT CHANGE UP (ref 43–77)
NRBC # BLD: 0 /100 WBCS — SIGNIFICANT CHANGE UP (ref 0–0)
PLATELET # BLD AUTO: 213 K/UL — SIGNIFICANT CHANGE UP (ref 150–400)
RBC # BLD: 4.05 M/UL — SIGNIFICANT CHANGE UP (ref 3.8–5.2)
RBC # FLD: 16.9 % — HIGH (ref 10.3–14.5)
WBC # BLD: 6.71 K/UL — SIGNIFICANT CHANGE UP (ref 3.8–10.5)
WBC # FLD AUTO: 6.71 K/UL — SIGNIFICANT CHANGE UP (ref 3.8–10.5)

## 2024-05-30 PROCEDURE — G2211 COMPLEX E/M VISIT ADD ON: CPT

## 2024-05-30 PROCEDURE — 99024 POSTOP FOLLOW-UP VISIT: CPT

## 2024-05-30 PROCEDURE — 99214 OFFICE O/P EST MOD 30 MIN: CPT

## 2024-05-30 RX ORDER — AMLODIPINE BESYLATE 10 MG/1
10 TABLET ORAL
Qty: 90 | Refills: 0 | Status: DISCONTINUED | COMMUNITY
Start: 2023-03-21 | End: 2024-05-30

## 2024-05-31 LAB
ALBUMIN SERPL ELPH-MCNC: 4 G/DL
ALP BLD-CCNC: 88 U/L
ALT SERPL-CCNC: 14 U/L
ANION GAP SERPL CALC-SCNC: 13 MMOL/L
AST SERPL-CCNC: 18 U/L
BILIRUB SERPL-MCNC: 0.9 MG/DL
BUN SERPL-MCNC: 10 MG/DL
CALCIUM SERPL-MCNC: 9.3 MG/DL
CHLORIDE SERPL-SCNC: 107 MMOL/L
CO2 SERPL-SCNC: 22 MMOL/L
CREAT SERPL-MCNC: 1.18 MG/DL
EGFR: 50 ML/MIN/1.73M2
GLUCOSE SERPL-MCNC: 104 MG/DL
POTASSIUM SERPL-SCNC: 4.7 MMOL/L
PROT SERPL-MCNC: 6.7 G/DL
SODIUM SERPL-SCNC: 142 MMOL/L

## 2024-05-31 NOTE — ASSESSMENT
[FreeTextEntry1] : *********************************************************************************************************************************************************************************************************************************************************************   68F h/o pulmonary HTN [recently placed on targeted medication w/excellent functional results] who was found to have an acute type B aortic dissection at Zone 3 this past August, however her functional status 2/2 her pulmonary HTN was prohibitive. She has made excellent results in her functional status following the initiation of Adempas, and given the aneurysmal degeneration of her aortic dissection, we felt this was a safe window to perform the procedure. On 4/26/2024 she underwent endovascular repair and advanced STABALISE technique for her chronic type B aortic dissection. Initially, did extremely well post-operatively and her lumbar drain was removed on POD 2. However, she had new-onset bilateral lower extremity weakness on POD 3, requiring emergent lumbar drain placement and resolution of symptoms; drain was eventually removed on POD 6.   She also experienced acute thrombocytopenia and fount to be HIT positive on POD#9, started on argatroban with eventual resolution and transition to Eliquis.   Today she presents and reports that she is doing exceptionally well    - We are extremely pleased with the structural and functional recovery of Ms. Byrne. Her discharge CT looked excellent. She has no residual lower extremity weakness and is currently ambulating well on room air without any issues. Her groin access sites look pristine.   - She will be following up with Dr. Cathy Serra her pulmonologist, as well as her pulmonary hypertension specialize. I believe she will be seeing Dr. Braxton Benites in the near future to discuss possible pulmonary thromboendarterectomy.   - She will enter our Aortic Registry for ongoing aortic surveillance and follow up with us in the office in 6 months with a CTA of the chest / abdomen / pelvis.   - She will also be following up with her cardiologist Dr. Brennen Barnett

## 2024-05-31 NOTE — PHYSICAL EXAM
[] : no respiratory distress [Respiration, Rhythm And Depth] : normal respiratory rhythm and effort [Auscultation Breath Sounds / Voice Sounds] : lungs were clear to auscultation bilaterally [Apical Impulse] : the apical impulse was normal [Heart Rate And Rhythm] : heart rate was normal and rhythm regular [Heart Sounds] : normal S1 and S2 [Murmurs] : no murmurs [Clean] : clean [Dry] : dry [Healing Well] : healing well [___ +] : bilateral ankle [unfilled]U+ pitting edema [FreeTextEntry3] : B/L groin

## 2024-05-31 NOTE — CONSULT LETTER
[Dear  ___] : Dear  [unfilled], [Courtesy Letter:] : I had the pleasure of seeing your patient, [unfilled], in my office today. [Please see my note below.] : Please see my note below. [Consult Closing:] : Thank you very much for allowing me to participate in the care of this patient.  If you have any questions, please do not hesitate to contact me. [Sincerely,] : Sincerely, [FreeTextEntry2] : Dr. SCARLETT WELLINGTON [FreeTextEntry3] :  Dr. Franky Suh Attending Surgeon Department of Cardiovascular and Thoracic surgery [address] [place] [address] Tel: [phone]

## 2024-05-31 NOTE — HISTORY OF PRESENT ILLNESS
[de-identified] : 68 y/o F with hx of HTN and otherwise unremarkable medical history here for possibly hypercoagulable evaluation. Recently (beginning of August) found with a very high d-dimer and ultimately sent to the hospital and found with an aortic dissection. The breathing has been an issue for around 8 months, and it actually was getting better with time with treatment from pulmonologist. She has been since diagnosed with pulmonary hypertension, which according to pulmonology is consistent with chronic thromboembolic pulmonary hypertension. Patient reporting that she feels anxious today, but otherwise feels OK. Her breathing is not completely back to normal but nothing close to the severity in April this year. No fevers or chills, night sweats. Appetite is normal, but she did intentionally lose 20 pounds due to the breathing issue in an effort to improve. No history of thrombosis in her life previous to this. Nobody in her family has ever had a blood clot before to her knowledge.   On initial workup found with borderline low protein S level (46%). She was started on Eliquis 5 mg BID for likely PEs. [de-identified] : Patient seen for follow up on 05/30/2024. Since last visit she presented to [facility] on 4/25/2024 for preoperative lumbar drain prior to planned TEVAR procedure. Patient underwent transfemoral TEVAR on 4/26/24. She was noted to have a clot in her celiac trunk on POD2 for which she received heparin. 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 Consistent with DON. Argatroban had been started on 5/5/24 when her platelet hit a carolyn of 27k and Heparin PF4 and PAVAN testing came back positive. She was transitioned prematurely to Eliquis against hematology's recommendations when her platelets increased to 99k, and her platelets proceeded to downtrend again, with further imaging studies showing a new nonocclusive R IJ thrombus and splenic infarcts. She was put back on the argatroban drip and subsequently her platelet count had recovered to 101k on POD27, and the primary team transitioned her to Eliquis and discharged her.  Also c/b urinary retention, needed stevens in place and failed TOV. She had two PRBC transfusions after her surgery. Discharged on 5/24.   Patient reported today that she feels very well. Platelets in the office are up to 213k. Stevens still in place today, has follow up with urology next week. No fevers or chills. She denied any blood in her stool or in her urine. Her appetite is good. Reported that she did have lower extremity edema when she was first discharged on 5/24 but this has resolved. Of note, she did not have any symptoms of thrombosis while suffering DON. She is fully ambulatory and her breathing is better, but she's still adjusting. Planning to see Dr. Benites (CT surgery - CTEPH specialist) on 6/7.

## 2024-05-31 NOTE — REASON FOR VISIT
[Family Member] : family member [de-identified] :  Endovascular repair of thoracic aortic aneurysm involving coverage of the origin left subclavian artery from Zone 3 to Zone 5 [[id number]-US, DBDR-S--PF-US].               Complex balloon fenestration "STABILISE" technique for achieving distal sealing and creation of landing zone. [de-identified] : 4/26/24

## 2024-05-31 NOTE — ASSESSMENT
[FreeTextEntry1] : 69 y/o F w/ HTN and recent aortic dissection, also with pulmonary HTN suspected due to chronic thromboembolic phenomena given elevated d-dimer and originally here for hypercoagulable workup which was overall unremarkable. Did have borderline low level of protein S, but PROS1 gene mutation analysis negative and likely due to consumption related to CTEPH. She has been on treatment with Eliquis 5 mg BID.   She is now s/p transfemoral TEVAR on 4/26/24 with complicated hospital course as noted above, but remarkably c/b DON involving mesenteric, splenic and RIJ thrombosis. Patient was on argatroban drip, but was discharged on Eliquis 10 mg BID x 7 days and then plan to return to 5 mg BID (tonight). Of note, also c/b urinary retention, now with stevens in place.   Platelets today up to 213k and feeling great.   Plan: -Counselled in detail with patient that DON is one of the more hypercoagulable conditions that exist, and that in truth there is far more evidence for the efficacy of Warfarin in this scenario. That being said, she is currently with full platelet count recovery and has tolerated the Eliquis well with no symptoms of progressive thrombosis. She is aware of the risks of suboptimal therapy, but agrees with continuing with Eliquis at this point given its effectiveness to this point.  -Patient understands and agrees with plan. All information explained to the best of my ability. -RTC in 1 month.

## 2024-06-03 ENCOUNTER — APPOINTMENT (OUTPATIENT)
Dept: UROLOGY | Facility: CLINIC | Age: 69
End: 2024-06-03
Payer: MEDICARE

## 2024-06-03 VITALS
RESPIRATION RATE: 16 BRPM | OXYGEN SATURATION: 94 % | HEART RATE: 93 BPM | SYSTOLIC BLOOD PRESSURE: 123 MMHG | DIASTOLIC BLOOD PRESSURE: 70 MMHG

## 2024-06-03 PROCEDURE — G2211 COMPLEX E/M VISIT ADD ON: CPT

## 2024-06-03 PROCEDURE — 99215 OFFICE O/P EST HI 40 MIN: CPT

## 2024-06-03 NOTE — HISTORY OF PRESENT ILLNESS
[FreeTextEntry1] : 68 year old female patient w prolonged hospitalization after endovascular repair of thoracic aorta on 4/26/24 with HIT and urinary retention Her initial urinary retention required stevens catheter after failed TOV with asymptomatic -800s  As per patient she had another TOV prior to discharge towards end of May with PVRs in the 100-200's, steevns was replaced Her current stevens catheter is approx 2 weeks old  Not on any alpha blockers  Bowel movements are usually every 1-2 days, she takes Miralax PRN for constipation  She is present with her son and his gf

## 2024-06-03 NOTE — PHYSICAL EXAM
[General Appearance - Well Developed] : well developed [General Appearance - Well Nourished] : well nourished [Normal Appearance] : normal appearance [Well Groomed] : well groomed [General Appearance - In No Acute Distress] : no acute distress [] : no respiratory distress [Normal Station and Gait] : the gait and station were normal for the patient's age [Skin Color & Pigmentation] : normal skin color and pigmentation [Affect] : the affect was normal [Mood] : the mood was normal [Not Anxious] : not anxious

## 2024-06-04 ENCOUNTER — NON-APPOINTMENT (OUTPATIENT)
Age: 69
End: 2024-06-04

## 2024-06-04 RX ORDER — ALFUZOSIN HYDROCHLORIDE 10 MG/1
10 TABLET, EXTENDED RELEASE ORAL
Qty: 90 | Refills: 1 | Status: ACTIVE | COMMUNITY
Start: 2024-06-04 | End: 1900-01-01

## 2024-06-07 ENCOUNTER — APPOINTMENT (OUTPATIENT)
Dept: PULMONOLOGY | Facility: CLINIC | Age: 69
End: 2024-06-07
Payer: MEDICARE

## 2024-06-07 ENCOUNTER — APPOINTMENT (OUTPATIENT)
Dept: CARDIOTHORACIC SURGERY | Facility: CLINIC | Age: 69
End: 2024-06-07
Payer: MEDICARE

## 2024-06-07 VITALS
HEIGHT: 64 IN | OXYGEN SATURATION: 94 % | BODY MASS INDEX: 28.85 KG/M2 | TEMPERATURE: 98.1 F | SYSTOLIC BLOOD PRESSURE: 120 MMHG | RESPIRATION RATE: 16 BRPM | DIASTOLIC BLOOD PRESSURE: 71 MMHG | WEIGHT: 169 LBS | HEART RATE: 86 BPM

## 2024-06-07 DIAGNOSIS — Z95.828 PRESENCE OF OTHER VASCULAR IMPLANTS AND GRAFTS: ICD-10-CM

## 2024-06-07 PROCEDURE — G2211 COMPLEX E/M VISIT ADD ON: CPT

## 2024-06-07 PROCEDURE — 99205 OFFICE O/P NEW HI 60 MIN: CPT

## 2024-06-07 PROCEDURE — 99024 POSTOP FOLLOW-UP VISIT: CPT

## 2024-06-07 NOTE — ASSESSMENT
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, Type B aortic dissection s/p TEVAR 4/2024, recent HIT now plts recovered and tolerating eliquis  #CTEPH #PAH 2/2 D/T's  - While she has severely elevated PASP she has had excellent functional response to dual oral therapy - Her complicated anatomy i/s/o aortic dissection as well as her recent hx of HIT make her a poor surgical candidate for PTE - Additionally, given possible RF for Group I PAH in addition to group IV disease, would not pursue high risk surgery that is less likely to be curative - Will have patient evaluated for balloon angioplasty for predominantly R sided thromboembolic disease - Should have 6MWT followed by pulmonologist, Dr. Serra, will discuss

## 2024-06-07 NOTE — HISTORY OF PRESENT ILLNESS
[TextBox_4] : 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, severe combined pre capillary PH, suspect with a component of PAH 2/2 drugs/toxins given history of phen fen use, Type B aortic dissection, obesity, systemic hypertension who presented to [facility] 4/25/24 for elective TEVAR, complicated by spinal ischemia for which PH was consulted in setting of aggressive vasopressors requirements to increase spinal perfusion/MAP. She was diagnosed with CTEPH 8/2024 was subsequently started on riociguat and follow up TTE showed significant improvement in PH as well as subjectively clinical improvement. She was cleared to proceed with TEVAR. Post operatively after concern for decreased perfusion causing spinal cord ischemia, PA-C placed showed with no significant changes from her baseline severe PH other than increase in cardiac output (While on milrinone), PVR/SVR ratio remained normal and she was started on macitentan for severe PH to improve spinal perfusion.  She responded very well to dual oral therapy (riociguat and macitentan), ambulating, although with persistent urinary retention requiring stevens. Planned for d/c but hospital course complicated by HIT for which heme was consulted. Confirmed by positive Heparin-PF4 antibodies and PAVAN, and noted celiac trunk thrombusplatelet carolyn 27k on 5/5/24Discharge delayed until plts improved and she could be transitioned to eliquis. Follow up plts stable at 213k.   6/7/24 - Pt feeling very well since discharge, no leg weakness and breathing much improved. Denies dizziness, palpitations, chest pain, LE edema. Tolerating riociguat and macitentan without side effects. Extensive discussion re: treatment options for CTEPH

## 2024-06-07 NOTE — HISTORY OF PRESENT ILLNESS
[FreeTextEntry1] : 68 year old female with PMHx  of Asthma, CTEPH, CAD, HTN, Type B Dissection sp transfemoral TEVAR with Dr. Kwon/Dr. Suh on 4/26/24.   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 Kwon/Dr. Suh on 4/26/24. Prior to her recent TEVAR, she had been followed by outpt pulmonologist Dr. Cathy Serra. Back in August 2023 she developed DELGADO, PCP thought it was related to systemic HTN but did not improve with anti hypertensives and was sent to ED by Dr. Cathy Serra after noted to have SpO2 92% and an elevated D-Dimer. In ED at that time CTA C/A/P was performed which revealed findings concerning for CTEPH and a Type B dissection. She underwent RHC which demonstrated severe combined pre/post capillary PH and she was started on riociguat, required lasix after initiating due to LE edema. Had significant improvement in DELGADO. 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 She was deemed a candidate for TEVAR with Dr. Kwon and Dr. Suh and presented to [facility] for preop lumbar drain prior to TEVAR (4/26/24).  Procedure was uncomplicated post operatively patient had no neurological deficits and lumbar drain was removed on POD#2. She had routine post operative CT scan that revealed new splenic infarcts and PE and she was started on low dose heparin gtt. Overnight POD#2-3 patient had new LE weakness, neurosurgery was emergently called and lumbar drain was replaced with improvement in symptoms and remained in place until POD#6. MRI was obstructed by artifact from TEVAR graft and CT was negative for spinal hemorrhage or infarct. Patient had persistent hypoxia post operatively and pulm was consulted for CTEPH. On POD#8 patient complained of LE unsteadiness with urinary retention, she was restarted on pressors to keep MAPs elevated with improvement. 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 Her pressors were slowly weaned from POD#10-12., POD #13, UA w/ culture obtained as patient retaining, UA positive, started on Ceftriaxone x4 days, discontinued as patient asymptomatic (cultures sensitive to CTX). She continued to remain stable with ambulation improving and transferred to  on POD#14. Hematology was consulted on POD 14, patient was transitioned from argatroban to Eliquis on POD 15. Urology reconsulted noting if patient fails TOV, she may leave stevens in place and follow up in their clinic. Patient failed overnight TOV on POD 17, so stevens replaced and left in place for discharge. On POD 18, hematology re-consulted, recommending to place patient back on argatroban gtt until plt >100k. POD #19-21, patient's platelets continued to be below goal, hematology reconsulted, pending further lab work up, pan culture and CTA C/A/P. POD22: CTA CAP done, UA obtained +Candida discussed w/  and given patient is asymptomatic no need to treat. POD23 urology noting patient can TOV one more time affter 7 days, stevens removed, passed TOV; however, was retaining over night, so stevens placed again. POD24 plts remain in the 70s, CTA C/A/P reviewed w/  found w/ normal post surgical findings and known endoleak, NTD. LE venous duplex obtained today revealing chronic LLE recannulation changes, no acute DVTs. POD 25 plts uptrended from 72>79, remains on therapeutic titrating argatroban gtt. POD26 plts uptrend 93>79. continue argatroban until platelets >100. POD27: Started on Eliquis with PLTS of 101, Pt DC home with Eliquis 10mg BID x7 days and 5 mg BID thereafter with outpatient followup.  Seen postop by Dr. Suh 5/30. Of note, Confirmed by positive Heparin-PF4 antibodies and PAVAN, and noted celiac trunk thrombusplatelet carolyn 27k on 5/5/24  She presents today with her son Brian.  Followed up with urology and Hematology (PLTS up to 213) who cleared her for AC (advising to hold 3 days pre PTE and resume post) per their note. Dx with allergy to Heparin, now on Eliquis. Reports doing and feeling well sp her TEVAR.  No real SOB, CP, and weights stable. Her  was recently dx with stage 4 pancreatic cancer and was readmitted to hospital yesterday.  She reports she is not willing to undergo open surgery at this time even if it was an option and feels she has to much to do for the care of herself and her  at this time.

## 2024-06-07 NOTE — ASSESSMENT
[FreeTextEntry1] : I reviewed the cardiac imaging, medical records and reports with patient and discussed the case.  I discussed given pts aortic aneurysm, residual dissection, recent TEVAR and allergy to Heparin, I feel she is high risk to undergo PTE.   I feel pt is better candidate for Balloon Pulmonary Angioplasty.     Plan:  - BPA with Dr. Hood Cha at [facility] - Follow up with pulmonologist Dr. Tiffanie Cabrera - Call with any questions or concerns

## 2024-06-07 NOTE — END OF VISIT
[FreeTextEntry3] : Written by Leroy Rose NP, acting as a scribe for Dr. Benites The documentation recorded by the scribe accurately reflects the service I personally performed and the decisions made by me. Signature Freeman Benites MD.

## 2024-06-07 NOTE — PHYSICAL EXAM
[General Appearance - Alert] : alert [General Appearance - In No Acute Distress] : in no acute distress [Sclera] : the sclera and conjunctiva were normal [Neck Appearance] : the appearance of the neck was normal [Jugular Venous Distention Increased] : there was no jugular-venous distention [] : no respiratory distress [Respiration, Rhythm And Depth] : normal respiratory rhythm and effort [Exaggerated Use Of Accessory Muscles For Inspiration] : no accessory muscle use [Auscultation Breath Sounds / Voice Sounds] : lungs were clear to auscultation bilaterally [Apical Impulse] : the apical impulse was normal [Heart Rate And Rhythm] : heart rate was normal and rhythm regular [Heart Sounds] : normal S1 and S2 [Abdomen Soft] : soft [Abdomen Tenderness] : non-tender [Involuntary Movements] : no involuntary movements were seen [No Focal Deficits] : no focal deficits [Oriented To Time, Place, And Person] : oriented to person, place, and time [Impaired Insight] : insight and judgment were intact [Affect] : the affect was normal [Mood] : the mood was normal

## 2024-06-07 NOTE — PROCEDURE
[FreeTextEntry1] : [facility] reviewed (8/2024) - Of note there was no PAWP waveform, unable to wedge, calculations made on estimated PAWP of 15 RA 13  |  PA 82/28/48  |  PAWP 15  |  CO/CI (F) 4.64/2.54  |  PVR 7.1WU  |  PVR/SVR 0.4

## 2024-06-10 ENCOUNTER — APPOINTMENT (OUTPATIENT)
Dept: INTERNAL MEDICINE | Facility: CLINIC | Age: 69
End: 2024-06-10
Payer: MEDICARE

## 2024-06-10 VITALS
SYSTOLIC BLOOD PRESSURE: 154 MMHG | TEMPERATURE: 98.5 F | OXYGEN SATURATION: 94 % | BODY MASS INDEX: 29.92 KG/M2 | HEIGHT: 63.58 IN | DIASTOLIC BLOOD PRESSURE: 73 MMHG | WEIGHT: 171 LBS | HEART RATE: 99 BPM

## 2024-06-10 VITALS — SYSTOLIC BLOOD PRESSURE: 104 MMHG | DIASTOLIC BLOOD PRESSURE: 60 MMHG

## 2024-06-10 PROCEDURE — 99495 TRANSJ CARE MGMT MOD F2F 14D: CPT

## 2024-06-10 RX ORDER — CARVEDILOL 3.12 MG/1
3.12 TABLET, FILM COATED ORAL TWICE DAILY
Qty: 180 | Refills: 0 | Status: ACTIVE | COMMUNITY
Start: 2023-09-07 | End: 1900-01-01

## 2024-06-10 RX ORDER — POTASSIUM CHLORIDE 750 MG/1
10 TABLET, EXTENDED RELEASE ORAL DAILY
Qty: 5 | Refills: 0 | Status: DISCONTINUED | COMMUNITY
End: 2024-06-10

## 2024-06-10 RX ORDER — TAMSULOSIN HYDROCHLORIDE 0.4 MG/1
0.4 CAPSULE ORAL
Qty: 90 | Refills: 3 | Status: DISCONTINUED | COMMUNITY
Start: 2024-06-03 | End: 2024-06-10

## 2024-06-10 RX ORDER — PANTOPRAZOLE 40 MG/1
40 TABLET, DELAYED RELEASE ORAL DAILY
Qty: 30 | Refills: 0 | Status: DISCONTINUED | COMMUNITY
End: 2024-06-10

## 2024-06-10 NOTE — PLAN
[FreeTextEntry1] : Hospital records and follow ups reviewed. Refill meds. Pt advised to sign up for [facility] portal to review labs and communicate any questions or concerns directly. Yearly physical and return as needed for illness, medication refills, and new or existing complaints. f/u 3 months.

## 2024-06-10 NOTE — HISTORY OF PRESENT ILLNESS
[Post-hospitalization from ___ Hospital] : Post-hospitalization from [unfilled] Hospital [Admitted on: ___] : The patient was admitted on [unfilled] [Discharged on ___] : discharged on [unfilled] [Discharge Summary] : discharge summary [Pertinent Labs] : pertinent labs [Radiology Findings] : radiology findings [Discharge Med List] : discharge medication list [Med Reconciliation] : medication reconciliation has been completed [Patient Contacted By: ____] : and contacted by [unfilled] [FreeTextEntry2] : 68 year old F with PMH HTN, CKD IIIa, right heart CHF, and pHTN presents for follow up after hospitalization for TEVAR. Pt denies CP/SOB, fever/chills, n/v/d/c.

## 2024-06-10 NOTE — PHYSICAL EXAM
[No Acute Distress] : no acute distress [Well-Appearing] : well-appearing [No Respiratory Distress] : no respiratory distress  [No Accessory Muscle Use] : no accessory muscle use [Clear to Auscultation] : lungs were clear to auscultation bilaterally [Normal Rate] : normal rate  [Regular Rhythm] : with a regular rhythm [Normal S1, S2] : normal S1 and S2 [No Murmur] : no murmur heard [No Edema] : there was no peripheral edema [No Extremity Clubbing/Cyanosis] : no extremity clubbing/cyanosis [Coordination Grossly Intact] : coordination grossly intact [No Focal Deficits] : no focal deficits [Normal Gait] : normal gait [Normal Affect] : the affect was normal [Normal Insight/Judgement] : insight and judgment were intact [de-identified] : obesity [90601 - Moderate Complexity requires multiple possible diagnoses and/or the management options, moderate complexity of the medical data (tests, etc.) to be reviewed, and moderate risk of significant complications, morbidity, and/or mortality as well as co] : Moderate Complexity

## 2024-06-13 NOTE — ED PROVIDER NOTE - SHIFT CHANGE
Medication Management Service, Warfarin Management  [facility], [phone]  Visit Date: 2024   Subjective:   Erik Olea is a 81 y.o. male who presents to clinic today for anticoagulation monitoring and adjustment.  Patient seen in clinic for warfarin management due to  Indication:    Left vetricular apical thrombus .   INR goal: of 2.0-3.0.  Duration of therapy: other: Started 3/14/24 will need to clarify end date. .    Assessment and PLAN   PT/INR done in office per protocol.   INR today is 2.1, therapeutic.        Plan:  Will continue current regimen of warfarin 3 mg Mon; 6 mg all other days of the week.      Using warfarin 6 mg tablets.  Recheck INR in 4 week(s).   Patient seen in room # 1.    ED. OP. = Discussed impact of vitamin k and alcohol on the INR.     Patient verbalized understanding of dosing directions and information discussed. Dosing schedule given to patient. Progress note sent to referring office.       Amy FOX. Yessenia., CACP, Clinical Pharmacist  Anticoagulation Services, [facility]  2024  8:39 AM      For Pharmacy Admin Tracking Only    Intervention Detail: Adherence Monitorin  Total # of Interventions Recommended: 1  Total # of Interventions Accepted: 1  Time Spent (min): 15       Yes...

## 2024-06-18 ENCOUNTER — APPOINTMENT (OUTPATIENT)
Dept: NEUROLOGY | Facility: CLINIC | Age: 69
End: 2024-06-18
Payer: MEDICARE

## 2024-06-18 VITALS
HEIGHT: 63.5 IN | HEART RATE: 100 BPM | BODY MASS INDEX: 29.57 KG/M2 | WEIGHT: 169 LBS | SYSTOLIC BLOOD PRESSURE: 117 MMHG | DIASTOLIC BLOOD PRESSURE: 64 MMHG

## 2024-06-18 DIAGNOSIS — R53.1 WEAKNESS: ICD-10-CM

## 2024-06-18 PROCEDURE — 99203 OFFICE O/P NEW LOW 30 MIN: CPT

## 2024-06-18 NOTE — ASSESSMENT
[FreeTextEntry1] : This is a 68F who was admitted in April for elective TEVAR c/b possible cord ischemia after lumbar drain removed, resolved with replacement of her lumbar drain. Exam reassuring with evidence of mild vibratory loss at the ankles, unclear if chronic or new since April. No further neurologic workup needed at this time.  F/u PCP

## 2024-06-18 NOTE — HISTORY OF PRESENT ILLNESS
[FreeTextEntry1] : This is a 68F with PMH  aortic dissection who presented to [facility] on 4/25/24 for elective TEVAR. She had lumbar drain placed prior to TEVAR and the surgery was uneventful. Was recovering well and walking the halls 2 days postoperatively so the lumbar drain was removed. The next morning, she woke up at 5AM and was unable to move her lower extremities and they were numb. Lumbar drain was emergently replaced and by the end of the day her numbness and weakness completely resolved.   The hospital course was complicated by HIT and the patient was discharged 29 days later. She was instructed to follow up with Neurology as outpatient "at least once".   She reports that since discharge she has been feeling better with each passing day. Her energy and strength are almost back to normal. Denies weakness or paresthesias in her lower extremities.

## 2024-06-18 NOTE — PHYSICAL EXAM
[Person] : oriented to person [Place] : oriented to place [Time] : oriented to time [Fluency] : fluency intact [Cranial Nerves Optic (II)] : visual acuity intact bilaterally,  visual fields full to confrontation, pupils equal round and reactive to light [Cranial Nerves Oculomotor (III)] : extraocular motion intact [Cranial Nerves Trigeminal (V)] : facial sensation intact symmetrically [Cranial Nerves Facial (VII)] : face symmetrical [Cranial Nerves Vestibulocochlear (VIII)] : hearing was intact bilaterally [Cranial Nerves Glossopharyngeal (IX)] : tongue and palate midline [Cranial Nerves Accessory (XI - Cranial And Spinal)] : head turning and shoulder shrug symmetric [Cranial Nerves Hypoglossal (XII)] : there was no tongue deviation with protrusion [Motor Tone] : muscle tone was normal in all four extremities [Motor Strength] : muscle strength was normal in all four extremities [No Muscle Atrophy] : normal bulk in all four extremities [Sensation Tactile Decrease] : light touch was intact [Sensation Pain / Temperature Decrease] : pain and temperature was intact [Abnormal Walk] : normal gait [2+] : Ankle jerk left 2+

## 2024-06-20 ENCOUNTER — APPOINTMENT (OUTPATIENT)
Dept: UROLOGY | Facility: CLINIC | Age: 69
End: 2024-06-20
Payer: MEDICARE

## 2024-06-20 ENCOUNTER — OUTPATIENT (OUTPATIENT)
Dept: OUTPATIENT SERVICES | Facility: HOSPITAL | Age: 69
LOS: 1 days | End: 2024-06-20
Payer: MEDICARE

## 2024-06-20 VITALS
OXYGEN SATURATION: 95 % | HEART RATE: 102 BPM | RESPIRATION RATE: 16 BRPM | SYSTOLIC BLOOD PRESSURE: 120 MMHG | DIASTOLIC BLOOD PRESSURE: 74 MMHG

## 2024-06-20 DIAGNOSIS — R33.9 RETENTION OF URINE, UNSPECIFIED: ICD-10-CM

## 2024-06-20 DIAGNOSIS — R35.0 FREQUENCY OF MICTURITION: ICD-10-CM

## 2024-06-20 DIAGNOSIS — Z98.891 HISTORY OF UTERINE SCAR FROM PREVIOUS SURGERY: Chronic | ICD-10-CM

## 2024-06-20 DIAGNOSIS — K59.00 CONSTIPATION, UNSPECIFIED: ICD-10-CM

## 2024-06-20 DIAGNOSIS — Z90.710 ACQUIRED ABSENCE OF BOTH CERVIX AND UTERUS: Chronic | ICD-10-CM

## 2024-06-20 PROCEDURE — 51798 US URINE CAPACITY MEASURE: CPT

## 2024-06-20 PROCEDURE — 51700 IRRIGATION OF BLADDER: CPT

## 2024-06-21 ENCOUNTER — OUTPATIENT (OUTPATIENT)
Dept: OUTPATIENT SERVICES | Facility: HOSPITAL | Age: 69
LOS: 1 days | Discharge: HOME | End: 2024-06-21

## 2024-06-21 ENCOUNTER — APPOINTMENT (OUTPATIENT)
Dept: PULMONOLOGY | Facility: CLINIC | Age: 69
End: 2024-06-21
Payer: MEDICARE

## 2024-06-21 ENCOUNTER — APPOINTMENT (OUTPATIENT)
Dept: PULMONOLOGY | Facility: CLINIC | Age: 69
End: 2024-06-21

## 2024-06-21 VITALS
RESPIRATION RATE: 16 BRPM | OXYGEN SATURATION: 95 % | SYSTOLIC BLOOD PRESSURE: 111 MMHG | HEART RATE: 100 BPM | DIASTOLIC BLOOD PRESSURE: 66 MMHG

## 2024-06-21 DIAGNOSIS — R06.02 SHORTNESS OF BREATH: ICD-10-CM

## 2024-06-21 DIAGNOSIS — Z98.891 HISTORY OF UTERINE SCAR FROM PREVIOUS SURGERY: Chronic | ICD-10-CM

## 2024-06-21 DIAGNOSIS — I10 ESSENTIAL (PRIMARY) HYPERTENSION: ICD-10-CM

## 2024-06-21 DIAGNOSIS — Z90.710 ACQUIRED ABSENCE OF BOTH CERVIX AND UTERUS: Chronic | ICD-10-CM

## 2024-06-21 DIAGNOSIS — D64.9 ANEMIA, UNSPECIFIED: ICD-10-CM

## 2024-06-21 DIAGNOSIS — I71.03 DISSECTION OF THORACOABDOMINAL AORTA: ICD-10-CM

## 2024-06-21 DIAGNOSIS — I27.20 PULMONARY HYPERTENSION, UNSPECIFIED: ICD-10-CM

## 2024-06-21 DIAGNOSIS — I71.012 DISSECTION OF DESCENDING THORACIC AORTA: ICD-10-CM

## 2024-06-21 PROCEDURE — 99214 OFFICE O/P EST MOD 30 MIN: CPT | Mod: 25

## 2024-06-21 PROCEDURE — 94010 BREATHING CAPACITY TEST: CPT

## 2024-06-21 PROCEDURE — 94727 GAS DIL/WSHOT DETER LNG VOL: CPT

## 2024-06-21 PROCEDURE — 95012 NITRIC OXIDE EXP GAS DETER: CPT

## 2024-06-21 PROCEDURE — 94729 DIFFUSING CAPACITY: CPT

## 2024-06-22 PROBLEM — I10 HYPERTENSION: Status: ACTIVE | Noted: 2023-04-14

## 2024-06-22 PROBLEM — I71.03 TYPE 2 DISSECTION OF THORACOABDOMINAL AORTA: Status: ACTIVE | Noted: 2023-09-09

## 2024-06-22 PROBLEM — R06.02 SHORTNESS OF BREATH ON EXERTION: Status: ACTIVE | Noted: 2023-03-21

## 2024-06-22 PROBLEM — I71.012 DISSECTION OF DESCENDING THORACIC AORTA: Status: ACTIVE | Noted: 2023-08-11

## 2024-06-22 NOTE — HISTORY OF PRESENT ILLNESS
[TextBox_4] : s/p TEVAR with Dr. Kwon and Dr. Suh on 4/26/24. Procedure was uncomplicated post operatively patient had no neurological deficits and lumbar drain was removed on POD#2. She had routine post operative CT scan that revealed new splenic infarcts and PE and she was started on low dose heparin gtt. Overnight POD#2-3 patient had new LE weakness, neurosurgery was emergently called and lumbar drain was replaced with improvement in symptoms and remained in place until POD#6. MRI was obstructed by artifact from TEVAR graft and CT was negative for spinal hemorrhage or infarct. Patient had persistent hypoxia post operatively and pulm was consulted for CTEPH. On POD#8 patient complained of LE unsteadiness with urinary retention, she was restarted on pressors to keep MAPs elevated with improvement. 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 Her pressors were slowly weaned from POD#10-12., POD #13, UA w/ culture obtained as patient retaining, UA positive, started on Ceftriaxone x4 days, discontinued as patient asymptomatic (cultures sensitive to CTX). She continued to remain stable with ambulation improving and transferred to  on POD#14. Hematology was consulted on POD 14, patient was transitioned from argatroban to Eliquis on POD 15. Urology reconsulted noting if patient fails TOV, she may leave stevens in place and follow up in their clinic. Patient failed overnight TOV on POD 17, so stevens replaced and left in place for discharge. On POD 18, hematology re-consulted, recommending to place patient back on argatroban gtt until plt >100k. POD #19-21, patient's platelets continued to be below goal, hematology reconsulted, pending further lab work up, pan culture and CTA C/A/P. POD22: CTA CAP done, UA obtained +Candida discussed w/  and given patient is asymptomatic no need to treat. POD23 urology noting patient can TOV one more time affter 7 days, stevens removed, passed TOV; however, was retaining over night, so stevens placed again. POD24 plts remain in the 70s, CTA C/A/P reviewed w/  found w/ normal post surgical findings and known endoleak, NTD. LE venous duplex obtained today revealing chronic LLE recannulation changes, no acute DVTs. POD 25 plts uptrended from 72>79, remains on therapeutic titrating argatroban gtt. POD26 plts uptrend 93>79. continue argatroban until platelets >100. POD27: Started on Eliquis with PLTS of 101, Pt DC home with Eliquis 10mg BID x7 days and 5 mg BID thereafter with outpatient followup. Seen postop by Dr. Suh 5/30. Of note, Confirmed by positive Heparin-PF4 antibodies and PAVAN, and noted celiac trunk thrombusplatelet carolyn 27k on 5/5/24. Meaghan was started on macitentan for pulmonary hypertension, in addition to being on riociguat. She is currently has minimal bilateral leg weakness, but no shortness of breath.

## 2024-06-22 NOTE — PROCEDURE
[FreeTextEntry1] : Pulmonary Function Test performed in my office today: Spirometry: Within normal limits; Lung Volume: Within normal limits; Diffusion: Severe impairment. __________  Exhaled Nitric Oxide             Final  No Documents Attached    	Test  	Result  	Flag	Reference	Goal	Last Verified  	Exhaled Nitric Oxide	17	 	 		REQUIRED  Ordered by: WILLAM LOO       Collected/Examined: 21Jun2024 04:20PM       Verification Required       Stage: Final       Performed at: In Office       Performed by: WILLAM LOO       Resulted: 21Jun2024 04:20PM       Last Updated: 21Jun2024 04:24PM

## 2024-06-22 NOTE — ASSESSMENT
[FreeTextEntry1] : Obtained and reviewed pulmonary function test, NIOX results with patient today.  Continue Eliquis 5 mg 1 tablet twice a day for CTEPH/pulmonary embolism. Continue Anoro Ellipta 62.5/25 mcg 1 puff once a day. Continue on Riociguat 2.5 mg for CTEPH. Continue macitentan 10 mg p.o. daily for pulmonary hypertension. Periodic echocardiogram to monitor pulmonary hypertension. Cardiology follow-up. Cardiothoracic surgery follow-up. Continue carvedilol and amlodipine for type II thoracic aortic dissection.   Medications reviewed. Continue present medications. Return for pulmonary follow up in 1 month.

## 2024-06-24 ENCOUNTER — RESULT REVIEW (OUTPATIENT)
Age: 69
End: 2024-06-24

## 2024-06-24 ENCOUNTER — APPOINTMENT (OUTPATIENT)
Dept: HEMATOLOGY ONCOLOGY | Facility: CLINIC | Age: 69
End: 2024-06-24
Payer: MEDICARE

## 2024-06-24 VITALS
SYSTOLIC BLOOD PRESSURE: 130 MMHG | RESPIRATION RATE: 14 BRPM | BODY MASS INDEX: 28.83 KG/M2 | WEIGHT: 165.34 LBS | OXYGEN SATURATION: 91 % | HEART RATE: 81 BPM | TEMPERATURE: 97.8 F | DIASTOLIC BLOOD PRESSURE: 69 MMHG

## 2024-06-24 DIAGNOSIS — D75.829 HEPARIN-INDUCED THROMBOCYTOPENIA, UNSPECIFIED: ICD-10-CM

## 2024-06-24 LAB
BASOPHILS # BLD AUTO: 0.02 K/UL — SIGNIFICANT CHANGE UP (ref 0–0.2)
BASOPHILS NFR BLD AUTO: 0.4 % — SIGNIFICANT CHANGE UP (ref 0–2)
EOSINOPHIL # BLD AUTO: 0.13 K/UL — SIGNIFICANT CHANGE UP (ref 0–0.5)
EOSINOPHIL NFR BLD AUTO: 2.3 % — SIGNIFICANT CHANGE UP (ref 0–6)
HCT VFR BLD CALC: 31 % — LOW (ref 34.5–45)
HGB BLD-MCNC: 10.3 G/DL — LOW (ref 11.5–15.5)
IMM GRANULOCYTES NFR BLD AUTO: 0.4 % — SIGNIFICANT CHANGE UP (ref 0–0.9)
LYMPHOCYTES # BLD AUTO: 1.53 K/UL — SIGNIFICANT CHANGE UP (ref 1–3.3)
LYMPHOCYTES # BLD AUTO: 27 % — SIGNIFICANT CHANGE UP (ref 13–44)
MCHC RBC-ENTMCNC: 25.9 PG — LOW (ref 27–34)
MCHC RBC-ENTMCNC: 33.2 G/DL — SIGNIFICANT CHANGE UP (ref 32–36)
MCV RBC AUTO: 77.9 FL — LOW (ref 80–100)
MONOCYTES # BLD AUTO: 0.41 K/UL — SIGNIFICANT CHANGE UP (ref 0–0.9)
MONOCYTES NFR BLD AUTO: 7.2 % — SIGNIFICANT CHANGE UP (ref 2–14)
NEUTROPHILS # BLD AUTO: 3.55 K/UL — SIGNIFICANT CHANGE UP (ref 1.8–7.4)
NEUTROPHILS NFR BLD AUTO: 62.7 % — SIGNIFICANT CHANGE UP (ref 43–77)
NRBC # BLD: 0 /100 WBCS — SIGNIFICANT CHANGE UP (ref 0–0)
PLATELET # BLD AUTO: 181 K/UL — SIGNIFICANT CHANGE UP (ref 150–400)
RBC # BLD: 3.98 M/UL — SIGNIFICANT CHANGE UP (ref 3.8–5.2)
RBC # FLD: 16.7 % — HIGH (ref 10.3–14.5)
WBC # BLD: 5.66 K/UL — SIGNIFICANT CHANGE UP (ref 3.8–10.5)
WBC # FLD AUTO: 5.66 K/UL — SIGNIFICANT CHANGE UP (ref 3.8–10.5)

## 2024-06-24 PROCEDURE — G2211 COMPLEX E/M VISIT ADD ON: CPT

## 2024-06-24 PROCEDURE — 99214 OFFICE O/P EST MOD 30 MIN: CPT

## 2024-06-25 DIAGNOSIS — R33.9 RETENTION OF URINE, UNSPECIFIED: ICD-10-CM

## 2024-06-25 DIAGNOSIS — K59.00 CONSTIPATION, UNSPECIFIED: ICD-10-CM

## 2024-06-28 LAB
ALBUMIN SERPL ELPH-MCNC: 3.9 G/DL
ALP BLD-CCNC: 72 U/L
ALT SERPL-CCNC: 15 U/L
ANION GAP SERPL CALC-SCNC: 11 MMOL/L
AST SERPL-CCNC: 18 U/L
BILIRUB SERPL-MCNC: 0.7 MG/DL
BUN SERPL-MCNC: 12 MG/DL
CALCIUM SERPL-MCNC: 9.3 MG/DL
CHLORIDE SERPL-SCNC: 105 MMOL/L
CO2 SERPL-SCNC: 25 MMOL/L
CREAT SERPL-MCNC: 1.08 MG/DL
EGFR: 56 ML/MIN/1.73M2
FERRITIN SERPL-MCNC: 100 NG/ML
GLUCOSE SERPL-MCNC: 106 MG/DL
HGB A MFR BLD: 69.2 %
HGB A2 MFR BLD: 3.4 %
HGB C MFR BLD: 27 %
HGB F MFR BLD: 0.4 %
HGB FRACT BLD-IMP: NORMAL
HGB S BLD QL SOLY: NEGATIVE
IRON SATN MFR SERPL: 13 %
IRON SERPL-MCNC: 35 UG/DL
POTASSIUM SERPL-SCNC: 3.5 MMOL/L
PROT SERPL-MCNC: 6.5 G/DL
SODIUM SERPL-SCNC: 141 MMOL/L
TIBC SERPL-MCNC: 282 UG/DL
UIBC SERPL-MCNC: 246 UG/DL

## 2024-07-01 ENCOUNTER — APPOINTMENT (OUTPATIENT)
Dept: CARDIOLOGY | Facility: CLINIC | Age: 69
End: 2024-07-01
Payer: MEDICARE

## 2024-07-01 ENCOUNTER — NON-APPOINTMENT (OUTPATIENT)
Age: 69
End: 2024-07-01

## 2024-07-01 VITALS
DIASTOLIC BLOOD PRESSURE: 64 MMHG | SYSTOLIC BLOOD PRESSURE: 113 MMHG | WEIGHT: 168 LBS | OXYGEN SATURATION: 85 % | TEMPERATURE: 98.4 F | HEART RATE: 94 BPM | BODY MASS INDEX: 29.4 KG/M2 | HEIGHT: 63.5 IN

## 2024-07-01 DIAGNOSIS — I27.24 CHRONIC THROMBOEMBOLIC PULMONARY HYPERTENSION: ICD-10-CM

## 2024-07-01 DIAGNOSIS — I50.812 CHRONIC RIGHT HEART FAILURE: ICD-10-CM

## 2024-07-01 DIAGNOSIS — I42.2 OTHER HYPERTROPHIC CARDIOMYOPATHY: ICD-10-CM

## 2024-07-01 DIAGNOSIS — I25.10 ATHEROSCLEROTIC HEART DISEASE OF NATIVE CORONARY ARTERY W/OUT ANGINA PECTORIS: ICD-10-CM

## 2024-07-01 DIAGNOSIS — I77.810 THORACIC AORTIC ECTASIA: ICD-10-CM

## 2024-07-01 PROCEDURE — 99214 OFFICE O/P EST MOD 30 MIN: CPT

## 2024-07-01 PROCEDURE — 93000 ELECTROCARDIOGRAM COMPLETE: CPT

## 2024-07-01 PROCEDURE — G2211 COMPLEX E/M VISIT ADD ON: CPT

## 2024-07-08 ENCOUNTER — RX RENEWAL (OUTPATIENT)
Age: 69
End: 2024-07-08

## 2024-07-12 ENCOUNTER — RX RENEWAL (OUTPATIENT)
Age: 69
End: 2024-07-12

## 2024-07-17 ENCOUNTER — RX RENEWAL (OUTPATIENT)
Age: 69
End: 2024-07-17

## 2024-07-19 ENCOUNTER — APPOINTMENT (OUTPATIENT)
Dept: PULMONOLOGY | Facility: CLINIC | Age: 69
End: 2024-07-19

## 2024-07-19 ENCOUNTER — TRANSCRIPTION ENCOUNTER (OUTPATIENT)
Age: 69
End: 2024-07-19

## 2024-07-29 ENCOUNTER — APPOINTMENT (OUTPATIENT)
Dept: UROLOGY | Facility: CLINIC | Age: 69
End: 2024-07-29

## 2024-09-09 ENCOUNTER — RX RENEWAL (OUTPATIENT)
Age: 69
End: 2024-09-09

## 2024-09-10 ENCOUNTER — APPOINTMENT (OUTPATIENT)
Dept: INTERNAL MEDICINE | Facility: CLINIC | Age: 69
End: 2024-09-10
Payer: MEDICARE

## 2024-09-10 VITALS
WEIGHT: 163 LBS | HEIGHT: 63.58 IN | HEART RATE: 77 BPM | BODY MASS INDEX: 28.53 KG/M2 | TEMPERATURE: 97.8 F | SYSTOLIC BLOOD PRESSURE: 125 MMHG | DIASTOLIC BLOOD PRESSURE: 78 MMHG

## 2024-09-10 DIAGNOSIS — N18.31 CHRONIC KIDNEY DISEASE, STAGE 3A: ICD-10-CM

## 2024-09-10 DIAGNOSIS — I50.812 CHRONIC RIGHT HEART FAILURE: ICD-10-CM

## 2024-09-10 DIAGNOSIS — I25.10 ATHEROSCLEROTIC HEART DISEASE OF NATIVE CORONARY ARTERY W/OUT ANGINA PECTORIS: ICD-10-CM

## 2024-09-10 DIAGNOSIS — Z63.4 DISAPPEARANCE AND DEATH OF FAMILY MEMBER: ICD-10-CM

## 2024-09-10 DIAGNOSIS — Z23 ENCOUNTER FOR IMMUNIZATION: ICD-10-CM

## 2024-09-10 DIAGNOSIS — R73.03 PREDIABETES.: ICD-10-CM

## 2024-09-10 DIAGNOSIS — D58.2 OTHER HEMOGLOBINOPATHIES: ICD-10-CM

## 2024-09-10 DIAGNOSIS — Z12.31 ENCOUNTER FOR SCREENING MAMMOGRAM FOR MALIGNANT NEOPLASM OF BREAST: ICD-10-CM

## 2024-09-10 DIAGNOSIS — Z12.11 ENCOUNTER FOR SCREENING FOR MALIGNANT NEOPLASM OF COLON: ICD-10-CM

## 2024-09-10 PROCEDURE — 99214 OFFICE O/P EST MOD 30 MIN: CPT

## 2024-09-10 PROCEDURE — G2211 COMPLEX E/M VISIT ADD ON: CPT

## 2024-09-10 PROCEDURE — 36415 COLL VENOUS BLD VENIPUNCTURE: CPT

## 2024-09-10 RX ORDER — CHOLECALCIFEROL (VITAMIN D3) 25 MCG
25 MCG TABLET ORAL
Refills: 0 | Status: ACTIVE | COMMUNITY

## 2024-09-10 SDOH — SOCIAL STABILITY - SOCIAL INSECURITY: DISSAPEARANCE AND DEATH OF FAMILY MEMBER: Z63.4

## 2024-09-10 NOTE — HISTORY OF PRESENT ILLNESS
[Family Member] : family member [de-identified] : 68 year old F with PMH HTN, CKD IIIa, right heart CHF, and pHTN presents for follow up.  recently passed from pancreatic cancer. Pt denies CP/SOB, fever/chills, n/v/d/c.

## 2024-09-10 NOTE — PHYSICAL EXAM
[No Acute Distress] : no acute distress [Well-Appearing] : well-appearing [Normal Sclera/Conjunctiva] : normal sclera/conjunctiva [PERRL] : pupils equal round and reactive to light [EOMI] : extraocular movements intact [Normal Outer Ear/Nose] : the outer ears and nose were normal in appearance [Normal Oropharynx] : the oropharynx was normal [Normal TMs] : both tympanic membranes were normal [No JVD] : no jugular venous distention [No Lymphadenopathy] : no lymphadenopathy [Supple] : supple [Thyroid Normal, No Nodules] : the thyroid was normal and there were no nodules present [No Respiratory Distress] : no respiratory distress  [No Accessory Muscle Use] : no accessory muscle use [Clear to Auscultation] : lungs were clear to auscultation bilaterally [Normal Rate] : normal rate  [Regular Rhythm] : with a regular rhythm [Normal S1, S2] : normal S1 and S2 [No Murmur] : no murmur heard [No Edema] : there was no peripheral edema [No Extremity Clubbing/Cyanosis] : no extremity clubbing/cyanosis [Coordination Grossly Intact] : coordination grossly intact [No Focal Deficits] : no focal deficits [Normal Gait] : normal gait [Normal Affect] : the affect was normal [Normal Insight/Judgement] : insight and judgment were intact

## 2024-09-10 NOTE — PLAN
[FreeTextEntry1] : Routine blood work drawn in office and urine collected today. Mammogram ordered. GI referral placed. Pt advised to sign up for [facility] portal to review labs and communicate any questions or concerns directly. Yearly physical and return as needed for illness, medication refills, and new or existing complaints. f/u 4 months.

## 2024-09-12 ENCOUNTER — TRANSCRIPTION ENCOUNTER (OUTPATIENT)
Age: 69
End: 2024-09-12

## 2024-09-12 LAB
25(OH)D3 SERPL-MCNC: 32.9 NG/ML
ALBUMIN SERPL ELPH-MCNC: 3.8 G/DL
ALP BLD-CCNC: 91 U/L
ALT SERPL-CCNC: 15 U/L
ANION GAP SERPL CALC-SCNC: 13 MMOL/L
APPEARANCE: CLEAR
AST SERPL-CCNC: 16 U/L
BACTERIA: NEGATIVE /HPF
BASOPHILS # BLD AUTO: 0.02 K/UL
BASOPHILS NFR BLD AUTO: 0.4 %
BILIRUB SERPL-MCNC: 0.6 MG/DL
BILIRUBIN URINE: NEGATIVE
BLOOD URINE: NEGATIVE
BUN SERPL-MCNC: 13 MG/DL
CALCIUM OXALATE CRYSTALS: PRESENT
CALCIUM SERPL-MCNC: 8.9 MG/DL
CAST: 0 /LPF
CHLORIDE SERPL-SCNC: 110 MMOL/L
CHOLEST SERPL-MCNC: 110 MG/DL
CO2 SERPL-SCNC: 21 MMOL/L
COLOR: YELLOW
CREAT SERPL-MCNC: 1.06 MG/DL
CREAT SPEC-SCNC: 158 MG/DL
CREAT/PROT UR: 0.1 RATIO
EGFR: 57 ML/MIN/1.73M2
EOSINOPHIL # BLD AUTO: 0.16 K/UL
EOSINOPHIL NFR BLD AUTO: 3.1 %
EPITHELIAL CELLS: 3 /HPF
FRUCTOSAMINE SERPL-MCNC: 223 UMOL/L
GLUCOSE QUALITATIVE U: NEGATIVE MG/DL
GLUCOSE SERPL-MCNC: 99 MG/DL
HCT VFR BLD CALC: 33.6 %
HDLC SERPL-MCNC: 40 MG/DL
HGB BLD-MCNC: 11.2 G/DL
IMM GRANULOCYTES NFR BLD AUTO: 0.4 %
KETONES URINE: NEGATIVE MG/DL
LDLC SERPL CALC-MCNC: 57 MG/DL
LEUKOCYTE ESTERASE URINE: NEGATIVE
LYMPHOCYTES # BLD AUTO: 1.85 K/UL
LYMPHOCYTES NFR BLD AUTO: 35.6 %
MAN DIFF?: NORMAL
MCHC RBC-ENTMCNC: 25.3 PG
MCHC RBC-ENTMCNC: 33.3 GM/DL
MCV RBC AUTO: 76 FL
MICROSCOPIC-UA: NORMAL
MONOCYTES # BLD AUTO: 0.41 K/UL
MONOCYTES NFR BLD AUTO: 7.9 %
NEUTROPHILS # BLD AUTO: 2.73 K/UL
NEUTROPHILS NFR BLD AUTO: 52.6 %
NITRITE URINE: NEGATIVE
NONHDLC SERPL-MCNC: 71 MG/DL
PH URINE: 5.5
PLATELET # BLD AUTO: 178 K/UL
POTASSIUM SERPL-SCNC: 4.1 MMOL/L
PROT SERPL-MCNC: 6.4 G/DL
PROT UR-MCNC: 8 MG/DL
PROTEIN URINE: NEGATIVE MG/DL
RBC # BLD: 4.42 M/UL
RBC # FLD: 17.1 %
RED BLOOD CELLS URINE: 2 /HPF
REVIEW: NORMAL
SODIUM SERPL-SCNC: 143 MMOL/L
SPECIFIC GRAVITY URINE: 1.02
TRIGL SERPL-MCNC: 63 MG/DL
UROBILINOGEN URINE: 0.2 MG/DL
WBC # FLD AUTO: 5.19 K/UL
WHITE BLOOD CELLS URINE: 1 /HPF

## 2024-09-16 ENCOUNTER — NON-APPOINTMENT (OUTPATIENT)
Age: 69
End: 2024-09-16

## 2024-09-17 ENCOUNTER — APPOINTMENT (OUTPATIENT)
Dept: INTERNAL MEDICINE | Facility: CLINIC | Age: 69
End: 2024-09-17
Payer: MEDICARE

## 2024-09-17 VITALS
OXYGEN SATURATION: 90 % | BODY MASS INDEX: 28.7 KG/M2 | HEIGHT: 63 IN | HEART RATE: 77 BPM | RESPIRATION RATE: 16 BRPM | DIASTOLIC BLOOD PRESSURE: 70 MMHG | SYSTOLIC BLOOD PRESSURE: 121 MMHG | WEIGHT: 162 LBS | TEMPERATURE: 97.4 F

## 2024-09-17 PROCEDURE — 99212 OFFICE O/P EST SF 10 MIN: CPT

## 2024-09-17 PROCEDURE — G2211 COMPLEX E/M VISIT ADD ON: CPT

## 2024-09-17 NOTE — PHYSICAL EXAM
[No Acute Distress] : no acute distress [Well-Appearing] : well-appearing [No Respiratory Distress] : no respiratory distress  [No Accessory Muscle Use] : no accessory muscle use [Coordination Grossly Intact] : coordination grossly intact [No Focal Deficits] : no focal deficits [Normal Gait] : normal gait [Normal Affect] : the affect was normal [Normal Insight/Judgement] : insight and judgment were intact [de-identified] : soft, mobile, non-tender, non-erythematous swelling of the left olecranon

## 2024-09-17 NOTE — REVIEW OF SYSTEMS
[Joint Swelling] : joint swelling [Negative] : Psychiatric [Joint Pain] : no joint pain [Joint Stiffness] : no joint stiffness [Muscle Weakness] : no muscle weakness [Muscle Pain] : no muscle pain [Back Pain] : no back pain

## 2024-09-17 NOTE — HISTORY OF PRESENT ILLNESS
[Family Member] : family member [FreeTextEntry8] : 68 year old F with PMH HTN, CKD IIIa, right heart CHF, and pHTN presents for left elbow swelling. Pt denies CP/SOB, fever/chills, n/v/d/c.

## 2024-09-17 NOTE — PLAN
[FreeTextEntry1] : Plan as above. RICE advised. Refer to orthopedics. Pt advised to sign up for [facility] portal to review labs and communicate any questions or concerns directly. Yearly physical and return as needed for illness, medication refills, and new or existing complaints.

## 2024-09-18 ENCOUNTER — APPOINTMENT (OUTPATIENT)
Dept: PULMONOLOGY | Facility: CLINIC | Age: 69
End: 2024-09-18
Payer: MEDICARE

## 2024-09-18 VITALS — OXYGEN SATURATION: 93 % | SYSTOLIC BLOOD PRESSURE: 110 MMHG | DIASTOLIC BLOOD PRESSURE: 66 MMHG | HEART RATE: 88 BPM

## 2024-09-18 DIAGNOSIS — J98.4 OTHER DISORDERS OF LUNG: ICD-10-CM

## 2024-09-18 DIAGNOSIS — R06.02 SHORTNESS OF BREATH: ICD-10-CM

## 2024-09-18 DIAGNOSIS — E55.9 VITAMIN D DEFICIENCY, UNSPECIFIED: ICD-10-CM

## 2024-09-18 DIAGNOSIS — I27.24 CHRONIC THROMBOEMBOLIC PULMONARY HYPERTENSION: ICD-10-CM

## 2024-09-18 DIAGNOSIS — I10 ESSENTIAL (PRIMARY) HYPERTENSION: ICD-10-CM

## 2024-09-18 PROCEDURE — ZZZZZ: CPT

## 2024-09-18 PROCEDURE — 94727 GAS DIL/WSHOT DETER LNG VOL: CPT

## 2024-09-18 PROCEDURE — 95012 NITRIC OXIDE EXP GAS DETER: CPT

## 2024-09-18 PROCEDURE — 94010 BREATHING CAPACITY TEST: CPT

## 2024-09-18 PROCEDURE — 94729 DIFFUSING CAPACITY: CPT

## 2024-09-18 PROCEDURE — 99214 OFFICE O/P EST MOD 30 MIN: CPT | Mod: 25

## 2024-09-19 ENCOUNTER — APPOINTMENT (OUTPATIENT)
Dept: ORTHOPEDIC SURGERY | Facility: CLINIC | Age: 69
End: 2024-09-19
Payer: MEDICARE

## 2024-09-19 DIAGNOSIS — M70.22 OLECRANON BURSITIS, LEFT ELBOW: ICD-10-CM

## 2024-09-19 DIAGNOSIS — M25.422 EFFUSION, LEFT ELBOW: ICD-10-CM

## 2024-09-19 PROCEDURE — 20605 DRAIN/INJ JOINT/BURSA W/O US: CPT | Mod: LT

## 2024-09-19 PROCEDURE — 99204 OFFICE O/P NEW MOD 45 MIN: CPT | Mod: 25

## 2024-09-19 PROCEDURE — 73080 X-RAY EXAM OF ELBOW: CPT | Mod: LT

## 2024-09-19 NOTE — HISTORY OF PRESENT ILLNESS
[de-identified] : 9/19/24:  acute onset of left elbow swelling since 9/15/24.  no injury or trauma.  no fevers or chills.  reports she just noticed the swelling.  no pain today.  [FreeTextEntry5] : MONIKA rangel [RHD] 68 year old female is here for LEFT elbow evaluation. pt states she noticed swelling at elbow 4 days ago w/o injury. denies prior injury to elbow.

## 2024-09-19 NOTE — ADDENDUM
[FreeTextEntry1] : I, Mook Harlan, acted solely as a scribe for Dr. Cathy Serra D.O. on this date 09/18/2024.   All medical record entries made by the Scribe were at my, Dr. Cathy Serra D.O., direction and personally dictated by me on 09/18/2024. I have reviewed the chart and agree that the record accurately reflects my personal performance of the history, physical exam, assessment and plan. I have also personally directed, reviewed, and agreed with the chart.

## 2024-09-19 NOTE — ASSESSMENT
[FreeTextEntry1] : Continue Eliquis 5 mg 1 tablet twice a day for CTEPH/pulmonary embolism. Continue Anoro Ellipta 62.5/25 mcg 1 puff once a day. Continue on Riociguat/Adempas 2.5 mg for CTEPH. Continue macitentant 10 mg p.o. daily for pulmonary hypertension. Periodic echocardiogram to monitor pulmonary hypertension. Cardiology follow-up. Cardiothoracic surgery follow-up. Continue carvedilol and amlodipine for type II thoracic aortic dissection.   Medications reviewed. Continue present medications.  Dr. Serra recommended the flu shot to Meaghan but she decliined. Dr. Serra's assessment: Meaghan is overall doing well. PFT ordered to monitor lung function. PFT normal, Meaghan is overall in very good health. Meaghan should follow up with Dr. Serra in three months (12/2024)

## 2024-09-19 NOTE — PROCEDURE
[Medium Joint Injection] : medium joint injection [Left] : of the left [Olecranon Bursae] : olecranon bursae [Alcohol] : alcohol [Betadine] : betadine [Ethyl Chloride sprayed topically] : ethyl chloride sprayed topically [Sterile technique used] : sterile technique used [Other: ___] : [unfilled] [] : Patient tolerated procedure well [Call if redness, pain or fever occur] : call if redness, pain or fever occur [Apply ice for 15min out of every hour for the next 12-24 hours as tolerated] : apply ice for 15 minutes out of every hour for the next 12-24 hours as tolerated [Patient was advised to rest the joint(s) for ____ days] : patient was advised to rest the joint(s) for [unfilled] days [Previous OTC use and PT nontherapeutic] : patient has tried OTC's including aspirin, Ibuprofen, Aleve, etc or prescription NSAIDS, and/or exercises at home and/or physical therapy without satisfactory response [Patient had decreased mobility in the joint] : patient had decreased mobility in the joint [Risks, benefits, alternatives discussed / Verbal consent obtained] : the risks benefits, and alternatives have been discussed, and verbal consent was obtained [de-identified] : 14 cc [de-identified] : clear yellow

## 2024-09-19 NOTE — HISTORY OF PRESENT ILLNESS
[TextBox_4] : MEAGHAN MORA is a 68 year old female, with history of cough and pulmonary hypertension, CTEPH, who presents to the office for follow up evaluation.  Meaghan is overall feeling well at this time but notes her bereavement for her recently departed .  Meaghan notes her compliance with her Eliquis and adempas regimen.  Meaghan notes that she does not want the flu shot but already had the shingles shot.

## 2024-09-19 NOTE — ASSESSMENT
[FreeTextEntry1] : acute onset of left elbow swelling since 9/15/24.  no injury or trauma.  no fevers or chills.  no pain today.  likely aseptic olecranon bursitis.   on eliquis - history of PE 2023 pmhx - htn, high cholesterol.

## 2024-09-19 NOTE — PHYSICAL EXAM
[Left] : left elbow [NL (150)] : flexion 150 degrees [NL (0)] : extension 0 degrees [NL (90)] : supination 90 degrees [5___] : supination 5[unfilled]/5 [] : no erythema [de-identified] : no ttp

## 2024-09-19 NOTE — PROCEDURE
[FreeTextEntry1] : PFT performed today, Sep 18 2024  2:50PM in my office . Spirometry: Within normal limits Lung Volume: Within normal limits Diffusion: Moderate impairment __________  Exhaled Nitric Oxide             Final  No Documents Attached    	Test  	Result  	Flag	Reference	Goal	Last Verified  	Exhaled Nitric Oxide	26	 	 		REQUIRED  Ordered by: WILLAM LOO       Collected/Examined: [id number] 03:24PM       Verification Required       Stage: Final       Performed at: In Office       Performed by: WILLAM LOO       Resulted: [id number] 03:24PM       Last Updated: [id number] 03:24PM

## 2024-09-19 NOTE — PROCEDURE
[FreeTextEntry1] : PFT performed today, Sep 18 2024  2:50PM in my office . Spirometry: Within normal limits Lung Volume: Within normal limits Diffusion: Moderate impairment __________  Exhaled Nitric Oxide             Final  No Documents Attached    	Test  	Result  	Flag	Reference	Goal	Last Verified  	Exhaled Nitric Oxide	26	 	 		REQUIRED  Ordered by: WILLAM LOO       Collected/Examined: [id number] 03:24PM       Verification Required       Stage: Final       Performed at: In Office       Performed by: WILLAM LOO       Resulted: [id number] 03:24PM       Last Updated: [id number] 03:24PM

## 2024-09-19 NOTE — DISCUSSION/SUMMARY
[de-identified] : aspiration as below. advised on rest, ice, compression.  discussed possibility of recurrence.  patient to call in or go to ER if any fevers, chills, redness drainage, etc.  tylenol prn - patient on eliquis.  The patient's orthopaedic condition(s) would warrant consideration of consistent or intermittent use of a prescription strength non-steroidal anti-inflammatory medication.  These medications are associated with risks including but not limited to gastrointestinal irritation, kidney damage, hypertension, and bleeding.    The patient has one or more medical conditions that would make this class of medication a significant risk and was therefore not prescribed. follow up in 1 week to re-assess.  Progress Note completed by Katie Michaud PA-C * Dr. Foster -- The documentation recorded in this note accurately reflects the decisions made by me during this visit.  I interviewed and examined the patient personally and oversaw all medical decisions.

## 2024-09-26 ENCOUNTER — APPOINTMENT (OUTPATIENT)
Dept: ORTHOPEDIC SURGERY | Facility: CLINIC | Age: 69
End: 2024-09-26
Payer: MEDICARE

## 2024-09-26 VITALS — WEIGHT: 162 LBS | BODY MASS INDEX: 28.7 KG/M2 | HEIGHT: 63 IN

## 2024-09-26 DIAGNOSIS — I10 ESSENTIAL (PRIMARY) HYPERTENSION: ICD-10-CM

## 2024-09-26 DIAGNOSIS — E78.00 PURE HYPERCHOLESTEROLEMIA, UNSPECIFIED: ICD-10-CM

## 2024-09-26 DIAGNOSIS — I51.9 HEART DISEASE, UNSPECIFIED: ICD-10-CM

## 2024-09-26 PROCEDURE — 20605 DRAIN/INJ JOINT/BURSA W/O US: CPT | Mod: LT

## 2024-09-26 PROCEDURE — 99214 OFFICE O/P EST MOD 30 MIN: CPT | Mod: 25

## 2024-09-26 NOTE — PHYSICAL EXAM
[Left] : left elbow [NL (150)] : flexion 150 degrees [NL (0)] : extension 0 degrees [NL (90)] : supination 90 degrees [5___] : supination 5[unfilled]/5 [] : small olecranon bursal effusion [de-identified] : no ttp

## 2024-09-26 NOTE — PROCEDURE
[Medium Joint Injection] : medium joint injection [Left] : of the left [Olecranon Bursae] : olecranon bursae [Alcohol] : alcohol [Betadine] : betadine [Ethyl Chloride sprayed topically] : ethyl chloride sprayed topically [Sterile technique used] : sterile technique used [Other: ___] : [unfilled] [] : Patient tolerated procedure well [Call if redness, pain or fever occur] : call if redness, pain or fever occur [Apply ice for 15min out of every hour for the next 12-24 hours as tolerated] : apply ice for 15 minutes out of every hour for the next 12-24 hours as tolerated [Patient was advised to rest the joint(s) for ____ days] : patient was advised to rest the joint(s) for [unfilled] days [Previous OTC use and PT nontherapeutic] : patient has tried OTC's including aspirin, Ibuprofen, Aleve, etc or prescription NSAIDS, and/or exercises at home and/or physical therapy without satisfactory response [Patient had decreased mobility in the joint] : patient had decreased mobility in the joint [Risks, benefits, alternatives discussed / Verbal consent obtained] : the risks benefits, and alternatives have been discussed, and verbal consent was obtained [de-identified] : 8 [de-identified] : red tinged

## 2024-09-26 NOTE — DISCUSSION/SUMMARY
[de-identified] : aspiration as below. advised on rest, ice, compression.  discussed possibility of recurrence.  patient to call in or go to ER if any fevers, chills, redness drainage, etc.  tylenol prn - patient on eliquis.  The patient's orthopaedic condition(s) would warrant consideration of consistent or intermittent use of a prescription strength non-steroidal anti-inflammatory medication.  These medications are associated with risks including but not limited to gastrointestinal irritation, kidney damage, hypertension, and bleeding.    The patient has one or more medical conditions that would make this class of medication a significant risk and was therefore not prescribed. follow up in 1 week to re-assess.

## 2024-09-26 NOTE — HISTORY OF PRESENT ILLNESS
[de-identified] : 9/19/24:  acute onset of left elbow swelling since 9/15/24.  no injury or trauma.  no fevers or chills.  reports she just noticed the swelling.  no pain today.  9/26/24: Follow up on the left elbow, swelling has gotten better but some recurrence.  no fevers or chills.

## 2024-10-03 ENCOUNTER — APPOINTMENT (OUTPATIENT)
Dept: ORTHOPEDIC SURGERY | Facility: CLINIC | Age: 69
End: 2024-10-03
Payer: MEDICARE

## 2024-10-03 DIAGNOSIS — M70.22 OLECRANON BURSITIS, LEFT ELBOW: ICD-10-CM

## 2024-10-03 PROCEDURE — 99213 OFFICE O/P EST LOW 20 MIN: CPT

## 2024-10-03 NOTE — ASSESSMENT
[FreeTextEntry1] : acute onset of left elbow swelling since 9/15/24.  no injury or trauma.  no fevers or chills.  no pain today.  likely aseptic olecranon bursitis.  mostly just the bursal sac today. no pain.  on eliquis - history of PE 2023 pmhx - htn, high cholesterol.

## 2024-10-03 NOTE — PHYSICAL EXAM
[Left] : left elbow [NL (150)] : flexion 150 degrees [NL (0)] : extension 0 degrees [NL (90)] : supination 90 degrees [5___] : supination 5[unfilled]/5 [] : no erythema [FreeTextEntry3] : less swelling [de-identified] : no ttp

## 2024-10-03 NOTE — HISTORY OF PRESENT ILLNESS
[de-identified] : 9/19/24:  acute onset of left elbow swelling since 9/15/24.  no injury or trauma.  no fevers or chills.  reports she just noticed the swelling.  no pain today.  9/26/24: Follow up on the left elbow, swelling has gotten better but some recurrence.  no fevers or chills. 10/3/24:  improved alot but still minor bump

## 2024-10-14 ENCOUNTER — RX RENEWAL (OUTPATIENT)
Age: 69
End: 2024-10-14

## 2024-10-21 ENCOUNTER — APPOINTMENT (OUTPATIENT)
Dept: UROLOGY | Facility: CLINIC | Age: 69
End: 2024-10-21
Payer: MEDICARE

## 2024-10-21 VITALS
SYSTOLIC BLOOD PRESSURE: 117 MMHG | RESPIRATION RATE: 16 BRPM | DIASTOLIC BLOOD PRESSURE: 72 MMHG | HEART RATE: 54 BPM | OXYGEN SATURATION: 94 %

## 2024-10-21 DIAGNOSIS — R33.9 RETENTION OF URINE, UNSPECIFIED: ICD-10-CM

## 2024-10-21 DIAGNOSIS — K59.00 CONSTIPATION, UNSPECIFIED: ICD-10-CM

## 2024-10-21 PROCEDURE — 51798 US URINE CAPACITY MEASURE: CPT

## 2024-10-21 PROCEDURE — 99214 OFFICE O/P EST MOD 30 MIN: CPT | Mod: 25

## 2024-10-21 PROCEDURE — G2211 COMPLEX E/M VISIT ADD ON: CPT

## 2024-10-23 ENCOUNTER — RX RENEWAL (OUTPATIENT)
Age: 69
End: 2024-10-23

## 2024-11-11 ENCOUNTER — RX RENEWAL (OUTPATIENT)
Age: 69
End: 2024-11-11

## 2024-12-03 ENCOUNTER — TRANSCRIPTION ENCOUNTER (OUTPATIENT)
Age: 69
End: 2024-12-03

## 2024-12-05 ENCOUNTER — OUTPATIENT (OUTPATIENT)
Dept: OUTPATIENT SERVICES | Facility: HOSPITAL | Age: 69
LOS: 1 days | End: 2024-12-05
Payer: MEDICARE

## 2024-12-05 ENCOUNTER — APPOINTMENT (OUTPATIENT)
Dept: CT IMAGING | Facility: CLINIC | Age: 69
End: 2024-12-05
Payer: MEDICARE

## 2024-12-05 DIAGNOSIS — Z95.828 PRESENCE OF OTHER VASCULAR IMPLANTS AND GRAFTS: ICD-10-CM

## 2024-12-05 DIAGNOSIS — Z98.891 HISTORY OF UTERINE SCAR FROM PREVIOUS SURGERY: Chronic | ICD-10-CM

## 2024-12-05 DIAGNOSIS — Z90.710 ACQUIRED ABSENCE OF BOTH CERVIX AND UTERUS: Chronic | ICD-10-CM

## 2024-12-05 DIAGNOSIS — Z95.810 PRESENCE OF AUTOMATIC (IMPLANTABLE) CARDIAC DEFIBRILLATOR: ICD-10-CM

## 2024-12-05 DIAGNOSIS — I77.810 THORACIC AORTIC ECTASIA: ICD-10-CM

## 2024-12-05 PROCEDURE — 71275 CT ANGIOGRAPHY CHEST: CPT | Mod: 26

## 2024-12-05 PROCEDURE — 74174 CTA ABD&PLVS W/CONTRAST: CPT

## 2024-12-05 PROCEDURE — 71275 CT ANGIOGRAPHY CHEST: CPT

## 2024-12-05 PROCEDURE — 74174 CTA ABD&PLVS W/CONTRAST: CPT | Mod: 26

## 2024-12-06 ENCOUNTER — NON-APPOINTMENT (OUTPATIENT)
Age: 69
End: 2024-12-06

## 2024-12-09 ENCOUNTER — OUTPATIENT (OUTPATIENT)
Dept: OUTPATIENT SERVICES | Facility: HOSPITAL | Age: 69
LOS: 1 days | Discharge: HOME | End: 2024-12-09

## 2024-12-09 ENCOUNTER — TRANSCRIPTION ENCOUNTER (OUTPATIENT)
Age: 69
End: 2024-12-09

## 2024-12-09 DIAGNOSIS — Z98.891 HISTORY OF UTERINE SCAR FROM PREVIOUS SURGERY: Chronic | ICD-10-CM

## 2024-12-09 DIAGNOSIS — D64.9 ANEMIA, UNSPECIFIED: ICD-10-CM

## 2024-12-09 DIAGNOSIS — Z90.710 ACQUIRED ABSENCE OF BOTH CERVIX AND UTERUS: Chronic | ICD-10-CM

## 2024-12-10 ENCOUNTER — APPOINTMENT (OUTPATIENT)
Dept: INTERNAL MEDICINE | Facility: CLINIC | Age: 69
End: 2024-12-10
Payer: MEDICARE

## 2024-12-10 VITALS
BODY MASS INDEX: 28.7 KG/M2 | HEART RATE: 85 BPM | WEIGHT: 162 LBS | SYSTOLIC BLOOD PRESSURE: 126 MMHG | TEMPERATURE: 98.1 F | OXYGEN SATURATION: 91 % | DIASTOLIC BLOOD PRESSURE: 73 MMHG

## 2024-12-10 DIAGNOSIS — I25.10 ATHEROSCLEROTIC HEART DISEASE OF NATIVE CORONARY ARTERY W/OUT ANGINA PECTORIS: ICD-10-CM

## 2024-12-10 DIAGNOSIS — Z01.811 ENCOUNTER FOR PREPROCEDURAL RESPIRATORY EXAMINATION: ICD-10-CM

## 2024-12-10 DIAGNOSIS — Z00.00 ENCOUNTER FOR GENERAL ADULT MEDICAL EXAMINATION W/OUT ABNORMAL FINDINGS: ICD-10-CM

## 2024-12-10 DIAGNOSIS — E66.3 OVERWEIGHT: ICD-10-CM

## 2024-12-10 DIAGNOSIS — Z23 ENCOUNTER FOR IMMUNIZATION: ICD-10-CM

## 2024-12-10 DIAGNOSIS — Z86.79 PERSONAL HISTORY OF OTHER DISEASES OF THE CIRCULATORY SYSTEM: ICD-10-CM

## 2024-12-10 DIAGNOSIS — Z12.31 ENCOUNTER FOR SCREENING MAMMOGRAM FOR MALIGNANT NEOPLASM OF BREAST: ICD-10-CM

## 2024-12-10 DIAGNOSIS — Z12.11 ENCOUNTER FOR SCREENING FOR MALIGNANT NEOPLASM OF COLON: ICD-10-CM

## 2024-12-10 DIAGNOSIS — Z86.2 PERSONAL HISTORY OF DISEASES OF THE BLOOD AND BLOOD-FORMING ORGANS AND CERTAIN DISORDERS INVOLVING THE IMMUNE MECHANISM: ICD-10-CM

## 2024-12-10 PROCEDURE — G0439: CPT

## 2024-12-12 ENCOUNTER — APPOINTMENT (OUTPATIENT)
Dept: CARDIOTHORACIC SURGERY | Facility: CLINIC | Age: 69
End: 2024-12-12
Payer: MEDICARE

## 2024-12-12 VITALS
SYSTOLIC BLOOD PRESSURE: 145 MMHG | BODY MASS INDEX: 28.7 KG/M2 | HEIGHT: 63 IN | HEART RATE: 75 BPM | WEIGHT: 162 LBS | DIASTOLIC BLOOD PRESSURE: 78 MMHG | OXYGEN SATURATION: 95 % | TEMPERATURE: 98.4 F | RESPIRATION RATE: 14 BRPM

## 2024-12-12 DIAGNOSIS — R33.9 RETENTION OF URINE, UNSPECIFIED: ICD-10-CM

## 2024-12-12 DIAGNOSIS — Z95.828 PRESENCE OF OTHER VASCULAR IMPLANTS AND GRAFTS: ICD-10-CM

## 2024-12-12 DIAGNOSIS — I71.03 DISSECTION OF THORACOABDOMINAL AORTA: ICD-10-CM

## 2024-12-12 DIAGNOSIS — N18.31 CHRONIC KIDNEY DISEASE, STAGE 3A: ICD-10-CM

## 2024-12-12 PROCEDURE — 99214 OFFICE O/P EST MOD 30 MIN: CPT

## 2024-12-16 ENCOUNTER — RESULT REVIEW (OUTPATIENT)
Age: 69
End: 2024-12-16

## 2024-12-16 ENCOUNTER — APPOINTMENT (OUTPATIENT)
Dept: HEMATOLOGY ONCOLOGY | Facility: CLINIC | Age: 69
End: 2024-12-16

## 2024-12-16 ENCOUNTER — APPOINTMENT (OUTPATIENT)
Dept: HEMATOLOGY ONCOLOGY | Facility: CLINIC | Age: 69
End: 2024-12-16
Payer: MEDICARE

## 2024-12-16 VITALS
HEART RATE: 65 BPM | DIASTOLIC BLOOD PRESSURE: 76 MMHG | BODY MASS INDEX: 29.21 KG/M2 | SYSTOLIC BLOOD PRESSURE: 144 MMHG | RESPIRATION RATE: 16 BRPM | WEIGHT: 164.91 LBS | OXYGEN SATURATION: 97 % | TEMPERATURE: 97.2 F

## 2024-12-16 LAB
BASOPHILS # BLD AUTO: 0.02 K/UL — SIGNIFICANT CHANGE UP (ref 0–0.2)
BASOPHILS NFR BLD AUTO: 0.5 % — SIGNIFICANT CHANGE UP (ref 0–2)
EOSINOPHIL # BLD AUTO: 0.12 K/UL — SIGNIFICANT CHANGE UP (ref 0–0.5)
EOSINOPHIL NFR BLD AUTO: 2.7 % — SIGNIFICANT CHANGE UP (ref 0–6)
HCT VFR BLD CALC: 35.6 % — SIGNIFICANT CHANGE UP (ref 34.5–45)
HGB BLD-MCNC: 12 G/DL — SIGNIFICANT CHANGE UP (ref 11.5–15.5)
IMM GRANULOCYTES NFR BLD AUTO: 0.2 % — SIGNIFICANT CHANGE UP (ref 0–0.9)
LYMPHOCYTES # BLD AUTO: 1.7 K/UL — SIGNIFICANT CHANGE UP (ref 1–3.3)
LYMPHOCYTES # BLD AUTO: 38.8 % — SIGNIFICANT CHANGE UP (ref 13–44)
MCHC RBC-ENTMCNC: 25.3 PG — LOW (ref 27–34)
MCHC RBC-ENTMCNC: 33.7 G/DL — SIGNIFICANT CHANGE UP (ref 32–36)
MCV RBC AUTO: 75.1 FL — LOW (ref 80–100)
MONOCYTES # BLD AUTO: 0.29 K/UL — SIGNIFICANT CHANGE UP (ref 0–0.9)
MONOCYTES NFR BLD AUTO: 6.6 % — SIGNIFICANT CHANGE UP (ref 2–14)
NEUTROPHILS # BLD AUTO: 2.24 K/UL — SIGNIFICANT CHANGE UP (ref 1.8–7.4)
NEUTROPHILS NFR BLD AUTO: 51.2 % — SIGNIFICANT CHANGE UP (ref 43–77)
NRBC # BLD: 0 /100 WBCS — SIGNIFICANT CHANGE UP (ref 0–0)
NRBC BLD-RTO: 0 /100 WBCS — SIGNIFICANT CHANGE UP (ref 0–0)
PLATELET # BLD AUTO: 183 K/UL — SIGNIFICANT CHANGE UP (ref 150–400)
RBC # BLD: 4.74 M/UL — SIGNIFICANT CHANGE UP (ref 3.8–5.2)
RBC # FLD: 16.3 % — HIGH (ref 10.3–14.5)
WBC # BLD: 4.38 K/UL — SIGNIFICANT CHANGE UP (ref 3.8–10.5)
WBC # FLD AUTO: 4.38 K/UL — SIGNIFICANT CHANGE UP (ref 3.8–10.5)

## 2024-12-16 PROCEDURE — 99213 OFFICE O/P EST LOW 20 MIN: CPT

## 2024-12-16 PROCEDURE — G2211 COMPLEX E/M VISIT ADD ON: CPT

## 2024-12-19 ENCOUNTER — APPOINTMENT (OUTPATIENT)
Dept: PULMONOLOGY | Facility: CLINIC | Age: 69
End: 2024-12-19
Payer: MEDICARE

## 2024-12-19 VITALS — DIASTOLIC BLOOD PRESSURE: 78 MMHG | OXYGEN SATURATION: 97 % | SYSTOLIC BLOOD PRESSURE: 134 MMHG | HEART RATE: 69 BPM

## 2024-12-19 DIAGNOSIS — I77.810 THORACIC AORTIC ECTASIA: ICD-10-CM

## 2024-12-19 DIAGNOSIS — J98.4 OTHER DISORDERS OF LUNG: ICD-10-CM

## 2024-12-19 DIAGNOSIS — R06.02 SHORTNESS OF BREATH: ICD-10-CM

## 2024-12-19 DIAGNOSIS — I71.012 DISSECTION OF DESCENDING THORACIC AORTA: ICD-10-CM

## 2024-12-19 DIAGNOSIS — I27.24 CHRONIC THROMBOEMBOLIC PULMONARY HYPERTENSION: ICD-10-CM

## 2024-12-19 PROCEDURE — 94729 DIFFUSING CAPACITY: CPT

## 2024-12-19 PROCEDURE — 94010 BREATHING CAPACITY TEST: CPT

## 2024-12-19 PROCEDURE — 94727 GAS DIL/WSHOT DETER LNG VOL: CPT

## 2024-12-19 PROCEDURE — ZZZZZ: CPT

## 2024-12-19 PROCEDURE — 99214 OFFICE O/P EST MOD 30 MIN: CPT | Mod: 25

## 2024-12-20 LAB
ALBUMIN SERPL ELPH-MCNC: 3.9 G/DL
ALP BLD-CCNC: 109 U/L
ALT SERPL-CCNC: 17 U/L
ANION GAP SERPL CALC-SCNC: 10 MMOL/L
AST SERPL-CCNC: 15 U/L
BILIRUB SERPL-MCNC: 0.6 MG/DL
BUN SERPL-MCNC: 14 MG/DL
CALCIUM SERPL-MCNC: 9 MG/DL
CHLORIDE SERPL-SCNC: 110 MMOL/L
CO2 SERPL-SCNC: 23 MMOL/L
CREAT SERPL-MCNC: 1.02 MG/DL
EGFR: 60 ML/MIN/1.73M2
GLUCOSE SERPL-MCNC: 96 MG/DL
POTASSIUM SERPL-SCNC: 4.3 MMOL/L
PROT SERPL-MCNC: 6.9 G/DL
SODIUM SERPL-SCNC: 143 MMOL/L

## 2025-01-02 ENCOUNTER — NON-APPOINTMENT (OUTPATIENT)
Age: 70
End: 2025-01-02

## 2025-01-06 ENCOUNTER — TRANSCRIPTION ENCOUNTER (OUTPATIENT)
Age: 70
End: 2025-01-06

## 2025-01-07 ENCOUNTER — RX RENEWAL (OUTPATIENT)
Age: 70
End: 2025-01-07

## 2025-01-31 ENCOUNTER — APPOINTMENT (OUTPATIENT)
Dept: GASTROENTEROLOGY | Facility: CLINIC | Age: 70
End: 2025-01-31
Payer: MEDICARE

## 2025-01-31 VITALS
RESPIRATION RATE: 17 BRPM | HEIGHT: 63 IN | WEIGHT: 167.56 LBS | DIASTOLIC BLOOD PRESSURE: 77 MMHG | OXYGEN SATURATION: 98 % | TEMPERATURE: 98.1 F | BODY MASS INDEX: 29.69 KG/M2 | SYSTOLIC BLOOD PRESSURE: 140 MMHG | HEART RATE: 79 BPM

## 2025-01-31 DIAGNOSIS — K20.90 ESOPHAGITIS, UNSPECIFIED WITHOUT BLEEDING: ICD-10-CM

## 2025-01-31 DIAGNOSIS — I77.810 THORACIC AORTIC ECTASIA: ICD-10-CM

## 2025-01-31 DIAGNOSIS — R10.13 EPIGASTRIC PAIN: ICD-10-CM

## 2025-01-31 DIAGNOSIS — I25.10 ATHEROSCLEROTIC HEART DISEASE OF NATIVE CORONARY ARTERY W/OUT ANGINA PECTORIS: ICD-10-CM

## 2025-01-31 DIAGNOSIS — I50.812 CHRONIC RIGHT HEART FAILURE: ICD-10-CM

## 2025-01-31 DIAGNOSIS — Z12.11 ENCOUNTER FOR SCREENING FOR MALIGNANT NEOPLASM OF COLON: ICD-10-CM

## 2025-01-31 PROCEDURE — 99204 OFFICE O/P NEW MOD 45 MIN: CPT

## 2025-01-31 RX ORDER — SODIUM SULFATE, POTASSIUM SULFATE AND MAGNESIUM SULFATE 1.6; 3.13; 17.5 G/177ML; G/177ML; G/177ML
17.5-3.13-1.6 SOLUTION ORAL
Qty: 177 | Refills: 0 | Status: ACTIVE | COMMUNITY
Start: 2025-01-31 | End: 1900-01-01

## 2025-03-04 ENCOUNTER — APPOINTMENT (OUTPATIENT)
Dept: GASTROENTEROLOGY | Facility: GI CENTER | Age: 70
End: 2025-03-04

## 2025-03-20 ENCOUNTER — NON-APPOINTMENT (OUTPATIENT)
Age: 70
End: 2025-03-20

## 2025-03-20 ENCOUNTER — APPOINTMENT (OUTPATIENT)
Dept: PULMONOLOGY | Facility: CLINIC | Age: 70
End: 2025-03-20
Payer: MEDICARE

## 2025-03-20 ENCOUNTER — APPOINTMENT (OUTPATIENT)
Dept: PULMONOLOGY | Facility: CLINIC | Age: 70
End: 2025-03-20

## 2025-03-20 VITALS — OXYGEN SATURATION: 94 % | DIASTOLIC BLOOD PRESSURE: 83 MMHG | SYSTOLIC BLOOD PRESSURE: 137 MMHG | HEART RATE: 78 BPM

## 2025-03-20 DIAGNOSIS — I27.20 PULMONARY HYPERTENSION, UNSPECIFIED: ICD-10-CM

## 2025-03-20 DIAGNOSIS — I77.810 THORACIC AORTIC ECTASIA: ICD-10-CM

## 2025-03-20 DIAGNOSIS — I27.24 CHRONIC THROMBOEMBOLIC PULMONARY HYPERTENSION: ICD-10-CM

## 2025-03-20 DIAGNOSIS — I71.012 DISSECTION OF DESCENDING THORACIC AORTA: ICD-10-CM

## 2025-03-20 PROCEDURE — 94727 GAS DIL/WSHOT DETER LNG VOL: CPT

## 2025-03-20 PROCEDURE — 94010 BREATHING CAPACITY TEST: CPT

## 2025-03-20 PROCEDURE — 95012 NITRIC OXIDE EXP GAS DETER: CPT

## 2025-03-20 PROCEDURE — ZZZZZ: CPT

## 2025-03-20 PROCEDURE — 94729 DIFFUSING CAPACITY: CPT

## 2025-03-20 PROCEDURE — 36415 COLL VENOUS BLD VENIPUNCTURE: CPT

## 2025-03-20 PROCEDURE — 99214 OFFICE O/P EST MOD 30 MIN: CPT | Mod: 25

## 2025-03-23 LAB
ALBUMIN SERPL ELPH-MCNC: 4 G/DL
ALP BLD-CCNC: 112 U/L
ALT SERPL-CCNC: 13 U/L
ANION GAP SERPL CALC-SCNC: 12 MMOL/L
AST SERPL-CCNC: 15 U/L
BILIRUB SERPL-MCNC: 0.8 MG/DL
BUN SERPL-MCNC: 15 MG/DL
CALCIUM SERPL-MCNC: 9.1 MG/DL
CHLORIDE SERPL-SCNC: 109 MMOL/L
CO2 SERPL-SCNC: 23 MMOL/L
CREAT SERPL-MCNC: 1.1 MG/DL
EGFRCR SERPLBLD CKD-EPI 2021: 54 ML/MIN/1.73M2
GLUCOSE SERPL-MCNC: 100 MG/DL
POTASSIUM SERPL-SCNC: 3.9 MMOL/L
PROT SERPL-MCNC: 6.7 G/DL
SODIUM SERPL-SCNC: 144 MMOL/L

## 2025-04-04 ENCOUNTER — TRANSCRIPTION ENCOUNTER (OUTPATIENT)
Age: 70
End: 2025-04-04

## 2025-04-04 ENCOUNTER — APPOINTMENT (OUTPATIENT)
Dept: GASTROENTEROLOGY | Facility: HOSPITAL | Age: 70
End: 2025-04-04

## 2025-04-04 ENCOUNTER — OUTPATIENT (OUTPATIENT)
Dept: OUTPATIENT SERVICES | Facility: HOSPITAL | Age: 70
LOS: 1 days | End: 2025-04-04
Payer: MEDICARE

## 2025-04-04 DIAGNOSIS — Z98.891 HISTORY OF UTERINE SCAR FROM PREVIOUS SURGERY: Chronic | ICD-10-CM

## 2025-04-04 DIAGNOSIS — Z90.710 ACQUIRED ABSENCE OF BOTH CERVIX AND UTERUS: Chronic | ICD-10-CM

## 2025-04-04 DIAGNOSIS — Z12.11 ENCOUNTER FOR SCREENING FOR MALIGNANT NEOPLASM OF COLON: ICD-10-CM

## 2025-04-04 DIAGNOSIS — R10.30 LOWER ABDOMINAL PAIN, UNSPECIFIED: ICD-10-CM

## 2025-04-04 PROCEDURE — G0121: CPT

## 2025-04-04 PROCEDURE — 45378 DIAGNOSTIC COLONOSCOPY: CPT

## 2025-04-04 DEVICE — HEMOSPRAY HEMOSTAT ENDO 7F: Type: IMPLANTABLE DEVICE | Status: FUNCTIONAL

## 2025-04-04 DEVICE — CLIP RESOLUTION 360 235CM: Type: IMPLANTABLE DEVICE | Status: FUNCTIONAL

## 2025-04-11 ENCOUNTER — APPOINTMENT (OUTPATIENT)
Dept: GASTROENTEROLOGY | Facility: CLINIC | Age: 70
End: 2025-04-11
Payer: MEDICARE

## 2025-04-11 VITALS
HEIGHT: 63 IN | RESPIRATION RATE: 17 BRPM | WEIGHT: 169.56 LBS | HEART RATE: 69 BPM | TEMPERATURE: 98.6 F | OXYGEN SATURATION: 98 % | DIASTOLIC BLOOD PRESSURE: 71 MMHG | SYSTOLIC BLOOD PRESSURE: 111 MMHG | BODY MASS INDEX: 30.04 KG/M2

## 2025-04-11 DIAGNOSIS — K64.9 UNSPECIFIED HEMORRHOIDS: ICD-10-CM

## 2025-04-11 DIAGNOSIS — Z12.11 ENCOUNTER FOR SCREENING FOR MALIGNANT NEOPLASM OF COLON: ICD-10-CM

## 2025-04-11 DIAGNOSIS — R10.13 EPIGASTRIC PAIN: ICD-10-CM

## 2025-04-11 DIAGNOSIS — K20.90 ESOPHAGITIS, UNSPECIFIED WITHOUT BLEEDING: ICD-10-CM

## 2025-04-11 DIAGNOSIS — K57.90 DIVERTICULOSIS OF INTESTINE, PART UNSPECIFIED, W/OUT PERFORATION OR ABSCESS W/OUT BLEEDING: ICD-10-CM

## 2025-04-11 DIAGNOSIS — Z87.19 PERSONAL HISTORY OF OTHER DISEASES OF THE DIGESTIVE SYSTEM: ICD-10-CM

## 2025-04-11 PROCEDURE — 99214 OFFICE O/P EST MOD 30 MIN: CPT

## 2025-04-12 PROBLEM — K57.90 DIVERTICULOSIS: Status: ACTIVE | Noted: 2025-04-12

## 2025-04-12 PROBLEM — K20.90 ESOPHAGITIS: Status: ACTIVE | Noted: 2025-04-12

## 2025-04-12 PROBLEM — R10.13 DYSPEPSIA: Status: RESOLVED | Noted: 2025-01-31 | Resolved: 2025-04-12

## 2025-04-12 PROBLEM — Z12.11 COLON CANCER SCREENING: Status: RESOLVED | Noted: 2025-01-31 | Resolved: 2025-04-12

## 2025-04-12 PROBLEM — R10.13 DYSPEPSIA: Status: ACTIVE | Noted: 2025-04-12

## 2025-04-12 PROBLEM — Z12.11 COLON CANCER SCREENING: Status: ACTIVE | Noted: 2025-04-12

## 2025-04-12 PROBLEM — Z87.19 HISTORY OF ESOPHAGITIS: Status: RESOLVED | Noted: 2025-01-31 | Resolved: 2025-04-12

## 2025-04-12 PROBLEM — K64.9 HEMORRHOIDS: Status: ACTIVE | Noted: 2025-04-12

## 2025-04-24 NOTE — BRIEF OPERATIVE NOTE - CO SURGEON
Goal Outcome Evaluation:                        Status: presented to the ED with a displaced tube and was admitted for IR replacement of the G tube.   Activity: SBA  Neuro: Intact, AnO x4, afebrile  Cardiac: WDL except hypotensive Bps at times  Respiratory: WDL on RA  GI/: Voiding spontaneously, passing gas, last BM 4/23   Diet: Regular, tube feed cont running at goal rate of 45mL/hr.  Skin: GJ and PICC  Lines/Drains: Double lumen PICC hep locked, GJ with J infusing tube feeds, G to gravity  Pain/Nausea: 9/10 abd pain managed with 10mg Oxy x2 and flexeril x1 overnight. Denies nausea.  Changes:     Linn Lindo (Resident)

## 2025-06-10 ENCOUNTER — APPOINTMENT (OUTPATIENT)
Dept: INTERNAL MEDICINE | Facility: CLINIC | Age: 70
End: 2025-06-10
Payer: MEDICARE

## 2025-06-10 VITALS
OXYGEN SATURATION: 94 % | HEART RATE: 72 BPM | TEMPERATURE: 97.5 F | HEIGHT: 63 IN | WEIGHT: 166.06 LBS | SYSTOLIC BLOOD PRESSURE: 133 MMHG | RESPIRATION RATE: 16 BRPM | BODY MASS INDEX: 29.42 KG/M2 | DIASTOLIC BLOOD PRESSURE: 80 MMHG

## 2025-06-10 PROBLEM — L98.9 SKIN LESION: Status: ACTIVE | Noted: 2025-06-10

## 2025-06-10 PROCEDURE — G2211 COMPLEX E/M VISIT ADD ON: CPT

## 2025-06-10 PROCEDURE — 36415 COLL VENOUS BLD VENIPUNCTURE: CPT

## 2025-06-10 PROCEDURE — 99214 OFFICE O/P EST MOD 30 MIN: CPT

## 2025-06-11 ENCOUNTER — TRANSCRIPTION ENCOUNTER (OUTPATIENT)
Age: 70
End: 2025-06-11

## 2025-06-11 LAB
25(OH)D3 SERPL-MCNC: 29.8 NG/ML
ALBUMIN SERPL ELPH-MCNC: 3.7 G/DL
ALP BLD-CCNC: 104 U/L
ALT SERPL-CCNC: 34 U/L
ANION GAP SERPL CALC-SCNC: 15 MMOL/L
AST SERPL-CCNC: 29 U/L
BASOPHILS # BLD AUTO: 0.03 K/UL
BASOPHILS NFR BLD AUTO: 0.6 %
BILIRUB SERPL-MCNC: 0.6 MG/DL
BUN SERPL-MCNC: 16 MG/DL
CALCIUM SERPL-MCNC: 9 MG/DL
CHLORIDE SERPL-SCNC: 105 MMOL/L
CHOLEST SERPL-MCNC: 123 MG/DL
CO2 SERPL-SCNC: 21 MMOL/L
CREAT SERPL-MCNC: 1.04 MG/DL
EGFRCR SERPLBLD CKD-EPI 2021: 58 ML/MIN/1.73M2
EOSINOPHIL # BLD AUTO: 0.13 K/UL
EOSINOPHIL NFR BLD AUTO: 2.5 %
ESTIMATED AVERAGE GLUCOSE: 126 MG/DL
GLUCOSE SERPL-MCNC: 90 MG/DL
HBA1C MFR BLD HPLC: 6 %
HCT VFR BLD CALC: 37 %
HDLC SERPL-MCNC: 44 MG/DL
HGB BLD-MCNC: 12.4 G/DL
IMM GRANULOCYTES NFR BLD AUTO: 0.4 %
LDLC SERPL-MCNC: 67 MG/DL
LYMPHOCYTES # BLD AUTO: 1.83 K/UL
LYMPHOCYTES NFR BLD AUTO: 35.2 %
MAN DIFF?: NORMAL
MCHC RBC-ENTMCNC: 25.5 PG
MCHC RBC-ENTMCNC: 33.5 G/DL
MCV RBC AUTO: 76 FL
MONOCYTES # BLD AUTO: 0.44 K/UL
MONOCYTES NFR BLD AUTO: 8.5 %
NEUTROPHILS # BLD AUTO: 2.75 K/UL
NEUTROPHILS NFR BLD AUTO: 52.8 %
NONHDLC SERPL-MCNC: 79 MG/DL
PLATELET # BLD AUTO: 208 K/UL
POTASSIUM SERPL-SCNC: 4.2 MMOL/L
PROT SERPL-MCNC: 6.3 G/DL
RBC # BLD: 4.87 M/UL
RBC # FLD: 16.5 %
SODIUM SERPL-SCNC: 142 MMOL/L
TRIGL SERPL-MCNC: 52 MG/DL
WBC # FLD AUTO: 5.2 K/UL

## 2025-06-19 ENCOUNTER — APPOINTMENT (OUTPATIENT)
Dept: PULMONOLOGY | Facility: CLINIC | Age: 70
End: 2025-06-19
Payer: MEDICARE

## 2025-06-19 VITALS — SYSTOLIC BLOOD PRESSURE: 132 MMHG | OXYGEN SATURATION: 97 % | HEART RATE: 57 BPM | DIASTOLIC BLOOD PRESSURE: 85 MMHG

## 2025-06-19 PROCEDURE — 94727 GAS DIL/WSHOT DETER LNG VOL: CPT

## 2025-06-19 PROCEDURE — ZZZZZ: CPT

## 2025-06-19 PROCEDURE — 94010 BREATHING CAPACITY TEST: CPT

## 2025-06-19 PROCEDURE — 94729 DIFFUSING CAPACITY: CPT

## 2025-06-19 PROCEDURE — 99214 OFFICE O/P EST MOD 30 MIN: CPT | Mod: 25

## 2025-06-24 ENCOUNTER — NON-APPOINTMENT (OUTPATIENT)
Age: 70
End: 2025-06-24

## 2025-06-24 ENCOUNTER — APPOINTMENT (OUTPATIENT)
Dept: CARDIOLOGY | Facility: CLINIC | Age: 70
End: 2025-06-24
Payer: MEDICARE

## 2025-06-24 VITALS
HEART RATE: 74 BPM | WEIGHT: 173 LBS | TEMPERATURE: 97.6 F | SYSTOLIC BLOOD PRESSURE: 119 MMHG | RESPIRATION RATE: 16 BRPM | OXYGEN SATURATION: 94 % | BODY MASS INDEX: 30.65 KG/M2 | HEIGHT: 63 IN | DIASTOLIC BLOOD PRESSURE: 73 MMHG

## 2025-06-24 PROBLEM — I51.7 ASYMMETRIC SEPTAL HYPERTROPHY: Status: ACTIVE | Noted: 2023-03-24

## 2025-06-24 PROCEDURE — G2211 COMPLEX E/M VISIT ADD ON: CPT

## 2025-06-24 PROCEDURE — 99214 OFFICE O/P EST MOD 30 MIN: CPT

## 2025-06-24 PROCEDURE — 93000 ELECTROCARDIOGRAM COMPLETE: CPT

## 2025-06-26 ENCOUNTER — APPOINTMENT (OUTPATIENT)
Dept: CT IMAGING | Facility: CLINIC | Age: 70
End: 2025-06-26

## 2025-06-26 ENCOUNTER — OUTPATIENT (OUTPATIENT)
Dept: OUTPATIENT SERVICES | Facility: HOSPITAL | Age: 70
LOS: 1 days | End: 2025-06-26
Payer: MEDICARE

## 2025-06-26 DIAGNOSIS — Z98.891 HISTORY OF UTERINE SCAR FROM PREVIOUS SURGERY: Chronic | ICD-10-CM

## 2025-06-26 DIAGNOSIS — Z90.710 ACQUIRED ABSENCE OF BOTH CERVIX AND UTERUS: Chronic | ICD-10-CM

## 2025-06-26 DIAGNOSIS — I71.012 DISSECTION OF DESCENDING THORACIC AORTA: ICD-10-CM

## 2025-06-26 PROCEDURE — 71275 CT ANGIOGRAPHY CHEST: CPT | Mod: 26

## 2025-06-26 PROCEDURE — 71275 CT ANGIOGRAPHY CHEST: CPT

## 2025-06-26 PROCEDURE — 74174 CTA ABD&PLVS W/CONTRAST: CPT | Mod: 26

## 2025-06-26 PROCEDURE — 74174 CTA ABD&PLVS W/CONTRAST: CPT

## 2025-06-28 ENCOUNTER — NON-APPOINTMENT (OUTPATIENT)
Age: 70
End: 2025-06-28

## 2025-06-30 ENCOUNTER — APPOINTMENT (OUTPATIENT)
Dept: DERMATOLOGY | Facility: CLINIC | Age: 70
End: 2025-06-30
Payer: MEDICARE

## 2025-06-30 VITALS — BODY MASS INDEX: 30.3 KG/M2 | WEIGHT: 171 LBS | HEIGHT: 63 IN

## 2025-06-30 PROBLEM — L82.1 SEBORRHEIC KERATOSES: Status: ACTIVE | Noted: 2025-06-30

## 2025-06-30 PROCEDURE — 99202 OFFICE O/P NEW SF 15 MIN: CPT | Mod: 25

## 2025-06-30 PROCEDURE — 17110 DESTRUCTION B9 LES UP TO 14: CPT

## 2025-07-02 ENCOUNTER — APPOINTMENT (OUTPATIENT)
Dept: CARDIOLOGY | Facility: CLINIC | Age: 70
End: 2025-07-02
Payer: MEDICARE

## 2025-07-02 PROCEDURE — 93306 TTE W/DOPPLER COMPLETE: CPT

## 2025-07-17 ENCOUNTER — APPOINTMENT (OUTPATIENT)
Dept: CARDIOTHORACIC SURGERY | Facility: CLINIC | Age: 70
End: 2025-07-17

## 2025-07-17 VITALS
SYSTOLIC BLOOD PRESSURE: 134 MMHG | DIASTOLIC BLOOD PRESSURE: 80 MMHG | TEMPERATURE: 98.6 F | BODY MASS INDEX: 30.12 KG/M2 | OXYGEN SATURATION: 94 % | RESPIRATION RATE: 16 BRPM | WEIGHT: 170 LBS | HEIGHT: 63 IN | HEART RATE: 56 BPM

## 2025-07-17 PROCEDURE — 99214 OFFICE O/P EST MOD 30 MIN: CPT

## 2025-07-28 ENCOUNTER — APPOINTMENT (OUTPATIENT)
Dept: MAMMOGRAPHY | Facility: CLINIC | Age: 70
End: 2025-07-28
Payer: MEDICARE

## 2025-07-28 ENCOUNTER — RESULT REVIEW (OUTPATIENT)
Age: 70
End: 2025-07-28

## 2025-07-28 PROCEDURE — 77063 BREAST TOMOSYNTHESIS BI: CPT

## 2025-07-28 PROCEDURE — 77067 SCR MAMMO BI INCL CAD: CPT

## 2025-07-29 ENCOUNTER — TRANSCRIPTION ENCOUNTER (OUTPATIENT)
Age: 70
End: 2025-07-29

## 2025-08-14 ENCOUNTER — APPOINTMENT (OUTPATIENT)
Dept: DERMATOLOGY | Facility: CLINIC | Age: 70
End: 2025-08-14
Payer: MEDICARE

## 2025-08-14 DIAGNOSIS — B07.9 VIRAL WART, UNSPECIFIED: ICD-10-CM

## 2025-08-14 PROCEDURE — 17110 DESTRUCTION B9 LES UP TO 14: CPT

## 2025-08-29 ENCOUNTER — APPOINTMENT (OUTPATIENT)
Dept: UROLOGY | Facility: CLINIC | Age: 70
End: 2025-08-29

## 2025-08-29 VITALS — SYSTOLIC BLOOD PRESSURE: 150 MMHG | HEART RATE: 80 BPM | DIASTOLIC BLOOD PRESSURE: 80 MMHG

## 2025-08-29 DIAGNOSIS — R33.9 RETENTION OF URINE, UNSPECIFIED: ICD-10-CM

## 2025-08-29 PROCEDURE — 51798 US URINE CAPACITY MEASURE: CPT

## 2025-08-29 PROCEDURE — 99214 OFFICE O/P EST MOD 30 MIN: CPT | Mod: 25

## 2025-08-29 PROCEDURE — 99459 PELVIC EXAMINATION: CPT

## 2025-09-02 PROBLEM — R33.9 INCOMPLETE EMPTYING OF BLADDER: Status: ACTIVE | Noted: 2025-09-02

## 2025-09-02 LAB
APPEARANCE: CLEAR
BACTERIA UR CULT: ABNORMAL
BACTERIA: ABNORMAL /HPF
BILIRUBIN URINE: NEGATIVE
BLOOD URINE: NEGATIVE
CALCIUM OXALATE CRYSTALS: PRESENT
CAST: 0 /LPF
COLOR: YELLOW
EPITHELIAL CELLS: 13 /HPF
GLUCOSE QUALITATIVE U: NEGATIVE MG/DL
KETONES URINE: NEGATIVE MG/DL
LEUKOCYTE ESTERASE URINE: NEGATIVE
MICROSCOPIC-UA: NORMAL
NITRITE URINE: NEGATIVE
PH URINE: 6
PROTEIN URINE: 30 MG/DL
RED BLOOD CELLS URINE: 4 /HPF
REVIEW: NORMAL
SPECIFIC GRAVITY URINE: 1.03
UROBILINOGEN URINE: 1 MG/DL
WHITE BLOOD CELLS URINE: 1 /HPF

## 2025-09-03 ENCOUNTER — NON-APPOINTMENT (OUTPATIENT)
Age: 70
End: 2025-09-03

## 2025-09-05 ENCOUNTER — APPOINTMENT (OUTPATIENT)
Dept: UROLOGY | Facility: CLINIC | Age: 70
End: 2025-09-05
Payer: MEDICARE

## 2025-09-05 ENCOUNTER — OUTPATIENT (OUTPATIENT)
Dept: OUTPATIENT SERVICES | Facility: HOSPITAL | Age: 70
LOS: 1 days | End: 2025-09-05
Payer: MEDICARE

## 2025-09-05 DIAGNOSIS — Z98.891 HISTORY OF UTERINE SCAR FROM PREVIOUS SURGERY: Chronic | ICD-10-CM

## 2025-09-05 DIAGNOSIS — R35.0 FREQUENCY OF MICTURITION: ICD-10-CM

## 2025-09-05 DIAGNOSIS — R33.9 RETENTION OF URINE, UNSPECIFIED: ICD-10-CM

## 2025-09-05 DIAGNOSIS — Z90.710 ACQUIRED ABSENCE OF BOTH CERVIX AND UTERUS: Chronic | ICD-10-CM

## 2025-09-05 PROCEDURE — 51701 INSERT BLADDER CATHETER: CPT

## 2025-09-08 DIAGNOSIS — R33.9 RETENTION OF URINE, UNSPECIFIED: ICD-10-CM

## 2025-09-09 DIAGNOSIS — R82.71 BACTERIURIA: ICD-10-CM

## 2025-09-09 LAB
APPEARANCE: CLEAR
BACTERIA UR CULT: ABNORMAL
BACTERIA: NEGATIVE /HPF
BILIRUBIN URINE: NEGATIVE
BLOOD URINE: NEGATIVE
CAST: 0 /LPF
COLOR: YELLOW
EPITHELIAL CELLS: 1 /HPF
GLUCOSE QUALITATIVE U: NEGATIVE MG/DL
KETONES URINE: NEGATIVE MG/DL
LEUKOCYTE ESTERASE URINE: NEGATIVE
MICROSCOPIC-UA: NORMAL
NITRITE URINE: NEGATIVE
PH URINE: 6
PROTEIN URINE: NEGATIVE MG/DL
RED BLOOD CELLS URINE: 1 /HPF
SPECIFIC GRAVITY URINE: 1.02
UROBILINOGEN URINE: 1 MG/DL
WHITE BLOOD CELLS URINE: 0 /HPF

## 2025-09-12 ENCOUNTER — TRANSCRIPTION ENCOUNTER (OUTPATIENT)
Age: 70
End: 2025-09-12

## 2025-09-12 RX ORDER — NITROFURANTOIN (MONOHYDRATE/MACROCRYSTALS) 25; 75 MG/1; MG/1
100 CAPSULE ORAL
Qty: 10 | Refills: 0 | Status: DISCONTINUED | COMMUNITY
Start: 2025-09-09 | End: 2025-09-12

## 2025-09-13 ENCOUNTER — RX RENEWAL (OUTPATIENT)
Age: 70
End: 2025-09-13

## 2025-09-15 ENCOUNTER — APPOINTMENT (OUTPATIENT)
Dept: DERMATOLOGY | Facility: CLINIC | Age: 70
End: 2025-09-15
Payer: MEDICARE

## 2025-09-15 ENCOUNTER — TRANSCRIPTION ENCOUNTER (OUTPATIENT)
Age: 70
End: 2025-09-15

## 2025-09-15 DIAGNOSIS — D23.9 OTHER BENIGN NEOPLASM OF SKIN, UNSPECIFIED: ICD-10-CM

## 2025-09-15 PROCEDURE — 99213 OFFICE O/P EST LOW 20 MIN: CPT

## 2025-09-18 ENCOUNTER — APPOINTMENT (OUTPATIENT)
Dept: PULMONOLOGY | Facility: CLINIC | Age: 70
End: 2025-09-18
Payer: MEDICARE

## 2025-09-18 VITALS — SYSTOLIC BLOOD PRESSURE: 138 MMHG | HEART RATE: 77 BPM | OXYGEN SATURATION: 91 % | DIASTOLIC BLOOD PRESSURE: 83 MMHG

## 2025-09-18 DIAGNOSIS — I27.20 PULMONARY HYPERTENSION, UNSPECIFIED: ICD-10-CM

## 2025-09-18 DIAGNOSIS — I77.810 THORACIC AORTIC ECTASIA: ICD-10-CM

## 2025-09-18 DIAGNOSIS — R06.02 SHORTNESS OF BREATH: ICD-10-CM

## 2025-09-18 DIAGNOSIS — I27.24 CHRONIC THROMBOEMBOLIC PULMONARY HYPERTENSION: ICD-10-CM

## 2025-09-18 PROCEDURE — 94729 DIFFUSING CAPACITY: CPT

## 2025-09-18 PROCEDURE — 95012 NITRIC OXIDE EXP GAS DETER: CPT

## 2025-09-18 PROCEDURE — 94727 GAS DIL/WSHOT DETER LNG VOL: CPT

## 2025-09-18 PROCEDURE — 94010 BREATHING CAPACITY TEST: CPT

## 2025-09-18 PROCEDURE — ZZZZZ: CPT

## 2025-09-18 PROCEDURE — 99214 OFFICE O/P EST MOD 30 MIN: CPT | Mod: 25

## (undated) DEVICE — CATH TRIOX OXIMETRY 8F 3 LUMENS

## (undated) DEVICE — FORCEP RADIAL JAW 4 W NDL 2.4MM 2.8MM 240CM ORANGE DISP

## (undated) DEVICE — NDL INJ SCLERO INTERJECT 23G

## (undated) DEVICE — TRAP QUICK CATCH  SINGL CHAMBER

## (undated) DEVICE — ELCTR STRYKER NEPTUNE SMOKE EVACUATION PENCIL (GREEN)

## (undated) DEVICE — SYR IV POSIFLUSH NS 3ML 30/TY

## (undated) DEVICE — DRSG TEGADERM CHLORHEXIDINE 3.5 X 4.5"

## (undated) DEVICE — FORCEP RADIAL JAW 4 JUMBO 2.8MM 3.2MM 240CM ORANGE DISP

## (undated) DEVICE — SET IV PUMP BLOOD 1VALVE 180FILTER NON-DEHP

## (undated) DEVICE — ELCTR BOVIE PENCIL HANDPIECE ROCKER SWITCH 15FT

## (undated) DEVICE — CATH ELECHMSTAT  INJ 7FR 210CM

## (undated) DEVICE — SUT HEWSON RETRIEVER

## (undated) DEVICE — SUCTION YANKAUER TAPERED BULBOUS NO VENT

## (undated) DEVICE — TRAP SPECIMEN SPUTUM 40CC

## (undated) DEVICE — PACK PROCEDURE HARVEST SMARTPREP APC-60

## (undated) DEVICE — NDL COUNTER FOAM AND MAGNET 40-70

## (undated) DEVICE — SNARE LRG

## (undated) DEVICE — GEL AQUSNC PACKET 20GR

## (undated) DEVICE — SENSOR O2 FINGER XL ADULT 24/BX 6BX/CA

## (undated) DEVICE — DVC TORQ PERIF 0.035

## (undated) DEVICE — DRSG DERMABOND 0.7ML

## (undated) DEVICE — SUT BOOT STANDARD (ORIGINAL YELLOW) 5 PAIR

## (undated) DEVICE — PACK HYBRID LHH

## (undated) DEVICE — FORMALIN CUPS 10% BUFFERED

## (undated) DEVICE — SUT VICRYL 2-0 27" CT-1

## (undated) DEVICE — SYS DEL CONTROLLER ANGIOTOUCH

## (undated) DEVICE — BRUSH COLONOSCOPY CYTOLOGY

## (undated) DEVICE — SUT SILK 2-0 18" SH (POP-OFF)

## (undated) DEVICE — SNARE POLYP SENS 27MM 240CM

## (undated) DEVICE — CANISTER SUCTION 1200CC 10/SL

## (undated) DEVICE — NDL COUNTER FOAM AND MAGNET 20-40

## (undated) DEVICE — VALVE BIOPSY

## (undated) DEVICE — ELCTR GROUNDING PAD ADULT COVIDIEN

## (undated) DEVICE — CATH IV SAFE BC 22G X 1" (BLUE)

## (undated) DEVICE — DRSG ALLEVYN LIFE 6 X 6 (PINK)

## (undated) DEVICE — CATH IV SAFE BC 20G X 1.16" (PINK)

## (undated) DEVICE — TUBING SMOKE EVAC 3/8" X 10FT FOR NEPTUNE

## (undated) DEVICE — TUBING CANNULA SALTER LABS NASAL ADULT 7FT

## (undated) DEVICE — SYR LUER SLIP TIP 30CC

## (undated) DEVICE — SOL IRR POUR H2O 500ML

## (undated) DEVICE — ENDOCUFF VISION SZ 2 LG GRN

## (undated) DEVICE — SUT PROLENE 5-0 30" RB-2

## (undated) DEVICE — CLAMP BX HOT RAD JAW 3

## (undated) DEVICE — TUBING IV SET GRAVITY 3Y 100" MACRO

## (undated) DEVICE — KIT DRSG CVC CHG 40/CA

## (undated) DEVICE — DRAPE PROBE COVER 5" X 96"

## (undated) DEVICE — STAPLER SKIN MULTI DIRECTION W35

## (undated) DEVICE — SUT MONOCRYL 4-0 27" PS-2 UNDYED

## (undated) DEVICE — STERIS DEFENDO 3-PIECE KIT (AIR/WATER, SUCTION & BIOPSY VALVES)

## (undated) DEVICE — ELCTR REM POLYHESIVE ADULT PT RETURN 15FT

## (undated) DEVICE — SUT NUROLON 1 18" OS-8 (POP-OFF)

## (undated) DEVICE — INFLATOR ENCORE 26

## (undated) DEVICE — KIT APPLICATOR SPRAY FOR HARVEST SYSTEM

## (undated) DEVICE — SOL IRR NS 0.9% 250ML

## (undated) DEVICE — DRSG BIOPATCH DISK W CHG 1" W 4.0MM HOLE

## (undated) DEVICE — POLY TRAP ETRAP

## (undated) DEVICE — Device

## (undated) DEVICE — SUT PROLENE 4-0 36" RB-1

## (undated) DEVICE — TUBING SUCTION CONN 6FT STERILE

## (undated) DEVICE — ENDOCUFF VISION SZ 3 SM PRPL

## (undated) DEVICE — RETRIEVER ROTH NET PLATINUM-UNIVERSAL

## (undated) DEVICE — DRAPE FLUID WARMER 44 X 66"

## (undated) DEVICE — VASCULAR DILATOR KIT 8,12,16,20, 24FR

## (undated) DEVICE — TUBE O2 SUPL CRUSH RESIS CONN SOUTHSIDE ONLY

## (undated) DEVICE — MARKER ENDO SPOT EX

## (undated) DEVICE — MASK O2 NON REBREATH 3IN1 ADULT

## (undated) DEVICE — TUBE RECTAL 24FR

## (undated) DEVICE — SYR LUER SLIP TIP 50CC

## (undated) DEVICE — MASK O2 ADLT POM ELITE W/ MED AND HI CONCEN M TO M 10FT

## (undated) DEVICE — CATH ELCTR GLIDE PRB 7FR

## (undated) DEVICE — DRAPE ULTRASOUND TRANSDUCER COVER

## (undated) DEVICE — DRAPE IOBAN 33" X 23"

## (undated) DEVICE — MANIFOLD ANGIO AUTOMD TRNDCR

## (undated) DEVICE — PACK IV START WITH CHG

## (undated) DEVICE — TUBING IV SET SECONDARY 34"